# Patient Record
Sex: MALE | Race: WHITE | Employment: OTHER | URBAN - METROPOLITAN AREA
[De-identification: names, ages, dates, MRNs, and addresses within clinical notes are randomized per-mention and may not be internally consistent; named-entity substitution may affect disease eponyms.]

---

## 2017-01-04 ENCOUNTER — ANTICOAGULATION THERAPY VISIT (OUTPATIENT)
Dept: ANTICOAGULATION | Facility: CLINIC | Age: 67
End: 2017-01-04
Payer: MEDICARE

## 2017-01-04 ENCOUNTER — ALLIED HEALTH/NURSE VISIT (OUTPATIENT)
Dept: FAMILY MEDICINE | Facility: CLINIC | Age: 67
End: 2017-01-04
Payer: MEDICARE

## 2017-01-04 DIAGNOSIS — Z23 NEED FOR PROPHYLACTIC VACCINATION AND INOCULATION AGAINST INFLUENZA: Primary | ICD-10-CM

## 2017-01-04 DIAGNOSIS — Z79.01 LONG-TERM (CURRENT) USE OF ANTICOAGULANTS: Primary | ICD-10-CM

## 2017-01-04 DIAGNOSIS — I48.91 ATRIAL FIBRILLATION WITH RVR (H): ICD-10-CM

## 2017-01-04 LAB — INR POINT OF CARE: 5.4 (ref 0.86–1.14)

## 2017-01-04 PROCEDURE — 99207 ZZC NO CHARGE NURSE ONLY: CPT

## 2017-01-04 PROCEDURE — 36416 COLLJ CAPILLARY BLOOD SPEC: CPT

## 2017-01-04 PROCEDURE — 90662 IIV NO PRSV INCREASED AG IM: CPT

## 2017-01-04 PROCEDURE — G0008 ADMIN INFLUENZA VIRUS VAC: HCPCS

## 2017-01-04 PROCEDURE — 85610 PROTHROMBIN TIME: CPT | Mod: QW

## 2017-01-04 NOTE — NURSING NOTE
Prior to injection verified patient identity using patient's name and date of birth.  Per orders of Dr. Valdez injection of Flu given by Mayra Dumas MA. Patient instructed to remain in clinic for 20 minutes afterwards and to report any adverse reaction to me immediately.

## 2017-01-04 NOTE — PROGRESS NOTES
Injectable Influenza Immunization Documentation    1.  Is the person to be vaccinated sick today?  No    2. Does the person to be vaccinated have an allergy to eggs or to a component of the vaccine?  No    3. Has the person to be vaccinated today ever had a serious reaction to influenza vaccine in the past?  No    4. Has the person to be vaccinated ever had Guillain-[place] syndrome?  No     Form completed by Mayra Dumas MA

## 2017-01-04 NOTE — PROGRESS NOTES
ANTICOAGULATION FOLLOW-UP CLINIC VISIT    Patient Name:  Home Valdez  Date:  1/4/2017  Contact Type:  Face to Face    SUBJECTIVE:     Patient Findings     Positives Medication Changes (he was on prednisone and an antibiotic but it has been >7 days since then), OTC meds (some more Tylenol recently), Inflammation (there may still be some inflammation going on from URI)           OBJECTIVE    INR PROTIME   Date Value Ref Range Status   01/04/2017 5.4* 0.86 - 1.14 Final       ASSESSMENT / PLAN  INR assessment SUPRA    Recheck INR In: 5 DAYS    INR Location Clinic      Anticoagulation Summary as of 1/4/2017     INR goal 2.0-3.0   Selected INR 5.4! (1/4/2017)   Maintenance plan 10 mg (2.5 mg x 4) on Mon, Wed, Fri; 7.5 mg (2.5 mg x 3) all other days   Full instructions 1/4: Hold; Otherwise 10 mg on Mon, Wed, Fri; 7.5 mg all other days   Weekly total 60 mg   Plan last modified Brittany Martin RN (10/3/2016)   Next INR check 1/9/2017   Target end date     Indications   Atrial fibrillation with RVR (H) [I48.91]  Long-term (current) use of anticoagulants [Z79.01] [Z79.01]         Anticoagulation Episode Summary     INR check location     Preferred lab     Send INR reminders to [facility]    Comments 2.5 MG TABLETS, likes print out       Anticoagulation Care Providers     Provider Role Specialty Phone number    Neo Galicia MD [facility] [phone]            See the Encounter Report to view Anticoagulation Flowsheet and Dosing Calendar (Go to Encounters tab in chart review, and find the Anticoagulation Therapy Visit)    Dosage adjustment made based on physician directed care plan.    Hold Wed then resume- about a 16-17% decrease in weekly dose   He is aware that his blood in considered 'thinner' and that he need to be careful with shaving, cutting, walking, etc.     Brittany Martin RN

## 2017-01-04 NOTE — MR AVS SNAPSHOT
Home Valdez   1/4/2017 1:30 PM   Anticoagulation Therapy Visit    Description:  66 year old male   Provider:  SHREYAS ANTI COAG   Department:  Ph Anticoag           INR as of 1/4/2017     Selected INR 5.4! (1/4/2017)      Anticoagulation Summary as of 1/4/2017     INR goal 2.0-3.0   Selected INR 5.4! (1/4/2017)   Full instructions 1/4: Hold; Otherwise 10 mg on Mon, Wed, Fri; 7.5 mg all other days   Next INR check 1/9/2017    Indications   Atrial fibrillation with RVR (H) [I48.91]  Long-term (current) use of anticoagulants [Z79.01] [Z79.01]         Your next Anticoagulation Clinic appointment(s)     Jan 09, 2017  8:30 AM   Anticoagulation Visit with SHREYAS ANTI DANI   [facility]    [address] [place] [address]   [phone]              Contact Numbers     Clinic Number:         January 2017 Details    Sun Mon Tue Wed Thu Fri Sat     1               2               3               4      Hold   See details      5      7.5 mg         6      10 mg         7      7.5 mg           8      7.5 mg         9            10               11               12               13               14                 15               16               17               18               19               20               21                 22               23               24               25               26               27               28                 29               30               31                    Date Details   01/04 This INR check       Date of next INR:  1/9/2017         How to take your warfarin dose     To take:  7.5 mg Take 3 of the 2.5 mg tablets.    To take:  10 mg Take 4 of the 2.5 mg tablets.    Hold Do not take your warfarin dose. See the Details table to the right for additional instructions.

## 2017-01-09 ENCOUNTER — ANTICOAGULATION THERAPY VISIT (OUTPATIENT)
Dept: ANTICOAGULATION | Facility: CLINIC | Age: 67
End: 2017-01-09
Payer: MEDICARE

## 2017-01-09 DIAGNOSIS — Z79.01 LONG-TERM (CURRENT) USE OF ANTICOAGULANTS: Primary | ICD-10-CM

## 2017-01-09 DIAGNOSIS — I48.91 ATRIAL FIBRILLATION WITH RVR (H): ICD-10-CM

## 2017-01-09 LAB — INR POINT OF CARE: 3.9 (ref 0.86–1.14)

## 2017-01-09 PROCEDURE — 99207 ZZC NO CHARGE NURSE ONLY: CPT

## 2017-01-09 PROCEDURE — 85610 PROTHROMBIN TIME: CPT | Mod: QW

## 2017-01-09 PROCEDURE — 36416 COLLJ CAPILLARY BLOOD SPEC: CPT

## 2017-01-09 NOTE — PROGRESS NOTES
ANTICOAGULATION FOLLOW-UP CLINIC VISIT    Patient Name:  Home Valdez  Date:  1/9/2017  Contact Type:  Face to Face    SUBJECTIVE:     Patient Findings     Positives Other Complaints (Trigeminal Neurelgia acted up twice this weekend. ), OTC meds (took a Tylenol on Thursday. )           OBJECTIVE    INR PROTIME   Date Value Ref Range Status   01/09/2017 3.9* 0.86 - 1.14 Final       ASSESSMENT / PLAN  INR assessment SUPRA    Recheck INR In: 4 DAYS    INR Location Clinic      Anticoagulation Summary as of 1/9/2017     INR goal 2.0-3.0   Selected INR 3.9! (1/9/2017)   Maintenance plan 10 mg (2.5 mg x 4) on Mon, Wed, Fri; 7.5 mg (2.5 mg x 3) all other days   Full instructions 1/9: 5 mg; 1/10: 5 mg; 1/11: 7.5 mg; 1/12: 5 mg; Otherwise 10 mg on Mon, Wed, Fri; 7.5 mg all other days   Weekly total 60 mg   Plan last modified Brittany Martin RN (10/3/2016)   Next INR check 1/13/2017   Target end date     Indications   Atrial fibrillation with RVR (H) [I48.91]  Long-term (current) use of anticoagulants [Z79.01] [Z79.01]         Anticoagulation Episode Summary     INR check location     Preferred lab     Send INR reminders to [facility]    Comments 2.5 MG TABLETS, likes print out       Anticoagulation Care Providers     Provider Role Specialty Phone number    Neo Galicia MD [facility] [phone]            See the Encounter Report to view Anticoagulation Flowsheet and Dosing Calendar (Go to Encounters tab in chart review, and find the Anticoagulation Therapy Visit)    Dosage adjustment made based on physician directed care plan.    Brittany Martin, FRENCH

## 2017-01-09 NOTE — MR AVS SNAPSHOT
Home Valdez   1/9/2017 8:30 AM   Anticoagulation Therapy Visit    Description:  66 year old male   Provider:  PH ANTI COAG   Department:  Ph Anticoag           INR as of 1/9/2017     Selected INR 3.9! (1/9/2017)      Anticoagulation Summary as of 1/9/2017     INR goal 2.0-3.0   Selected INR 3.9! (1/9/2017)   Full instructions 1/9: 5 mg; 1/10: 5 mg; 1/11: 7.5 mg; 1/12: 5 mg; Otherwise 10 mg on Mon, Wed, Fri; 7.5 mg all other days   Next INR check 1/13/2017    Indications   Atrial fibrillation with RVR (H) [I48.91]  Long-term (current) use of anticoagulants [Z79.01] [Z79.01]         Your next Anticoagulation Clinic appointment(s)     Jan 09, 2017  8:30 AM   Anticoagulation Visit with PH ANTI COAG   [facility]    [address] [place] [address]   [phone]            Jan 13, 2017  8:30 AM   Anticoagulation Visit with PH ANTI COAG   [facility]    [address] [place] [address]   [phone]              Contact Numbers     Clinic Number:         January 2017 Details    Sun Mon Tue Wed Thu Fri Sat     1               2               3               4               5               6               7                 8               9      5 mg   See details      10      5 mg         11      7.5 mg         12      5 mg         13            14                 15               16               17               18               19               20               21                 22               23               24               25               26               27               28                 29               30               31                    Date Details   01/09 This INR check       Date of next INR:  1/13/2017         How to take your warfarin dose     To take:  5 mg Take 2 of the 2.5 mg tablets.    To take:  7.5 mg Take 3 of the 2.5 mg tablets.    To take:  10 mg Take 4 of the 2.5 mg  tablets.

## 2017-01-12 DIAGNOSIS — I50.9 CHF (CONGESTIVE HEART FAILURE) (H): ICD-10-CM

## 2017-01-12 DIAGNOSIS — I48.91 ATRIAL FIBRILLATION WITH RVR (H): Primary | ICD-10-CM

## 2017-01-12 RX ORDER — METOPROLOL SUCCINATE 50 MG/1
50 TABLET, EXTENDED RELEASE ORAL DAILY
Qty: 90 TABLET | Refills: 3 | Status: SHIPPED | OUTPATIENT
Start: 2017-01-12 | End: 2018-01-17

## 2017-01-13 ENCOUNTER — ANTICOAGULATION THERAPY VISIT (OUTPATIENT)
Dept: ANTICOAGULATION | Facility: CLINIC | Age: 67
End: 2017-01-13
Payer: MEDICARE

## 2017-01-13 DIAGNOSIS — I48.91 ATRIAL FIBRILLATION WITH RVR (H): ICD-10-CM

## 2017-01-13 DIAGNOSIS — Z79.01 LONG-TERM (CURRENT) USE OF ANTICOAGULANTS: Primary | ICD-10-CM

## 2017-01-13 LAB — INR POINT OF CARE: 3.2 (ref 0.86–1.14)

## 2017-01-13 PROCEDURE — 85610 PROTHROMBIN TIME: CPT | Mod: QW

## 2017-01-13 PROCEDURE — 36416 COLLJ CAPILLARY BLOOD SPEC: CPT

## 2017-01-13 PROCEDURE — 99207 ZZC NO CHARGE NURSE ONLY: CPT

## 2017-01-13 NOTE — PROGRESS NOTES
ANTICOAGULATION FOLLOW-UP CLINIC VISIT    Patient Name:  Home Valdez  Date:  1/13/2017  Contact Type:  Face to Face    SUBJECTIVE:     Patient Findings     Positives No Problem Findings           OBJECTIVE    INR PROTIME   Date Value Ref Range Status   01/13/2017 3.2* 0.86 - 1.14 Final       ASSESSMENT / PLAN  INR assessment SUPRA    Recheck INR In: 1 WEEK    INR Location Clinic      Anticoagulation Summary as of 1/13/2017     INR goal 2.0-3.0   Selected INR 3.2! (1/13/2017)   Maintenance plan 7.5 mg (2.5 mg x 3) on Tue, Thu, Sat; 5 mg (2.5 mg x 2) all other days   Full instructions 7.5 mg on Tue, Thu, Sat; 5 mg all other days   Weekly total 42.5 mg   Plan last modified Brittany Martin RN (1/13/2017)   Next INR check 1/20/2017   Target end date     Indications   Atrial fibrillation with RVR (H) [I48.91]  Long-term (current) use of anticoagulants [Z79.01] [Z79.01]         Anticoagulation Episode Summary     INR check location     Preferred lab     Send INR reminders to [facility] POOL    Comments 2.5 MG TABLETS, likes print out       Anticoagulation Care Providers     Provider Role Specialty Phone number    Neo Galicia MD [facility] [phone]            See the Encounter Report to view Anticoagulation Flowsheet and Dosing Calendar (Go to Encounters tab in chart review, and find the Anticoagulation Therapy Visit)    Dosage adjustment made based on physician directed care plan.    Brittany Martin RN

## 2017-01-13 NOTE — MR AVS SNAPSHOT
Home Valdez   1/13/2017 8:30 AM   Anticoagulation Therapy Visit    Description:  66 year old male   Provider:  PH ANTI COAG   Department:  Ph Anticoag           INR as of 1/13/2017     Selected INR 3.2! (1/13/2017)      Anticoagulation Summary as of 1/13/2017     INR goal 2.0-3.0   Selected INR 3.2! (1/13/2017)   Full instructions 7.5 mg on Tue, Thu, Sat; 5 mg all other days   Next INR check 1/20/2017    Indications   Atrial fibrillation with RVR (H) [I48.91]  Long-term (current) use of anticoagulants [Z79.01] [Z79.01]         Your next Anticoagulation Clinic appointment(s)     Jan 13, 2017  8:30 AM   Anticoagulation Visit with PH ANTI COAG   [facility] ([address] [address]   [phone]            Jan 20, 2017  8:15 AM   Anticoagulation Visit with PH ANTI COAG   [facility] ([address] [address]   [phone]              Contact Numbers     Clinic Number:         January 2017 Details    Sun Mon Tue Wed Thu Fri Sat     1               2               3               4               5               6               7                 8               9               10               11               12               13      5 mg   See details      14      7.5 mg           15      5 mg         16      5 mg         17      7.5 mg         18      5 mg         19      7.5 mg         20            21                 22               23               24               25               26               27               28                 29               30               31                    Date Details   01/13 This INR check       Date of next INR:  1/20/2017         How to take your warfarin dose     To take:  5 mg Take 2 of the 2.5 mg tablets.    To take:  7.5 mg Take 3 of the 2.5 mg tablets.

## 2017-01-20 ENCOUNTER — ANTICOAGULATION THERAPY VISIT (OUTPATIENT)
Dept: ANTICOAGULATION | Facility: CLINIC | Age: 67
End: 2017-01-20
Payer: MEDICARE

## 2017-01-20 DIAGNOSIS — Z79.01 LONG-TERM (CURRENT) USE OF ANTICOAGULANTS: Primary | ICD-10-CM

## 2017-01-20 DIAGNOSIS — I48.91 ATRIAL FIBRILLATION WITH RVR (H): ICD-10-CM

## 2017-01-20 LAB — INR POINT OF CARE: 2.3 (ref 0.86–1.14)

## 2017-01-20 PROCEDURE — 99207 ZZC NO CHARGE NURSE ONLY: CPT

## 2017-01-20 PROCEDURE — 85610 PROTHROMBIN TIME: CPT | Mod: QW

## 2017-01-20 PROCEDURE — 36416 COLLJ CAPILLARY BLOOD SPEC: CPT

## 2017-01-20 NOTE — PROGRESS NOTES
ANTICOAGULATION FOLLOW-UP CLINIC VISIT    Patient Name:  Home Valdez  Date:  1/20/2017  Contact Type:  Face to Face    SUBJECTIVE:     Patient Findings     Positives No Problem Findings           OBJECTIVE    INR PROTIME   Date Value Ref Range Status   01/20/2017 2.3* 0.86 - 1.14 Final       ASSESSMENT / PLAN  INR assessment THER    Recheck INR In: 2 WEEKS    INR Location Clinic      Anticoagulation Summary as of 1/20/2017     INR goal 2.0-3.0   Selected INR 2.3 (1/20/2017)   Maintenance plan 7.5 mg (2.5 mg x 3) on Tue, Thu, Sat; 5 mg (2.5 mg x 2) all other days   Full instructions 7.5 mg on Tue, Thu, Sat; 5 mg all other days   Weekly total 42.5 mg   No change documented Brittany Martin RN   Plan last modified Brittany Martin RN (1/13/2017)   Next INR check 2/3/2017   Target end date     Indications   Atrial fibrillation with RVR (H) [I48.91]  Long-term (current) use of anticoagulants [Z79.01] [Z79.01]         Anticoagulation Episode Summary     INR check location     Preferred lab     Send INR reminders to [facility] POOL    Comments 2.5 MG TABLETS, likes print out       Anticoagulation Care Providers     Provider Role Specialty Phone number    Neo Galicia MD [facility] [phone]            See the Encounter Report to view Anticoagulation Flowsheet and Dosing Calendar (Go to Encounters tab in chart review, and find the Anticoagulation Therapy Visit)    Dosage adjustment made based on physician directed care plan.    Brittany Martin RN

## 2017-01-20 NOTE — MR AVS SNAPSHOT
Home Valdez   1/20/2017 8:15 AM   Anticoagulation Therapy Visit    Description:  66 year old male   Provider:  PH ANTI COAG   Department:  Ph Anticoag           INR as of 1/20/2017     Selected INR 2.3 (1/20/2017)      Anticoagulation Summary as of 1/20/2017     INR goal 2.0-3.0   Selected INR 2.3 (1/20/2017)   Full instructions 7.5 mg on Tue, Thu, Sat; 5 mg all other days   Next INR check 2/3/2017    Indications   Atrial fibrillation with RVR (H) [I48.91]  Long-term (current) use of anticoagulants [Z79.01] [Z79.01]         Your next Anticoagulation Clinic appointment(s)     Jan 20, 2017  8:15 AM   Anticoagulation Visit with PH ANTI COAG   [address] [address]   [phone]            Feb 03, 2017  8:30 AM   Anticoagulation Visit with PH ANTI COAG   [facility] ([address] [address]   [phone]              Contact Numbers     Clinic Number:         January 2017 Details    Sun Mon Tue Wed Thu Fri Sat     1               2               3               4               5               6               7                 8               9               10               11               12               13               14                 15               16               17               18               19               20      5 mg   See details      21      7.5 mg           22      5 mg         23      5 mg         24      7.5 mg         25      5 mg         26      7.5 mg         27      5 mg         28      7.5 mg           29      5 mg         30      5 mg         31      7.5 mg              Date Details   01/20 This INR check               How to take your warfarin dose     To take:  5 mg Take 2 of the 2.5 mg tablets.    To take:  7.5 mg Take 3 of the 2.5 mg tablets.           February 2017 Details    Sun Mon Tue Wed Thu Fri Sat        1      5 mg          2      7.5 mg         3            4                 5               6               7               8               9               10               11                 12               13               14               15               16               17               18                 19               20               21               22               23               24               25                 26               27               28                    Date Details   No additional details    Date of next INR:  2/3/2017         How to take your warfarin dose     To take:  5 mg Take 2 of the 2.5 mg tablets.    To take:  7.5 mg Take 3 of the 2.5 mg tablets.

## 2017-01-25 DIAGNOSIS — J44.1 COPD EXACERBATION (H): ICD-10-CM

## 2017-01-25 DIAGNOSIS — J06.9 UPPER RESPIRATORY TRACT INFECTION, UNSPECIFIED TYPE: Primary | ICD-10-CM

## 2017-01-25 DIAGNOSIS — J43.1 PANLOBULAR EMPHYSEMA (H): ICD-10-CM

## 2017-01-25 RX ORDER — METHYLPREDNISOLONE 4 MG
TABLET, DOSE PACK ORAL
Qty: 21 TABLET | Refills: 0 | Status: SHIPPED | OUTPATIENT
Start: 2017-01-25 | End: 2017-02-16

## 2017-01-25 RX ORDER — AMOXICILLIN AND CLAVULANATE POTASSIUM 500; 125 MG/1; MG/1
1 TABLET, FILM COATED ORAL 3 TIMES DAILY
Qty: 30 TABLET | Refills: 3 | Status: SHIPPED | OUTPATIENT
Start: 2017-01-25 | End: 2017-02-16

## 2017-02-01 ENCOUNTER — OFFICE VISIT (OUTPATIENT)
Dept: NEUROSURGERY | Facility: CLINIC | Age: 67
End: 2017-02-01

## 2017-02-01 VITALS
WEIGHT: 244.8 LBS | HEIGHT: 69 IN | HEART RATE: 64 BPM | BODY MASS INDEX: 36.26 KG/M2 | SYSTOLIC BLOOD PRESSURE: 150 MMHG | DIASTOLIC BLOOD PRESSURE: 82 MMHG

## 2017-02-01 DIAGNOSIS — G50.0 TRIGEMINAL NEURALGIA OF LEFT SIDE OF FACE: Primary | ICD-10-CM

## 2017-02-01 ASSESSMENT — PAIN SCALES - GENERAL: PAINLEVEL: NO PAIN (0)

## 2017-02-01 NOTE — NURSING NOTE
Chief Complaint   Patient presents with     RECHECK     [employer] RETURN - facial pain has returned     Ros Dunne MA

## 2017-02-01 NOTE — Clinical Note
"2/1/2017       RE: Home Valdez  [address]  [facility] [address]     Dear Colleague,    Thank you for referring your patient, Home Valdez, to the [facility] NEUROSURGERY at [facility]. Please see a copy of my visit note below.    HISTORY OF PRESENT ILLNESS:  Mr. Valdez is seen in followup of his treated trigeminal neuralgia.  He is a 66-year-old man who we treated with a stereotactic balloon compression trigeminal rhizotomy on the left in 09/2016.  At that time, he was having so much pain that we really could not have a conversation with him, although he was able to understand the explanation of his condition and the treatment.      Today, he is a changed person.  He was smiling and talkative.  He is, however, having some small recurrences of pain.  He had been pain-free until earlier this month, and then he experienced the onset of what he said it felt like worms crawling under the skin of his face and this has been followed by several attacks of true trigeminal neuralgia.  They are not nearly as severe as they were before surgery, and he has not had any for the past several days.      He does say that his eye feels a bit \"gritty\" on the left.  There is no clouding of the cornea, there is no conjunctival redness, but he does not have a corneal reflex on that side.      RECOMMENDATION:  My first recommendation was for him to see his ophthalmologist as soon as possible about his eye.  The second recommendation was that if his pain does recur full bore, we can repeat the procedure or consider other procedures, but at the present time, he stated clearly that it is not bad enough to warrant surgical intervention.  He is going to stay on his medication, see his ophthalmologist but let us know if the pain is progressing and requires additional interventional treatment.         REGINA CA MD             D: 02/01/2017 20:36   T: 02/02/2017 12:39   MT: RICHARDSON      Name: "     KARTIK HUERTA   MRN:      [id number]        Account:      [id number]   :      1950           Service Date: 2017      Document: [id number]

## 2017-02-02 NOTE — PROGRESS NOTES
"HISTORY OF PRESENT ILLNESS:  Mr. Huerta is seen in followup of his treated trigeminal neuralgia.  He is a 66-year-old man who we treated with a stereotactic balloon compression trigeminal rhizotomy on the left in 2016.  At that time, he was having so much pain that we really could not have a conversation with him, although he was able to understand the explanation of his condition and the treatment.      Today, he is a changed person.  He was smiling and talkative.  He is, however, having some small recurrences of pain.  He had been pain-free until earlier this month, and then he experienced the onset of what he said it felt like worms crawling under the skin of his face and this has been followed by several attacks of true trigeminal neuralgia.  They are not nearly as severe as they were before surgery, and he has not had any for the past several days.      He does say that his eye feels a bit \"gritty\" on the left.  There is no clouding of the cornea, there is no conjunctival redness, but he does not have a corneal reflex on that side.      RECOMMENDATION:  My first recommendation was for him to see his ophthalmologist as soon as possible about his eye.  The second recommendation was that if his pain does recur full bore, we can repeat the procedure or consider other procedures, but at the present time, he stated clearly that it is not bad enough to warrant surgical intervention.  He is going to stay on his medication, see his ophthalmologist but let us know if the pain is progressing and requires additional interventional treatment.         REGINA CA MD             D: 2017 20:36   T: 2017 12:39   MT: RICHARDSON      Name:     KARTIK HUERTA   MRN:      [id number]        Account:      [id number]   :      1950           Service Date: 2017      Document: [id number]    "

## 2017-02-03 ENCOUNTER — ANTICOAGULATION THERAPY VISIT (OUTPATIENT)
Dept: ANTICOAGULATION | Facility: CLINIC | Age: 67
End: 2017-02-03
Payer: MEDICARE

## 2017-02-03 DIAGNOSIS — Z79.01 LONG-TERM (CURRENT) USE OF ANTICOAGULANTS: Primary | ICD-10-CM

## 2017-02-03 DIAGNOSIS — I48.91 ATRIAL FIBRILLATION WITH RVR (H): ICD-10-CM

## 2017-02-03 LAB — INR POINT OF CARE: 3.2 (ref 0.86–1.14)

## 2017-02-03 PROCEDURE — 36416 COLLJ CAPILLARY BLOOD SPEC: CPT

## 2017-02-03 PROCEDURE — 99207 ZZC NO CHARGE NURSE ONLY: CPT

## 2017-02-03 PROCEDURE — 85610 PROTHROMBIN TIME: CPT | Mod: QW

## 2017-02-03 NOTE — PROGRESS NOTES
ANTICOAGULATION FOLLOW-UP CLINIC VISIT    Patient Name:  Home Valdez  Date:  2/3/2017  Contact Type:  Face to Face    SUBJECTIVE:     Patient Findings     Positives Medication Changes (methylprednisone (done this AM), augmentin (will be done on Monday night))           OBJECTIVE    INR PROTIME   Date Value Ref Range Status   02/03/2017 3.2* 0.86 - 1.14 Final       ASSESSMENT / PLAN  INR assessment THER    Recheck INR In: 2 WEEKS    INR Location Clinic      Anticoagulation Summary as of 2/3/2017     INR goal 2.0-3.0   Selected INR 3.2! (2/3/2017)   Maintenance plan 7.5 mg (2.5 mg x 3) on Tue, Thu, Sat; 5 mg (2.5 mg x 2) all other days   Full instructions 2/3: 2.5 mg; Otherwise 7.5 mg on Tue, Thu, Sat; 5 mg all other days   Weekly total 42.5 mg   Plan last modified Brittany Martin RN (1/13/2017)   Next INR check 2/17/2017   Target end date     Indications   Atrial fibrillation with RVR (H) [I48.91]  Long-term (current) use of anticoagulants [Z79.01] [Z79.01]         Anticoagulation Episode Summary     INR check location     Preferred lab     Send INR reminders to [facility] POOL    Comments 2.5 MG TABLETS, likes print out       Anticoagulation Care Providers     Provider Role Specialty Phone number    Neo Galicia MD [facility] [phone]            See the Encounter Report to view Anticoagulation Flowsheet and Dosing Calendar (Go to Encounters tab in chart review, and find the Anticoagulation Therapy Visit)    Dosage adjustment made based on physician directed care plan.  Take 2.5 mg tonight and then resume. Recheck 2 weeks     Brittany Martin, FRENCH

## 2017-02-03 NOTE — MR AVS SNAPSHOT
Home Valdez   2/3/2017 8:30 AM   Anticoagulation Therapy Visit    Description:  66 year old male   Provider:  PH ANTI COAG   Department:  Ph Anticoag           INR as of 2/3/2017     Selected INR 3.2! (2/3/2017)      Anticoagulation Summary as of 2/3/2017     INR goal 2.0-3.0   Selected INR 3.2! (2/3/2017)   Full instructions 2/3: 2.5 mg; Otherwise 7.5 mg on Tue, Thu, Sat; 5 mg all other days   Next INR check 2/17/2017    Indications   Atrial fibrillation with RVR (H) [I48.91]  Long-term (current) use of anticoagulants [Z79.01] [Z79.01]         Your next Anticoagulation Clinic appointment(s)     Feb 03, 2017  8:30 AM   Anticoagulation Visit with PH ANTI COAG   [facility]    [address] [place] [address]   [phone]            Feb 17, 2017  8:45 AM   Anticoagulation Visit with PH ANTI COAG   [facility]    [address] [place] [address]   [phone]              Contact Numbers     Clinic Number:         February 2017 Details    Sun Mon Tue Wed Thu Fri Sat        1               2               3      2.5 mg   See details      4      7.5 mg           5      5 mg         6      5 mg         7      7.5 mg         8      5 mg         9      7.5 mg         10      5 mg         11      7.5 mg           12      5 mg         13      5 mg         14      7.5 mg         15      5 mg         16      7.5 mg         17            18                 19               20               21               22               23               24               25                 26               27               28                    Date Details   02/03 This INR check       Date of next INR:  2/17/2017         How to take your warfarin dose     To take:  2.5 mg Take 1 of the 2.5 mg tablets.    To take:  5 mg Take 2 of the 2.5 mg tablets.    To take:  7.5 mg Take 3 of the 2.5 mg tablets.

## 2017-02-07 ENCOUNTER — OFFICE VISIT (OUTPATIENT)
Dept: SLEEP MEDICINE | Facility: CLINIC | Age: 67
End: 2017-02-07
Payer: MEDICARE

## 2017-02-07 ENCOUNTER — OFFICE VISIT (OUTPATIENT)
Dept: FAMILY MEDICINE | Facility: OTHER | Age: 67
End: 2017-02-07
Payer: MEDICARE

## 2017-02-07 ENCOUNTER — TELEPHONE (OUTPATIENT)
Dept: FAMILY MEDICINE | Facility: OTHER | Age: 67
End: 2017-02-07

## 2017-02-07 VITALS — HEART RATE: 66 BPM | SYSTOLIC BLOOD PRESSURE: 136 MMHG | DIASTOLIC BLOOD PRESSURE: 82 MMHG

## 2017-02-07 DIAGNOSIS — R06.9 BREATHING PROBLEM: Primary | ICD-10-CM

## 2017-02-07 DIAGNOSIS — Z01.30 BP CHECK: Primary | ICD-10-CM

## 2017-02-07 PROCEDURE — 99207 ZZC NO CHARGE NURSE ONLY: CPT

## 2017-02-07 PROCEDURE — 94762 N-INVAS EAR/PLS OXIMTRY CONT: CPT | Performed by: OTOLARYNGOLOGY

## 2017-02-07 ASSESSMENT — ANXIETY QUESTIONNAIRES
3. WORRYING TOO MUCH ABOUT DIFFERENT THINGS: NOT AT ALL
6. BECOMING EASILY ANNOYED OR IRRITABLE: NOT AT ALL
2. NOT BEING ABLE TO STOP OR CONTROL WORRYING: NOT AT ALL
5. BEING SO RESTLESS THAT IT IS HARD TO SIT STILL: NOT AT ALL
IF YOU CHECKED OFF ANY PROBLEMS ON THIS QUESTIONNAIRE, HOW DIFFICULT HAVE THESE PROBLEMS MADE IT FOR YOU TO DO YOUR WORK, TAKE CARE OF THINGS AT HOME, OR GET ALONG WITH OTHER PEOPLE: NOT DIFFICULT AT ALL
1. FEELING NERVOUS, ANXIOUS, OR ON EDGE: NOT AT ALL
7. FEELING AFRAID AS IF SOMETHING AWFUL MIGHT HAPPEN: NOT AT ALL
GAD7 TOTAL SCORE: 0

## 2017-02-07 ASSESSMENT — PATIENT HEALTH QUESTIONNAIRE - PHQ9: 5. POOR APPETITE OR OVEREATING: NOT AT ALL

## 2017-02-07 NOTE — TELEPHONE ENCOUNTER
Panel Management Review      Patient has the following on his problem list:       Composite cancer screening  Chart review shows that this patient is due/due soon for the following   Summary:     Patient is due/failing the following:   BP CHECK    Action needed:   Patient needs nurse only appointment.    Type of outreach:    Phone, spoke to patient.  appointment scheduled    Questions for provider review:                                                                                                                                        Jocelin RO LPN       Chart routed to Jocelin RO LPN   .

## 2017-02-07 NOTE — NURSING NOTE
Home is a 66 year old male who comes in today for a blood pressure check because of medication change and ongoing blood pressure monitoring.  Patient is taking medication as prescribed  Patient is tolerating medications well.  Current complaints: none  Patient is monitoring Blood Pressure elsewhere.      Vitals as recorded, a regular cuff was used.  left arm  BP Readings from Last 4 Encounters:   02/07/17 136/82   02/01/17 150/82   12/19/16 140/90   11/08/16 153/67       Health Maintenance Due   Topic Date Due     HEPATITIS C SCREENING  02/27/1968     VIOLET QUESTIONNAIRE 1 YEAR  08/12/2016     COPD ACTION PLAN Q1 YR  02/08/2017     PNEUMOCOCCAL (2 of 2 - PPSV23) 02/10/2017   Nkechi Carroll MA     2/7/2017

## 2017-02-08 ASSESSMENT — ANXIETY QUESTIONNAIRES: GAD7 TOTAL SCORE: 0

## 2017-02-14 ENCOUNTER — DOCUMENTATION ONLY (OUTPATIENT)
Dept: SLEEP MEDICINE | Facility: CLINIC | Age: 67
End: 2017-02-14

## 2017-02-14 DIAGNOSIS — R09.02 HYPOXEMIA: Primary | ICD-10-CM

## 2017-02-14 NOTE — PROGRESS NOTES
Patient came in for new nasal mask. Was fitted with N20 and really liked it. We also tried the Dreamwear and the Wisp masks. He didn't like those as much, the patient went with the N20.

## 2017-02-14 NOTE — PROGRESS NOTES
EPIC Screening Oximetry Report   URGENT SLEEP STUDY REFERRAL FOR CHF PATIENTS WITH EF 35-50%  FAX WITH TRACINGS TO SLEEP CENTER    This study identifies severity, pattern and frequency of hypoxemia and cannot exclude mild sleep apnea or and may not identified sleep related breathing abnormalities in patients on oxygen or positive airway pressure devices.    Report February 14, 2017  Recording date 2/7/17  Name Home Valdez  MRN [id number]           REQUIRED FOR URGENT REFERRAL         Meets criteria?  1. ?  Duration of data collection: 11:48 hours [> 2 hours continuous artifact free]  2. ?  Condition:    2 lpm O2           [room air]  3. ?  4% O2 desat index:   3.7/ hour  [>15/hour ]  4. ?  Pattern                continuous     [episodic]      Episodic      (Obstructive sleep apnea, Cheyne Roland)    Sustained    (Hypoventilation, Lung disease)      SpO2  <88%                          59.3min       [consider increasing supplemental O2 to 4lpm]

## 2017-02-16 ENCOUNTER — ANTICOAGULATION THERAPY VISIT (OUTPATIENT)
Dept: ANTICOAGULATION | Facility: OTHER | Age: 67
End: 2017-02-16
Payer: MEDICARE

## 2017-02-16 ENCOUNTER — OFFICE VISIT (OUTPATIENT)
Dept: FAMILY MEDICINE | Facility: OTHER | Age: 67
End: 2017-02-16
Payer: MEDICARE

## 2017-02-16 VITALS
BODY MASS INDEX: 35.88 KG/M2 | SYSTOLIC BLOOD PRESSURE: 136 MMHG | WEIGHT: 244.7 LBS | RESPIRATION RATE: 20 BRPM | DIASTOLIC BLOOD PRESSURE: 78 MMHG | TEMPERATURE: 97.6 F | HEART RATE: 72 BPM

## 2017-02-16 DIAGNOSIS — J20.9 ACUTE BRONCHITIS WITH COEXISTING CONDITION REQUIRING PROPHYLACTIC TREATMENT: Primary | ICD-10-CM

## 2017-02-16 DIAGNOSIS — I48.91 ATRIAL FIBRILLATION WITH RVR (H): ICD-10-CM

## 2017-02-16 DIAGNOSIS — B37.0 CANDIDIASIS OF MOUTH: ICD-10-CM

## 2017-02-16 DIAGNOSIS — J44.1 COPD EXACERBATION (H): ICD-10-CM

## 2017-02-16 DIAGNOSIS — Z79.01 LONG-TERM (CURRENT) USE OF ANTICOAGULANTS: ICD-10-CM

## 2017-02-16 LAB — INR POINT OF CARE: 3.8 (ref 0.86–1.14)

## 2017-02-16 PROCEDURE — 85610 PROTHROMBIN TIME: CPT | Mod: QW

## 2017-02-16 PROCEDURE — 99207 ZZC NO CHARGE NURSE ONLY: CPT

## 2017-02-16 PROCEDURE — 99214 OFFICE O/P EST MOD 30 MIN: CPT | Performed by: FAMILY MEDICINE

## 2017-02-16 PROCEDURE — 36416 COLLJ CAPILLARY BLOOD SPEC: CPT

## 2017-02-16 RX ORDER — NYSTATIN 100000/ML
500000 SUSPENSION, ORAL (FINAL DOSE FORM) ORAL 3 TIMES DAILY
Qty: 60 ML | Refills: 0 | Status: SHIPPED | OUTPATIENT
Start: 2017-02-16 | End: 2017-11-06 | Stop reason: ALTCHOICE

## 2017-02-16 RX ORDER — CEFUROXIME AXETIL 250 MG/1
250 TABLET ORAL 2 TIMES DAILY
Qty: 20 TABLET | Refills: 0 | Status: SHIPPED | OUTPATIENT
Start: 2017-02-16 | End: 2017-03-07

## 2017-02-16 RX ORDER — PREDNISONE 20 MG/1
20 TABLET ORAL 2 TIMES DAILY
Qty: 10 TABLET | Refills: 0 | Status: SHIPPED | OUTPATIENT
Start: 2017-02-16 | End: 2017-03-07

## 2017-02-16 ASSESSMENT — PAIN SCALES - GENERAL: PAINLEVEL: NO PAIN (0)

## 2017-02-16 NOTE — PROGRESS NOTES
ANTICOAGULATION FOLLOW-UP CLINIC VISIT    Patient Name:  Home Valdez  Date:  2/16/2017  Contact Type:  Face to Face    SUBJECTIVE:     Patient Findings     Positives Change in medications (Finished a prednisoe taper last week), Antibiotic use or infection (Just finished a round of antibiotics last week)           OBJECTIVE    INR Protime   Date Value Ref Range Status   02/16/2017 3.8 (A) 0.86 - 1.14 Final       ASSESSMENT / PLAN  INR assessment SUPRA    Recheck INR In: 2 WEEKS    INR Location Clinic      Anticoagulation Summary as of 2/16/2017     INR goal 2.0-3.0   Today's INR 3.8!   Maintenance plan 7.5 mg (2.5 mg x 3) on Tue, Thu, Sat; 5 mg (2.5 mg x 2) all other days   Full instructions 2/16: Hold; Otherwise 7.5 mg on Tue, Thu, Sat; 5 mg all other days   Weekly total 42.5 mg   Plan last modified Brittany Martin RN (1/13/2017)   Next INR check 3/2/2017   Target end date     Indications   Atrial fibrillation with RVR (H) [I48.91]  Long-term (current) use of anticoagulants [Z79.01] [Z79.01]         Anticoagulation Episode Summary     INR check location     Preferred lab     Send INR reminders to [facility] POOL    Comments 2.5 MG TABLETS, likes print out       Anticoagulation Care Providers     Provider Role Specialty Phone number    Neo Galicia MD [facility] [phone]            See the Encounter Report to view Anticoagulation Flowsheet and Dosing Calendar (Go to Encounters tab in chart review, and find the Anticoagulation Therapy Visit)    Dosage adjustment made based on physician directed care plan.    Shameka Carey RN

## 2017-02-16 NOTE — PROGRESS NOTES
SUBJECTIVE:                                                    Home Valdez is a 66 year old male who presents to clinic today for the following health issues:      Acute Illness   Acute illness concerns: cough  Onset: over 1 week    Fever: no    Chills/Sweats: YES    Headache (location?): no    Sinus Pressure:YES    Conjunctivitis:  no    Ear Pain: no    Rhinorrhea: YES    Congestion: YES    Sore Throat: YES- some     Cough: YES-productive of yellow sputum    Wheeze: YES    Decreased Appetite: YES    Nausea: no    Vomiting: no    Diarrhea:  no    Dysuria/Freq.: no    Fatigue/Achiness: no    Sick/Strep Exposure: no     Therapies Tried and outcome: cough drops- mild relief      Home Valdez is a 66 year old male  who presents to the clinic today with a cough sx as noted above.      Patient Active Problem List   Diagnosis     Posttraumatic stress disorder     PERS HX TOBACCO USE     Pulmonary emphysema (H)     Pulmonary nodule, needs annual ct      Hypertension goal BP (blood pressure) < 140/90     Encounter for long-term current use of medication     COPD (chronic obstructive pulmonary disease) (H)     Hyperlipidemia LDL goal <130     AAA (abdominal aortic aneurysm) 4.0 cm     SEVERE JAMES (obstructive sleep apnea)     Ejection fraction < 50%     Nonischemic cardiomyopathy EF 25% by Echo 11/21/14     Other peripheral vascular disease(443.89)     Dermatophytosis of nail     Syncope     Atrial fibrillation with RVR (H)     Acute systolic CHF (congestive heart failure) (H)     Neutrophilic leukocytosis     Advance Care Planning     DVT prophylaxis     Rapid atrial fibrillation (H)     Hypopotassemia     Hypomagnesemia     Acute bronchitis     CHF (congestive heart failure) (H)     Thrombocytopenia (H)     Long-term (current) use of anticoagulants [Z79.01]     History of atrial fibrillation     COPD exacerbation (H)     Acute respiratory failure with hypoxia (H)     Acute respiratory failure (H)     Trigeminal  neuralgia of left side of face     Panlobular emphysema (H)           Past Medical History   Diagnosis Date     AAA (abdominal aortic aneurysm) (H)      COPD (chronic obstructive pulmonary disease) (H)      moderate     Emphysema      Hyperlipidemia      Hypertension      Hypomagnesemia 1/2/2015     JAMES (obstructive sleep apnea) 9/3/2014          PTSD (post-traumatic stress disorder)      Trigeminal neuralgia            Current Outpatient Prescriptions on File Prior to Visit:  metoprolol (TOPROL-XL) 50 MG 24 hr tablet Take 1 tablet (50 mg) by mouth daily   ipratropium - albuterol 0.5 mg/2.5 mg/3 mL (DUONEB) 0.5-2.5 (3) MG/3ML neb solution Take 1 vial (3 mLs) by nebulization every 4 hours as needed for shortness of breath / dyspnea or wheezing   amiodarone (PACERONE/CODARONE) 200 MG tablet Take 0.5 tablets (100 mg) by mouth daily   potassium chloride SA (K-DUR/KLOR-CON M) 20 MEQ CR tablet Take 1 tablet (20 mEq) by mouth daily   losartan (COZAAR) 50 MG tablet Take 2 tablets (100 mg) by mouth daily   fluticasone-salmeterol (ADVAIR) 250-50 MCG/DOSE diskus inhaler Inhale 1 puff into the lungs 2 times daily   fluticasone (FLONASE) 50 MCG/ACT spray USE ONE TO TWO SPRAYS IN EACH NOSTRIL EVERY DAY   buPROPion (WELLBUTRIN SR) 150 MG 12 hr tablet Take 1 tablet (150 mg) by mouth 2 times daily   albuterol (PROAIR HFA, PROVENTIL HFA, VENTOLIN HFA) 108 (90 BASE) MCG/ACT inhaler Inhale 2 puffs into the lungs every 4 hours as needed for shortness of breath / dyspnea   torsemide (DEMADEX) 20 MG tablet Take 10 mg po daily. Take an extra 10mg po daily as needed for swelling.   warfarin (JANTOVEN) 2.5 MG tablet Take 10 mg (4 tablets) every Dcnazf-Wjchmigfc-Sktrpi-Saturday and take 7.5 mg (3 tablets) all other days, or as directed by the coumadin clinic..  Please resume on Saturday 9/10, for healing from procedure.   senna-docusate (SENOKOT-S;PERICOLACE) 8.6-50 MG per tablet Take 2 tablets by mouth 2 times daily   order for DME  Equipment being ordered: Spacer for inhaler   order for DME Equipment being ordered: Nebulizer   metolazone (ZAROXOLYN) 2.5 MG tablet Take 1 tablet (2.5 mg) by mouth daily as needed   ORDER FOR DME Equipment being ordered: Oxygen 2 liters per minute continuous  with portabilityNeeds home oxygen with portability     No current facility-administered medications on file prior to visit.         Social History     Social History     Marital status:      Spouse name: Chrissy     Number of children: 2     Years of education: N/A     Occupational History      [employer]     Social History Main Topics     Smoking status: Former Smoker     Packs/day: 1.00     Years: 40.00     Types: Cigarettes     Quit date: 8/1/2014     Smokeless tobacco: Never Used     Alcohol use No     Drug use: No     Sexual activity: Yes     Partners: Female     Other Topics Concern      Service Yes     Blood Transfusions No     Sleep Concern Yes     Seat Belt Yes     Parent/Sibling W/ Cabg, Mi Or Angioplasty Before 65f 55m? No     Social History Narrative        Exam:   /78 (BP Location: Right arm, Patient Position: Chair, Cuff Size: Adult Large)  Pulse 72  Temp 97.6  F (36.4  C) (Tympanic)  Resp 20  Wt 244 lb 11.2 oz (111 kg)  BMI 35.88 kg/m2    General: healthy, alert and no distress, appears hydarated, vital signs stable     Ears: ne    Throat: NORMAL - no erythema, no adenopathy, no exudates.    Neck: supple, non-tender, free range of motion, no adenopathy    Cardiac: NORMAL - regular rate and rhythm without murmur.    Respiratory: diffuse scattered wheezing, but air movement and O2 sats okay        Assessment: Acute Bronchitis  COPD exacerbation    Plan: rx prednsione, ceftin. Restart advair. Nystatin for yeast stomatitis.  Symptomatic measures otherwise encouraged, rest,humidity, extra fluids. Recheck in   4-5 days if not improved, sooner if increasing symptoms. Octavio inr in 4-5 dd.    Medications:    Outpatient  Encounter Prescriptions as of 2/16/2017   Medication Sig Dispense Refill     predniSONE (DELTASONE) 20 MG tablet Take 1 tablet (20 mg) by mouth 2 times daily 10 tablet 0     nystatin (MYCOSTATIN) 425002 UNIT/ML suspension Take 5 mLs (500,000 Units) by mouth 3 times daily 60 mL 0     cefUROXime (CEFTIN) 250 MG tablet Take 1 tablet (250 mg) by mouth 2 times daily 20 tablet 0     metoprolol (TOPROL-XL) 50 MG 24 hr tablet Take 1 tablet (50 mg) by mouth daily 90 tablet 3     ipratropium - albuterol 0.5 mg/2.5 mg/3 mL (DUONEB) 0.5-2.5 (3) MG/3ML neb solution Take 1 vial (3 mLs) by nebulization every 4 hours as needed for shortness of breath / dyspnea or wheezing 540 vial 3     amiodarone (PACERONE/CODARONE) 200 MG tablet Take 0.5 tablets (100 mg) by mouth daily 45 tablet 3     potassium chloride SA (K-DUR/KLOR-CON M) 20 MEQ CR tablet Take 1 tablet (20 mEq) by mouth daily 90 tablet 1     losartan (COZAAR) 50 MG tablet Take 2 tablets (100 mg) by mouth daily 180 tablet 0     fluticasone-salmeterol (ADVAIR) 250-50 MCG/DOSE diskus inhaler Inhale 1 puff into the lungs 2 times daily 3 Inhaler 3     fluticasone (FLONASE) 50 MCG/ACT spray USE ONE TO TWO SPRAYS IN EACH NOSTRIL EVERY DAY 16 g 6     buPROPion (WELLBUTRIN SR) 150 MG 12 hr tablet Take 1 tablet (150 mg) by mouth 2 times daily 180 tablet 3     albuterol (PROAIR HFA, PROVENTIL HFA, VENTOLIN HFA) 108 (90 BASE) MCG/ACT inhaler Inhale 2 puffs into the lungs every 4 hours as needed for shortness of breath / dyspnea 6 Inhaler 3     torsemide (DEMADEX) 20 MG tablet Take 10 mg po daily. Take an extra 10mg po daily as needed for swelling. 180 tablet 3     warfarin (JANTOVEN) 2.5 MG tablet Take 10 mg (4 tablets) every Yyqinp-Dqsdiicvv-Eqsznc-Saturday and take 7.5 mg (3 tablets) all other days, or as directed by the coumadin clinic..      Please resume on Saturday 9/10, for healing from procedure. 290 tablet 0     senna-docusate (SENOKOT-S;PERICOLACE) 8.6-50 MG per tablet Take 2  tablets by mouth 2 times daily       order for DME Equipment being ordered: Spacer for inhaler 1 Device 0     order for DME Equipment being ordered: Nebulizer 1 Device 0     metolazone (ZAROXOLYN) 2.5 MG tablet Take 1 tablet (2.5 mg) by mouth daily as needed 30 tablet 0     ORDER FOR DME Equipment being ordered: Oxygen 2 liters per minute continuous  with portability  Needs home oxygen with portability 1 Units 0     [DISCONTINUED] methylPREDNISolone (MEDROL DOSEPAK) 4 MG tablet Follow package instructions (Patient not taking: Reported on 2/16/2017) 21 tablet 0     [DISCONTINUED] amoxicillin-clavulanate (AUGMENTIN) 500-125 MG per tablet Take 1 tablet by mouth 3 times daily (Patient not taking: Reported on 2/16/2017) 30 tablet 3     [DISCONTINUED] guaiFENesin-codeine (ROBITUSSIN AC) 100-10 MG/5ML SOLN solution Take 10 mLs by mouth every 4 hours as needed (Patient not taking: Reported on 2/16/2017) 240 mL 0     No facility-administered encounter medications on file as of 2/16/2017.          dbue

## 2017-02-16 NOTE — MR AVS SNAPSHOT
After Visit Summary   2/16/2017    Home Valdez    MRN: [id number]           Patient Information     Date Of Birth          1950        Visit Information        Provider Department      2/16/2017 2:00 PM Neo Galicia MD [facility]        Today's Diagnoses     Acute bronchitis with coexisting condition requiring prophylactic treatment    -  1    COPD exacerbation (H)        Candidiasis of mouth           Follow-ups after your visit        Your next 10 appointments already scheduled     Feb 22, 2017  8:30 AM CST   [facility]    [address] [facility] [address]   [phone]            Mar 02, 2017  9:00 AM CST   [facility] with  [facility]   [facility]    [address] [facility] [address]   [phone]            May 09, 2017  4:30 PM CDT   Return Sleep Patient with Wilber Thurston MD   [facility] ([facility])    [address] [place] [address] [phone]              Who to contact     If you have questions or need follow up information about today's clinic visit or your schedule please contact [facility] directly at [phone].  Normal or non-critical lab and imaging results will be communicated to you by Giveohart, letter or phone within 4 business days after the clinic has received the results. If you do not hear from us within 7 days, please contact the clinic through Giveohart or phone. If you have a critical or abnormal lab result, we will notify you by phone as soon as possible.  Submit refill requests through [email or web address] or call your pharmacy and they will forward the refill request to us. Please allow 3 business days for your refill to be completed.          Additional Information About Your Visit        [email or web address] Information     [email or web address] lets you send messages to your  "doctor, view your test results, renew your prescriptions, schedule appointments and more. To sign up, go to [email or web address] . Click on \"Log in\" on the left side of the screen, which will take you to the Welcome page. Then click on \"Sign up Now\" on the right side of the page.     You will be asked to enter the access code listed below, as well as some personal information. Please follow the directions to create your username and password.     Your access code is: [email or web address]  Expires: 3/19/2017  9:57 AM     Your access code will  in 90 days. If you need help or a new code, please call your [facility] clinic or [phone].        Care EveryWhere ID     This is your Care EveryWhere ID. This could be used by other organizations to access your [facility] medical records  [id number]        Your Vitals Were     Pulse Temperature Respirations BMI (Body Mass Index)          72 97.6  F (36.4  C) (Tympanic) 20 35.88 kg/m2         Blood Pressure from Last 3 Encounters:   17 136/78   17 136/82   17 150/82    Weight from Last 3 Encounters:   17 244 lb 11.2 oz (111 kg)   17 244 lb 12.8 oz (111 kg)   16 242 lb 11.2 oz (110.1 kg)              Today, you had the following     No orders found for display         Today's Medication Changes          These changes are accurate as of: 17  3:01 PM.  If you have any questions, ask your nurse or doctor.               Start taking these medicines.        Dose/Directions    cefUROXime 250 MG tablet   Commonly known as:  CEFTIN   Used for:  Acute bronchitis with coexisting condition requiring prophylactic treatment   Started by:  Neo Galicia MD        Dose:  250 mg   Take 1 tablet (250 mg) by mouth 2 times daily   Quantity:  20 tablet   Refills:  0       nystatin 203888 UNIT/ML suspension   Commonly known as:  MYCOSTATIN   Used for:  Candidiasis of mouth   Started by:  Neo Galicia MD        Dose:  612419 Units   Take 5 mLs " (500,000 Units) by mouth 3 times daily   Quantity:  60 mL   Refills:  0       predniSONE 20 MG tablet   Commonly known as:  DELTASONE   Used for:  Acute bronchitis with coexisting condition requiring prophylactic treatment, COPD exacerbation (H)   Started by:  Neo Galicia MD        Dose:  20 mg   Take 1 tablet (20 mg) by mouth 2 times daily   Quantity:  10 tablet   Refills:  0            Where to get your medicines      These medications were sent to [id number] - [place], [place] - [address] [place] [address]  [address] [place] [address] [place] [address]    Hours:  M-F 8:30-6:30; Sat 9-4; closed Sunday Phone:  [phone]     cefUROXime 250 MG tablet    nystatin 611810 UNIT/ML suspension    predniSONE 20 MG tablet                Primary Care Provider    None Specified       No primary provider on file.        Thank you!     Thank you for choosing [facility]  for your care. Our goal is always to provide you with excellent care. Hearing back from our patients is one way we can continue to improve our services. Please take a few minutes to complete the written survey that you may receive in the mail after your visit with us. Thank you!             Your Updated Medication List - Protect others around you: Learn how to safely use, store and throw away your medicines at www.disposemymeds.org.          This list is accurate as of: 2/16/17  3:01 PM.  Always use your most recent med list.                   Brand Name Dispense Instructions for use    albuterol 108 (90 BASE) MCG/ACT Inhaler    PROAIR HFA/PROVENTIL HFA/VENTOLIN HFA    6 Inhaler    Inhale 2 puffs into the lungs every 4 hours as needed for shortness of breath / dyspnea       amiodarone 200 MG tablet    PACERONE/CODARONE    45 tablet    Take 0.5 tablets (100 mg) by mouth daily       buPROPion 150 MG 12 hr tablet    WELLBUTRIN SR    180 tablet    Take 1 tablet (150 mg) by mouth 2 times daily       cefUROXime 250 MG tablet    CEFTIN    20 tablet    Take 1 tablet  (250 mg) by mouth 2 times daily       fluticasone 50 MCG/ACT spray    FLONASE    16 g    USE ONE TO TWO SPRAYS IN EACH NOSTRIL EVERY DAY       fluticasone-salmeterol 250-50 MCG/DOSE diskus inhaler    ADVAIR    3 Inhaler    Inhale 1 puff into the lungs 2 times daily       ipratropium - albuterol 0.5 mg/2.5 mg/3 mL 0.5-2.5 (3) MG/3ML neb solution    DUONEB    540 vial    Take 1 vial (3 mLs) by nebulization every 4 hours as needed for shortness of breath / dyspnea or wheezing       losartan 50 MG tablet    COZAAR    180 tablet    Take 2 tablets (100 mg) by mouth daily       metolazone 2.5 MG tablet    ZAROXOLYN    30 tablet    Take 1 tablet (2.5 mg) by mouth daily as needed       metoprolol 50 MG 24 hr tablet    TOPROL-XL    90 tablet    Take 1 tablet (50 mg) by mouth daily       nystatin 053289 UNIT/ML suspension    MYCOSTATIN    60 mL    Take 5 mLs (500,000 Units) by mouth 3 times daily       * order for DME     1 Units    Equipment being ordered: Oxygen 2 liters per minute continuous  with portability Needs home oxygen with portability       * order for DME     1 Device    Equipment being ordered: Nebulizer       * order for DME     1 Device    Equipment being ordered: Spacer for inhaler       potassium chloride SA 20 MEQ CR tablet    K-DUR/KLOR-CON M    90 tablet    Take 1 tablet (20 mEq) by mouth daily       predniSONE 20 MG tablet    DELTASONE    10 tablet    Take 1 tablet (20 mg) by mouth 2 times daily       senna-docusate 8.6-50 MG per tablet    SENOKOT-S;PERICOLACE     Take 2 tablets by mouth 2 times daily       torsemide 20 MG tablet    DEMADEX    180 tablet    Take 10 mg po daily. Take an extra 10mg po daily as needed for swelling.       warfarin 2.5 MG tablet    JANTOVEN    290 tablet    Take 10 mg (4 tablets) every Cfqzka-Krvqrormo-Tnvtkk-Saturday and take 7.5 mg (3 tablets) all other days, or as directed by the coumadin clinic..    Please resume on Saturday 9/10, for healing from procedure.       *  Notice:  This list has 3 medication(s) that are the same as other medications prescribed for you. Read the directions carefully, and ask your doctor or other care provider to review them with you.

## 2017-02-16 NOTE — MR AVS SNAPSHOT
Home Valdez   2/16/2017 1:15 PM   Anticoagulation Therapy Visit    Description:  66 year old male   Provider:  [facility]   Department:  [facility]           INR as of 2/16/2017     Today's INR 3.8!      Anticoagulation Summary as of 2/16/2017     INR goal 2.0-3.0   Today's INR 3.8!   Full instructions 2/16: Hold; Otherwise 7.5 mg on Tue, Thu, Sat; 5 mg all other days   Next INR check 3/2/2017    Indications   Atrial fibrillation with RVR (H) [I48.91]  Long-term (current) use of anticoagulants [Z79.01] [Z79.01]         Your next Anticoagulation Clinic appointment(s)     Feb 16, 2017  1:15 PM CST   Anticoagulation Visit with  ANTI COAG   [facility]    [address] [facility] [address]   [phone]            Mar 02, 2017  9:00 AM CST   Anticoagulation Visit with  ANTI COAG   [facility]    [address] [facility] [address]   [phone]              Contact Numbers     Clinic Number:         February 2017 Details    Sun Mon Tue Wed Thu Fri Sat        1               2               3               4                 5               6               7               8               9               10               11                 12               13               14               15               16      Hold   See details      17      5 mg         18      7.5 mg           19      5 mg         20      5 mg         21      7.5 mg         22      5 mg         23      7.5 mg         24      5 mg         25      7.5 mg           26      5 mg         27      5 mg         28      7.5 mg              Date Details   02/16 This INR check               How to take your warfarin dose     To take:  5 mg Take 2 of the 2.5 mg tablets.    To take:  7.5 mg Take 3 of the 2.5 mg tablets.    Hold Do not take your warfarin dose. See the Details table to the right for additional instructions.                March 2017 Details    Sun Mon  Tue Wed Thu Fri Sat        1      5 mg         2            3               4                 5               6               7               8               9               10               11                 12               13               14               15               16               17               18                 19               20               21               22               23               24               25                 26               27               28               29               30               31                 Date Details   No additional details    Date of next INR:  3/2/2017         How to take your warfarin dose     To take:  5 mg Take 2 of the 2.5 mg tablets.    To take:  7.5 mg Take 3 of the 2.5 mg tablets.

## 2017-02-16 NOTE — NURSING NOTE
"Chief Complaint   Patient presents with     Cough     1 week       Initial /78 (BP Location: Right arm, Patient Position: Chair, Cuff Size: Adult Large)  Pulse 72  Temp 97.6  F (36.4  C) (Tympanic)  Resp 20  Wt 244 lb 11.2 oz (111 kg)  BMI 35.88 kg/m2 Estimated body mass index is 35.88 kg/(m^2) as calculated from the following:    Height as of 2/1/17: 5' 9.25\" (1.759 m).    Weight as of this encounter: 244 lb 11.2 oz (111 kg).  Medication Reconciliation: complete   Nkechi Carroll MA     2/16/2017      "

## 2017-02-22 ENCOUNTER — ANTICOAGULATION THERAPY VISIT (OUTPATIENT)
Dept: ANTICOAGULATION | Facility: OTHER | Age: 67
End: 2017-02-22
Payer: MEDICARE

## 2017-02-22 DIAGNOSIS — I48.91 ATRIAL FIBRILLATION WITH RVR (H): ICD-10-CM

## 2017-02-22 DIAGNOSIS — Z79.01 LONG-TERM (CURRENT) USE OF ANTICOAGULANTS: ICD-10-CM

## 2017-02-22 LAB — INR POINT OF CARE: 1.4 (ref 0.86–1.14)

## 2017-02-22 PROCEDURE — 36416 COLLJ CAPILLARY BLOOD SPEC: CPT

## 2017-02-22 PROCEDURE — 85610 PROTHROMBIN TIME: CPT | Mod: QW

## 2017-02-22 PROCEDURE — 99207 ZZC NO CHARGE NURSE ONLY: CPT

## 2017-02-22 NOTE — MR AVS SNAPSHOT
Home Valdez   2/22/2017 8:30 AM   Anticoagulation Therapy Visit    Description:  66 year old male   Provider:  CAMILO ANTI COAG   Department:  [facility]           INR as of 2/22/2017     Today's INR 1.4!      Anticoagulation Summary as of 2/22/2017     INR goal 2.0-3.0   Today's INR 1.4!   Full instructions 2/22: 7.5 mg; Otherwise 7.5 mg on Tue, Thu, Sat; 5 mg all other days   Next INR check 3/2/2017    Indications   Atrial fibrillation with RVR (H) [I48.91]  Long-term (current) use of anticoagulants [Z79.01] [Z79.01]         Your next Anticoagulation Clinic appointment(s)     Feb 22, 2017  8:30 AM CST   [facility]    [address] [facility] [address]   [phone]            Mar 02, 2017  9:00 AM CST   Anticoagulation Visit with [facility] ANTI COAG   [facility]    [address] [facility] [address]   [phone]              Contact Numbers     Clinic Number:         February 2017 Details    Sun Mon Tue Wed Thu Fri Sat        1               2               3               4                 5               6               7               8               9               10               11                 12               13               14               15               16               17               18                 19               20               21               22      7.5 mg   See details      23      7.5 mg         24      5 mg         25      7.5 mg           26      5 mg         27      5 mg         28      7.5 mg              Date Details   02/22 This INR check               How to take your warfarin dose     To take:  5 mg Take 2 of the 2.5 mg tablets.    To take:  7.5 mg Take 3 of the 2.5 mg tablets.           March 2017 Details    Sun Mon Tue Wed Thu Fri Sat        1      5 mg         2            3               4                 5               6               7                8               9               10               11                 12               13               14               15               16               17               18                 19               20               21               22               23               24               25                 26               27               28               29               30               31                 Date Details   No additional details    Date of next INR:  3/2/2017         How to take your warfarin dose     To take:  5 mg Take 2 of the 2.5 mg tablets.    To take:  7.5 mg Take 3 of the 2.5 mg tablets.

## 2017-02-22 NOTE — PROGRESS NOTES
ANTICOAGULATION FOLLOW-UP CLINIC VISIT    Patient Name:  Home Valdez  Date:  2/22/2017  Contact Type:  Face to Face    SUBJECTIVE:        OBJECTIVE    INR Protime   Date Value Ref Range Status   02/22/2017 1.4 (A) 0.86 - 1.14 Final       ASSESSMENT / PLAN  INR assessment SUB    Recheck INR In: 1 WEEK    INR Location Clinic      Anticoagulation Summary as of 2/22/2017     INR goal 2.0-3.0   Today's INR 1.4!   Maintenance plan 7.5 mg (2.5 mg x 3) on Tue, Thu, Sat; 5 mg (2.5 mg x 2) all other days   Full instructions 2/22: 7.5 mg; Otherwise 7.5 mg on Tue, Thu, Sat; 5 mg all other days   Weekly total 42.5 mg   Plan last modified Brittany Martin RN (1/13/2017)   Next INR check 3/2/2017   Target end date     Indications   Atrial fibrillation with RVR (H) [I48.91]  Long-term (current) use of anticoagulants [Z79.01] [Z79.01]         Anticoagulation Episode Summary     INR check location     Preferred lab     Send INR reminders to [facility] POOL    Comments 2.5 MG TABLETS, likes print out       Anticoagulation Care Providers     Provider Role Specialty Phone number    Neo Galicia MD [facility] [phone]            See the Encounter Report to view Anticoagulation Flowsheet and Dosing Calendar (Go to Encounters tab in chart review, and find the Anticoagulation Therapy Visit)    Dosage adjustment made based on physician directed care plan.    Shameka Carey RN

## 2017-02-24 ENCOUNTER — TELEPHONE (OUTPATIENT)
Dept: CARDIOLOGY | Facility: CLINIC | Age: 67
End: 2017-02-24

## 2017-02-24 NOTE — TELEPHONE ENCOUNTER
Pt calls to ask if he can have labs prior to 5/2017 OV w/ Dr. Murphy done at [facility]. I told him we use the same EHR and  could see Dr. Murphy's orders-he may call  clinic to schedule lab appt and the results will be sent to Dr. Murphy in [facility]. He agrees.

## 2017-03-02 ENCOUNTER — ANTICOAGULATION THERAPY VISIT (OUTPATIENT)
Dept: ANTICOAGULATION | Facility: OTHER | Age: 67
End: 2017-03-02
Payer: MEDICARE

## 2017-03-02 DIAGNOSIS — Z79.01 LONG-TERM (CURRENT) USE OF ANTICOAGULANTS: ICD-10-CM

## 2017-03-02 DIAGNOSIS — I48.91 ATRIAL FIBRILLATION WITH RVR (H): ICD-10-CM

## 2017-03-02 LAB — INR POINT OF CARE: 2.9 (ref 0.86–1.14)

## 2017-03-02 PROCEDURE — 99207 ZZC NO CHARGE NURSE ONLY: CPT

## 2017-03-02 PROCEDURE — 85610 PROTHROMBIN TIME: CPT | Mod: QW

## 2017-03-02 PROCEDURE — 36416 COLLJ CAPILLARY BLOOD SPEC: CPT

## 2017-03-02 NOTE — PROGRESS NOTES
ANTICOAGULATION FOLLOW-UP CLINIC VISIT    Patient Name:  Home Valdez  Date:  3/2/2017  Contact Type:  Face to Face    SUBJECTIVE:     Patient Findings     Positives No Problem Findings           OBJECTIVE    INR Protime   Date Value Ref Range Status   03/02/2017 2.9 (A) 0.86 - 1.14 Final       ASSESSMENT / PLAN  INR assessment THER    Recheck INR In: 2 WEEKS    INR Location Clinic      Anticoagulation Summary as of 3/2/2017     INR goal 2.0-3.0   Today's INR 2.9   Maintenance plan 7.5 mg (2.5 mg x 3) on Tue, Thu, Sat; 5 mg (2.5 mg x 2) all other days   Full instructions 7.5 mg on Tue, Thu, Sat; 5 mg all other days   Weekly total 42.5 mg   No change documented Shameka Carey RN   Plan last modified Brittany Martin RN (1/13/2017)   Next INR check 3/16/2017   Target end date     Indications   Atrial fibrillation with RVR (H) [I48.91]  Long-term (current) use of anticoagulants [Z79.01] [Z79.01]         Anticoagulation Episode Summary     INR check location     Preferred lab     Send INR reminders to [facility] POOL    Comments 2.5 MG TABLETS, likes print out       Anticoagulation Care Providers     Provider Role Specialty Phone number    Neo Galicia MD [facility] [phone]            See the Encounter Report to view Anticoagulation Flowsheet and Dosing Calendar (Go to Encounters tab in chart review, and find the Anticoagulation Therapy Visit)    Dosage adjustment made based on physician directed care plan.    Shameka aCrey RN

## 2017-03-02 NOTE — MR AVS SNAPSHOT
Home Valdez   3/2/2017 9:00 AM   Anticoagulation Therapy Visit    Description:  67 year old male   Provider:  CAMILO ANTI COAG   Department:  [facility]           INR as of 3/2/2017     Today's INR 2.9      Anticoagulation Summary as of 3/2/2017     INR goal 2.0-3.0   Today's INR 2.9   Full instructions 7.5 mg on Tue, Thu, Sat; 5 mg all other days   Next INR check 3/16/2017    Indications   Atrial fibrillation with RVR (H) [I48.91]  Long-term (current) use of anticoagulants [Z79.01] [Z79.01]         Your next Anticoagulation Clinic appointment(s)     Mar 02, 2017  9:00 AM CST   An[facility]  ANTI COAEBEN   [facility]    [address] [facility] [address]   [phone]            Mar 16, 2017  8:30 AM CDT   Anticoagulation Visit with  ANTI COAG   [facility]    [address] [facility] [address]   [phone]              Contact Numbers     Clinic Number:         March 2017 Details    Sun Mon Tue Wed Thu Fri Sat        1               2      7.5 mg   See details      3      5 mg         4      7.5 mg           5      5 mg         6      5 mg         7      7.5 mg         8      5 mg         9      7.5 mg         10      5 mg         11      7.5 mg           12      5 mg         13      5 mg         14      7.5 mg         15      5 mg         16            17               18                 19               20               21               22               23               24               25                 26               27               28               29               30               31                 Date Details   03/02 This INR check       Date of next INR:  3/16/2017         How to take your warfarin dose     To take:  5 mg Take 2 of the 2.5 mg tablets.    To take:  7.5 mg Take 3 of the 2.5 mg tablets.

## 2017-03-07 ENCOUNTER — OFFICE VISIT (OUTPATIENT)
Dept: FAMILY MEDICINE | Facility: OTHER | Age: 67
End: 2017-03-07
Payer: MEDICARE

## 2017-03-07 VITALS
SYSTOLIC BLOOD PRESSURE: 134 MMHG | HEART RATE: 73 BPM | OXYGEN SATURATION: 96 % | DIASTOLIC BLOOD PRESSURE: 82 MMHG | BODY MASS INDEX: 34.13 KG/M2 | TEMPERATURE: 96.8 F | HEIGHT: 71 IN | WEIGHT: 243.8 LBS

## 2017-03-07 DIAGNOSIS — J01.90 ACUTE SINUSITIS WITH SYMPTOMS > 10 DAYS: Primary | ICD-10-CM

## 2017-03-07 PROCEDURE — 99213 OFFICE O/P EST LOW 20 MIN: CPT | Performed by: FAMILY MEDICINE

## 2017-03-07 RX ORDER — CEFUROXIME AXETIL 250 MG/1
250 TABLET ORAL 2 TIMES DAILY
Qty: 14 TABLET | Refills: 0 | Status: SHIPPED | OUTPATIENT
Start: 2017-03-07 | End: 2017-03-14

## 2017-03-07 RX ORDER — PREDNISONE 10 MG/1
10 TABLET ORAL DAILY
Qty: 7 TABLET | Refills: 0 | Status: SHIPPED | OUTPATIENT
Start: 2017-03-07 | End: 2017-05-21

## 2017-03-07 ASSESSMENT — PAIN SCALES - GENERAL: PAINLEVEL: NO PAIN (0)

## 2017-03-07 NOTE — MR AVS SNAPSHOT
After Visit Summary   3/7/2017    Home Valdez    MRN: [id number]           Patient Information     Date Of Birth          1950        Visit Information        Provider Department      3/7/2017 10:00 AM Neo Galicia MD [facility]        Today's Diagnoses     Acute sinusitis with symptoms > 10 days    -  1       Follow-ups after your visit        Your next 10 appointments already scheduled     Mar 16, 2017  8:30 AM CDT   Anticoagulation Visit with  ANTI COAG   [facility]    [address] [facility] [address] [phone]            May 09, 2017  4:30 PM CDT   Return Sleep Patient with Wilber Thurston MD   [facility]    [address] [facility] [address] [phone]            May 18, 2017  1:45 PM CDT   Return Visit with Jimenez Murphy MD   [facility]    [address] [place] [address] [phone]              Who to contact     If you have questions or need follow up information about today's clinic visit or your schedule please contact [facility] directly at [phone].  Normal or non-critical lab and imaging results will be communicated to you by [facility], letter or phone within 4 business days after the clinic has received the results. If you do not hear from us within 7 days, please contact the clinic through [facility] or phone. If you have a critical or abnormal lab result, we will notify you by phone as soon as possible.  Submit refill requests through [facility] or call your pharmacy and they will forward the refill request to us. Please allow 3 business days for your refill to be completed.          Additional Information About Your Visit        [facility] Information     [facility] lets you send messages to your doctor, view your test results, renew your  "prescriptions, schedule appointments and more. To sign up, go to [email or web address] . Click on \"Log in\" on the left side of the screen, which will take you to the Welcome page. Then click on \"Sign up Now\" on the right side of the page.     You will be asked to enter the access code listed below, as well as some personal information. Please follow the directions to create your username and password.     Your access code is: [email or web address]  Expires: 3/19/2017  9:57 AM     Your access code will  in 90 days. If you need help or a new code, please call your [place] clinic or [phone].        Care EveryWhere ID     This is your Care EveryWhere ID. This could be used by other organizations to access your [facility] medical records  [id number]        Your Vitals Were     Pulse Temperature Height Pulse Oximetry BMI (Body Mass Index)       73 96.8  F (36  C) (Tympanic) 5' 11\" (1.803 m) 96% 34 kg/m2        Blood Pressure from Last 3 Encounters:   17 134/82   17 136/78   17 136/82    Weight from Last 3 Encounters:   17 243 lb 12.8 oz (110.6 kg)   17 244 lb 11.2 oz (111 kg)   17 244 lb 12.8 oz (111 kg)              Today, you had the following     No orders found for display         Today's Medication Changes          These changes are accurate as of: 3/7/17 10:21 AM.  If you have any questions, ask your nurse or doctor.               Start taking these medicines.        Dose/Directions    cefUROXime 250 MG tablet   Commonly known as:  CEFTIN   Used for:  Acute sinusitis with symptoms > 10 days   Started by:  Neo Galicia MD        Dose:  250 mg   Take 1 tablet (250 mg) by mouth 2 times daily for 7 days   Quantity:  14 tablet   Refills:  0       predniSONE 10 MG tablet   Commonly known as:  DELTASONE   Used for:  Acute sinusitis with symptoms > 10 days   Started by:  Neo Galicia MD        Dose:  10 mg   Take 1 tablet (10 mg) by mouth daily   Quantity:  7 tablet "   Refills:  0            Where to get your medicines      These medications were sent to [id number] - [place], [place] - [address] [place] [address]  [address] [place] [id number]    Hours:  M-F 8:30-6:30; Sat 9-4; closed Sunday Phone:  [phone]     cefUROXime 250 MG tablet    predniSONE 10 MG tablet                Primary Care Provider    None Specified       No primary provider on file.        Thank you!     Thank you for choosing [facility]  for your care. Our goal is always to provide you with excellent care. Hearing back from our patients is one way we can continue to improve our services. Please take a few minutes to complete the written survey that you may receive in the mail after your visit with us. Thank you!             Your Updated Medication List - Protect others around you: Learn how to safely use, store and throw away your medicines at www.disposemymeds.org.          This list is accurate as of: 3/7/17 10:21 AM.  Always use your most recent med list.                   Brand Name Dispense Instructions for use    albuterol 108 (90 BASE) MCG/ACT Inhaler    PROAIR HFA/PROVENTIL HFA/VENTOLIN HFA    6 Inhaler    Inhale 2 puffs into the lungs every 4 hours as needed for shortness of breath / dyspnea       amiodarone 200 MG tablet    PACERONE/CODARONE    45 tablet    Take 0.5 tablets (100 mg) by mouth daily       buPROPion 150 MG 12 hr tablet    WELLBUTRIN SR    180 tablet    Take 1 tablet (150 mg) by mouth 2 times daily       cefUROXime 250 MG tablet    CEFTIN    14 tablet    Take 1 tablet (250 mg) by mouth 2 times daily for 7 days       fluticasone 50 MCG/ACT spray    FLONASE    16 g    USE ONE TO TWO SPRAYS IN EACH NOSTRIL EVERY DAY       fluticasone-salmeterol 250-50 MCG/DOSE diskus inhaler    ADVAIR    3 Inhaler    Inhale 1 puff into the lungs 2 times daily       ipratropium - albuterol 0.5 mg/2.5 mg/3 mL 0.5-2.5 (3) MG/3ML neb solution    DUONEB    540 vial    Take 1 vial (3 mLs) by  nebulization every 4 hours as needed for shortness of breath / dyspnea or wheezing       losartan 50 MG tablet    COZAAR    180 tablet    Take 2 tablets (100 mg) by mouth daily       metolazone 2.5 MG tablet    ZAROXOLYN    30 tablet    Take 1 tablet (2.5 mg) by mouth daily as needed       metoprolol 50 MG 24 hr tablet    TOPROL-XL    90 tablet    Take 1 tablet (50 mg) by mouth daily       nystatin 878591 UNIT/ML suspension    MYCOSTATIN    60 mL    Take 5 mLs (500,000 Units) by mouth 3 times daily       * order for DME     1 Units    Equipment being ordered: Oxygen 2 liters per minute continuous  with portability Needs home oxygen with portability       * order for DME     1 Device    Equipment being ordered: Nebulizer       * order for DME     1 Device    Equipment being ordered: Spacer for inhaler       potassium chloride SA 20 MEQ CR tablet    K-DUR/KLOR-CON M    90 tablet    Take 1 tablet (20 mEq) by mouth daily       predniSONE 10 MG tablet    DELTASONE    7 tablet    Take 1 tablet (10 mg) by mouth daily       senna-docusate 8.6-50 MG per tablet    SENOKOT-S;PERICOLACE     Take 2 tablets by mouth 2 times daily       torsemide 20 MG tablet    DEMADEX    180 tablet    Take 10 mg po daily. Take an extra 10mg po daily as needed for swelling.       warfarin 2.5 MG tablet    JANTOVEN    290 tablet    Take 10 mg (4 tablets) every Biibql-Dbsrolpls-Yqaqll-Saturday and take 7.5 mg (3 tablets) all other days, or as directed by the coumadin clinic..    Please resume on Saturday 9/10, for healing from procedure.       * Notice:  This list has 3 medication(s) that are the same as other medications prescribed for you. Read the directions carefully, and ask your doctor or other care provider to review them with you.

## 2017-03-07 NOTE — NURSING NOTE
"Chief Complaint   Patient presents with     Cough     3 weeks       Initial /82 (BP Location: Left arm, Patient Position: Chair, Cuff Size: Adult Regular)  Pulse 73  Temp 96.8  F (36  C) (Tympanic)  Ht 5' 11\" (1.803 m)  Wt 243 lb 12.8 oz (110.6 kg)  SpO2 96%  BMI 34 kg/m2 Estimated body mass index is 34 kg/(m^2) as calculated from the following:    Height as of this encounter: 5' 11\" (1.803 m).    Weight as of this encounter: 243 lb 12.8 oz (110.6 kg).  Medication Reconciliation: complete   Nkechi Carroll MA     3/7/2017    "

## 2017-03-07 NOTE — PROGRESS NOTES
SUBJECTIVE:                                                    Home Valdez is a 67 year old male who presents to clinic today for the following health issues:      Acute Illness   Acute illness concerns: cough  Onset: 3 weeks    Fever: no    Chills/Sweats: no    Headache (location?): YES    Sinus Pressure:YES    Conjunctivitis:  no    Ear Pain: no    Rhinorrhea: YES    Congestion: YES    Sore Throat: no     Cough: YES-productive of yellow sputum    Wheeze: YES- some    Decreased Appetite: no    Nausea: no    Vomiting: no    Diarrhea:  no    Dysuria/Freq.: no    Fatigue/Achiness: no    Sick/Strep Exposure: no     Therapies Tried and outcome: was on an antibiotic and prednisone- provided relief from symptoms but symptoms are now back      SUBJECTIVE:    Home is a 67 year old male presenting with symptoms as noted above.  Predisposing factors include copd, hx of smkg    Patient Active Problem List   Diagnosis     Posttraumatic stress disorder     PERS HX TOBACCO USE     Pulmonary emphysema (H)     Pulmonary nodule, needs annual ct      Hypertension goal BP (blood pressure) < 140/90     Encounter for long-term current use of medication     COPD (chronic obstructive pulmonary disease) (H)     Hyperlipidemia LDL goal <130     AAA (abdominal aortic aneurysm) 4.0 cm     SEVERE JAMES (obstructive sleep apnea)     Ejection fraction < 50%     Nonischemic cardiomyopathy EF 25% by Echo 11/21/14     Other peripheral vascular disease(443.89)     Dermatophytosis of nail     Syncope     Atrial fibrillation with RVR (H)     Acute systolic CHF (congestive heart failure) (H)     Neutrophilic leukocytosis     Advance Care Planning     DVT prophylaxis     Rapid atrial fibrillation (H)     Hypopotassemia     Hypomagnesemia     Acute bronchitis     CHF (congestive heart failure) (H)     Thrombocytopenia (H)     Long-term (current) use of anticoagulants [Z79.01]     History of atrial fibrillation     COPD exacerbation (H)     Acute  respiratory failure with hypoxia (H)     Acute respiratory failure (H)     Trigeminal neuralgia of left side of face     Panlobular emphysema (H)       Current Outpatient Prescriptions   Medication Sig Dispense Refill     predniSONE (DELTASONE) 10 MG tablet Take 1 tablet (10 mg) by mouth daily 7 tablet 0     cefUROXime (CEFTIN) 250 MG tablet Take 1 tablet (250 mg) by mouth 2 times daily for 7 days 14 tablet 0     nystatin (MYCOSTATIN) 093171 UNIT/ML suspension Take 5 mLs (500,000 Units) by mouth 3 times daily 60 mL 0     metoprolol (TOPROL-XL) 50 MG 24 hr tablet Take 1 tablet (50 mg) by mouth daily 90 tablet 3     ipratropium - albuterol 0.5 mg/2.5 mg/3 mL (DUONEB) 0.5-2.5 (3) MG/3ML neb solution Take 1 vial (3 mLs) by nebulization every 4 hours as needed for shortness of breath / dyspnea or wheezing 540 vial 3     amiodarone (PACERONE/CODARONE) 200 MG tablet Take 0.5 tablets (100 mg) by mouth daily 45 tablet 3     potassium chloride SA (K-DUR/KLOR-CON M) 20 MEQ CR tablet Take 1 tablet (20 mEq) by mouth daily 90 tablet 1     losartan (COZAAR) 50 MG tablet Take 2 tablets (100 mg) by mouth daily 180 tablet 0     fluticasone-salmeterol (ADVAIR) 250-50 MCG/DOSE diskus inhaler Inhale 1 puff into the lungs 2 times daily 3 Inhaler 3     fluticasone (FLONASE) 50 MCG/ACT spray USE ONE TO TWO SPRAYS IN EACH NOSTRIL EVERY DAY 16 g 6     buPROPion (WELLBUTRIN SR) 150 MG 12 hr tablet Take 1 tablet (150 mg) by mouth 2 times daily 180 tablet 3     albuterol (PROAIR HFA, PROVENTIL HFA, VENTOLIN HFA) 108 (90 BASE) MCG/ACT inhaler Inhale 2 puffs into the lungs every 4 hours as needed for shortness of breath / dyspnea 6 Inhaler 3     torsemide (DEMADEX) 20 MG tablet Take 10 mg po daily. Take an extra 10mg po daily as needed for swelling. 180 tablet 3     warfarin (JANTOVEN) 2.5 MG tablet Take 10 mg (4 tablets) every Lobsvt-Wozxmvjix-Khmmbd-Saturday and take 7.5 mg (3 tablets) all other days, or as directed by the coumadin clinic..       Please resume on Saturday 9/10, for healing from procedure. 290 tablet 0     senna-docusate (SENOKOT-S;PERICOLACE) 8.6-50 MG per tablet Take 2 tablets by mouth 2 times daily       order for DME Equipment being ordered: Spacer for inhaler 1 Device 0     order for DME Equipment being ordered: Nebulizer 1 Device 0     metolazone (ZAROXOLYN) 2.5 MG tablet Take 1 tablet (2.5 mg) by mouth daily as needed 30 tablet 0     ORDER FOR DME Equipment being ordered: Oxygen 2 liters per minute continuous  with portability  Needs home oxygen with portability 1 Units 0       Allergies   Allergen Reactions     Contrast Dye Anaphylaxis       History   Smoking Status     Former Smoker     Packs/day: 1.00     Years: 40.00     Types: Cigarettes     Quit date: 8/1/2014   Smokeless Tobacco     Never Used       OBJECTIVE:  General appearance: nad  Skin color is nl  Hydration status appears adequate with normal skin turgor and moist mucous membranes.    HEENT:   Left TM is nl  Right TM is nl  Throat is nl  Neck is supple w/o adenopathy    CARDIAC:nl  LUNGS: clear  ABDOMEN: ne    CXR Findings: ne    ASSESSMENT:    persistent sinusitis symptoms      PLAN:    short course abx and prednisone. He is already on flonase  extra fluids, humidity,  call back if no improvement in 4-5 days, sooner if increasing sx.  decrease coumadin to 5 mg / while on abx    dbue

## 2017-03-08 DIAGNOSIS — R09.02 HYPOXEMIA: ICD-10-CM

## 2017-03-08 DIAGNOSIS — G47.33 OSA (OBSTRUCTIVE SLEEP APNEA): Primary | ICD-10-CM

## 2017-03-16 ENCOUNTER — ANTICOAGULATION THERAPY VISIT (OUTPATIENT)
Dept: ANTICOAGULATION | Facility: OTHER | Age: 67
End: 2017-03-16
Payer: MEDICARE

## 2017-03-16 DIAGNOSIS — I48.91 ATRIAL FIBRILLATION WITH RVR (H): ICD-10-CM

## 2017-03-16 DIAGNOSIS — Z79.01 LONG-TERM (CURRENT) USE OF ANTICOAGULANTS: ICD-10-CM

## 2017-03-16 LAB — INR POINT OF CARE: 1.9 (ref 0.86–1.14)

## 2017-03-16 PROCEDURE — 85610 PROTHROMBIN TIME: CPT | Mod: QW

## 2017-03-16 PROCEDURE — 99207 ZZC NO CHARGE NURSE ONLY: CPT

## 2017-03-16 PROCEDURE — 36416 COLLJ CAPILLARY BLOOD SPEC: CPT

## 2017-03-16 NOTE — PROGRESS NOTES
ANTICOAGULATION FOLLOW-UP CLINIC VISIT    Patient Name:  Home Valdez  Date:  3/16/2017  Contact Type:  Face to Face    SUBJECTIVE:     Patient Findings     Positives No Problem Findings           OBJECTIVE    INR Protime   Date Value Ref Range Status   03/16/2017 1.9 (A) 0.86 - 1.14 Final       ASSESSMENT / PLAN  INR assessment THER    Recheck INR In: 2 WEEKS    INR Location Clinic      Anticoagulation Summary as of 3/16/2017     INR goal 2.0-3.0   Today's INR 1.9!   Maintenance plan 7.5 mg (2.5 mg x 3) on Tue, Thu, Sat; 5 mg (2.5 mg x 2) all other days   Full instructions 3/16: 5 mg; 3/18: 5 mg; Otherwise 7.5 mg on Tue, Thu, Sat; 5 mg all other days   Weekly total 42.5 mg   Plan last modified Brittany Martin RN (1/13/2017)   Next INR check 3/30/2017   Target end date     Indications   Atrial fibrillation with RVR (H) [I48.91]  Long-term (current) use of anticoagulants [Z79.01] [Z79.01]         Anticoagulation Episode Summary     INR check location     Preferred lab     Send INR reminders to [facility] POOL    Comments 2.5 MG TABLETS, likes print out       Anticoagulation Care Providers     Provider Role Specialty Phone number    Neo Galicia MD [facility] [phone]            See the Encounter Report to view Anticoagulation Flowsheet and Dosing Calendar (Go to Encounters tab in chart review, and find the Anticoagulation Therapy Visit)    Dosage adjustment made based on physician directed care plan.    Shameka Carey RN

## 2017-03-16 NOTE — MR AVS SNAPSHOT
Home Valdez   3/16/2017 8:30 AM   Anticoagulation Therapy Visit    Description:  67 year old male   Provider:  CAMILO ANTI COAG   Department:  [facility]           INR as of 3/16/2017     Today's INR 1.9!      Anticoagulation Summary as of 3/16/2017     INR goal 2.0-3.0   Today's INR 1.9!   Full instructions 3/16: 5 mg; 3/18: 5 mg; Otherwise 7.5 mg on Tue, Thu, Sat; 5 mg all other days   Next INR check 3/30/2017    Indications   Atrial fibrillation with RVR (H) [I48.91]  Long-term (current) use of anticoagulants [Z79.01] [Z79.01]         Your next Anticoagulation Clinic appointment(s)     Mar 16, 2017  8:30 AM CDT   Anticoagulation Visit with  ANTI COAG   [facility]    [address] [facility] [address]   [phone]            Mar 30, 2017  8:15 AM CDT   Anticoagulation Visit with  ANTI COAG   [facility]    [address] [facility] [address]   [phone]              Contact Numbers     Clinic Number:         March 2017 Details    Sun Mon Tue Wed Thu Fri Sat        1               2               3               4                 5               6               7               8               9               10               11                 12               13               14               15               16      5 mg   See details      17      5 mg         18      5 mg           19      5 mg         20      5 mg         21      7.5 mg         22      5 mg         23      7.5 mg         24      5 mg         25      7.5 mg           26      5 mg         27      5 mg         28      7.5 mg         29      5 mg         30            31                 Date Details   03/16 This INR check       Date of next INR:  3/30/2017         How to take your warfarin dose     To take:  5 mg Take 2 of the 2.5 mg tablets.    To take:  7.5 mg Take 3 of the 2.5 mg tablets.

## 2017-03-22 ENCOUNTER — THERAPY VISIT (OUTPATIENT)
Dept: SLEEP MEDICINE | Facility: CLINIC | Age: 67
End: 2017-03-22
Payer: MEDICARE

## 2017-03-22 PROCEDURE — 95811 POLYSOM 6/>YRS CPAP 4/> PARM: CPT | Performed by: OTOLARYNGOLOGY

## 2017-03-22 NOTE — MR AVS SNAPSHOT
After Visit Summary   3/22/2017    Home Valdez    MRN: [id number]           Patient Information     Date Of Birth          1950        Visit Information        Provider Department      3/22/2017 8:00 PM PH BED 1 [facility]         Follow-ups after your visit        Your next 10 appointments already scheduled     Mar 30, 2017  8:15 AM CDT   Anticoagulation Visit with [facility] ANTI COAEBEN   [facility]    [address]  [facility] [id number] [phone]            May 09, 2017  4:30 PM CDT   Return Sleep Patient with Wilber Thurston MD   [facility] ([facility])    [address] [facility] [address] [phone]            May 15, 2017 11:00 AM CDT   Return Visit with Jimenez Murphy MD   [facility] [place] ([facility])    [address] [address] [phone]              Who to contact     If you have questions or need follow up information about today's clinic visit or your schedule please contact [facility] directly at [phone].  Normal or non-critical lab and imaging results will be communicated to you by MyChart, letter or phone within 4 business days after the clinic has received the results. If you do not hear from us within 7 days, please contact the clinic through [employer] or phone. If you have a critical or abnormal lab result, we will notify you by phone as soon as possible.  Submit refill requests through Earnest or call your pharmacy and they will forward the refill request to us. Please allow 3 business days for your refill to be completed.          Additional Information About Your Visit        [employer]Dent Information     Earnest lets you send messages to your doctor, view your test results, renew your prescriptions, schedule appointments and more. To sign up, go to  [email or web address] . Click on \"Log in\" on the left side of the screen, which will take you to the Welcome page. Then click on \"Sign up Now\" on the right side of the page.     You will be asked to enter the access code listed below, as well as some personal information. Please follow the directions to create your username and password.     Your access code is: [email or web address]  Expires: 2017  8:16 AM     Your access code will  in 90 days. If you need help or a new code, please call your [facility] clinic or [phone].        Care EveryWhere ID     This is your Care EveryWhere ID. This could be used by other organizations to access your [facility] medical records  [id number]         Blood Pressure from Last 3 Encounters:   17 134/82   17 136/78   17 136/82    Weight from Last 3 Encounters:   17 110.6 kg (243 lb 12.8 oz)   17 111 kg (244 lb 11.2 oz)   17 111 kg (244 lb 12.8 oz)              Today, you had the following     No orders found for display       Primary Care Provider    None Specified       No primary provider on file.        Thank you!     Thank you for choosing [facility]  for your care. Our goal is always to provide you with excellent care. Hearing back from our patients is one way we can continue to improve our services. Please take a few minutes to complete the written survey that you may receive in the mail after your visit with us. Thank you!             Your Updated Medication List - Protect others around you: Learn how to safely use, store and throw away your medicines at www.disposemymeds.org.          This list is accurate as of: 3/22/17 11:59 PM.  Always use your most recent med list.                   Brand Name Dispense Instructions for use    albuterol 108 (90 BASE) MCG/ACT Inhaler    PROAIR HFA/PROVENTIL HFA/VENTOLIN HFA    6 Inhaler    Inhale 2 puffs into the lungs every 4 hours as needed for shortness of breath / dyspnea "       amiodarone 200 MG tablet    PACERONE/CODARONE    45 tablet    Take 0.5 tablets (100 mg) by mouth daily       buPROPion 150 MG 12 hr tablet    WELLBUTRIN SR    180 tablet    Take 1 tablet (150 mg) by mouth 2 times daily       fluticasone 50 MCG/ACT spray    FLONASE    16 g    USE ONE TO TWO SPRAYS IN EACH NOSTRIL EVERY DAY       fluticasone-salmeterol 250-50 MCG/DOSE diskus inhaler    ADVAIR    3 Inhaler    Inhale 1 puff into the lungs 2 times daily       ipratropium - albuterol 0.5 mg/2.5 mg/3 mL 0.5-2.5 (3) MG/3ML neb solution    DUONEB    540 vial    Take 1 vial (3 mLs) by nebulization every 4 hours as needed for shortness of breath / dyspnea or wheezing       losartan 50 MG tablet    COZAAR    180 tablet    Take 2 tablets (100 mg) by mouth daily       metolazone 2.5 MG tablet    ZAROXOLYN    30 tablet    Take 1 tablet (2.5 mg) by mouth daily as needed       metoprolol 50 MG 24 hr tablet    TOPROL-XL    90 tablet    Take 1 tablet (50 mg) by mouth daily       nystatin 550698 UNIT/ML suspension    MYCOSTATIN    60 mL    Take 5 mLs (500,000 Units) by mouth 3 times daily       * order for DME     1 Units    Equipment being ordered: Oxygen 2 liters per minute continuous  with portability Needs home oxygen with portability       * order for DME     1 Device    Equipment being ordered: Nebulizer       * order for DME     1 Device    Equipment being ordered: Spacer for inhaler       potassium chloride SA 20 MEQ CR tablet    K-DUR/KLOR-CON M    90 tablet    Take 1 tablet (20 mEq) by mouth daily       predniSONE 10 MG tablet    DELTASONE    7 tablet    Take 1 tablet (10 mg) by mouth daily       senna-docusate 8.6-50 MG per tablet    SENOKOT-S;PERICOLACE     Take 2 tablets by mouth 2 times daily       torsemide 20 MG tablet    DEMADEX    180 tablet    Take 10 mg po daily. Take an extra 10mg po daily as needed for swelling.       warfarin 2.5 MG tablet    JANTOVEN    290 tablet    Take 10 mg (4 tablets) every  Bjepdj-Eejtcalxu-Gcorwk-Saturday and take 7.5 mg (3 tablets) all other days, or as directed by the coumadin clinic..    Please resume on Saturday 9/10, for healing from procedure.       * Notice:  This list has 3 medication(s) that are the same as other medications prescribed for you. Read the directions carefully, and ask your doctor or other care provider to review them with you.

## 2017-03-23 NOTE — PROGRESS NOTES
Completed a all night titration PSG per provider order.    Preliminary AHI 0.  A final therapeutic PAP pressure was achieved.    Supine REM was not seen on therapeutic pressure.    Patient reports feeling refreshed in AM.

## 2017-03-30 ENCOUNTER — ANTICOAGULATION THERAPY VISIT (OUTPATIENT)
Dept: ANTICOAGULATION | Facility: OTHER | Age: 67
End: 2017-03-30
Payer: MEDICARE

## 2017-03-30 DIAGNOSIS — I48.91 ATRIAL FIBRILLATION WITH RVR (H): ICD-10-CM

## 2017-03-30 DIAGNOSIS — Z79.01 LONG-TERM (CURRENT) USE OF ANTICOAGULANTS: ICD-10-CM

## 2017-03-30 LAB — INR POINT OF CARE: 2.1 (ref 0.86–1.14)

## 2017-03-30 PROCEDURE — 85610 PROTHROMBIN TIME: CPT | Mod: QW

## 2017-03-30 PROCEDURE — 36416 COLLJ CAPILLARY BLOOD SPEC: CPT

## 2017-03-30 PROCEDURE — 99207 ZZC NO CHARGE NURSE ONLY: CPT

## 2017-03-30 NOTE — MR AVS SNAPSHOT
Home Valdez   3/30/2017 8:15 AM   Anticoagulation Therapy Visit    Description:  67 year old male   Provider:  [facility] ANTI [facility]AG   Department:  [facility]           INR as of 3/30/2017     Today's INR 2.1      Anticoagulation Summary as of 3/30/2017     INR goal 2.0-3.0   Today's INR 2.1   Full instructions 7.5 mg on Tue, Thu, Sat; 5 mg all other days   Next INR check 4/27/2017    Indications   Atrial fibrillation with RVR (H) [I48.91]  Long-term (current) use of anticoagulants [Z79.01] [Z79.01]         Your next Anticoagulation Clinic appointment(s)     Mar 30, 2017  8:15 AM CDT   Anticoagulation Visit with  ANTI COAG   [facility]    [address] [facility] [address]   [phone]            Apr 27, 2017  8:30 AM CDT   Anticoagulation Visit with  ANTI COAG   [facility] ([facility]    [address] [facility] [address]   [phone]              Contact Numbers     Clinic Number:         March 2017 Details    Sun Mon Tue Wed Thu Fri Sat        1               2               3               4                 5               6               7               8               9               10               11                 12               13               14               15               16               17               18                 19               20               21               22               23               24               25                 26               27               28               29               30      7.5 mg   See details      31      5 mg           Date Details   03/30 This INR check               How to take your warfarin dose     To take:  5 mg Take 2 of the 2.5 mg tablets.    To take:  7.5 mg Take 3 of the 2.5 mg tablets.           April 2017 Details    Sun Mon Tue Wed Thu Fri Sat           1      7.5 mg           2      5 mg         3      5 mg         4      7.5 mg         5      5 mg         6       7.5 mg         7      5 mg         8      7.5 mg           9      5 mg         10      5 mg         11      7.5 mg         12      5 mg         13      7.5 mg         14      5 mg         15      7.5 mg           16      5 mg         17      5 mg         18      7.5 mg         19      5 mg         20      7.5 mg         21      5 mg         22      7.5 mg           23      5 mg         24      5 mg         25      7.5 mg         26      5 mg         27            28               29                 30                      Date Details   No additional details    Date of next INR:  4/27/2017         How to take your warfarin dose     To take:  5 mg Take 2 of the 2.5 mg tablets.    To take:  7.5 mg Take 3 of the 2.5 mg tablets.

## 2017-03-30 NOTE — PROGRESS NOTES
ANTICOAGULATION FOLLOW-UP CLINIC VISIT    Patient Name:  Home Valdez  Date:  3/30/2017  Contact Type:  Face to Face    SUBJECTIVE:     Patient Findings     Positives No Problem Findings           OBJECTIVE    INR Protime   Date Value Ref Range Status   03/30/2017 2.1 (A) 0.86 - 1.14 Final       ASSESSMENT / PLAN  INR assessment THER    Recheck INR In: 4 WEEKS    INR Location Clinic      Anticoagulation Summary as of 3/30/2017     INR goal 2.0-3.0   Today's INR 2.1   Maintenance plan 7.5 mg (2.5 mg x 3) on Tue, Thu, Sat; 5 mg (2.5 mg x 2) all other days   Full instructions 7.5 mg on Tue, Thu, Sat; 5 mg all other days   Weekly total 42.5 mg   No change documented Shameka Craey RN   Plan last modified Brittany Martin RN (1/13/2017)   Next INR check 4/27/2017   Target end date     Indications   Atrial fibrillation with RVR (H) [I48.91]  Long-term (current) use of anticoagulants [Z79.01] [Z79.01]         Anticoagulation Episode Summary     INR check location     Preferred lab     Send INR reminders to [facility] POOL    Comments 2.5 MG TABLETS, likes print out       Anticoagulation Care Providers     Provider Role Specialty Phone number    Neo Galicia MD [facility] [phone]            See the Encounter Report to view Anticoagulation Flowsheet and Dosing Calendar (Go to Encounters tab in chart review, and find the Anticoagulation Therapy Visit)    Dosage adjustment made based on physician directed care plan.    Shameka Carey RN

## 2017-04-14 ENCOUNTER — OFFICE VISIT (OUTPATIENT)
Dept: FAMILY MEDICINE | Facility: OTHER | Age: 67
End: 2017-04-14
Payer: MEDICARE

## 2017-04-14 VITALS
BODY MASS INDEX: 33.5 KG/M2 | TEMPERATURE: 98.8 F | DIASTOLIC BLOOD PRESSURE: 72 MMHG | WEIGHT: 240.2 LBS | HEART RATE: 82 BPM | OXYGEN SATURATION: 92 % | SYSTOLIC BLOOD PRESSURE: 142 MMHG

## 2017-04-14 DIAGNOSIS — D69.6 THROMBOCYTOPENIA (H): ICD-10-CM

## 2017-04-14 DIAGNOSIS — I71.40 ABDOMINAL AORTIC ANEURYSM (AAA) WITHOUT RUPTURE (H): ICD-10-CM

## 2017-04-14 DIAGNOSIS — Z86.79 HISTORY OF ATRIAL FIBRILLATION: ICD-10-CM

## 2017-04-14 DIAGNOSIS — J44.9 CHRONIC OBSTRUCTIVE PULMONARY DISEASE, UNSPECIFIED COPD TYPE (H): Primary | Chronic | ICD-10-CM

## 2017-04-14 DIAGNOSIS — J44.1 OBSTRUCTIVE CHRONIC BRONCHITIS WITH EXACERBATION (H): ICD-10-CM

## 2017-04-14 DIAGNOSIS — I50.22 CHRONIC SYSTOLIC CONGESTIVE HEART FAILURE (H): ICD-10-CM

## 2017-04-14 DIAGNOSIS — J06.9 UPPER RESPIRATORY TRACT INFECTION, UNSPECIFIED TYPE: ICD-10-CM

## 2017-04-14 PROCEDURE — 99214 OFFICE O/P EST MOD 30 MIN: CPT | Performed by: INTERNAL MEDICINE

## 2017-04-14 RX ORDER — PREDNISONE 20 MG/1
40 TABLET ORAL DAILY
Qty: 10 TABLET | Refills: 0 | Status: SHIPPED | OUTPATIENT
Start: 2017-04-14 | End: 2017-04-19

## 2017-04-14 RX ORDER — LEVOFLOXACIN 250 MG/1
250 TABLET, FILM COATED ORAL DAILY
Qty: 7 TABLET | Refills: 0 | Status: SHIPPED | OUTPATIENT
Start: 2017-04-14 | End: 2017-05-21

## 2017-04-14 ASSESSMENT — PAIN SCALES - GENERAL: PAINLEVEL: NO PAIN (0)

## 2017-04-14 NOTE — NURSING NOTE
"Chief Complaint   Patient presents with     Cough       Initial /72 (BP Location: Right arm, Patient Position: Chair, Cuff Size: Adult Large)  Pulse 82  Temp 98.8  F (37.1  C) (Temporal)  Wt 240 lb 3.2 oz (109 kg)  SpO2 92%  BMI 33.5 kg/m2 Estimated body mass index is 33.5 kg/(m^2) as calculated from the following:    Height as of 3/7/17: 5' 11\" (1.803 m).    Weight as of this encounter: 240 lb 3.2 oz (109 kg).  Medication Reconciliation: complete  Tricia LAU    "

## 2017-04-14 NOTE — LETTER
My COPD Action Plan   Name: Home Valdez    YOB: 1950   Date: 4/14/2017    My doctor: Sandip Vega,    My clinic: [address] [address] [phone]  My Controller Medicine:    Dose:      My Rescue Medicine:    Dose:      My Flare Up Medicine:    Dose:   My COPD Severity:      Use of Oxygen:         GREEN ZONE       Doing well today      Usual level of activity and exercise    Usual amount of cough and mucus    No shortness of breath    Usual level of health (thinking clearly, sleeping well, feel like eating) Actions:      Take daily medicines    Use oxygen as prescribed    Follow regular exercise and diet plan    Avoid cigarette smoke and other irritants that harm the lungs           YELLOW ZONE          Having a bad day or flare up      Short of breath more than usual    A lot more sputum (mucus) than usual    Sputum looks yellow, green, tan, brown or bloody    More coughing or wheezing    Fever or chills    Less energy; trouble completing activities    Trouble thinking or focusing    Using quick relief inhaler or nebulizer more often    Poor sleep; symptoms wake me up    Do not feel like eating Actions:      Get plenty of rest    Take daily medicines    Use quick relief inhaler every  hours    If you use oxygen, call you doctor to see if you should adjust your oxygen    Do breathing exercises or other things to help you relax    Let a loved one, friend or neighbor know you are feeling worse    Call your care team if you have 2 or more symptoms.  Start taking steroids or antibiotics if directed by your care team           RED ZONE       Need medical care now      Severe shortness of breath (feel you can't breathe)    Fever, chills    Not enough breath to do any activity    Trouble coughing up mucus, walking or talking    Blood in mucus    Frequent coughing   Rescue medicines are not working    Not able to sleep because of  breathing    Feel confused or drowsy    Chest pain    Actions:      Call your health care team.  If you cannot reach your care team, call 911 or go to the emergency room.        Electronically signed by: Tricia Varghese, April 14, 2017  Annual Reminders:  Meet with Care Team, Flu Shot every Fall and Pneumonia Shot at least once  Pharmacy:    [facility] [place], [place] - [address]  [facility] [place] [place] - [address] [place] [address]  [facility] [id number] [facility] [address] [place] [address]

## 2017-04-14 NOTE — MR AVS SNAPSHOT
After Visit Summary   4/14/2017    Home Valdez    MRN: [id number]           Patient Information     Date Of Birth          1950        Visit Information        Provider Department      4/14/2017 9:20 AM Sandip Vega DO [facility]        Today's Diagnoses     Chronic obstructive pulmonary disease, unspecified COPD type (H)    -  1    Upper respiratory tract infection, unspecified type        Obstructive chronic bronchitis with exacerbation (H)        Chronic systolic congestive heart failure (H)        History of atrial fibrillation        Abdominal aortic aneurysm (AAA) without rupture (H)        Thrombocytopenia (H)           Follow-ups after your visit        Your next 10 appointments already scheduled     May 30, 2017  1:30 PM CDT   LAB with NL LAB PMC   [facility] ([facility]    [address] [place] [id number] [phone]           Patient must bring picture ID.  Patient should be prepared to give a urine specimen  Please do not eat 10-12 hours before your appointment if you are coming in fasting for labs on lipids, cholesterol, or glucose (sugar).  Pregnant women should follow their Care Team instructions. Water with medications is okay. Do not drink coffee or other fluids.   If you have concerns about taking  your medications, please ask at office or if scheduling via [employer], send a message by clicking on Secure Messaging, Message Your Care Team.            May 30, 2017  2:00 PM CDT   Return Visit with Jimenez Murphy MD   [facility] ([address] [id number] [phone]            Jun 01, 2017  8:30 AM CDT   Anticoagulation Visit with CAMILO ANTI COAEBEN   [facility] ([facility])    [address] [facility] [id number] [phone]              Who to contact     If you have questions or need follow up information  "about today's clinic visit or your schedule please contact [facility] directly at [phone].  Normal or non-critical lab and imaging results will be communicated to you by MyChart, letter or phone within 4 business days after the clinic has received the results. If you do not hear from us within 7 days, please contact the clinic through [employer] or phone. If you have a critical or abnormal lab result, we will notify you by phone as soon as possible.  Submit refill requests through [email or web address] or call your pharmacy and they will forward the refill request to us. Please allow 3 business days for your refill to be completed.          Additional Information About Your Visit        [employer] Information     [email or web address] lets you send messages to your doctor, view your test results, renew your prescriptions, schedule appointments and more. To sign up, go to [email or web address] . Click on \"Log in\" on the left side of the screen, which will take you to the Welcome page. Then click on \"Sign up Now\" on the right side of the page.     You will be asked to enter the access code listed below, as well as some personal information. Please follow the directions to create your username and password.     Your access code is: [email or web address]  Expires: 2017  8:16 AM     Your access code will  in 90 days. If you need help or a new code, please call your [facility] clinic or [phone].        Care EveryWhere ID     This is your Care EveryWhere ID. This could be used by other organizations to access your [facility] medical records  [id number]        Your Vitals Were     Pulse Temperature Pulse Oximetry BMI (Body Mass Index)          82 98.8  F (37.1  C) (Temporal) 92% 33.5 kg/m2         Blood Pressure from Last 3 Encounters:   17 138/62   17 136/78   17 142/72    Weight from Last 3 Encounters:   17 240 lb (108.9 kg)   17 245 lb (111.1 kg)   17 240 lb 3.2 oz (109 kg)              We " Performed the Following     COPD ACTION PLAN          Today's Medication Changes          These changes are accurate as of: 4/14/17 11:59 PM.  If you have any questions, ask your nurse or doctor.               Start taking these medicines.        Dose/Directions    levofloxacin 250 MG tablet   Commonly known as:  LEVAQUIN   Used for:  Obstructive chronic bronchitis with exacerbation (H)   Started by:  Sandip Vega DO        Dose:  250 mg   Take 1 tablet (250 mg) by mouth daily   Quantity:  7 tablet   Refills:  0         These medicines have changed or have updated prescriptions.        Dose/Directions    * predniSONE 10 MG tablet   Commonly known as:  DELTASONE   This may have changed:  Another medication with the same name was added. Make sure you understand how and when to take each.   Used for:  Acute sinusitis with symptoms > 10 days   Changed by:  Neo Galicia MD        Dose:  10 mg   Take 1 tablet (10 mg) by mouth daily   Quantity:  7 tablet   Refills:  0       * predniSONE 20 MG tablet   Commonly known as:  DELTASONE   This may have changed:  You were already taking a medication with the same name, and this prescription was added. Make sure you understand how and when to take each.   Used for:  Obstructive chronic bronchitis with exacerbation (H)   Changed by:  Sandip Vega DO        Dose:  40 mg   Take 2 tablets (40 mg) by mouth daily for 5 days   Quantity:  10 tablet   Refills:  0       * Notice:  This list has 2 medication(s) that are the same as other medications prescribed for you. Read the directions carefully, and ask your doctor or other care provider to review them with you.         Where to get your medicines      These medications were sent to [id number] - [place] - [address] [place] [address]  [address] [place] [address] [place] [address]    Hours:  M-F 8:30-6:30; Sat 9-4; closed Sunday Phone:  [phone]     levofloxacin 250 MG tablet    predniSONE 20 MG tablet                 Primary Care Provider    None Specified       [address] [place] [address]        Thank you!     Thank you for choosing [facility]  for your care. Our goal is always to provide you with excellent care. Hearing back from our patients is one way we can continue to improve our services. Please take a few minutes to complete the written survey that you may receive in the mail after your visit with us. Thank you!             Your Updated Medication List - Protect others around you: Learn how to safely use, store and throw away your medicines at www.disposemymeds.org.          This list is accurate as of: 4/14/17 11:59 PM.  Always use your most recent med list.                   Brand Name Dispense Instructions for use    amiodarone 200 MG tablet    PACERONE/CODARONE    45 tablet    Take 0.5 tablets (100 mg) by mouth daily       ipratropium - albuterol 0.5 mg/2.5 mg/3 mL 0.5-2.5 (3) MG/3ML neb solution    DUONEB    540 vial    Take 1 vial (3 mLs) by nebulization every 4 hours as needed for shortness of breath / dyspnea or wheezing       levofloxacin 250 MG tablet    LEVAQUIN    7 tablet    Take 1 tablet (250 mg) by mouth daily       metolazone 2.5 MG tablet    ZAROXOLYN    30 tablet    Take 1 tablet (2.5 mg) by mouth daily as needed       metoprolol 50 MG 24 hr tablet    TOPROL-XL    90 tablet    Take 1 tablet (50 mg) by mouth daily       nystatin 606778 UNIT/ML suspension    MYCOSTATIN    60 mL    Take 5 mLs (500,000 Units) by mouth 3 times daily       * order for DME     1 Units    Equipment being ordered: Oxygen 2 liters per minute continuous  with portability Needs home oxygen with portability       * order for DME     1 Device    Equipment being ordered: Nebulizer       * order for DME     1 Device    Equipment being ordered: Spacer for inhaler       potassium chloride SA 20 MEQ CR tablet    K-DUR/KLOR-CON M    90 tablet    Take 1 tablet (20 mEq) by mouth daily       *  predniSONE 10 MG tablet    DELTASONE    7 tablet    Take 1 tablet (10 mg) by mouth daily       * predniSONE 20 MG tablet    DELTASONE    10 tablet    Take 2 tablets (40 mg) by mouth daily for 5 days       senna-docusate 8.6-50 MG per tablet    SENOKOT-S;PERICOLACE     Take 2 tablets by mouth 2 times daily       torsemide 20 MG tablet    DEMADEX    180 tablet    Take 10 mg po daily. Take an extra 10mg po daily as needed for swelling.       * Notice:  This list has 5 medication(s) that are the same as other medications prescribed for you. Read the directions carefully, and ask your doctor or other care provider to review them with you.

## 2017-04-14 NOTE — PROGRESS NOTES
SUBJECTIVE:                                                    Home Valdez is a 67 year old male who presents to clinic today for the following health issues:    RESPIRATORY SYMPTOMS      Duration: 1 week    Description  nasal congestion, rhinorrhea, cough, wheezing and ear pain right    Severity: moderate    Accompanying signs and symptoms: None    History (predisposing factors):  COPD    Precipitating or alleviating factors: None    Therapies tried and outcome:  none       Problem list and histories reviewed & adjusted, as indicated.  Additional history: as documented      Reviewed and updated as needed this visit by clinical staff  Tobacco  Allergies  Soc Hx      Reviewed and updated as needed this visit by Provider       The patient is a pleasant 67-year-old gentleman who presents today with a weeklong history of runny nose, cough, wheezing, and some mild right ear pain. He is taking food and fluids adequately, he has had low-grade fever but has not measured it. He's had no shaking chills. His medications are reviewed and noted, he does have a current history of chronic obstructive pulmonary disease which is currently exacerbated as been using his nebulizer therapy and states that at this time, it is working properly. He also has a history of chronic systolic congestive heart failure which is currently fairly well-controlled. There is no signs of fluid overload or edema. He continues the anticoagulant for his chronic atrial fibrillation, he's had no syncope or near syncope.                         PAST, FAMILY,SOCIAL HISTORY:     Medical  History:   has a past medical history of AAA (abdominal aortic aneurysm) (H); COPD (chronic obstructive pulmonary disease) (H); Emphysema; Hyperlipidemia; Hypertension; Hypomagnesemia (1/2/2015); JAMES (obstructive sleep apnea) (9/3/2014); PTSD (post-traumatic stress disorder); and Trigeminal neuralgia.     Surgical History:   has a past surgical history that includes  CYSTOURETHROSCOPY (1998); LAMINOTOMY,LUMBAR DISK,1 INTRSP (1993); NONSPECIFIC PROCEDURE; NONSPECIFIC PROCEDURE; Phacoemulsification with standard intraocular lens implant (12/26/2013); Vitrectomy anterior (12/26/2013); L cataract surgery[; Soft tissue surgery; colonoscopy; hernia repair; Head and neck surgery; Vitrectomy parsplana with 25 gauge system (2/6/2014); and Balloon compression rhizotomy (Left, 9/7/2016).     Social History:   reports that he quit smoking about 2 years ago. His smoking use included Cigarettes. He has a 40.00 pack-year smoking history. He has never used smokeless tobacco. He reports that he does not drink alcohol or use illicit drugs.     Family History:  family history includes DIABETES in his paternal grandmother; Depression in his father; Hypertension in his mother and paternal grandfather.            MEDICATIONS  Current Outpatient Prescriptions   Medication Sig Dispense Refill     levofloxacin (LEVAQUIN) 250 MG tablet Take 1 tablet (250 mg) by mouth daily 7 tablet 0     predniSONE (DELTASONE) 10 MG tablet Take 1 tablet (10 mg) by mouth daily 7 tablet 0     nystatin (MYCOSTATIN) 031768 UNIT/ML suspension Take 5 mLs (500,000 Units) by mouth 3 times daily 60 mL 0     metoprolol (TOPROL-XL) 50 MG 24 hr tablet Take 1 tablet (50 mg) by mouth daily 90 tablet 3     ipratropium - albuterol 0.5 mg/2.5 mg/3 mL (DUONEB) 0.5-2.5 (3) MG/3ML neb solution Take 1 vial (3 mLs) by nebulization every 4 hours as needed for shortness of breath / dyspnea or wheezing 540 vial 3     amiodarone (PACERONE/CODARONE) 200 MG tablet Take 0.5 tablets (100 mg) by mouth daily 45 tablet 3     potassium chloride SA (K-DUR/KLOR-CON M) 20 MEQ CR tablet Take 1 tablet (20 mEq) by mouth daily 90 tablet 1     torsemide (DEMADEX) 20 MG tablet Take 10 mg po daily. Take an extra 10mg po daily as needed for swelling. 180 tablet 3     senna-docusate (SENOKOT-S;PERICOLACE) 8.6-50 MG per tablet Take 2 tablets by mouth 2 times daily        order for DME Equipment being ordered: Spacer for inhaler 1 Device 0     order for DME Equipment being ordered: Nebulizer 1 Device 0     metolazone (ZAROXOLYN) 2.5 MG tablet Take 1 tablet (2.5 mg) by mouth daily as needed 30 tablet 0     ORDER FOR DME Equipment being ordered: Oxygen 2 liters per minute continuous  with portability  Needs home oxygen with portability 1 Units 0     losartan (COZAAR) 50 MG tablet Take 2 tablets (100 mg) by mouth daily 180 tablet 3     buPROPion (WELLBUTRIN SR) 150 MG 12 hr tablet Take 1 tablet (150 mg) by mouth 2 times daily 180 tablet 3     fluticasone-salmeterol (ADVAIR) 250-50 MCG/DOSE diskus inhaler Inhale 1 puff into the lungs 2 times daily 3 Inhaler 3     albuterol (PROAIR HFA/PROVENTIL HFA/VENTOLIN HFA) 108 (90 BASE) MCG/ACT Inhaler Inhale 2 puffs into the lungs every 4 hours as needed for shortness of breath / dyspnea 6 Inhaler 3     fluticasone (FLONASE) 50 MCG/ACT spray USE ONE TO TWO SPRAYS IN EACH NOSTRIL EVERY DAY 16 g 6     warfarin (JANTOVEN) 2.5 MG tablet Take 7.5 mg (3 tablets) on Tuesday, Thursday, Saturday and 5 mg (2 tablets) all other days or as directed by coumadin clinic. 235 tablet 0     cetirizine (ZYRTEC) 10 MG tablet Take 1 tablet (10 mg) by mouth every evening 30 tablet 1     montelukast (SINGULAIR) 10 MG tablet Take 1 tablet (10 mg) by mouth At Bedtime 30 tablet 1     Respiratory Therapy Supplies (NEBULIZER/TUBING/MOUTHPIECE) KIT Use every 4-6 hours PRN 1 kit 11         --------------------------------------------------------------------------------------------------------------------                          REVIEW OF SYSTEMS:         LUNGS: Pt denies: excess sputum, hemoptysis, or shortness of breath at rest. He does have dyspnea with any exertion..   HEART: Pt denies: chest pain, sensed arrythmia, syncope, tachy or bradyarrhythmia or excess edema.   GI: Pt denies: nausea, vomitting, diarrhea, constipation, melena, or hematochezia.   NEURO: Pt denies:  seizures, strokes, diplopia, weakness, paraesthesias, or paralysis.                          EXAMINATION:         /72 (BP Location: Right arm, Patient Position: Chair, Cuff Size: Adult Large)  Pulse 82  Temp 98.8  F (37.1  C) (Temporal)  Wt 240 lb 3.2 oz (109 kg)  SpO2 92%  BMI 33.5 kg/m2   Constitutional: The patient appears to be in moderate acute distress. The patient appears to be adequately hydrated. No acute hemodynamic distress is noted at this time.   LUNGS: Breath sounds are markedly decreased with scant wheezes noted bilaterally, airflow is slowed, no intercostal retraction or stridor is noted. Occasional coughing is noted during visit.   HEART:  Irregularly irregular without rubs, clicks, gallops, or murmurs. PMI is nondisplaced. Upstrokes are brisk. S1,S2 are heard.   GI: Abdomen is soft, without rebound, guarding or tenderness. Bowel sounds are appropriate. No renal bruits are heard.    NEURO: Pt is alert and appropriate. No neurologic lateralization is noted. Cranial nerves 2-12 are intact. Peripheral sensory and motor function are grossly normal   SKIN:  warm and dry. No erythema, or rashes are noted. No specific lesions of concern are noted.      PSYCH: The patient appears grossly appropriate. Maintains good eye contact, does not have any jittery or atypical motion. Displays appropriate affect.                        DECISION MAKIN. Chronic obstructive pulmonary disease, unspecified COPD type (H)    - COPD ACTION PLAN    2. Upper respiratory tract infection, unspecified type  Antibiotics as below, Tylenol, over-the-counter antihistamines as needed, fluids, and rest    3. Obstructive chronic bronchitis with exacerbation (H)    - levofloxacin (LEVAQUIN) 250 MG tablet; Take 1 tablet (250 mg) by mouth daily  Dispense: 7 tablet; Refill: 0  - predniSONE (DELTASONE) 20 MG tablet; Take 2 tablets (40 mg) by mouth daily for 5 days  Dispense: 10 tablet; Refill: 0    4. Chronic systolic  congestive heart failure (H)  Stable at this time on current medications    5. History of atrial fibrillation  Continue anticoagulation, rate control    6. Abdominal aortic aneurysm (AAA) without rupture (H)  Recent ultrasound shows minimal progression    7. Thrombocytopenia (H)  Currently stable and being followed                           FOLLOW UP    I have asked the patient to make an appointment for follow up with me in 1 week        I have carefully explained the diagnosis and treatment options with the patient. The patient has displayed an understanding of the above, and all subsequent questions were answered.         DO SANJAY Lock    Portions of this note were produced using [employer]  Although every attempt at real-time proof reading has been made, occasional grammar/syntax errors may have been missed.

## 2017-04-19 DIAGNOSIS — J44.9 CHRONIC OBSTRUCTIVE PULMONARY DISEASE, UNSPECIFIED COPD TYPE (H): Primary | Chronic | ICD-10-CM

## 2017-04-19 DIAGNOSIS — Z53.9 ERRONEOUS ENCOUNTER--DISREGARD: Primary | ICD-10-CM

## 2017-04-19 RX ORDER — NEBULIZER ACCESSORIES
KIT MISCELLANEOUS
Qty: 1 KIT | Refills: 11 | Status: CANCELLED | OUTPATIENT
Start: 2017-04-19

## 2017-04-19 RX ORDER — NEBULIZER ACCESSORIES
KIT MISCELLANEOUS
Qty: 1 KIT | Refills: 11 | Status: SHIPPED | OUTPATIENT
Start: 2017-04-19

## 2017-04-27 ENCOUNTER — ANTICOAGULATION THERAPY VISIT (OUTPATIENT)
Dept: ANTICOAGULATION | Facility: OTHER | Age: 67
End: 2017-04-27
Payer: MEDICARE

## 2017-04-27 DIAGNOSIS — I48.91 ATRIAL FIBRILLATION WITH RVR (H): ICD-10-CM

## 2017-04-27 DIAGNOSIS — I10 HYPERTENSION GOAL BP (BLOOD PRESSURE) < 140/90: ICD-10-CM

## 2017-04-27 DIAGNOSIS — Z79.01 LONG-TERM (CURRENT) USE OF ANTICOAGULANTS: ICD-10-CM

## 2017-04-27 LAB — INR POINT OF CARE: 3.9 (ref 0.86–1.14)

## 2017-04-27 PROCEDURE — 36416 COLLJ CAPILLARY BLOOD SPEC: CPT

## 2017-04-27 PROCEDURE — 99207 ZZC NO CHARGE NURSE ONLY: CPT

## 2017-04-27 PROCEDURE — 85610 PROTHROMBIN TIME: CPT | Mod: QW

## 2017-04-27 RX ORDER — LOSARTAN POTASSIUM 50 MG/1
100 TABLET ORAL DAILY
Qty: 180 TABLET | Refills: 3 | Status: SHIPPED | OUTPATIENT
Start: 2017-04-27 | End: 2017-05-09

## 2017-04-27 NOTE — PROGRESS NOTES
ANTICOAGULATION FOLLOW-UP CLINIC VISIT    Patient Name:  Home Valdez  Date:  4/27/2017  Contact Type:  Face to Face    SUBJECTIVE:     Patient Findings     Positives Unexplained INR or factor level change           OBJECTIVE    INR Protime   Date Value Ref Range Status   04/27/2017 3.9 (A) 0.86 - 1.14 Final       ASSESSMENT / PLAN  INR assessment SUPRA    Recheck INR In: 2 WEEKS    INR Location Clinic      Anticoagulation Summary as of 4/27/2017     INR goal 2.0-3.0   Today's INR 3.9!   Maintenance plan 7.5 mg (2.5 mg x 3) on Tue, Thu, Sat; 5 mg (2.5 mg x 2) all other days   Full instructions 4/27: Hold; Otherwise 7.5 mg on Tue, Thu, Sat; 5 mg all other days   Weekly total 42.5 mg   Plan last modified Brittany Martin RN (1/13/2017)   Next INR check 5/11/2017   Target end date     Indications   Atrial fibrillation with RVR (H) [I48.91]  Long-term (current) use of anticoagulants [Z79.01] [Z79.01]         Anticoagulation Episode Summary     INR check location     Preferred lab     Send INR reminders to [facility] POOL    Comments 2.5 MG TABLETS, likes print out       Anticoagulation Care Providers     Provider Role Specialty Phone number    Neo Galicia MD [facility] [phone]            See the Encounter Report to view Anticoagulation Flowsheet and Dosing Calendar (Go to Encounters tab in chart review, and find the Anticoagulation Therapy Visit)    Dosage adjustment made based on physician directed care plan.    Shameka Carey RN

## 2017-05-02 ENCOUNTER — OFFICE VISIT (OUTPATIENT)
Dept: FAMILY MEDICINE | Facility: OTHER | Age: 67
End: 2017-05-02
Payer: MEDICARE

## 2017-05-02 VITALS
RESPIRATION RATE: 16 BRPM | BODY MASS INDEX: 34.17 KG/M2 | OXYGEN SATURATION: 94 % | HEART RATE: 79 BPM | SYSTOLIC BLOOD PRESSURE: 136 MMHG | TEMPERATURE: 99 F | WEIGHT: 245 LBS | DIASTOLIC BLOOD PRESSURE: 78 MMHG

## 2017-05-02 DIAGNOSIS — J43.1 PANLOBULAR EMPHYSEMA (H): ICD-10-CM

## 2017-05-02 DIAGNOSIS — J44.9 CHRONIC OBSTRUCTIVE PULMONARY DISEASE, UNSPECIFIED COPD TYPE (H): Chronic | ICD-10-CM

## 2017-05-02 DIAGNOSIS — I48.91 ATRIAL FIBRILLATION WITH RVR (H): ICD-10-CM

## 2017-05-02 DIAGNOSIS — J31.0 CHRONIC RHINITIS: ICD-10-CM

## 2017-05-02 DIAGNOSIS — Z87.891 PERSONAL HISTORY OF TOBACCO USE, PRESENTING HAZARDS TO HEALTH: ICD-10-CM

## 2017-05-02 DIAGNOSIS — J30.2 SEASONAL ALLERGIC RHINITIS, UNSPECIFIED ALLERGIC RHINITIS TRIGGER: Primary | ICD-10-CM

## 2017-05-02 DIAGNOSIS — I42.8 NONISCHEMIC CARDIOMYOPATHY (H): ICD-10-CM

## 2017-05-02 DIAGNOSIS — I10 HYPERTENSION GOAL BP (BLOOD PRESSURE) < 140/90: ICD-10-CM

## 2017-05-02 PROCEDURE — 99214 OFFICE O/P EST MOD 30 MIN: CPT | Performed by: INTERNAL MEDICINE

## 2017-05-02 RX ORDER — MONTELUKAST SODIUM 10 MG/1
10 TABLET ORAL AT BEDTIME
Qty: 30 TABLET | Refills: 1 | Status: SHIPPED | OUTPATIENT
Start: 2017-05-02 | End: 2017-06-30

## 2017-05-02 RX ORDER — CETIRIZINE HYDROCHLORIDE 10 MG/1
10 TABLET ORAL EVERY EVENING
Qty: 30 TABLET | Refills: 1 | Status: SHIPPED | OUTPATIENT
Start: 2017-05-02 | End: 2017-06-30

## 2017-05-02 ASSESSMENT — PAIN SCALES - GENERAL: PAINLEVEL: NO PAIN (0)

## 2017-05-02 NOTE — MR AVS SNAPSHOT
After Visit Summary   5/2/2017    Home Valdez    MRN: [id number]           Patient Information     Date Of Birth          1950        Visit Information        Provider Department      5/2/2017 9:00 AM Sandip Vega DO [facility]        Today's Diagnoses     Seasonal allergic rhinitis, unspecified allergic rhinitis trigger    -  1    Chronic obstructive pulmonary disease, unspecified COPD type (H)        Nonischemic cardiomyopathy EF 25% by Echo 11/21/14        Atrial fibrillation with RVR (H)        Panlobular emphysema (H)        Chronic rhinitis        PERS HX TOBACCO USE           Follow-ups after your visit        Your next 10 appointments already scheduled     May 30, 2017  1:30 PM CDT   LAB with NL LAB PMC   [facility]    [address] [place] [id number] [phone]           Patient must bring picture ID.  Patient should be prepared to give a urine specimen  Please do not eat 10-12 hours before your appointment if you are coming in fasting for labs on lipids, cholesterol, or glucose (sugar).  Pregnant women should follow their Care Team instructions. Water with medications is okay. Do not drink coffee or other fluids.   If you have concerns about taking  your medications, please ask at office or if scheduling via [email or web address], send a message by clicking on Secure Messaging, Message Your Care Team.            May 30, 2017  2:00 PM CDT   Return Visit with Jimenez Murphy MD   [facility] ([address] [phone]            Jun 01, 2017  8:30 AM CDT   Anticoagulation Visit with CAMILO ANTI COAG   [facility]    [address] [facility] [id number] [phone]              Who to contact     If you have questions or need follow up information about today's clinic visit or your schedule  "please contact [facility] directly at [phone].  Normal or non-critical lab and imaging results will be communicated to you by MyChart, letter or phone within 4 business days after the clinic has received the results. If you do not hear from us within 7 days, please contact the clinic through MyChart or phone. If you have a critical or abnormal lab result, we will notify you by phone as soon as possible.  Submit refill requests through [employer] or call your pharmacy and they will forward the refill request to us. Please allow 3 business days for your refill to be completed.          Additional Information About Your Visit        [employer] Information     [employer] lets you send messages to your doctor, view your test results, renew your prescriptions, schedule appointments and more. To sign up, go to [email or web address] . Click on \"Log in\" on the left side of the screen, which will take you to the Welcome page. Then click on \"Sign up Now\" on the right side of the page.     You will be asked to enter the access code listed below, as well as some personal information. Please follow the directions to create your username and password.     Your access code is: [email or web address]  Expires: 2017  8:16 AM     Your access code will  in 90 days. If you need help or a new code, please call your [place] clinic or [phone].        Care EveryWhere ID     This is your Care EveryWhere ID. This could be used by other organizations to access your [place] medical records  [id number]        Your Vitals Were     Pulse Temperature Respirations Pulse Oximetry BMI (Body Mass Index)       79 99  F (37.2  C) (Tympanic) 16 94% 34.17 kg/m2        Blood Pressure from Last 3 Encounters:   17 138/62   17 136/78   17 142/72    Weight from Last 3 Encounters:   17 240 lb (108.9 kg)   17 245 lb (111.1 kg)   17 240 lb 3.2 oz (109 kg)              Today, you had the following     No " orders found for display         Today's Medication Changes          These changes are accurate as of: 5/2/17 11:59 PM.  If you have any questions, ask your nurse or doctor.               Start taking these medicines.        Dose/Directions    cetirizine 10 MG tablet   Commonly known as:  zyrTEC   Used for:  Seasonal allergic rhinitis, unspecified allergic rhinitis trigger   Started by:  Sandip Vega DO        Dose:  10 mg   Take 1 tablet (10 mg) by mouth every evening   Quantity:  30 tablet   Refills:  1       montelukast 10 MG tablet   Commonly known as:  SINGULAIR   Used for:  Seasonal allergic rhinitis, unspecified allergic rhinitis trigger   Started by:  Sandip Vega DO        Dose:  10 mg   Take 1 tablet (10 mg) by mouth At Bedtime   Quantity:  30 tablet   Refills:  1         These medicines have changed or have updated prescriptions.        Dose/Directions    warfarin 2.5 MG tablet   Commonly known as:  HARRYTOVEN   This may have changed:  additional instructions   Used for:  Atrial fibrillation with RVR (H)   Changed by:  Sandip Vega DO        Take 7.5 mg (3 tablets) on Tuesday, Thursday, Saturday and 5 mg (2 tablets) all other days or as directed by coumadin clinic.   Quantity:  235 tablet   Refills:  0         Stop taking these medicines if you haven't already. Please contact your care team if you have questions.     levofloxacin 250 MG tablet   Commonly known as:  LEVAQUIN   Stopped by:  Sandip Vega DO           predniSONE 10 MG tablet   Commonly known as:  DELTASONE   Stopped by:  Sandip Vega DO                Where to get your medicines      These medications were sent to [id number] - [place] - [address] [place] [address]  [address] [place], [place] [address] [address]    Hours:  M-F 8:30-6:30; Sat 9-4; closed Sunday Phone:  [phone]     cetirizine 10 MG tablet    montelukast 10 MG tablet    warfarin 2.5 MG tablet                 Primary Care Provider Office Phone # Fax #    Sandip Vega [phone]       [address] [place] [address]        Thank you!     Thank you for choosing [facility]  for your care. Our goal is always to provide you with excellent care. Hearing back from our patients is one way we can continue to improve our services. Please take a few minutes to complete the written survey that you may receive in the mail after your visit with us. Thank you!             Your Updated Medication List - Protect others around you: Learn how to safely use, store and throw away your medicines at www.disposemymeds.org.          This list is accurate as of: 5/2/17 11:59 PM.  Always use your most recent med list.                   Brand Name Dispense Instructions for use    amiodarone 200 MG tablet    PACERONE/CODARONE    45 tablet    Take 0.5 tablets (100 mg) by mouth daily       cetirizine 10 MG tablet    zyrTEC    30 tablet    Take 1 tablet (10 mg) by mouth every evening       ipratropium - albuterol 0.5 mg/2.5 mg/3 mL 0.5-2.5 (3) MG/3ML neb solution    DUONEB    540 vial    Take 1 vial (3 mLs) by nebulization every 4 hours as needed for shortness of breath / dyspnea or wheezing       metolazone 2.5 MG tablet    ZAROXOLYN    30 tablet    Take 1 tablet (2.5 mg) by mouth daily as needed       metoprolol 50 MG 24 hr tablet    TOPROL-XL    90 tablet    Take 1 tablet (50 mg) by mouth daily       montelukast 10 MG tablet    SINGULAIR    30 tablet    Take 1 tablet (10 mg) by mouth At Bedtime       nebulizer/tubing/mouthpiece Kit     1 kit    Use every 4-6 hours PRN       nystatin 383219 UNIT/ML suspension    MYCOSTATIN    60 mL    Take 5 mLs (500,000 Units) by mouth 3 times daily       * order for DME     1 Units    Equipment being ordered: Oxygen 2 liters per minute continuous  with portability Needs home oxygen with portability       * order for DME     1 Device    Equipment  being ordered: Nebulizer       * order for DME     1 Device    Equipment being ordered: Spacer for inhaler       potassium chloride SA 20 MEQ CR tablet    K-DUR/KLOR-CON M    90 tablet    Take 1 tablet (20 mEq) by mouth daily       senna-docusate 8.6-50 MG per tablet    SENOKOT-S;PERICOLACE     Take 2 tablets by mouth 2 times daily       torsemide 20 MG tablet    DEMADEX    180 tablet    Take 10 mg po daily. Take an extra 10mg po daily as needed for swelling.       warfarin 2.5 MG tablet    JANTOVEN    235 tablet    Take 7.5 mg (3 tablets) on Tuesday, Thursday, Saturday and 5 mg (2 tablets) all other days or as directed by coumadin clinic.       * Notice:  This list has 3 medication(s) that are the same as other medications prescribed for you. Read the directions carefully, and ask your doctor or other care provider to review them with you.
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
## 2017-05-03 RX ORDER — BUPROPION HYDROCHLORIDE 150 MG/1
150 TABLET, EXTENDED RELEASE ORAL 2 TIMES DAILY
Qty: 180 TABLET | Refills: 3 | Status: CANCELLED | OUTPATIENT
Start: 2017-05-03

## 2017-05-03 RX ORDER — FLUTICASONE PROPIONATE 50 MCG
SPRAY, SUSPENSION (ML) NASAL
Qty: 16 G | Refills: 6 | Status: CANCELLED | OUTPATIENT
Start: 2017-05-03

## 2017-05-03 NOTE — TELEPHONE ENCOUNTER
Cozaar      Last Written Prescription Date: 4-  Last Fill Quantity: 180, # refills: 3  Last Office Visit with [facility] or [facility] prescribing provider: 5-2-2017  Next 5 appointments (look out 90 days)     May 30, 2017  2:00 PM CDT   [facility]    [address] [place] [id number] [phone]                   Potassium   Date Value Ref Range Status   11/08/2016 3.7 3.4 - 5.3 mmol/L Final     Creatinine   Date Value Ref Range Status   11/08/2016 1.10 0.66 - 1.25 mg/dL Final     BP Readings from Last 3 Encounters:   05/02/17 136/78   04/14/17 142/72   03/07/17 134/82

## 2017-05-05 DIAGNOSIS — I48.91 ATRIAL FIBRILLATION WITH RVR (H): ICD-10-CM

## 2017-05-05 RX ORDER — LOSARTAN POTASSIUM 50 MG/1
TABLET ORAL
Qty: 180 TABLET | Refills: 1 | OUTPATIENT
Start: 2017-05-05

## 2017-05-05 RX ORDER — WARFARIN SODIUM 2.5 MG/1
TABLET ORAL
Qty: 235 TABLET | Refills: 0 | Status: ON HOLD | OUTPATIENT
Start: 2017-05-05 | End: 2017-06-05

## 2017-05-05 RX ORDER — WARFARIN SODIUM 2.5 MG/1
TABLET ORAL
Qty: 290 TABLET | Refills: 1
Start: 2017-05-05

## 2017-05-05 NOTE — TELEPHONE ENCOUNTER
Cozaar  Filled for 90 days with 3 refills on 4/27/17, too soon refill refused.    Coumadin  Prescription approved per [facility] Refill Protocol.      Jonn Betancourt RN, BSN

## 2017-05-09 ENCOUNTER — OFFICE VISIT (OUTPATIENT)
Dept: SLEEP MEDICINE | Facility: CLINIC | Age: 67
End: 2017-05-09
Payer: MEDICARE

## 2017-05-09 VITALS
WEIGHT: 240 LBS | HEIGHT: 70 IN | BODY MASS INDEX: 34.36 KG/M2 | HEART RATE: 68 BPM | DIASTOLIC BLOOD PRESSURE: 62 MMHG | SYSTOLIC BLOOD PRESSURE: 138 MMHG

## 2017-05-09 DIAGNOSIS — J43.1 PANLOBULAR EMPHYSEMA (H): ICD-10-CM

## 2017-05-09 DIAGNOSIS — I10 HYPERTENSION GOAL BP (BLOOD PRESSURE) < 140/90: ICD-10-CM

## 2017-05-09 DIAGNOSIS — G47.33 OSA (OBSTRUCTIVE SLEEP APNEA): Primary | ICD-10-CM

## 2017-05-09 DIAGNOSIS — Z87.891 PERSONAL HISTORY OF TOBACCO USE, PRESENTING HAZARDS TO HEALTH: ICD-10-CM

## 2017-05-09 DIAGNOSIS — J44.1 CHRONIC OBSTRUCTIVE PULMONARY DISEASE WITH ACUTE EXACERBATION (H): ICD-10-CM

## 2017-05-09 DIAGNOSIS — J31.0 CHRONIC RHINITIS: ICD-10-CM

## 2017-05-09 DIAGNOSIS — I48.91 ATRIAL FIBRILLATION WITH RVR (H): ICD-10-CM

## 2017-05-09 PROCEDURE — 99213 OFFICE O/P EST LOW 20 MIN: CPT | Performed by: OTOLARYNGOLOGY

## 2017-05-09 RX ORDER — ALBUTEROL SULFATE 90 UG/1
2 AEROSOL, METERED RESPIRATORY (INHALATION) EVERY 4 HOURS PRN
Qty: 6 INHALER | Refills: 3 | Status: SHIPPED | OUTPATIENT
Start: 2017-05-09 | End: 2018-05-18

## 2017-05-09 RX ORDER — BUPROPION HYDROCHLORIDE 150 MG/1
150 TABLET, EXTENDED RELEASE ORAL 2 TIMES DAILY
Qty: 180 TABLET | Refills: 3 | Status: SHIPPED | OUTPATIENT
Start: 2017-05-09 | End: 2018-05-08

## 2017-05-09 RX ORDER — FLUTICASONE PROPIONATE 50 MCG
SPRAY, SUSPENSION (ML) NASAL
Qty: 16 G | Refills: 6 | Status: SHIPPED | OUTPATIENT
Start: 2017-05-09 | End: 2018-07-03

## 2017-05-09 RX ORDER — LOSARTAN POTASSIUM 50 MG/1
100 TABLET ORAL DAILY
Qty: 180 TABLET | Refills: 3 | Status: SHIPPED | OUTPATIENT
Start: 2017-05-09 | End: 2018-05-02

## 2017-05-09 NOTE — NURSING NOTE
"Chief Complaint   Patient presents with     Sleep Problem     cpap f/u       Initial /62  Pulse 68  Ht 1.778 m (5' 10\")  Wt 108.9 kg (240 lb)  BMI 34.44 kg/m2 Estimated body mass index is 34.44 kg/(m^2) as calculated from the following:    Height as of this encounter: 1.778 m (5' 10\").    Weight as of this encounter: 108.9 kg (240 lb).  Medication Reconciliation: complete          "

## 2017-05-09 NOTE — PATIENT INSTRUCTIONS

## 2017-05-09 NOTE — PROGRESS NOTES
Obstructive Sleep Apnea- PAP Follow-Up Visit:    Chief Complaint   Patient presents with     Sleep Problem     cpap f/u       Home Valdez comes in today for follow-up of their severe sleep apnea, managed with CPAP.     Overall, the patient rates their experience with PAP as 10 (0 poor, 10 great). The mask is comfortable. The mask is not leaking, 0 nights per week. They are not snoring with the mask on. They are not having gasp arousals.  They are not having significant oral/nasal dryness. The pressure settings are comfortable.     Patient uses nasal mask.      Patient does feel rested in the morning.    [place] Sleepiness Scale: 2/24    ResMed   BIPAP 15/7 cmH2O download:  30 total days of use. 0 nonuse days. 30 days with >4 hours use.   AHI 1.8.             Past medical/surgical history, family history, social history, medications and allergies were reviewed.      Problem List:  Patient Active Problem List    Diagnosis Date Noted     Panlobular emphysema (H) 12/07/2016     Priority: Medium     Trigeminal neuralgia of left side of face 09/06/2016     Priority: Medium     History of atrial fibrillation 03/28/2016     Priority: Medium     COPD exacerbation (H) 03/28/2016     Priority: Medium     Acute respiratory failure with hypoxia (H) 03/28/2016     Priority: Medium     Acute respiratory failure (H) 03/28/2016     Priority: Medium     Long-term (current) use of anticoagulants [Z79.01] 03/07/2016     Priority: Medium     Thrombocytopenia (H) 02/08/2016     Priority: Medium     CHF (congestive heart failure) (H) 08/16/2015     Priority: Medium     Acute bronchitis 01/04/2015     Priority: Medium     Hypopotassemia 01/02/2015     Priority: Medium     Hypomagnesemia 01/02/2015     Priority: Medium     Acute systolic CHF (congestive heart failure) (H) 12/31/2014     Priority: Medium     Neutrophilic leukocytosis 12/31/2014     Priority: Medium     Advance Care Planning 12/31/2014     Priority: Medium     Advance  Care Planning 9/12/2016: Receipt of ACP document:  Received: Health Care Directive which was witnessed or notarized on 4-1-08.  Document previously scanned on 4-7-08 however scanned to incorrect document type- edited today to AD doc type.  Validation form completed and sent to be scanned.  Code Status reflects choices in most recent ACP document.  Confirmed/documented designated decision maker(s).  Added by Jigna Martínez RN, System Director ACP-Honoring Choices              DVT prophylaxis 12/31/2014     Priority: Medium     Nonischemic cardiomyopathy EF 25% by Echo 11/21/14 11/03/2014     Priority: Medium     AAA (abdominal aortic aneurysm) 4.0 cm 09/10/2013     Priority: Medium     Atrial fibrillation with RVR (H) 12/31/2014     Rapid atrial fibrillation (H) 12/31/2014     Syncope 11/20/2014     Other peripheral vascular disease(443.89) 11/05/2014     Dermatophytosis of nail 11/05/2014     Ejection fraction < 50% 10/22/2014     SEVERE JAMES (obstructive sleep apnea) 09/08/2014     [place] 9/3/2014  , Oxygen Regino 70%  BIPAP 15/7 with O2 2L       Hyperlipidemia LDL goal <130 01/21/2013     COPD (chronic obstructive pulmonary disease) (H) 01/04/2013     Hypertension goal BP (blood pressure) < 140/90 07/23/2012     Encounter for long-term current use of medication 07/23/2012     Problem list name updated by automated process. Provider to review       Pulmonary emphysema (H) 01/18/2012     Do you wish to do the replacement in the background? yes         Pulmonary nodule, needs annual ct  01/18/2012     PERS HX TOBACCO USE 12/13/2006     Posttraumatic stress disorder 09/26/2003        There were no vitals taken for this visit.        Impression/Plan:  The patient is doing well with BIPAP  Severe Sleep apnea.  Tolerating PAP well. Daytime symptoms are improved..       Home Valdez will follow up in about 1 year(s).     Fifteen minutes spent with patient, all of which were spent face-to-face counseling,  consulting, coordinating plan of care.            CC:  Sandip Vega Oleg Froymovich, MD

## 2017-05-09 NOTE — MR AVS SNAPSHOT
After Visit Summary   5/9/2017    Home Valdez    MRN: [id number]           Patient Information     Date Of Birth          1950        Visit Information        Provider Department      5/9/2017 4:30 PM Wilber Thurston MD [place] [facility]        Today's Diagnoses     JAMES (obstructive sleep apnea)    -  1      Care Instructions      Your BMI is Body mass index is 34.44 kg/(m^2).  Weight management is a personal decision.  If you are interested in exploring weight loss strategies, the following discussion covers the approaches that may be successful. Body mass index (BMI) is one way to tell whether you are at a healthy weight, overweight, or obese. It measures your weight in relation to your height.  A BMI of 18.5 to 24.9 is in the healthy range. A person with a BMI of 25 to 29.9 is considered overweight, and someone with a BMI of 30 or greater is considered obese. More than two-thirds of American adults are considered overweight or obese.  Being overweight or obese increases the risk for further weight gain. Excess weight may lead to heart disease and diabetes.  Creating and following plans for healthy eating and physical activity may help you improve your health.  Weight control is part of healthy lifestyle and includes exercise, emotional health, and healthy eating habits. Careful eating habits lifelong are the mainstay of weight control. Though there are significant health benefits from weight loss, long-term weight loss with diet alone may be very difficult to achieve- studies show long-term success with dietary management in less than 10% of people. Attaining a healthy weight may be especially difficult to achieve in those with severe obesity. In some cases, medications, devices and surgical management might be considered.  What can you do?  If you are overweight or obese and are interested in methods for weight loss, you should discuss this with your provider.      Consider reducing daily calorie intake by 500 calories.     Keep a food journal.     Avoiding skipping meals, consider cutting portions instead.    Diet combined with exercise helps maintain muscle while optimizing fat loss. Strength training is particularly important for building and maintaining muscle mass. Exercise helps reduce stress, increase energy, and improves fitness. Increasing exercise without diet control, however, may not burn enough calories to loose weight.       Start walking three days a week 10-20 minutes at a time    Work towards walking thirty minutes five days a week     Eventually, increase the speed of your walking for 1-2 minutes at time    In addition, we recommend that you review healthy lifestyles and methods for weight loss available through the National Institutes of Health patient information sites:  http://win.niddk.nih.gov/publications/index.htm    And look into health and wellness programs that may be available through your health insurance provider, employer, local community center, or bryan club.    Weight management plan: Patient was referred to their PCP to discuss a diet and exercise plan.            Follow-ups after your visit        Your next 10 appointments already scheduled     May 11, 2017  8:15 AM CDT   [facility]  [facility]    [address] [facility] [address] [phone]            May 30, 2017  1:30 PM CDT   LAB with NL LAB [facility] ([facility])    [address] [facility] [address] [phone]           Patient must bring picture ID.  Patient should be prepared to give a urine specimen  Please do not eat 10-12 hours before your appointment if you are coming in fasting for labs on lipids, cholesterol, or glucose (sugar).  Pregnant women should follow their Care Team instructions. Water with medications is okay. Do not drink coffee or other  "fluids.   If you have concerns about taking  your medications, please ask at office or if scheduling via DashThis, send a message by clicking on Secure Messaging, Message Your Care Team.            May 30, 2017  2:00 PM CDT   Return Visit with Jimenez Murphy MD   [facility]    [address] [place] [id number] [phone]              Who to contact     If you have questions or need follow up information about today's clinic visit or your schedule please contact [facility] directly at [phone].  Normal or non-critical lab and imaging results will be communicated to you by [employer], letter or phone within 4 business days after the clinic has received the results. If you do not hear from us within 7 days, please contact the clinic through [employer] or phone. If you have a critical or abnormal lab result, we will notify you by phone as soon as possible.  Submit refill requests through DashThis or call your pharmacy and they will forward the refill request to us. Please allow 3 business days for your refill to be completed.          Additional Information About Your Visit        DashThis Information     DashThis lets you send messages to your doctor, view your test results, renew your prescriptions, schedule appointments and more. To sign up, go to [email or web address] . Click on \"Log in\" on the left side of the screen, which will take you to the Welcome page. Then click on \"Sign up Now\" on the right side of the page.     You will be asked to enter the access code listed below, as well as some personal information. Please follow the directions to create your username and password.     Your access code is: [email or web address]  Expires: 2017  8:16 AM     Your access code will  in 90 days. If you need help or a new code, please call your [facility] or [phone].        Care EveryWhere ID     This is your Care EveryWhere ID. This " "could be used by other organizations to access your [place] medical records  [id number]        Your Vitals Were     Pulse Height BMI (Body Mass Index)             68 1.778 m (5' 10\") 34.44 kg/m2          Blood Pressure from Last 3 Encounters:   05/09/17 138/62   05/02/17 136/78   04/14/17 142/72    Weight from Last 3 Encounters:   05/09/17 108.9 kg (240 lb)   05/02/17 111.1 kg (245 lb)   04/14/17 109 kg (240 lb 3.2 oz)              We Performed the Following     Comprehensive DME          Where to get your medicines      These medications were sent to [id number] - [place] - [address]   [address] [place] [address]    Hours:  M-F 8:30-6:30; Sat 9-4; closed Sunday Phone:  [phone]     albuterol 108 (90 BASE) MCG/ACT Inhaler    buPROPion 150 MG 12 hr tablet    fluticasone 50 MCG/ACT spray    fluticasone-salmeterol 250-50 MCG/DOSE diskus inhaler    losartan 50 MG tablet          Primary Care Provider Office Phone # Fax #    Sandip Home DO [place] [phone]       [address] [place] [address]        Thank you!     Thank you for choosing [facility]  for your care. Our goal is always to provide you with excellent care. Hearing back from our patients is one way we can continue to improve our services. Please take a few minutes to complete the written survey that you may receive in the mail after your visit with us. Thank you!             Your Updated Medication List - Protect others around you: Learn how to safely use, store and throw away your medicines at www.disposemymeds.org.          This list is accurate as of: 5/9/17  5:24 PM.  Always use your most recent med list.                   Brand Name Dispense Instructions for use    albuterol 108 (90 BASE) MCG/ACT Inhaler    PROAIR HFA/PROVENTIL HFA/VENTOLIN HFA    6 Inhaler    Inhale 2 puffs into the lungs every 4 hours as needed for shortness of breath / dyspnea       " amiodarone 200 MG tablet    PACERONE/CODARONE    45 tablet    Take 0.5 tablets (100 mg) by mouth daily       buPROPion 150 MG 12 hr tablet    WELLBUTRIN SR    180 tablet    Take 1 tablet (150 mg) by mouth 2 times daily       cetirizine 10 MG tablet    zyrTEC    30 tablet    Take 1 tablet (10 mg) by mouth every evening       fluticasone 50 MCG/ACT spray    FLONASE    16 g    USE ONE TO TWO SPRAYS IN EACH NOSTRIL EVERY DAY       fluticasone-salmeterol 250-50 MCG/DOSE diskus inhaler    ADVAIR    3 Inhaler    Inhale 1 puff into the lungs 2 times daily       ipratropium - albuterol 0.5 mg/2.5 mg/3 mL 0.5-2.5 (3) MG/3ML neb solution    DUONEB    540 vial    Take 1 vial (3 mLs) by nebulization every 4 hours as needed for shortness of breath / dyspnea or wheezing       levofloxacin 250 MG tablet    LEVAQUIN    7 tablet    Take 1 tablet (250 mg) by mouth daily       losartan 50 MG tablet    COZAAR    180 tablet    Take 2 tablets (100 mg) by mouth daily       metolazone 2.5 MG tablet    ZAROXOLYN    30 tablet    Take 1 tablet (2.5 mg) by mouth daily as needed       metoprolol 50 MG 24 hr tablet    TOPROL-XL    90 tablet    Take 1 tablet (50 mg) by mouth daily       montelukast 10 MG tablet    SINGULAIR    30 tablet    Take 1 tablet (10 mg) by mouth At Bedtime       nebulizer/tubing/mouthpiece Kit     1 kit    Use every 4-6 hours PRN       nystatin 307571 UNIT/ML suspension    MYCOSTATIN    60 mL    Take 5 mLs (500,000 Units) by mouth 3 times daily       * order for DME     1 Units    Equipment being ordered: Oxygen 2 liters per minute continuous  with portability Needs home oxygen with portability       * order for DME     1 Device    Equipment being ordered: Nebulizer       * order for DME     1 Device    Equipment being ordered: Spacer for inhaler       potassium chloride SA 20 MEQ CR tablet    K-DUR/KLOR-CON M    90 tablet    Take 1 tablet (20 mEq) by mouth daily       predniSONE 10 MG tablet    DELTASONE    7 tablet     Take 1 tablet (10 mg) by mouth daily       senna-docusate 8.6-50 MG per tablet    SENOKOT-S;PERICOLACE     Take 2 tablets by mouth 2 times daily       torsemide 20 MG tablet    DEMADEX    180 tablet    Take 10 mg po daily. Take an extra 10mg po daily as needed for swelling.       warfarin 2.5 MG tablet    JANTOVEN    235 tablet    Take 7.5 mg (3 tablets) on Tuesday, Thursday, Saturday and 5 mg (2 tablets) all other days or as directed by coumadin clinic.       * Notice:  This list has 3 medication(s) that are the same as other medications prescribed for you. Read the directions carefully, and ask your doctor or other care provider to review them with you.

## 2017-05-11 ENCOUNTER — ANTICOAGULATION THERAPY VISIT (OUTPATIENT)
Dept: ANTICOAGULATION | Facility: OTHER | Age: 67
End: 2017-05-11
Payer: MEDICARE

## 2017-05-11 DIAGNOSIS — I48.91 ATRIAL FIBRILLATION WITH RVR (H): ICD-10-CM

## 2017-05-11 DIAGNOSIS — Z79.01 LONG-TERM (CURRENT) USE OF ANTICOAGULANTS: ICD-10-CM

## 2017-05-11 LAB — INR POINT OF CARE: 2.6 (ref 0.86–1.14)

## 2017-05-11 PROCEDURE — 85610 PROTHROMBIN TIME: CPT | Mod: QW

## 2017-05-11 PROCEDURE — 36416 COLLJ CAPILLARY BLOOD SPEC: CPT

## 2017-05-11 PROCEDURE — 99207 ZZC NO CHARGE NURSE ONLY: CPT

## 2017-05-11 NOTE — MR AVS SNAPSHOT
Home Valdez   5/11/2017 8:15 AM   Anticoagulation Therapy Visit    Description:  67 year old male   Provider:  [facility]   Department:  [facility]           INR as of 5/11/2017     Today's INR 2.6      Anticoagulation Summary as of 5/11/2017     INR goal 2.0-3.0   Today's INR 2.6   Full instructions 7.5 mg on Tue, Thu, Sat; 5 mg all other days   Next INR check 6/1/2017    Indications   Atrial fibrillation with RVR (H) [I48.91]  Long-term (current) use of anticoagulants [Z79.01] [Z79.01]         Your next Anticoagulation Clinic appointment(s)     Jun 01, 2017  8:30 AM CDT   [facility] with [facility]   [facility]    [address] [facility] [address]   [phone]              Contact Numbers     Clinic Number:         May 2017 Details    Sun Mon Tue Wed Thu Fri Sat      1               2               3               4               5               6                 7               8               9               10               11      7.5 mg   See details      12      5 mg         13      7.5 mg           14      5 mg         15      5 mg         16      7.5 mg         17      5 mg         18      7.5 mg         19      5 mg         20      7.5 mg           21      5 mg         22      5 mg         23      7.5 mg         24      5 mg         25      7.5 mg         26      5 mg         27      7.5 mg           28      5 mg         29      5 mg         30      7.5 mg         31      5 mg             Date Details   05/11 This INR check               How to take your warfarin dose     To take:  5 mg Take 2 of the 2.5 mg tablets.    To take:  7.5 mg Take 3 of the 2.5 mg tablets.           June 2017 Details    Sun Mon Tue Wed Thu Fri Sat         1            2               3                 4               5               6               7               8               9               10                 11               12               13                14               15               16               17                 18               19               20               21               22               23               24                 25               26               27               28               29               30                 Date Details   No additional details    Date of next INR:  6/1/2017         How to take your warfarin dose     To take:  7.5 mg Take 3 of the 2.5 mg tablets.

## 2017-05-11 NOTE — PROGRESS NOTES
ANTICOAGULATION FOLLOW-UP CLINIC VISIT    Patient Name:  Home Valdez  Date:  5/11/2017  Contact Type:  Face to Face    SUBJECTIVE:     Patient Findings     Positives No Problem Findings           OBJECTIVE    INR Protime   Date Value Ref Range Status   05/11/2017 2.6 (A) 0.86 - 1.14 Final       ASSESSMENT / PLAN  INR assessment THER    Recheck INR In: 3 WEEKS    INR Location Clinic      Anticoagulation Summary as of 5/11/2017     INR goal 2.0-3.0   Today's INR 2.6   Maintenance plan 7.5 mg (2.5 mg x 3) on Tue, Thu, Sat; 5 mg (2.5 mg x 2) all other days   Full instructions 7.5 mg on Tue, Thu, Sat; 5 mg all other days   Weekly total 42.5 mg   No change documented Shameka Carey RN   Plan last modified Brittany Martin RN (1/13/2017)   Next INR check 6/1/2017   Target end date     Indications   Atrial fibrillation with RVR (H) [I48.91]  Long-term (current) use of anticoagulants [Z79.01] [Z79.01]         Anticoagulation Episode Summary     INR check location     Preferred lab     Send INR reminders to [facility] POOL    Comments 2.5 MG TABLETS, likes print out       Anticoagulation Care Providers     Provider Role Specialty Phone number    Neo Galicia MD [facility] [phone]            See the Encounter Report to view Anticoagulation Flowsheet and Dosing Calendar (Go to Encounters tab in chart review, and find the Anticoagulation Therapy Visit)    Dosage adjustment made based on physician directed care plan.    Shameka Carey RN

## 2017-05-26 ENCOUNTER — OFFICE VISIT (OUTPATIENT)
Dept: FAMILY MEDICINE | Facility: OTHER | Age: 67
End: 2017-05-26
Payer: MEDICARE

## 2017-05-26 VITALS
BODY MASS INDEX: 35.4 KG/M2 | WEIGHT: 246.7 LBS | HEART RATE: 73 BPM | DIASTOLIC BLOOD PRESSURE: 84 MMHG | SYSTOLIC BLOOD PRESSURE: 136 MMHG | RESPIRATION RATE: 20 BRPM | TEMPERATURE: 98.3 F | OXYGEN SATURATION: 93 %

## 2017-05-26 DIAGNOSIS — R20.9 DISTURBANCE OF SKIN SENSATION: Primary | ICD-10-CM

## 2017-05-26 DIAGNOSIS — Z12.5 SPECIAL SCREENING FOR MALIGNANT NEOPLASM OF PROSTATE: ICD-10-CM

## 2017-05-26 DIAGNOSIS — I48.91 ATRIAL FIBRILLATION WITH RVR (H): ICD-10-CM

## 2017-05-26 DIAGNOSIS — Z79.899 ENCOUNTER FOR LONG-TERM CURRENT USE OF MEDICATION: ICD-10-CM

## 2017-05-26 DIAGNOSIS — J43.1 PANLOBULAR EMPHYSEMA (H): ICD-10-CM

## 2017-05-26 PROCEDURE — 99213 OFFICE O/P EST LOW 20 MIN: CPT | Performed by: FAMILY MEDICINE

## 2017-05-26 ASSESSMENT — PAIN SCALES - GENERAL: PAINLEVEL: MILD PAIN (2)

## 2017-05-26 NOTE — PROGRESS NOTES
SUBJECTIVE:                                                    Home Valdez is a 67 year old male who presents to clinic today for the following health issues:      Patient is here for burning in his right foot for 3 weeks, some swelling.     SUBJECTIVE:  Home Valdez, a 67 year old male scheduled an appointment to discuss the following issues:     Disturbance of skin sensation: no obvious reason. Symptoms localized to a 6 cm area lat aspect rt foot  Special screening for malignant neoplasm of prostate  Encounter for long-term current use of medication  Panlobular emphysema (H): stable, o2 sats good, feels well  Atrial fibrillation with RVR (H): no med issues, stable  Past Medical History:   Diagnosis Date     AAA (abdominal aortic aneurysm) (H)      COPD (chronic obstructive pulmonary disease) (H)     moderate     Emphysema      Hyperlipidemia      Hypertension      Hypomagnesemia 1/2/2015     JAMES (obstructive sleep apnea) 9/3/2014         PTSD (post-traumatic stress disorder)      Trigeminal neuralgia      Current Outpatient Prescriptions   Medication Sig Dispense Refill     losartan (COZAAR) 50 MG tablet Take 2 tablets (100 mg) by mouth daily 180 tablet 3     buPROPion (WELLBUTRIN SR) 150 MG 12 hr tablet Take 1 tablet (150 mg) by mouth 2 times daily 180 tablet 3     fluticasone-salmeterol (ADVAIR) 250-50 MCG/DOSE diskus inhaler Inhale 1 puff into the lungs 2 times daily 3 Inhaler 3     albuterol (PROAIR HFA/PROVENTIL HFA/VENTOLIN HFA) 108 (90 BASE) MCG/ACT Inhaler Inhale 2 puffs into the lungs every 4 hours as needed for shortness of breath / dyspnea 6 Inhaler 3     fluticasone (FLONASE) 50 MCG/ACT spray USE ONE TO TWO SPRAYS IN EACH NOSTRIL EVERY DAY 16 g 6     warfarin (JANTOVEN) 2.5 MG tablet Take 7.5 mg (3 tablets) on Tuesday, Thursday, Saturday and 5 mg (2 tablets) all other days or as directed by coumadin clinic. 235 tablet 0     cetirizine (ZYRTEC) 10 MG tablet Take 1 tablet (10 mg) by  mouth every evening 30 tablet 1     montelukast (SINGULAIR) 10 MG tablet Take 1 tablet (10 mg) by mouth At Bedtime 30 tablet 1     Respiratory Therapy Supplies (NEBULIZER/TUBING/MOUTHPIECE) KIT Use every 4-6 hours PRN 1 kit 11     nystatin (MYCOSTATIN) 185695 UNIT/ML suspension Take 5 mLs (500,000 Units) by mouth 3 times daily 60 mL 0     metoprolol (TOPROL-XL) 50 MG 24 hr tablet Take 1 tablet (50 mg) by mouth daily 90 tablet 3     ipratropium - albuterol 0.5 mg/2.5 mg/3 mL (DUONEB) 0.5-2.5 (3) MG/3ML neb solution Take 1 vial (3 mLs) by nebulization every 4 hours as needed for shortness of breath / dyspnea or wheezing 540 vial 3     amiodarone (PACERONE/CODARONE) 200 MG tablet Take 0.5 tablets (100 mg) by mouth daily 45 tablet 3     potassium chloride SA (K-DUR/KLOR-CON M) 20 MEQ CR tablet Take 1 tablet (20 mEq) by mouth daily 90 tablet 1     torsemide (DEMADEX) 20 MG tablet Take 10 mg po daily. Take an extra 10mg po daily as needed for swelling. 180 tablet 3     senna-docusate (SENOKOT-S;PERICOLACE) 8.6-50 MG per tablet Take 2 tablets by mouth 2 times daily       order for DME Equipment being ordered: Nebulizer 1 Device 0     metolazone (ZAROXOLYN) 2.5 MG tablet Take 1 tablet (2.5 mg) by mouth daily as needed 30 tablet 0           EXAM:  /84 (BP Location: Right arm, Cuff Size: Adult Regular)  Pulse 73  Temp 98.3  F (36.8  C) (Tympanic)  Resp 20  Wt 246 lb 11.2 oz (111.9 kg)  SpO2 93%  BMI 35.4 kg/m2  .    LUNGS:  Clear to auscultation.decreased BS   HEART:  Regular rhythm, no murmurs.    ABDOMEN:  The abdomen is soft, nontender, no hepatosplenomegaly or masses appreciated.  Rectal and prostate nl  EXTREMITIES: 5 cm area of old hematoma in the symptomatic area. Slight tenderness to palpation. No redness or signs of inflamation       IMPRESSION/PLAN:  Encounter Diagnoses   Name Primary?     Disturbance of skin sensation: observation. cb if symptoms persist      Special screening for malignant neoplasm of  prostate: psa ordered      Encounter for long-term current use of medication      Panlobular emphysema (H): no changes made      Atrial fibrillation with RVR (H): will f/u with cardiology soon. Labs ordered      Orders Placed This Encounter     CBC with platelets differential     Prostate spec antigen screen     *UA reflex to Microscopic       Neo Galicia;

## 2017-05-26 NOTE — NURSING NOTE
"Chief Complaint   Patient presents with     Musculoskeletal Problem     right foot burning, 3 weeks       Initial /84 (BP Location: Right arm, Cuff Size: Adult Regular)  Pulse 73  Temp 98.3  F (36.8  C) (Tympanic)  Resp 20  Wt 246 lb 11.2 oz (111.9 kg)  SpO2 93%  BMI 35.4 kg/m2 Estimated body mass index is 35.4 kg/(m^2) as calculated from the following:    Height as of 5/9/17: 5' 10\" (1.778 m).    Weight as of this encounter: 246 lb 11.2 oz (111.9 kg).  Medication Reconciliation: complete   Nkechi Carroll MA     5/26/2017      "

## 2017-05-26 NOTE — MR AVS SNAPSHOT
After Visit Summary   5/26/2017    Home Valdez    MRN: [id number]           Patient Information     Date Of Birth          1950        Visit Information        Provider Department      5/26/2017 10:00 AM Neo Galicia MD [facility]        Today's Diagnoses     Disturbance of skin sensation    -  1    Special screening for malignant neoplasm of prostate        Encounter for long-term current use of medication           Follow-ups after your visit        Your next 10 appointments already scheduled     May 30, 2017  8:15 AM CDT   LAB with NL LAB [facility] ([facility])    [address] [facility] [address] [phone]           Patient must bring picture ID.  Patient should be prepared to give a urine specimen  Please do not eat 10-12 hours before your appointment if you are coming in fasting for labs on lipids, cholesterol, or glucose (sugar).  Pregnant women should follow their Care Team instructions. Water with medications is okay. Do not drink coffee or other fluids.   If you have concerns about taking  your medications, please ask at office or if scheduling via HomeToucht, send a message by clicking on Secure Messaging, Message Your Care Team.            May 30, 2017  2:00 PM CDT   Return Visit with Jimenez Murphy MD   [facility]    [address] [facility] [address] [phone]            Jun 01, 2017  8:30 AM CDT   Anticoagulation Visit with  [facility]   [facility] ([facility])    [address] [facility] [id number] [phone]              Future tests that were ordered for you today     Open Future Orders        Priority Expected Expires Ordered    CBC with platelets differential Routine 5/30/2017 6/26/2017 5/26/2017    Prostate spec antigen screen Routine 5/30/2017 6/26/2017 5/26/2017    *UA reflex to Microscopic Routine 5/30/2017 6/26/2017  "2017            Who to contact     If you have questions or need follow up information about today's clinic visit or your schedule please contact [facility] directly at [phone].  Normal or non-critical lab and imaging results will be communicated to you by MyChart, letter or phone within 4 business days after the clinic has received the results. If you do not hear from us within 7 days, please contact the clinic through MyChart or phone. If you have a critical or abnormal lab result, we will notify you by phone as soon as possible.  Submit refill requests through Disconnect or call your pharmacy and they will forward the refill request to us. Please allow 3 business days for your refill to be completed.          Additional Information About Your Visit        [employer] Information     Disconnect lets you send messages to your doctor, view your test results, renew your prescriptions, schedule appointments and more. To sign up, go to [email or web address] . Click on \"Log in\" on the left side of the screen, which will take you to the Welcome page. Then click on \"Sign up Now\" on the right side of the page.     You will be asked to enter the access code listed below, as well as some personal information. Please follow the directions to create your username and password.     Your access code is: [email or web address]  Expires: 2017  8:16 AM     Your access code will  in 90 days. If you need help or a new code, please call your [facility] or [phone].        Care EveryWhere ID     This is your Care EveryWhere ID. This could be used by other organizations to access your [place] medical records  [id number]        Your Vitals Were     Pulse Temperature Respirations Pulse Oximetry BMI (Body Mass Index)       73 98.3  F (36.8  C) (Tympanic) 20 93% 35.4 kg/m2        Blood Pressure from Last 3 Encounters:   17 136/84   17 138/62   17 136/78    Weight from Last 3 Encounters: "   05/26/17 246 lb 11.2 oz (111.9 kg)   05/09/17 240 lb (108.9 kg)   05/02/17 245 lb (111.1 kg)               Primary Care Provider Office Phone # Fax #    Sandip Vega [phone]       [address] [place] [address]        Thank you!     Thank you for choosing [facility]  for your care. Our goal is always to provide you with excellent care. Hearing back from our patients is one way we can continue to improve our services. Please take a few minutes to complete the written survey that you may receive in the mail after your visit with us. Thank you!             Your Updated Medication List - Protect others around you: Learn how to safely use, store and throw away your medicines at www.disposemymeds.org.          This list is accurate as of: 5/26/17 10:22 AM.  Always use your most recent med list.                   Brand Name Dispense Instructions for use    albuterol 108 (90 BASE) MCG/ACT Inhaler    PROAIR HFA/PROVENTIL HFA/VENTOLIN HFA    6 Inhaler    Inhale 2 puffs into the lungs every 4 hours as needed for shortness of breath / dyspnea       amiodarone 200 MG tablet    PACERONE/CODARONE    45 tablet    Take 0.5 tablets (100 mg) by mouth daily       buPROPion 150 MG 12 hr tablet    WELLBUTRIN SR    180 tablet    Take 1 tablet (150 mg) by mouth 2 times daily       cetirizine 10 MG tablet    zyrTEC    30 tablet    Take 1 tablet (10 mg) by mouth every evening       fluticasone 50 MCG/ACT spray    FLONASE    16 g    USE ONE TO TWO SPRAYS IN EACH NOSTRIL EVERY DAY       fluticasone-salmeterol 250-50 MCG/DOSE diskus inhaler    ADVAIR    3 Inhaler    Inhale 1 puff into the lungs 2 times daily       ipratropium - albuterol 0.5 mg/2.5 mg/3 mL 0.5-2.5 (3) MG/3ML neb solution    DUONEB    540 vial    Take 1 vial (3 mLs) by nebulization every 4 hours as needed for shortness of breath / dyspnea or wheezing       losartan 50 MG tablet    COZAAR    180 tablet     Take 2 tablets (100 mg) by mouth daily       metolazone 2.5 MG tablet    ZAROXOLYN    30 tablet    Take 1 tablet (2.5 mg) by mouth daily as needed       metoprolol 50 MG 24 hr tablet    TOPROL-XL    90 tablet    Take 1 tablet (50 mg) by mouth daily       montelukast 10 MG tablet    SINGULAIR    30 tablet    Take 1 tablet (10 mg) by mouth At Bedtime       nebulizer/tubing/mouthpiece Kit     1 kit    Use every 4-6 hours PRN       nystatin 527860 UNIT/ML suspension    MYCOSTATIN    60 mL    Take 5 mLs (500,000 Units) by mouth 3 times daily       order for DME     1 Device    Equipment being ordered: Nebulizer       potassium chloride SA 20 MEQ CR tablet    K-DUR/KLOR-CON M    90 tablet    Take 1 tablet (20 mEq) by mouth daily       senna-docusate 8.6-50 MG per tablet    SENOKOT-S;PERICOLACE     Take 2 tablets by mouth 2 times daily       torsemide 20 MG tablet    DEMADEX    180 tablet    Take 10 mg po daily. Take an extra 10mg po daily as needed for swelling.       warfarin 2.5 MG tablet    JANTOVEN    235 tablet    Take 7.5 mg (3 tablets) on Tuesday, Thursday, Saturday and 5 mg (2 tablets) all other days or as directed by coumadin clinic.

## 2017-05-30 ENCOUNTER — OFFICE VISIT (OUTPATIENT)
Dept: CARDIOLOGY | Facility: CLINIC | Age: 67
End: 2017-05-30
Attending: INTERNAL MEDICINE
Payer: MEDICARE

## 2017-05-30 VITALS
DIASTOLIC BLOOD PRESSURE: 88 MMHG | SYSTOLIC BLOOD PRESSURE: 126 MMHG | WEIGHT: 249 LBS | HEIGHT: 70 IN | BODY MASS INDEX: 35.65 KG/M2 | OXYGEN SATURATION: 95 % | HEART RATE: 66 BPM

## 2017-05-30 DIAGNOSIS — E78.5 HYPERLIPIDEMIA LDL GOAL <130: ICD-10-CM

## 2017-05-30 DIAGNOSIS — I71.40 ABDOMINAL AORTIC ANEURYSM (AAA) WITHOUT RUPTURE (H): ICD-10-CM

## 2017-05-30 DIAGNOSIS — J44.1 COPD EXACERBATION (H): ICD-10-CM

## 2017-05-30 DIAGNOSIS — Z79.899 ENCOUNTER FOR LONG-TERM CURRENT USE OF MEDICATION: ICD-10-CM

## 2017-05-30 DIAGNOSIS — I10 ESSENTIAL HYPERTENSION WITH GOAL BLOOD PRESSURE LESS THAN 140/90: ICD-10-CM

## 2017-05-30 DIAGNOSIS — I48.0 PAROXYSMAL ATRIAL FIBRILLATION (H): ICD-10-CM

## 2017-05-30 DIAGNOSIS — Z12.5 SPECIAL SCREENING FOR MALIGNANT NEOPLASM OF PROSTATE: ICD-10-CM

## 2017-05-30 DIAGNOSIS — I48.91 ATRIAL FIBRILLATION WITH RVR (H): ICD-10-CM

## 2017-05-30 DIAGNOSIS — I50.32 CHRONIC DIASTOLIC CONGESTIVE HEART FAILURE (H): Primary | ICD-10-CM

## 2017-05-30 LAB
ALBUMIN SERPL-MCNC: 3.6 G/DL (ref 3.4–5)
ALBUMIN UR-MCNC: 30 MG/DL
ALP SERPL-CCNC: 80 U/L (ref 40–150)
ALT SERPL W P-5'-P-CCNC: 25 U/L (ref 0–70)
ANION GAP SERPL CALCULATED.3IONS-SCNC: 5 MMOL/L (ref 3–14)
APPEARANCE UR: CLEAR
AST SERPL W P-5'-P-CCNC: 21 U/L (ref 0–45)
BASOPHILS # BLD AUTO: 0 10E9/L (ref 0–0.2)
BASOPHILS NFR BLD AUTO: 0.4 %
BILIRUB DIRECT SERPL-MCNC: 0.1 MG/DL (ref 0–0.2)
BILIRUB SERPL-MCNC: 0.5 MG/DL (ref 0.2–1.3)
BILIRUB UR QL STRIP: NEGATIVE
BUN SERPL-MCNC: 13 MG/DL (ref 7–30)
CALCIUM SERPL-MCNC: 8.2 MG/DL (ref 8.5–10.1)
CHLORIDE SERPL-SCNC: 108 MMOL/L (ref 94–109)
CHOLEST SERPL-MCNC: 169 MG/DL
CO2 SERPL-SCNC: 29 MMOL/L (ref 20–32)
COLOR UR AUTO: YELLOW
CREAT SERPL-MCNC: 1 MG/DL (ref 0.66–1.25)
DIFFERENTIAL METHOD BLD: NORMAL
EOSINOPHIL # BLD AUTO: 0.3 10E9/L (ref 0–0.7)
EOSINOPHIL NFR BLD AUTO: 3.7 %
ERYTHROCYTE [DISTWIDTH] IN BLOOD BY AUTOMATED COUNT: 13 % (ref 10–15)
GFR SERPL CREATININE-BSD FRML MDRD: 75 ML/MIN/1.7M2
GLUCOSE SERPL-MCNC: 103 MG/DL (ref 70–99)
GLUCOSE UR STRIP-MCNC: NEGATIVE MG/DL
HCT VFR BLD AUTO: 47.4 % (ref 40–53)
HDLC SERPL-MCNC: 37 MG/DL
HGB BLD-MCNC: 15.5 G/DL (ref 13.3–17.7)
HGB UR QL STRIP: ABNORMAL
KETONES UR STRIP-MCNC: NEGATIVE MG/DL
LDLC SERPL CALC-MCNC: 105 MG/DL
LEUKOCYTE ESTERASE UR QL STRIP: NEGATIVE
LYMPHOCYTES # BLD AUTO: 1.1 10E9/L (ref 0.8–5.3)
LYMPHOCYTES NFR BLD AUTO: 12.2 %
MCH RBC QN AUTO: 29.9 PG (ref 26.5–33)
MCHC RBC AUTO-ENTMCNC: 32.7 G/DL (ref 31.5–36.5)
MCV RBC AUTO: 91 FL (ref 78–100)
MONOCYTES # BLD AUTO: 0.8 10E9/L (ref 0–1.3)
MONOCYTES NFR BLD AUTO: 8.4 %
NEUTROPHILS # BLD AUTO: 6.9 10E9/L (ref 1.6–8.3)
NEUTROPHILS NFR BLD AUTO: 75.3 %
NITRATE UR QL: NEGATIVE
NONHDLC SERPL-MCNC: 132 MG/DL
PH UR STRIP: 6.5 PH (ref 5–7)
PLATELET # BLD AUTO: 210 10E9/L (ref 150–450)
POTASSIUM SERPL-SCNC: 4.3 MMOL/L (ref 3.4–5.3)
PROT SERPL-MCNC: 6.9 G/DL (ref 6.8–8.8)
PSA SERPL-ACNC: 0.87 UG/L (ref 0–4)
RBC # BLD AUTO: 5.19 10E12/L (ref 4.4–5.9)
RBC #/AREA URNS AUTO: NORMAL /HPF (ref 0–2)
SODIUM SERPL-SCNC: 142 MMOL/L (ref 133–144)
SP GR UR STRIP: 1.01 (ref 1–1.03)
TRIGL SERPL-MCNC: 135 MG/DL
TSH SERPL DL<=0.05 MIU/L-ACNC: 2.22 MU/L (ref 0.4–4)
URN SPEC COLLECT METH UR: ABNORMAL
UROBILINOGEN UR STRIP-ACNC: 0.2 EU/DL (ref 0.2–1)
WBC # BLD AUTO: 9.2 10E9/L (ref 4–11)
WBC #/AREA URNS AUTO: NORMAL /HPF (ref 0–2)

## 2017-05-30 PROCEDURE — 80048 BASIC METABOLIC PNL TOTAL CA: CPT | Performed by: INTERNAL MEDICINE

## 2017-05-30 PROCEDURE — G0103 PSA SCREENING: HCPCS | Performed by: INTERNAL MEDICINE

## 2017-05-30 PROCEDURE — 36415 COLL VENOUS BLD VENIPUNCTURE: CPT | Performed by: INTERNAL MEDICINE

## 2017-05-30 PROCEDURE — 84443 ASSAY THYROID STIM HORMONE: CPT | Performed by: INTERNAL MEDICINE

## 2017-05-30 PROCEDURE — 80076 HEPATIC FUNCTION PANEL: CPT | Performed by: INTERNAL MEDICINE

## 2017-05-30 PROCEDURE — 81001 URINALYSIS AUTO W/SCOPE: CPT | Performed by: FAMILY MEDICINE

## 2017-05-30 PROCEDURE — 85025 COMPLETE CBC W/AUTO DIFF WBC: CPT | Performed by: INTERNAL MEDICINE

## 2017-05-30 PROCEDURE — 99214 OFFICE O/P EST MOD 30 MIN: CPT | Performed by: INTERNAL MEDICINE

## 2017-05-30 PROCEDURE — 80061 LIPID PANEL: CPT | Performed by: INTERNAL MEDICINE
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
## 2017-05-30 NOTE — MR AVS SNAPSHOT
After Visit Summary   5/30/2017    Home Valdez    MRN: [id number]           Patient Information     Date Of Birth          1950        Visit Information        Provider Department      5/30/2017 2:00 PM Jimenez Murphy MD [facility]        Today's Diagnoses     Chronic diastolic congestive heart failure (H)    -  1    Atrial fibrillation with RVR (H)        Hyperlipidemia LDL goal <130        Abdominal aortic aneurysm (AAA) without rupture (H)        Essential hypertension with goal blood pressure less than 140/90        COPD exacerbation (H)           Follow-ups after your visit        Additional Services     Follow-Up with Cardiologist                 Your next 10 appointments already scheduled     Jun 01, 2017  8:30 AM CDT   Anticoagulation Visit with  ANTI COAG   [facility]    [address] [facility] [id number] [phone]              Future tests that were ordered for you today     Open Future Orders        Priority Expected Expires Ordered    General PFT Lab (Please always keep checked) Routine 11/21/2017 5/30/2018 5/30/2017    Follow-Up with Cardiologist Routine 11/26/2017 5/30/2018 5/30/2017            Who to contact     If you have questions or need follow up information about today's clinic visit or your schedule please contact [facility] directly at [phone].  Normal or non-critical lab and imaging results will be communicated to you by MyChart, letter or phone within 4 business days after the clinic has received the results. If you do not hear from us within 7 days, please contact the clinic through MyChart or phone. If you have a critical or abnormal lab result, we will notify you by phone as soon as possible.  Submit refill requests through [facility] or call your pharmacy and they will forward the refill request to us. Please allow 3 business days for your refill to be completed.           "Additional Information About Your Visit        MyChart Information     PasswordBox lets you send messages to your doctor, view your test results, renew your prescriptions, schedule appointments and more. To sign up, go to [email or web address] . Click on \"Log in\" on the left side of the screen, which will take you to the Welcome page. Then click on \"Sign up Now\" on the right side of the page.     You will be asked to enter the access code listed below, as well as some personal information. Please follow the directions to create your username and password.     Your access code is: [email or web address]  Expires: 2017  8:16 AM     Your access code will  in 90 days. If you need help or a new code, please call your [facility] clinic or [phone].        Care EveryWhere ID     This is your [facility] EveryWhere ID. This could be used by other organizations to access your [facility] medical records  [id number]        Your Vitals Were     Pulse Height Pulse Oximetry BMI (Body Mass Index)          66 1.778 m (5' 10\") 95% 35.73 kg/m2         Blood Pressure from Last 3 Encounters:   17 126/88   17 136/84   17 138/62    Weight from Last 3 Encounters:   17 112.9 kg (249 lb)   17 111.9 kg (246 lb 11.2 oz)   17 108.9 kg (240 lb)              We Performed the Following     Follow-Up with Cardiologist          Today's Medication Changes          These changes are accurate as of: 17  2:32 PM.  If you have any questions, ask your nurse or doctor.               Stop taking these medicines if you haven't already. Please contact your care team if you have questions.     metolazone 2.5 MG tablet   Commonly known as:  ZAROXOLYN   Stopped by:  Jimenez Murphy MD                    Primary Care Provider Office Phone # Fax #    Sandip Home Vega [phone]       [address] [place] [address]        Thank you!     Thank you for choosing [facility]  for your care. Our goal is always to provide you with excellent care. Hearing back from our patients is one way we can continue to improve our services. Please take a few minutes to complete the written survey that you may receive in the mail after your visit with us. Thank you!             Your Updated Medication List - Protect others around you: Learn how to safely use, store and throw away your medicines at www.disposemymeds.org.          This list is accurate as of: 5/30/17  2:32 PM.  Always use your most recent med list.                   Brand Name Dispense Instructions for use    albuterol 108 (90 BASE) MCG/ACT Inhaler    PROAIR HFA/PROVENTIL HFA/VENTOLIN HFA    6 Inhaler    Inhale 2 puffs into the lungs every 4 hours as needed for shortness of breath / dyspnea       amiodarone 200 MG tablet    PACERONE/CODARONE    45 tablet    Take 0.5 tablets (100 mg) by mouth daily       buPROPion 150 MG 12 hr tablet    WELLBUTRIN SR    180 tablet    Take 1 tablet (150 mg) by mouth 2 times daily       cetirizine 10 MG tablet    zyrTEC    30 tablet    Take 1 tablet (10 mg) by mouth every evening       fluticasone 50 MCG/ACT spray    FLONASE    16 g    USE ONE TO TWO SPRAYS IN EACH NOSTRIL EVERY DAY       fluticasone-salmeterol 250-50 MCG/DOSE diskus inhaler    ADVAIR    3 Inhaler    Inhale 1 puff into the lungs 2 times daily       ipratropium - albuterol 0.5 mg/2.5 mg/3 mL 0.5-2.5 (3) MG/3ML neb solution    DUONEB    540 vial    Take 1 vial (3 mLs) by nebulization every 4 hours as needed for shortness of breath / dyspnea or wheezing       losartan 50 MG tablet    COZAAR    180 tablet    Take 2 tablets (100 mg) by mouth daily       metoprolol 50 MG 24 hr tablet    TOPROL-XL    90 tablet    Take 1 tablet (50 mg) by mouth daily       montelukast 10 MG tablet    SINGULAIR    30 tablet    Take 1 tablet (10 mg) by mouth At Bedtime       nebulizer/tubing/mouthpiece Kit     1 kit    Use every 4-6 hours PRN        nystatin 576964 UNIT/ML suspension    MYCOSTATIN    60 mL    Take 5 mLs (500,000 Units) by mouth 3 times daily       order for DME     1 Device    Equipment being ordered: Nebulizer       potassium chloride SA 20 MEQ CR tablet    K-DUR/KLOR-CON M    90 tablet    Take 1 tablet (20 mEq) by mouth daily       senna-docusate 8.6-50 MG per tablet    SENOKOT-S;PERICOLACE     Take 2 tablets by mouth 2 times daily       torsemide 20 MG tablet    DEMADEX    180 tablet    Take 10 mg po daily. Take an extra 10mg po daily as needed for swelling.       warfarin 2.5 MG tablet    JANTOVEN    235 tablet    Take 7.5 mg (3 tablets) on Tuesday, Thursday, Saturday and 5 mg (2 tablets) all other days or as directed by coumadin clinic.

## 2017-05-30 NOTE — LETTER
5/30/2017    Sandip Vega, DO  [address]   [place] [address]    RE: Home Valdez       Dear Colleague,    I had the opportunity to see Mr. Home Valdez in Cardiology Clinic today at the [facility] in [place] for reevaluation of paroxysmal atrial fibrillation, nonischemic cardiomyopathy and congestive heart failure.  He has an abdominal aortic aneurysm, hypertension and COPD as well.  He also has severe obstructive sleep apnea and uses a CPAP regularly at night.      He developed atrial fibrillation with rapid ventricular response but was asymptomatic until he developed a dilated cardiomyopathy with an ejection fraction as low as 20%-25%.  He had diastolic heart failure complicated by pneumonia and ended up hospitalized.  Fortunately, once we controlled his diastolic heart failure and atrial fibrillation, he has done well.  He continues to use his CPAP mask faithfully at night for treatment of his sleep apnea and his inhalers for COPD.      At this point, he tells me he is doing very well.  He is not limited by any shortness of breath.  He has no chest pain, palpitations, lightheadedness or other concerning symptoms.  I reduced his amiodarone from 200 mg a day to 100 mg a day at last visit, and he has not had any breakthrough atrial fibrillation identified.      He has not had any coronary artery disease and has good cholesterol numbers without medical therapy.  He does have a mild abdominal aortic aneurysm measuring 3.6 cm on his most recent ultrasound study 6 months ago.  His left ventricular ejection fraction is now up to 45%-50% by echo, and he continues on a low dose of torsemide.      PHYSICAL EXAMINATION:  On examination today his blood pressure is 126/88, heart rate 66 and weight 249 pounds.  His lungs are clear.  His heart rhythm is regular.  He has no cardiac murmurs or carotid bruits and no edema.     No facility-administered  encounter medications on file as of 5/30/2017.      Outpatient Encounter Prescriptions as of 5/30/2017   Medication Sig Dispense Refill     losartan (COZAAR) 50 MG tablet Take 2 tablets (100 mg) by mouth daily 180 tablet 3     buPROPion (WELLBUTRIN SR) 150 MG 12 hr tablet Take 1 tablet (150 mg) by mouth 2 times daily 180 tablet 3     fluticasone-salmeterol (ADVAIR) 250-50 MCG/DOSE diskus inhaler Inhale 1 puff into the lungs 2 times daily 3 Inhaler 3     albuterol (PROAIR HFA/PROVENTIL HFA/VENTOLIN HFA) 108 (90 BASE) MCG/ACT Inhaler Inhale 2 puffs into the lungs every 4 hours as needed for shortness of breath / dyspnea 6 Inhaler 3     fluticasone (FLONASE) 50 MCG/ACT spray USE ONE TO TWO SPRAYS IN EACH NOSTRIL EVERY DAY 16 g 6     warfarin (JANTOVEN) 2.5 MG tablet Take 7.5 mg (3 tablets) on Tuesday, Thursday, Saturday and 5 mg (2 tablets) all other days or as directed by coumadin clinic. 235 tablet 0     cetirizine (ZYRTEC) 10 MG tablet Take 1 tablet (10 mg) by mouth every evening 30 tablet 1     montelukast (SINGULAIR) 10 MG tablet Take 1 tablet (10 mg) by mouth At Bedtime 30 tablet 1     nystatin (MYCOSTATIN) 747450 UNIT/ML suspension Take 5 mLs (500,000 Units) by mouth 3 times daily 60 mL 0     metoprolol (TOPROL-XL) 50 MG 24 hr tablet Take 1 tablet (50 mg) by mouth daily 90 tablet 3     ipratropium - albuterol 0.5 mg/2.5 mg/3 mL (DUONEB) 0.5-2.5 (3) MG/3ML neb solution Take 1 vial (3 mLs) by nebulization every 4 hours as needed for shortness of breath / dyspnea or wheezing 540 vial 3     amiodarone (PACERONE/CODARONE) 200 MG tablet Take 0.5 tablets (100 mg) by mouth daily 45 tablet 3     potassium chloride SA (K-DUR/KLOR-CON M) 20 MEQ CR tablet Take 1 tablet (20 mEq) by mouth daily 90 tablet 1     torsemide (DEMADEX) 20 MG tablet Take 10 mg po daily. Take an extra 10mg po daily as needed for swelling. 180 tablet 3     senna-docusate (SENOKOT-S;PERICOLACE) 8.6-50 MG per tablet Take 2 tablets by mouth 2 times daily        order for DME Equipment being ordered: Nebulizer 1 Device 0     Respiratory Therapy Supplies (NEBULIZER/TUBING/MOUTHPIECE) KIT Use every 4-6 hours PRN 1 kit 11     [DISCONTINUED] metolazone (ZAROXOLYN) 2.5 MG tablet Take 1 tablet (2.5 mg) by mouth daily as needed (Patient not taking: Reported on 5/30/2017) 30 tablet 0      IMPRESSIONS:  Mr. Home Valdez is a 67-year-old gentleman with a history of paroxysmal atrial fibrillation, well controlled on amiodarone and maintaining normal sinus rhythm.  He has a mild degree of left ventricular systolic dysfunction with an ejection fraction of 45%-50% and a history of diastolic heart failure.  Fortunately, that seems to be resolved as well.  He is managing his COPD and obstructive sleep apnea quite well.  He remains on warfarin for stroke prevention.      His labs all look excellent today.  There is no evidence of amiodarone toxicity.  I did encourage him to continue taking that medication for prevention of recurrent atrial fibrillation.  He complains of some photosensitivity but seems to have other potential causes for that including trigeminal neuralgia, recent cataract surgery and a retinal problem as well.  I am not inclined to stop the amiodarone for that reason unless his ophthalmologist would recommend it.      I will plan to see him back again in 6 months and repeat pulmonary function testing for amiodarone screening and an ultrasound of his abdominal aorta for reevaluation of his aneurysm.     Again, thank you for allowing me to participate in the care of your patient.      Sincerely,    MARCO A YARBROUGH MD     [facility]

## 2017-05-31 NOTE — PROGRESS NOTES
HISTORY OF PRESENT ILLNESS:  I had the opportunity to see Mr. Home Valdez in Cardiology Clinic today at the [facility] in [place] for reevaluation of paroxysmal atrial fibrillation, nonischemic cardiomyopathy and congestive heart failure.  He has an abdominal aortic aneurysm, hypertension and COPD as well.  He also has severe obstructive sleep apnea and uses a CPAP regularly at night.      He developed atrial fibrillation with rapid ventricular response but was asymptomatic until he developed a dilated cardiomyopathy with an ejection fraction as low as 20%-25%.  He had diastolic heart failure complicated by pneumonia and ended up hospitalized.  Fortunately, once we controlled his diastolic heart failure and atrial fibrillation, he has done well.  He continues to use his CPAP mask faithfully at night for treatment of his sleep apnea and his inhalers for COPD.      At this point, he tells me he is doing very well.  He is not limited by any shortness of breath.  He has no chest pain, palpitations, lightheadedness or other concerning symptoms.  I reduced his amiodarone from 200 mg a day to 100 mg a day at last visit, and he has not had any breakthrough atrial fibrillation identified.      He has not had any coronary artery disease and has good cholesterol numbers without medical therapy.  He does have a mild abdominal aortic aneurysm measuring 3.6 cm on his most recent ultrasound study 6 months ago.  His left ventricular ejection fraction is now up to 45%-50% by echo, and he continues on a low dose of torsemide.      PHYSICAL EXAMINATION:  On examination today his blood pressure is 126/88, heart rate 66 and weight 249 pounds.  His lungs are clear.  His heart rhythm is regular.  He has no cardiac murmurs or carotid bruits and no edema.      IMPRESSIONS:  Mr. Home Valdez is a 67-year-old gentleman with a history of paroxysmal atrial fibrillation, well controlled on amiodarone and  maintaining normal sinus rhythm.  He has a mild degree of left ventricular systolic dysfunction with an ejection fraction of 45%-50% and a history of diastolic heart failure.  Fortunately, that seems to be resolved as well.  He is managing his COPD and obstructive sleep apnea quite well.  He remains on warfarin for stroke prevention.      His labs all look excellent today.  There is no evidence of amiodarone toxicity.  I did encourage him to continue taking that medication for prevention of recurrent atrial fibrillation.  He complains of some photosensitivity but seems to have other potential causes for that including trigeminal neuralgia, recent cataract surgery and a retinal problem as well.  I am not inclined to stop the amiodarone for that reason unless his ophthalmologist would recommend it.      I will plan to see him back again in 6 months and repeat pulmonary function testing for amiodarone screening and an ultrasound of his abdominal aorta for reevaluation of his aneurysm.      cc:   Sandip Vega, [place] [address]         MARCO A YARBROUGH MD, FACC             D: 2017 14:40   T: 2017 03:34   MT: BRENDA      Name:     KARTIK HUERTA   MRN:      [id number]        Account:      [id number]   :      1950           Service Date: 2017      Document: [id number]

## 2017-06-01 ENCOUNTER — HOSPITAL ENCOUNTER (EMERGENCY)
Facility: CLINIC | Age: 67
Discharge: ANOTHER HOSPITAL | End: 2017-06-01
Attending: EMERGENCY MEDICINE | Admitting: EMERGENCY MEDICINE
Payer: MEDICARE

## 2017-06-01 ENCOUNTER — ANTICOAGULATION THERAPY VISIT (OUTPATIENT)
Dept: ANTICOAGULATION | Facility: OTHER | Age: 67
End: 2017-06-01
Payer: MEDICARE

## 2017-06-01 VITALS
OXYGEN SATURATION: 99 % | SYSTOLIC BLOOD PRESSURE: 174 MMHG | WEIGHT: 248.9 LBS | BODY MASS INDEX: 35.71 KG/M2 | HEART RATE: 78 BPM | RESPIRATION RATE: 18 BRPM | DIASTOLIC BLOOD PRESSURE: 92 MMHG | TEMPERATURE: 98.4 F

## 2017-06-01 DIAGNOSIS — G50.0 TRIGEMINAL NEURALGIA: ICD-10-CM

## 2017-06-01 DIAGNOSIS — I48.91 ATRIAL FIBRILLATION WITH RVR (H): ICD-10-CM

## 2017-06-01 DIAGNOSIS — Z79.01 LONG-TERM (CURRENT) USE OF ANTICOAGULANTS: ICD-10-CM

## 2017-06-01 LAB — INR POINT OF CARE: 3.4 (ref 0.86–1.14)

## 2017-06-01 PROCEDURE — 85610 PROTHROMBIN TIME: CPT | Mod: QW

## 2017-06-01 PROCEDURE — 99207 ZZC NO CHARGE NURSE ONLY: CPT

## 2017-06-01 PROCEDURE — 96375 TX/PRO/DX INJ NEW DRUG ADDON: CPT | Performed by: EMERGENCY MEDICINE

## 2017-06-01 PROCEDURE — 36416 COLLJ CAPILLARY BLOOD SPEC: CPT

## 2017-06-01 PROCEDURE — 99285 EMERGENCY DEPT VISIT HI MDM: CPT | Mod: 25 | Performed by: EMERGENCY MEDICINE

## 2017-06-01 PROCEDURE — 96376 TX/PRO/DX INJ SAME DRUG ADON: CPT | Performed by: EMERGENCY MEDICINE

## 2017-06-01 PROCEDURE — 25000128 H RX IP 250 OP 636: Performed by: EMERGENCY MEDICINE

## 2017-06-01 PROCEDURE — 96374 THER/PROPH/DIAG INJ IV PUSH: CPT | Performed by: EMERGENCY MEDICINE

## 2017-06-01 PROCEDURE — 25000125 ZZHC RX 250: Performed by: EMERGENCY MEDICINE

## 2017-06-01 PROCEDURE — 99285 EMERGENCY DEPT VISIT HI MDM: CPT | Mod: Z6 | Performed by: EMERGENCY MEDICINE

## 2017-06-01 RX ORDER — ALBUTEROL SULFATE 90 UG/1
2 AEROSOL, METERED RESPIRATORY (INHALATION) EVERY 4 HOURS PRN
Status: CANCELLED | OUTPATIENT
Start: 2017-06-01

## 2017-06-01 RX ORDER — FLUTICASONE PROPIONATE 50 MCG
1 SPRAY, SUSPENSION (ML) NASAL DAILY PRN
Status: CANCELLED | OUTPATIENT
Start: 2017-06-01

## 2017-06-01 RX ORDER — KETAMINE HYDROCHLORIDE 100 MG/ML
0.6 INJECTION, SOLUTION INTRAMUSCULAR; INTRAVENOUS ONCE
Status: COMPLETED | OUTPATIENT
Start: 2017-06-01 | End: 2017-06-01

## 2017-06-01 RX ORDER — HYDROMORPHONE HYDROCHLORIDE 1 MG/ML
0.5 INJECTION, SOLUTION INTRAMUSCULAR; INTRAVENOUS; SUBCUTANEOUS
Status: DISCONTINUED | OUTPATIENT
Start: 2017-06-01 | End: 2017-06-02 | Stop reason: HOSPADM

## 2017-06-01 RX ORDER — NYSTATIN 100000/ML
500000 SUSPENSION, ORAL (FINAL DOSE FORM) ORAL 3 TIMES DAILY
Status: CANCELLED | OUTPATIENT
Start: 2017-06-01

## 2017-06-01 RX ORDER — LOSARTAN POTASSIUM 50 MG/1
100 TABLET ORAL DAILY
Status: CANCELLED | OUTPATIENT
Start: 2017-06-02

## 2017-06-01 RX ORDER — AMOXICILLIN 250 MG
2 CAPSULE ORAL 2 TIMES DAILY
Status: CANCELLED | OUTPATIENT
Start: 2017-06-01

## 2017-06-01 RX ORDER — METOPROLOL SUCCINATE 50 MG/1
50 TABLET, EXTENDED RELEASE ORAL DAILY
Status: CANCELLED | OUTPATIENT
Start: 2017-06-02

## 2017-06-01 RX ORDER — CETIRIZINE HYDROCHLORIDE 10 MG/1
10 TABLET ORAL EVERY EVENING
Status: CANCELLED | OUTPATIENT
Start: 2017-06-01

## 2017-06-01 RX ORDER — IPRATROPIUM BROMIDE AND ALBUTEROL SULFATE 2.5; .5 MG/3ML; MG/3ML
1 SOLUTION RESPIRATORY (INHALATION) EVERY 4 HOURS PRN
Status: CANCELLED | OUTPATIENT
Start: 2017-06-01

## 2017-06-01 RX ORDER — ONDANSETRON 2 MG/ML
4 INJECTION INTRAMUSCULAR; INTRAVENOUS EVERY 30 MIN PRN
Status: DISCONTINUED | OUTPATIENT
Start: 2017-06-01 | End: 2017-06-02 | Stop reason: HOSPADM

## 2017-06-01 RX ORDER — BUPROPION HYDROCHLORIDE 150 MG/1
150 TABLET, EXTENDED RELEASE ORAL 2 TIMES DAILY
Status: CANCELLED | OUTPATIENT
Start: 2017-06-01

## 2017-06-01 RX ORDER — POTASSIUM CHLORIDE 1500 MG/1
20 TABLET, EXTENDED RELEASE ORAL DAILY
Status: CANCELLED | OUTPATIENT
Start: 2017-06-02

## 2017-06-01 RX ORDER — MONTELUKAST SODIUM 10 MG/1
10 TABLET ORAL AT BEDTIME
Status: CANCELLED | OUTPATIENT
Start: 2017-06-01

## 2017-06-01 RX ADMIN — HYDROMORPHONE HYDROCHLORIDE 0.5 MG: 1 INJECTION, SOLUTION INTRAMUSCULAR; INTRAVENOUS; SUBCUTANEOUS at 21:46

## 2017-06-01 RX ADMIN — KETAMINE HYDROCHLORIDE 68 MG: 100 INJECTION, SOLUTION INTRAMUSCULAR; INTRAVENOUS at 21:15

## 2017-06-01 RX ADMIN — KETAMINE HYDROCHLORIDE 68 MG: 100 INJECTION, SOLUTION INTRAMUSCULAR; INTRAVENOUS at 22:34

## 2017-06-01 RX ADMIN — ONDANSETRON HYDROCHLORIDE 4 MG: 2 INJECTION, SOLUTION INTRAMUSCULAR; INTRAVENOUS at 21:50

## 2017-06-01 ASSESSMENT — ENCOUNTER SYMPTOMS: HEADACHES: 1

## 2017-06-01 NOTE — PROGRESS NOTES
ANTICOAGULATION FOLLOW-UP CLINIC VISIT    Patient Name:  Home Valdez  Date:  6/1/2017  Contact Type:  Face to Face    SUBJECTIVE:     Patient Findings     Positives Unexplained INR or factor level change           OBJECTIVE    INR Protime   Date Value Ref Range Status   06/01/2017 3.4 (A) 0.86 - 1.14 Final       ASSESSMENT / PLAN  INR assessment SUPRA    Recheck INR In: 3 WEEKS    INR Location Clinic      Anticoagulation Summary as of 6/1/2017     INR goal 2.0-3.0   Today's INR 3.4!   Maintenance plan 7.5 mg (2.5 mg x 3) on Tue, Thu, Sat; 5 mg (2.5 mg x 2) all other days   Full instructions 6/1: 5 mg; Otherwise 7.5 mg on Tue, Thu, Sat; 5 mg all other days   Weekly total 42.5 mg   Plan last modified Brittany Martin RN (1/13/2017)   Next INR check 6/22/2017   Target end date     Indications   Atrial fibrillation with RVR (H) [I48.91]  Long-term (current) use of anticoagulants [Z79.01] [Z79.01]         Anticoagulation Episode Summary     INR check location     Preferred lab     Send INR reminders to [facility] POOL    Comments 2.5 MG TABLETS, likes print out       Anticoagulation Care Providers     Provider Role Specialty Phone number    Neo Galicia MD [facility] [phone]            See the Encounter Report to view Anticoagulation Flowsheet and Dosing Calendar (Go to Encounters tab in chart review, and find the Anticoagulation Therapy Visit)    Dosage adjustment made based on physician directed care plan.    Shameka Carey RN

## 2017-06-01 NOTE — MR AVS SNAPSHOT
Home Valdez   6/1/2017 8:30 AM   Anticoagulation Therapy Visit    Description:  67 year old male   Provider:  CAMILO ANTI COAG   Department:   Anticoag           INR as of 6/1/2017     Today's INR 3.4!      Anticoagulation Summary as of 6/1/2017     INR goal 2.0-3.0   Today's INR 3.4!   Full instructions 6/1: 5 mg; Otherwise 7.5 mg on Tue, Thu, Sat; 5 mg all other days   Next INR check 6/22/2017    Indications   Atrial fibrillation with RVR (H) [I48.91]  Long-term (current) use of anticoagulants [Z79.01] [Z79.01]         Your next Anticoagulation Clinic appointment(s)     Jun 01, 2017  8:30 AM CDT   [facility]  [facility]    [address] [facility] [address]   [phone]            Jun 22, 2017  8:45 AM CDT   Anticoagulation Visit with  ANTI COAG   [facility] ([facility]    [address] [facility] [address]   [phone]              Contact Numbers     Clinic Number:         June 2017 Details    Sun Mon Tue Wed Thu Fri Sat         1      5 mg   See details      2      5 mg         3      7.5 mg           4      5 mg         5      5 mg         6      7.5 mg         7      5 mg         8      7.5 mg         9      5 mg         10      7.5 mg           11      5 mg         12      5 mg         13      7.5 mg         14      5 mg         15      7.5 mg         16      5 mg         17      7.5 mg           18      5 mg         19      5 mg         20      7.5 mg         21      5 mg         22            23               24                 25               26               27               28               29               30                 Date Details   06/01 This INR check       Date of next INR:  6/22/2017         How to take your warfarin dose     To take:  5 mg Take 2 of the 2.5 mg tablets.    To take:  7.5 mg Take 3 of the 2.5 mg tablets.

## 2017-06-02 ENCOUNTER — HOSPITAL ENCOUNTER (INPATIENT)
Facility: CLINIC | Age: 67
LOS: 4 days | Discharge: HOME | DRG: 042 | End: 2017-06-06
Attending: NEUROLOGICAL SURGERY | Admitting: NEUROLOGICAL SURGERY
Payer: MEDICARE

## 2017-06-02 ENCOUNTER — APPOINTMENT (OUTPATIENT)
Dept: GENERAL RADIOLOGY | Facility: CLINIC | Age: 67
DRG: 042 | End: 2017-06-02
Attending: NURSE PRACTITIONER
Payer: MEDICARE

## 2017-06-02 DIAGNOSIS — I48.91 ATRIAL FIBRILLATION WITH RVR (H): ICD-10-CM

## 2017-06-02 DIAGNOSIS — G50.0 TRIGEMINAL NEURALGIA: Primary | ICD-10-CM

## 2017-06-02 LAB
ANION GAP SERPL CALCULATED.3IONS-SCNC: 7 MMOL/L (ref 3–14)
APTT PPP: 39 SEC (ref 22–37)
BUN SERPL-MCNC: 18 MG/DL (ref 7–30)
CALCIUM SERPL-MCNC: 8.5 MG/DL (ref 8.5–10.1)
CHLORIDE SERPL-SCNC: 107 MMOL/L (ref 94–109)
CO2 SERPL-SCNC: 26 MMOL/L (ref 20–32)
CREAT SERPL-MCNC: 1.3 MG/DL (ref 0.66–1.25)
ERYTHROCYTE [DISTWIDTH] IN BLOOD BY AUTOMATED COUNT: 13.5 % (ref 10–15)
GFR SERPL CREATININE-BSD FRML MDRD: 55 ML/MIN/1.7M2
GLUCOSE SERPL-MCNC: 113 MG/DL (ref 70–99)
HCT VFR BLD AUTO: 50.1 % (ref 40–53)
HGB BLD-MCNC: 16.5 G/DL (ref 13.3–17.7)
INR PPP: 2.64 (ref 0.86–1.14)
INTERPRETATION ECG - MUSE: NORMAL
MAGNESIUM SERPL-MCNC: 2.2 MG/DL (ref 1.6–2.3)
MCH RBC QN AUTO: 30.7 PG (ref 26.5–33)
MCHC RBC AUTO-ENTMCNC: 32.9 G/DL (ref 31.5–36.5)
MCV RBC AUTO: 93 FL (ref 78–100)
PHOSPHATE SERPL-MCNC: 3.1 MG/DL (ref 2.5–4.5)
PLATELET # BLD AUTO: 186 10E9/L (ref 150–450)
POTASSIUM SERPL-SCNC: 4.2 MMOL/L (ref 3.4–5.3)
RBC # BLD AUTO: 5.38 10E12/L (ref 4.4–5.9)
SODIUM SERPL-SCNC: 140 MMOL/L (ref 133–144)
WBC # BLD AUTO: 11.7 10E9/L (ref 4–11)

## 2017-06-02 PROCEDURE — 93005 ELECTROCARDIOGRAM TRACING: CPT

## 2017-06-02 PROCEDURE — A9270 NON-COVERED ITEM OR SERVICE: HCPCS | Mod: GY

## 2017-06-02 PROCEDURE — 25000125 ZZHC RX 250: Performed by: STUDENT IN AN ORGANIZED HEALTH CARE EDUCATION/TRAINING PROGRAM

## 2017-06-02 PROCEDURE — 36415 COLL VENOUS BLD VENIPUNCTURE: CPT | Performed by: STUDENT IN AN ORGANIZED HEALTH CARE EDUCATION/TRAINING PROGRAM

## 2017-06-02 PROCEDURE — 93010 ELECTROCARDIOGRAM REPORT: CPT | Mod: 76 | Performed by: INTERNAL MEDICINE

## 2017-06-02 PROCEDURE — A9270 NON-COVERED ITEM OR SERVICE: HCPCS | Mod: GY | Performed by: STUDENT IN AN ORGANIZED HEALTH CARE EDUCATION/TRAINING PROGRAM

## 2017-06-02 PROCEDURE — 85027 COMPLETE CBC AUTOMATED: CPT | Performed by: STUDENT IN AN ORGANIZED HEALTH CARE EDUCATION/TRAINING PROGRAM

## 2017-06-02 PROCEDURE — 94640 AIRWAY INHALATION TREATMENT: CPT | Mod: 76

## 2017-06-02 PROCEDURE — 25000132 ZZH RX MED GY IP 250 OP 250 PS 637: Mod: GY | Performed by: NEUROLOGICAL SURGERY

## 2017-06-02 PROCEDURE — 40000275 ZZH STATISTIC RCP TIME EA 10 MIN

## 2017-06-02 PROCEDURE — 83735 ASSAY OF MAGNESIUM: CPT | Performed by: STUDENT IN AN ORGANIZED HEALTH CARE EDUCATION/TRAINING PROGRAM

## 2017-06-02 PROCEDURE — A9270 NON-COVERED ITEM OR SERVICE: HCPCS | Mod: GY | Performed by: NEUROLOGICAL SURGERY

## 2017-06-02 PROCEDURE — 25000128 H RX IP 250 OP 636

## 2017-06-02 PROCEDURE — 94640 AIRWAY INHALATION TREATMENT: CPT

## 2017-06-02 PROCEDURE — 80048 BASIC METABOLIC PNL TOTAL CA: CPT | Performed by: STUDENT IN AN ORGANIZED HEALTH CARE EDUCATION/TRAINING PROGRAM

## 2017-06-02 PROCEDURE — 85610 PROTHROMBIN TIME: CPT | Performed by: STUDENT IN AN ORGANIZED HEALTH CARE EDUCATION/TRAINING PROGRAM

## 2017-06-02 PROCEDURE — 25000128 H RX IP 250 OP 636: Performed by: NEUROLOGICAL SURGERY

## 2017-06-02 PROCEDURE — 12000003 ZZH R&B CRITICAL UMMC

## 2017-06-02 PROCEDURE — 25000128 H RX IP 250 OP 636: Performed by: STUDENT IN AN ORGANIZED HEALTH CARE EDUCATION/TRAINING PROGRAM

## 2017-06-02 PROCEDURE — 71020 XR CHEST 2 VW: CPT

## 2017-06-02 PROCEDURE — 84100 ASSAY OF PHOSPHORUS: CPT | Performed by: STUDENT IN AN ORGANIZED HEALTH CARE EDUCATION/TRAINING PROGRAM

## 2017-06-02 PROCEDURE — 25000132 ZZH RX MED GY IP 250 OP 250 PS 637: Mod: GY | Performed by: STUDENT IN AN ORGANIZED HEALTH CARE EDUCATION/TRAINING PROGRAM

## 2017-06-02 PROCEDURE — 25000132 ZZH RX MED GY IP 250 OP 250 PS 637: Mod: GY

## 2017-06-02 PROCEDURE — 85730 THROMBOPLASTIN TIME PARTIAL: CPT | Performed by: STUDENT IN AN ORGANIZED HEALTH CARE EDUCATION/TRAINING PROGRAM

## 2017-06-02 RX ORDER — POTASSIUM CL/LIDO/0.9 % NACL 10MEQ/0.1L
10 INTRAVENOUS SOLUTION, PIGGYBACK (ML) INTRAVENOUS
Status: DISCONTINUED | OUTPATIENT
Start: 2017-06-02 | End: 2017-06-06 | Stop reason: HOSPADM

## 2017-06-02 RX ORDER — OXYCODONE HYDROCHLORIDE 5 MG/1
5-10 TABLET ORAL
Status: DISCONTINUED | OUTPATIENT
Start: 2017-06-02 | End: 2017-06-02

## 2017-06-02 RX ORDER — AMOXICILLIN 250 MG
1-2 CAPSULE ORAL 2 TIMES DAILY
Status: DISCONTINUED | OUTPATIENT
Start: 2017-06-02 | End: 2017-06-02

## 2017-06-02 RX ORDER — NALOXONE HYDROCHLORIDE 0.4 MG/ML
.1-.4 INJECTION, SOLUTION INTRAMUSCULAR; INTRAVENOUS; SUBCUTANEOUS
Status: DISCONTINUED | OUTPATIENT
Start: 2017-06-02 | End: 2017-06-06 | Stop reason: HOSPADM

## 2017-06-02 RX ORDER — KETOROLAC TROMETHAMINE 30 MG/ML
15 INJECTION, SOLUTION INTRAMUSCULAR; INTRAVENOUS ONCE
Status: COMPLETED | OUTPATIENT
Start: 2017-06-02 | End: 2017-06-02

## 2017-06-02 RX ORDER — HYDROMORPHONE HYDROCHLORIDE 1 MG/ML
INJECTION, SOLUTION INTRAMUSCULAR; INTRAVENOUS; SUBCUTANEOUS
Status: COMPLETED
Start: 2017-06-02 | End: 2017-06-02

## 2017-06-02 RX ORDER — CARBAMAZEPINE 100 MG/1
100 CAPSULE, EXTENDED RELEASE ORAL 2 TIMES DAILY
Status: DISCONTINUED | OUTPATIENT
Start: 2017-06-02 | End: 2017-06-06 | Stop reason: HOSPADM

## 2017-06-02 RX ORDER — POTASSIUM CHLORIDE 1.5 G/1.58G
20-40 POWDER, FOR SOLUTION ORAL
Status: DISCONTINUED | OUTPATIENT
Start: 2017-06-02 | End: 2017-06-06 | Stop reason: HOSPADM

## 2017-06-02 RX ORDER — PROCHLORPERAZINE MALEATE 5 MG
5 TABLET ORAL EVERY 6 HOURS PRN
Status: DISCONTINUED | OUTPATIENT
Start: 2017-06-02 | End: 2017-06-06 | Stop reason: HOSPADM

## 2017-06-02 RX ORDER — DIPHENHYDRAMINE HYDROCHLORIDE 50 MG/ML
25 INJECTION INTRAMUSCULAR; INTRAVENOUS ONCE
Status: COMPLETED | OUTPATIENT
Start: 2017-06-02 | End: 2017-06-02

## 2017-06-02 RX ORDER — BUPROPION HYDROCHLORIDE 150 MG/1
150 TABLET, EXTENDED RELEASE ORAL 2 TIMES DAILY
Status: DISCONTINUED | OUTPATIENT
Start: 2017-06-02 | End: 2017-06-06 | Stop reason: HOSPADM

## 2017-06-02 RX ORDER — OXYCODONE HYDROCHLORIDE 10 MG/1
10-20 TABLET ORAL
Status: DISCONTINUED | OUTPATIENT
Start: 2017-06-02 | End: 2017-06-06 | Stop reason: HOSPADM

## 2017-06-02 RX ORDER — HYDROMORPHONE HCL/0.9% NACL/PF 0.2MG/0.2
0.2 SYRINGE (ML) INTRAVENOUS
Status: DISCONTINUED | OUTPATIENT
Start: 2017-06-02 | End: 2017-06-02

## 2017-06-02 RX ORDER — BISACODYL 5 MG
5-15 TABLET, DELAYED RELEASE (ENTERIC COATED) ORAL DAILY PRN
Status: DISCONTINUED | OUTPATIENT
Start: 2017-06-02 | End: 2017-06-06 | Stop reason: HOSPADM

## 2017-06-02 RX ORDER — FLUTICASONE PROPIONATE 50 MCG
1 SPRAY, SUSPENSION (ML) NASAL DAILY PRN
Status: DISCONTINUED | OUTPATIENT
Start: 2017-06-02 | End: 2017-06-06 | Stop reason: HOSPADM

## 2017-06-02 RX ORDER — IPRATROPIUM BROMIDE AND ALBUTEROL SULFATE 2.5; .5 MG/3ML; MG/3ML
1 SOLUTION RESPIRATORY (INHALATION) EVERY 4 HOURS PRN
Status: DISCONTINUED | OUTPATIENT
Start: 2017-06-02 | End: 2017-06-06 | Stop reason: HOSPADM

## 2017-06-02 RX ORDER — SODIUM CHLORIDE 9 MG/ML
INJECTION, SOLUTION INTRAVENOUS CONTINUOUS
Status: DISCONTINUED | OUTPATIENT
Start: 2017-06-02 | End: 2017-06-02

## 2017-06-02 RX ORDER — MONTELUKAST SODIUM 10 MG/1
10 TABLET ORAL AT BEDTIME
Status: DISCONTINUED | OUTPATIENT
Start: 2017-06-02 | End: 2017-06-06 | Stop reason: HOSPADM

## 2017-06-02 RX ORDER — ONDANSETRON 4 MG/1
4 TABLET, ORALLY DISINTEGRATING ORAL EVERY 6 HOURS PRN
Status: DISCONTINUED | OUTPATIENT
Start: 2017-06-02 | End: 2017-06-06 | Stop reason: HOSPADM

## 2017-06-02 RX ORDER — METOPROLOL SUCCINATE 50 MG/1
50 TABLET, EXTENDED RELEASE ORAL DAILY
Status: DISCONTINUED | OUTPATIENT
Start: 2017-06-02 | End: 2017-06-06 | Stop reason: HOSPADM

## 2017-06-02 RX ORDER — ACETAMINOPHEN 325 MG/1
650 TABLET ORAL EVERY 4 HOURS PRN
Status: DISCONTINUED | OUTPATIENT
Start: 2017-06-02 | End: 2017-06-06 | Stop reason: HOSPADM

## 2017-06-02 RX ORDER — HYDROMORPHONE HYDROCHLORIDE 1 MG/ML
.5-1 INJECTION, SOLUTION INTRAMUSCULAR; INTRAVENOUS; SUBCUTANEOUS
Status: DISCONTINUED | OUTPATIENT
Start: 2017-06-02 | End: 2017-06-06 | Stop reason: HOSPADM

## 2017-06-02 RX ORDER — MAGNESIUM SULFATE HEPTAHYDRATE 40 MG/ML
4 INJECTION, SOLUTION INTRAVENOUS EVERY 4 HOURS PRN
Status: DISCONTINUED | OUTPATIENT
Start: 2017-06-02 | End: 2017-06-06 | Stop reason: HOSPADM

## 2017-06-02 RX ORDER — AMOXICILLIN 250 MG
2 CAPSULE ORAL 2 TIMES DAILY
Status: DISCONTINUED | OUTPATIENT
Start: 2017-06-02 | End: 2017-06-06 | Stop reason: HOSPADM

## 2017-06-02 RX ORDER — WARFARIN SODIUM 4 MG/1
4 TABLET ORAL ONCE
Status: COMPLETED | OUTPATIENT
Start: 2017-06-02 | End: 2017-06-02

## 2017-06-02 RX ORDER — POTASSIUM CHLORIDE 29.8 MG/ML
20 INJECTION INTRAVENOUS
Status: DISCONTINUED | OUTPATIENT
Start: 2017-06-02 | End: 2017-06-06 | Stop reason: HOSPADM

## 2017-06-02 RX ORDER — POTASSIUM CHLORIDE 750 MG/1
20-40 TABLET, EXTENDED RELEASE ORAL
Status: DISCONTINUED | OUTPATIENT
Start: 2017-06-02 | End: 2017-06-06 | Stop reason: HOSPADM

## 2017-06-02 RX ORDER — LIDOCAINE 40 MG/G
CREAM TOPICAL
Status: DISCONTINUED | OUTPATIENT
Start: 2017-06-02 | End: 2017-06-06 | Stop reason: HOSPADM

## 2017-06-02 RX ORDER — POTASSIUM CHLORIDE 7.45 MG/ML
10 INJECTION INTRAVENOUS
Status: DISCONTINUED | OUTPATIENT
Start: 2017-06-02 | End: 2017-06-06 | Stop reason: HOSPADM

## 2017-06-02 RX ORDER — GABAPENTIN 250 MG/5ML
100 SOLUTION ORAL EVERY 12 HOURS SCHEDULED
Status: DISCONTINUED | OUTPATIENT
Start: 2017-06-02 | End: 2017-06-06 | Stop reason: HOSPADM

## 2017-06-02 RX ORDER — LOSARTAN POTASSIUM 100 MG/1
100 TABLET ORAL DAILY
Status: DISCONTINUED | OUTPATIENT
Start: 2017-06-02 | End: 2017-06-06 | Stop reason: HOSPADM

## 2017-06-02 RX ORDER — ONDANSETRON 2 MG/ML
4 INJECTION INTRAMUSCULAR; INTRAVENOUS EVERY 6 HOURS PRN
Status: DISCONTINUED | OUTPATIENT
Start: 2017-06-02 | End: 2017-06-06 | Stop reason: HOSPADM

## 2017-06-02 RX ORDER — NYSTATIN 100000/ML
500000 SUSPENSION, ORAL (FINAL DOSE FORM) ORAL 3 TIMES DAILY
Status: DISCONTINUED | OUTPATIENT
Start: 2017-06-02 | End: 2017-06-06 | Stop reason: HOSPADM

## 2017-06-02 RX ORDER — AMIODARONE HYDROCHLORIDE 100 MG/1
100 TABLET ORAL DAILY
Status: DISCONTINUED | OUTPATIENT
Start: 2017-06-02 | End: 2017-06-06 | Stop reason: HOSPADM

## 2017-06-02 RX ORDER — KETAMINE HYDROCHLORIDE 10 MG/ML
0.04 INJECTION, SOLUTION INTRAMUSCULAR; INTRAVENOUS
Status: DISCONTINUED | OUTPATIENT
Start: 2017-06-02 | End: 2017-06-06 | Stop reason: HOSPADM

## 2017-06-02 RX ORDER — POTASSIUM CHLORIDE 750 MG/1
20 TABLET, EXTENDED RELEASE ORAL DAILY
Status: DISCONTINUED | OUTPATIENT
Start: 2017-06-02 | End: 2017-06-06 | Stop reason: HOSPADM

## 2017-06-02 RX ORDER — CETIRIZINE HYDROCHLORIDE 10 MG/1
10 TABLET ORAL EVERY EVENING
Status: DISCONTINUED | OUTPATIENT
Start: 2017-06-02 | End: 2017-06-06 | Stop reason: HOSPADM

## 2017-06-02 RX ORDER — ALBUTEROL SULFATE 90 UG/1
2 AEROSOL, METERED RESPIRATORY (INHALATION) EVERY 4 HOURS PRN
Status: DISCONTINUED | OUTPATIENT
Start: 2017-06-02 | End: 2017-06-06 | Stop reason: HOSPADM

## 2017-06-02 RX ORDER — PROCHLORPERAZINE 25 MG
12.5 SUPPOSITORY, RECTAL RECTAL EVERY 12 HOURS PRN
Status: DISCONTINUED | OUTPATIENT
Start: 2017-06-02 | End: 2017-06-06 | Stop reason: HOSPADM

## 2017-06-02 RX ADMIN — FLUTICASONE FUROATE AND VILANTEROL TRIFENATATE 1 PUFF: 100; 25 POWDER RESPIRATORY (INHALATION) at 08:01

## 2017-06-02 RX ADMIN — GABAPENTIN 100 MG: 250 SOLUTION ORAL at 08:05

## 2017-06-02 RX ADMIN — HYDROMORPHONE HYDROCHLORIDE 0.2 MG: 10 INJECTION, SOLUTION INTRAMUSCULAR; INTRAVENOUS; SUBCUTANEOUS at 00:50

## 2017-06-02 RX ADMIN — DIPHENHYDRAMINE HYDROCHLORIDE 25 MG: 50 INJECTION, SOLUTION INTRAMUSCULAR; INTRAVENOUS at 20:00

## 2017-06-02 RX ADMIN — ACETAMINOPHEN 650 MG: 325 TABLET, FILM COATED ORAL at 08:25

## 2017-06-02 RX ADMIN — IPRATROPIUM BROMIDE AND ALBUTEROL SULFATE 3 ML: .5; 3 SOLUTION RESPIRATORY (INHALATION) at 09:25

## 2017-06-02 RX ADMIN — GABAPENTIN 100 MG: 250 SOLUTION ORAL at 02:40

## 2017-06-02 RX ADMIN — ACETAMINOPHEN 650 MG: 325 TABLET, FILM COATED ORAL at 17:30

## 2017-06-02 RX ADMIN — OXYCODONE HYDROCHLORIDE 10 MG: 5 TABLET ORAL at 13:37

## 2017-06-02 RX ADMIN — CARBAMAZEPINE 100 MG: 100 CAPSULE, EXTENDED RELEASE ORAL at 08:05

## 2017-06-02 RX ADMIN — Medication 5 MG: at 14:53

## 2017-06-02 RX ADMIN — SODIUM CHLORIDE: 9 INJECTION, SOLUTION INTRAVENOUS at 01:24

## 2017-06-02 RX ADMIN — OXYCODONE HYDROCHLORIDE 10 MG: 10 TABLET ORAL at 17:43

## 2017-06-02 RX ADMIN — HYDROMORPHONE HYDROCHLORIDE 0.2 MG: 10 INJECTION, SOLUTION INTRAMUSCULAR; INTRAVENOUS; SUBCUTANEOUS at 02:39

## 2017-06-02 RX ADMIN — SENNOSIDES AND DOCUSATE SODIUM 2 TABLET: 8.6; 5 TABLET ORAL at 19:54

## 2017-06-02 RX ADMIN — AMIODARONE HYDROCHLORIDE 100 MG: 100 TABLET ORAL at 08:05

## 2017-06-02 RX ADMIN — HYDROMORPHONE HYDROCHLORIDE 1 MG: 1 INJECTION, SOLUTION INTRAMUSCULAR; INTRAVENOUS; SUBCUTANEOUS at 18:33

## 2017-06-02 RX ADMIN — LOSARTAN POTASSIUM 100 MG: 100 TABLET, FILM COATED ORAL at 08:07

## 2017-06-02 RX ADMIN — OXYCODONE HYDROCHLORIDE 10 MG: 10 TABLET ORAL at 21:32

## 2017-06-02 RX ADMIN — WARFARIN SODIUM 4 MG: 4 TABLET ORAL at 04:17

## 2017-06-02 RX ADMIN — CARBAMAZEPINE 100 MG: 100 CAPSULE, EXTENDED RELEASE ORAL at 02:41

## 2017-06-02 RX ADMIN — Medication 5 MG: at 04:09

## 2017-06-02 RX ADMIN — METOPROLOL SUCCINATE 50 MG: 50 TABLET, EXTENDED RELEASE ORAL at 08:07

## 2017-06-02 RX ADMIN — ACETAMINOPHEN 650 MG: 325 TABLET, FILM COATED ORAL at 13:36

## 2017-06-02 RX ADMIN — HYDROMORPHONE HYDROCHLORIDE 0.2 MG: 10 INJECTION, SOLUTION INTRAMUSCULAR; INTRAVENOUS; SUBCUTANEOUS at 05:10

## 2017-06-02 RX ADMIN — OXYCODONE HYDROCHLORIDE 10 MG: 5 TABLET ORAL at 16:46

## 2017-06-02 RX ADMIN — Medication 5 MG: at 17:30

## 2017-06-02 RX ADMIN — CARBAMAZEPINE 100 MG: 100 CAPSULE, EXTENDED RELEASE ORAL at 20:02

## 2017-06-02 RX ADMIN — GABAPENTIN 100 MG: 250 SOLUTION ORAL at 20:03

## 2017-06-02 RX ADMIN — Medication 5 MG: at 23:38

## 2017-06-02 RX ADMIN — Medication 5 MG: at 12:17

## 2017-06-02 RX ADMIN — HYDROMORPHONE HYDROCHLORIDE 0.2 MG: 10 INJECTION, SOLUTION INTRAMUSCULAR; INTRAVENOUS; SUBCUTANEOUS at 09:05

## 2017-06-02 RX ADMIN — BUPROPION HYDROCHLORIDE 150 MG: 150 TABLET, FILM COATED, EXTENDED RELEASE ORAL at 19:55

## 2017-06-02 RX ADMIN — IPRATROPIUM BROMIDE AND ALBUTEROL SULFATE 3 ML: .5; 3 SOLUTION RESPIRATORY (INHALATION) at 15:36

## 2017-06-02 RX ADMIN — SENNOSIDES AND DOCUSATE SODIUM 2 TABLET: 8.6; 5 TABLET ORAL at 08:07

## 2017-06-02 RX ADMIN — Medication 5 MG: at 08:01

## 2017-06-02 RX ADMIN — Medication 5 MG: at 06:05

## 2017-06-02 RX ADMIN — Medication 5 MG: at 19:50

## 2017-06-02 RX ADMIN — Medication 5 MG: at 10:03

## 2017-06-02 RX ADMIN — SODIUM CHLORIDE: 9 INJECTION, SOLUTION INTRAVENOUS at 10:06

## 2017-06-02 RX ADMIN — POTASSIUM CHLORIDE 20 MEQ: 750 TABLET, EXTENDED RELEASE ORAL at 08:08

## 2017-06-02 RX ADMIN — CETIRIZINE HYDROCHLORIDE 10 MG: 10 TABLET, FILM COATED ORAL at 19:55

## 2017-06-02 RX ADMIN — OXYCODONE HYDROCHLORIDE 10 MG: 5 TABLET ORAL at 10:37

## 2017-06-02 RX ADMIN — OXYCODONE HYDROCHLORIDE 15 MG: 5 TABLET ORAL at 18:57

## 2017-06-02 RX ADMIN — KETOROLAC TROMETHAMINE 15 MG: 30 INJECTION, SOLUTION INTRAMUSCULAR at 18:19

## 2017-06-02 RX ADMIN — KETOROLAC TROMETHAMINE 15 MG: 30 INJECTION, SOLUTION INTRAMUSCULAR at 02:22

## 2017-06-02 RX ADMIN — HYDROMORPHONE HYDROCHLORIDE 0.2 MG: 1 INJECTION, SOLUTION INTRAMUSCULAR; INTRAVENOUS; SUBCUTANEOUS at 00:50

## 2017-06-02 RX ADMIN — BUPROPION HYDROCHLORIDE 150 MG: 150 TABLET, FILM COATED, EXTENDED RELEASE ORAL at 08:07

## 2017-06-02 ASSESSMENT — PAIN DESCRIPTION - DESCRIPTORS
DESCRIPTORS: SHOOTING
DESCRIPTORS: ACHING
DESCRIPTORS: SHOOTING
DESCRIPTORS: SHOOTING

## 2017-06-02 ASSESSMENT — VISUAL ACUITY
OU: NORMAL ACUITY;GLASSES

## 2017-06-02 NOTE — ED NOTES
Pt presents with left sided facial pain off and on all week, much worse tonight than usual.  He reports hx of trigeminal neuralgia.

## 2017-06-02 NOTE — IP AVS SNAPSHOT
[address] [address]    Phone:  [phone]                                       After Visit Summary   6/2/2017    Home Vladez    MRN: [id number]           After Visit Summary Signature Page     I have received my discharge instructions, and my questions have been answered. I have discussed any challenges I see with this plan with the nurse or doctor.    ..........................................................................................................................................  Patient/Patient Representative Signature      ..........................................................................................................................................  Patient Representative Print Name and Relationship to Patient    ..................................................               ................................................  Date                                            Time    ..........................................................................................................................................  Reviewed by Signature/Title    ...................................................              ..............................................  Date                                                            Time

## 2017-06-02 NOTE — PHARMACY-ANTICOAGULATION SERVICE
Clinical Pharmacy - Warfarin Dosing Consult     Pharmacy has been consulted to manage this patient s warfarin therapy.  Indication: Atrial Fibrillation  Therapy Goal: INR 2-3  Warfarin Prior to Admission: Yes  Warfarin PTA Regimen: 7.5 mg Tue,Thu,Sat and 5 mg rest of week. Patients last dose was 5-31-17  Significant drug interactions: Amiodarone     INR Protime   Date Value Ref Range Status   06/01/2017 3.4 (A) 0.86 - 1.14 Final   05/11/2017 2.6 (A) 0.86 - 1.14 Final       Recommend warfarin 4 mg today (Late dose for 6/1) (Approximately 1/2 of scheduled home dose due to slightly elevated INR of 3.4) .  Pharmacy will monitor Home Valdez daily and order warfarin doses to achieve specified goal.      Please contact pharmacy as soon as possible if the warfarin needs to be held for a procedure or if the warfarin goals change.

## 2017-06-02 NOTE — LETTER
"Transition Communication Hand-off for Care Transitions to Next Level of Care Provider    Name: Home Valdez  MRN #: [id number]  :  1950    Primary Care Provider: Sandip Vega   Primary Clinic: [address] [place] [address]    Admit Date/Time: 2017 12:18 AM  Discharge Date:  17    Reason for Hospitalization:  Admitted for pain control. Recurrent trigeminal neuralgia.   Post-op Diagnosis: Left-sided recurrent trigeminal neuralgia in the V1, V2 and V3 distribution.     Procedure 17: Stereotactic trigeminal balloon rhizotomy left side.      Payor Source: Payor: MEDICARE / Plan: MEDICARE / Product Type: Medicare /   Readmission Assessment Measure (LOREN) Risk Score/category:  Elevated  Reason for Communication Hand-off Referral: Multiple providers/specialties    Discharge Plan:  Discharged Home.     Discharge Instructions from the [place] orders:  \"Our physician's assistant, Minnie Gabriel, will be calling you within 1-3 days to discuss your trigeminal medications.\"  \"Follow up with Shanika Pelayo NP in Neurosurgery in 2 weeks for follow up     Follow up in one week with PCP     Follow up on 2017 with Coumadin clinic\"       Follow-up plan:  Future Appointments  Date Time Provider Department Center   2017 8:45 AM CAMILO Benedict RN Care Coordinator  [address], [facility]        AVS/Discharge Summary is the source of truth; this is a helpful guide for improved communication of patient story          "
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
## 2017-06-02 NOTE — PLAN OF CARE
Problem: Goal Outcome Summary  Goal: Goal Outcome Summary  Outcome: No Change  Pt arrived on 6A from [facility] at around 0015 with worsening L facial nerve pain. Pt has hx of trigeminal neuralgia. VSS with some HTN. Neuros intact except some numbness near L eye and severe pain to L face. C/o severe unrelenting pain to L face, mostly in the cheek area. Received prn IV dilaudid, toradol, and ketamine, and scheduled gabapentin and carbatrol, with very little relief. MIVF at 100 mL/hr. Regular diet with poor PO intake d/t pain. Voids spontaneously. No BM. Up with SBA in room. Independently positioning in bed. Continue to address pain concerns. Plan for OR on Saturday.
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
## 2017-06-02 NOTE — IP AVS SNAPSHOT
MRN:[id number]                      After Visit Summary   6/2/2017    Home Valdez    MRN: [id number]           Thank you!     Thank you for choosing [facility] for your care. Our goal is always to provide you with excellent care. Hearing back from our patients is one way we can continue to improve our services. Please take a few minutes to complete the written survey that you may receive in the mail after you visit with us. Thank you!        Patient Information     Date Of Birth          1950        Designated Caregiver       Most Recent Value    Caregiver    Will someone help with your care after discharge? yes    Name of designated caregiver Chrissy    Phone number of caregiver [phone]    Caregiver address [address] [place] [address]      About your hospital stay     You were admitted on:  June 2, 2017 You last received care in the:  [address] [place] [address]    You were discharged on:  June 6, 2017        Reason for your hospital stay       Facial pain                  Who to Call     For medical emergencies, please call 911.  For non-urgent questions about your medical care, please call your primary care provider or clinic, [phone]  For questions related to your surgery, please call your surgery clinic        Attending Provider     Provider Specialty    Alex Campbell MD Neurosurgery    [facility], Paulino Kent MD Neurosurgery       Primary Care Provider Office Phone # Fax #    [facility] [phone]      After Care Instructions     Activity       Your activity upon discharge:  Do not do any bending, twisting, strenuous exercise, or heavy lifting (greater than 10 pounds) for 4-6 weeks. Be careful and ask for assistance when walking or going up and down stairs. Avoid any activities that could result in trauma to the surgical wound. Do not drive within 3 months of having your last seizure or while using narcotics or other sedating  medications, such as sleep aids, muscle relaxants, etc.            Diet       Follow this diet upon discharge: Orders Placed This Encounter      Regular Diet Adult            Discharge Instructions       You underwent a Percutaneous rhizotomy.  Our physician's assistant, Minnie Gabriel, will be calling you within 1-3 days to discuss your trigeminal medications.   - If you have not heard from our clinic about your follow up visit by 3-4 days following your discharge, please call our clinic at [phone] to schedule an appointment with the Neurosurgery teams.   This surgery was done by Paulino Gonzales MD      After discharge, your activity restrictions are:   -We encourage short frequent walks, increasing as tolerated.  - No driving until you are seen in clinic and cleared by your neurosurgeon.  If you have had a seizure, you may not drive for at least 3 months according to [place] law.    - No strenuous activity.  - No lifting more than 10 pounds until you are seen in clinic (a gallon of milk weighs approximately 8 pounds)    Wound cares after surgery  - You are ok to shower, but do not soak your incisions. Pat them dry if they get damp.   - Avoid coloring your hair or permanent styling until cleared by your surgeon  - No baths, hot tubs or pools for 4-6 weeks after surgery.       Call if you have any of the followin. Temperature greater than 101.5 F.   2. Any redness, swelling or discharge from the wound.   3. Any new weakness, numbness or altered mental status.  4. Worsening pain that is not improving with the pain medications you were prescribed.     Call [phone] or after 5:00 pm or on weekends call [phone] and ask for the neurosurgery resident on call. Thank You.                  Follow-up Appointments     Adult [facility] Follow-up and recommended labs and tests       Follow up with Shanika Pelayo NP in Neurosurgery in 2 weeks for follow up    Follow up in one week with PCP    Follow up on  "6/8/2017 with Coumadin clinic     Appointments on [facility] and/or [facility] (with [facility] or [facility] provider or service). Call [phone] if you haven't heard regarding these appointments within 7 days of discharge.                  Your next 10 appointments already scheduled     Jun 22, 2017  8:45 AM CDT   Anticoagulation Visit with  [facility]   [facility]    [address] [facility] [address] [phone]              Warfarin Instruction     You have started taking a medicine called warfarin. This is a blood-thinning medicine (anticoagulant). It helps prevent and treat blood clots.      Before leaving the hospital, make sure you know how much to take and how long to take it.      You will need regular blood tests to make sure your blood is clotting safely. It is very important to see your doctor for regular blood tests.    Talk to your doctor before taking any new medicine (this includes over-the-counter drugs and herbal products). Many medicines can interact with warfarin. This may cause more bleeding or too much clotting.     Eating a lot of vitamin K--found in green, leafy vegetables--can change the way warfarin works in your body. Do NOT avoid these foods. Instead, try to eat the same amount each day.     Bleeding is the most common side-effect of warfarin. You may notice bleeding gums, a bloody nose, bruises and bleeding longer when you cut yourself. See a doctor at once if:   o You cough up blood  o You find blood in your stool (poop)  o You have a deep cut, or a cut that bleeds longer than 10 minutes   o You have a bad cut, hard fall, accident or hit your head (go to urgent care or the emergency room).    For women who can get pregnant: This medicine can harm an unborn baby. Be very careful not to get pregnant while taking this medicine. If you think you might be pregnant, call your doctor right away.    For more information, read \"Guide to Warfarin " "Therapy,  the booklet you received in the hospital.        Pending Results     No orders found from 2017 to 6/3/2017.            Statement of Approval     Ordered          17 0913  I have reviewed and agree with all the recommendations and orders detailed in this document.  EFFECTIVE NOW     Approved and electronically signed by:  Miranda Mosley APRN CNP             Admission Information     Date & Time Provider Department Dept. Phone    2017 Paulino Gonzales MD [address] [facility] [phone]      Your Vitals Were     Blood Pressure Pulse Temperature Respirations Pulse Oximetry       144/72 (BP Location: Left arm) 77 98.7  F (37.1  C) (Oral) 16 91%       MyChart Information     [employer] lets you send messages to your doctor, view your test results, renew your prescriptions, schedule appointments and more. To sign up, go to [email or web address] . Click on \"Log in\" on the left side of the screen, which will take you to the Welcome page. Then click on \"Sign up Now\" on the right side of the page.     You will be asked to enter the access code listed below, as well as some personal information. Please follow the directions to create your username and password.     Your access code is: [email or web address]  Expires: 2017  8:16 AM     Your access code will  in 90 days. If you need help or a new code, please call your [facility] clinic or [phone].        Care EveryWhere ID     This is your Care EveryWhere ID. This could be used by other organizations to access your [facility] medical records  [id number]           Review of your medicines      START taking        Dose / Directions    carBAMazepine 100 MG 12 hr capsule   Commonly known as:  CARBATROL        Dose:  100 mg   Take 1 capsule (100 mg) by mouth 2 times daily   Quantity:  60 capsule   Refills:  0       gabapentin 250 MG/5ML solution   Commonly known as:  NEURONTIN        Dose:  100 mg   Take 2 mLs (100 mg) by mouth " every 12 hours   Quantity:  450 mL   Refills:  0       oxyCODONE 10 MG IR tablet   Commonly known as:  ROXICODONE        Dose:  10-20 mg   Take 1-2 tablets (10-20 mg) by mouth every 3 hours as needed for moderate to severe pain   Quantity:  30 tablet   Refills:  0         CONTINUE these medicines which have NOT CHANGED        Dose / Directions    albuterol 108 (90 BASE) MCG/ACT Inhaler   Commonly known as:  PROAIR HFA/PROVENTIL HFA/VENTOLIN HFA   Used for:  Chronic obstructive pulmonary disease with acute exacerbation (H)        Dose:  2 puff   Inhale 2 puffs into the lungs every 4 hours as needed for shortness of breath / dyspnea   Quantity:  6 Inhaler   Refills:  3       amiodarone 200 MG tablet   Commonly known as:  PACERONE/CODARONE   Used for:  Atrial fibrillation (H)        Dose:  100 mg   Take 0.5 tablets (100 mg) by mouth daily   Quantity:  45 tablet   Refills:  3       buPROPion 150 MG 12 hr tablet   Commonly known as:  WELLBUTRIN SR   Used for:  Personal history of tobacco use, presenting hazards to health        Dose:  150 mg   Take 1 tablet (150 mg) by mouth 2 times daily   Quantity:  180 tablet   Refills:  3       cetirizine 10 MG tablet   Commonly known as:  zyrTEC   Used for:  Seasonal allergic rhinitis, unspecified allergic rhinitis trigger        Dose:  10 mg   Take 1 tablet (10 mg) by mouth every evening   Quantity:  30 tablet   Refills:  1       fluticasone 50 MCG/ACT spray   Commonly known as:  FLONASE   Used for:  Chronic rhinitis        USE ONE TO TWO SPRAYS IN EACH NOSTRIL EVERY DAY   Quantity:  16 g   Refills:  6       fluticasone-salmeterol 250-50 MCG/DOSE diskus inhaler   Commonly known as:  ADVAIR   Used for:  Panlobular emphysema (H)        Dose:  1 puff   Inhale 1 puff into the lungs 2 times daily   Quantity:  3 Inhaler   Refills:  3       ipratropium - albuterol 0.5 mg/2.5 mg/3 mL 0.5-2.5 (3) MG/3ML neb solution   Commonly known as:  DUONEB   Used for:  COPD exacerbation (H)         Dose:  1 vial   Take 1 vial (3 mLs) by nebulization every 4 hours as needed for shortness of breath / dyspnea or wheezing   Quantity:  540 vial   Refills:  3       losartan 50 MG tablet   Commonly known as:  COZAAR   Used for:  Atrial fibrillation with RVR (H), Hypertension goal BP (blood pressure) < 140/90        Dose:  100 mg   Take 2 tablets (100 mg) by mouth daily   Quantity:  180 tablet   Refills:  3       metoprolol 50 MG 24 hr tablet   Commonly known as:  TOPROL-XL   Used for:  Atrial fibrillation with RVR (H), CHF (congestive heart failure) (H)        Dose:  50 mg   Take 1 tablet (50 mg) by mouth daily   Quantity:  90 tablet   Refills:  3       montelukast 10 MG tablet   Commonly known as:  SINGULAIR   Used for:  Seasonal allergic rhinitis, unspecified allergic rhinitis trigger        Dose:  10 mg   Take 1 tablet (10 mg) by mouth At Bedtime   Quantity:  30 tablet   Refills:  1       nebulizer/tubing/mouthpiece Kit   Used for:  Chronic obstructive pulmonary disease, unspecified COPD type (H)        Use every 4-6 hours PRN   Quantity:  1 kit   Refills:  11       nystatin 951958 UNIT/ML suspension   Commonly known as:  MYCOSTATIN   Used for:  Candidiasis of mouth        Dose:  226374 Units   Take 5 mLs (500,000 Units) by mouth 3 times daily   Quantity:  60 mL   Refills:  0       order for DME   Used for:  COPD (chronic obstructive pulmonary disease) (H)        Equipment being ordered: Nebulizer   Quantity:  1 Device   Refills:  0       potassium chloride SA 20 MEQ CR tablet   Commonly known as:  K-DUR/KLOR-CON M   Used for:  CHF (congestive heart failure) (H), Peripheral edema        Dose:  20 mEq   Take 1 tablet (20 mEq) by mouth daily   Quantity:  90 tablet   Refills:  1       senna-docusate 8.6-50 MG per tablet   Commonly known as:  SENOKOT-S;PERICOLACE        Dose:  2 tablet   Take 2 tablets by mouth 2 times daily   Refills:  0       torsemide 20 MG tablet   Commonly known as:  DEMADEX   Used for:  Chronic  diastolic congestive heart failure (H)        Take 10 mg po daily. Take an extra 10mg po daily as needed for swelling.   Quantity:  180 tablet   Refills:  3       warfarin 2.5 MG tablet   Commonly known as:  JANTOVEN   Used for:  Atrial fibrillation with RVR (H)        Start taking on:  6/8/2017   Take 7.5 mg (3 tablets) on Tuesday, Thursday, Saturday and 5 mg (2 tablets) all other days or as directed by coumadin clinic.   Quantity:  235 tablet   Refills:  0            Where to get your medicines      These medications were sent to [facility] - [place], [place] - [address]  [address] [address]     Phone:  [phone]     carBAMazepine 100 MG 12 hr capsule    gabapentin 250 MG/5ML solution    warfarin 2.5 MG tablet         Some of these will need a paper prescription and others can be bought over the counter. Ask your nurse if you have questions.     Bring a paper prescription for each of these medications     oxyCODONE 10 MG IR tablet                Protect others around you: Learn how to safely use, store and throw away your medicines at www.disposemymeds.org.             Medication List: This is a list of all your medications and when to take them. Check marks below indicate your daily home schedule. Keep this list as a reference.      Medications           Morning Afternoon Evening Bedtime As Needed    albuterol 108 (90 BASE) MCG/ACT Inhaler   Commonly known as:  PROAIR HFA/PROVENTIL HFA/VENTOLIN HFA   Inhale 2 puffs into the lungs every 4 hours as needed for shortness of breath / dyspnea                                amiodarone 200 MG tablet   Commonly known as:  PACERONE/CODARONE   Take 0.5 tablets (100 mg) by mouth daily   Last time this was given:  100 mg on 6/6/2017  8:43 AM                                buPROPion 150 MG 12 hr tablet   Commonly known as:  WELLBUTRIN SR   Take 1 tablet (150 mg) by mouth 2 times daily   Last time this was given:  150 mg on  6/6/2017  8:43 AM                                carBAMazepine 100 MG 12 hr capsule   Commonly known as:  CARBATROL   Take 1 capsule (100 mg) by mouth 2 times daily   Last time this was given:  100 mg on 6/6/2017  8:43 AM                                cetirizine 10 MG tablet   Commonly known as:  zyrTEC   Take 1 tablet (10 mg) by mouth every evening   Last time this was given:  10 mg on 6/5/2017  8:05 PM                                fluticasone 50 MCG/ACT spray   Commonly known as:  FLONASE   USE ONE TO TWO SPRAYS IN EACH NOSTRIL EVERY DAY                                fluticasone-salmeterol 250-50 MCG/DOSE diskus inhaler   Commonly known as:  ADVAIR   Inhale 1 puff into the lungs 2 times daily                                gabapentin 250 MG/5ML solution   Commonly known as:  NEURONTIN   Take 2 mLs (100 mg) by mouth every 12 hours   Last time this was given:  100 mg on 6/6/2017  8:43 AM                                ipratropium - albuterol 0.5 mg/2.5 mg/3 mL 0.5-2.5 (3) MG/3ML neb solution   Commonly known as:  DUONEB   Take 1 vial (3 mLs) by nebulization every 4 hours as needed for shortness of breath / dyspnea or wheezing   Last time this was given:  3 mLs on 6/6/2017  6:38 AM                                losartan 50 MG tablet   Commonly known as:  COZAAR   Take 2 tablets (100 mg) by mouth daily   Last time this was given:  100 mg on 6/6/2017  8:43 AM                                metoprolol 50 MG 24 hr tablet   Commonly known as:  TOPROL-XL   Take 1 tablet (50 mg) by mouth daily   Last time this was given:  50 mg on 6/6/2017  8:43 AM                                montelukast 10 MG tablet   Commonly known as:  SINGULAIR   Take 1 tablet (10 mg) by mouth At Bedtime   Last time this was given:  10 mg on 6/4/2017  9:53 PM                                nebulizer/tubing/mouthpiece Kit   Use every 4-6 hours PRN                                nystatin [id number] UNIT/ML suspension   Commonly known as:  MYCOSTATIN    Take 5 mLs (500,000 Units) by mouth 3 times daily   Last time this was given:  500,000 Units on 6/6/2017  8:44 AM                                order for DME   Equipment being ordered: Nebulizer                                oxyCODONE 10 MG IR tablet   Commonly known as:  ROXICODONE   Take 1-2 tablets (10-20 mg) by mouth every 3 hours as needed for moderate to severe pain   Last time this was given:  10 mg on 6/5/2017  7:55 AM                                potassium chloride SA 20 MEQ CR tablet   Commonly known as:  K-DUR/KLOR-CON M   Take 1 tablet (20 mEq) by mouth daily   Last time this was given:  20 mEq on 6/6/2017  8:43 AM                                senna-docusate 8.6-50 MG per tablet   Commonly known as:  SENOKOT-S;PERICOLACE   Take 2 tablets by mouth 2 times daily   Last time this was given:  2 tablets on 6/6/2017  8:43 AM                                torsemide 20 MG tablet   Commonly known as:  DEMADEX   Take 10 mg po daily. Take an extra 10mg po daily as needed for swelling.                                warfarin 2.5 MG tablet   Commonly known as:  JANTOVEN   Take 7.5 mg (3 tablets) on Tuesday, Thursday, Saturday and 5 mg (2 tablets) all other days or as directed by coumadin clinic.   Start taking on:  6/8/2017   Last time this was given:  4 mg on 6/2/2017  4:17 AM

## 2017-06-03 ENCOUNTER — ANESTHESIA EVENT (OUTPATIENT)
Dept: SURGERY | Facility: CLINIC | Age: 67
DRG: 042 | End: 2017-06-03
Payer: MEDICARE

## 2017-06-03 ENCOUNTER — ANESTHESIA (OUTPATIENT)
Dept: SURGERY | Facility: CLINIC | Age: 67
DRG: 042 | End: 2017-06-03
Payer: MEDICARE

## 2017-06-03 LAB
ANION GAP SERPL CALCULATED.3IONS-SCNC: 7 MMOL/L (ref 3–14)
APTT PPP: 34 SEC (ref 22–37)
BLD PROD TYP BPU: NORMAL
BLD UNIT ID BPU: 0
BLD UNIT ID BPU: 0
BLOOD PRODUCT CODE: NORMAL
BLOOD PRODUCT CODE: NORMAL
BPU ID: NORMAL
BPU ID: NORMAL
BUN SERPL-MCNC: 24 MG/DL (ref 7–30)
CALCIUM SERPL-MCNC: 8.2 MG/DL (ref 8.5–10.1)
CHLORIDE SERPL-SCNC: 107 MMOL/L (ref 94–109)
CO2 SERPL-SCNC: 28 MMOL/L (ref 20–32)
CREAT SERPL-MCNC: 1.7 MG/DL (ref 0.66–1.25)
ERYTHROCYTE [DISTWIDTH] IN BLOOD BY AUTOMATED COUNT: 13.8 % (ref 10–15)
FIBRINOGEN PPP-MCNC: 434 MG/DL (ref 200–420)
GFR SERPL CREATININE-BSD FRML MDRD: 40 ML/MIN/1.7M2
GLUCOSE SERPL-MCNC: 111 MG/DL (ref 70–99)
HCT VFR BLD AUTO: 48.3 % (ref 40–53)
HGB BLD-MCNC: 15.1 G/DL (ref 13.3–17.7)
INR PPP: 1.54 (ref 0.86–1.14)
INR PPP: 1.64 (ref 0.86–1.14)
INR PPP: 2 (ref 0.86–1.14)
MAGNESIUM SERPL-MCNC: 2.4 MG/DL (ref 1.6–2.3)
MCH RBC QN AUTO: 29.8 PG (ref 26.5–33)
MCHC RBC AUTO-ENTMCNC: 31.3 G/DL (ref 31.5–36.5)
MCV RBC AUTO: 95 FL (ref 78–100)
NUM BPU REQUESTED: 1
PHOSPHATE SERPL-MCNC: 3.1 MG/DL (ref 2.5–4.5)
PLATELET # BLD AUTO: 181 10E9/L (ref 150–450)
POTASSIUM SERPL-SCNC: 4.6 MMOL/L (ref 3.4–5.3)
RBC # BLD AUTO: 5.07 10E12/L (ref 4.4–5.9)
SODIUM SERPL-SCNC: 142 MMOL/L (ref 133–144)
TRANSFUSION STATUS PATIENT QL: NORMAL
WBC # BLD AUTO: 11.9 10E9/L (ref 4–11)

## 2017-06-03 PROCEDURE — 40000275 ZZH STATISTIC RCP TIME EA 10 MIN

## 2017-06-03 PROCEDURE — 25000125 ZZHC RX 250: Performed by: STUDENT IN AN ORGANIZED HEALTH CARE EDUCATION/TRAINING PROGRAM

## 2017-06-03 PROCEDURE — 85610 PROTHROMBIN TIME: CPT | Performed by: STUDENT IN AN ORGANIZED HEALTH CARE EDUCATION/TRAINING PROGRAM

## 2017-06-03 PROCEDURE — 80048 BASIC METABOLIC PNL TOTAL CA: CPT | Performed by: STUDENT IN AN ORGANIZED HEALTH CARE EDUCATION/TRAINING PROGRAM

## 2017-06-03 PROCEDURE — 85384 FIBRINOGEN ACTIVITY: CPT | Performed by: STUDENT IN AN ORGANIZED HEALTH CARE EDUCATION/TRAINING PROGRAM

## 2017-06-03 PROCEDURE — 36415 COLL VENOUS BLD VENIPUNCTURE: CPT | Performed by: STUDENT IN AN ORGANIZED HEALTH CARE EDUCATION/TRAINING PROGRAM

## 2017-06-03 PROCEDURE — 25000132 ZZH RX MED GY IP 250 OP 250 PS 637: Mod: GY | Performed by: NEUROLOGICAL SURGERY

## 2017-06-03 PROCEDURE — 40000344 ZZHCL STATISTIC THAWING COMPONENT: Performed by: STUDENT IN AN ORGANIZED HEALTH CARE EDUCATION/TRAINING PROGRAM

## 2017-06-03 PROCEDURE — 94640 AIRWAY INHALATION TREATMENT: CPT | Mod: 76

## 2017-06-03 PROCEDURE — 25000128 H RX IP 250 OP 636: Performed by: STUDENT IN AN ORGANIZED HEALTH CARE EDUCATION/TRAINING PROGRAM

## 2017-06-03 PROCEDURE — A9270 NON-COVERED ITEM OR SERVICE: HCPCS | Mod: GY | Performed by: NEUROLOGICAL SURGERY

## 2017-06-03 PROCEDURE — 94660 CPAP INITIATION&MGMT: CPT

## 2017-06-03 PROCEDURE — 25000132 ZZH RX MED GY IP 250 OP 250 PS 637: Mod: GY | Performed by: STUDENT IN AN ORGANIZED HEALTH CARE EDUCATION/TRAINING PROGRAM

## 2017-06-03 PROCEDURE — 85730 THROMBOPLASTIN TIME PARTIAL: CPT | Performed by: STUDENT IN AN ORGANIZED HEALTH CARE EDUCATION/TRAINING PROGRAM

## 2017-06-03 PROCEDURE — 25000128 H RX IP 250 OP 636: Performed by: NEUROLOGICAL SURGERY

## 2017-06-03 PROCEDURE — 27210995 ZZH RX 272

## 2017-06-03 PROCEDURE — 12000008 ZZH R&B INTERMEDIATE UMMC

## 2017-06-03 PROCEDURE — 84100 ASSAY OF PHOSPHORUS: CPT | Performed by: STUDENT IN AN ORGANIZED HEALTH CARE EDUCATION/TRAINING PROGRAM

## 2017-06-03 PROCEDURE — 36415 COLL VENOUS BLD VENIPUNCTURE: CPT | Performed by: NEUROLOGICAL SURGERY

## 2017-06-03 PROCEDURE — 83735 ASSAY OF MAGNESIUM: CPT | Performed by: STUDENT IN AN ORGANIZED HEALTH CARE EDUCATION/TRAINING PROGRAM

## 2017-06-03 PROCEDURE — P9059 PLASMA, FRZ BETWEEN 8-24HOUR: HCPCS | Performed by: STUDENT IN AN ORGANIZED HEALTH CARE EDUCATION/TRAINING PROGRAM

## 2017-06-03 PROCEDURE — 40000556 ZZH STATISTIC PERIPHERAL IV START W US GUIDANCE

## 2017-06-03 PROCEDURE — A9270 NON-COVERED ITEM OR SERVICE: HCPCS | Mod: GY | Performed by: STUDENT IN AN ORGANIZED HEALTH CARE EDUCATION/TRAINING PROGRAM

## 2017-06-03 PROCEDURE — 25000128 H RX IP 250 OP 636

## 2017-06-03 PROCEDURE — 94640 AIRWAY INHALATION TREATMENT: CPT

## 2017-06-03 PROCEDURE — 85027 COMPLETE CBC AUTOMATED: CPT | Performed by: STUDENT IN AN ORGANIZED HEALTH CARE EDUCATION/TRAINING PROGRAM

## 2017-06-03 PROCEDURE — 40000141 ZZH STATISTIC PERIPHERAL IV START W/O US GUIDANCE

## 2017-06-03 PROCEDURE — 85610 PROTHROMBIN TIME: CPT | Performed by: NEUROLOGICAL SURGERY

## 2017-06-03 RX ORDER — DIPHENHYDRAMINE HYDROCHLORIDE 50 MG/ML
25 INJECTION INTRAMUSCULAR; INTRAVENOUS EVERY 6 HOURS PRN
Status: DISCONTINUED | OUTPATIENT
Start: 2017-06-03 | End: 2017-06-06 | Stop reason: HOSPADM

## 2017-06-03 RX ORDER — DIPHENHYDRAMINE HCL 25 MG
25 CAPSULE ORAL EVERY 6 HOURS PRN
Status: DISCONTINUED | OUTPATIENT
Start: 2017-06-03 | End: 2017-06-06 | Stop reason: HOSPADM

## 2017-06-03 RX ORDER — CEFAZOLIN SODIUM 2 G/100ML
2 INJECTION, SOLUTION INTRAVENOUS
Status: COMPLETED | OUTPATIENT
Start: 2017-06-03 | End: 2017-06-05

## 2017-06-03 RX ORDER — CEFAZOLIN SODIUM 1 G/3ML
1 INJECTION, POWDER, FOR SOLUTION INTRAMUSCULAR; INTRAVENOUS SEE ADMIN INSTRUCTIONS
Status: DISCONTINUED | OUTPATIENT
Start: 2017-06-03 | End: 2017-06-05 | Stop reason: HOSPADM

## 2017-06-03 RX ORDER — SODIUM CHLORIDE 9 MG/ML
INJECTION, SOLUTION INTRAVENOUS
Status: COMPLETED
Start: 2017-06-03 | End: 2017-06-03

## 2017-06-03 RX ADMIN — PHYTONADIONE 10 MG: 10 INJECTION, EMULSION INTRAMUSCULAR; INTRAVENOUS; SUBCUTANEOUS at 09:05

## 2017-06-03 RX ADMIN — SODIUM CHLORIDE: 0.9 INJECTION, SOLUTION INTRAVENOUS at 11:04

## 2017-06-03 RX ADMIN — GABAPENTIN 100 MG: 250 SOLUTION ORAL at 09:02

## 2017-06-03 RX ADMIN — OXYCODONE HYDROCHLORIDE 10 MG: 10 TABLET ORAL at 22:45

## 2017-06-03 RX ADMIN — HYDROMORPHONE HYDROCHLORIDE 0.5 MG: 1 INJECTION, SOLUTION INTRAMUSCULAR; INTRAVENOUS; SUBCUTANEOUS at 08:17

## 2017-06-03 RX ADMIN — HYDROMORPHONE HYDROCHLORIDE 0.5 MG: 1 INJECTION, SOLUTION INTRAMUSCULAR; INTRAVENOUS; SUBCUTANEOUS at 09:01

## 2017-06-03 RX ADMIN — DIPHENHYDRAMINE HYDROCHLORIDE 25 MG: 25 CAPSULE ORAL at 22:38

## 2017-06-03 RX ADMIN — POTASSIUM CHLORIDE 20 MEQ: 750 TABLET, EXTENDED RELEASE ORAL at 09:03

## 2017-06-03 RX ADMIN — OXYCODONE HYDROCHLORIDE 20 MG: 10 TABLET ORAL at 06:38

## 2017-06-03 RX ADMIN — SENNOSIDES AND DOCUSATE SODIUM 2 TABLET: 8.6; 5 TABLET ORAL at 19:44

## 2017-06-03 RX ADMIN — OXYCODONE HYDROCHLORIDE 10 MG: 10 TABLET ORAL at 21:30

## 2017-06-03 RX ADMIN — SENNOSIDES AND DOCUSATE SODIUM 2 TABLET: 8.6; 5 TABLET ORAL at 09:03

## 2017-06-03 RX ADMIN — FLUTICASONE FUROATE AND VILANTEROL TRIFENATATE 1 PUFF: 100; 25 POWDER RESPIRATORY (INHALATION) at 09:04

## 2017-06-03 RX ADMIN — OXYCODONE HYDROCHLORIDE 10 MG: 10 TABLET ORAL at 19:45

## 2017-06-03 RX ADMIN — ACETAMINOPHEN 650 MG: 325 TABLET, FILM COATED ORAL at 09:02

## 2017-06-03 RX ADMIN — IPRATROPIUM BROMIDE AND ALBUTEROL SULFATE 3 ML: .5; 3 SOLUTION RESPIRATORY (INHALATION) at 17:39

## 2017-06-03 RX ADMIN — LOSARTAN POTASSIUM 100 MG: 100 TABLET, FILM COATED ORAL at 09:02

## 2017-06-03 RX ADMIN — HYDROMORPHONE HYDROCHLORIDE 1 MG: 1 INJECTION, SOLUTION INTRAMUSCULAR; INTRAVENOUS; SUBCUTANEOUS at 14:36

## 2017-06-03 RX ADMIN — CETIRIZINE HYDROCHLORIDE 10 MG: 10 TABLET, FILM COATED ORAL at 19:46

## 2017-06-03 RX ADMIN — BUPROPION HYDROCHLORIDE 150 MG: 150 TABLET, FILM COATED, EXTENDED RELEASE ORAL at 09:03

## 2017-06-03 RX ADMIN — GABAPENTIN 100 MG: 250 SOLUTION ORAL at 19:45

## 2017-06-03 RX ADMIN — OXYCODONE HYDROCHLORIDE 20 MG: 10 TABLET ORAL at 16:00

## 2017-06-03 RX ADMIN — AMIODARONE HYDROCHLORIDE 100 MG: 100 TABLET ORAL at 09:02

## 2017-06-03 RX ADMIN — BUPROPION HYDROCHLORIDE 150 MG: 150 TABLET, FILM COATED, EXTENDED RELEASE ORAL at 19:46

## 2017-06-03 RX ADMIN — ACETAMINOPHEN 650 MG: 325 TABLET, FILM COATED ORAL at 16:00

## 2017-06-03 RX ADMIN — OXYCODONE HYDROCHLORIDE 20 MG: 10 TABLET ORAL at 11:29

## 2017-06-03 RX ADMIN — ACETAMINOPHEN 650 MG: 325 TABLET, FILM COATED ORAL at 21:30

## 2017-06-03 RX ADMIN — IPRATROPIUM BROMIDE AND ALBUTEROL SULFATE 3 ML: .5; 3 SOLUTION RESPIRATORY (INHALATION) at 06:21

## 2017-06-03 RX ADMIN — DIPHENHYDRAMINE HYDROCHLORIDE 25 MG: 25 CAPSULE ORAL at 10:00

## 2017-06-03 RX ADMIN — CARBAMAZEPINE 100 MG: 100 CAPSULE, EXTENDED RELEASE ORAL at 13:42

## 2017-06-03 RX ADMIN — METOPROLOL SUCCINATE 50 MG: 50 TABLET, EXTENDED RELEASE ORAL at 09:02

## 2017-06-03 RX ADMIN — IPRATROPIUM BROMIDE AND ALBUTEROL SULFATE 3 ML: .5; 3 SOLUTION RESPIRATORY (INHALATION) at 21:53

## 2017-06-03 RX ADMIN — CARBAMAZEPINE 100 MG: 100 CAPSULE, EXTENDED RELEASE ORAL at 21:30

## 2017-06-03 RX ADMIN — MONTELUKAST SODIUM 10 MG: 10 TABLET, FILM COATED ORAL at 21:30

## 2017-06-03 ASSESSMENT — PAIN DESCRIPTION - DESCRIPTORS
DESCRIPTORS: ACHING
DESCRIPTORS: SHOOTING

## 2017-06-03 ASSESSMENT — VISUAL ACUITY
OU: NORMAL ACUITY;GLASSES

## 2017-06-03 ASSESSMENT — ENCOUNTER SYMPTOMS: DYSRHYTHMIAS: 1

## 2017-06-03 ASSESSMENT — COPD QUESTIONNAIRES: COPD: 1

## 2017-06-03 ASSESSMENT — LIFESTYLE VARIABLES: TOBACCO_USE: 1

## 2017-06-03 NOTE — PLAN OF CARE
Problem: Goal Outcome Summary  Goal: Goal Outcome Summary  Outcome: No Change  Pt sleeping well most of the night, VSS able to make needs known. NPO since [place] for OR today at approx 1500 for trigeminal neuralgia pain. Using home CPAP and was found with sats at 81% RA after he removed mask. Requested neb x1 and reported feeling better afterwards. Left facial pain controlled with Ketamine and Oxycodone prn. Pt voiding small amounts without difficulty. Pre-surgical bath x1 at 0500 and will need another before surgery. PIV fell out in bed and new one placed in L FA. Fluids infusing without difficulty.  Plan: OR today

## 2017-06-03 NOTE — PLAN OF CARE
Problem: Goal Outcome Summary  Goal: Goal Outcome Summary  Outcome: No Change  RN [id number]  VSS except slight HTN and requiring 2-3 LNC to maintain sats greater than 90.  Neuros intact except numbness to left face.  Left facial pain well controlled at this time with oxycodone.  Slightly sedated so holding off on IV pain meds at this time.  Pt denies pain this shift.  Voids spont.  Pt was NPO all day for OR.  INR after vit K and plasma x 2 was 1.54.  Now OR on hold until Monday.  Started back on regular diet, good PO.  IV SLed.  Up with SBA.  BA 2/2 increase in pain meds. Await for OR on Monday.  Continue with POC.

## 2017-06-03 NOTE — PLAN OF CARE
Problem: Goal Outcome Summary  Goal: Goal Outcome Summary  Outcome: Improving     VSS. Neuros intact except numbness to left face and surrounding eye. Pt in significant amount of pain which pt states feels like electric shocks, occasionally increasing in intensity. Pain improving with IV ketamine and oxycodone. Please continue to wake pt up if he is sleeping overnight to give pain meds. Voiding spont. Regular diet with good PO. IV TKO for multiple IV meds. IV Benadryl given x1 for itching from IV Dilaudid. Up with SBA. BA on 2/2 increase in pain meds. Plan is for OR tomorrow around 8663-9729, pt to be NPO at midnight. Home CPAP on. Continue POC.

## 2017-06-03 NOTE — PROGRESS NOTES
[facility], [place]   Neurosurgery Progress Note:    Assessment: Home Valdez is a 67 year old male with hx L trigeminal neuralgia with previous glycerol injection, GK, and balloon compression rhizotomy procedures, who is now experiencing recurrence. He has been clinically stable, and his pain has been better under control. Scheduled for rhizotomy today.    Plan:  - Serial neuro exams  - Pain control  - INR recheck after products given this AM   - NPO for procedure today  - Bowel regimen  - PRN antiemetics  - IVF until taking adequate PO  - PT/OT  - SCDs for DVT proph    Greatly appreciate the help from PT/OT in caring for Home Valdez    Jasmyn Deluca MD  Neurosurgery, PGY2      Subjective: No acute events overnight.    Objective:   Temp:  [96.9  F (36.1  C)-100.2  F (37.9  C)] (P) 99.2  F (37.3  C)  Pulse:  [57-77] (P) 64  Resp:  [16-20] (P) 18  BP: (106-146)/(46-84) 134/60  SpO2:  [86 %-95 %] (P) 94 %  I/O last 3 completed shifts:  In: 1520 [P.O.:720; I.V.:800]  Out: 370 [Urine:370]    Gen: Appears comfortable, NAD  Wound: Incision, clean, dry, intact without strikethrough  Neurologic:  - Alert & Oriented to person, place, time, and situation  - Follows commands briskly  - Speech fluent, spontaneous. No aphasia or dysarthria.  - No gaze preference. No apparent hemineglect.  - PERRL, EOMI  - Strong eye closure, jaw clench, and cheek puff  - Face symmetric with sensation intact to light touch  - Palate elevates symmetrically, uvula midline, tongue protrudes midline  - Trapezii and sternocleidomastoid muscles 5/5 bilaterally  - No pronator drift     Del Tr Bi WE WF Gr   R 5 5 5 5 5 5   L 5 5 5 5 5 5    HF KE KF DF PF EHL   R 5 5 5 5 5 5   L 5 5 5 5 5 5     Reflexes 2+ throughout    Sensation intact and symmetric to light touch throughout except numbness along entire L mckenna-face (baseline since glycerol injection many years ago)    LABS  Recent Labs   Lab Test  06/03/17   5548   06/02/17 0108  05/30/17   0805   WBC  11.9*  11.7*  9.2   HGB  15.1  16.5  15.5   MCV  95  93  91   PLT  181  186  210       Recent Labs   Lab Test  06/03/17   0617 06/02/17 0108 05/30/17   0805   NA  142  140  142   POTASSIUM  4.6  4.2  4.3   CHLORIDE  107  107  108   CO2  28  26  29   BUN  24  18  13   CR  1.70*  1.30*  1.00   ANIONGAP  7  7  5   BHAVNA  8.2*  8.5  8.2*   GLC  111*  113*  103*

## 2017-06-03 NOTE — PLAN OF CARE
Problem: Individualization  Goal: Patient Preferences  Outcome: No Change  VSS, needs 2-3L O2 to keep sats above 90%. Left facial pain controlled with tylenol, oxycodone and IV dilaudid. Neuros unchanged; left facial numbness. Pt has been NPO for OR this afternoon. Received 2 units of plasma and vit k, waiting for INR to come back at this time. Up with SBA in room . Voiding spontaneously, no BM today. Had pre-op shower this afternoon. Family at bedside and helpful with cares.

## 2017-06-03 NOTE — PLAN OF CARE
Problem: Goal Outcome Summary  Goal: Goal Outcome Summary  Outcome: No Change  VSS.  Neuros intact except numbness to left face and surrounding eye.  Pain was well controlled most of the morning and afternoon with oxycodone and ketamine.  Pt became intolerable at around 1715.  Oxycodone increased plus one time dose, toradol given, dilaudid increased, ketamine given with moderate relief.  Continue to monitor and notify MDs if more meds needed.  Voids spont.  Good PO, regular diet.  IV TKO'd for multiple IV meds.  Up with SBA.  BA 2/2 increase in pain meds.  Plan for OR tomorrow btwn 3-4pm.  Continue with POC.

## 2017-06-04 LAB — INR PPP: 1.28 (ref 0.86–1.14)

## 2017-06-04 PROCEDURE — 25000132 ZZH RX MED GY IP 250 OP 250 PS 637: Mod: GY | Performed by: NEUROLOGICAL SURGERY

## 2017-06-04 PROCEDURE — 25000128 H RX IP 250 OP 636: Performed by: NEUROLOGICAL SURGERY

## 2017-06-04 PROCEDURE — 94660 CPAP INITIATION&MGMT: CPT

## 2017-06-04 PROCEDURE — 36415 COLL VENOUS BLD VENIPUNCTURE: CPT | Performed by: STUDENT IN AN ORGANIZED HEALTH CARE EDUCATION/TRAINING PROGRAM

## 2017-06-04 PROCEDURE — 40000141 ZZH STATISTIC PERIPHERAL IV START W/O US GUIDANCE

## 2017-06-04 PROCEDURE — A9270 NON-COVERED ITEM OR SERVICE: HCPCS | Mod: GY | Performed by: NEUROLOGICAL SURGERY

## 2017-06-04 PROCEDURE — 25000125 ZZHC RX 250: Performed by: STUDENT IN AN ORGANIZED HEALTH CARE EDUCATION/TRAINING PROGRAM

## 2017-06-04 PROCEDURE — A9270 NON-COVERED ITEM OR SERVICE: HCPCS | Mod: GY | Performed by: STUDENT IN AN ORGANIZED HEALTH CARE EDUCATION/TRAINING PROGRAM

## 2017-06-04 PROCEDURE — 12000003 ZZH R&B CRITICAL UMMC

## 2017-06-04 PROCEDURE — 85610 PROTHROMBIN TIME: CPT | Performed by: STUDENT IN AN ORGANIZED HEALTH CARE EDUCATION/TRAINING PROGRAM

## 2017-06-04 PROCEDURE — 40000275 ZZH STATISTIC RCP TIME EA 10 MIN

## 2017-06-04 PROCEDURE — 25000132 ZZH RX MED GY IP 250 OP 250 PS 637: Mod: GY | Performed by: STUDENT IN AN ORGANIZED HEALTH CARE EDUCATION/TRAINING PROGRAM

## 2017-06-04 PROCEDURE — 94640 AIRWAY INHALATION TREATMENT: CPT | Mod: 76

## 2017-06-04 RX ADMIN — OXYCODONE HYDROCHLORIDE 10 MG: 10 TABLET ORAL at 01:46

## 2017-06-04 RX ADMIN — OXYCODONE HYDROCHLORIDE 10 MG: 10 TABLET ORAL at 08:17

## 2017-06-04 RX ADMIN — ACETAMINOPHEN 650 MG: 325 TABLET, FILM COATED ORAL at 19:42

## 2017-06-04 RX ADMIN — METOPROLOL SUCCINATE 50 MG: 50 TABLET, EXTENDED RELEASE ORAL at 08:12

## 2017-06-04 RX ADMIN — ACETAMINOPHEN 650 MG: 325 TABLET, FILM COATED ORAL at 15:08

## 2017-06-04 RX ADMIN — OXYCODONE HYDROCHLORIDE 10 MG: 10 TABLET ORAL at 15:08

## 2017-06-04 RX ADMIN — HYDROMORPHONE HYDROCHLORIDE 1 MG: 1 INJECTION, SOLUTION INTRAMUSCULAR; INTRAVENOUS; SUBCUTANEOUS at 21:53

## 2017-06-04 RX ADMIN — POTASSIUM CHLORIDE 20 MEQ: 750 TABLET, EXTENDED RELEASE ORAL at 08:12

## 2017-06-04 RX ADMIN — ACETAMINOPHEN 650 MG: 325 TABLET, FILM COATED ORAL at 11:31

## 2017-06-04 RX ADMIN — BUPROPION HYDROCHLORIDE 150 MG: 150 TABLET, FILM COATED, EXTENDED RELEASE ORAL at 08:12

## 2017-06-04 RX ADMIN — OXYCODONE HYDROCHLORIDE 10 MG: 10 TABLET ORAL at 06:53

## 2017-06-04 RX ADMIN — HYDROMORPHONE HYDROCHLORIDE 0.5 MG: 1 INJECTION, SOLUTION INTRAMUSCULAR; INTRAVENOUS; SUBCUTANEOUS at 19:36

## 2017-06-04 RX ADMIN — ACETAMINOPHEN 650 MG: 325 TABLET, FILM COATED ORAL at 01:44

## 2017-06-04 RX ADMIN — CARBAMAZEPINE 100 MG: 100 CAPSULE, EXTENDED RELEASE ORAL at 19:42

## 2017-06-04 RX ADMIN — IPRATROPIUM BROMIDE AND ALBUTEROL SULFATE 3 ML: .5; 3 SOLUTION RESPIRATORY (INHALATION) at 01:17

## 2017-06-04 RX ADMIN — MONTELUKAST SODIUM 10 MG: 10 TABLET, FILM COATED ORAL at 21:53

## 2017-06-04 RX ADMIN — ACETAMINOPHEN 650 MG: 325 TABLET, FILM COATED ORAL at 06:52

## 2017-06-04 RX ADMIN — HYDROMORPHONE HYDROCHLORIDE 1 MG: 1 INJECTION, SOLUTION INTRAMUSCULAR; INTRAVENOUS; SUBCUTANEOUS at 11:31

## 2017-06-04 RX ADMIN — CARBAMAZEPINE 100 MG: 100 CAPSULE, EXTENDED RELEASE ORAL at 10:25

## 2017-06-04 RX ADMIN — CETIRIZINE HYDROCHLORIDE 10 MG: 10 TABLET, FILM COATED ORAL at 19:43

## 2017-06-04 RX ADMIN — AMIODARONE HYDROCHLORIDE 100 MG: 100 TABLET ORAL at 08:12

## 2017-06-04 RX ADMIN — GABAPENTIN 100 MG: 250 SOLUTION ORAL at 19:42

## 2017-06-04 RX ADMIN — OXYCODONE HYDROCHLORIDE 10 MG: 10 TABLET ORAL at 18:09

## 2017-06-04 RX ADMIN — SENNOSIDES AND DOCUSATE SODIUM 2 TABLET: 8.6; 5 TABLET ORAL at 19:42

## 2017-06-04 RX ADMIN — HYDROMORPHONE HYDROCHLORIDE 1 MG: 1 INJECTION, SOLUTION INTRAMUSCULAR; INTRAVENOUS; SUBCUTANEOUS at 16:19

## 2017-06-04 RX ADMIN — GABAPENTIN 100 MG: 250 SOLUTION ORAL at 08:12

## 2017-06-04 RX ADMIN — FLUTICASONE FUROATE AND VILANTEROL TRIFENATATE 1 PUFF: 100; 25 POWDER RESPIRATORY (INHALATION) at 08:11

## 2017-06-04 RX ADMIN — SENNOSIDES AND DOCUSATE SODIUM 2 TABLET: 8.6; 5 TABLET ORAL at 08:12

## 2017-06-04 RX ADMIN — OXYCODONE HYDROCHLORIDE 10 MG: 10 TABLET ORAL at 03:54

## 2017-06-04 RX ADMIN — OXYCODONE HYDROCHLORIDE 10 MG: 10 TABLET ORAL at 21:17

## 2017-06-04 RX ADMIN — OXYCODONE HYDROCHLORIDE 10 MG: 10 TABLET ORAL at 05:19

## 2017-06-04 RX ADMIN — IPRATROPIUM BROMIDE AND ALBUTEROL SULFATE 3 ML: .5; 3 SOLUTION RESPIRATORY (INHALATION) at 09:39

## 2017-06-04 RX ADMIN — OXYCODONE HYDROCHLORIDE 10 MG: 10 TABLET ORAL at 10:25

## 2017-06-04 RX ADMIN — BUPROPION HYDROCHLORIDE 150 MG: 150 TABLET, FILM COATED, EXTENDED RELEASE ORAL at 19:42

## 2017-06-04 RX ADMIN — LOSARTAN POTASSIUM 100 MG: 100 TABLET, FILM COATED ORAL at 08:12

## 2017-06-04 RX ADMIN — OXYCODONE HYDROCHLORIDE 10 MG: 10 TABLET ORAL at 00:50

## 2017-06-04 ASSESSMENT — PAIN DESCRIPTION - DESCRIPTORS
DESCRIPTORS: ACHING
DESCRIPTORS: ACHING
DESCRIPTORS: SHOOTING
DESCRIPTORS: ACHING
DESCRIPTORS: SHOOTING
DESCRIPTORS: ACHING

## 2017-06-04 ASSESSMENT — VISUAL ACUITY
OU: NORMAL ACUITY;GLASSES

## 2017-06-04 NOTE — PLAN OF CARE
Problem: Goal Outcome Summary  Goal: Goal Outcome Summary  Outcome: No Change  RN [id number]  VSS except paulie at times and requiring 2-3 LNC to maintain sats greater than 90.  Neuros intact except numbness to left face.  Left facial pain well controlled at this time with oxycodone and IV dilaudid.  Voids spont.  Regular diet, good PO.  Plan for NPO at [place] for OR tomorrow.  IV SLed.  Up with SBA.  On call for OR tomorrow.  Continue with POC.

## 2017-06-04 NOTE — PROGRESS NOTES
[facility], [place]   Neurosurgery Progress Note:    Assessment: Home Valdez is a 67 year old male with hx L trigeminal neuralgia with previous glycerol injection, GK, and balloon compression rhizotomy procedures, who is now experiencing recurrence. He has been clinically stable, and his pain has been better under control. Surgery postponed yesterday due to elevated INR     Plan:  - Serial neuro exams  - Pain control  - INR recheck this AM   - Bowel regimen  - PRN antiemetics  - IVFs remain on; will consider making NPO once plan finalized   - PT/OT  - SCDs for DVT proph  - Tentative plan for surgery on Monday AM     Greatly appreciate the help from PT/OT in caring for Home Valdez    Jasmyn Deluca MD  Neurosurgery, PGY2      Subjective: No acute events overnight.    Objective:   Temp:  [96.9  F (36.1  C)-99.3  F (37.4  C)] 99.3  F (37.4  C)  Pulse:  [58-77] 58  Heart Rate:  [60-67] 63  Resp:  [16-20] 18  BP: (118-146)/(58-84) 133/80  SpO2:  [91 %-98 %] 94 %  I/O last 3 completed shifts:  In: 1070 [P.O.:870]  Out: 520 [Urine:520]    Gen: Appears comfortable, NAD  Wound: Incision, clean, dry, intact without strikethrough  Neurologic:  - Alert & Oriented to person, place, time, and situation  - Follows commands briskly  - Speech fluent, spontaneous. No aphasia or dysarthria.  - No gaze preference. No apparent hemineglect.  - PERRL, EOMI  - Strong eye closure, jaw clench, and cheek puff  - Face symmetric with sensation intact to light touch  - Palate elevates symmetrically, uvula midline, tongue protrudes midline  - Trapezii and sternocleidomastoid muscles 5/5 bilaterally  - No pronator drift     Del Tr Bi WE WF Gr   R 5 5 5 5 5 5   L 5 5 5 5 5 5    HF KE KF DF PF EHL   R 5 5 5 5 5 5   L 5 5 5 5 5 5     Reflexes 2+ throughout    Sensation intact and symmetric to light touch throughout except numbness along entire L mckenna-face (baseline since glycerol injection many years  ago)    LABS  Recent Labs   Lab Test  06/03/17   0617  06/02/17   0108  05/30/17   0805   WBC  11.9*  11.7*  9.2   HGB  15.1  16.5  15.5   MCV  95  93  91   PLT  181  186  210       Recent Labs   Lab Test  06/03/17   0617  06/02/17   0108  05/30/17   0805   NA  142  140  142   POTASSIUM  4.6  4.2  4.3   CHLORIDE  107  107  108   CO2  28  26  29   BUN  24  18  13   CR  1.70*  1.30*  1.00   ANIONGAP  7  7  5   BHAVNA  8.2*  8.5  8.2*   GLC  111*  113*  103*

## 2017-06-04 NOTE — PLAN OF CARE
Problem: Goal Outcome Summary  Goal: Goal Outcome Summary  Outcome: No Change  Pt here for trigeminal neuralgia, awaiting for rhizotomy procedure (most likely tomorrow). AVSS, on 2-3L NC, stating mid 90's. A&Ox4. Neuros intact besides numbness to left face. Continues to have sharp, shooting pain to left side of face. Taking PRN oxy and tylenol. Has PRN dilaudid and ketamine if needed. Pt states pain has been managed well, please stay on top of PRN meds. IV SL. On a regular diet with good PO intake. Voiding spon. No BM but passing gas. Up with SBA. INR to be drawn this AM, please watch for results. Pulse ox on. Refused PCD's. Continue with POC.

## 2017-06-04 NOTE — PLAN OF CARE
Problem: Individualization  Goal: Patient Preferences  Outcome: No Change  VSS, needs 2-3L O2 to keep sats above 90%. Left facial pain controlled with tylenol, oxycodone and IV dilaudid. Neuros unchanged; left facial numbness. Good po intake. Up with SBA in room . Voiding spontaneously, no BM today. Plan is for pt to be NPO at midnight, OR tomorrow. Family at bedside and helpful with cares.

## 2017-06-05 ENCOUNTER — APPOINTMENT (OUTPATIENT)
Dept: GENERAL RADIOLOGY | Facility: CLINIC | Age: 67
DRG: 042 | End: 2017-06-05
Attending: NEUROLOGICAL SURGERY
Payer: MEDICARE

## 2017-06-05 LAB — INR PPP: 1.24 (ref 0.86–1.14)

## 2017-06-05 PROCEDURE — 71000016 ZZH RECOVERY PHASE 1 LEVEL 3 FIRST HR: Performed by: NEUROLOGICAL SURGERY

## 2017-06-05 PROCEDURE — 36415 COLL VENOUS BLD VENIPUNCTURE: CPT | Performed by: STUDENT IN AN ORGANIZED HEALTH CARE EDUCATION/TRAINING PROGRAM

## 2017-06-05 PROCEDURE — C9399 UNCLASSIFIED DRUGS OR BIOLOG: HCPCS | Performed by: ANESTHESIOLOGY

## 2017-06-05 PROCEDURE — 37000008 ZZH ANESTHESIA TECHNICAL FEE, 1ST 30 MIN: Performed by: NEUROLOGICAL SURGERY

## 2017-06-05 PROCEDURE — 25000128 H RX IP 250 OP 636: Performed by: NEUROLOGICAL SURGERY

## 2017-06-05 PROCEDURE — 25000132 ZZH RX MED GY IP 250 OP 250 PS 637: Mod: GY | Performed by: STUDENT IN AN ORGANIZED HEALTH CARE EDUCATION/TRAINING PROGRAM

## 2017-06-05 PROCEDURE — 40000275 ZZH STATISTIC RCP TIME EA 10 MIN

## 2017-06-05 PROCEDURE — 27210794 ZZH OR GENERAL SUPPLY STERILE: Performed by: NEUROLOGICAL SURGERY

## 2017-06-05 PROCEDURE — 25000125 ZZHC RX 250: Performed by: ANESTHESIOLOGY

## 2017-06-05 PROCEDURE — 12000001 ZZH R&B MED SURG/OB UMMC

## 2017-06-05 PROCEDURE — 36000053 ZZH SURGERY LEVEL 2 EA 15 ADDTL MIN - UMMC: Performed by: NEUROLOGICAL SURGERY

## 2017-06-05 PROCEDURE — 008K3ZZ DIVISION OF TRIGEMINAL NERVE, PERCUTANEOUS APPROACH: ICD-10-PCS | Performed by: NEUROLOGICAL SURGERY

## 2017-06-05 PROCEDURE — A9270 NON-COVERED ITEM OR SERVICE: HCPCS | Mod: GY | Performed by: NEUROLOGICAL SURGERY

## 2017-06-05 PROCEDURE — A9270 NON-COVERED ITEM OR SERVICE: HCPCS | Mod: GY | Performed by: STUDENT IN AN ORGANIZED HEALTH CARE EDUCATION/TRAINING PROGRAM

## 2017-06-05 PROCEDURE — C1757 CATH, THROMBECTOMY/EMBOLECT: HCPCS | Performed by: NEUROLOGICAL SURGERY

## 2017-06-05 PROCEDURE — 94660 CPAP INITIATION&MGMT: CPT

## 2017-06-05 PROCEDURE — 25000132 ZZH RX MED GY IP 250 OP 250 PS 637: Mod: GY | Performed by: NEUROLOGICAL SURGERY

## 2017-06-05 PROCEDURE — 40000279 XR SURGERY CARM FLUORO GREATER THAN 5 MIN W STILLS: Mod: TC

## 2017-06-05 PROCEDURE — 37000009 ZZH ANESTHESIA TECHNICAL FEE, EACH ADDTL 15 MIN: Performed by: NEUROLOGICAL SURGERY

## 2017-06-05 PROCEDURE — 94640 AIRWAY INHALATION TREATMENT: CPT

## 2017-06-05 PROCEDURE — 40000171 ZZH STATISTIC PRE-PROCEDURE ASSESSMENT III: Performed by: NEUROLOGICAL SURGERY

## 2017-06-05 PROCEDURE — 85610 PROTHROMBIN TIME: CPT | Performed by: STUDENT IN AN ORGANIZED HEALTH CARE EDUCATION/TRAINING PROGRAM

## 2017-06-05 PROCEDURE — 25000125 ZZHC RX 250: Performed by: STUDENT IN AN ORGANIZED HEALTH CARE EDUCATION/TRAINING PROGRAM

## 2017-06-05 PROCEDURE — 36000055 ZZH SURGERY LEVEL 2 W FLUORO 1ST 30 MIN - UMMC: Performed by: NEUROLOGICAL SURGERY

## 2017-06-05 PROCEDURE — 25000128 H RX IP 250 OP 636: Performed by: ANESTHESIOLOGY

## 2017-06-05 PROCEDURE — 25000566 ZZH SEVOFLURANE, EA 15 MIN: Performed by: NEUROLOGICAL SURGERY

## 2017-06-05 RX ORDER — LIDOCAINE HYDROCHLORIDE 20 MG/ML
INJECTION, SOLUTION INFILTRATION; PERINEURAL PRN
Status: DISCONTINUED | OUTPATIENT
Start: 2017-06-05 | End: 2017-06-05

## 2017-06-05 RX ORDER — ONDANSETRON 4 MG/1
4 TABLET, ORALLY DISINTEGRATING ORAL EVERY 30 MIN PRN
Status: DISCONTINUED | OUTPATIENT
Start: 2017-06-05 | End: 2017-06-05

## 2017-06-05 RX ORDER — IPRATROPIUM BROMIDE AND ALBUTEROL SULFATE 2.5; .5 MG/3ML; MG/3ML
3 SOLUTION RESPIRATORY (INHALATION) ONCE
Status: COMPLETED | OUTPATIENT
Start: 2017-06-05 | End: 2017-06-05

## 2017-06-05 RX ORDER — SODIUM CHLORIDE, SODIUM LACTATE, POTASSIUM CHLORIDE, CALCIUM CHLORIDE 600; 310; 30; 20 MG/100ML; MG/100ML; MG/100ML; MG/100ML
INJECTION, SOLUTION INTRAVENOUS CONTINUOUS
Status: DISCONTINUED | OUTPATIENT
Start: 2017-06-05 | End: 2017-06-05

## 2017-06-05 RX ORDER — LIDOCAINE 40 MG/G
CREAM TOPICAL
Status: DISCONTINUED | OUTPATIENT
Start: 2017-06-05 | End: 2017-06-05 | Stop reason: HOSPADM

## 2017-06-05 RX ORDER — OXYCODONE HYDROCHLORIDE 10 MG/1
10-20 TABLET ORAL
Qty: 30 TABLET | Refills: 0 | Status: SHIPPED | OUTPATIENT
Start: 2017-06-05 | End: 2017-11-06 | Stop reason: ALTCHOICE

## 2017-06-05 RX ORDER — KETAMINE HYDROCHLORIDE 10 MG/ML
INJECTION, SOLUTION INTRAMUSCULAR; INTRAVENOUS PRN
Status: DISCONTINUED | OUTPATIENT
Start: 2017-06-05 | End: 2017-06-05

## 2017-06-05 RX ORDER — FENTANYL CITRATE 50 UG/ML
25-50 INJECTION, SOLUTION INTRAMUSCULAR; INTRAVENOUS
Status: DISCONTINUED | OUTPATIENT
Start: 2017-06-05 | End: 2017-06-05

## 2017-06-05 RX ORDER — SODIUM CHLORIDE, SODIUM LACTATE, POTASSIUM CHLORIDE, CALCIUM CHLORIDE 600; 310; 30; 20 MG/100ML; MG/100ML; MG/100ML; MG/100ML
INJECTION, SOLUTION INTRAVENOUS CONTINUOUS PRN
Status: DISCONTINUED | OUTPATIENT
Start: 2017-06-05 | End: 2017-06-05

## 2017-06-05 RX ORDER — HYDROMORPHONE HYDROCHLORIDE 1 MG/ML
.3-.5 INJECTION, SOLUTION INTRAMUSCULAR; INTRAVENOUS; SUBCUTANEOUS EVERY 5 MIN PRN
Status: DISCONTINUED | OUTPATIENT
Start: 2017-06-05 | End: 2017-06-05

## 2017-06-05 RX ORDER — CARBAMAZEPINE 100 MG/1
100 CAPSULE, EXTENDED RELEASE ORAL 2 TIMES DAILY
Qty: 60 CAPSULE | Refills: 0 | Status: SHIPPED | OUTPATIENT
Start: 2017-06-05 | End: 2017-07-26

## 2017-06-05 RX ORDER — ONDANSETRON 2 MG/ML
INJECTION INTRAMUSCULAR; INTRAVENOUS PRN
Status: DISCONTINUED | OUTPATIENT
Start: 2017-06-05 | End: 2017-06-05

## 2017-06-05 RX ORDER — WARFARIN SODIUM 2.5 MG/1
TABLET ORAL
Qty: 235 TABLET | Refills: 0 | Status: SHIPPED | OUTPATIENT
Start: 2017-06-08 | End: 2018-07-17

## 2017-06-05 RX ORDER — PROPOFOL 10 MG/ML
INJECTION, EMULSION INTRAVENOUS PRN
Status: DISCONTINUED | OUTPATIENT
Start: 2017-06-05 | End: 2017-06-05

## 2017-06-05 RX ORDER — GABAPENTIN 250 MG/5ML
100 SOLUTION ORAL EVERY 12 HOURS
Qty: 450 ML | Refills: 0 | Status: SHIPPED | OUTPATIENT
Start: 2017-06-05 | End: 2017-11-06 | Stop reason: ALTCHOICE

## 2017-06-05 RX ORDER — ONDANSETRON 2 MG/ML
4 INJECTION INTRAMUSCULAR; INTRAVENOUS EVERY 30 MIN PRN
Status: DISCONTINUED | OUTPATIENT
Start: 2017-06-05 | End: 2017-06-05

## 2017-06-05 RX ORDER — FENTANYL CITRATE 50 UG/ML
INJECTION, SOLUTION INTRAMUSCULAR; INTRAVENOUS PRN
Status: DISCONTINUED | OUTPATIENT
Start: 2017-06-05 | End: 2017-06-05

## 2017-06-05 RX ORDER — IOPAMIDOL 612 MG/ML
INJECTION, SOLUTION INTRATHECAL PRN
Status: DISCONTINUED | OUTPATIENT
Start: 2017-06-05 | End: 2017-06-05

## 2017-06-05 RX ORDER — SODIUM CHLORIDE, SODIUM LACTATE, POTASSIUM CHLORIDE, CALCIUM CHLORIDE 600; 310; 30; 20 MG/100ML; MG/100ML; MG/100ML; MG/100ML
INJECTION, SOLUTION INTRAVENOUS CONTINUOUS
Status: DISCONTINUED | OUTPATIENT
Start: 2017-06-05 | End: 2017-06-05 | Stop reason: HOSPADM

## 2017-06-05 RX ORDER — EPHEDRINE SULFATE 50 MG/ML
INJECTION, SOLUTION INTRAMUSCULAR; INTRAVENOUS; SUBCUTANEOUS PRN
Status: DISCONTINUED | OUTPATIENT
Start: 2017-06-05 | End: 2017-06-05

## 2017-06-05 RX ADMIN — AMIODARONE HYDROCHLORIDE 100 MG: 100 TABLET ORAL at 07:49

## 2017-06-05 RX ADMIN — METOPROLOL SUCCINATE 50 MG: 50 TABLET, EXTENDED RELEASE ORAL at 07:50

## 2017-06-05 RX ADMIN — ACETAMINOPHEN 650 MG: 325 TABLET, FILM COATED ORAL at 03:20

## 2017-06-05 RX ADMIN — CARBAMAZEPINE 100 MG: 100 CAPSULE, EXTENDED RELEASE ORAL at 20:03

## 2017-06-05 RX ADMIN — OXYCODONE HYDROCHLORIDE 10 MG: 10 TABLET ORAL at 03:20

## 2017-06-05 RX ADMIN — GABAPENTIN 100 MG: 250 SOLUTION ORAL at 07:50

## 2017-06-05 RX ADMIN — ONDANSETRON 4 MG: 2 INJECTION INTRAMUSCULAR; INTRAVENOUS at 11:07

## 2017-06-05 RX ADMIN — IPRATROPIUM BROMIDE AND ALBUTEROL SULFATE 3 ML: .5; 3 SOLUTION RESPIRATORY (INHALATION) at 00:32

## 2017-06-05 RX ADMIN — NYSTATIN 500000 UNITS: 500000 SUSPENSION ORAL at 20:03

## 2017-06-05 RX ADMIN — IPRATROPIUM BROMIDE AND ALBUTEROL SULFATE 3 ML: .5; 3 SOLUTION RESPIRATORY (INHALATION) at 11:44

## 2017-06-05 RX ADMIN — CARBAMAZEPINE 100 MG: 100 CAPSULE, EXTENDED RELEASE ORAL at 07:49

## 2017-06-05 RX ADMIN — ACETAMINOPHEN 650 MG: 325 TABLET, FILM COATED ORAL at 07:55

## 2017-06-05 RX ADMIN — KETAMINE HYDROCHLORIDE 20 MG: 10 INJECTION, SOLUTION INTRAMUSCULAR; INTRAVENOUS at 09:58

## 2017-06-05 RX ADMIN — OXYCODONE HYDROCHLORIDE 10 MG: 10 TABLET ORAL at 07:55

## 2017-06-05 RX ADMIN — LOSARTAN POTASSIUM 100 MG: 100 TABLET, FILM COATED ORAL at 07:50

## 2017-06-05 RX ADMIN — Medication 5 MG: at 09:59

## 2017-06-05 RX ADMIN — FENTANYL CITRATE 100 MCG: 50 INJECTION, SOLUTION INTRAMUSCULAR; INTRAVENOUS at 09:35

## 2017-06-05 RX ADMIN — CEFAZOLIN SODIUM 2 G: 2 INJECTION, SOLUTION INTRAVENOUS at 10:00

## 2017-06-05 RX ADMIN — GABAPENTIN 100 MG: 250 SOLUTION ORAL at 20:05

## 2017-06-05 RX ADMIN — BUPROPION HYDROCHLORIDE 150 MG: 150 TABLET, FILM COATED, EXTENDED RELEASE ORAL at 07:50

## 2017-06-05 RX ADMIN — Medication 5 MG: at 10:03

## 2017-06-05 RX ADMIN — PHENYLEPHRINE HYDROCHLORIDE 0.5 MCG/KG/MIN: 10 INJECTION, SOLUTION INTRAMUSCULAR; INTRAVENOUS; SUBCUTANEOUS at 10:32

## 2017-06-05 RX ADMIN — LIDOCAINE HYDROCHLORIDE 100 MG: 20 INJECTION, SOLUTION INFILTRATION; PERINEURAL at 09:35

## 2017-06-05 RX ADMIN — SUGAMMADEX 200 MG: 100 INJECTION, SOLUTION INTRAVENOUS at 11:11

## 2017-06-05 RX ADMIN — SUCCINYLCHOLINE CHLORIDE 160 MG: 20 INJECTION, SOLUTION INTRAMUSCULAR; INTRAVENOUS at 09:35

## 2017-06-05 RX ADMIN — FLUTICASONE FUROATE AND VILANTEROL TRIFENATATE 1 PUFF: 100; 25 POWDER RESPIRATORY (INHALATION) at 07:49

## 2017-06-05 RX ADMIN — CETIRIZINE HYDROCHLORIDE 10 MG: 10 TABLET, FILM COATED ORAL at 20:05

## 2017-06-05 RX ADMIN — ROCURONIUM BROMIDE 30 MG: 10 INJECTION INTRAVENOUS at 09:48

## 2017-06-05 RX ADMIN — SODIUM CHLORIDE, POTASSIUM CHLORIDE, SODIUM LACTATE AND CALCIUM CHLORIDE: 600; 310; 30; 20 INJECTION, SOLUTION INTRAVENOUS at 09:25

## 2017-06-05 RX ADMIN — PHENYLEPHRINE HYDROCHLORIDE 100 MCG: 10 INJECTION, SOLUTION INTRAMUSCULAR; INTRAVENOUS; SUBCUTANEOUS at 09:51

## 2017-06-05 RX ADMIN — PHENYLEPHRINE HYDROCHLORIDE 100 MCG: 10 INJECTION, SOLUTION INTRAMUSCULAR; INTRAVENOUS; SUBCUTANEOUS at 09:45

## 2017-06-05 RX ADMIN — PROPOFOL 200 MG: 10 INJECTION, EMULSION INTRAVENOUS at 09:35

## 2017-06-05 RX ADMIN — BUPROPION HYDROCHLORIDE 150 MG: 150 TABLET, FILM COATED, EXTENDED RELEASE ORAL at 20:03

## 2017-06-05 RX ADMIN — SENNOSIDES AND DOCUSATE SODIUM 2 TABLET: 8.6; 5 TABLET ORAL at 20:03

## 2017-06-05 RX ADMIN — IPRATROPIUM BROMIDE AND ALBUTEROL SULFATE 3 ML: .5; 3 SOLUTION RESPIRATORY (INHALATION) at 08:51

## 2017-06-05 RX ADMIN — HYDROMORPHONE HYDROCHLORIDE 0.5 MG: 1 INJECTION, SOLUTION INTRAMUSCULAR; INTRAVENOUS; SUBCUTANEOUS at 01:53

## 2017-06-05 ASSESSMENT — VISUAL ACUITY
OU: NORMAL ACUITY;GLASSES
OU: NORMAL ACUITY;GLASSES

## 2017-06-05 ASSESSMENT — PAIN DESCRIPTION - DESCRIPTORS: DESCRIPTORS: ACHING

## 2017-06-05 NOTE — OR NURSING
Brad arrived in the pacu with agitation. He calmed down some and was able to say why he was here. Dr. Scott at bedside and told Brad that the rhizotomy went well and he could possibly be discharged later today.  Brad proceeded to want to leave the pacu and was upset that we were keeping him here. Chrissy, his wife called and she said that he did have some confusion after pain meds. His RN on 6A also supported that he had some intermittent confusion after pain meds.  OLAN, unable to test do further testing of his neuro status because he refused. He did cooperate in having a duoneb. He was able to void in a urinal about 40 cc of concentrated urine.

## 2017-06-05 NOTE — PLAN OF CARE
Problem: Goal Outcome Summary  Goal: Goal Outcome Summary  Outcome: No Change  Pt admitted with recurrence of left-sided trigeminal neuralgia. VSS except HTN within parameters. Sats mid 90's on 3L NC, pt requires 2-3 L to maintain above 90%. Neuros include numbness to L side of face, intermittent confusion after pain meds. C/o of pain to left side of face, pain controlled with PRN IV Dilaudid, PRN Tylenol and PRN Oxycodone. Voids spont. BMx1 overnight. NPO. IV SL. Up SBA. BA on, pt forgets to use call light for help. Refused PCD's. Plan for possible OR today, pt add on for OR. Continue to monitor and follow POC.

## 2017-06-05 NOTE — BRIEF OP NOTE
[facility], [place]    Brief Operative Note    Pre-operative diagnosis: Left side V1,2,3 Trigemnial Neauralgia  Post-operative diagnosis Same  Procedure:   Left side BALLOON COMPRESSION RHIZOTOMY - Wound Class: I-Clean  Surgeon: Surgeon(s) and Role:     * Paulino Gonzales MD - Primary     * Karl Scott MD - Resident - Assisting  Anesthesia: General   Estimated blood loss: Less than 10 ml  Drains: None  Specimens: * No specimens in log *  Findings:   On 3rd attempt good infltion of balloon for 90 sec.  Complications: None.  Implants: None.  Plan:  PACU --> 3C likely d/c home

## 2017-06-05 NOTE — ANESTHESIA POSTPROCEDURE EVALUATION
Patient: Home Valdez    Procedure(s):  BALLOON COMPRESSION RHIZOTOMY - Wound Class: I-Clean    Diagnosis:Trigemnial Neauralgia  Diagnosis Additional Information: No value filed.    Anesthesia Type:  General, ETT    Note:  Anesthesia Post Evaluation    Patient location during evaluation: PACU  Patient participation: Able to fully participate in evaluation (Agitated as baseline per chou RN and family member)  Level of consciousness: agitated  Pain management: adequate  Airway patency: patent  Cardiovascular status: acceptable  Respiratory status: acceptable  Hydration status: acceptable  PONV: none             Last vitals:  Vitals:    06/05/17 1205 06/05/17 1215 06/05/17 1232   BP:   158/73   Pulse:      Resp: 16 16 20   Temp:   37.2  C (99  F)   SpO2:   98%         Electronically Signed By: Yenny Connell MD  June 5, 2017  12:39 PM

## 2017-06-05 NOTE — PLAN OF CARE
Problem: Individualization  Goal: Patient Preferences  Outcome: Improving  Pt had Rhizotomy this am. Came back from PACU around 1230. VSS, requiring 2L of O2 to keep sats above 90%. Pt was weaned off of O2 but with activity pt will dip into low 80s (needs 1L to recover and then does ok on room air again). Will try to encourage IS and keep trailing activity on room air. Neuros unchanged; numbness on left side of face. Bandaid over site on left cheek from OR. Good po intake. Voiding spontaneously, had BM this am. Up with SBA. Hoping to go home this afternoon, NP came and saw pt. Will go home if sats get better on room air. Wife at bedside and helpful with cares.

## 2017-06-05 NOTE — PROGRESS NOTES
[facility], [place]   Neurosurgery Progress Note:    Assessment: Home Valdez is a 67 year old male with hx L trigeminal neuralgia with previous glycerol injection, GK, and balloon compression rhizotomy procedures, who is now experiencing recurrence. He has been clinically stable, and his pain has been better under control. To OR today.    Plan:  - Serial neuro exams  - Pain control  - INR recheck this AM   - Bowel regimen  - PRN antiemetics  - NPO  - PT/OT  - SCDs for DVT proph  - Plan for OR today  - INR recheck    Greatly appreciate the help from PT/OT in caring for Home Valdez    Jasmyn Deluca MD  Neurosurgery, PGY2      Subjective: No acute events overnight.    Objective:   Temp:  [97.4  F (36.3  C)-99.4  F (37.4  C)] 98.1  F (36.7  C)  Pulse:  [55-77] 69  Heart Rate:  [69-84] 69  Resp:  [16-20] 18  BP: (132-177)/(65-95) 132/77  SpO2:  [79 %-98 %] 90 %  I/O last 3 completed shifts:  In: 1250 [P.O.:650; I.V.:600]  Out: 40 [Urine:40]    Gen: Appears comfortable, NAD  Wound: Incision, clean, dry, intact without strikethrough  Neurologic:  - Alert & Oriented to person, place, time, and situation  - Follows commands briskly  - Speech fluent, spontaneous. No aphasia or dysarthria.  - No gaze preference. No apparent hemineglect.  - PERRL, EOMI  - Strong eye closure, jaw clench, and cheek puff  - Face symmetric with sensation intact to light touch  - Palate elevates symmetrically, uvula midline, tongue protrudes midline  - Trapezii and sternocleidomastoid muscles 5/5 bilaterally  - No pronator drift     Del Tr Bi WE WF Gr   R 5 5 5 5 5 5   L 5 5 5 5 5 5    HF KE KF DF PF EHL   R 5 5 5 5 5 5   L 5 5 5 5 5 5     Reflexes 2+ throughout    Sensation intact and symmetric to light touch throughout except numbness along entire L mckenna-face (baseline since glycerol injection many years ago)    LABS  No new labs.

## 2017-06-05 NOTE — DISCHARGE SUMMARY
[facility] Discharge Summary and Instructions    Home Valdez MRN# [id number]   Age: 67 year old YOB: 1950     Date of Admission:  6/2/2017  Date of Discharge::  6/6/2017  Admitting Physician:  Alex Campbell MD  Discharge Physician:  Alex Campbell MD          Admission Diagnoses:   Trigeminal neuralgia [G50.0]          Discharge Diagnosis:   Trigeminal neuralgia [G50.0]          Procedures:   Stereotactic trigeminal balloon rhizotomy left side.  Use of intraoperative fluoroscopic guidance.            Brief History of Illness:   Mr. Home Valdez has a history of left-sided trigeminal neuralgia, which was treated in the past by gamma knife as well as glycerol rhizotomy and subsequently got relief for many years.  Most recently in 09/2016 he presented to the Neurosurgery Service here at the [facility] with recurrent symptoms when he was treated with the balloon compression rhizotomy that gave him good relief.  He had recurrence of his symptoms during his last week and therefore presented once again for left-sided balloon compression rhizotomy again.  He wished to proceed with this and gave informed written consent for the same after understanding the risks, benefits and alternatives to the procedure in detail.                Hospital Course:   Following surgery the patient noted resolution of left facial pain.  The patient was initially requiring supplemental oxygen to keep oxygen saturations above 90%, the patient does not use supplemental oxygen at home.  Due to this, the patient was monitored overnight in the hospital.  Patient has known history of COPD and does utilize a CPAP machine at night.  The patient and his wife state that his typical oxygen saturations are in the low 90s and the patient does check these at home.   The patient's saturations prior to discharge were 92% on room air.  The patient denied shortness of breathe, chest pain.    The patient  stated that he was able to tolerate diet and mastication without pain.  The patient was ambulating safely and independently.  Prior to discharge the patient was voiding, pain was controlled, he was medically stable and tolerating diet.  Patient was able to discharge home with wife on 6/6/2017.            Discharge Medications:     Current Discharge Medication List      START taking these medications    Details   oxyCODONE (ROXICODONE) 10 MG IR tablet Take 1-2 tablets (10-20 mg) by mouth every 3 hours as needed for moderate to severe pain  Qty: 30 tablet, Refills: 0    Associated Diagnoses: Trigeminal neuralgia      carBAMazepine (CARBATROL) 100 MG 12 hr capsule Take 1 capsule (100 mg) by mouth 2 times daily  Qty: 60 capsule, Refills: 0    Associated Diagnoses: Trigeminal neuralgia      gabapentin (NEURONTIN) 250 MG/5ML solution Take 2 mLs (100 mg) by mouth every 12 hours  Qty: 450 mL, Refills: 0    Associated Diagnoses: Trigeminal neuralgia         CONTINUE these medications which have CHANGED    Details   warfarin (JANTOVEN) 2.5 MG tablet Take 7.5 mg (3 tablets) on Tuesday, Thursday, Saturday and 5 mg (2 tablets) all other days or as directed by coumadin clinic.  Qty: 235 tablet, Refills: 0    Comments: Ok to restart Coumadin on 6/8/2017  Associated Diagnoses: Atrial fibrillation with RVR (H)         CONTINUE these medications which have NOT CHANGED    Details   losartan (COZAAR) 50 MG tablet Take 2 tablets (100 mg) by mouth daily  Qty: 180 tablet, Refills: 3    Associated Diagnoses: Atrial fibrillation with RVR (H); Hypertension goal BP (blood pressure) < 140/90      buPROPion (WELLBUTRIN SR) 150 MG 12 hr tablet Take 1 tablet (150 mg) by mouth 2 times daily  Qty: 180 tablet, Refills: 3    Associated Diagnoses: Personal history of tobacco use, presenting hazards to health      fluticasone-salmeterol (ADVAIR) 250-50 MCG/DOSE diskus inhaler Inhale 1 puff into the lungs 2 times daily  Qty: 3 Inhaler, Refills: 3     Associated Diagnoses: Panlobular emphysema (H)      albuterol (PROAIR HFA/PROVENTIL HFA/VENTOLIN HFA) 108 (90 BASE) MCG/ACT Inhaler Inhale 2 puffs into the lungs every 4 hours as needed for shortness of breath / dyspnea  Qty: 6 Inhaler, Refills: 3    Associated Diagnoses: Chronic obstructive pulmonary disease with acute exacerbation (H)      fluticasone (FLONASE) 50 MCG/ACT spray USE ONE TO TWO SPRAYS IN EACH NOSTRIL EVERY DAY  Qty: 16 g, Refills: 6    Associated Diagnoses: Chronic rhinitis      cetirizine (ZYRTEC) 10 MG tablet Take 1 tablet (10 mg) by mouth every evening  Qty: 30 tablet, Refills: 1    Associated Diagnoses: Seasonal allergic rhinitis, unspecified allergic rhinitis trigger      montelukast (SINGULAIR) 10 MG tablet Take 1 tablet (10 mg) by mouth At Bedtime  Qty: 30 tablet, Refills: 1    Associated Diagnoses: Seasonal allergic rhinitis, unspecified allergic rhinitis trigger      Respiratory Therapy Supplies (NEBULIZER/TUBING/MOUTHPIECE) KIT Use every 4-6 hours PRN  Qty: 1 kit, Refills: 11    Associated Diagnoses: Chronic obstructive pulmonary disease, unspecified COPD type (H)      nystatin (MYCOSTATIN) 760844 UNIT/ML suspension Take 5 mLs (500,000 Units) by mouth 3 times daily  Qty: 60 mL, Refills: 0    Associated Diagnoses: Candidiasis of mouth      metoprolol (TOPROL-XL) 50 MG 24 hr tablet Take 1 tablet (50 mg) by mouth daily  Qty: 90 tablet, Refills: 3    Associated Diagnoses: Atrial fibrillation with RVR (H); CHF (congestive heart failure) (H)      ipratropium - albuterol 0.5 mg/2.5 mg/3 mL (DUONEB) 0.5-2.5 (3) MG/3ML neb solution Take 1 vial (3 mLs) by nebulization every 4 hours as needed for shortness of breath / dyspnea or wheezing  Qty: 540 vial, Refills: 3    Associated Diagnoses: COPD exacerbation (H)      amiodarone (PACERONE/CODARONE) 200 MG tablet Take 0.5 tablets (100 mg) by mouth daily  Qty: 45 tablet, Refills: 3    Associated Diagnoses: Atrial fibrillation (H)      potassium chloride  SA (K-DUR/KLOR-CON M) 20 MEQ CR tablet Take 1 tablet (20 mEq) by mouth daily  Qty: 90 tablet, Refills: 1    Associated Diagnoses: CHF (congestive heart failure) (H); Peripheral edema      torsemide (DEMADEX) 20 MG tablet Take 10 mg po daily. Take an extra 10mg po daily as needed for swelling.  Qty: 180 tablet, Refills: 3    Associated Diagnoses: Chronic diastolic congestive heart failure (H)      senna-docusate (SENOKOT-S;PERICOLACE) 8.6-50 MG per tablet Take 2 tablets by mouth 2 times daily      order for DME Equipment being ordered: Nebulizer  Qty: 1 Device, Refills: 0    Associated Diagnoses: COPD (chronic obstructive pulmonary disease) (H)            Exam:   Physical Exam  /65 (BP Location: Left arm)  Pulse 77  Temp 98.1  F (36.7  C) (Oral)  Resp 18  SpO2 91%  General: Appears comfortable, NAD  Wound: Incision, clean, dry, intact   Neurologic Exam:  - AOx3.  - Follows commands.  - Speech fluent, spontaneous. No aphasia or dysarthria.  - No gaze preference. No apparent hemineglect.  - PERRL, EOMI.  - Face symmetric with sensation intact to light touch.  - Palate elevates symmetrically, uvula midline, tongue protrudes midline.  - Trapezii and sternocleidomastoid muscles 5/5 bilaterally.  - No pronator drift.  Motor: Normal bulk/tone; no tremor, rigidity, or bradykinesia.   Right:  Deltoid 5/5, tricep 5/5, bicep 5/5, wrist flexor 5/5, wrist extensor 5/5, finger intrinsic 5/5  Left:  Deltoid 5/5, tricep 5/5, bicep 5/5, wrist flexor 5/5, wrist extensor 5/5, finger intrinsic 5/5  Right: Iliopsoas 5/5, quadricep 5/5, hamstring 5/5, tibialis anterior 5/5, gastrocnemius 5/5, EHL 5/5  Left:  Iliopsoas 5/5, quadricep 5/5, hamstring 5/5, tibialis anterior 5/5, gastrocnemius 5/5, EHL 5/5    Sensation intact in bilateral L4-S1 dermatones            Discharge Instructions and Follow-Up:   Discharge diet: Regular   Discharge activity: You may advance activity as tolerated. No strenuous exercise or heay lifting greater  than 10 lbs for 4 weeks or until seen and cleared in clinic.   Discharge follow-up: Follow-up with Shanika Pelayo NP in 2 weeks    Follow up with Coumadin clinic on 6/8/17    Follow up with PCP with in one week    Wound care: Ok to shower,however no scrubbing of the wound and no soaking of the wound, meaning no bathtubs or swimming pools. Pat dry only. Leave wound open to air.  Sutures are not absorbable and need to be removed in 2 weeks. If patient still at rehab by this time, the sutures may be removed by the rehab physician if he or she considers that the wound has healed completely.     Please call if you have:  1. increased pain, redness, drainage, swelling at your incision  2. fevers > 101.5 F degrees  3. with any questions or concerns.  You may reach the Neurosurgery clinic at [phone] during regular work hours. ER at [phone].    and ask for the Neurosurgery Resident on call at [phone], during off hours or weekends.         Discharge Disposition:   Discharged to home

## 2017-06-05 NOTE — OP NOTE
DATE OF SURGERY:  06/05/2017      PREOPERATIVE DIAGNOSIS:  Left-sided recurrent trigeminal neuralgia in the V1, V2 and V3 distribution.      POSTOPERATIVE DIAGNOSIS:  Left-sided recurrent trigeminal neuralgia in the V1, V2 and V3 distribution.      PROCEDURE PERFORMED:  Stereotactic trigeminal balloon rhizotomy left side.  Use of intraoperative fluoroscopic guidance.      SURGEON:  Paulino Gonzales MD      ASSISTANTS:  Karl Scott MD      OPERATIVE INDICATIONS:  Mr. Home Valdez has a history of left-sided trigeminal neuralgia, which was treated in the past by gamma knife as well as glycerol rhizotomy and subsequently got relief for many years.  Most recently in 09/2016 he presented to the Neurosurgery Service here at the [facility] with recurrent symptoms when he was treated with the balloon compression rhizotomy that gave him good relief.  He had recurrence of his symptoms during his last week and therefore presented once again for left-sided balloon compression rhizotomy again.  He wished to proceed with this and gave informed written consent for the same after understanding the risks, benefits and alternatives to the procedure in detail.      DESCRIPTION OF PROCEDURE:  Mr. Valdez was brought to the operating room by our Anesthesia colleagues.  He underwent general endotracheal anesthesia and two C-arm fluoroscopes were set up to provide AP and lateral fluoroscopy for the procedure.  The skin over the left cheek was prepped and he was sterilely draped.  Timepout was performed.     A point 2 cm lateral to the oral fissure was marked on the left side and entry into the skin was made with a 15 blade needle and then a Jamshidi needle with obturator was placed through the cheek, taking care not to enter the oral cavity.  Under fluoroscopic guidance, the needle was passed to the foramen ovale and position confirmed on AP and lateral views, which showed the needle up to the clival line.  Then, the  stylet was removed and a balloon catheter was placed.  The balloon was attempted to be inflated but there was no good inflation noted and therefore, the balloon was removed and the cannula and stylet was reinserted and the balloon stylet was reinserted and placed more medially in the foramen ovale.  The stylet was removed and the balloon catheter once again placed inside the cannula and inflation of the balloon was performed with Isovue 300.  It was thought that the inflation noted on fluoroscopy was more distal to the Meckel's cave and therefore, after inflation at 1.5 atmospheres of pressure for 90 seconds this was deflated back and the deflated catheter moved more deeper just slightly beyond the clival line and inflation of the balloon was once again performed.  This time, there was good pear-shaped fluoroscopic visualization noted and the balloon was inflated at 1.5 atmospheres of pressure for 90 seconds.  At the end of this, the balloon was deflated and the catheter and cannula were removed and the patient sent to the recovery room.      There was no significant blood loss.  Dr. Kelsie Nguyen was present and scrubbed in for the entire procedure.         KELSIE NGUYEN MD       As dictated by RICARDO GARCIA MD            D: 2017 11:24   T: 2017 11:54   MT: DIONY      Name:     KARTIK HUERTA   MRN:      [id number]        Account:        [id number]   :      1950           Procedure Date: 2017      Document: [id number]

## 2017-06-05 NOTE — PLAN OF CARE
Problem: Individualization  Goal: Patient Preferences  Outcome: No Change  Hypertensive, home BP meds given. Needs 2-3L O2 to keep sats above 90%. Left facial pain controlled with tylenol & oxycodone. Neuros unchanged; left facial numbness. Pt has been NPO since midnight, had sips of water with meds this am. Up indep in room . Had pre-op shower. Pt is going to pre-op at this time, report given to receiving RN. Wife accompanied pt to pre-op. Belongings left in room, top drawer has glasses and cell phone.

## 2017-06-05 NOTE — ANESTHESIA CARE TRANSFER NOTE
Patient: Home Valdez    Procedure(s):  BALLOON COMPRESSION RHIZOTOMY - Wound Class: I-Clean    Diagnosis: Trigemnial Neauralgia  Diagnosis Additional Information: No value filed.    Anesthesia Type:   General, ETT     Note:  Airway :Face Mask  Patient transferred to:PACU  Comments: VSS. Breathing spontaneously at a regular rate with adequate tidal volumes and maintaining O2 sats on 6L facemask. Denies nausea or pain. However demonstrates post operative delirium.     Perez Slaughter MD  Anesthesiology resident   [facility]        Vitals: (Last set prior to Anesthesia Care Transfer)    CRNA VITALS  6/5/2017 1052 - 6/5/2017 1138      6/5/2017             Pulse: 142    Ht Rate: 79    SpO2: 100 %    Resp Rate (observed): 19    Resp Rate (set): 10                Electronically Signed By: Perez Slaughter MD  June 5, 2017  11:38 AM

## 2017-06-06 ENCOUNTER — CARE COORDINATION (OUTPATIENT)
Dept: CARE COORDINATION | Facility: CLINIC | Age: 67
End: 2017-06-06

## 2017-06-06 ENCOUNTER — TELEPHONE (OUTPATIENT)
Dept: ANTICOAGULATION | Facility: OTHER | Age: 67
End: 2017-06-06

## 2017-06-06 VITALS
DIASTOLIC BLOOD PRESSURE: 72 MMHG | SYSTOLIC BLOOD PRESSURE: 144 MMHG | TEMPERATURE: 98.7 F | HEART RATE: 77 BPM | RESPIRATION RATE: 16 BRPM | OXYGEN SATURATION: 91 %

## 2017-06-06 LAB — INTERPRETATION ECG - MUSE: NORMAL

## 2017-06-06 PROCEDURE — 94640 AIRWAY INHALATION TREATMENT: CPT

## 2017-06-06 PROCEDURE — 40000275 ZZH STATISTIC RCP TIME EA 10 MIN

## 2017-06-06 PROCEDURE — A9270 NON-COVERED ITEM OR SERVICE: HCPCS | Mod: GY | Performed by: STUDENT IN AN ORGANIZED HEALTH CARE EDUCATION/TRAINING PROGRAM

## 2017-06-06 PROCEDURE — 25000132 ZZH RX MED GY IP 250 OP 250 PS 637: Mod: GY | Performed by: STUDENT IN AN ORGANIZED HEALTH CARE EDUCATION/TRAINING PROGRAM

## 2017-06-06 PROCEDURE — 25000125 ZZHC RX 250: Performed by: STUDENT IN AN ORGANIZED HEALTH CARE EDUCATION/TRAINING PROGRAM

## 2017-06-06 RX ADMIN — FLUTICASONE FUROATE AND VILANTEROL TRIFENATATE 1 PUFF: 100; 25 POWDER RESPIRATORY (INHALATION) at 08:37

## 2017-06-06 RX ADMIN — AMIODARONE HYDROCHLORIDE 100 MG: 100 TABLET ORAL at 08:43

## 2017-06-06 RX ADMIN — IPRATROPIUM BROMIDE AND ALBUTEROL SULFATE 3 ML: .5; 3 SOLUTION RESPIRATORY (INHALATION) at 06:38

## 2017-06-06 RX ADMIN — METOPROLOL SUCCINATE 50 MG: 50 TABLET, EXTENDED RELEASE ORAL at 08:43

## 2017-06-06 RX ADMIN — SENNOSIDES AND DOCUSATE SODIUM 2 TABLET: 8.6; 5 TABLET ORAL at 08:43

## 2017-06-06 RX ADMIN — BUPROPION HYDROCHLORIDE 150 MG: 150 TABLET, FILM COATED, EXTENDED RELEASE ORAL at 08:43

## 2017-06-06 RX ADMIN — LOSARTAN POTASSIUM 100 MG: 100 TABLET, FILM COATED ORAL at 08:43

## 2017-06-06 RX ADMIN — GABAPENTIN 100 MG: 250 SOLUTION ORAL at 08:43

## 2017-06-06 RX ADMIN — POTASSIUM CHLORIDE 20 MEQ: 750 TABLET, EXTENDED RELEASE ORAL at 08:43

## 2017-06-06 RX ADMIN — NYSTATIN 500000 UNITS: 500000 SUSPENSION ORAL at 08:44

## 2017-06-06 RX ADMIN — CARBAMAZEPINE 100 MG: 100 CAPSULE, EXTENDED RELEASE ORAL at 08:43

## 2017-06-06 NOTE — PROGRESS NOTES
[facility], [place]   Neurosurgery Progress Note:    Assessment: Home Valdez is a 67 year old male with hx L trigeminal neuralgia with previous glycerol injection, GK, and balloon compression rhizotomy procedures, who is now experiencing recurrence.   Now POD #1 s/p left-sided balloon compression rhizotomy. Pain is completely gone per patient.     Plan:  - Serial neuro exams  - Pain control  - Oxygenating without issues overnight. Using home CPAP.   - Diet advanced  - Bowel regimen  - PRN antiemetics  - NPO  - PT/OT  - SCDs for DVT proph  - Dc today  - Follow up with Dr. Orozco in neurosurgery clinic in 1 month with thin-cut MRI to discuss MVD option in future     Greatly appreciate the help from PT/OT in caring for Home Valdez    Jasmyn Deluca MD  Neurosurgery, PGY2      Subjective: No acute events overnight.    Objective:   Temp:  [97.4  F (36.3  C)-99.4  F (37.4  C)] 97.8  F (36.6  C)  Pulse:  [59-77] 59  Heart Rate:  [69-84] 69  Resp:  [16-20] 18  BP: (132-177)/(65-95) 147/75  SpO2:  [79 %-98 %] 90 %  I/O last 3 completed shifts:  In: 630 [P.O.:30; I.V.:600]  Out: 40 [Urine:40]    Gen: Appears comfortable, NAD  Wound: Incision, clean, dry, intact without strikethrough  Neurologic:  - Alert & Oriented to person, place, time, and situation  - Follows commands briskly  - Speech fluent, spontaneous. No aphasia or dysarthria.  - No gaze preference. No apparent hemineglect.  - PERRL, EOMI  - Strong eye closure, jaw clench, and cheek puff  - Face symmetric with sensation intact to light touch  - Palate elevates symmetrically, uvula midline, tongue protrudes midline  - Trapezii and sternocleidomastoid muscles 5/5 bilaterally  - No pronator drift     Del Tr Bi WE WF Gr   R 5 5 5 5 5 5   L 5 5 5 5 5 5    HF KE KF DF PF EHL   R 5 5 5 5 5 5   L 5 5 5 5 5 5     Reflexes 2+ throughout    Sensation intact and symmetric to light touch throughout except numbness along entire L mckenna-face  (baseline since glycerol injection many years ago)    LABS  No new labs.

## 2017-06-06 NOTE — PROGRESS NOTES
Patient has clinic visit today 6/8 so no post DC follow up call is needed            Date: 6/8/2017 Status: Hernandez   Time: 10:00 AM Length: 30     Visit Type: LONG [659]   CLARY:     Provider: Neo Galicia MD Department:  FAMILY PRACTICE   Special Needs:   Comments:     Referral Number:   Referral Status:         CSN: [id number]   Notes: f/u/ok per GJ     Made On:   6/6/2017 12:40 PM By:   FREDERICK HUERTA [[id number]] (ES)

## 2017-06-06 NOTE — TELEPHONE ENCOUNTER
See note below.   According to patient's chart, he took 7.5 mg Coumadin on Tuesday, 5 mg Wednesday, and 5 mg Thursday. While admitted they gave him 4 mg on Friday 6/2, and he was advised to not take any coumadin again on 6/8. He was also given 10 mg Jackie K via infusion.   He has an appt to see [facility] on 6/8 for INR check. He was discharged with new medications- Roxicodone, carbatrol, and neurontin. The Carbatrol can increase INR, per [employer]rom[employer].   FRENCH Canales, FRENCH Stinson, FRENCH Holliday,     Mr José Miguel discharged this morning from [address] at the [facility]. He was admitted on 6-02 for pain control due to trigeminal neuralgia. Surgery was on 6-05.   On the DC orders the doctor instructed him to restart Coumadin on 6-08-17.   Last dose of Coumadin was 6-02-17 at 4:17am.         Thanks,   Asiya Benedict RN Care Coordinator   [address], [facility]

## 2017-06-06 NOTE — PLAN OF CARE
Problem: Goal Outcome Summary  Goal: Goal Outcome Summary  Outcome: Improving  Patient with hx trigeminal neuralgia POD 1 balloon rhizotomy procedure. Site intact. Patient denies pain. Using cpap with oxygen at 2.5 L. Slight numbness right face. /76  Pulse 77  Temp 98.5  F (36.9  C) (Oral)  Resp 16  SpO2 90%

## 2017-06-06 NOTE — PLAN OF CARE
Problem: Goal Outcome Summary  Goal: Goal Outcome Summary  Outcome: Improving  Pt s/p Rhizotomy for Trigeminal neuralgia. Alert and oriented. Numbness to L face. Denies pain. Continues to require 2L O2 to remain over 90%. AVSS. Bandaid over L cheek site. Tolerating regular diet. Voiding spontaneously, had BM this am. Up with SBA. CPAP at night. D/c home tomorrow if O2 sats improve.

## 2017-06-07 NOTE — PROGRESS NOTES
VSS; A&O X 4. Neurologically intact, denied pain before leaving, retrieved meds with wife from discharge pharmacy and wife is providing transport to home. Pt, writer, and wife of pt went through discharge summary and instructions together.

## 2017-06-08 ENCOUNTER — OFFICE VISIT (OUTPATIENT)
Dept: FAMILY MEDICINE | Facility: OTHER | Age: 67
End: 2017-06-08
Payer: MEDICARE

## 2017-06-08 ENCOUNTER — ANTICOAGULATION THERAPY VISIT (OUTPATIENT)
Dept: ANTICOAGULATION | Facility: OTHER | Age: 67
End: 2017-06-08
Payer: MEDICARE

## 2017-06-08 ENCOUNTER — TELEPHONE (OUTPATIENT)
Dept: NEUROSURGERY | Facility: CLINIC | Age: 67
End: 2017-06-08

## 2017-06-08 VITALS
BODY MASS INDEX: 35.15 KG/M2 | DIASTOLIC BLOOD PRESSURE: 62 MMHG | HEART RATE: 80 BPM | WEIGHT: 245 LBS | TEMPERATURE: 98.9 F | SYSTOLIC BLOOD PRESSURE: 134 MMHG | RESPIRATION RATE: 18 BRPM | OXYGEN SATURATION: 93 %

## 2017-06-08 DIAGNOSIS — Z79.01 LONG-TERM (CURRENT) USE OF ANTICOAGULANTS: ICD-10-CM

## 2017-06-08 DIAGNOSIS — Z79.899 ON AMIODARONE THERAPY: Primary | ICD-10-CM

## 2017-06-08 DIAGNOSIS — Z86.79 HISTORY OF ATRIAL FIBRILLATION: ICD-10-CM

## 2017-06-08 DIAGNOSIS — J44.9 CHRONIC OBSTRUCTIVE PULMONARY DISEASE, UNSPECIFIED COPD TYPE (H): Chronic | ICD-10-CM

## 2017-06-08 DIAGNOSIS — G50.0 TRIGEMINAL NEURALGIA OF LEFT SIDE OF FACE: Primary | ICD-10-CM

## 2017-06-08 DIAGNOSIS — I48.91 ATRIAL FIBRILLATION WITH RVR (H): ICD-10-CM

## 2017-06-08 LAB — INR POINT OF CARE: 1 (ref 0.86–1.14)

## 2017-06-08 PROCEDURE — 99495 TRANSJ CARE MGMT MOD F2F 14D: CPT | Performed by: FAMILY MEDICINE

## 2017-06-08 PROCEDURE — 99207 ZZC NO CHARGE NURSE ONLY: CPT

## 2017-06-08 PROCEDURE — 36416 COLLJ CAPILLARY BLOOD SPEC: CPT

## 2017-06-08 PROCEDURE — 85610 PROTHROMBIN TIME: CPT | Mod: QW

## 2017-06-08 ASSESSMENT — PAIN SCALES - GENERAL: PAINLEVEL: NO PAIN (0)

## 2017-06-08 NOTE — NURSING NOTE
"Chief Complaint   Patient presents with     Hospital F/U       Initial /62 (BP Location: Right arm, Patient Position: Chair, Cuff Size: Adult Regular)  Pulse 80  Temp 98.9  F (37.2  C) (Tympanic)  Resp 18  Wt 245 lb (111.1 kg)  SpO2 93%  BMI 35.15 kg/m2 Estimated body mass index is 35.15 kg/(m^2) as calculated from the following:    Height as of 5/30/17: 5' 10\" (1.778 m).    Weight as of this encounter: 245 lb (111.1 kg).  Medication Reconciliation: complete   Nkechi Carroll MA     6/8/2017      "

## 2017-06-08 NOTE — PROGRESS NOTES
SUBJECTIVE:                                                    Home Valdez is a 67 year old male who presents to clinic today for the following health issues:          Hospital Follow-up Visit:    Hospital/Nursing Home/IP Rehab Facility: [facility]  Date of Admission: 6/3/17  Date of Discharge: 6/6/17  Reason(s) for Admission: Trigeminal neuralgia            Problems taking medications regularly:  None       Medication changes since discharge: gabapentin        Problems adhering to non-medication therapy:  None    Summary of hospitalization:  [facility] discharge summary reviewed  Diagnostic Tests/Treatments reviewed.  Follow up needed: pcp, coumadin clinic  Other Healthcare Providers Involved in Patient s Care:         None  Update since discharge: improved.       Post Discharge Medication Reconciliation: discharge medications reconciled, continue medications without change.  Plan of care communicated with patient     Coding guidelines for this visit:  Type of Medical   Decision Making Face-to-Face Visit       within 7 Days of discharge Face-to-Face Visit        within 14 days of discharge   Moderate Complexity [id number]   High Complexity 33024 [id number]          SUBJECTIVE:  Home Valdez, a 67 year old male scheduled an appointment to discuss the following issues:     Trigeminal neuralgia of left side of face: much improved p procedure at   Long-term (current) use of anticoagulants: inr 1.0, coumadin restarted  History of atrial fibrillation: stable, no symptoms  Chronic obstructive pulmonary disease, unspecified COPD type (H): needs O2 sats asher'd, ok at 93  Past Medical History:   Diagnosis Date     AAA (abdominal aortic aneurysm) (H)      COPD (chronic obstructive pulmonary disease) (H)     moderate     Emphysema      Hyperlipidemia      Hypertension      Hypomagnesemia 1/2/2015     JAMES (obstructive sleep apnea) 9/3/2014         PTSD (post-traumatic stress  disorder)      Trigeminal neuralgia      Current Outpatient Prescriptions   Medication Sig Dispense Refill     warfarin (JANTOVEN) 2.5 MG tablet Take 7.5 mg (3 tablets) on Tuesday, Thursday, Saturday and 5 mg (2 tablets) all other days or as directed by coumadin clinic. 235 tablet 0     carBAMazepine (CARBATROL) 100 MG 12 hr capsule Take 1 capsule (100 mg) by mouth 2 times daily 60 capsule 0     gabapentin (NEURONTIN) 250 MG/5ML solution Take 2 mLs (100 mg) by mouth every 12 hours 450 mL 0     losartan (COZAAR) 50 MG tablet Take 2 tablets (100 mg) by mouth daily 180 tablet 3     buPROPion (WELLBUTRIN SR) 150 MG 12 hr tablet Take 1 tablet (150 mg) by mouth 2 times daily 180 tablet 3     fluticasone-salmeterol (ADVAIR) 250-50 MCG/DOSE diskus inhaler Inhale 1 puff into the lungs 2 times daily 3 Inhaler 3     albuterol (PROAIR HFA/PROVENTIL HFA/VENTOLIN HFA) 108 (90 BASE) MCG/ACT Inhaler Inhale 2 puffs into the lungs every 4 hours as needed for shortness of breath / dyspnea 6 Inhaler 3     fluticasone (FLONASE) 50 MCG/ACT spray USE ONE TO TWO SPRAYS IN EACH NOSTRIL EVERY DAY 16 g 6     cetirizine (ZYRTEC) 10 MG tablet Take 1 tablet (10 mg) by mouth every evening 30 tablet 1     montelukast (SINGULAIR) 10 MG tablet Take 1 tablet (10 mg) by mouth At Bedtime 30 tablet 1     Respiratory Therapy Supplies (NEBULIZER/TUBING/MOUTHPIECE) KIT Use every 4-6 hours PRN 1 kit 11     nystatin (MYCOSTATIN) 572672 UNIT/ML suspension Take 5 mLs (500,000 Units) by mouth 3 times daily 60 mL 0     metoprolol (TOPROL-XL) 50 MG 24 hr tablet Take 1 tablet (50 mg) by mouth daily 90 tablet 3     ipratropium - albuterol 0.5 mg/2.5 mg/3 mL (DUONEB) 0.5-2.5 (3) MG/3ML neb solution Take 1 vial (3 mLs) by nebulization every 4 hours as needed for shortness of breath / dyspnea or wheezing 540 vial 3     amiodarone (PACERONE/CODARONE) 200 MG tablet Take 0.5 tablets (100 mg) by mouth daily 45 tablet 3     potassium chloride SA (K-DUR/KLOR-CON M) 20 MEQ  CR tablet Take 1 tablet (20 mEq) by mouth daily 90 tablet 1     torsemide (DEMADEX) 20 MG tablet Take 10 mg po daily. Take an extra 10mg po daily as needed for swelling. 180 tablet 3     senna-docusate (SENOKOT-S;PERICOLACE) 8.6-50 MG per tablet Take 2 tablets by mouth 2 times daily       order for DME Equipment being ordered: Nebulizer 1 Device 0     oxyCODONE (ROXICODONE) 10 MG IR tablet Take 1-2 tablets (10-20 mg) by mouth every 3 hours as needed for moderate to severe pain (Patient not taking: Reported on 6/8/2017) 30 tablet 0       REVIEW OF SYSTEMS:         Respiratory: stable copd    Cardiovascular: negative    Gastrointestinal: negative      Neurologic: see hpi         EXAM:  /62 (BP Location: Right arm, Patient Position: Chair, Cuff Size: Adult Regular)  Pulse 80  Temp 98.9  F (37.2  C) (Tympanic)  Resp 18  Wt 245 lb (111.1 kg)  SpO2 93%  BMI 35.15 kg/m2    LUNGS:  A few scattered wheezes  HEART:  Regular rhythm, no murmurs.    NEURO no facial nerve abnormalities    SKIN:  A small amount of bruising near the procedure site      IMPRESSION/PLAN:  Encounter Diagnoses   Name Primary?     Trigeminal neuralgia of left side of face: symptoms resolved w/o problems      Long-term (current) use of anticoagulants [Z79.01]: will f/u with coumadin clinic      History of atrial fibrillation: stable NSR      Chronic obstructive pulmonary disease, unspecified COPD type (H): o2 sats satisfactory on RA        Neo Galicia

## 2017-06-08 NOTE — PROGRESS NOTES
ANTICOAGULATION FOLLOW-UP CLINIC VISIT    Patient Name:  Home Valdez  Date:  6/8/2017  Contact Type:  Face to Face    SUBJECTIVE:     Patient Findings     Positives Change in medications (roxicodone, neurontin, and carbatrol - carbatrol can increase INR ), Intentional hold of therapy (pt had procedure on 6/5 so they held coumadin while he was admitted. )           OBJECTIVE    INR Protime   Date Value Ref Range Status   06/08/2017 1.0 0.86 - 1.14 Final       ASSESSMENT / PLAN  INR assessment SUB    Recheck INR In: 1 WEEK    INR Location Clinic      Anticoagulation Summary as of 6/8/2017     INR goal 2.0-3.0   Today's INR 1.0!   Maintenance plan 7.5 mg (2.5 mg x 3) on Tue, Thu, Sat; 5 mg (2.5 mg x 2) all other days   Full instructions 7.5 mg on Tue, Thu, Sat; 5 mg all other days   Weekly total 42.5 mg   No change documented Brittany Martin RN   Plan last modified Brittany Martin RN (1/13/2017)   Next INR check 6/15/2017   Target end date     Indications   Atrial fibrillation with RVR (H) [I48.91]  Long-term (current) use of anticoagulants [Z79.01] [Z79.01]         Anticoagulation Episode Summary     INR check location     Preferred lab     Send INR reminders to [facility]    Comments 2.5 MG TABLETS, likes print out       Anticoagulation Care Providers     Provider Role Specialty Phone number    eNo Galicia MD [facility] [phone]            See the Encounter Report to view Anticoagulation Flowsheet and Dosing Calendar (Go to Encounters tab in chart review, and find the Anticoagulation Therapy Visit)    Dosage adjustment made based on physician directed care plan.    roxicodone, neurontin, and carbatrol - carbatrol can increase INR; these three meds will only be taken for 2 weeks      Brittany Martin, FRENCH

## 2017-06-08 NOTE — TELEPHONE ENCOUNTER
Left message for patient and encouraged him to call back to discuss how he is doing after surgery with Dr. Gonzales as well as post-op follow-up. Direct line was given to patient for return call.     Nohemy Cronin RN

## 2017-06-08 NOTE — MR AVS SNAPSHOT
Home Valdez   6/8/2017 9:30 AM   Anticoagulation Therapy Visit    Description:  67 year old male   Provider:  [facility]   Department:  [facility]           INR as of 6/8/2017     Today's INR 1.0!      Anticoagulation Summary as of 6/8/2017     INR goal 2.0-3.0   Today's INR 1.0!   Full instructions 7.5 mg on Tue, Thu, Sat; 5 mg all other days   Next INR check 6/15/2017    Indications   Atrial fibrillation with RVR (H) [I48.91]  Long-term (current) use of anticoagulants [Z79.01] [Z79.01]         Your next Anticoagulation Clinic appointment(s)     Claudy 15, 2017  9:30 AM CDT   [facility]    [address] [facility] [address]   [phone]            Jun 22, 2017  8:45 AM CDT   Anticoagulation Visit with  ANTI COAG   [facility]    [address] [facility] [address]   [phone]              Contact Numbers     Clinic Number:         June 2017 Details    Sun Mon Tue Wed Thu Fri Sat         1               2               3                 4               5               6               7               8      7.5 mg   See details      9      5 mg         10      7.5 mg           11      5 mg         12      5 mg         13      7.5 mg         14      5 mg         15            16               17                 18               19               20               21               22               23               24                 25               26               27               28               29               30                 Date Details   06/08 This INR check       Date of next INR:  6/15/2017         How to take your warfarin dose     To take:  5 mg Take 2 of the 2.5 mg tablets.    To take:  7.5 mg Take 3 of the 2.5 mg tablets.

## 2017-06-08 NOTE — MR AVS SNAPSHOT
After Visit Summary   6/8/2017    Home Valdez    MRN: [id number]           Patient Information     Date Of Birth          1950        Visit Information        Provider Department      6/8/2017 10:00 AM Neo Galicia MD [facility]        Today's Diagnoses     Trigeminal neuralgia of left side of face    -  1    Long-term (current) use of anticoagulants [Z79.01]        History of atrial fibrillation        Chronic obstructive pulmonary disease, unspecified COPD type (H)           Follow-ups after your visit        Your next 10 appointments already scheduled     Claudy 15, 2017  9:30 AM CDT   Anticoagulation Visit with  ANTI COAG   [facility]    [address] [facility] [address]   [phone]            Jun 20, 2017  1:30 PM CDT   (Arrive by 1:15 PM)   Return Visit with Dorothea Schwartz PA-C   [facility] ([facility])    [address] [place] [id number] [phone]            Jun 22, 2017  8:45 AM CDT   Anticoagulation Visit with  ANTI COAG   [facility]    [address] [facility] [address]   [phone]              Who to contact     If you have questions or need follow up information about today's clinic visit or your schedule please contact [facility] directly at [phone].  Normal or non-critical lab and imaging results will be communicated to you by MyChart, letter or phone within 4 business days after the clinic has received the results. If you do not hear from us within 7 days, please contact the clinic through MyChart or phone. If you have a critical or abnormal lab result, we will notify you by phone as soon as possible.  Submit refill requests through [employer] or call your pharmacy and they will forward the refill request to us. Please allow 3 business days for your refill to be completed.           "Additional Information About Your Visit        MyChart Information     [employer] lets you send messages to your doctor, view your test results, renew your prescriptions, schedule appointments and more. To sign up, go to [email or web address] . Click on \"Log in\" on the left side of the screen, which will take you to the Welcome page. Then click on \"Sign up Now\" on the right side of the page.     You will be asked to enter the access code listed below, as well as some personal information. Please follow the directions to create your username and password.     Your access code is: [email or web address]  Expires: 2017  8:16 AM     Your access code will  in 90 days. If you need help or a new code, please call your [place] clinic or [phone].        Care EveryWhere ID     This is your [facility] EveryWhere ID. This could be used by other organizations to access your [place] medical records  [id number]        Your Vitals Were     Pulse Temperature Respirations Pulse Oximetry BMI (Body Mass Index)       80 98.9  F (37.2  C) (Tympanic) 18 93% 35.15 kg/m2        Blood Pressure from Last 3 Encounters:   17 134/62   17 144/72   17 (!) 174/92    Weight from Last 3 Encounters:   17 245 lb (111.1 kg)   17 248 lb 14.4 oz (112.9 kg)   17 249 lb (112.9 kg)              Today, you had the following     No orders found for display       Primary Care Provider Office Phone # Fax #    Sandip Home DO Gary [phone]       [address]   [facility] [address]        Thank you!     Thank you for choosing [facility]  for your care. Our goal is always to provide you with excellent care. Hearing back from our patients is one way we can continue to improve our services. Please take a few minutes to complete the written survey that you may receive in the mail after your visit with us. Thank you!             Your Updated Medication List - Protect " others around you: Learn how to safely use, store and throw away your medicines at www.disposemymeds.org.          This list is accurate as of: 6/8/17 11:31 AM.  Always use your most recent med list.                   Brand Name Dispense Instructions for use    albuterol 108 (90 BASE) MCG/ACT Inhaler    PROAIR HFA/PROVENTIL HFA/VENTOLIN HFA    6 Inhaler    Inhale 2 puffs into the lungs every 4 hours as needed for shortness of breath / dyspnea       amiodarone 200 MG tablet    PACERONE/CODARONE    45 tablet    Take 0.5 tablets (100 mg) by mouth daily       buPROPion 150 MG 12 hr tablet    WELLBUTRIN SR    180 tablet    Take 1 tablet (150 mg) by mouth 2 times daily       carBAMazepine 100 MG 12 hr capsule    CARBATROL    60 capsule    Take 1 capsule (100 mg) by mouth 2 times daily       cetirizine 10 MG tablet    zyrTEC    30 tablet    Take 1 tablet (10 mg) by mouth every evening       fluticasone 50 MCG/ACT spray    FLONASE    16 g    USE ONE TO TWO SPRAYS IN EACH NOSTRIL EVERY DAY       fluticasone-salmeterol 250-50 MCG/DOSE diskus inhaler    ADVAIR    3 Inhaler    Inhale 1 puff into the lungs 2 times daily       gabapentin 250 MG/5ML solution    NEURONTIN    450 mL    Take 2 mLs (100 mg) by mouth every 12 hours       ipratropium - albuterol 0.5 mg/2.5 mg/3 mL 0.5-2.5 (3) MG/3ML neb solution    DUONEB    540 vial    Take 1 vial (3 mLs) by nebulization every 4 hours as needed for shortness of breath / dyspnea or wheezing       losartan 50 MG tablet    COZAAR    180 tablet    Take 2 tablets (100 mg) by mouth daily       metoprolol 50 MG 24 hr tablet    TOPROL-XL    90 tablet    Take 1 tablet (50 mg) by mouth daily       montelukast 10 MG tablet    SINGULAIR    30 tablet    Take 1 tablet (10 mg) by mouth At Bedtime       nebulizer/tubing/mouthpiece Kit     1 kit    Use every 4-6 hours PRN       nystatin 529369 UNIT/ML suspension    MYCOSTATIN    60 mL    Take 5 mLs (500,000 Units) by mouth 3 times daily       order for  DME     1 Device    Equipment being ordered: Nebulizer       oxyCODONE 10 MG IR tablet    ROXICODONE    30 tablet    Take 1-2 tablets (10-20 mg) by mouth every 3 hours as needed for moderate to severe pain       potassium chloride SA 20 MEQ CR tablet    K-DUR/KLOR-CON M    90 tablet    Take 1 tablet (20 mEq) by mouth daily       senna-docusate 8.6-50 MG per tablet    SENOKOT-S;PERICOLACE     Take 2 tablets by mouth 2 times daily       torsemide 20 MG tablet    DEMADEX    180 tablet    Take 10 mg po daily. Take an extra 10mg po daily as needed for swelling.       warfarin 2.5 MG tablet    JANTOVEN    235 tablet    Take 7.5 mg (3 tablets) on Tuesday, Thursday, Saturday and 5 mg (2 tablets) all other days or as directed by coumadin clinic.

## 2017-06-09 ENCOUNTER — TELEPHONE (OUTPATIENT)
Dept: NEUROSURGERY | Facility: CLINIC | Age: 67
End: 2017-06-09

## 2017-06-09 NOTE — TELEPHONE ENCOUNTER
Neurosurgery Discharge Follow-Up      Responsible Attending Physician: Dr. Gonzales  Date of Discharge:    Discharge to:  Home    Current Status:  Pt is a 66 y/o male s/p Stereotactic trigeminal balloon rhizotomy left side.  Use of intraoperative fluoroscopic guidance on 6/5/17.  Reports pre-op symptoms improved.   Operative  pain is decreasing.  Ambulating without assistance.  Pain well controlled with current meds, ample supply.  Denies redness, swelling, increased tenderness, or elevated temp.  Denies current bowel or bladder issues.  No visible sutures/staples in place.      Discharge instructions and medication use were reviewed.  RN triage/on call number given:  [phone] or after hours and w/e - [phone].  Patient verbalized understanding and agreement with current plan.    Follow up appointments/imaging/tests needed: 2 week post-op with Shanika Pelayo DNP is scheduled for 6/23 at 10:00am.     Nohemy Cronin RN

## 2017-06-15 ENCOUNTER — ANTICOAGULATION THERAPY VISIT (OUTPATIENT)
Dept: ANTICOAGULATION | Facility: OTHER | Age: 67
End: 2017-06-15
Payer: MEDICARE

## 2017-06-15 DIAGNOSIS — I48.91 ATRIAL FIBRILLATION WITH RVR (H): ICD-10-CM

## 2017-06-15 DIAGNOSIS — Z79.01 LONG-TERM (CURRENT) USE OF ANTICOAGULANTS: ICD-10-CM

## 2017-06-15 LAB — INR POINT OF CARE: 1.6 (ref 0.86–1.14)

## 2017-06-15 PROCEDURE — 85610 PROTHROMBIN TIME: CPT | Mod: QW

## 2017-06-15 PROCEDURE — 36416 COLLJ CAPILLARY BLOOD SPEC: CPT

## 2017-06-15 PROCEDURE — 99207 ZZC NO CHARGE NURSE ONLY: CPT

## 2017-06-15 NOTE — PROGRESS NOTES
ANTICOAGULATION FOLLOW-UP CLINIC VISIT    Patient Name:  Home Valdez  Date:  6/15/2017  Contact Type:  Face to Face    SUBJECTIVE:     Patient Findings     Positives Intentional hold of therapy, Missed doses    Comments Pt had been taken off his coumadin due to hospitalization but has been back on for 1 week. We will increase him slightly and recheck in 1 week. Patient verbalized understanding and agrees with plan of care. Pt had no further questions or concerns at this time. Shameka aCrey RN, BSN             OBJECTIVE    INR Protime   Date Value Ref Range Status   06/15/2017 1.6 (A) 0.86 - 1.14 Final       ASSESSMENT / PLAN  INR assessment SUB    Recheck INR In: 1 WEEK    INR Location Clinic      Anticoagulation Summary as of 6/15/2017     INR goal 2.0-3.0   Today's INR 1.6!   Maintenance plan 5 mg (2.5 mg x 2) on Mon, Wed, Fri; 7.5 mg (2.5 mg x 3) all other days   Full instructions 6/15: 10 mg; Otherwise 5 mg on Mon, Wed, Fri; 7.5 mg all other days   Weekly total 45 mg   Plan last modified Shameka Carey RN (6/15/2017)   Next INR check 6/22/2017   Target end date     Indications   Atrial fibrillation with RVR (H) [I48.91]  Long-term (current) use of anticoagulants [Z79.01] [Z79.01]         Anticoagulation Episode Summary     INR check location     Preferred lab     Send INR reminders to [facility]    Comments 2.5 MG TABLETS, likes print out       Anticoagulation Care Providers     Provider Role Specialty Phone number    YeyomarilynNeo MD [facility] [phone]            See the Encounter Report to view Anticoagulation Flowsheet and Dosing Calendar (Go to Encounters tab in chart review, and find the Anticoagulation Therapy Visit)    Dosage adjustment made based on physician directed care plan.    Shameka Carey RN

## 2017-06-15 NOTE — MR AVS SNAPSHOT
Home Valdez   6/15/2017 9:30 AM   Anticoagulation Therapy Visit    Description:  67 year old male   Provider:  [facility]   Department:  [facility]           INR as of 6/15/2017     Today's INR 1.6!      Anticoagulation Summary as of 6/15/2017     INR goal 2.0-3.0   Today's INR 1.6!   Full instructions 6/15: 10 mg; Otherwise 5 mg on Mon, Wed, Fri; 7.5 mg all other days   Next INR check 6/22/2017    Indications   Atrial fibrillation with RVR (H) [I48.91]  Long-term (current) use of anticoagulants [Z79.01] [Z79.01]         Your next Anticoagulation Clinic appointment(s)     Jun 22, 2017  8:45 AM CDT   [facility] with [facility]   [facility]    [address] [facility] [address]   [phone]              Contact Numbers     Clinic Number:         June 2017 Details    Sun Mon Tue Wed Thu Fri Sat         1               2               3                 4               5               6               7               8               9               10                 11               12               13               14               15      10 mg   See details      16      5 mg         17      7.5 mg           18      7.5 mg         19      5 mg         20      7.5 mg         21      5 mg         22            23               24                 25               26               27               28               29               30                 Date Details   06/15 This INR check       Date of next INR:  6/22/2017         How to take your warfarin dose     To take:  5 mg Take 2 of the 2.5 mg tablets.    To take:  7.5 mg Take 3 of the 2.5 mg tablets.    To take:  10 mg Take 4 of the 2.5 mg tablets.

## 2017-06-22 ENCOUNTER — ANTICOAGULATION THERAPY VISIT (OUTPATIENT)
Dept: ANTICOAGULATION | Facility: OTHER | Age: 67
End: 2017-06-22
Payer: MEDICARE

## 2017-06-22 DIAGNOSIS — I48.91 ATRIAL FIBRILLATION WITH RVR (H): ICD-10-CM

## 2017-06-22 DIAGNOSIS — Z79.01 LONG-TERM (CURRENT) USE OF ANTICOAGULANTS: ICD-10-CM

## 2017-06-22 LAB — INR POINT OF CARE: 1.7 (ref 0.86–1.14)

## 2017-06-22 PROCEDURE — 99207 ZZC NO CHARGE NURSE ONLY: CPT

## 2017-06-22 PROCEDURE — 36416 COLLJ CAPILLARY BLOOD SPEC: CPT

## 2017-06-22 PROCEDURE — 85610 PROTHROMBIN TIME: CPT | Mod: QW

## 2017-06-22 NOTE — PROGRESS NOTES
ANTICOAGULATION FOLLOW-UP CLINIC VISIT    Patient Name:  Home Valdez  Date:  6/22/2017  Contact Type:  Face to Face    SUBJECTIVE:     Patient Findings     Positives Unexplained INR or factor level change           OBJECTIVE    INR Protime   Date Value Ref Range Status   06/22/2017 1.7 (A) 0.86 - 1.14 Final       ASSESSMENT / PLAN  INR assessment SUB    Recheck INR In: 8 DAYS    INR Location Clinic      Anticoagulation Summary as of 6/22/2017     INR goal 2.0-3.0   Today's INR 1.7!   Maintenance plan 5 mg (2.5 mg x 2) on Mon, Fri; 7.5 mg (2.5 mg x 3) all other days   Full instructions 6/22: 10 mg; Otherwise 5 mg on Mon, Fri; 7.5 mg all other days   Weekly total 47.5 mg   Plan last modified Shameka Carey RN (6/22/2017)   Next INR check 6/30/2017   Target end date     Indications   Atrial fibrillation with RVR (H) [I48.91]  Long-term (current) use of anticoagulants [Z79.01] [Z79.01]         Anticoagulation Episode Summary     INR check location     Preferred lab     Send INR reminders to [facility] POOL    Comments 2.5 MG TABLETS, likes print out       Anticoagulation Care Providers     Provider Role Specialty Phone number    Neo Galicia MD [facility] [phone]            See the Encounter Report to view Anticoagulation Flowsheet and Dosing Calendar (Go to Encounters tab in chart review, and find the Anticoagulation Therapy Visit)    Dosage adjustment made based on physician directed care plan.    Shameka Carey RN

## 2017-06-22 NOTE — MR AVS SNAPSHOT
Home Valdez   6/22/2017 8:45 AM   Anticoagulation Therapy Visit    Description:  67 year old male   Provider:  [facility]   Department:  [facility]           INR as of 6/22/2017     Today's INR 1.7!      Anticoagulation Summary as of 6/22/2017     INR goal 2.0-3.0   Today's INR 1.7!   Full instructions 6/22: 10 mg; Otherwise 5 mg on Mon, Fri; 7.5 mg all other days   Next INR check 6/30/2017    Indications   Atrial fibrillation with RVR (H) [I48.91]  Long-term (current) use of anticoagulants [Z79.01] [Z79.01]         Your next Anticoagulation Clinic appointment(s)     Jun 22, 2017  8:45 AM CDT   [facility]  [facility]   [facility]    [address] [facility] [address]   [phone]            Jun 30, 2017  8:30 AM CDT   Anticoagulation Visit with  ANTI COAG   [facility] ([facility]    [address] [facility] [address]   [phone]              Contact Numbers     Clinic Number:         June 2017 Details    Sun Mon Tue Wed Thu Fri Sat         1               2               3                 4               5               6               7               8               9               10                 11               12               13               14               15               16               17                 18               19               20               21               22      10 mg   See details      23      5 mg         24      7.5 mg           25      7.5 mg         26      5 mg         27      7.5 mg         28      7.5 mg         29      7.5 mg         30              Date Details   06/22 This INR check       Date of next INR:  6/30/2017         How to take your warfarin dose     To take:  5 mg Take 2 of the 2.5 mg tablets.    To take:  7.5 mg Take 3 of the 2.5 mg tablets.    To take:  10 mg Take 4 of the 2.5 mg tablets.

## 2017-06-23 ENCOUNTER — OFFICE VISIT (OUTPATIENT)
Dept: NEUROSURGERY | Facility: CLINIC | Age: 67
End: 2017-06-23

## 2017-06-23 VITALS
TEMPERATURE: 98.1 F | SYSTOLIC BLOOD PRESSURE: 149 MMHG | HEART RATE: 63 BPM | DIASTOLIC BLOOD PRESSURE: 78 MMHG | HEIGHT: 70 IN

## 2017-06-23 DIAGNOSIS — Z09 SURGICAL FOLLOWUP: ICD-10-CM

## 2017-06-23 DIAGNOSIS — G50.0 TRIGEMINAL NEURALGIA: Primary | ICD-10-CM

## 2017-06-23 ASSESSMENT — PAIN SCALES - GENERAL: PAINLEVEL: NO PAIN (0)

## 2017-06-23 NOTE — NURSING NOTE
.  Chief Complaint   Patient presents with     RECHECK     [facility] RETURN - POST OP     Ros Dunne MA

## 2017-06-23 NOTE — LETTER
6/23/2017       RE: Home Valdez  [address]  [facility] [address]     Dear Colleague,    Thank you for referring your patient, Home Valdez, to the [facility] NEUROSURGERY at [facility]. Please see a copy of my visit note below.    REASON FOR VISIT:    Chief Complaint   Patient presents with     Surgical Followup     S/p balloon compression of left trigeminal nerve by Dr. WATSON Gonzales on 6/5/2017       HISTORY OF PRESENT ILLNESS:  Mr. Valdez is a 67 year old male with significant history of intermittent left sided trigeminal neuralgia (TN) involving all 3 division since 9/2016. In early 6/2017, he went to a local hospital for management of severe lancinating pain attacks and was subsequently transferred to [facility] for surgical intervention by our service. Of note, he had previously undergone a balloon compression by my colleague Dr. Jamie Orozco in 10/2016 with good relief and henceforth the same procedure was repeated. This was done by Dr. Paulino Gonzales 18 days ago.    Today he happily tells that he has been free of pain except for increased numbness in the left half face of his face from his baseline. He said he has had the numbness for years and it does not affect his quality of life compared to the TN. He is ready to be weaned off Carbatrol and Neurontin, which was prescribed to him upon discharge. He has otherwise no concerns.      INTERVAL HEALTH HISTORY:  Past Medical History:   Diagnosis Date     AAA (abdominal aortic aneurysm) (H)      COPD (chronic obstructive pulmonary disease) (H)     moderate     Emphysema      Hyperlipidemia      Hypertension      Hypomagnesemia 1/2/2015     JAMES (obstructive sleep apnea) 9/3/2014         PTSD (post-traumatic stress disorder)      Trigeminal neuralgia        CURRENT MEDICATIONS:  Current Outpatient Prescriptions   Medication Sig Dispense Refill     oxyCODONE (ROXICODONE) 10 MG IR tablet Take 1-2 tablets (10-20 mg) by  mouth every 3 hours as needed for moderate to severe pain 30 tablet 0     warfarin (JANTOVEN) 2.5 MG tablet Take 7.5 mg (3 tablets) on Tuesday, Thursday, Saturday and 5 mg (2 tablets) all other days or as directed by coumadin clinic. 235 tablet 0     carBAMazepine (CARBATROL) 100 MG 12 hr capsule Take 1 capsule (100 mg) by mouth 2 times daily 60 capsule 0     gabapentin (NEURONTIN) 250 MG/5ML solution Take 2 mLs (100 mg) by mouth every 12 hours 450 mL 0     losartan (COZAAR) 50 MG tablet Take 2 tablets (100 mg) by mouth daily 180 tablet 3     buPROPion (WELLBUTRIN SR) 150 MG 12 hr tablet Take 1 tablet (150 mg) by mouth 2 times daily 180 tablet 3     fluticasone-salmeterol (ADVAIR) 250-50 MCG/DOSE diskus inhaler Inhale 1 puff into the lungs 2 times daily 3 Inhaler 3     albuterol (PROAIR HFA/PROVENTIL HFA/VENTOLIN HFA) 108 (90 BASE) MCG/ACT Inhaler Inhale 2 puffs into the lungs every 4 hours as needed for shortness of breath / dyspnea 6 Inhaler 3     fluticasone (FLONASE) 50 MCG/ACT spray USE ONE TO TWO SPRAYS IN EACH NOSTRIL EVERY DAY 16 g 6     cetirizine (ZYRTEC) 10 MG tablet Take 1 tablet (10 mg) by mouth every evening 30 tablet 1     montelukast (SINGULAIR) 10 MG tablet Take 1 tablet (10 mg) by mouth At Bedtime 30 tablet 1     Respiratory Therapy Supplies (NEBULIZER/TUBING/MOUTHPIECE) KIT Use every 4-6 hours PRN 1 kit 11     nystatin (MYCOSTATIN) 439145 UNIT/ML suspension Take 5 mLs (500,000 Units) by mouth 3 times daily 60 mL 0     metoprolol (TOPROL-XL) 50 MG 24 hr tablet Take 1 tablet (50 mg) by mouth daily 90 tablet 3     ipratropium - albuterol 0.5 mg/2.5 mg/3 mL (DUONEB) 0.5-2.5 (3) MG/3ML neb solution Take 1 vial (3 mLs) by nebulization every 4 hours as needed for shortness of breath / dyspnea or wheezing 540 vial 3     amiodarone (PACERONE/CODARONE) 200 MG tablet Take 0.5 tablets (100 mg) by mouth daily 45 tablet 3     potassium chloride SA (K-DUR/KLOR-CON M) 20 MEQ CR tablet Take 1 tablet (20 mEq) by  "mouth daily 90 tablet 1     torsemide (DEMADEX) 20 MG tablet Take 10 mg po daily. Take an extra 10mg po daily as needed for swelling. 180 tablet 3     senna-docusate (SENOKOT-S;PERICOLACE) 8.6-50 MG per tablet Take 2 tablets by mouth 2 times daily       order for DME Equipment being ordered: Nebulizer 1 Device 0       ALLERGIES:  Contrast dye    PHYSICAL EXAMINATION:  Filed Vitals:  /78 (BP Location: Right arm, Patient Position: Sitting, Cuff Size: Adult Large)  Pulse 63  Temp 98.1  F (36.7  C) (Oral)  Ht 1.778 m (5' 10\")     Patient Supplied Answers To the  Pain Questionnaire  [facility] Pain -  Patient Entered Questionnaire/Answers 6/23/2017   What number best describes your pain right now:  0 = No pain  to  10 = Worst pain imaginable 0   How would you describe the pain? -   What number best describes your average pain for the past week:  0 = No pain  to  10 = Worst pain imaginable -   What number best describes your LOWEST pain in past 24 hours:  0 = No pain  to  10 = Worst pain imaginable -   What number best describes your WORST pain in past 24 hours:  0 = No pain  to  10 = Worst pain imaginable -   What non-medicine treatments have you already had for your pain? -   Have you tried treating your pain with medication?  -   Are you currently taking medications for your pain? -     Appearance: Comfortable and relaxed  LOC and Cognition:  Normal:   Alert and oriented to time, person and place for age, Appropriate and fluent speech for age, Follows commands appropriately for age and Affect pleasant, behavior cooperative  Focus Cranial Nerves:   Bilateral corneal reflex is intact. [place] and pterygoid muscle strength are normal, He has hypethesia in all three division of trigeminal nerve except for the cornea. Facial  movement is symmetric.       ASSESSMENT: Left sided V1, V2, and V3 idiopathic trigeminal neuralgia in remission after recent balloon compression.      RECOMMENDATION:  1. Begin tapering Neurontin " first and then Carbatol over the next 3 weeks. A written scheduled was given to follow upon discharge. Patient was instructed to call our office if there is a concern with the medication or if he develops recurrent TN.  2. Followup in neurosurgery about 1-2 week after he finishes all medications.    Shanika Pelayo DNP, APRN, FNP-BC  [facility]  Department of Neurosurgery  Phone: [phone]  Fax: [phone]

## 2017-06-23 NOTE — PATIENT INSTRUCTIONS
A. Please taper off the medication base on the following schedule:  1st week  Neurontin 200 mg or 2 cc  At night   Carbatrol  No change     2nd week  Carbatrol 100 mg Twice per      3rd week  Carbatrol 100 mg Daily     4th week: Done  Feel free to call our [phone] if you have any concerns or questions  B. Followup in neurosurgery in 4-5 weeks.

## 2017-06-23 NOTE — MR AVS SNAPSHOT
After Visit Summary   6/23/2017    Home Valdez    MRN: [id number]           Patient Information     Date Of Birth          1950        Visit Information        Provider Department      6/23/2017 10:00 AM Shanika Pelayo APRN CNP [facility] Neurosurgery        Care Instructions    Home Valdez Medication tapering schedule:    Please taper off the medication base on the following schedule:  First week  Neurontin 200 mg or 2 cc  At night   Carbatrol  No change     Second week  Carbatrol 100 mg Twice per      Third week  Carbatrol 100 mg Daily     Four week: Done  Feel free to call our [phone] if you have any concerns or questions            Follow-ups after your visit        Follow-up notes from your care team     Return in about 5 weeks (around 7/28/2017).      Your next 10 appointments already scheduled     Jun 30, 2017  8:30 AM CDT   Anticoagulation Visit with  ANTI COAG   [facility] ([facility])    [address] [facility] [address]   [phone]            Jul 26, 2017  8:30 AM CDT   (Arrive by 8:15 AM)   Return Visit with REJI Yi CNP [facility] Neurosurgery ([facility])    [address] [address] [phone]              Who to contact     Please call your clinic at [phone] to:    Ask questions about your health    Make or cancel appointments    Discuss your medicines    Learn about your test results    Speak to your doctor   If you have compliments or concerns about an experience at your clinic, or if you wish to file a complaint, please contact [facility] Patient Relations at [phone] or email us at [email or web address]         Additional Information About Your Visit        MyChart Information     [employer] is an electronic gateway that provides easy, online access to your medical records. With [employer], you  "can request a clinic appointment, read your test results, renew a prescription or communicate with your care team.     To sign up for [employer] visit the website at [email or web address]   You will be asked to enter the access code listed below, as well as some personal information. Please follow the directions to create your username and password.     Your access code is: [email or web address]  Expires: 2017 10:47 AM     Your access code will  in 90 days. If you need help or a new code, please contact your [facility] or call [phone] for assistance.        Care EveryWhere ID     This is your Care EveryWhere ID. This could be used by other organizations to access your [place] medical records  [id number]        Your Vitals Were     Pulse Temperature Height             63 98.1  F (36.7  C) (Oral) 1.778 m (5' 10\")          Blood Pressure from Last 3 Encounters:   17 149/78   17 134/62   17 144/72    Weight from Last 3 Encounters:   17 111.1 kg (245 lb)   17 112.9 kg (248 lb 14.4 oz)   17 112.9 kg (249 lb)              Today, you had the following     No orders found for display       Primary Care Provider Office Phone # Fax #    Sandip Home Vega [phone]       [address]   [facility] [address]        Equal Access to Services     MILKA ORTIZ AH: Hadii aad ku hadasho Soomaali, waaxda luqadaha, qaybta kaalmada adeegyada, waxay paul john . So [facility] [phone].    ATENCIÓN: Si habla español, tiene a calabrese disposición servicios gratuitos de asistencia lingüística. Llame al [phone].    We comply with applicable federal civil rights laws and [place] laws. We do not discriminate on the basis of race, color, national origin, age, disability sex, sexual orientation or gender identity.            Thank you!     Thank you for choosing [facility]  for your care. " Our goal is always to provide you with excellent care. Hearing back from our patients is one way we can continue to improve our services. Please take a few minutes to complete the written survey that you may receive in the mail after your visit with us. Thank you!             Your Updated Medication List - Protect others around you: Learn how to safely use, store and throw away your medicines at www.disposemymeds.org.          This list is accurate as of: 6/23/17 10:54 AM.  Always use your most recent med list.                   Brand Name Dispense Instructions for use Diagnosis    albuterol 108 (90 BASE) MCG/ACT Inhaler    PROAIR HFA/PROVENTIL HFA/VENTOLIN HFA    6 Inhaler    Inhale 2 puffs into the lungs every 4 hours as needed for shortness of breath / dyspnea    Chronic obstructive pulmonary disease with acute exacerbation (H)       amiodarone 200 MG tablet    PACERONE/CODARONE    45 tablet    Take 0.5 tablets (100 mg) by mouth daily    Atrial fibrillation (H)       buPROPion 150 MG 12 hr tablet    WELLBUTRIN SR    180 tablet    Take 1 tablet (150 mg) by mouth 2 times daily    Personal history of tobacco use, presenting hazards to health       carBAMazepine 100 MG 12 hr capsule    CARBATROL    60 capsule    Take 1 capsule (100 mg) by mouth 2 times daily    Trigeminal neuralgia       cetirizine 10 MG tablet    zyrTEC    30 tablet    Take 1 tablet (10 mg) by mouth every evening    Seasonal allergic rhinitis, unspecified allergic rhinitis trigger       fluticasone 50 MCG/ACT spray    FLONASE    16 g    USE ONE TO TWO SPRAYS IN EACH NOSTRIL EVERY DAY    Chronic rhinitis       fluticasone-salmeterol 250-50 MCG/DOSE diskus inhaler    ADVAIR    3 Inhaler    Inhale 1 puff into the lungs 2 times daily    Panlobular emphysema (H)       gabapentin 250 MG/5ML solution    NEURONTIN    450 mL    Take 2 mLs (100 mg) by mouth every 12 hours    Trigeminal neuralgia       ipratropium - albuterol 0.5 mg/2.5 mg/3 mL 0.5-2.5 (3)  MG/3ML neb solution    DUONEB    540 vial    Take 1 vial (3 mLs) by nebulization every 4 hours as needed for shortness of breath / dyspnea or wheezing    COPD exacerbation (H)       losartan 50 MG tablet    COZAAR    180 tablet    Take 2 tablets (100 mg) by mouth daily    Atrial fibrillation with RVR (H), Hypertension goal BP (blood pressure) < 140/90       metoprolol 50 MG 24 hr tablet    TOPROL-XL    90 tablet    Take 1 tablet (50 mg) by mouth daily    Atrial fibrillation with RVR (H), CHF (congestive heart failure) (H)       montelukast 10 MG tablet    SINGULAIR    30 tablet    Take 1 tablet (10 mg) by mouth At Bedtime    Seasonal allergic rhinitis, unspecified allergic rhinitis trigger       nebulizer/tubing/mouthpiece Kit     1 kit    Use every 4-6 hours PRN    Chronic obstructive pulmonary disease, unspecified COPD type (H)       nystatin 593973 UNIT/ML suspension    MYCOSTATIN    60 mL    Take 5 mLs (500,000 Units) by mouth 3 times daily    Candidiasis of mouth       order for DME     1 Device    Equipment being ordered: Nebulizer    COPD (chronic obstructive pulmonary disease) (H)       oxyCODONE 10 MG IR tablet    ROXICODONE    30 tablet    Take 1-2 tablets (10-20 mg) by mouth every 3 hours as needed for moderate to severe pain    Trigeminal neuralgia       potassium chloride SA 20 MEQ CR tablet    K-DUR/KLOR-CON M    90 tablet    Take 1 tablet (20 mEq) by mouth daily    CHF (congestive heart failure) (H), Peripheral edema       senna-docusate 8.6-50 MG per tablet    SENOKOT-S;PERICOLACE     Take 2 tablets by mouth 2 times daily        torsemide 20 MG tablet    DEMADEX    180 tablet    Take 10 mg po daily. Take an extra 10mg po daily as needed for swelling.    Chronic diastolic congestive heart failure (H)       warfarin 2.5 MG tablet    JANTOVEN    235 tablet    Take 7.5 mg (3 tablets) on Tuesday, Thursday, Saturday and 5 mg (2 tablets) all other days or as directed by coumadin clinic.    Atrial  fibrillation with RVR (H)

## 2017-06-23 NOTE — PROGRESS NOTES
REASON FOR VISIT:    Chief Complaint   Patient presents with     Surgical Followup     S/p balloon compression of left trigeminal nerve by Dr. WATSON Gonzales on 6/5/2017       HISTORY OF PRESENT ILLNESS:  Mr. Valdez is a 67 year old male with significant history of intermittent left sided trigeminal neuralgia (TN) involving all 3 division since 9/2016. In early 6/2017, he went to a local hospital for management of severe lancinating pain attacks and was subsequently transferred to [facility] for surgical intervention by our service. Of note, he had previously undergone a balloon compression by my colleague Dr. Jamie Orozco in 10/2016 with good relief and henceforth the same procedure was repeated. This was done by Dr. Paulino Gonzales 18 days ago.    Today he happily tells that he has been free of pain except for increased numbness in the left half face of his face from his baseline. He said he has had the numbness for years and it does not affect his quality of life compared to the TN. He is ready to be weaned off Carbatrol and Neurontin, which was prescribed to him upon discharge. He has otherwise no concerns.      INTERVAL HEALTH HISTORY:  Past Medical History:   Diagnosis Date     AAA (abdominal aortic aneurysm) (H)      COPD (chronic obstructive pulmonary disease) (H)     moderate     Emphysema      Hyperlipidemia      Hypertension      Hypomagnesemia 1/2/2015     JAMES (obstructive sleep apnea) 9/3/2014         PTSD (post-traumatic stress disorder)      Trigeminal neuralgia        CURRENT MEDICATIONS:  Current Outpatient Prescriptions   Medication Sig Dispense Refill     oxyCODONE (ROXICODONE) 10 MG IR tablet Take 1-2 tablets (10-20 mg) by mouth every 3 hours as needed for moderate to severe pain 30 tablet 0     warfarin (JANTOVEN) 2.5 MG tablet Take 7.5 mg (3 tablets) on Tuesday, Thursday, Saturday and 5 mg (2 tablets) all other days or as directed by coumadin clinic. 235 tablet 0     carBAMazepine (CARBATROL) 100  MG 12 hr capsule Take 1 capsule (100 mg) by mouth 2 times daily 60 capsule 0     gabapentin (NEURONTIN) 250 MG/5ML solution Take 2 mLs (100 mg) by mouth every 12 hours 450 mL 0     losartan (COZAAR) 50 MG tablet Take 2 tablets (100 mg) by mouth daily 180 tablet 3     buPROPion (WELLBUTRIN SR) 150 MG 12 hr tablet Take 1 tablet (150 mg) by mouth 2 times daily 180 tablet 3     fluticasone-salmeterol (ADVAIR) 250-50 MCG/DOSE diskus inhaler Inhale 1 puff into the lungs 2 times daily 3 Inhaler 3     albuterol (PROAIR HFA/PROVENTIL HFA/VENTOLIN HFA) 108 (90 BASE) MCG/ACT Inhaler Inhale 2 puffs into the lungs every 4 hours as needed for shortness of breath / dyspnea 6 Inhaler 3     fluticasone (FLONASE) 50 MCG/ACT spray USE ONE TO TWO SPRAYS IN EACH NOSTRIL EVERY DAY 16 g 6     cetirizine (ZYRTEC) 10 MG tablet Take 1 tablet (10 mg) by mouth every evening 30 tablet 1     montelukast (SINGULAIR) 10 MG tablet Take 1 tablet (10 mg) by mouth At Bedtime 30 tablet 1     Respiratory Therapy Supplies (NEBULIZER/TUBING/MOUTHPIECE) KIT Use every 4-6 hours PRN 1 kit 11     nystatin (MYCOSTATIN) 109989 UNIT/ML suspension Take 5 mLs (500,000 Units) by mouth 3 times daily 60 mL 0     metoprolol (TOPROL-XL) 50 MG 24 hr tablet Take 1 tablet (50 mg) by mouth daily 90 tablet 3     ipratropium - albuterol 0.5 mg/2.5 mg/3 mL (DUONEB) 0.5-2.5 (3) MG/3ML neb solution Take 1 vial (3 mLs) by nebulization every 4 hours as needed for shortness of breath / dyspnea or wheezing 540 vial 3     amiodarone (PACERONE/CODARONE) 200 MG tablet Take 0.5 tablets (100 mg) by mouth daily 45 tablet 3     potassium chloride SA (K-DUR/KLOR-CON M) 20 MEQ CR tablet Take 1 tablet (20 mEq) by mouth daily 90 tablet 1     torsemide (DEMADEX) 20 MG tablet Take 10 mg po daily. Take an extra 10mg po daily as needed for swelling. 180 tablet 3     senna-docusate (SENOKOT-S;PERICOLACE) 8.6-50 MG per tablet Take 2 tablets by mouth 2 times daily       order for DME Equipment being  "ordered: Nebulizer 1 Device 0       ALLERGIES:  Contrast dye      PHYSICAL EXAMINATION:  Filed Vitals:  /78 (BP Location: Right arm, Patient Position: Sitting, Cuff Size: Adult Large)  Pulse 63  Temp 98.1  F (36.7  C) (Oral)  Ht 1.778 m (5' 10\")     Patient Supplied Answers To the [facility] Pain Questionnaire  [facility] Pain -  Patient Entered Questionnaire/Answers 6/23/2017   What number best describes your pain right now:  0 = No pain  to  10 = Worst pain imaginable 0   How would you describe the pain? -   What number best describes your average pain for the past week:  0 = No pain  to  10 = Worst pain imaginable -   What number best describes your LOWEST pain in past 24 hours:  0 = No pain  to  10 = Worst pain imaginable -   What number best describes your WORST pain in past 24 hours:  0 = No pain  to  10 = Worst pain imaginable -   What non-medicine treatments have you already had for your pain? -   Have you tried treating your pain with medication?  -   Are you currently taking medications for your pain? -     Appearance: Comfortable and relaxed  LOC and Cognition:  Normal:   Alert and oriented to time, person and place for age, Appropriate and fluent speech for age, Follows commands appropriately for age and Affect pleasant, behavior cooperative  Focus Cranial Nerves:   Bilateral corneal reflex is intact. [place] and pterygoid muscle strength are normal, He has hypoethesia in all three divisions of trigeminal nerve. Facial movement is symmetric.       ASSESSMENT: Left sided V1, V2, and V3 idiopathic trigeminal neuralgia in remission after recent balloon compression.      RECOMMENDATION:  1. Begin tapering Neurontin first and then Carbatol over the next 3 weeks. A written scheduled was given to follow upon discharge. Patient was instructed to call our office if there is a concern with the medication or if he develops recurrent TN.  2. Followup in neurosurgery about 1-2 week after he finishes all " medications.    Shanika Pelayo, SAHRA, APRN, FNP-BC  [facility]  Department of Neurosurgery  Phone: [phone]  Fax: [phone]

## 2017-06-30 ENCOUNTER — ANTICOAGULATION THERAPY VISIT (OUTPATIENT)
Dept: ANTICOAGULATION | Facility: OTHER | Age: 67
End: 2017-06-30
Payer: MEDICARE

## 2017-06-30 ENCOUNTER — TELEPHONE (OUTPATIENT)
Dept: INTERNAL MEDICINE | Facility: OTHER | Age: 67
End: 2017-06-30

## 2017-06-30 DIAGNOSIS — J30.2 CHRONIC SEASONAL ALLERGIC RHINITIS DUE TO OTHER ALLERGEN: Primary | ICD-10-CM

## 2017-06-30 DIAGNOSIS — Z79.01 LONG-TERM (CURRENT) USE OF ANTICOAGULANTS: ICD-10-CM

## 2017-06-30 DIAGNOSIS — R60.0 PERIPHERAL EDEMA: ICD-10-CM

## 2017-06-30 DIAGNOSIS — I50.9 CHF (CONGESTIVE HEART FAILURE) (H): ICD-10-CM

## 2017-06-30 DIAGNOSIS — I48.91 ATRIAL FIBRILLATION WITH RVR (H): ICD-10-CM

## 2017-06-30 LAB — INR POINT OF CARE: 1.9 (ref 0.86–1.14)

## 2017-06-30 PROCEDURE — 85610 PROTHROMBIN TIME: CPT | Mod: QW

## 2017-06-30 PROCEDURE — 99207 ZZC NO CHARGE NURSE ONLY: CPT

## 2017-06-30 PROCEDURE — 36416 COLLJ CAPILLARY BLOOD SPEC: CPT

## 2017-06-30 RX ORDER — CETIRIZINE HYDROCHLORIDE 10 MG/1
10 TABLET ORAL EVERY EVENING
Qty: 30 TABLET | Refills: 1 | Status: SHIPPED | OUTPATIENT
Start: 2017-06-30 | End: 2017-09-04

## 2017-06-30 RX ORDER — MONTELUKAST SODIUM 10 MG/1
10 TABLET ORAL AT BEDTIME
Qty: 30 TABLET | Refills: 1 | Status: SHIPPED | OUTPATIENT
Start: 2017-06-30 | End: 2017-09-04

## 2017-06-30 RX ORDER — POTASSIUM CHLORIDE 1500 MG/1
20 TABLET, EXTENDED RELEASE ORAL DAILY
Qty: 90 TABLET | Refills: 1 | Status: SHIPPED | OUTPATIENT
Start: 2017-06-30 | End: 2017-12-26

## 2017-06-30 NOTE — TELEPHONE ENCOUNTER
Reason for Call:  Medication or medication refill:    Do you use a [facility]?  Name of the pharmacy and phone number for the current request:  [facility] - [phone]    Name of the medication requested: Zyrtec & Singulair    Other request: pt is wondering if he should continue taking both meds    Can we leave a detailed message on this number? YES    Phone number patient can be reached at: Home number on file [phone] (home)    Best Time:     Call taken on 6/30/2017 at 12:47 PM by Keiry Henry

## 2017-06-30 NOTE — PROGRESS NOTES
ANTICOAGULATION FOLLOW-UP CLINIC VISIT    Patient Name:  Home Valdez  Date:  6/30/2017  Contact Type:  Face to Face    SUBJECTIVE:     Patient Findings     Positives No Problem Findings           OBJECTIVE    INR Protime   Date Value Ref Range Status   06/30/2017 1.9 (A) 0.86 - 1.14 Final       ASSESSMENT / PLAN  INR assessment THER    Recheck INR In: 1 WEEK    INR Location Clinic      Anticoagulation Summary as of 6/30/2017     INR goal 2.0-3.0   Today's INR 1.9!   Maintenance plan 5 mg (2.5 mg x 2) on Mon; 7.5 mg (2.5 mg x 3) all other days   Full instructions 5 mg on Mon; 7.5 mg all other days   Weekly total 50 mg   Plan last modified Shameka Carey RN (6/30/2017)   Next INR check 7/7/2017   Target end date     Indications   Atrial fibrillation with RVR (H) [I48.91]  Long-term (current) use of anticoagulants [Z79.01] [Z79.01]         Anticoagulation Episode Summary     INR check location     Preferred lab     Send INR reminders to [facility]    Comments 2.5 MG TABLETS, likes print out       Anticoagulation Care Providers     Provider Role Specialty Phone number    Neo Galicia MD [facility] [phone]            See the Encounter Report to view Anticoagulation Flowsheet and Dosing Calendar (Go to Encounters tab in chart review, and find the Anticoagulation Therapy Visit)    Dosage adjustment made based on physician directed care plan.    Shameka Carey RN

## 2017-06-30 NOTE — MR AVS SNAPSHOT
Home Valdez   6/30/2017 8:30 AM   Anticoagulation Therapy Visit    Description:  67 year old male   Provider:  [facility]   Department:  [facility]           INR as of 6/30/2017     Today's INR 1.9!      Anticoagulation Summary as of 6/30/2017     INR goal 2.0-3.0   Today's INR 1.9!   Full instructions 5 mg on Mon; 7.5 mg all other days   Next INR check 7/7/2017    Indications   Atrial fibrillation with RVR (H) [I48.91]  Long-term (current) use of anticoagulants [Z79.01] [Z79.01]         Your next Anticoagulation Clinic appointment(s)     Jun 30, 2017  8:30 AM CDT   [facility]    [address] [facility] [address]   [phone]            Jul 07, 2017  9:45 AM CDT   [facility]    [address] [facility] [address]   [phone]              Contact Numbers     Clinic Number:         June 2017 Details    Sun Mon Tue Wed Thu Fri Sat         1               2               3                 4               5               6               7               8               9               10                 11               12               13               14               15               16               17                 18               19               20               21               22               23               24                 25               26               27               28               29               30      7.5 mg   See details        Date Details   06/30 This INR check               How to take your warfarin dose     To take:  7.5 mg Take 3 of the 2.5 mg tablets.           July 2017 Details    Sun Mon Tue Wed Thu Fri Sat           1      7.5 mg           2      7.5 mg         3      5 mg         4      7.5 mg         5      7.5 mg         6      7.5 mg         7            8                 9               10                11               12               13               14               15                 16               17               18               19               20               21               22                 23               24               25               26               27               28               29                 30               31                     Date Details   No additional details    Date of next INR:  7/7/2017         How to take your warfarin dose     To take:  5 mg Take 2 of the 2.5 mg tablets.    To take:  7.5 mg Take 3 of the 2.5 mg tablets.

## 2017-07-07 ENCOUNTER — ANTICOAGULATION THERAPY VISIT (OUTPATIENT)
Dept: ANTICOAGULATION | Facility: OTHER | Age: 67
End: 2017-07-07
Payer: MEDICARE

## 2017-07-07 DIAGNOSIS — I48.91 ATRIAL FIBRILLATION WITH RVR (H): ICD-10-CM

## 2017-07-07 DIAGNOSIS — Z79.01 LONG-TERM (CURRENT) USE OF ANTICOAGULANTS: ICD-10-CM

## 2017-07-07 LAB — INR POINT OF CARE: 2 (ref 0.86–1.14)

## 2017-07-07 PROCEDURE — 36416 COLLJ CAPILLARY BLOOD SPEC: CPT

## 2017-07-07 PROCEDURE — 85610 PROTHROMBIN TIME: CPT | Mod: QW

## 2017-07-07 PROCEDURE — 99207 ZZC NO CHARGE NURSE ONLY: CPT

## 2017-07-07 NOTE — PROGRESS NOTES
ANTICOAGULATION FOLLOW-UP CLINIC VISIT    Patient Name:  Home Valdez  Date:  7/7/2017  Contact Type:  Face to Face    SUBJECTIVE:     Patient Findings     Positives No Problem Findings           OBJECTIVE    INR Protime   Date Value Ref Range Status   07/07/2017 2.0 (A) 0.86 - 1.14 Final       ASSESSMENT / PLAN  INR assessment THER    Recheck INR In: 2 WEEKS    INR Location Clinic      Anticoagulation Summary as of 7/7/2017     INR goal 2.0-3.0   Today's INR 2.0   Maintenance plan 5 mg (2.5 mg x 2) on Mon; 7.5 mg (2.5 mg x 3) all other days   Full instructions 5 mg on Mon; 7.5 mg all other days   Weekly total 50 mg   No change documented Shameka Carey RN   Plan last modified Shameka Carey RN (6/30/2017)   Next INR check 7/21/2017   Target end date     Indications   Atrial fibrillation with RVR (H) [I48.91]  Long-term (current) use of anticoagulants [Z79.01] [Z79.01]         Anticoagulation Episode Summary     INR check location     Preferred lab     Send INR reminders to [facility]    Comments 2.5 MG TABLETS, likes print out       Anticoagulation Care Providers     Provider Role Specialty Phone number    Neo Galicia MD [facility] [phone]            See the Encounter Report to view Anticoagulation Flowsheet and Dosing Calendar (Go to Encounters tab in chart review, and find the Anticoagulation Therapy Visit)    Dosage adjustment made based on physician directed care plan.    Shameka Carey, RN

## 2017-07-12 ENCOUNTER — OFFICE VISIT (OUTPATIENT)
Dept: FAMILY MEDICINE | Facility: CLINIC | Age: 67
End: 2017-07-12
Payer: MEDICARE

## 2017-07-12 VITALS
TEMPERATURE: 97.2 F | OXYGEN SATURATION: 95 % | SYSTOLIC BLOOD PRESSURE: 144 MMHG | BODY MASS INDEX: 36.59 KG/M2 | WEIGHT: 255 LBS | HEART RATE: 77 BPM | RESPIRATION RATE: 16 BRPM | DIASTOLIC BLOOD PRESSURE: 78 MMHG

## 2017-07-12 DIAGNOSIS — L30.9 DERMATITIS: Primary | ICD-10-CM

## 2017-07-12 PROCEDURE — 99212 OFFICE O/P EST SF 10 MIN: CPT | Performed by: FAMILY MEDICINE

## 2017-07-12 ASSESSMENT — PAIN SCALES - GENERAL: PAINLEVEL: NO PAIN (0)

## 2017-07-12 NOTE — MR AVS SNAPSHOT
"              After Visit Summary   7/12/2017    Home Valdez    MRN: [id number]           Patient Information     Date Of Birth          1950        Visit Information        Provider Department      7/12/2017 5:00 PM Neo Galicia MD [facility]         Follow-ups after your visit        Your next 10 appointments already scheduled     Jul 21, 2017  4:15 PM CDT   Anticoagulation [facility] with  ANTI [facility]AG   [facility]    [address] [facility] [address] [phone]            Jul 26, 2017  8:30 AM CDT   (Arrive by 8:15 AM)   Return Visit with REJI Yi [facility] ([facility] [place])    [address]  3rd Floor  [place] [facility] [address] [phone]              Who to contact     If you have questions or need follow up information about today's clinic visit or your schedule please contact [facility] directly at [phone].  Normal or non-critical lab and imaging results will be communicated to you by Boxeehart, letter or phone within 4 business days after the clinic has received the results. If you do not hear from us within 7 days, please contact the clinic through Boxeehart or phone. If you have a critical or abnormal lab result, we will notify you by phone as soon as possible.  Submit refill requests through [employer] or call your pharmacy and they will forward the refill request to us. Please allow 3 business days for your refill to be completed.          Additional Information About Your Visit        Boxeehart Information     [employer] lets you send messages to your doctor, view your test results, renew your prescriptions, schedule appointments and more. To sign up, go to [email or web address] . Click on \"Log in\" on the left side of the screen, which will take you to the Welcome page. Then click on \"Sign up Now\" on the right side of the page.     You will be asked to " enter the access code listed below, as well as some personal information. Please follow the directions to create your username and password.     Your access code is: [email or web address]  Expires: 2017 10:47 AM     Your access code will  in 90 days. If you need help or a new code, please call your [place] clinic or [phone].        Care EveryWhere ID     This is your Care EveryWhere ID. This could be used by other organizations to access your [place] medical records  [id number]        Your Vitals Were     Pulse Temperature Respirations Pulse Oximetry BMI (Body Mass Index)       77 97.2  F (36.2  C) (Temporal) 16 95% 36.59 kg/m2        Blood Pressure from Last 3 Encounters:   17 144/78   17 149/78   17 134/62    Weight from Last 3 Encounters:   17 255 lb (115.7 kg)   17 245 lb (111.1 kg)   17 248 lb 14.4 oz (112.9 kg)              Today, you had the following     No orders found for display       Primary Care Provider Office Phone # Fax #    Sandip Home Vega [phone]       [address]   [place] [address]        Equal Access to Services     MILKA ORTIZ AH: Hadii aad ku hadasho Soomaali, waaxda luqadaha, qaybta kaalmada adeegyada, waxay idiin hayaan jessica kharapillo john . So [place] [phone].    ATENCIÓN: Si habla español, tiene a calabrese disposición servicios gratuitos de asistencia lingüística. Llame al [phone].    We comply with applicable federal civil rights laws and [place] laws. We do not discriminate on the basis of race, color, national origin, age, disability sex, sexual orientation or gender identity.            Thank you!     Thank you for choosing [facility]  for your care. Our goal is always to provide you with excellent care. Hearing back from our patients is one way we can continue to improve our services. Please take a few minutes to complete the written survey that you may receive  in the mail after your visit with us. Thank you!             Your Updated Medication List - Protect others around you: Learn how to safely use, store and throw away your medicines at www.disposemymeds.org.          This list is accurate as of: 7/12/17  5:42 PM.  Always use your most recent med list.                   Brand Name Dispense Instructions for use Diagnosis    albuterol 108 (90 BASE) MCG/ACT Inhaler    PROAIR HFA/PROVENTIL HFA/VENTOLIN HFA    6 Inhaler    Inhale 2 puffs into the lungs every 4 hours as needed for shortness of breath / dyspnea    Chronic obstructive pulmonary disease with acute exacerbation (H)       amiodarone 200 MG tablet    PACERONE/CODARONE    45 tablet    Take 0.5 tablets (100 mg) by mouth daily    Atrial fibrillation (H)       buPROPion 150 MG 12 hr tablet    WELLBUTRIN SR    180 tablet    Take 1 tablet (150 mg) by mouth 2 times daily    Personal history of tobacco use, presenting hazards to health       carBAMazepine 100 MG 12 hr capsule    CARBATROL    60 capsule    Take 1 capsule (100 mg) by mouth 2 times daily    Trigeminal neuralgia       cetirizine 10 MG tablet    zyrTEC    30 tablet    Take 1 tablet (10 mg) by mouth every evening    Chronic seasonal allergic rhinitis due to other allergen       fluticasone 50 MCG/ACT spray    FLONASE    16 g    USE ONE TO TWO SPRAYS IN EACH NOSTRIL EVERY DAY    Chronic rhinitis       fluticasone-salmeterol 250-50 MCG/DOSE diskus inhaler    ADVAIR    3 Inhaler    Inhale 1 puff into the lungs 2 times daily    Panlobular emphysema (H)       gabapentin 250 MG/5ML solution    NEURONTIN    450 mL    Take 2 mLs (100 mg) by mouth every 12 hours    Trigeminal neuralgia       ipratropium - albuterol 0.5 mg/2.5 mg/3 mL 0.5-2.5 (3) MG/3ML neb solution    DUONEB    540 vial    Take 1 vial (3 mLs) by nebulization every 4 hours as needed for shortness of breath / dyspnea or wheezing    COPD exacerbation (H)       losartan 50 MG tablet    COZAAR    180 tablet     Take 2 tablets (100 mg) by mouth daily    Atrial fibrillation with RVR (H), Hypertension goal BP (blood pressure) < 140/90       metoprolol 50 MG 24 hr tablet    TOPROL-XL    90 tablet    Take 1 tablet (50 mg) by mouth daily    Atrial fibrillation with RVR (H), CHF (congestive heart failure) (H)       montelukast 10 MG tablet    SINGULAIR    30 tablet    Take 1 tablet (10 mg) by mouth At Bedtime    Chronic seasonal allergic rhinitis due to other allergen       nebulizer/tubing/mouthpiece Kit     1 kit    Use every 4-6 hours PRN    Chronic obstructive pulmonary disease, unspecified COPD type (H)       nystatin 789732 UNIT/ML suspension    MYCOSTATIN    60 mL    Take 5 mLs (500,000 Units) by mouth 3 times daily    Candidiasis of mouth       order for DME     1 Device    Equipment being ordered: Nebulizer    COPD (chronic obstructive pulmonary disease) (H)       oxyCODONE 10 MG IR tablet    ROXICODONE    30 tablet    Take 1-2 tablets (10-20 mg) by mouth every 3 hours as needed for moderate to severe pain    Trigeminal neuralgia       potassium chloride SA 20 MEQ CR tablet    K-DUR/KLOR-CON M    90 tablet    Take 1 tablet (20 mEq) by mouth daily    CHF (congestive heart failure) (H), Peripheral edema       senna-docusate 8.6-50 MG per tablet    SENOKOT-S;PERICOLACE     Take 2 tablets by mouth 2 times daily        torsemide 20 MG tablet    DEMADEX    180 tablet    Take 10 mg po daily. Take an extra 10mg po daily as needed for swelling.    Chronic diastolic congestive heart failure (H)       warfarin 2.5 MG tablet    JANTOVEN    235 tablet    Take 7.5 mg (3 tablets) on Tuesday, Thursday, Saturday and 5 mg (2 tablets) all other days or as directed by coumadin clinic.    Atrial fibrillation with RVR (H)

## 2017-07-12 NOTE — NURSING NOTE
"Chief Complaint   Patient presents with     Derm Problem       Initial /72 (BP Location: Right arm, Patient Position: Sitting, Cuff Size: Adult Large)  Pulse 98  Temp 97.2  F (36.2  C) (Temporal)  Resp 16  Wt 255 lb (115.7 kg)  SpO2 (!) 77%  BMI 36.59 kg/m2 Estimated body mass index is 36.59 kg/(m^2) as calculated from the following:    Height as of 6/23/17: 5' 10\" (1.778 m).    Weight as of this encounter: 255 lb (115.7 kg).  Medication Reconciliation: complete     Debbi Choudhury MA 7/12/2017        "

## 2017-07-12 NOTE — PROGRESS NOTES
SUBJECTIVE:                                                    Home Valdez is a 67 year old male who presents to clinic today for the following health issues:    Rash  Onset: 3 or 4 days ago    Description:   Location: upper left thigh  Character: round, raised, red  Itching (Pruritis): no     Progression of Symptoms:  improving    Accompanying Signs & Symptoms:  Fever: no   Body aches or joint pain: no   Sore throat symptoms: no   Recent cold symptoms: no     History:   Previous similar rash: no     Precipitating factors:   Exposure to similar rash: no   New exposures: None   Recent travel: no     Alleviating factors:      Therapies Tried and outcome: none    Rash: 4x 2 cm area of what appears to be herpes simplex on left upper posterior thigh  Appears to be in the resolution stage at this point  No systemic symptoms  /78 (BP Location: Right arm, Patient Position: Sitting, Cuff Size: Adult Large)  Pulse 77  Temp 97.2  F (36.2  C) (Temporal)  Resp 16  Wt 255 lb (115.7 kg)  SpO2 95%  BMI 36.59 kg/m2  IMP: herpes simplex, resolving  PL: local care at this point   cb if not contniuing to resolve  dbue

## 2017-07-18 ENCOUNTER — TELEPHONE (OUTPATIENT)
Dept: FAMILY MEDICINE | Facility: OTHER | Age: 67
End: 2017-07-18

## 2017-07-18 NOTE — TELEPHONE ENCOUNTER
Reason for Call:  Same Day Appointment, Requested Provider:  Sandip Vega DO    PCP: Sandip Vega    Reason for visit: URI/allergies, nasal congestion    Duration of symptoms: 4 days    Have you been treated for this in the past? No    Additional comments: pt is scheduled on Thurs, requesting to be seen today, please advise    Can we leave a detailed message on this number? YES    Phone number patient can be reached at: Home number on file [phone] (home)    Best Time: anytime    Call taken on 7/18/2017 at 7:35 AM by Marilyn Perez

## 2017-07-19 ENCOUNTER — OFFICE VISIT (OUTPATIENT)
Dept: FAMILY MEDICINE | Facility: OTHER | Age: 67
End: 2017-07-19
Payer: MEDICARE

## 2017-07-19 VITALS
SYSTOLIC BLOOD PRESSURE: 132 MMHG | TEMPERATURE: 98 F | BODY MASS INDEX: 35.87 KG/M2 | DIASTOLIC BLOOD PRESSURE: 78 MMHG | WEIGHT: 250 LBS | HEART RATE: 72 BPM | OXYGEN SATURATION: 95 %

## 2017-07-19 DIAGNOSIS — J43.1 PANLOBULAR EMPHYSEMA (H): Primary | ICD-10-CM

## 2017-07-19 DIAGNOSIS — Z86.79 HISTORY OF ATRIAL FIBRILLATION: ICD-10-CM

## 2017-07-19 PROCEDURE — 99213 OFFICE O/P EST LOW 20 MIN: CPT | Performed by: INTERNAL MEDICINE

## 2017-07-19 RX ORDER — CEFUROXIME AXETIL 500 MG/1
500 TABLET ORAL 2 TIMES DAILY
Qty: 20 TABLET | Refills: 0 | Status: SHIPPED | OUTPATIENT
Start: 2017-07-19 | End: 2017-11-06 | Stop reason: ALTCHOICE

## 2017-07-19 RX ORDER — PREDNISONE 20 MG/1
40 TABLET ORAL DAILY
Qty: 10 TABLET | Refills: 0 | Status: SHIPPED | OUTPATIENT
Start: 2017-07-19 | End: 2017-07-24

## 2017-07-19 ASSESSMENT — PAIN SCALES - GENERAL: PAINLEVEL: NO PAIN (0)

## 2017-07-19 NOTE — MR AVS SNAPSHOT
After Visit Summary   7/19/2017    Home Valdez    MRN: [id number]           Patient Information     Date Of Birth          1950        Visit Information        Provider Department      7/19/2017 7:00 AM Sandip Vega DO [facility]        Today's Diagnoses     Panlobular emphysema (H)    -  1    History of atrial fibrillation           Follow-ups after your visit        Your next 10 appointments already scheduled     Jul 20, 2017  8:40 AM CDT   [facility] with Sandip Vega DO   [facility]    [address] [facility] [id number] [phone]           Bring a current list of meds and any records pertaining to this visit.  For Physicals, please bring immunization records and any forms needing to be filled out.  Please arrive 10 minutes early to complete paperwork.            Jul 21, 2017  4:15 PM CDT   Anticoagulation Visit with  [facility]   [facility]    [address] [facility] [id number] [phone]            Jul 26, 2017  8:30 AM CDT   (Arrive by 8:15 AM)   Return Visit with REJI Yi [facility] ([facility])    [address] [facility]  3rd Floor  [facility] [address] [phone]              Who to contact     If you have questions or need follow up information about today's clinic visit or your schedule please contact [facility] directly at [phone].  Normal or non-critical lab and imaging results will be communicated to you by MyChart, letter or phone within 4 business days after the clinic has received the results. If you do not hear from us within 7 days, please contact the clinic through MyChart or phone. If you have a critical or abnormal lab result, we will notify you by phone as soon as possible.  Submit refill requests through [employer] or call your pharmacy and they will  "forward the refill request to us. Please allow 3 business days for your refill to be completed.          Additional Information About Your Visit        nWayharTotalTakeout Information     [employer] lets you send messages to your doctor, view your test results, renew your prescriptions, schedule appointments and more. To sign up, go to [email or web address] . Click on \"Log in\" on the left side of the screen, which will take you to the Welcome page. Then click on \"Sign up Now\" on the right side of the page.     You will be asked to enter the access code listed below, as well as some personal information. Please follow the directions to create your username and password.     Your access code is: [email or web address]  Expires: 2017 10:47 AM     Your access code will  in 90 days. If you need help or a new code, please call your [place] clinic or [phone].        Care EveryWhere ID     This is your Care EveryWhere ID. This could be used by other organizations to access your [place] medical records  [id number]        Your Vitals Were     Pulse Temperature Pulse Oximetry BMI (Body Mass Index)          72 98  F (36.7  C) (Temporal) 95% 35.87 kg/m2         Blood Pressure from Last 3 Encounters:   17 132/78   17 144/78   17 149/78    Weight from Last 3 Encounters:   17 250 lb (113.4 kg)   17 255 lb (115.7 kg)   17 245 lb (111.1 kg)              Today, you had the following     No orders found for display         Today's Medication Changes          These changes are accurate as of: 17  7:38 AM.  If you have any questions, ask your nurse or doctor.               Start taking these medicines.        Dose/Directions    cefuroxime 500 MG tablet   Commonly known as:  CEFTIN   Used for:  Panlobular emphysema (H)   Started by:  Sandip Vega DO        Dose:  500 mg   Take 1 tablet (500 mg) by mouth 2 times daily   Quantity:  20 tablet   Refills:  0       predniSONE 20 MG tablet "   Commonly known as:  DELTASONE   Used for:  Panlobular emphysema (H)   Started by:  Sandip Vega DO        Dose:  40 mg   Take 2 tablets (40 mg) by mouth daily for 5 days   Quantity:  10 tablet   Refills:  0            Where to get your medicines      These medications were sent to [id number] - [place], [place] - [address]   [address] [facility] [address]    Hours:  M-F 8:30-6:30; Sat 9-4; closed Sunday Phone:  [phone]     cefuroxime 500 MG tablet    predniSONE 20 MG tablet                Primary Care Provider Office Phone # Fax #    Sandip Home Vega [phone]       [address] [place] [address]        Equal Access to Services     MILKA ORTIZ : Hadii lissette leon hadasho Soomaali, waaxda luqadaha, qaybta kaalmada adeegyada, jase john . So [facility] [phone].    ATENCIÓN: Si habla español, tiene a calabrese disposición servicios gratuitos de asistencia lingüística. Llame al [phone].    We comply with applicable federal civil rights laws and [place] laws. We do not discriminate on the basis of race, color, national origin, age, disability sex, sexual orientation or gender identity.            Thank you!     Thank you for choosing [facility]  for your care. Our goal is always to provide you with excellent care. Hearing back from our patients is one way we can continue to improve our services. Please take a few minutes to complete the written survey that you may receive in the mail after your visit with us. Thank you!             Your Updated Medication List - Protect others around you: Learn how to safely use, store and throw away your medicines at www.disposemymeds.org.          This list is accurate as of: 7/19/17  7:38 AM.  Always use your most recent med list.                   Brand Name Dispense Instructions for use Diagnosis    albuterol 108 (90 BASE) MCG/ACT Inhaler    PROAIR  HFA/PROVENTIL HFA/VENTOLIN HFA    6 Inhaler    Inhale 2 puffs into the lungs every 4 hours as needed for shortness of breath / dyspnea    Chronic obstructive pulmonary disease with acute exacerbation (H)       amiodarone 200 MG tablet    PACERONE/CODARONE    45 tablet    Take 0.5 tablets (100 mg) by mouth daily    Atrial fibrillation (H)       buPROPion 150 MG 12 hr tablet    WELLBUTRIN SR    180 tablet    Take 1 tablet (150 mg) by mouth 2 times daily    Personal history of tobacco use, presenting hazards to health       carBAMazepine 100 MG 12 hr capsule    CARBATROL    60 capsule    Take 1 capsule (100 mg) by mouth 2 times daily    Trigeminal neuralgia       cefuroxime 500 MG tablet    CEFTIN    20 tablet    Take 1 tablet (500 mg) by mouth 2 times daily    Panlobular emphysema (H)       cetirizine 10 MG tablet    zyrTEC    30 tablet    Take 1 tablet (10 mg) by mouth every evening    Chronic seasonal allergic rhinitis due to other allergen       fluticasone 50 MCG/ACT spray    FLONASE    16 g    USE ONE TO TWO SPRAYS IN EACH NOSTRIL EVERY DAY    Chronic rhinitis       fluticasone-salmeterol 250-50 MCG/DOSE diskus inhaler    ADVAIR    3 Inhaler    Inhale 1 puff into the lungs 2 times daily    Panlobular emphysema (H)       gabapentin 250 MG/5ML solution    NEURONTIN    450 mL    Take 2 mLs (100 mg) by mouth every 12 hours    Trigeminal neuralgia       ipratropium - albuterol 0.5 mg/2.5 mg/3 mL 0.5-2.5 (3) MG/3ML neb solution    DUONEB    540 vial    Take 1 vial (3 mLs) by nebulization every 4 hours as needed for shortness of breath / dyspnea or wheezing    COPD exacerbation (H)       losartan 50 MG tablet    COZAAR    180 tablet    Take 2 tablets (100 mg) by mouth daily    Atrial fibrillation with RVR (H), Hypertension goal BP (blood pressure) < 140/90       metoprolol 50 MG 24 hr tablet    TOPROL-XL    90 tablet    Take 1 tablet (50 mg) by mouth daily    Atrial fibrillation with RVR (H), CHF (congestive heart  failure) (H)       montelukast 10 MG tablet    SINGULAIR    30 tablet    Take 1 tablet (10 mg) by mouth At Bedtime    Chronic seasonal allergic rhinitis due to other allergen       nebulizer/tubing/mouthpiece Kit     1 kit    Use every 4-6 hours PRN    Chronic obstructive pulmonary disease, unspecified COPD type (H)       nystatin 751724 UNIT/ML suspension    MYCOSTATIN    60 mL    Take 5 mLs (500,000 Units) by mouth 3 times daily    Candidiasis of mouth       order for DME     1 Device    Equipment being ordered: Nebulizer    COPD (chronic obstructive pulmonary disease) (H)       oxyCODONE 10 MG IR tablet    ROXICODONE    30 tablet    Take 1-2 tablets (10-20 mg) by mouth every 3 hours as needed for moderate to severe pain    Trigeminal neuralgia       potassium chloride SA 20 MEQ CR tablet    K-DUR/KLOR-CON M    90 tablet    Take 1 tablet (20 mEq) by mouth daily    CHF (congestive heart failure) (H), Peripheral edema       predniSONE 20 MG tablet    DELTASONE    10 tablet    Take 2 tablets (40 mg) by mouth daily for 5 days    Panlobular emphysema (H)       senna-docusate 8.6-50 MG per tablet    SENOKOT-S;PERICOLACE     Take 2 tablets by mouth 2 times daily        torsemide 20 MG tablet    DEMADEX    180 tablet    Take 10 mg po daily. Take an extra 10mg po daily as needed for swelling.    Chronic diastolic congestive heart failure (H)       warfarin 2.5 MG tablet    JANTOVEN    235 tablet    Take 7.5 mg (3 tablets) on Tuesday, Thursday, Saturday and 5 mg (2 tablets) all other days or as directed by coumadin clinic.    Atrial fibrillation with RVR (H)

## 2017-07-19 NOTE — PROGRESS NOTES
SUBJECTIVE:                                                    Home Valdez is a 67 year old male who presents to clinic today for the following health issues:    RESPIRATORY SYMPTOMS      Duration: 5 days    Description  nasal congestion, rhinorrhea, cough and wheezing    Severity: moderate    Accompanying signs and symptoms: None    History (predisposing factors):  COPD    Precipitating or alleviating factors: None    Therapies tried and outcome:  none        CHIEF COMPLAINT:    The patient is a pleasant 67-year-old gentleman who has history of chronic obstructive pulmonary disease. He recently began expressing increased shortness of breath, increased wheezing, and yellow sputum production. He notes that he's been doing a lot of outdoor work but generally wears a mask. He has been around some people that a prescription trace symptoms in recent weeks. His history is also positive for nonischemic cardiomyopathy and chronic atrial fibrillation. He normally sees another provider for his routine care.                         PAST, FAMILY,SOCIAL HISTORY:     Medical  History:   has a past medical history of AAA (abdominal aortic aneurysm) (H); COPD (chronic obstructive pulmonary disease) (H); Emphysema; Hyperlipidemia; Hypertension; Hypomagnesemia (1/2/2015); JAMES (obstructive sleep apnea) (9/3/2014); PTSD (post-traumatic stress disorder); and Trigeminal neuralgia.     Surgical History:   has a past surgical history that includes CYSTOURETHROSCOPY (1998); LAMINOTOMY,LUMBAR DISK,1 INTRSP (1993); NONSPECIFIC PROCEDURE; NONSPECIFIC PROCEDURE; Phacoemulsification with standard intraocular lens implant (12/26/2013); Vitrectomy anterior (12/26/2013); L cataract surgery[; Soft tissue surgery; colonoscopy; hernia repair; Head and neck surgery; Vitrectomy parsplana with 25 gauge system (2/6/2014); Balloon compression rhizotomy (Left, 9/7/2016); and Balloon compression rhizotomy (Left, 6/5/2017).     Social History:    reports that he quit smoking about 2 years ago. His smoking use included Cigarettes. He has a 40.00 pack-year smoking history. He has never used smokeless tobacco. He reports that he does not drink alcohol or use illicit drugs.     Family History:  family history includes DIABETES in his paternal grandmother; Depression in his father; Hypertension in his mother and paternal grandfather.            MEDICATIONS  Current Outpatient Prescriptions   Medication Sig Dispense Refill     cefuroxime (CEFTIN) 500 MG tablet Take 1 tablet (500 mg) by mouth 2 times daily 20 tablet 0     predniSONE (DELTASONE) 20 MG tablet Take 2 tablets (40 mg) by mouth daily for 5 days 10 tablet 0     cetirizine (ZYRTEC) 10 MG tablet Take 1 tablet (10 mg) by mouth every evening 30 tablet 1     montelukast (SINGULAIR) 10 MG tablet Take 1 tablet (10 mg) by mouth At Bedtime 30 tablet 1     potassium chloride SA (K-DUR/KLOR-CON M) 20 MEQ CR tablet Take 1 tablet (20 mEq) by mouth daily 90 tablet 1     oxyCODONE (ROXICODONE) 10 MG IR tablet Take 1-2 tablets (10-20 mg) by mouth every 3 hours as needed for moderate to severe pain 30 tablet 0     warfarin (JANTOVEN) 2.5 MG tablet Take 7.5 mg (3 tablets) on Tuesday, Thursday, Saturday and 5 mg (2 tablets) all other days or as directed by coumadin clinic. 235 tablet 0     carBAMazepine (CARBATROL) 100 MG 12 hr capsule Take 1 capsule (100 mg) by mouth 2 times daily 60 capsule 0     gabapentin (NEURONTIN) 250 MG/5ML solution Take 2 mLs (100 mg) by mouth every 12 hours 450 mL 0     losartan (COZAAR) 50 MG tablet Take 2 tablets (100 mg) by mouth daily 180 tablet 3     buPROPion (WELLBUTRIN SR) 150 MG 12 hr tablet Take 1 tablet (150 mg) by mouth 2 times daily 180 tablet 3     fluticasone-salmeterol (ADVAIR) 250-50 MCG/DOSE diskus inhaler Inhale 1 puff into the lungs 2 times daily 3 Inhaler 3     albuterol (PROAIR HFA/PROVENTIL HFA/VENTOLIN HFA) 108 (90 BASE) MCG/ACT Inhaler Inhale 2 puffs into the lungs every 4  hours as needed for shortness of breath / dyspnea 6 Inhaler 3     fluticasone (FLONASE) 50 MCG/ACT spray USE ONE TO TWO SPRAYS IN EACH NOSTRIL EVERY DAY 16 g 6     Respiratory Therapy Supplies (NEBULIZER/TUBING/MOUTHPIECE) KIT Use every 4-6 hours PRN 1 kit 11     nystatin (MYCOSTATIN) 517830 UNIT/ML suspension Take 5 mLs (500,000 Units) by mouth 3 times daily 60 mL 0     metoprolol (TOPROL-XL) 50 MG 24 hr tablet Take 1 tablet (50 mg) by mouth daily 90 tablet 3     ipratropium - albuterol 0.5 mg/2.5 mg/3 mL (DUONEB) 0.5-2.5 (3) MG/3ML neb solution Take 1 vial (3 mLs) by nebulization every 4 hours as needed for shortness of breath / dyspnea or wheezing 540 vial 3     amiodarone (PACERONE/CODARONE) 200 MG tablet Take 0.5 tablets (100 mg) by mouth daily 45 tablet 3     torsemide (DEMADEX) 20 MG tablet Take 10 mg po daily. Take an extra 10mg po daily as needed for swelling. 180 tablet 3     senna-docusate (SENOKOT-S;PERICOLACE) 8.6-50 MG per tablet Take 2 tablets by mouth 2 times daily       order for DME Equipment being ordered: Nebulizer 1 Device 0         --------------------------------------------------------------------------------------------------------------------                          REVIEW OF SYSTEMS:         LUNGS: Pt denies: hemoptysis, or shortness of breath at rest. He does have dyspnea with exertion..   HEART: Pt denies: chest pain, sensed arrythmia, syncope, tachy or bradyarrhythmia or excess edema.   GI: Pt denies: nausea, vomitting, diarrhea, constipation, melena, or hematochezia.   NEURO: Pt denies: seizures, strokes, diplopia, weakness, paraesthesias, or paralysis.                          EXAMINATION:         /78 (BP Location: Left arm, Patient Position: Chair, Cuff Size: Adult Regular)  Pulse 72  Temp 98  F (36.7  C) (Temporal)  Wt 250 lb (113.4 kg)  SpO2 95%  BMI 35.87 kg/m2   Constitutional: The patient appears to be in mild acute distress. The patient appears to be adequately  hydrated. No acute respiratory or hemodynamic distress is noted at this time.   LUNGS: Scattered rhonchi with wheezing is noted bilaterally, airflow is slowed, no intercostal retraction or stridor is noted. No coughing is noted during visit.   HEART:  Irregularly irregular without rubs, clicks, gallops, or murmurs. PMI is nondisplaced. Upstrokes are brisk. S1,S2 are heard.   GI: Abdomen is soft, without rebound, guarding or tenderness. Bowel sounds are appropriate. No renal bruits are heard.    NEURO: Pt is alert and appropriate. No neurologic lateralization is noted. Cranial nerves 2-12 are intact. Peripheral sensory and motor function are grossly normal                        DECISION MAKIN. Panlobular emphysema (H)  Brief course of steroid with antibiotic.  Continue aggressive nebulizer  Avoid allergens as possible, wear mask  - cefuroxime (CEFTIN) 500 MG tablet; Take 1 tablet (500 mg) by mouth 2 times daily  Dispense: 20 tablet; Refill: 0  - predniSONE (DELTASONE) 20 MG tablet; Take 2 tablets (40 mg) by mouth daily for 5 days  Dispense: 10 tablet; Refill: 0    2. History of atrial fibrillation  Continue anticoagulation and rate management                               FOLLOW UP    I have asked the patient to make an appointment for follow up with me or his usual provider as needed        I have carefully explained the diagnosis and treatment options with the patient. The patient has displayed an understanding of the above, and all subsequent questions were answered.         DO SANJAY Lock    Portions of this note were produced using frooly  Although every attempt at real-time proof reading has been made, occasional grammar/syntax errors may have been missed.

## 2017-07-26 ENCOUNTER — OFFICE VISIT (OUTPATIENT)
Dept: NEUROSURGERY | Facility: CLINIC | Age: 67
End: 2017-07-26

## 2017-07-26 VITALS
BODY MASS INDEX: 35.79 KG/M2 | SYSTOLIC BLOOD PRESSURE: 147 MMHG | WEIGHT: 250 LBS | HEART RATE: 56 BPM | DIASTOLIC BLOOD PRESSURE: 78 MMHG | HEIGHT: 70 IN

## 2017-07-26 DIAGNOSIS — G50.0 TRIGEMINAL NEURALGIA: Primary | ICD-10-CM

## 2017-07-26 DIAGNOSIS — Z09 SURGICAL FOLLOWUP: ICD-10-CM

## 2017-07-26 ASSESSMENT — PAIN SCALES - GENERAL: PAINLEVEL: NO PAIN (0)

## 2017-07-26 NOTE — PROGRESS NOTES
REASON FOR VISIT: Postop followup    Chief Complaint   Patient presents with     Surgical Followup     Postop followup, s/p balloon compression for recurrent left-sided trigeminal neuralgia by Dr. Gonzales on 6/5/2017       HISTORY OF PRESENT ILLNESS:  This is a 67 year old male with a long-standing history of left-sided trigeminal neuralgia involving all 3 divisions. He has had glycerol injection and SRS by an outside provide years ago. He underwent the first balloon compression by Dr. Jamie Orozco in 9/2016 and most recently in 6/2017 by Dr. Paulino Gonzales when he developed recurrent pain in left V1 and V2 distributions.     He returns today for a 2nd postop followup. It has been one week since he weaned off Carbatrol and Neurontin. He denies recurrent trigeminal neuralgia except for a crawling sensation in the left side of his face intermittently since the procedure. He said the sensation makes him nervous, because this was how he felt before it progressed to twinges, small and then full blown electric- shock like pain attacks in his face. He said he has had baseline decreased sensation in all left trigeminal nerve dermatone, but predominantly in V2 since the glycerol injection more than 20 years ago. He has otherwise no (new) concerns from the procedure. He wants to go over microvascular decompression today and possibly consider having this done when the pain reoccurs.       INTERVAL HEALTH HISTORY:  Past Medical History:   Diagnosis Date     AAA (abdominal aortic aneurysm) (H)      COPD (chronic obstructive pulmonary disease) (H)     moderate     Emphysema      Hyperlipidemia      Hypertension      Hypomagnesemia 1/2/2015     JAMES (obstructive sleep apnea) 9/3/2014         PTSD (post-traumatic stress disorder)      Trigeminal neuralgia        CURRENT MEDICATIONS:  Current Outpatient Prescriptions   Medication Sig Dispense Refill     cefuroxime (CEFTIN) 500 MG tablet Take 1 tablet (500 mg) by mouth 2 times  daily 20 tablet 0     cetirizine (ZYRTEC) 10 MG tablet Take 1 tablet (10 mg) by mouth every evening 30 tablet 1     montelukast (SINGULAIR) 10 MG tablet Take 1 tablet (10 mg) by mouth At Bedtime 30 tablet 1     potassium chloride SA (K-DUR/KLOR-CON M) 20 MEQ CR tablet Take 1 tablet (20 mEq) by mouth daily 90 tablet 1     oxyCODONE (ROXICODONE) 10 MG IR tablet Take 1-2 tablets (10-20 mg) by mouth every 3 hours as needed for moderate to severe pain 30 tablet 0     warfarin (JANTOVEN) 2.5 MG tablet Take 7.5 mg (3 tablets) on Tuesday, Thursday, Saturday and 5 mg (2 tablets) all other days or as directed by coumadin clinic. 235 tablet 0     carBAMazepine (CARBATROL) 100 MG 12 hr capsule Take 1 capsule (100 mg) by mouth 2 times daily 60 capsule 0     gabapentin (NEURONTIN) 250 MG/5ML solution Take 2 mLs (100 mg) by mouth every 12 hours 450 mL 0     losartan (COZAAR) 50 MG tablet Take 2 tablets (100 mg) by mouth daily 180 tablet 3     buPROPion (WELLBUTRIN SR) 150 MG 12 hr tablet Take 1 tablet (150 mg) by mouth 2 times daily 180 tablet 3     fluticasone-salmeterol (ADVAIR) 250-50 MCG/DOSE diskus inhaler Inhale 1 puff into the lungs 2 times daily 3 Inhaler 3     albuterol (PROAIR HFA/PROVENTIL HFA/VENTOLIN HFA) 108 (90 BASE) MCG/ACT Inhaler Inhale 2 puffs into the lungs every 4 hours as needed for shortness of breath / dyspnea 6 Inhaler 3     fluticasone (FLONASE) 50 MCG/ACT spray USE ONE TO TWO SPRAYS IN EACH NOSTRIL EVERY DAY 16 g 6     Respiratory Therapy Supplies (NEBULIZER/TUBING/MOUTHPIECE) KIT Use every 4-6 hours PRN 1 kit 11     nystatin (MYCOSTATIN) 130858 UNIT/ML suspension Take 5 mLs (500,000 Units) by mouth 3 times daily 60 mL 0     metoprolol (TOPROL-XL) 50 MG 24 hr tablet Take 1 tablet (50 mg) by mouth daily 90 tablet 3     ipratropium - albuterol 0.5 mg/2.5 mg/3 mL (DUONEB) 0.5-2.5 (3) MG/3ML neb solution Take 1 vial (3 mLs) by nebulization every 4 hours as needed for shortness of breath / dyspnea or wheezing  "540 vial 3     amiodarone (PACERONE/CODARONE) 200 MG tablet Take 0.5 tablets (100 mg) by mouth daily 45 tablet 3     torsemide (DEMADEX) 20 MG tablet Take 10 mg po daily. Take an extra 10mg po daily as needed for swelling. 180 tablet 3     senna-docusate (SENOKOT-S;PERICOLACE) 8.6-50 MG per tablet Take 2 tablets by mouth 2 times daily       order for DME Equipment being ordered: Nebulizer 1 Device 0       ALLERGIES:  Contrast dye      REVIEW OF SYSTEMS:  10 point ROS neg other than the symptoms noted above in the HPI and PMH.      PHYSICAL EXAMINATION:    Filed Vitals:  /78 (BP Location: Left arm, Patient Position: Sitting, Cuff Size: Adult Regular)  Pulse 56  Ht 1.778 m (5' 10\")  Wt 113.4 kg (250 lb)  BMI 35.87 kg/m2     Patient Supplied Answers To the  Pain Questionnaire  [facility] Pain -  Patient Entered Questionnaire/Answers 7/26/2017   What number best describes your pain right now:  0 = No pain  to  10 = Worst pain imaginable 0   How would you describe the pain? other   Which of the following worsen your pain? nothing worsens the pain   Which of the following improve or reduce your pain?  medication, nothing relieves the pain   What number best describes your average pain for the past week:  0 = No pain  to  10 = Worst pain imaginable 0   What number best describes your LOWEST pain in past 24 hours:  0 = No pain  to  10 = Worst pain imaginable 0   What number best describes your WORST pain in past 24 hours:  0 = No pain  to  10 = Worst pain imaginable 0   What non-medicine treatments have you already had for your pain? surgery   Have you tried treating your pain with medication?  Yes   Are you currently taking medications for your pain? No     Appearance: Comfortable and relaxed  LOC and Cognition:  Normal:   Alert and oriented to time, person and place for age, Appropriate and fluent speech for age, Follows commands appropriately for age and Affect pleasant, behavior cooperative  Focus neurologic exam: "   Bilateral corneal reflex is intact. Masseter and pterygoid muscle strength are normal. He has hypoesthesia in all 3 trigeminal nerve branch dermatone including the left cornea. Facial movement are intact and symmetric.       ASSESSMENT: Left-sided trigeminal neuralgia in remission after repeat balloon compression on 6/5/2017      RECOMMENDATIONS: We went over MVD in detail per patient's request. The patient was informed that the relief is often immediate and long lived with MVD preserving 10-12 years in 70% of patients. The incidence of facial anesthesia is much less than percutaneous procedures. He was told that surgery takes about 3 hours to complete followed by 3-4 days of hospitalization and another 4-6 weeks of resting at home for recovery. Potential complications associated with MVD include but not limited to CSF leak, meningitis, partial or complete deafness, transient diplopia, transient facial nerve dysfunction, cerebellar injuries, increased facial numbness, dysesthesia, possible failure and rarely stroke and death.   However, given his multiple co-morbidities and anxiety about recurrence within the next months, I suggest that he schedule a followup appointment to reassess his status and review all surgical options, including MVD again with Dr. Jamie Orozco in a couple of months. He is agreeable to this. We will have our office schedule the appointment accordingly.   I also told him to feel free to call our office earlier if there is any concerns or questions.   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 We told him that there is no limitation for this and gave him a written letter per his request.      Shanika Pelayo, SAHRA, APRN, FNP-BC  [facility]  Department of Neurosurgery  Phone: [phone]  Fax: [phone]

## 2017-07-26 NOTE — MR AVS SNAPSHOT
After Visit Summary   7/26/2017    Home Valdez    MRN: [id number]           Patient Information     Date Of Birth          1950        Visit Information        Provider Department      7/26/2017 8:30 AM Shanika Pelayo APRN [facility] Neurosurgery        Today's Diagnoses     Trigeminal neuralgia    -  1    Surgical followup          Care Instructions    1. Please return to clinic in 3 months for follow-up with Dr. Orozco.     2. Please call the neurosurgery clinic at [phone] with further questions or concerns.           Follow-ups after your visit        Your next 10 appointments already scheduled     Jul 27, 2017 10:30 AM CDT   [facility] with  [facility]   [facility]    [address] [facility] [id number] [phone]            Nov 08, 2017  9:30 AM CST   (Arrive by 9:15 AM)   Return Visit with Jamie Orozco MD   [facility] Neurosurgery ([facility])    [address] [address] [phone]              Who to contact     Please call your clinic at [phone] to:    Ask questions about your health    Make or cancel appointments    Discuss your medicines    Learn about your test results    Speak to your doctor   If you have compliments or concerns about an experience at your clinic, or if you wish to file a complaint, please contact [facility] Patient Relations at [phone] or email us at [email or web address]         Additional Information About Your Visit        MyChart Information     [email or web address] is an electronic gateway that provides easy, online access to your medical records. With [email or web address], you can request a clinic appointment, read your test results, renew a prescription or communicate with your care team.     To sign up for H-umust visit the website at [email or web address]   You will be asked to enter the  "access code listed below, as well as some personal information. Please follow the directions to create your username and password.     Your access code is: [email or web address]  Expires: 2017 10:47 AM     Your access code will  in 90 days. If you need help or a new code, please contact your [facility] or call [phone] for assistance.        Care EveryWhere ID     This is your Care EveryWhere ID. This could be used by other organizations to access your [facility] medical records  [id number]        Your Vitals Were     Pulse Height BMI (Body Mass Index)             56 1.778 m (5' 10\") 35.87 kg/m2          Blood Pressure from Last 3 Encounters:   17 147/78   17 132/78   17 144/78    Weight from Last 3 Encounters:   17 113.4 kg (250 lb)   17 113.4 kg (250 lb)   17 115.7 kg (255 lb)              Today, you had the following     No orders found for display       Primary Care Provider Office Phone # Fax #    Sandip Home Vega [phone]       [address]   [facility] [address]        Equal Access to Services     MILKA ORTIZ AH: Hadii aad ku hadasho Soomaali, waaxda luqadaha, qaybta kaalmada adeegyada, waxay idiin haysandran jessica cotearapillo lacasey bhandari. So [facility] [phone].    ATENCIÓN: Si habla español, tiene a calabrese disposición servicios gratuitos de asistencia lingüística. Llame al [phone].    We comply with applicable federal civil rights laws and [place] laws. We do not discriminate on the basis of race, color, national origin, age, disability sex, sexual orientation or gender identity.            Thank you!     Thank you for choosing [facility]  for your care. Our goal is always to provide you with excellent care. Hearing back from our patients is one way we can continue to improve our services. Please take a few minutes to complete the written survey that you may receive in the mail after " your visit with us. Thank you!             Your Updated Medication List - Protect others around you: Learn how to safely use, store and throw away your medicines at www.disposemymeds.org.          This list is accurate as of: 7/26/17  9:45 AM.  Always use your most recent med list.                   Brand Name Dispense Instructions for use Diagnosis    albuterol 108 (90 BASE) MCG/ACT Inhaler    PROAIR HFA/PROVENTIL HFA/VENTOLIN HFA    6 Inhaler    Inhale 2 puffs into the lungs every 4 hours as needed for shortness of breath / dyspnea    Chronic obstructive pulmonary disease with acute exacerbation (H)       amiodarone 200 MG tablet    PACERONE/CODARONE    45 tablet    Take 0.5 tablets (100 mg) by mouth daily    Atrial fibrillation (H)       buPROPion 150 MG 12 hr tablet    WELLBUTRIN SR    180 tablet    Take 1 tablet (150 mg) by mouth 2 times daily    Personal history of tobacco use, presenting hazards to health       carBAMazepine 100 MG 12 hr capsule    CARBATROL    60 capsule    Take 1 capsule (100 mg) by mouth 2 times daily    Trigeminal neuralgia       cefuroxime 500 MG tablet    CEFTIN    20 tablet    Take 1 tablet (500 mg) by mouth 2 times daily    Panlobular emphysema (H)       cetirizine 10 MG tablet    zyrTEC    30 tablet    Take 1 tablet (10 mg) by mouth every evening    Chronic seasonal allergic rhinitis due to other allergen       fluticasone 50 MCG/ACT spray    FLONASE    16 g    USE ONE TO TWO SPRAYS IN EACH NOSTRIL EVERY DAY    Chronic rhinitis       fluticasone-salmeterol 250-50 MCG/DOSE diskus inhaler    ADVAIR    3 Inhaler    Inhale 1 puff into the lungs 2 times daily    Panlobular emphysema (H)       gabapentin 250 MG/5ML solution    NEURONTIN    450 mL    Take 2 mLs (100 mg) by mouth every 12 hours    Trigeminal neuralgia       ipratropium - albuterol 0.5 mg/2.5 mg/3 mL 0.5-2.5 (3) MG/3ML neb solution    DUONEB    540 vial    Take 1 vial (3 mLs) by nebulization every 4 hours as needed for  shortness of breath / dyspnea or wheezing    COPD exacerbation (H)       losartan 50 MG tablet    COZAAR    180 tablet    Take 2 tablets (100 mg) by mouth daily    Atrial fibrillation with RVR (H), Hypertension goal BP (blood pressure) < 140/90       metoprolol 50 MG 24 hr tablet    TOPROL-XL    90 tablet    Take 1 tablet (50 mg) by mouth daily    Atrial fibrillation with RVR (H), CHF (congestive heart failure) (H)       montelukast 10 MG tablet    SINGULAIR    30 tablet    Take 1 tablet (10 mg) by mouth At Bedtime    Chronic seasonal allergic rhinitis due to other allergen       nebulizer/tubing/mouthpiece Kit     1 kit    Use every 4-6 hours PRN    Chronic obstructive pulmonary disease, unspecified COPD type (H)       nystatin 457838 UNIT/ML suspension    MYCOSTATIN    60 mL    Take 5 mLs (500,000 Units) by mouth 3 times daily    Candidiasis of mouth       order for DME     1 Device    Equipment being ordered: Nebulizer    COPD (chronic obstructive pulmonary disease) (H)       oxyCODONE 10 MG IR tablet    ROXICODONE    30 tablet    Take 1-2 tablets (10-20 mg) by mouth every 3 hours as needed for moderate to severe pain    Trigeminal neuralgia       potassium chloride SA 20 MEQ CR tablet    K-DUR/KLOR-CON M    90 tablet    Take 1 tablet (20 mEq) by mouth daily    CHF (congestive heart failure) (H), Peripheral edema       senna-docusate 8.6-50 MG per tablet    SENOKOT-S;PERICOLACE     Take 2 tablets by mouth 2 times daily        torsemide 20 MG tablet    DEMADEX    180 tablet    Take 10 mg po daily. Take an extra 10mg po daily as needed for swelling.    Chronic diastolic congestive heart failure (H)       warfarin 2.5 MG tablet    JANTOVEN    235 tablet    Take 7.5 mg (3 tablets) on Tuesday, Thursday, Saturday and 5 mg (2 tablets) all other days or as directed by coumadin clinic.    Atrial fibrillation with RVR (H)

## 2017-07-26 NOTE — LETTER
Home Valdez  [address] [facility] [address]        July 26, 2017           To Whom It May Concern:    Home Valdez was seen in our clinic. 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     Please do not hesitate to contact our clinic with further questions or concerns.       Sincerely,        REJI Palencia CNP

## 2017-07-26 NOTE — LETTER
7/26/2017       RE: Home Valdez  [address]  [facility] [address]     Dear Colleague,    Thank you for referring your patient, Home Valdez, to the [facility] NEUROSURGERY at [facility]. Please see a copy of my visit note below.    REASON FOR VISIT: Postop followup    Chief Complaint   Patient presents with     Surgical Followup     Postop followup, s/p balloon compression for recurrent left-sided trigeminal neuralgia by Dr. Gonzales on 6/5/2017     HISTORY OF PRESENT ILLNESS:  This is a 67 year old male with a long-standing history of left-sided trigeminal neuralgia involving all 3 divisions. He has had glycerol injection and SRS by an outside provide years ago. He underwent the first balloon compression by Dr. Jamie Orozco in 9/2016 and most recently in 6/2017 by Dr. Paulino Gonzales when he developed recurrent pain in left V1 and V2 distributions.     He returns today for a 2nd postop followup. It has been one week since he weaned off Carbatrol and Neurontin. He denies recurrent trigeminal neuralgia except for a crawling sensation in the left side of his face intermittently since the procedure. He said the sensation makes him nervous, because this was how he felt before it progressed to twinges, small and then full blown electric- shock like pain attacks in his face. He said he has had baseline decreased sensation in all left trigeminal nerve dermatone, but predominantly in V2 since the glycerol injection more than 20 years ago. He has otherwise no (new) concerns from the procedure. He wants to go over microvascular decompression today and possibly consider having this done when the pain reoccurs.     INTERVAL HEALTH HISTORY:  Past Medical History:   Diagnosis Date     AAA (abdominal aortic aneurysm) (H)      COPD (chronic obstructive pulmonary disease) (H)     moderate     Emphysema      Hyperlipidemia      Hypertension      Hypomagnesemia 1/2/2015     JAMES  (obstructive sleep apnea) 9/3/2014         PTSD (post-traumatic stress disorder)      Trigeminal neuralgia        CURRENT MEDICATIONS:  Current Outpatient Prescriptions   Medication Sig Dispense Refill     cefuroxime (CEFTIN) 500 MG tablet Take 1 tablet (500 mg) by mouth 2 times daily 20 tablet 0     cetirizine (ZYRTEC) 10 MG tablet Take 1 tablet (10 mg) by mouth every evening 30 tablet 1     montelukast (SINGULAIR) 10 MG tablet Take 1 tablet (10 mg) by mouth At Bedtime 30 tablet 1     potassium chloride SA (K-DUR/KLOR-CON M) 20 MEQ CR tablet Take 1 tablet (20 mEq) by mouth daily 90 tablet 1     oxyCODONE (ROXICODONE) 10 MG IR tablet Take 1-2 tablets (10-20 mg) by mouth every 3 hours as needed for moderate to severe pain 30 tablet 0     warfarin (JANTOVEN) 2.5 MG tablet Take 7.5 mg (3 tablets) on Tuesday, Thursday, Saturday and 5 mg (2 tablets) all other days or as directed by coumadin clinic. 235 tablet 0     carBAMazepine (CARBATROL) 100 MG 12 hr capsule Take 1 capsule (100 mg) by mouth 2 times daily 60 capsule 0     gabapentin (NEURONTIN) 250 MG/5ML solution Take 2 mLs (100 mg) by mouth every 12 hours 450 mL 0     losartan (COZAAR) 50 MG tablet Take 2 tablets (100 mg) by mouth daily 180 tablet 3     buPROPion (WELLBUTRIN SR) 150 MG 12 hr tablet Take 1 tablet (150 mg) by mouth 2 times daily 180 tablet 3     fluticasone-salmeterol (ADVAIR) 250-50 MCG/DOSE diskus inhaler Inhale 1 puff into the lungs 2 times daily 3 Inhaler 3     albuterol (PROAIR HFA/PROVENTIL HFA/VENTOLIN HFA) 108 (90 BASE) MCG/ACT Inhaler Inhale 2 puffs into the lungs every 4 hours as needed for shortness of breath / dyspnea 6 Inhaler 3     fluticasone (FLONASE) 50 MCG/ACT spray USE ONE TO TWO SPRAYS IN EACH NOSTRIL EVERY DAY 16 g 6     Respiratory Therapy Supplies (NEBULIZER/TUBING/MOUTHPIECE) KIT Use every 4-6 hours PRN 1 kit 11     nystatin (MYCOSTATIN) 635365 UNIT/ML suspension Take 5 mLs (500,000 Units) by mouth 3 times daily 60 mL 0  "    metoprolol (TOPROL-XL) 50 MG 24 hr tablet Take 1 tablet (50 mg) by mouth daily 90 tablet 3     ipratropium - albuterol 0.5 mg/2.5 mg/3 mL (DUONEB) 0.5-2.5 (3) MG/3ML neb solution Take 1 vial (3 mLs) by nebulization every 4 hours as needed for shortness of breath / dyspnea or wheezing 540 vial 3     amiodarone (PACERONE/CODARONE) 200 MG tablet Take 0.5 tablets (100 mg) by mouth daily 45 tablet 3     torsemide (DEMADEX) 20 MG tablet Take 10 mg po daily. Take an extra 10mg po daily as needed for swelling. 180 tablet 3     senna-docusate (SENOKOT-S;PERICOLACE) 8.6-50 MG per tablet Take 2 tablets by mouth 2 times daily       order for DME Equipment being ordered: Nebulizer 1 Device 0     ALLERGIES:  Contrast dye    REVIEW OF SYSTEMS:  10 point ROS neg other than the symptoms noted above in the HPI and PMH.    PHYSICAL EXAMINATION:    Filed Vitals:  /78 (BP Location: Left arm, Patient Position: Sitting, Cuff Size: Adult Regular)  Pulse 56  Ht 1.778 m (5' 10\")  Wt 113.4 kg (250 lb)  BMI 35.87 kg/m2     Patient Supplied Answers To the [facility] Pain Questionnaire  [facility] Pain -  Patient Entered Questionnaire/Answers 7/26/2017   What number best describes your pain right now:  0 = No pain  to  10 = Worst pain imaginable 0   How would you describe the pain? other   Which of the following worsen your pain? nothing worsens the pain   Which of the following improve or reduce your pain?  medication, nothing relieves the pain   What number best describes your average pain for the past week:  0 = No pain  to  10 = Worst pain imaginable 0   What number best describes your LOWEST pain in past 24 hours:  0 = No pain  to  10 = Worst pain imaginable 0   What number best describes your WORST pain in past 24 hours:  0 = No pain  to  10 = Worst pain imaginable 0   What non-medicine treatments have you already had for your pain? surgery   Have you tried treating your pain with medication?  Yes   Are you currently taking medications for " your pain? No     Appearance: Comfortable and relaxed  LOC and Cognition:  Normal:   Alert and oriented to time, person and place for age, Appropriate and fluent speech for age, Follows commands appropriately for age and Affect pleasant, behavior cooperative  Focus neurologic exam:   Bilateral corneal reflex is intact. Masseter and pterygoid muscle strength are normal. He has hypoesthesia in all 3 trigeminal nerve branch dermatone including the left cornea. Facial movement are intact and symmetric.     ASSESSMENT: Left-sided trigeminal neuralgia in remission after repeat balloon compression on 6/5/2017    RECOMMENDATIONS: We went over MVD in detail per patient's request. The patient was informed that the relief is often immediate and long lived with MVD preserving 10-12 years in 70% of patients. The incidence of facial anesthesia is much less than percutaneous procedures. He was told that surgery takes about 3 hours to complete followed by 3-4 days of hospitalization and another 4-6 weeks of resting at home for recovery. Potential complications associated with MVD include but not limited to CSF leak, meningitis, partial or complete deafness, transient diplopia, transient facial nerve dysfunction, cerebellar injuries, increased facial numbness, dysesthesia, possible failure and rarely stroke and death.   However, given his multiple co-morbidities and anxiety about recurrence within the next months, I suggest that he schedule a followup appointment to reassess his status and review all surgical options, including MVD again with Dr. Jamie Orozco in a couple of months. He is agreeable to this. We will have our office schedule the appointment accordingly.   I also told him to feel free to call our office earlier if there is any concerns or questions.   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 We told him that there is no limitation for this and gave him a  written letter per his request.    Again, thank you for allowing me to participate in the care of your patient.      Sincerely,    REJI Palencia CNP

## 2017-07-27 ENCOUNTER — TELEPHONE (OUTPATIENT)
Dept: ANTICOAGULATION | Facility: OTHER | Age: 67
End: 2017-07-27

## 2017-07-27 ENCOUNTER — ANTICOAGULATION THERAPY VISIT (OUTPATIENT)
Dept: ANTICOAGULATION | Facility: OTHER | Age: 67
End: 2017-07-27
Payer: MEDICARE

## 2017-07-27 DIAGNOSIS — Z79.01 LONG-TERM (CURRENT) USE OF ANTICOAGULANTS: ICD-10-CM

## 2017-07-27 DIAGNOSIS — I48.91 ATRIAL FIBRILLATION WITH RVR (H): ICD-10-CM

## 2017-07-27 DIAGNOSIS — Z86.79 HISTORY OF ATRIAL FIBRILLATION: Primary | ICD-10-CM

## 2017-07-27 LAB — INR POINT OF CARE: 2.5 (ref 0.86–1.14)

## 2017-07-27 PROCEDURE — 85610 PROTHROMBIN TIME: CPT | Mod: QW

## 2017-07-27 PROCEDURE — 99207 ZZC NO CHARGE NURSE ONLY: CPT

## 2017-07-27 PROCEDURE — 36416 COLLJ CAPILLARY BLOOD SPEC: CPT

## 2017-07-27 NOTE — MR AVS SNAPSHOT
Home Valdez   7/27/2017 3:15 PM   Anticoagulation Therapy Visit    Description:  67 year old male   Provider:  CAMILO ANTI COAG   Department:   Anticoag           INR as of 7/27/2017     Today's INR 2.5      Anticoagulation Summary as of 7/27/2017     INR goal 2.0-3.0   Today's INR 2.5   Full instructions 5 mg on Mon; 7.5 mg all other days   Next INR check 8/7/2017    Indications   Atrial fibrillation with RVR (H) [I48.91]  Long-term (current) use of anticoagulants [Z79.01] [Z79.01]         Your next Anticoagulation Clinic appointment(s)     Jul 27, 2017  3:15 PM CDT   An[facility]agulation Visit with  ANTI COAG   [facility]    [address] [facility] [address]   [phone]            Aug 07, 2017  8:45 AM CDT   Anticoagulation Visit with  ANTI COAG   [facility]    [address] [facility] [address]   [phone]              Contact Numbers     Clinic Number:         July 2017 Details    Sun Mon Tue Wed Thu Fri Sat           1                 2               3               4               5               6               7               8                 9               10               11               12               13               14               15                 16               17               18               19               20               21               22                 23               24               25               26               27      7.5 mg   See details      28      7.5 mg         29      7.5 mg           30      7.5 mg         31      5 mg               Date Details   07/27 This INR check               How to take your warfarin dose     To take:  5 mg Take 2 of the 2.5 mg tablets.    To take:  7.5 mg Take 3 of the 2.5 mg tablets.           August 2017 Details    Sun Mon Tue Wed Thu Fri Sat       1      7.5 mg         2      7.5 mg         3      7.5 mg         4      7.5 mg         5       7.5 mg           6      7.5 mg         7            8               9               10               11               12                 13               14               15               16               17               18               19                 20               21               22               23               24               25               26                 27               28               29               30               31                  Date Details   No additional details    Date of next INR:  8/7/2017         How to take your warfarin dose     To take:  5 mg Take 2 of the 2.5 mg tablets.    To take:  7.5 mg Take 3 of the 2.5 mg tablets.

## 2017-07-27 NOTE — TELEPHONE ENCOUNTER
Please review and renew this patients INR referral, orders pending. Thank you!      Shameka Carey RN, BSN

## 2017-07-27 NOTE — PROGRESS NOTES
ANTICOAGULATION FOLLOW-UP CLINIC VISIT    Patient Name:  Home Valdez  Date:  7/27/2017  Contact Type:  Face to Face    SUBJECTIVE:     Patient Findings     Positives Antibiotic use or infection (Pt just finished a medrol dose ailyn and is finishing up an antibiotic. ), No Problem Findings           OBJECTIVE    INR Protime   Date Value Ref Range Status   07/27/2017 2.5 (A) 0.86 - 1.14 Final       ASSESSMENT / PLAN  INR assessment THER    Recheck INR In: 10 DAYS    INR Location Clinic      Anticoagulation Summary as of 7/27/2017     INR goal 2.0-3.0   Today's INR 2.5   Maintenance plan 5 mg (2.5 mg x 2) on Mon; 7.5 mg (2.5 mg x 3) all other days   Full instructions 5 mg on Mon; 7.5 mg all other days   Weekly total 50 mg   No change documented Shameka Carey RN   Plan last modified Shameka Carey RN (6/30/2017)   Next INR check 8/7/2017   Target end date     Indications   Atrial fibrillation with RVR (H) [I48.91]  Long-term (current) use of anticoagulants [Z79.01] [Z79.01]         Anticoagulation Episode Summary     INR check location     Preferred lab     Send INR reminders to [facility]    Comments 2.5 MG TABLETS, likes print out       Anticoagulation Care Providers     Provider Role Specialty Phone number    Neo Galicia MD [facility] [phone]            See the Encounter Report to view Anticoagulation Flowsheet and Dosing Calendar (Go to Encounters tab in chart review, and find the Anticoagulation Therapy Visit)    Dosage adjustment made based on physician directed care plan.    Shameka Carey RN

## 2017-08-07 ENCOUNTER — ANTICOAGULATION THERAPY VISIT (OUTPATIENT)
Dept: ANTICOAGULATION | Facility: OTHER | Age: 67
End: 2017-08-07
Payer: MEDICARE

## 2017-08-07 DIAGNOSIS — Z79.01 LONG-TERM (CURRENT) USE OF ANTICOAGULANTS: ICD-10-CM

## 2017-08-07 DIAGNOSIS — I48.91 ATRIAL FIBRILLATION WITH RVR (H): ICD-10-CM

## 2017-08-07 LAB — INR POINT OF CARE: 3.2 (ref 0.86–1.14)

## 2017-08-07 PROCEDURE — 36416 COLLJ CAPILLARY BLOOD SPEC: CPT

## 2017-08-07 PROCEDURE — 85610 PROTHROMBIN TIME: CPT | Mod: QW

## 2017-08-07 PROCEDURE — 99207 ZZC NO CHARGE NURSE ONLY: CPT

## 2017-08-07 NOTE — MR AVS SNAPSHOT
Home Valdez   8/7/2017 8:45 AM   Anticoagulation Therapy Visit    Description:  67 year old male   Provider:  CAMILO ANTI COAG   Department:  [facility]           INR as of 8/7/2017     Today's INR 3.2!      Anticoagulation Summary as of 8/7/2017     INR goal 2.0-3.0   Today's INR 3.2!   Full instructions 8/9: 5 mg; Otherwise 5 mg on Mon; 7.5 mg all other days   Next INR check 8/11/2017    Indications   Atrial fibrillation with RVR (H) [I48.91]  Long-term (current) use of anticoagulants [Z79.01] [Z79.01]         Your next Anticoagulation Clinic appointment(s)     Aug 07, 2017  8:45 AM CDT   Anticoagulation Visit with CAMILO ANTI COAG   [facility] ([facility]    [address] [facility] [address]   [phone]            Aug 11, 2017  9:15 AM CDT   Anticoagulation Visit with CAMILO ANTI COAG   [facility] ([facility]    [address] [facility] [address]   [phone]              Contact Numbers     Clinic Number:         August 2017 Details    Sun Mon Tue Wed Thu Fri Sat       1               2               3               4               5                 6               7      5 mg   See details      8      7.5 mg         9      5 mg         10      7.5 mg         11            12                 13               14               15               16               17               18               19                 20               21               22               23               24               25               26                 27               28               29               30               31                  Date Details   08/07 This INR check       Date of next INR:  8/11/2017         How to take your warfarin dose     To take:  5 mg Take 2 of the 2.5 mg tablets.    To take:  7.5 mg Take 3 of the 2.5 mg tablets.

## 2017-08-07 NOTE — PROGRESS NOTES
ANTICOAGULATION FOLLOW-UP CLINIC VISIT    Patient Name:  Hmoe Valdez  Date:  8/7/2017  Contact Type:  Face to Face    SUBJECTIVE:     Patient Findings     Positives Antibiotic use or infection (Started on an antibiotic from the dentist)           OBJECTIVE    INR Protime   Date Value Ref Range Status   08/07/2017 3.2 (A) 0.86 - 1.14 Final       ASSESSMENT / PLAN  INR assessment THER    Recheck INR In: 4 DAYS    INR Location Clinic      Anticoagulation Summary as of 8/7/2017     INR goal 2.0-3.0   Today's INR 3.2!   Maintenance plan 5 mg (2.5 mg x 2) on Mon; 7.5 mg (2.5 mg x 3) all other days   Full instructions 8/9: 5 mg; Otherwise 5 mg on Mon; 7.5 mg all other days   Weekly total 50 mg   Plan last modified Shameka Carey RN (6/30/2017)   Next INR check 8/11/2017   Target end date     Indications   Atrial fibrillation with RVR (H) [I48.91]  Long-term (current) use of anticoagulants [Z79.01] [Z79.01]         Anticoagulation Episode Summary     INR check location     Preferred lab     Send INR reminders to [facility]    Comments 2.5 MG TABLETS, likes print out       Anticoagulation Care Providers     Provider Role Specialty Phone number    eNo Galicia MD [facility] [phone]            See the Encounter Report to view Anticoagulation Flowsheet and Dosing Calendar (Go to Encounters tab in chart review, and find the Anticoagulation Therapy Visit)    Dosage adjustment made based on physician directed care plan.    Shameka Carey RN

## 2017-08-11 ENCOUNTER — ANTICOAGULATION THERAPY VISIT (OUTPATIENT)
Dept: ANTICOAGULATION | Facility: OTHER | Age: 67
End: 2017-08-11
Payer: MEDICARE

## 2017-08-11 DIAGNOSIS — I48.91 ATRIAL FIBRILLATION WITH RVR (H): ICD-10-CM

## 2017-08-11 DIAGNOSIS — Z79.01 LONG-TERM (CURRENT) USE OF ANTICOAGULANTS: ICD-10-CM

## 2017-08-11 LAB — INR POINT OF CARE: 3.6 (ref 0.86–1.14)

## 2017-08-11 PROCEDURE — 36416 COLLJ CAPILLARY BLOOD SPEC: CPT

## 2017-08-11 PROCEDURE — 99207 ZZC NO CHARGE NURSE ONLY: CPT

## 2017-08-11 PROCEDURE — 85610 PROTHROMBIN TIME: CPT | Mod: QW

## 2017-08-11 NOTE — PROGRESS NOTES
ANTICOAGULATION FOLLOW-UP CLINIC VISIT    Patient Name:  Home Valdez  Date:  8/11/2017  Contact Type:  Face to Face    SUBJECTIVE:     Patient Findings     Positives Antibiotic use or infection (Still taking antibiotics from the Dentist - 2 days left)           OBJECTIVE    INR Protime   Date Value Ref Range Status   08/11/2017 3.6 (A) 0.86 - 1.14 Final       ASSESSMENT / PLAN  INR assessment SUPRA    Recheck INR In: 6 DAYS    INR Location Clinic      Anticoagulation Summary as of 8/11/2017     INR goal 2.0-3.0   Today's INR 3.6!   Maintenance plan 5 mg (2.5 mg x 2) on Mon; 7.5 mg (2.5 mg x 3) all other days   Full instructions 8/11: 2.5 mg; Otherwise 5 mg on Mon; 7.5 mg all other days   Weekly total 50 mg   Plan last modified Shameka Carey RN (6/30/2017)   Next INR check 8/17/2017   Target end date     Indications   Atrial fibrillation with RVR (H) [I48.91]  Long-term (current) use of anticoagulants [Z79.01] [Z79.01]         Anticoagulation Episode Summary     INR check location     Preferred lab     Send INR reminders to [facility] PO[facility]    Comments 2.5 MG TABLETS, likes print out       Anticoagulation Care Providers     Provider Role Specialty Phone number    Neo Galicia MD [facility] [phone]            See the Encounter Report to view Anticoagulation Flowsheet and Dosing Calendar (Go to Encounters tab in chart review, and find the Anticoagulation Therapy Visit)    Dosage adjustment made based on physician directed care plan.    Shameka Carey RN

## 2017-08-11 NOTE — MR AVS SNAPSHOT
Home Valdez   8/11/2017 11:30 AM   Anticoagulation Therapy Visit    Description:  67 year old male   Provider:  CAMILO ANTI DANI   Department:  [facility]           INR as of 8/11/2017     Today's INR 3.6!      Anticoagulation Summary as of 8/11/2017     INR goal 2.0-3.0   Today's INR 3.6!   Full instructions 8/11: 2.5 mg; Otherwise 5 mg on Mon; 7.5 mg all other days   Next INR check 8/17/2017    Indications   Atrial fibrillation with RVR (H) [I48.91]  Long-term (current) use of anticoagulants [Z79.01] [Z79.01]         Your next Anticoagulation Clinic appointment(s)     Aug 17, 2017  8:15 AM CDT   [facility] with [facility]TI [facility]AG   [facility]    [address] [facility] [address]   [phone]              Contact Numbers     Clinic Number:         August 2017 Details    Sun Mon Tue Wed Thu Fri Sat       1               2               3               4               5                 6               7               8               9               10               11      2.5 mg   See details      12      7.5 mg           13      7.5 mg         14      5 mg         15      7.5 mg         16      7.5 mg         17            18               19                 20               21               22               23               24               25               26                 27               28               29               30               31                  Date Details   08/11 This INR check       Date of next INR:  8/17/2017         How to take your warfarin dose     To take:  2.5 mg Take 1 of the 2.5 mg tablets.    To take:  5 mg Take 2 of the 2.5 mg tablets.    To take:  7.5 mg Take 3 of the 2.5 mg tablets.

## 2017-08-12 DIAGNOSIS — I48.91 ATRIAL FIBRILLATION WITH RVR (H): ICD-10-CM

## 2017-08-14 RX ORDER — WARFARIN SODIUM 2.5 MG/1
TABLET ORAL
Qty: 250 TABLET | Refills: 0 | Status: SHIPPED | OUTPATIENT
Start: 2017-08-14 | End: 2017-11-22

## 2017-08-14 NOTE — TELEPHONE ENCOUNTER
warfarin (JANTOVEN) 2.5 MG tablet                                Last Written Prescription Date: 6/8/17  Last Fill Qty: 235, # refills: 0  Last Office Visit with G, P or [facility] prescribing provider: 7/19/17         Date and Result of Last PT/INR:   Lab Results   Component Value Date    INR 3.6 08/11/2017    INR 3.2 08/07/2017    INR 1.24 06/05/2017    INR 1.28 06/04/2017

## 2017-08-21 ENCOUNTER — ANTICOAGULATION THERAPY VISIT (OUTPATIENT)
Dept: ANTICOAGULATION | Facility: OTHER | Age: 67
End: 2017-08-21
Payer: MEDICARE

## 2017-08-21 DIAGNOSIS — Z79.01 LONG-TERM (CURRENT) USE OF ANTICOAGULANTS: ICD-10-CM

## 2017-08-21 DIAGNOSIS — I48.91 ATRIAL FIBRILLATION WITH RVR (H): ICD-10-CM

## 2017-08-21 LAB — INR POINT OF CARE: 3 (ref 0.86–1.14)

## 2017-08-21 PROCEDURE — 99207 ZZC NO CHARGE NURSE ONLY: CPT

## 2017-08-21 PROCEDURE — 85610 PROTHROMBIN TIME: CPT | Mod: QW

## 2017-08-21 PROCEDURE — 36416 COLLJ CAPILLARY BLOOD SPEC: CPT

## 2017-08-21 NOTE — MR AVS SNAPSHOT
Home Valdez   8/21/2017 8:15 AM   Anticoagulation Therapy Visit    Description:  67 year old male   Provider:  CAMILO ANTI COAG   Department:   Anticoag           INR as of 8/21/2017     Today's INR 3.0      Anticoagulation Summary as of 8/21/2017     INR goal 2.0-3.0   Today's INR 3.0   Full instructions 5 mg on Mon; 7.5 mg all other days   Next INR check 9/11/2017    Indications   Atrial fibrillation with RVR (H) [I48.91]  Long-term (current) use of anticoagulants [Z79.01] [Z79.01]         Your next Anticoagulation Clinic appointment(s)     Aug 21, 2017  8:15 AM CDT   Anticoagulation Visit with  ANTI COAG   [facility]    [address] [facility] [address]   [phone]            Sep 11, 2017  8:30 AM CDT   Anticoagulation Visit with  ANTI COAG   [facility]    [address] [facility] [address]   [phone]              Contact Numbers     Clinic Number:         August 2017 Details    Sun Mon Tue Wed Thu Fri Sat       1               2               3               4               5                 6               7               8               9               10               11               12                 13               14               15               16               17               18               19                 20               21      5 mg   See details      22      7.5 mg         23      7.5 mg         24      7.5 mg         25      7.5 mg         26      7.5 mg           27      7.5 mg         28      5 mg         29      7.5 mg         30      7.5 mg         31      7.5 mg            Date Details   08/21 This INR check               How to take your warfarin dose     To take:  5 mg Take 2 of the 2.5 mg tablets.    To take:  7.5 mg Take 3 of the 2.5 mg tablets.           September 2017 Details    Sun Mon Tue Wed Thu Fri Sat          1      7.5 mg         2      7.5 mg           3      7.5 mg          4      5 mg         5      7.5 mg         6      7.5 mg         7      7.5 mg         8      7.5 mg         9      7.5 mg           10      7.5 mg         11            12               13               14               15               16                 17               18               19               20               21               22               23                 24               25               26               27               28               29               30                Date Details   No additional details    Date of next INR:  9/11/2017         How to take your warfarin dose     To take:  5 mg Take 2 of the 2.5 mg tablets.    To take:  7.5 mg Take 3 of the 2.5 mg tablets.

## 2017-08-21 NOTE — PROGRESS NOTES
ANTICOAGULATION FOLLOW-UP CLINIC VISIT    Patient Name:  Home Valdez  Date:  8/21/2017  Contact Type:  Face to Face    SUBJECTIVE:     Patient Findings     Positives No Problem Findings           OBJECTIVE    INR Protime   Date Value Ref Range Status   08/21/2017 3.0 (A) 0.86 - 1.14 Final       ASSESSMENT / PLAN  INR assessment THER    Recheck INR In: 3 WEEKS    INR Location Clinic      Anticoagulation Summary as of 8/21/2017     INR goal 2.0-3.0   Today's INR 3.0   Maintenance plan 5 mg (2.5 mg x 2) on Mon; 7.5 mg (2.5 mg x 3) all other days   Full instructions 5 mg on Mon; 7.5 mg all other days   Weekly total 50 mg   No change documented Shameka Carey RN   Plan last modified Shameka Carey RN (6/30/2017)   Next INR check 9/11/2017   Target end date     Indications   Atrial fibrillation with RVR (H) [I48.91]  Long-term (current) use of anticoagulants [Z79.01] [Z79.01]         Anticoagulation Episode Summary     INR check location     Preferred lab     Send INR reminders to [facility]    Comments 2.5 MG TABLETS, likes print out       Anticoagulation Care Providers     Provider Role Specialty Phone number    Neo Galicia MD [facility] [phone]            See the Encounter Report to view Anticoagulation Flowsheet and Dosing Calendar (Go to Encounters tab in chart review, and find the Anticoagulation Therapy Visit)    Dosage adjustment made based on physician directed care plan.    Shameka Carey RN

## 2017-08-25 ENCOUNTER — ANTICOAGULATION THERAPY VISIT (OUTPATIENT)
Dept: ANTICOAGULATION | Facility: OTHER | Age: 67
End: 2017-08-25
Payer: MEDICARE

## 2017-08-25 DIAGNOSIS — I48.91 ATRIAL FIBRILLATION WITH RVR (H): ICD-10-CM

## 2017-08-25 DIAGNOSIS — Z79.01 LONG-TERM (CURRENT) USE OF ANTICOAGULANTS: ICD-10-CM

## 2017-08-25 LAB — INR POINT OF CARE: 2.6 (ref 0.86–1.14)

## 2017-08-25 PROCEDURE — 99207 ZZC NO CHARGE NURSE ONLY: CPT

## 2017-08-25 PROCEDURE — 85610 PROTHROMBIN TIME: CPT | Mod: QW

## 2017-08-25 PROCEDURE — 36416 COLLJ CAPILLARY BLOOD SPEC: CPT

## 2017-08-25 NOTE — PROGRESS NOTES
ANTICOAGULATION FOLLOW-UP CLINIC VISIT    Patient Name:  Home Valdez  Date:  8/25/2017  Contact Type:  Face to Face    SUBJECTIVE:     Patient Findings     Positives No Problem Findings    Comments Having an upcoming procedure on 9/13. INR needs to be below 2.5. I will see him back on Friday 9/8/17 and we will reevaluate. Shameka Carey RN, BSN             OBJECTIVE    INR Protime   Date Value Ref Range Status   08/25/2017 2.6 (A) 0.86 - 1.14 Final       ASSESSMENT / PLAN  INR assessment THER    Recheck INR In: 2 WEEKS    INR Location Clinic      Anticoagulation Summary as of 8/25/2017     INR goal 2.0-3.0   Today's INR 2.6   Maintenance plan 5 mg (2.5 mg x 2) on Mon; 7.5 mg (2.5 mg x 3) all other days   Full instructions 5 mg on Mon; 7.5 mg all other days   Weekly total 50 mg   No change documented Shameka Carey RN   Plan last modified Shameka Carey RN (6/30/2017)   Next INR check 9/8/2017   Target end date     Indications   Atrial fibrillation with RVR (H) [I48.91]  Long-term (current) use of anticoagulants [Z79.01] [Z79.01]         Anticoagulation Episode Summary     INR check location     Preferred lab     Send INR reminders to [facility]    Comments 2.5 MG TABLETS, likes print out       Anticoagulation Care Providers     Provider Role Specialty Phone number    Neo Galicia MD [facility] [phone]            See the Encounter Report to view Anticoagulation Flowsheet and Dosing Calendar (Go to Encounters tab in chart review, and find the Anticoagulation Therapy Visit)    Dosage adjustment made based on physician directed care plan.    Shameka Carey RN

## 2017-08-25 NOTE — MR AVS SNAPSHOT
Home Valdez   8/25/2017 8:15 AM   Anticoagulation Therapy Visit    Description:  67 year old male   Provider:  CAMILO ANTI COAG   Department:  [facility]           INR as of 8/25/2017     Today's INR 2.6      Anticoagulation Summary as of 8/25/2017     INR goal 2.0-3.0   Today's INR 2.6   Full instructions 5 mg on Mon; 7.5 mg all other days   Next INR check 9/8/2017    Indications   Atrial fibrillation with RVR (H) [I48.91]  Long-term (current) use of anticoagulants [Z79.01] [Z79.01]         Your next Anticoagulation Clinic appointment(s)     Sep 08, 2017  8:15 AM CDT   An[facility]agulation [facility] with CAMILO ANTI DANI   [facility]    [address] [facility] [id number] [phone]              Contact Numbers     Clinic Number:         August 2017 Details    Sun Mon Tue Wed Thu Fri Sat       1               2               3               4               5                 6               7               8               9               10               11               12                 13               14               15               16               17               18               19                 20               21               22               23               24               25      7.5 mg   See details      26      7.5 mg           27      7.5 mg         28      5 mg         29      7.5 mg         30      7.5 mg         31      7.5 mg            Date Details   08/25 This INR check               How to take your warfarin dose     To take:  5 mg Take 2 of the 2.5 mg tablets.    To take:  7.5 mg Take 3 of the 2.5 mg tablets.           September 2017 Details    Sun Mon Tue Wed Thu Fri Sat          1      7.5 mg         2      7.5 mg           3      7.5 mg         4      5 mg         5      7.5 mg         6      7.5 mg         7      7.5 mg         8            9                 10               11               12               13               14                15               16                 17               18               19               20               21               22               23                 24               25               26               27               28               29               30                Date Details   No additional details    Date of next INR:  9/8/2017         How to take your warfarin dose     To take:  5 mg Take 2 of the 2.5 mg tablets.    To take:  7.5 mg Take 3 of the 2.5 mg tablets.

## 2017-08-29 DIAGNOSIS — J44.1 COPD EXACERBATION (H): ICD-10-CM

## 2017-08-29 NOTE — TELEPHONE ENCOUNTER
Ipratropium-albuterol 0.5 mg/2.5 mg/3 mL (duoneb) 0.5-2.5 ( (3) MG/ 3 ML neb solution        Last Written Prescription Date: 12/30/2016  Last Fill Quantity: 540, # refills: 3    Last Office Visit with G, P or [facility] prescribing provider:  07/19/2017   Future Office Visit:       Date of Last Asthma Action Plan Letter:   There are no preventive care reminders to display for this patient.   Asthma Control Test: No flowsheet data found.    Date of Last Spirometry Test:   No results found for this or any previous visit.          Leda Lucas, CMA

## 2017-08-31 RX ORDER — IPRATROPIUM BROMIDE AND ALBUTEROL SULFATE 2.5; .5 MG/3ML; MG/3ML
SOLUTION RESPIRATORY (INHALATION)
Qty: 540 ML | Refills: 3 | Status: SHIPPED | OUTPATIENT
Start: 2017-08-31 | End: 2018-03-26

## 2017-08-31 NOTE — TELEPHONE ENCOUNTER
Prescription approved per [facility] Refill Protocol.    Kari Mora RN  [facility]

## 2017-09-08 ENCOUNTER — ANTICOAGULATION THERAPY VISIT (OUTPATIENT)
Dept: ANTICOAGULATION | Facility: OTHER | Age: 67
End: 2017-09-08
Payer: MEDICARE

## 2017-09-08 DIAGNOSIS — I48.91 ATRIAL FIBRILLATION WITH RVR (H): ICD-10-CM

## 2017-09-08 DIAGNOSIS — Z79.01 LONG-TERM (CURRENT) USE OF ANTICOAGULANTS: ICD-10-CM

## 2017-09-08 LAB — INR POINT OF CARE: 3.2 (ref 0.86–1.14)

## 2017-09-08 PROCEDURE — 99207 ZZC NO CHARGE NURSE ONLY: CPT

## 2017-09-08 PROCEDURE — 85610 PROTHROMBIN TIME: CPT | Mod: QW

## 2017-09-08 PROCEDURE — 36416 COLLJ CAPILLARY BLOOD SPEC: CPT

## 2017-09-08 NOTE — PROGRESS NOTES
ANTICOAGULATION FOLLOW-UP CLINIC VISIT    Patient Name:  Home Valdez  Date:  9/8/2017  Contact Type:  Face to Face    SUBJECTIVE:     Patient Findings     Positives No Problem Findings    Comments INR still needs to be below 2.5 for upcoming procedure on 9/13. We will decrease his dose and have him recheck on Tuesday 9/12 in lab. Shameka Carey RN, BSN             OBJECTIVE    INR Protime   Date Value Ref Range Status   09/08/2017 3.2 (A) 0.86 - 1.14 Final       ASSESSMENT / PLAN  INR assessment THER    Recheck INR In: 4 DAYS    INR Location Clinic      Anticoagulation Summary as of 9/8/2017     INR goal 2.0-3.0   Today's INR 3.2!   Maintenance plan 5 mg (2.5 mg x 2) on Mon; 7.5 mg (2.5 mg x 3) all other days   Full instructions 9/8: 5 mg; 9/9: 5 mg; 9/10: 5 mg; 9/11: 2.5 mg; Otherwise 5 mg on Mon; 7.5 mg all other days   Weekly total 50 mg   Plan last modified Shameka Carey RN (6/30/2017)   Next INR check 9/12/2017   Target end date     Indications   Atrial fibrillation with RVR (H) [I48.91]  Long-term (current) use of anticoagulants [Z79.01] [Z79.01]         Anticoagulation Episode Summary     INR check location     Preferred lab     Send INR reminders to [facility]    Comments 2.5 MG TABLETS, likes print out       Anticoagulation Care Providers     Provider Role Specialty Phone number    FridaNeo MD [facility] [phone]            See the Encounter Report to view Anticoagulation Flowsheet and Dosing Calendar (Go to Encounters tab in chart review, and find the Anticoagulation Therapy Visit)    Dosage adjustment made based on physician directed care plan.    Shameka Carey RN

## 2017-09-08 NOTE — MR AVS SNAPSHOT
Home Valdez   9/8/2017 8:15 AM   Anticoagulation Therapy Visit    Description:  67 year old male   Provider:  [facility]   Department:  [facility]           INR as of 9/8/2017     Today's INR 3.2!      Anticoagulation Summary as of 9/8/2017     INR goal 2.0-3.0   Today's INR 3.2!   Full instructions 9/8: 5 mg; 9/9: 5 mg; 9/10: 5 mg; 9/11: 2.5 mg; Otherwise 5 mg on Mon; 7.5 mg all other days   Next INR check 9/12/2017    Indications   Atrial fibrillation with RVR (H) [I48.91]  Long-term (current) use of anticoagulants [Z79.01] [Z79.01]         Your next Anticoagulation Clinic appointment(s)     Sep 08, 2017  8:15 AM CDT   [facility]agulation Visit with [facility]   [facility]    [address] [facility] [address]   [phone]              Contact Numbers     Clinic Number:         September 2017 Details    Sun Mon Tue Wed Thu Fri Sat          1               2                 3               4               5               6               7               8      5 mg   See details      9      5 mg           10      5 mg         11      2.5 mg         12            13               14               15               16                 17               18               19               20               21               22               23                 24               25               26               27               28               29               30                Date Details   09/08 This INR check       Date of next INR:  9/12/2017         How to take your warfarin dose     To take:  2.5 mg Take 1 of the 2.5 mg tablets.    To take:  5 mg Take 2 of the 2.5 mg tablets.    To take:  7.5 mg Take 3 of the 2.5 mg tablets.

## 2017-09-12 ENCOUNTER — ANTICOAGULATION THERAPY VISIT (OUTPATIENT)
Dept: ANTICOAGULATION | Facility: CLINIC | Age: 67
End: 2017-09-12
Payer: MEDICARE

## 2017-09-12 DIAGNOSIS — Z79.01 LONG-TERM (CURRENT) USE OF ANTICOAGULANTS: Primary | ICD-10-CM

## 2017-09-12 DIAGNOSIS — Z79.01 LONG-TERM (CURRENT) USE OF ANTICOAGULANTS: ICD-10-CM

## 2017-09-12 DIAGNOSIS — I48.91 ATRIAL FIBRILLATION WITH RVR (H): ICD-10-CM

## 2017-09-12 LAB — INR BLD: 2.2 (ref 0.86–1.14)

## 2017-09-12 PROCEDURE — 99207 ZZC NO CHARGE NURSE ONLY: CPT | Performed by: INTERNAL MEDICINE

## 2017-09-12 PROCEDURE — 85610 PROTHROMBIN TIME: CPT | Mod: QW | Performed by: INTERNAL MEDICINE

## 2017-09-12 PROCEDURE — 36416 COLLJ CAPILLARY BLOOD SPEC: CPT | Performed by: INTERNAL MEDICINE

## 2017-09-12 NOTE — PROGRESS NOTES
ANTICOAGULATION FOLLOW-UP CLINIC VISIT    Patient Name:  Home Valdez  Date:  9/12/2017  Contact Type:  Telephone    SUBJECTIVE:     Patient Findings     Comments Pt's dose decreased over this past weekend for dental procedure tomorrow.            OBJECTIVE    INR Point of Care   Date Value Ref Range Status   09/12/2017 2.2 (H) 0.86 - 1.14 Final     Comment:     This test is intended for monitoring Coumadin therapy.  Results are not   accurate in patients with prolonged INR due to factor deficiency.         ASSESSMENT / PLAN  INR assessment THER    Recheck INR In: 2 WEEKS    INR Location Outside lab      Anticoagulation Summary as of 9/12/2017     INR goal 2.0-3.0   Today's INR 2.2   Maintenance plan 5 mg (2.5 mg x 2) on Mon; 7.5 mg (2.5 mg x 3) all other days   Full instructions 9/12: 2.5 mg; 9/15: 5 mg; 9/22: 5 mg; Otherwise 5 mg on Mon; 7.5 mg all other days   Weekly total 50 mg   Plan last modified Shameka Carey RN (6/30/2017)   Next INR check 9/25/2017   Target end date     Indications   Atrial fibrillation with RVR (H) [I48.91]  Long-term (current) use of anticoagulants [Z79.01] [Z79.01]         Anticoagulation Episode Summary     INR check location     Preferred lab     Send INR reminders to [facility]    Comments 2.5 MG TABLETS, likes print out       Anticoagulation Care Providers     Provider Role Specialty Phone number    Neo Galicia MD [facility] [phone]            See the Encounter Report to view Anticoagulation Flowsheet and Dosing Calendar (Go to Encounters tab in chart review, and find the Anticoagulation Therapy Visit)    Dosage adjustment made based on physician directed care plan.    Fabiola Kent RN

## 2017-09-12 NOTE — MR AVS SNAPSHOT
Home Valdez   9/12/2017   Anticoagulation Therapy Visit    Description:  67 year old male   Provider:  Sandip Vega DO   Department:  [facility]           INR as of 9/12/2017     Today's INR 2.2      Anticoagulation Summary as of 9/12/2017     INR goal 2.0-3.0   Today's INR 2.2   Full instructions 9/12: 2.5 mg; 9/15: 5 mg; 9/22: 5 mg; Otherwise 5 mg on Mon; 7.5 mg all other days   Next INR check 9/25/2017    Indications   Atrial fibrillation with RVR (H) [I48.91]  Long-term (current) use of anticoagulants [Z79.01] [Z79.01]         Your next Anticoagulation Clinic appointment(s)     Sep 28, 2017  8:15 AM CDT   [facility]    [address] [facility] [address]   [phone]              Contact Numbers     Clinic Number:         September 2017 Details    Sun Mon Tue Wed Thu Fri Sat          1               2                 3               4               5               6               7               8               9                 10               11               12      2.5 mg   See details      13      7.5 mg         14      7.5 mg         15      5 mg         16      7.5 mg           17      7.5 mg         18      5 mg         19      7.5 mg         20      7.5 mg         21      7.5 mg         22      5 mg         23      7.5 mg           24      7.5 mg         25            26               27               28               29               30                Date Details   09/12 This INR check       Date of next INR:  9/25/2017         How to take your warfarin dose     To take:  2.5 mg Take 1 of the 2.5 mg tablets.    To take:  5 mg Take 2 of the 2.5 mg tablets.    To take:  7.5 mg Take 3 of the 2.5 mg tablets.

## 2017-09-25 ENCOUNTER — OFFICE VISIT (OUTPATIENT)
Dept: FAMILY MEDICINE | Facility: OTHER | Age: 67
End: 2017-09-25
Payer: MEDICARE

## 2017-09-25 VITALS
RESPIRATION RATE: 24 BRPM | TEMPERATURE: 98.6 F | DIASTOLIC BLOOD PRESSURE: 82 MMHG | WEIGHT: 244.7 LBS | BODY MASS INDEX: 35.11 KG/M2 | SYSTOLIC BLOOD PRESSURE: 138 MMHG | OXYGEN SATURATION: 95 % | HEART RATE: 72 BPM

## 2017-09-25 DIAGNOSIS — J44.1 OBSTRUCTIVE CHRONIC BRONCHITIS WITH EXACERBATION (H): Primary | ICD-10-CM

## 2017-09-25 PROCEDURE — 99213 OFFICE O/P EST LOW 20 MIN: CPT | Performed by: FAMILY MEDICINE

## 2017-09-25 RX ORDER — CEFUROXIME AXETIL 250 MG/1
250 TABLET ORAL 2 TIMES DAILY
Qty: 20 TABLET | Refills: 3 | Status: SHIPPED | OUTPATIENT
Start: 2017-09-25 | End: 2018-07-17

## 2017-09-25 RX ORDER — METHYLPREDNISOLONE 4 MG
TABLET, DOSE PACK ORAL
Qty: 21 TABLET | Refills: 3 | Status: SHIPPED | OUTPATIENT
Start: 2017-09-25 | End: 2017-11-06 | Stop reason: ALTCHOICE

## 2017-09-25 ASSESSMENT — PAIN SCALES - GENERAL: PAINLEVEL: NO PAIN (0)

## 2017-09-25 NOTE — NURSING NOTE
"Chief Complaint   Patient presents with     Allergies       Initial /82 (BP Location: Left arm, Cuff Size: Adult Large)  Pulse 72  Temp 98.6  F (37  C) (Temporal)  Resp 24  Wt 244 lb 11.2 oz (111 kg)  SpO2 95%  BMI 35.11 kg/m2 Estimated body mass index is 35.11 kg/(m^2) as calculated from the following:    Height as of 7/26/17: 5' 10\" (1.778 m).    Weight as of this encounter: 244 lb 11.2 oz (111 kg).  Medication Reconciliation: complete Nkechi Carroll MA     9/25/2017      "

## 2017-09-25 NOTE — PROGRESS NOTES
Home Valdez is a 67 year old male  who presents to the clinic today with a cough with purulent sputum for 2 weeks.  No chest pain, or hemoptysis. He has ahd some aguirre with this.        Patient Active Problem List   Diagnosis     Posttraumatic stress disorder     PERS HX TOBACCO USE     Pulmonary emphysema (H)     Pulmonary nodule, needs annual ct      Hypertension goal BP (blood pressure) < 140/90     Encounter for long-term current use of medication     COPD (chronic obstructive pulmonary disease) (H)     Hyperlipidemia LDL goal <130     AAA (abdominal aortic aneurysm) 4.0 cm     SEVERE JAMES (obstructive sleep apnea)     Ejection fraction < 50%     Nonischemic cardiomyopathy EF 25% by Echo 11/21/14     Periph vascular dis NEC     Dermatophytosis of nail     Syncope     Atrial fibrillation with RVR (H)     Acute systolic CHF (congestive heart failure) (H)     Neutrophilic leukocytosis     Advance Care Planning     DVT prophylaxis     Rapid atrial fibrillation (H)     Hypopotassemia     Hypomagnesemia     Acute bronchitis     CHF (congestive heart failure) (H)     Thrombocytopenia (H)     Long-term (current) use of anticoagulants [Z79.01]     History of atrial fibrillation     COPD exacerbation (H)     Acute respiratory failure (H)     Trigeminal neuralgia of left side of face     Panlobular emphysema (H)     Trigeminal neuralgia     On amiodarone therapy           Past Medical History:   Diagnosis Date     AAA (abdominal aortic aneurysm) (H)      COPD (chronic obstructive pulmonary disease) (H)     moderate     Emphysema      Hyperlipidemia      Hypertension      Hypomagnesemia 1/2/2015     JAMES (obstructive sleep apnea) 9/3/2014         PTSD (post-traumatic stress disorder)      Trigeminal neuralgia            Current Outpatient Prescriptions on File Prior to Visit:  montelukast (SINGULAIR) 10 MG tablet TAKE 1 TABLET BY MOUTH DAILY AT BEDTIME   cetirizine (ZYRTEC) 10 MG tablet TAKE 1 TABLET BY MOUTH EVERY  EVENING   ipratropium - albuterol 0.5 mg/2.5 mg/3 mL (DUONEB) 0.5-2.5 (3) MG/3ML neb solution TAKE 1 VIAL (3 MLS) BY NEBULIZATION EVERY 4 HOURS AS NEEDED FOR SHORTNESS OF BREATH / DYSPNEA OR WHEEZING   warfarin (JANTOVEN) 2.5 MG tablet TAKE TAKE 2 TABLETS (5MG) on Mondays and take 3 TABLETS (7.5MG) ALL OTHER DAYS OR AS DIRECTED BY THE COUMADIN CLINIC   cefuroxime (CEFTIN) 500 MG tablet Take 1 tablet (500 mg) by mouth 2 times daily   potassium chloride SA (K-DUR/KLOR-CON M) 20 MEQ CR tablet Take 1 tablet (20 mEq) by mouth daily   oxyCODONE (ROXICODONE) 10 MG IR tablet Take 1-2 tablets (10-20 mg) by mouth every 3 hours as needed for moderate to severe pain   warfarin (JANTOVEN) 2.5 MG tablet Take 7.5 mg (3 tablets) on Tuesday, Thursday, Saturday and 5 mg (2 tablets) all other days or as directed by coumadin clinic.   gabapentin (NEURONTIN) 250 MG/5ML solution Take 2 mLs (100 mg) by mouth every 12 hours   losartan (COZAAR) 50 MG tablet Take 2 tablets (100 mg) by mouth daily   buPROPion (WELLBUTRIN SR) 150 MG 12 hr tablet Take 1 tablet (150 mg) by mouth 2 times daily   fluticasone-salmeterol (ADVAIR) 250-50 MCG/DOSE diskus inhaler Inhale 1 puff into the lungs 2 times daily   albuterol (PROAIR HFA/PROVENTIL HFA/VENTOLIN HFA) 108 (90 BASE) MCG/ACT Inhaler Inhale 2 puffs into the lungs every 4 hours as needed for shortness of breath / dyspnea   fluticasone (FLONASE) 50 MCG/ACT spray USE ONE TO TWO SPRAYS IN EACH NOSTRIL EVERY DAY   Respiratory Therapy Supplies (NEBULIZER/TUBING/MOUTHPIECE) KIT Use every 4-6 hours PRN   nystatin (MYCOSTATIN) 984321 UNIT/ML suspension Take 5 mLs (500,000 Units) by mouth 3 times daily   metoprolol (TOPROL-XL) 50 MG 24 hr tablet Take 1 tablet (50 mg) by mouth daily   amiodarone (PACERONE/CODARONE) 200 MG tablet Take 0.5 tablets (100 mg) by mouth daily   torsemide (DEMADEX) 20 MG tablet Take 10 mg po daily. Take an extra 10mg po daily as needed for swelling.   senna-docusate  (SENOKOT-S;PERICOLACE) 8.6-50 MG per tablet Take 2 tablets by mouth 2 times daily   order for DME Equipment being ordered: Nebulizer     No current facility-administered medications on file prior to visit.         Social History     Social History     Marital status:      Spouse name: Chrissy     Number of children: 2     Years of education: N/A     Occupational History      [employer]     Social History Main Topics     Smoking status: Former Smoker     Packs/day: 1.00     Years: 40.00     Types: Cigarettes     Quit date: 8/1/2014     Smokeless tobacco: Never Used     Alcohol use No     Drug use: No     Sexual activity: Yes     Partners: Female     Other Topics Concern      Service Yes     Blood Transfusions No     Sleep Concern Yes     Seat Belt Yes     Parent/Sibling W/ Cabg, Mi Or Angioplasty Before 65f 55m? No     Social History Narrative        Exam:   There were no vitals taken for this visit.    General: healthy, alert and no distress, appears hydarated, vital signs stable     Cardiac: NORMAL - regular rate and rhythm without murmur.    Respiratory: some scattered, wheezing, mild        Assessment: Acute Bronchitis  COPD exacerbation    Plan:  Symptomatic measures otherwise encouraged, rest,humidity, extra fluids. Recheck in   4-5 days if not improved, sooner if increasing symptoms.  rx ceftin and medrol DP  Also reviewed his Immunizations, recommended flu shot in 2-3 weeks  INR in a few dd re abx usage      Medications:    Outpatient Encounter Prescriptions as of 9/25/2017   Medication Sig Dispense Refill     cefuroxime (CEFTIN) 250 MG tablet Take 1 tablet (250 mg) by mouth 2 times daily 20 tablet 3     methylPREDNISolone (MEDROL DOSEPAK) 4 MG tablet Follow package instructions 21 tablet 3     montelukast (SINGULAIR) 10 MG tablet TAKE 1 TABLET BY MOUTH DAILY AT BEDTIME 30 tablet 10     cetirizine (ZYRTEC) 10 MG tablet TAKE 1 TABLET BY MOUTH EVERY EVENING 30 tablet 10      ipratropium - albuterol 0.5 mg/2.5 mg/3 mL (DUONEB) 0.5-2.5 (3) MG/3ML neb solution TAKE 1 VIAL (3 MLS) BY NEBULIZATION EVERY 4 HOURS AS NEEDED FOR SHORTNESS OF BREATH / DYSPNEA OR WHEEZING 540 mL 3     warfarin (JANTOVEN) 2.5 MG tablet TAKE TAKE 2 TABLETS (5MG) on Mondays and take 3 TABLETS (7.5MG) ALL OTHER DAYS OR AS DIRECTED BY THE COUMADIN CLINIC 250 tablet 0     cefuroxime (CEFTIN) 500 MG tablet Take 1 tablet (500 mg) by mouth 2 times daily 20 tablet 0     potassium chloride SA (K-DUR/KLOR-CON M) 20 MEQ CR tablet Take 1 tablet (20 mEq) by mouth daily 90 tablet 1     oxyCODONE (ROXICODONE) 10 MG IR tablet Take 1-2 tablets (10-20 mg) by mouth every 3 hours as needed for moderate to severe pain 30 tablet 0     warfarin (JANTOVEN) 2.5 MG tablet Take 7.5 mg (3 tablets) on Tuesday, Thursday, Saturday and 5 mg (2 tablets) all other days or as directed by coumadin clinic. 235 tablet 0     gabapentin (NEURONTIN) 250 MG/5ML solution Take 2 mLs (100 mg) by mouth every 12 hours 450 mL 0     losartan (COZAAR) 50 MG tablet Take 2 tablets (100 mg) by mouth daily 180 tablet 3     buPROPion (WELLBUTRIN SR) 150 MG 12 hr tablet Take 1 tablet (150 mg) by mouth 2 times daily 180 tablet 3     fluticasone-salmeterol (ADVAIR) 250-50 MCG/DOSE diskus inhaler Inhale 1 puff into the lungs 2 times daily 3 Inhaler 3     albuterol (PROAIR HFA/PROVENTIL HFA/VENTOLIN HFA) 108 (90 BASE) MCG/ACT Inhaler Inhale 2 puffs into the lungs every 4 hours as needed for shortness of breath / dyspnea 6 Inhaler 3     fluticasone (FLONASE) 50 MCG/ACT spray USE ONE TO TWO SPRAYS IN EACH NOSTRIL EVERY DAY 16 g 6     Respiratory Therapy Supplies (NEBULIZER/TUBING/MOUTHPIECE) KIT Use every 4-6 hours PRN 1 kit 11     nystatin (MYCOSTATIN) 197000 UNIT/ML suspension Take 5 mLs (500,000 Units) by mouth 3 times daily 60 mL 0     metoprolol (TOPROL-XL) 50 MG 24 hr tablet Take 1 tablet (50 mg) by mouth daily 90 tablet 3     amiodarone (PACERONE/CODARONE) 200 MG tablet  Take 0.5 tablets (100 mg) by mouth daily 45 tablet 3     torsemide (DEMADEX) 20 MG tablet Take 10 mg po daily. Take an extra 10mg po daily as needed for swelling. 180 tablet 3     senna-docusate (SENOKOT-S;PERICOLACE) 8.6-50 MG per tablet Take 2 tablets by mouth 2 times daily       order for DME Equipment being ordered: Nebulizer 1 Device 0     No facility-administered encounter medications on file as of 9/25/2017.      dbue

## 2017-09-25 NOTE — MR AVS SNAPSHOT
"              After Visit Summary   9/25/2017    Home Valdez    MRN: [id number]           Patient Information     Date Of Birth          1950        Visit Information        Provider Department      9/25/2017 10:00 AM Neo Galicia MD [facility]        Today's Diagnoses     Obstructive chronic bronchitis with exacerbation (H)    -  1       Follow-ups after your visit        Your next 10 appointments already scheduled     Sep 28, 2017  8:15 AM CDT   Anticoagulation Visit with  ANTI COAG   [facility] ([facility])    [address] [facility] [id number] [phone]            Nov 08, 2017  9:30 AM CST   (Arrive by 9:15 AM)   Return Visit with Jamie Orozco MD   [facility] ([facility])    [address]  [address] [facility] [place] [facility] [address] [phone]              Who to contact     If you have questions or need follow up information about today's clinic visit or your schedule please contact [facility] directly at [phone].  Normal or non-critical lab and imaging results will be communicated to you by ScalIThart, letter or phone within 4 business days after the clinic has received the results. If you do not hear from us within 7 days, please contact the clinic through ScalIThart or phone. If you have a critical or abnormal lab result, we will notify you by phone as soon as possible.  Submit refill requests through InfoReach or call your pharmacy and they will forward the refill request to us. Please allow 3 business days for your refill to be completed.          Additional Information About Your Visit        MyChart Information     InfoReach lets you send messages to your doctor, view your test results, renew your prescriptions, schedule appointments and more. To sign up, go to [email or web address] . Click on \"Log in\" on the left side of the screen, which will take you to the Welcome page. Then click " "on \"Sign up Now\" on the right side of the page.     You will be asked to enter the access code listed below, as well as some personal information. Please follow the directions to create your username and password.     Your access code is: [email or web address]  Expires: 2017 12:20 PM     Your access code will  in 90 days. If you need help or a new code, please call your [facility] clinic or [phone].        Care EveryWhere ID     This is your Care EveryWhere ID. This could be used by other organizations to access your [facility] medical records  [id number]        Your Vitals Were     Pulse Temperature Respirations Pulse Oximetry BMI (Body Mass Index)       72 98.6  F (37  C) (Temporal) 24 95% 35.11 kg/m2        Blood Pressure from Last 3 Encounters:   17 138/82   17 147/78   17 132/78    Weight from Last 3 Encounters:   17 244 lb 11.2 oz (111 kg)   17 250 lb (113.4 kg)   17 250 lb (113.4 kg)              Today, you had the following     No orders found for display         Today's Medication Changes          These changes are accurate as of: 17 12:20 PM.  If you have any questions, ask your nurse or doctor.               Start taking these medicines.        Dose/Directions    methylPREDNISolone 4 MG tablet   Commonly known as:  MEDROL DOSEPAK   Used for:  Obstructive chronic bronchitis with exacerbation (H)   Started by:  Neo Galicia MD        Follow package instructions   Quantity:  21 tablet   Refills:  3         These medicines have changed or have updated prescriptions.        Dose/Directions    * cefuroxime 500 MG tablet   Commonly known as:  CEFTIN   This may have changed:  Another medication with the same name was added. Make sure you understand how and when to take each.   Used for:  Panlobular emphysema (H)   Changed by:  Sandip Vega DO        Dose:  500 mg   Take 1 tablet (500 mg) by mouth 2 times daily   Quantity:  20 tablet   Refills:  0    "    * cefuroxime 250 MG tablet   Commonly known as:  CEFTIN   This may have changed:  You were already taking a medication with the same name, and this prescription was added. Make sure you understand how and when to take each.   Used for:  Obstructive chronic bronchitis with exacerbation (H)   Changed by:  Neo Galicia MD        Dose:  250 mg   Take 1 tablet (250 mg) by mouth 2 times daily   Quantity:  20 tablet   Refills:  3       * Notice:  This list has 2 medication(s) that are the same as other medications prescribed for you. Read the directions carefully, and ask your doctor or other care provider to review them with you.         Where to get your medicines      These medications were sent to [id number] - [place], [place] - [address] [place] [address]  [address] [place] [id number]    Hours:  M-F 8:30-6:30; Sat 9-4; closed Sunday Phone:  [phone]     cefuroxime 250 MG tablet    methylPREDNISolone 4 MG tablet                Primary Care Provider Office Phone # Fax #    Sandip Home Vega,  [phone]       [address] [place] [address]        Equal Access to Services     [facility] HospitalFELIPE : Hadii lissette leon hadasho Sofede, waaxda luqadaha, qaybta kaalmada adekendra, jase john . So [facility] [phone].    ATENCIÓN: Si habla español, tiene a calabrese disposición servicios gratuitos de asistencia lingüística. [facility] [phone].    We comply with applicable federal civil rights laws and [place] laws. We do not discriminate on the basis of race, color, national origin, age, disability sex, sexual orientation or gender identity.            Thank you!     Thank you for choosing [facility]  for your care. Our goal is always to provide you with excellent care. Hearing back from our patients is one way we can continue to improve our services. Please take a few minutes to complete the written survey that you may receive in the mail after your visit with  us. Thank you!             Your Updated Medication List - Protect others around you: Learn how to safely use, store and throw away your medicines at www.disposemymeds.org.          This list is accurate as of: 9/25/17 12:20 PM.  Always use your most recent med list.                   Brand Name Dispense Instructions for use Diagnosis    albuterol 108 (90 BASE) MCG/ACT Inhaler    PROAIR HFA/PROVENTIL HFA/VENTOLIN HFA    6 Inhaler    Inhale 2 puffs into the lungs every 4 hours as needed for shortness of breath / dyspnea    Chronic obstructive pulmonary disease with acute exacerbation (H)       amiodarone 200 MG tablet    PACERONE/CODARONE    45 tablet    Take 0.5 tablets (100 mg) by mouth daily    Atrial fibrillation (H)       buPROPion 150 MG 12 hr tablet    WELLBUTRIN SR    180 tablet    Take 1 tablet (150 mg) by mouth 2 times daily    Personal history of tobacco use, presenting hazards to health       * cefuroxime 500 MG tablet    CEFTIN    20 tablet    Take 1 tablet (500 mg) by mouth 2 times daily    Panlobular emphysema (H)       * cefuroxime 250 MG tablet    CEFTIN    20 tablet    Take 1 tablet (250 mg) by mouth 2 times daily    Obstructive chronic bronchitis with exacerbation (H)       cetirizine 10 MG tablet    zyrTEC    30 tablet    TAKE 1 TABLET BY MOUTH EVERY EVENING    Chronic seasonal allergic rhinitis due to other allergen       fluticasone 50 MCG/ACT spray    FLONASE    16 g    USE ONE TO TWO SPRAYS IN EACH NOSTRIL EVERY DAY    Chronic rhinitis       fluticasone-salmeterol 250-50 MCG/DOSE diskus inhaler    ADVAIR    3 Inhaler    Inhale 1 puff into the lungs 2 times daily    Panlobular emphysema (H)       gabapentin 250 MG/5ML solution    NEURONTIN    450 mL    Take 2 mLs (100 mg) by mouth every 12 hours    Trigeminal neuralgia       ipratropium - albuterol 0.5 mg/2.5 mg/3 mL 0.5-2.5 (3) MG/3ML neb solution    DUONEB    540 mL    TAKE 1 VIAL (3 MLS) BY NEBULIZATION EVERY 4 HOURS AS NEEDED FOR SHORTNESS  OF BREATH / DYSPNEA OR WHEEZING    COPD exacerbation (H)       losartan 50 MG tablet    COZAAR    180 tablet    Take 2 tablets (100 mg) by mouth daily    Atrial fibrillation with RVR (H), Hypertension goal BP (blood pressure) < 140/90       methylPREDNISolone 4 MG tablet    MEDROL DOSEPAK    21 tablet    Follow package instructions    Obstructive chronic bronchitis with exacerbation (H)       metoprolol 50 MG 24 hr tablet    TOPROL-XL    90 tablet    Take 1 tablet (50 mg) by mouth daily    Atrial fibrillation with RVR (H), CHF (congestive heart failure) (H)       montelukast 10 MG tablet    SINGULAIR    30 tablet    TAKE 1 TABLET BY MOUTH DAILY AT BEDTIME    Chronic seasonal allergic rhinitis due to other allergen       nebulizer/tubing/mouthpiece Kit     1 kit    Use every 4-6 hours PRN    Chronic obstructive pulmonary disease, unspecified COPD type (H)       nystatin 298281 UNIT/ML suspension    MYCOSTATIN    60 mL    Take 5 mLs (500,000 Units) by mouth 3 times daily    Candidiasis of mouth       order for DME     1 Device    Equipment being ordered: Nebulizer    COPD (chronic obstructive pulmonary disease) (H)       oxyCODONE 10 MG IR tablet    ROXICODONE    30 tablet    Take 1-2 tablets (10-20 mg) by mouth every 3 hours as needed for moderate to severe pain    Trigeminal neuralgia       potassium chloride SA 20 MEQ CR tablet    K-DUR/KLOR-CON M    90 tablet    Take 1 tablet (20 mEq) by mouth daily    CHF (congestive heart failure) (H), Peripheral edema       senna-docusate 8.6-50 MG per tablet    SENOKOT-S;PERICOLACE     Take 2 tablets by mouth 2 times daily        torsemide 20 MG tablet    DEMADEX    180 tablet    Take 10 mg po daily. Take an extra 10mg po daily as needed for swelling.    Chronic diastolic congestive heart failure (H)       * warfarin 2.5 MG tablet    JANTOVEN    235 tablet    Take 7.5 mg (3 tablets) on Tuesday, Thursday, Saturday and 5 mg (2 tablets) all other days or as directed by coumadin  clinic.    Atrial fibrillation with RVR (H)       * warfarin 2.5 MG tablet    JANTOVEN    250 tablet    TAKE TAKE 2 TABLETS (5MG) on Mondays and take 3 TABLETS (7.5MG) ALL OTHER DAYS OR AS DIRECTED BY THE COUMADIN CLINIC    Atrial fibrillation with RVR (H)       * Notice:  This list has 4 medication(s) that are the same as other medications prescribed for you. Read the directions carefully, and ask your doctor or other care provider to review them with you.

## 2017-09-28 ENCOUNTER — ANTICOAGULATION THERAPY VISIT (OUTPATIENT)
Dept: ANTICOAGULATION | Facility: OTHER | Age: 67
End: 2017-09-28
Payer: MEDICARE

## 2017-09-28 DIAGNOSIS — Z79.01 LONG-TERM (CURRENT) USE OF ANTICOAGULANTS: ICD-10-CM

## 2017-09-28 DIAGNOSIS — I48.91 ATRIAL FIBRILLATION WITH RVR (H): ICD-10-CM

## 2017-09-28 LAB — INR POINT OF CARE: 2.3 (ref 0.86–1.14)

## 2017-09-28 PROCEDURE — 99207 ZZC NO CHARGE NURSE ONLY: CPT

## 2017-09-28 PROCEDURE — 36416 COLLJ CAPILLARY BLOOD SPEC: CPT

## 2017-09-28 PROCEDURE — 85610 PROTHROMBIN TIME: CPT | Mod: QW

## 2017-09-28 NOTE — PROGRESS NOTES
ANTICOAGULATION FOLLOW-UP CLINIC VISIT    Patient Name:  Home Valdez  Date:  9/28/2017  Contact Type:  Face to Face    SUBJECTIVE:     Patient Findings     Positives Antibiotic use or infection (Currently on Ceftin  for 5 more days), No Problem Findings           OBJECTIVE    INR Protime   Date Value Ref Range Status   09/28/2017 2.3 (A) 0.86 - 1.14 Final       ASSESSMENT / PLAN  INR assessment THER    Recheck INR In: 2 WEEKS    INR Location Clinic      Anticoagulation Summary as of 9/28/2017     INR goal 2.0-3.0   Today's INR 2.3   Maintenance plan 5 mg (2.5 mg x 2) on Mon; 7.5 mg (2.5 mg x 3) all other days   Full instructions 5 mg on Mon; 7.5 mg all other days   Weekly total 50 mg   No change documented Shameka Carey RN   Plan last modified Shameka Carey RN (6/30/2017)   Next INR check 10/12/2017   Target end date     Indications   Atrial fibrillation with RVR (H) [I48.91]  Long-term (current) use of anticoagulants [Z79.01] [Z79.01]         Anticoagulation Episode Summary     INR check location     Preferred lab     Send INR reminders to [facility] POOL    Comments 2.5 MG TABLETS, likes print out       Anticoagulation Care Providers     Provider Role Specialty Phone number    Neo Galicia MD [facility] [phone]            See the Encounter Report to view Anticoagulation Flowsheet and Dosing Calendar (Go to Encounters tab in chart review, and find the Anticoagulation Therapy Visit)    Dosage adjustment made based on physician directed care plan.    Shameka Carey RN                physical therapy

## 2017-09-28 NOTE — MR AVS SNAPSHOT
Home Valdez   9/28/2017 8:15 AM   Anticoagulation Therapy Visit    Description:  67 year old male   Provider:  [facility]   Department:  [facility]           INR as of 9/28/2017     Today's INR 2.3      Anticoagulation Summary as of 9/28/2017     INR goal 2.0-3.0   Today's INR 2.3   Full instructions 5 mg on Mon; 7.5 mg all other days   Next INR check 10/12/2017    Indications   Atrial fibrillation with RVR (H) [I48.91]  Long-term (current) use of anticoagulants [Z79.01] [Z79.01]         Your next Anticoagulation Clinic appointment(s)     Oct 12, 2017  4:15 PM CDT   [facility]    [address] [facility] [id number] [phone]              Contact Numbers     Clinic Number:         September 2017 Details    Sun Mon Tue Wed Thu Fri Sat          1               2                 3               4               5               6               7               8               9                 10               11               12               13               14               15               16                 17               18               19               20               21               22               23                 24               25               26               27               28      7.5 mg   See details      29      7.5 mg         30      7.5 mg          Date Details   09/28 This INR check               How to take your warfarin dose     To take:  7.5 mg Take 3 of the 2.5 mg tablets.           October 2017 Details    Sun Mon Tue Wed Thu Fri Sat     1      7.5 mg         2      5 mg         3      7.5 mg         4      7.5 mg         5      7.5 mg         6      7.5 mg         7      7.5 mg           8      7.5 mg         9      5 mg         10      7.5 mg         11      7.5 mg         12            13               14                 15               16               17               18                19               20               21                 22               23               24               25               26               27               28                 29               30               31                    Date Details   No additional details    Date of next INR:  10/12/2017         How to take your warfarin dose     To take:  5 mg Take 2 of the 2.5 mg tablets.    To take:  7.5 mg Take 3 of the 2.5 mg tablets.

## 2017-10-12 ENCOUNTER — ANTICOAGULATION THERAPY VISIT (OUTPATIENT)
Dept: ANTICOAGULATION | Facility: OTHER | Age: 67
End: 2017-10-12
Payer: MEDICARE

## 2017-10-12 DIAGNOSIS — I48.91 ATRIAL FIBRILLATION WITH RVR (H): ICD-10-CM

## 2017-10-12 DIAGNOSIS — Z79.01 LONG-TERM (CURRENT) USE OF ANTICOAGULANTS: ICD-10-CM

## 2017-10-12 LAB — INR POINT OF CARE: 3.5 (ref 0.86–1.14)

## 2017-10-12 PROCEDURE — 99207 ZZC NO CHARGE NURSE ONLY: CPT

## 2017-10-12 PROCEDURE — 85610 PROTHROMBIN TIME: CPT | Mod: QW

## 2017-10-12 PROCEDURE — 36416 COLLJ CAPILLARY BLOOD SPEC: CPT

## 2017-10-12 NOTE — MR AVS SNAPSHOT
Home Valdez   10/12/2017 4:15 PM   Anticoagulation Therapy Visit    Description:  67 year old male   Provider:  [facility] ANTI [facility]AG   Department:   Anticoag           INR as of 10/12/2017     Today's INR 3.5!      Anticoagulation Summary as of 10/12/2017     INR goal 2.0-3.0   Today's INR 3.5!   Full instructions 10/12: 2.5 mg; Otherwise 5 mg on Mon; 7.5 mg all other days   Next INR check 10/27/2017    Indications   Atrial fibrillation with RVR (H) [I48.91]  Long-term (current) use of anticoagulants [Z79.01] [Z79.01]         Your next Anticoagulation Clinic appointment(s)     Oct 12, 2017  4:15 PM CDT   [facility]  [facility]    [address] [facility] [address]   [phone]            Oct 27, 2017  4:15 PM CDT   Anticoagulation Visit with  ANTI COAG   [facility] ([facility]    [address] [facility] [address]   [phone]              Contact Numbers     Clinic Number:         October 2017 Details    Sun Mon Tue Wed Thu Fri Sat     1               2               3               4               5               6               7                 8               9               10               11               12      2.5 mg   See details      13      7.5 mg         14      7.5 mg           15      7.5 mg         16      5 mg         17      7.5 mg         18      7.5 mg         19      7.5 mg         20      7.5 mg         21      7.5 mg           22      7.5 mg         23      5 mg         24      7.5 mg         25      7.5 mg         26      7.5 mg         27            28                 29               30               31                    Date Details   10/12 This INR check       Date of next INR:  10/27/2017         How to take your warfarin dose     To take:  2.5 mg Take 1 of the 2.5 mg tablets.    To take:  5 mg Take 2 of the 2.5 mg tablets.    To take:  7.5 mg Take 3 of the 2.5 mg tablets.

## 2017-10-12 NOTE — PROGRESS NOTES
ANTICOAGULATION FOLLOW-UP CLINIC VISIT    Patient Name:  Home Valdez  Date:  10/12/2017  Contact Type:  Face to Face    SUBJECTIVE:     Patient Findings     Positives Change in diet/appetite (Pt states he was trying to stay away from greens - I have advised him to eat them like he normally would)           OBJECTIVE    INR Protime   Date Value Ref Range Status   10/12/2017 3.5 (A) 0.86 - 1.14 Final       ASSESSMENT / PLAN  INR assessment SUPRA    Recheck INR In: 2 WEEKS    INR Location Clinic      Anticoagulation Summary as of 10/12/2017     INR goal 2.0-3.0   Today's INR 3.5!   Maintenance plan 5 mg (2.5 mg x 2) on Mon; 7.5 mg (2.5 mg x 3) all other days   Full instructions 10/12: 2.5 mg; Otherwise 5 mg on Mon; 7.5 mg all other days   Weekly total 50 mg   Plan last modified Shameka Carey RN (6/30/2017)   Next INR check 10/27/2017   Target end date     Indications   Atrial fibrillation with RVR (H) [I48.91]  Long-term (current) use of anticoagulants [Z79.01] [Z79.01]         Anticoagulation Episode Summary     INR check location     Preferred lab     Send INR reminders to [facility] POOL    Comments 2.5 MG TABLETS, likes print out       Anticoagulation Care Providers     Provider Role Specialty Phone number    Neo Galicia MD [facility] [phone]            See the Encounter Report to view Anticoagulation Flowsheet and Dosing Calendar (Go to Encounters tab in chart review, and find the Anticoagulation Therapy Visit)    Dosage adjustment made based on physician directed care plan.    Shameka Carey RN

## 2017-10-16 ENCOUNTER — TELEPHONE (OUTPATIENT)
Dept: INTERNAL MEDICINE | Facility: CLINIC | Age: 67
End: 2017-10-16

## 2017-10-16 NOTE — TELEPHONE ENCOUNTER
Please forward the following to the requesting individuals;      Patient may discontinue Coumadin 5 days prior to the dental procedure.    Patient should start Coumadin the following day.      Gary

## 2017-10-16 NOTE — TELEPHONE ENCOUNTER
Reason for Call: Request for an order or referral:    [facility]ue [facility]in 48 hours preop, surgery for 17 teeth to be taken out.    Date needed: as soon as possible (today if possible    Has the patient been seen by the PCP for this problem? YES    Additional comments:     Phone number Patient can be reached at:  Other phone number:  [phone]  Adriana    Best Time:      Can we leave a detailed message on this number?  YES    Call taken on 10/16/2017 at 8:41 AM by Marilyn Perez

## 2017-10-17 NOTE — TELEPHONE ENCOUNTER
Brad returned call. I read him the message below and he verbalized understanding. He has no questions at this time.     Thank you. Wes Vidales, Patient Representative

## 2017-10-19 ENCOUNTER — ALLIED HEALTH/NURSE VISIT (OUTPATIENT)
Dept: FAMILY MEDICINE | Facility: OTHER | Age: 67
End: 2017-10-19
Payer: MEDICARE

## 2017-10-19 ENCOUNTER — TELEPHONE (OUTPATIENT)
Dept: INTERNAL MEDICINE | Facility: CLINIC | Age: 67
End: 2017-10-19

## 2017-10-19 DIAGNOSIS — Z23 NEED FOR PROPHYLACTIC VACCINATION AND INOCULATION AGAINST INFLUENZA: Primary | ICD-10-CM

## 2017-10-19 PROCEDURE — 90662 IIV NO PRSV INCREASED AG IM: CPT

## 2017-10-19 PROCEDURE — G0008 ADMIN INFLUENZA VIRUS VAC: HCPCS

## 2017-10-19 NOTE — TELEPHONE ENCOUNTER
I have contacted Adriana with the oral surgeon's office. I gave her the message below. She is going to follow-up with the pt. Neeru Brown CMA ([facility])

## 2017-10-19 NOTE — MR AVS SNAPSHOT
After Visit Summary   10/19/2017    Home Valdez    MRN: [id number]           Patient Information     Date Of Birth          1950        Visit Information        Provider Department      10/19/2017 3:30 PM NL FLOAT NURSE, [facility]        Today's Diagnoses     Need for prophylactic vaccination and inoculation against influenza    -  1       Follow-ups after your visit        Your next 10 appointments already scheduled     Oct 27, 2017  4:15 PM CDT   [facility] with  ANT[facility]   [facility]    [address]  [facility] [address]   [phone]            Nov 08, 2017  9:30 AM CST   (Arrive by 9:15 AM)   Return Visit with Jamie Orozco MD   [facility] ([facility])    [address]  [address] [facility] [id number] [phone]            Nov 15, 2017  9:00 AM CST   Pulmonary Function with NL RESPIRATORY THERAPY   [facility] ([facility])    [address] [place] [id number] [phone]           No Inhalers for 6 hours prior to test No Smoking 2 hours prior to test            Nov 16, 2017  9:30 AM CST   US AORTA/IVC/ILIAC DUPLEX COMPLETE with PHUS3   [facility] ([facility])    [address] [facility] [phone] [phone]           Please bring a list of your medicines (including vitamins, minerals and over-the-counter drugs). Also, tell your doctor about any allergies you may have. Wear comfortable clothes and leave your valuables at home.  Adults: No eating or drinking for 8 hours before the exam. You may take medicine with a small sip of water.  Children: - Children 6+ years: No food or drink for 6 hours before exam. - Children 1-5 years: No food or drink for 4 hours before exam. - Infants, breast-fed: may have breast milk up to 2 hours before exam. - Infants,  "formula: may have bottle until 4 hours before exam.  Please call the Imaging Department at your exam site with any questions.            2017  1:30 PM CST   Return Visit with Jimenez Murphy MD   [facility]    [address] [place] [id number] [phone]              Who to contact     If you have questions or need follow up information about today's clinic visit or your schedule please contact [facility] directly at [phone].  Normal or non-critical lab and imaging results will be communicated to you by LeadPointhart, letter or phone within 4 business days after the clinic has received the results. If you do not hear from us within 7 days, please contact the clinic through DrFirstt or phone. If you have a critical or abnormal lab result, we will notify you by phone as soon as possible.  Submit refill requests through UReserv or call your pharmacy and they will forward the refill request to us. Please allow 3 business days for your refill to be completed.          Additional Information About Your Visit        UReserv Information     UReserv lets you send messages to your doctor, view your test results, renew your prescriptions, schedule appointments and more. To sign up, go to [email or web address] . Click on \"Log in\" on the left side of the screen, which will take you to the Welcome page. Then click on \"Sign up Now\" on the right side of the page.     You will be asked to enter the access code listed below, as well as some personal information. Please follow the directions to create your username and password.     Your access code is: [email or web address]  Expires: 2017 12:20 PM     Your access code will  in 90 days. If you need help or a new code, please call your [facility] or [phone].        Care EveryWhere ID     This is your Care EveryWhere ID. This could be used by other organizations to access your [facility] " records  [id number]         Blood Pressure from Last 3 Encounters:   09/25/17 138/82   07/26/17 147/78   07/19/17 132/78    Weight from Last 3 Encounters:   09/25/17 244 lb 11.2 oz (111 kg)   07/26/17 250 lb (113.4 kg)   07/19/17 250 lb (113.4 kg)              We Performed the Following     FLU VACCINE, INCREASED ANTIGEN, PRESV FREE, AGE 65+ [11415]     Vaccine Administration, Initial [36825]        Primary Care Provider Office Phone # Fax #    Sandip Vega [phone] [address] [place] [address]        Equal Access to Services     MILKA ORTIZ : Radha Ignacio, wajanettda nayeliadaha, qaybta kaalmada ademalouyamanuel, jase bhandari. So [facility] [phone].    ATENCIÓN: Si habla español, tiene a aclabrese disposición servicios gratuitos de asistencia lingüística. Llame al [phone].    We comply with applicable federal civil rights laws and [place] laws. We do not discriminate on the basis of race, color, national origin, age, disability, sex, sexual orientation, or gender identity.            Thank you!     Thank you for choosing [facility]  for your care. Our goal is always to provide you with excellent care. Hearing back from our patients is one way we can continue to improve our services. Please take a few minutes to complete the written survey that you may receive in the mail after your visit with us. Thank you!             Your Updated Medication List - Protect others around you: Learn how to safely use, store and throw away your medicines at www.disposemymeds.org.          This list is accurate as of: 10/19/17  3:36 PM.  Always use your most recent med list.                   Brand Name Dispense Instructions for use Diagnosis    albuterol 108 (90 BASE) MCG/ACT Inhaler    PROAIR HFA/PROVENTIL HFA/VENTOLIN HFA    6 Inhaler    Inhale 2 puffs into the lungs every 4 hours as needed for shortness of breath / dyspnea    Chronic  obstructive pulmonary disease with acute exacerbation (H)       amiodarone 200 MG tablet    PACERONE/CODARONE    45 tablet    Take 0.5 tablets (100 mg) by mouth daily    Atrial fibrillation (H)       buPROPion 150 MG 12 hr tablet    WELLBUTRIN SR    180 tablet    Take 1 tablet (150 mg) by mouth 2 times daily    Personal history of tobacco use, presenting hazards to health       * cefuroxime 500 MG tablet    CEFTIN    20 tablet    Take 1 tablet (500 mg) by mouth 2 times daily    Panlobular emphysema (H)       * cefuroxime 250 MG tablet    CEFTIN    20 tablet    Take 1 tablet (250 mg) by mouth 2 times daily    Obstructive chronic bronchitis with exacerbation (H)       cetirizine 10 MG tablet    zyrTEC    30 tablet    TAKE 1 TABLET BY MOUTH EVERY EVENING    Chronic seasonal allergic rhinitis due to other allergen       fluticasone 50 MCG/ACT spray    FLONASE    16 g    USE ONE TO TWO SPRAYS IN EACH NOSTRIL EVERY DAY    Chronic rhinitis       fluticasone-salmeterol 250-50 MCG/DOSE diskus inhaler    ADVAIR    3 Inhaler    Inhale 1 puff into the lungs 2 times daily    Panlobular emphysema (H)       gabapentin 250 MG/5ML solution    NEURONTIN    450 mL    Take 2 mLs (100 mg) by mouth every 12 hours    Trigeminal neuralgia       ipratropium - albuterol 0.5 mg/2.5 mg/3 mL 0.5-2.5 (3) MG/3ML neb solution    DUONEB    540 mL    TAKE 1 VIAL (3 MLS) BY NEBULIZATION EVERY 4 HOURS AS NEEDED FOR SHORTNESS OF BREATH / DYSPNEA OR WHEEZING    COPD exacerbation (H)       losartan 50 MG tablet    COZAAR    180 tablet    Take 2 tablets (100 mg) by mouth daily    Atrial fibrillation with RVR (H), Hypertension goal BP (blood pressure) < 140/90       methylPREDNISolone 4 MG tablet    MEDROL DOSEPAK    21 tablet    Follow package instructions    Obstructive chronic bronchitis with exacerbation (H)       metoprolol 50 MG 24 hr tablet    TOPROL-XL    90 tablet    Take 1 tablet (50 mg) by mouth daily    Atrial fibrillation with RVR (H), CHF  (congestive heart failure) (H)       montelukast 10 MG tablet    SINGULAIR    30 tablet    TAKE 1 TABLET BY MOUTH DAILY AT BEDTIME    Chronic seasonal allergic rhinitis due to other allergen       nebulizer/tubing/mouthpiece Kit     1 kit    Use every 4-6 hours PRN    Chronic obstructive pulmonary disease, unspecified COPD type (H)       nystatin 752348 UNIT/ML suspension    MYCOSTATIN    60 mL    Take 5 mLs (500,000 Units) by mouth 3 times daily    Candidiasis of mouth       order for DME     1 Device    Equipment being ordered: Nebulizer    COPD (chronic obstructive pulmonary disease) (H)       oxyCODONE 10 MG IR tablet    ROXICODONE    30 tablet    Take 1-2 tablets (10-20 mg) by mouth every 3 hours as needed for moderate to severe pain    Trigeminal neuralgia       potassium chloride SA 20 MEQ CR tablet    K-DUR/KLOR-CON M    90 tablet    Take 1 tablet (20 mEq) by mouth daily    CHF (congestive heart failure) (H), Peripheral edema       senna-docusate 8.6-50 MG per tablet    SENOKOT-S;PERICOLACE     Take 2 tablets by mouth 2 times daily        torsemide 20 MG tablet    DEMADEX    180 tablet    Take 10 mg po daily. Take an extra 10mg po daily as needed for swelling.    Chronic diastolic congestive heart failure (H)       * warfarin 2.5 MG tablet    JANTOVEN    235 tablet    Take 7.5 mg (3 tablets) on Tuesday, Thursday, Saturday and 5 mg (2 tablets) all other days or as directed by coumadin clinic.    Atrial fibrillation with RVR (H)       * warfarin 2.5 MG tablet    JANTOVEN    250 tablet    TAKE TAKE 2 TABLETS (5MG) on Mondays and take 3 TABLETS (7.5MG) ALL OTHER DAYS OR AS DIRECTED BY THE COUMADIN CLINIC    Atrial fibrillation with RVR (H)       * Notice:  This list has 4 medication(s) that are the same as other medications prescribed for you. Read the directions carefully, and ask your doctor or other care provider to review them with you.

## 2017-10-19 NOTE — TELEPHONE ENCOUNTER
Oral surgeon's office called and they are only recommending pt stop coumadin for 2 days prior to procedure. Pt was notified by PCP that he should stop it for 5 days prior. The surgeon said 2 days is what they think is necessary. Please advise if the 2 day hold is okay.    Adriana [phone]. Okay to leave detailed message on VM. She will call pt.

## 2017-10-19 NOTE — PROGRESS NOTES
Injectable Influenza Immunization Documentation    1.  Is the person to be vaccinated sick today?   No    2. Does the person to be vaccinated have an allergy to a component   of the vaccine?   No  Egg Allergy Algorithm Link    3. Has the person to be vaccinated ever had a serious reaction   to influenza vaccine in the past?   No    4. Has the person to be vaccinated ever had Guillain-Barré syndrome?   No    Prior to injection verified patient identity using patient's name and date of birth.    Form completed by Nkechi Carroll MA     10/19/2017

## 2017-10-25 ENCOUNTER — OFFICE VISIT (OUTPATIENT)
Dept: NEUROSURGERY | Facility: CLINIC | Age: 67
End: 2017-10-25

## 2017-10-25 VITALS
BODY MASS INDEX: 36.22 KG/M2 | SYSTOLIC BLOOD PRESSURE: 142 MMHG | HEART RATE: 61 BPM | DIASTOLIC BLOOD PRESSURE: 79 MMHG | HEIGHT: 70 IN | WEIGHT: 253 LBS

## 2017-10-25 DIAGNOSIS — G50.0 TRIGEMINAL NEURALGIA: Primary | ICD-10-CM

## 2017-10-25 ASSESSMENT — PAIN SCALES - GENERAL: PAINLEVEL: NO PAIN (0)

## 2017-10-25 NOTE — LETTER
"10/25/2017       RE: Home Valdez  [address]  [facility] [address]     Dear Colleague,    Thank you for referring your patient, Home Valdez, to the [facility] NEUROSURGERY at [facility]. Please see a copy of my visit note below.    Neurosurgery Clinic     10/25/17    HPI:   Home Valdez is a 67 year old male who presents for followup of LEFT trigeminal neuralgia affecting V1 and V2.     He has received a LEFT percutaneous balloon rhizotomy for trigeminal neuralgia in 09/2016 and 06/2017. He is planning on having impressions for dentures completed on 11/9/17 as the first stage a major dental restoratons. There is a time limit on completing the restoration procedure and the impressions cannot be delayed wihtout disrupting the entire plan. At his last visit with Shanika Pelayo, the patient reported increasing amounts of pain beginning in 9/2017. Unfortunately, dental impressions are not performed under sedation, and the patient is concerned that his recurrent trigeminal neuralgia will lead to his inability to obtain dentures before the end of the year.  He is especially concerned with his facial sensitivity and the amount of facial touching that is involved with obtaining impressions.     The patient does report interest in a microvascular decompression possibly in 2018. He is requesting a repeat percutaneous balloon rhizotomy in the meantime.     Physical exam:  Ht 1.778 m (5' 10\")  Wt 114.8 kg (253 lb)  BMI 36.3 kg/m2    General: appears comfortable, in no acute distress. Accompanied by wife and daughter who is a RN coordinator here for hematology/oncology.   NEURO:  CN: No corneal reflex of the LEFT eye. Decreased sensation in V1, V2, and V3 on the LEFT. Mild left ptosis present. PERRL bilaterally. EOMI. Symmetric palate elevation, tongue protrusion, and smile. 5/5 SCM bilaterally. No aphasia or dysarthria.   Motor: 5/5 in the bilateral upper and lower " "extremities  Sensory: Intact to light touch bilaterally in upper and lower extremities     Imaging:  No new imaging obtained for this visit     Assessment:  Home Valdez is a 67 year old male who presents for followup of LEFT trigeminal neuralgia affecting V1 and V2. He is s/p balloon rhizotomies x 2 (9/16 and 6/17) with recurrence and dysesthesias on examination. He is interested in a MVD in the future but would like to pursue a repeat percutaneous balloon rhizotomy in the interim to facilitate him receiving his dentures and is understanding of the risk of future dysesthesias with repeat ablative procedures. The patient elects to the below procedure after having discussed the benefits, risks, and alternatives to the procedure.     Plan:   --repeat LEFT percutaneous balloon rhizotomy on 11/7/17  --lower INR < 1.5 on day prior surgery     Taco \"Pravin\" MD [facility]   Neurosurgery, PGY-1     I have personally examined the patient, reviewed and edited the resident's note and agree with the plan of care.  My additional comments have been separately dictated. - [facility]          HISTORY OF PRESENT ILLNESS:  Mr. Valdez was seen for further followup of his trigeminal neuralgia.      He has left first and second division trigeminal neuralgia which was treated with a balloon compression rhizotomy in 2016.  He got good relief from the first procedure, but it recurred last spring when I was out, and Dr. Gonzales did a repeat procedure.  He got additional relief, but his pain has recurred in the past couple of months.  He also has some \"warm like\" abnormal feelings in the first division.  These tend to come on just before the actual pain begins.      The situation is complicated in that he now needs to have an extensive dental reconstruction and the simple act of creating the dental impressions required to start the process is too painful for him to complete it.  Therefore, he is requesting that we consider repeating the balloon " compression rhizotomy in preparation for the impressions which are necessary before the actual reconstruction is done.      A detailed summary of today's visit has been entered in the record by my resident, Pravin Grayson, which I have reviewed, amended and incorporate into my own.      Mr. Huerta has corneal anesthesia, which he has had ever since a glycerol injection 25 years ago and some demonstrable numbness in the first division on the face.      IMPRESSION/PLAN:  We discussed the whole variety of options.  He understands that microvascular decompression may be the final course of action that we need to pursue, but for a variety of logistic reasons, he needs to get the dental reconstructions done before the end of the year and therefore wants to do the repeat rhizotomy in order to accomplish that and then decide about a microvascular decompression sometime next year.      This seems like a reasonable situation to consider a repeat balloon compression rhizotomy; and therefore, we will make arrangements to do that in the near future.         REGINA CA MD             D: 10/25/2017 12:38   T: 10/25/2017 16:49   MT: RICHARDSON      Name:     KARTIK HUERTA   MRN:      [id number]        Account:      [id number]   :      1950           Service Date: 10/25/2017      Document: [id number]

## 2017-10-25 NOTE — NURSING NOTE
Pre-op Teaching    Procedure:left percutaneous trigeminal nerve balloon compression   Date:11/7/17  Surgeon:aJmie Orozco MD     Type and Screen Date:n/a  Type and Cross Date:(Needs to be within 3 days of surgery-otherwise order stat day of)        Pre-op folder with specific written instructions      Discussed pre-op routine and requirements to include:      Surgical procedure, post-op recovery and expectations, need for H&P, NPO prior to OR, pre-op antibacterial showers, pain control and importance of follow-up visits.  Surgery scheduling will coordinate OR time/date and update patient as appropriate.  3C will call with more instructions pre-op.   Ample time was provided for patient questions and in-depth discussion of topics of heightened interest.     A four ounce bottle of antibacterial soap solution was given to patient as well as specific instructions for use. Brad verbalized understanding of instructions.  Approximately 20 minutes spent with patient and family discussing and reviewing.

## 2017-10-25 NOTE — PROGRESS NOTES
"HISTORY OF PRESENT ILLNESS:  Mr. Valdez was seen for further followup of his trigeminal neuralgia.      He has left first and second division trigeminal neuralgia which was treated with a balloon compression rhizotomy in 2016.  He got good relief from the first procedure, but it recurred last spring when I was out, and Dr. Gonzales did a repeat procedure.  He got additional relief, but his pain has recurred in the past couple of months.  He also has some \"warm like\" abnormal feelings in the first division.  These tend to come on just before the actual pain begins.      The situation is complicated in that he now needs to have an extensive dental reconstruction and the simple act of creating the dental impressions required to start the process is too painful for him to complete it.  Therefore, he is requesting that we consider repeating the balloon compression rhizotomy in preparation for the impressions which are necessary before the actual reconstruction is done.      A detailed summary of today's visit has been entered in the record by my resident, Pravin Grayson, which I have reviewed, amended and incorporate into my own.      Mr. Valdez has corneal anesthesia, which he has had ever since a glycerol injection 25 years ago and some demonstrable numbness in the first division on the face.      IMPRESSION/PLAN:  We discussed the whole variety of options.  He understands that microvascular decompression may be the final course of action that we need to pursue, but for a variety of logistic reasons, he needs to get the dental reconstructions done before the end of the year and therefore wants to do the repeat rhizotomy in order to accomplish that and then decide about a microvascular decompression sometime next year.      This seems like a reasonable situation to consider a repeat balloon compression rhizotomy; and therefore, we will make arrangements to do that in the near future.         REGINA CA MD             D: " 10/25/2017 12:38   T: 10/25/2017 16:49   MT: RICHARDSON      Name:     KARTIK HUERTA   MRN:      [id number]        Account:      [id number]   :      1950           Service Date: 10/25/2017      Document: [id number]

## 2017-10-25 NOTE — PROGRESS NOTES
"Neurosurgery Clinic     10/25/17    HPI:   Home Valdez is a 67 year old male who presents for followup of LEFT trigeminal neuralgia affecting V1 and V2.     He has received a LEFT percutaneous balloon rhizotomy for trigeminal neuralgia in 09/2016 and 06/2017. He is planning on having impressions for dentures completed on 11/9/17 as the first stage a major dental restoratons. There is a time limit on completing the restoration procedure and the impressions cannot be delayed wihtout disrupting the entire plan. At his last visit with Shanika Pelayo, the patient reported increasing amounts of pain beginning in 9/2017. Unfortunately, dental impressions are not performed under sedation, and the patient is concerned that his recurrent trigeminal neuralgia will lead to his inability to obtain dentures before the end of the year.  He is especially concerned with his facial sensitivity and the amount of facial touching that is involved with obtaining impressions.     The patient does report interest in a microvascular decompression possibly in 2018. He is requesting a repeat percutaneous balloon rhizotomy in the meantime.     Physical exam:  Ht 1.778 m (5' 10\")  Wt 114.8 kg (253 lb)  BMI 36.3 kg/m2    General: appears comfortable, in no acute distress. Accompanied by wife and daughter who is a RN coordinator here for hematology/oncology.   NEURO:  CN: No corneal reflex of the LEFT eye. Decreased sensation in V1, V2, and V3 on the LEFT. Mild left ptosis present. PERRL bilaterally. EOMI. Symmetric palate elevation, tongue protrusion, and smile. 5/5 SCM bilaterally. No aphasia or dysarthria.   Motor: 5/5 in the bilateral upper and lower extremities  Sensory: Intact to light touch bilaterally in upper and lower extremities     Imaging:  No new imaging obtained for this visit     Assessment:  Home Valdez is a 67 year old male who presents for followup of LEFT trigeminal neuralgia affecting V1 and V2. He is s/p " "balloon rhizotomies x 2 (9/16 and 6/17) with recurrence and dysesthesias on examination. He is interested in a MVD in the future but would like to pursue a repeat percutaneous balloon rhizotomy in the interim to facilitate him receiving his dentures and is understanding of the risk of future dysesthesias with repeat ablative procedures. The patient elects to the below procedure after having discussed the benefits, risks, and alternatives to the procedure.     Plan:   --repeat LEFT percutaneous balloon rhizotomy on 11/7/17  --lower INR < 1.5 on day prior surgery     Taco \"Pravin\" MD [facility]   Neurosurgery, PGY-1     I have personally examined the patient, reviewed and edited the resident's note and agree with the plan of care.  My additional comments have been separately dictated. - [facility]        "

## 2017-10-25 NOTE — MR AVS SNAPSHOT
After Visit Summary   10/25/2017    Home Valdez    MRN: [id number]           Patient Information     Date Of Birth          1950        Visit Information        Provider Department      10/25/2017 8:15 AM Jamie Orozco MD [facility] Neuro[facility]        Today's Diagnoses     Trigeminal neuralgia    -  1       Follow-ups after your visit        Your next 10 appointments already scheduled     Nov 01, 2017  5:00 PM CDT   Pre-Op physical with Neo Galicia MD   [facility]    [address] [facility] [id number] [phone]            Nov 03, 2017  8:30 AM CDT   Anticoagulation Visit with  ANTI COAG   [facility]    [address] [facility] [id number] [phone]            Nov 06, 2017  3:00 PM CST   (Arrive by 2:45 PM)   PAC Pharmacist with  Pac Pharmacist   [facility] ([facility])    [address] [place] [address]  [address] [facility] [id number] [phone]            Nov 06, 2017  3:30 PM CST   (Arrive by 3:15 PM)   PAC EVALUATION with  Pac Yokasta 2   [facility] ([facility])    [address] [place] [id number]   [phone]            Nov 06, 2017  4:30 PM CST   (Arrive by 4:15 PM)   PAC RN ASSESSMENT with  Pac Rn   [facility] ([facility])    [address] [place] [address]  [address] [facility] [address]   [phone]            Nov 06, 2017  4:40 PM CST   (Arrive by 4:25 PM)   PAC Anesthesia Consult with  Pac Anesthesiologist   [facility] ([facility])    [address] [place] [address]   [phone]            Nov 07, 2017   Procedure with Jamie Orozco MD   [facility] [place] Same Day Surgery (--)    [address] [place] [address]  St  [place] [address]   [phone]            Nov 15, 2017  9:00 AM CST   Pulmonary Function with NL RESPIRATORY THERAPY   [facility] ([facility])    [address] [place] [address] [address] [place] [phone]           No Inhalers for 6 hours prior to test No Smoking 2 hours prior to test            Nov 16, 2017  9:30 AM CST   US AORTA/IVC/ILIAC DUPLEX COMPLETE with PHUS3   [facility]trasound ([facility])    [address] [place] [phone]           Please bring a list of your medicines (including vitamins, minerals and over-the-counter drugs). Also, tell your doctor about any allergies you may have. Wear comfortable clothes and leave your valuables at home.  Adults: No eating or drinking for 8 hours before the exam. You may take medicine with a small sip of water.  Children: - Children 6+ years: No food or drink for 6 hours before exam. - Children 1-5 years: No food or drink for 4 hours before exam. - Infants, breast-fed: may have breast milk up to 2 hours before exam. - Infants, formula: may have bottle until 4 hours before exam.  Please call the Imaging Department at your exam site with any questions.            Nov 28, 2017  1:30 PM CST   Return Visit with Jimenez Murphy MD   [facility] ([facility])    [address] [place] [id number] [phone]              Who to contact     Please call your clinic at [phone] to:    Ask questions about your health    Make or cancel appointments    Discuss your medicines    Learn about your test results    Speak to your doctor   If you have compliments or concerns about an experience at your clinic, or if you wish to file a complaint, please contact [facility] Patient Relations at [phone] or email us at [email or web address]         Additional Information About Your Visit       "  MyChart Information     [employer] is an electronic gateway that provides easy, online access to your medical records. With [employer], you can request a clinic appointment, read your test results, renew a prescription or communicate with your care team.     To sign up for [employer] visit the website at [email or web address]   You will be asked to enter the access code listed below, as well as some personal information. Please follow the directions to create your username and password.     Your access code is: [email or web address]  Expires: 2017 12:20 PM     Your access code will  in 90 days. If you need help or a new code, please contact your [facility] or call [phone] for assistance.        Care EveryWhere ID     This is your Care EveryWhere ID. This could be used by other organizations to access your [facility] medical records  [id number]        Your Vitals Were     Pulse Height BMI (Body Mass Index)             61 1.778 m (5' 10\") 36.3 kg/m2          Blood Pressure from Last 3 Encounters:   10/25/17 142/79   17 138/82   17 147/78    Weight from Last 3 Encounters:   10/25/17 114.8 kg (253 lb)   17 111 kg (244 lb 11.2 oz)   17 113.4 kg (250 lb)              We Performed the Following     Latoya-Operative Worksheet        Primary Care Provider Office Phone # Fax #    [facility] [phone]       [address] [place] [address]        Equal Access to Services     [facility]: Hadii aad ku hadasho Soomaali, waaxda luqadaha, qaybta kaalmada adeegyamanuel, jase john . So [facility] [phone].    ATENCIÓN: Si habla español, tiene a calabrese disposición servicios gratuitos de asistencia lingüística. Llame al [phone].    We comply with applicable federal civil rights laws and [place] laws. We do not discriminate on the basis of race, color, national origin, age, disability, sex, sexual " orientation, or gender identity.            Thank you!     Thank you for choosing [facility]  for your care. Our goal is always to provide you with excellent care. Hearing back from our patients is one way we can continue to improve our services. Please take a few minutes to complete the written survey that you may receive in the mail after your visit with us. Thank you!             Your Updated Medication List - Protect others around you: Learn how to safely use, store and throw away your medicines at www.disposemymeds.org.          This list is accurate as of: 10/25/17 11:59 PM.  Always use your most recent med list.                   Brand Name Dispense Instructions for use Diagnosis    albuterol 108 (90 BASE) MCG/ACT Inhaler    PROAIR HFA/PROVENTIL HFA/VENTOLIN HFA    6 Inhaler    Inhale 2 puffs into the lungs every 4 hours as needed for shortness of breath / dyspnea    Chronic obstructive pulmonary disease with acute exacerbation (H)       amiodarone 200 MG tablet    PACERONE/CODARONE    45 tablet    Take 0.5 tablets (100 mg) by mouth daily    Atrial fibrillation (H)       buPROPion 150 MG 12 hr tablet    WELLBUTRIN SR    180 tablet    Take 1 tablet (150 mg) by mouth 2 times daily    Personal history of tobacco use, presenting hazards to health       * cefuroxime 500 MG tablet    CEFTIN    20 tablet    Take 1 tablet (500 mg) by mouth 2 times daily    Panlobular emphysema (H)       * cefuroxime 250 MG tablet    CEFTIN    20 tablet    Take 1 tablet (250 mg) by mouth 2 times daily    Obstructive chronic bronchitis with exacerbation (H)       cetirizine 10 MG tablet    zyrTEC    30 tablet    TAKE 1 TABLET BY MOUTH EVERY EVENING    Chronic seasonal allergic rhinitis due to other allergen       fluticasone 50 MCG/ACT spray    FLONASE    16 g    USE ONE TO TWO SPRAYS IN EACH NOSTRIL EVERY DAY    Chronic rhinitis       fluticasone-salmeterol 250-50 MCG/DOSE diskus inhaler    ADVAIR    3 Inhaler    Inhale 1  puff into the lungs 2 times daily    Panlobular emphysema (H)       gabapentin 250 MG/5ML solution    NEURONTIN    450 mL    Take 2 mLs (100 mg) by mouth every 12 hours    Trigeminal neuralgia       ipratropium - albuterol 0.5 mg/2.5 mg/3 mL 0.5-2.5 (3) MG/3ML neb solution    DUONEB    540 mL    TAKE 1 VIAL (3 MLS) BY NEBULIZATION EVERY 4 HOURS AS NEEDED FOR SHORTNESS OF BREATH / DYSPNEA OR WHEEZING    COPD exacerbation (H)       losartan 50 MG tablet    COZAAR    180 tablet    Take 2 tablets (100 mg) by mouth daily    Atrial fibrillation with RVR (H), Hypertension goal BP (blood pressure) < 140/90       methylPREDNISolone 4 MG tablet    MEDROL DOSEPAK    21 tablet    Follow package instructions    Obstructive chronic bronchitis with exacerbation (H)       metoprolol 50 MG 24 hr tablet    TOPROL-XL    90 tablet    Take 1 tablet (50 mg) by mouth daily    Atrial fibrillation with RVR (H), CHF (congestive heart failure) (H)       montelukast 10 MG tablet    SINGULAIR    30 tablet    TAKE 1 TABLET BY MOUTH DAILY AT BEDTIME    Chronic seasonal allergic rhinitis due to other allergen       nebulizer/tubing/mouthpiece Kit     1 kit    Use every 4-6 hours PRN    Chronic obstructive pulmonary disease, unspecified COPD type (H)       nystatin 318515 UNIT/ML suspension    MYCOSTATIN    60 mL    Take 5 mLs (500,000 Units) by mouth 3 times daily    Candidiasis of mouth       order for DME     1 Device    Equipment being ordered: Nebulizer    COPD (chronic obstructive pulmonary disease) (H)       oxyCODONE 10 MG IR tablet    ROXICODONE    30 tablet    Take 1-2 tablets (10-20 mg) by mouth every 3 hours as needed for moderate to severe pain    Trigeminal neuralgia       potassium chloride SA 20 MEQ CR tablet    K-DUR/KLOR-CON M    90 tablet    Take 1 tablet (20 mEq) by mouth daily    CHF (congestive heart failure) (H), Peripheral edema       senna-docusate 8.6-50 MG per tablet    SENOKOT-S;PERICOLACE     Take 2 tablets by mouth 2  times daily        torsemide 20 MG tablet    DEMADEX    180 tablet    Take 10 mg po daily. Take an extra 10mg po daily as needed for swelling.    Chronic diastolic congestive heart failure (H)       * warfarin 2.5 MG tablet    JANTOVEN    235 tablet    Take 7.5 mg (3 tablets) on Tuesday, Thursday, Saturday and 5 mg (2 tablets) all other days or as directed by coumadin clinic.    Atrial fibrillation with RVR (H)       * warfarin 2.5 MG tablet    JANTOVEN    250 tablet    TAKE TAKE 2 TABLETS (5MG) on Mondays and take 3 TABLETS (7.5MG) ALL OTHER DAYS OR AS DIRECTED BY THE COUMADIN CLINIC    Atrial fibrillation with RVR (H)       * Notice:  This list has 4 medication(s) that are the same as other medications prescribed for you. Read the directions carefully, and ask your doctor or other care provider to review them with you.

## 2017-10-27 ENCOUNTER — ANTICOAGULATION THERAPY VISIT (OUTPATIENT)
Dept: ANTICOAGULATION | Facility: OTHER | Age: 67
End: 2017-10-27
Payer: MEDICARE

## 2017-10-27 DIAGNOSIS — I48.91 ATRIAL FIBRILLATION WITH RVR (H): ICD-10-CM

## 2017-10-27 DIAGNOSIS — Z79.01 LONG-TERM (CURRENT) USE OF ANTICOAGULANTS: ICD-10-CM

## 2017-10-27 LAB — INR POINT OF CARE: 3.9 (ref 0.86–1.14)

## 2017-10-27 PROCEDURE — 99207 ZZC NO CHARGE NURSE ONLY: CPT

## 2017-10-27 PROCEDURE — 36416 COLLJ CAPILLARY BLOOD SPEC: CPT

## 2017-10-27 PROCEDURE — 85610 PROTHROMBIN TIME: CPT | Mod: QW

## 2017-10-27 NOTE — PROGRESS NOTES
ANTICOAGULATION FOLLOW-UP CLINIC VISIT    Patient Name:  Home Valdez  Date:  10/27/2017  Contact Type:  Face to Face    SUBJECTIVE:     Patient Findings     Positives Unexplained INR or factor level change    Comments Pt is having upcoming surgery on 11/7/17. INR needs to be below 1.5. Will recheck on 11/3. Shameka Carey RN, BSN             OBJECTIVE    INR Protime   Date Value Ref Range Status   10/27/2017 3.9 (A) 0.86 - 1.14 Final       ASSESSMENT / PLAN  INR assessment SUPRA    Recheck INR In: 1 WEEK    INR Location Clinic      Anticoagulation Summary as of 10/27/2017     INR goal 2.0-3.0   Today's INR 3.9!   Maintenance plan 5 mg (2.5 mg x 2) on Mon; 7.5 mg (2.5 mg x 3) all other days   Full instructions 10/27: Hold; 11/1: 5 mg; 11/4: Hold; 11/5: Hold; 11/6: Hold; Otherwise 5 mg on Mon; 7.5 mg all other days   Weekly total 50 mg   Plan last modified Shameka Carey RN (6/30/2017)   Next INR check 11/3/2017   Target end date     Indications   Atrial fibrillation with RVR (H) [I48.91]  Long-term (current) use of anticoagulants [Z79.01] [Z79.01]         Anticoagulation Episode Summary     INR check location     Preferred lab     Send INR reminders to [facility]    Comments 2.5 MG TABLETS, likes print out       Anticoagulation Care Providers     Provider Role Specialty Phone number    Neo Galicia MD [facility] [phone]            See the Encounter Report to view Anticoagulation Flowsheet and Dosing Calendar (Go to Encounters tab in chart review, and find the Anticoagulation Therapy Visit)    Dosage adjustment made based on physician directed care plan.    Shameka Carey RN

## 2017-10-27 NOTE — MR AVS SNAPSHOT
Home Valdez   10/27/2017 4:15 PM   Anticoagulation Therapy Visit    Description:  67 year old male   Provider:  CAMILO ANTI DANI   Department:   Anticoag           INR as of 10/27/2017     Today's INR 3.9!      Anticoagulation Summary as of 10/27/2017     INR goal 2.0-3.0   Today's INR 3.9!   Full instructions 10/27: Hold; 11/1: 5 mg; 11/4: Hold; 11/5: Hold; 11/6: Hold; Otherwise 5 mg on Mon; 7.5 mg all other days   Next INR check 11/3/2017    Indications   Atrial fibrillation with RVR (H) [I48.91]  Long-term (current) use of anticoagulants [Z79.01] [Z79.01]         Your next Anticoagulation Clinic appointment(s)     Oct 27, 2017  4:15 PM CDT   [facility]agulation [facility]  ANTI COAG   [facility]    [address] [facility] [address]   [phone]            Nov 03, 2017  8:30 AM CDT   Anticoagulation Visit with  ANTI COAG   [facility]    [address] [facility] [address]   [phone]              Contact Numbers     Clinic Number:         October 2017 Details    Sun Mon Tue Wed Thu Fri Sat     1               2               3               4               5               6               7                 8               9               10               11               12               13               14                 15               16               17               18               19               20               21                 22               23               24               25               26               27      Hold   See details      28      7.5 mg           29      7.5 mg         30      5 mg         31      7.5 mg              Date Details   10/27 This INR check               How to take your warfarin dose     To take:  5 mg Take 2 of the 2.5 mg tablets.    To take:  7.5 mg Take 3 of the 2.5 mg tablets.    Hold Do not take your warfarin dose. See the Details table to the right for additional  instructions.                November 2017 Details    Sun Mon Tue Wed Thu Fri Sat        1      5 mg         2      7.5 mg         3            4                 5               6               7               8               9               10               11                 12               13               14               15               16               17               18                 19               20               21               22               23               24               25                 26               27               28               29               30                  Date Details   No additional details    Date of next INR:  11/3/2017         How to take your warfarin dose     To take:  5 mg Take 2 of the 2.5 mg tablets.    To take:  7.5 mg Take 3 of the 2.5 mg tablets.

## 2017-10-30 ENCOUNTER — TELEPHONE (OUTPATIENT)
Dept: FAMILY MEDICINE | Facility: OTHER | Age: 67
End: 2017-10-30

## 2017-10-30 NOTE — TELEPHONE ENCOUNTER
I advised pt to go ahead and take the antibiotic and we will continue as planned to see him on Friday and make any adjustment at that time. Patient verbalized understanding and agrees with plan of care. Pt had no further questions or concerns at this time. Shameka Carey RN, BSN

## 2017-10-30 NOTE — TELEPHONE ENCOUNTER
Reason for Call:  INR    Who is calling?   Patient    Phone number:  [phone] (H)    Fax number:  na    Name of caller: Brad    INR Value:  NA    Are there any other concerns:  Yes: Pt is concerned because he has surgery scheduled for next week Wed 11/08 and is coming down with a bad cold.  Pt states he has antibiotics he can take for this but, he is worried about how this will effect his coumadin numbers for his surgery.  Please call and advise.    Can we leave a detailed message on this number? YES    Phone number patient can be reached at: Home number on file [phone] (home)      Call taken on 10/30/2017 at 12:38 PM by Brittany Hernandez

## 2017-11-01 ENCOUNTER — OFFICE VISIT (OUTPATIENT)
Dept: FAMILY MEDICINE | Facility: CLINIC | Age: 67
End: 2017-11-01
Payer: MEDICARE

## 2017-11-01 VITALS
WEIGHT: 253 LBS | RESPIRATION RATE: 20 BRPM | BODY MASS INDEX: 36.3 KG/M2 | OXYGEN SATURATION: 94 % | HEART RATE: 72 BPM | TEMPERATURE: 98.5 F | SYSTOLIC BLOOD PRESSURE: 136 MMHG | DIASTOLIC BLOOD PRESSURE: 74 MMHG

## 2017-11-01 DIAGNOSIS — I10 HYPERTENSION GOAL BP (BLOOD PRESSURE) < 140/90: ICD-10-CM

## 2017-11-01 DIAGNOSIS — J44.9 CHRONIC OBSTRUCTIVE PULMONARY DISEASE, UNSPECIFIED COPD TYPE (H): Chronic | ICD-10-CM

## 2017-11-01 DIAGNOSIS — Z01.818 PREOP GENERAL PHYSICAL EXAM: Primary | ICD-10-CM

## 2017-11-01 DIAGNOSIS — J43.1 PANLOBULAR EMPHYSEMA (H): ICD-10-CM

## 2017-11-01 LAB
ANION GAP SERPL CALCULATED.3IONS-SCNC: 7 MMOL/L (ref 3–14)
BASOPHILS # BLD AUTO: 0 10E9/L (ref 0–0.2)
BASOPHILS NFR BLD AUTO: 0.2 %
BUN SERPL-MCNC: 20 MG/DL (ref 7–30)
CALCIUM SERPL-MCNC: 8.8 MG/DL (ref 8.5–10.1)
CHLORIDE SERPL-SCNC: 105 MMOL/L (ref 94–109)
CO2 SERPL-SCNC: 29 MMOL/L (ref 20–32)
CREAT SERPL-MCNC: 1.26 MG/DL (ref 0.66–1.25)
DIFFERENTIAL METHOD BLD: ABNORMAL
EOSINOPHIL # BLD AUTO: 0.1 10E9/L (ref 0–0.7)
EOSINOPHIL NFR BLD AUTO: 0.6 %
ERYTHROCYTE [DISTWIDTH] IN BLOOD BY AUTOMATED COUNT: 13.6 % (ref 10–15)
GFR SERPL CREATININE-BSD FRML MDRD: 57 ML/MIN/1.7M2
GLUCOSE SERPL-MCNC: 104 MG/DL (ref 70–99)
HCT VFR BLD AUTO: 50.2 % (ref 40–53)
HGB BLD-MCNC: 16 G/DL (ref 13.3–17.7)
IMM GRANULOCYTES # BLD: 0.1 10E9/L (ref 0–0.4)
IMM GRANULOCYTES NFR BLD: 0.4 %
LYMPHOCYTES # BLD AUTO: 1.5 10E9/L (ref 0.8–5.3)
LYMPHOCYTES NFR BLD AUTO: 11.1 %
MCH RBC QN AUTO: 29.3 PG (ref 26.5–33)
MCHC RBC AUTO-ENTMCNC: 31.9 G/DL (ref 31.5–36.5)
MCV RBC AUTO: 92 FL (ref 78–100)
MONOCYTES # BLD AUTO: 0.9 10E9/L (ref 0–1.3)
MONOCYTES NFR BLD AUTO: 6.6 %
NEUTROPHILS # BLD AUTO: 11.2 10E9/L (ref 1.6–8.3)
NEUTROPHILS NFR BLD AUTO: 81.1 %
PLATELET # BLD AUTO: 229 10E9/L (ref 150–450)
POTASSIUM SERPL-SCNC: 3.9 MMOL/L (ref 3.4–5.3)
RBC # BLD AUTO: 5.46 10E12/L (ref 4.4–5.9)
SODIUM SERPL-SCNC: 141 MMOL/L (ref 133–144)
WBC # BLD AUTO: 13.8 10E9/L (ref 4–11)

## 2017-11-01 PROCEDURE — 85025 COMPLETE CBC W/AUTO DIFF WBC: CPT | Performed by: FAMILY MEDICINE

## 2017-11-01 PROCEDURE — 93000 ELECTROCARDIOGRAM COMPLETE: CPT | Performed by: FAMILY MEDICINE

## 2017-11-01 PROCEDURE — 80048 BASIC METABOLIC PNL TOTAL CA: CPT | Performed by: FAMILY MEDICINE

## 2017-11-01 PROCEDURE — 36415 COLL VENOUS BLD VENIPUNCTURE: CPT | Performed by: FAMILY MEDICINE

## 2017-11-01 PROCEDURE — 99214 OFFICE O/P EST MOD 30 MIN: CPT | Performed by: FAMILY MEDICINE

## 2017-11-01 ASSESSMENT — PAIN SCALES - GENERAL: PAINLEVEL: MODERATE PAIN (5)

## 2017-11-01 NOTE — NURSING NOTE
"Chief Complaint   Patient presents with     Pre-Op Exam       Initial /74 (BP Location: Right arm, Patient Position: Sitting, Cuff Size: Adult Large)  Pulse 72  Temp 98.5  F (36.9  C) (Temporal)  Resp 20  Wt 253 lb (114.8 kg)  SpO2 94%  BMI 36.3 kg/m2 Estimated body mass index is 36.3 kg/(m^2) as calculated from the following:    Height as of 10/25/17: 5' 10\" (1.778 m).    Weight as of this encounter: 253 lb (114.8 kg).  Medication Reconciliation: complete     Debbi Choudhury MA 11/1/2017        "

## 2017-11-01 NOTE — MR AVS SNAPSHOT
After Visit Summary   11/1/2017    Home Valdez    MRN: [id number]           Patient Information     Date Of Birth          1950        Visit Information        Provider Department      11/1/2017 5:00 PM Neo Galicia MD [facility]        Today's Diagnoses     Preop general physical exam    -  1    Panlobular emphysema (H)        Hypertension goal BP (blood pressure) < 140/90        Chronic obstructive pulmonary disease, unspecified COPD type (H)          Care Instructions      Before Your Surgery      Call your surgeon if there is any change in your health. This includes signs of a cold or flu (such as a sore throat, runny nose, cough, rash or fever).    Do not smoke, drink alcohol or take over the counter medicine (unless your surgeon or primary care doctor tells you to) for the 24 hours before and after surgery.    If you take prescribed drugs: Follow your doctor s orders about which medicines to take and which to stop until after surgery.    Eating and drinking prior to surgery: follow the instructions from your surgeon    Take a shower or bath the night before surgery. Use the soap your surgeon gave you to gently clean your skin. If you do not have soap from your surgeon, use your regular soap. Do not shave or scrub the surgery site.  Wear clean pajamas and have clean sheets on your bed.           Follow-ups after your visit        Your next 10 appointments already scheduled     Nov 03, 2017  8:30 AM CDT   Anticoagulation Visit with  ANTI COAG   [facility] ([facility])    [address] [facility] [address]   [phone]            Nov 06, 2017  3:00 PM CST   (Arrive by 2:45 PM)   PAC Pharmacist with  Pac Pharmacist   [facility] ([facility])    [address]  4th [address]  [place] [address]   [phone]            Nov 06, 2017  3:30 PM CST   (Arrive by 3:15 PM)   PAC  EVALUATION with  Pac Yokasta 2   [facility] ([facility])    [address]  [address] [facility] [address]   [phone]            Nov 06, 2017  4:30 PM CST   (Arrive by 4:15 PM)   PAC RN ASSESSMENT with Adam Pac Rn   [facility] [place] ([facility])    [address]  [address] [facility] [address]   [phone]            Nov 06, 2017  4:40 PM CST   (Arrive by 4:25 PM)   PAC Anesthesia Consult with Adam Pac Anesthesiologist   [facility] [place] ([facility])    [address]  [address] [facility] [address]   [phone]            Nov 07, 2017   Procedure with Jamie Orozco MD   [facility] [place] Same Day Surgery (--)    [address] [address]   [phone]            Nov 15, 2017  9:00 AM CST   Pulmonary Function with NL RESPIRATORY THERAPY   [facility] ([facility])    [address] [place] [id number] [phone]           No Inhalers for 6 hours prior to test No Smoking 2 hours prior to test            Nov 16, 2017  9:30 AM CST   US AORTA/IVC/ILIAC DUPLEX COMPLETE with PHUS3   [facility] ([facility])    [address] [id number] [phone]           Please bring a list of your medicines (including vitamins, minerals and over-the-counter drugs). Also, tell your doctor about any allergies you may have. Wear comfortable clothes and leave your valuables at home.  Adults: No eating or drinking for 8 hours before the exam. You may take medicine with a small sip of water.  Children: - Children 6+ years: No food or drink for 6 hours before exam. - Children 1-5 years: No food or drink for 4 hours before exam. - Infants, breast-fed: may have breast milk up to 2 hours before exam. - Infants, formula: may have  "bottle until 4 hours before exam.  Please call the Imaging Department at your exam site with any questions.            2017  1:30 PM CST   Return Visit with Jimenez Murphy MD   [facility] ([facility])    [address] [place] [address] [phone]              Who to contact     If you have questions or need follow up information about today's clinic visit or your schedule please contact [facility] directly at [phone].  Normal or non-critical lab and imaging results will be communicated to you by [employer], letter or phone within 4 business days after the clinic has received the results. If you do not hear from us within 7 days, please contact the clinic through [employer] or phone. If you have a critical or abnormal lab result, we will notify you by phone as soon as possible.  Submit refill requests through [employer] or call your pharmacy and they will forward the refill request to us. Please allow 3 business days for your refill to be completed.          Additional Information About Your Visit        [employer] Information     GoingOn lets you send messages to your doctor, view your test results, renew your prescriptions, schedule appointments and more. To sign up, go to [email or web address] . Click on \"Log in\" on the left side of the screen, which will take you to the Welcome page. Then click on \"Sign up Now\" on the right side of the page.     You will be asked to enter the access code listed below, as well as some personal information. Please follow the directions to create your username and password.     Your access code is: [email or web address]  Expires: 2017 12:20 PM     Your access code will  in 90 days. If you need help or a new code, please call your [facility] or [phone].        Care EveryWhere ID     This is your Care EveryWhere ID. This could be used by other organizations to access your " [facility] medical records  [id number]        Your Vitals Were     Pulse Temperature Respirations Pulse Oximetry BMI (Body Mass Index)       72 98.5  F (36.9  C) (Temporal) 20 94% 36.3 kg/m2        Blood Pressure from Last 3 Encounters:   11/01/17 136/74   10/25/17 142/79   09/25/17 138/82    Weight from Last 3 Encounters:   11/01/17 253 lb (114.8 kg)   10/25/17 253 lb (114.8 kg)   09/25/17 244 lb 11.2 oz (111 kg)              We Performed the Following     Basic metabolic panel     CBC with platelets differential     EKG 12-lead complete w/read - Clinics        Primary Care Provider Office Phone # Fax #    [facility],  [phone] [address] [place] [address]        Equal Access to Services     REBECCA ORTIZ : Hadii aad carolyn hadasho Soomaali, waaxda luqadaha, qaybta kaalmada adeegyada, jase miller haybrendan john . So [facility] [phone].    ATENCIÓN: Si habla español, tiene a calabrese disposición servicios gratuitos de asistencia lingüística. Llame al [phone].    We comply with applicable federal civil rights laws and [place] laws. We do not discriminate on the basis of race, color, national origin, age, disability, sex, sexual orientation, or gender identity.            Thank you!     Thank you for choosing [facility]  for your care. Our goal is always to provide you with excellent care. Hearing back from our patients is one way we can continue to improve our services. Please take a few minutes to complete the written survey that you may receive in the mail after your visit with us. Thank you!             Your Updated Medication List - Protect others around you: Learn how to safely use, store and throw away your medicines at www.disposemymeds.org.          This list is accurate as of: 11/1/17  7:14 PM.  Always use your most recent med list.                   Brand Name Dispense Instructions for use Diagnosis    albuterol 108 (90 BASE) MCG/ACT  Inhaler    PROAIR HFA/PROVENTIL HFA/VENTOLIN HFA    6 Inhaler    Inhale 2 puffs into the lungs every 4 hours as needed for shortness of breath / dyspnea    Chronic obstructive pulmonary disease with acute exacerbation (H)       amiodarone 200 MG tablet    PACERONE/CODARONE    45 tablet    Take 0.5 tablets (100 mg) by mouth daily    Atrial fibrillation (H)       buPROPion 150 MG 12 hr tablet    WELLBUTRIN SR    180 tablet    Take 1 tablet (150 mg) by mouth 2 times daily    Personal history of tobacco use, presenting hazards to health       * cefuroxime 500 MG tablet    CEFTIN    20 tablet    Take 1 tablet (500 mg) by mouth 2 times daily    Panlobular emphysema (H)       * cefuroxime 250 MG tablet    CEFTIN    20 tablet    Take 1 tablet (250 mg) by mouth 2 times daily    Obstructive chronic bronchitis with exacerbation (H)       cetirizine 10 MG tablet    zyrTEC    30 tablet    TAKE 1 TABLET BY MOUTH EVERY EVENING    Chronic seasonal allergic rhinitis due to other allergen       fluticasone 50 MCG/ACT spray    FLONASE    16 g    USE ONE TO TWO SPRAYS IN EACH NOSTRIL EVERY DAY    Chronic rhinitis       fluticasone-salmeterol 250-50 MCG/DOSE diskus inhaler    ADVAIR    3 Inhaler    Inhale 1 puff into the lungs 2 times daily    Panlobular emphysema (H)       gabapentin 250 MG/5ML solution    NEURONTIN    450 mL    Take 2 mLs (100 mg) by mouth every 12 hours    Trigeminal neuralgia       ipratropium - albuterol 0.5 mg/2.5 mg/3 mL 0.5-2.5 (3) MG/3ML neb solution    DUONEB    540 mL    TAKE 1 VIAL (3 MLS) BY NEBULIZATION EVERY 4 HOURS AS NEEDED FOR SHORTNESS OF BREATH / DYSPNEA OR WHEEZING    COPD exacerbation (H)       losartan 50 MG tablet    COZAAR    180 tablet    Take 2 tablets (100 mg) by mouth daily    Atrial fibrillation with RVR (H), Hypertension goal BP (blood pressure) < 140/90       methylPREDNISolone 4 MG tablet    MEDROL DOSEPAK    21 tablet    Follow package instructions    Obstructive chronic bronchitis with  exacerbation (H)       metoprolol 50 MG 24 hr tablet    TOPROL-XL    90 tablet    Take 1 tablet (50 mg) by mouth daily    Atrial fibrillation with RVR (H), CHF (congestive heart failure) (H)       montelukast 10 MG tablet    SINGULAIR    30 tablet    TAKE 1 TABLET BY MOUTH DAILY AT BEDTIME    Chronic seasonal allergic rhinitis due to other allergen       nebulizer/tubing/mouthpiece Kit     1 kit    Use every 4-6 hours PRN    Chronic obstructive pulmonary disease, unspecified COPD type (H)       nystatin 566726 UNIT/ML suspension    MYCOSTATIN    60 mL    Take 5 mLs (500,000 Units) by mouth 3 times daily    Candidiasis of mouth       order for DME     1 Device    Equipment being ordered: Nebulizer    COPD (chronic obstructive pulmonary disease) (H)       oxyCODONE 10 MG IR tablet    ROXICODONE    30 tablet    Take 1-2 tablets (10-20 mg) by mouth every 3 hours as needed for moderate to severe pain    Trigeminal neuralgia       potassium chloride SA 20 MEQ CR tablet    K-DUR/KLOR-CON M    90 tablet    Take 1 tablet (20 mEq) by mouth daily    CHF (congestive heart failure) (H), Peripheral edema       senna-docusate 8.6-50 MG per tablet    SENOKOT-S;PERICOLACE     Take 2 tablets by mouth 2 times daily        torsemide 20 MG tablet    DEMADEX    180 tablet    Take 10 mg po daily. Take an extra 10mg po daily as needed for swelling.    Chronic diastolic congestive heart failure (H)       * warfarin 2.5 MG tablet    JANTOVEN    235 tablet    Take 7.5 mg (3 tablets) on Tuesday, Thursday, Saturday and 5 mg (2 tablets) all other days or as directed by coumadin clinic.    Atrial fibrillation with RVR (H)       * warfarin 2.5 MG tablet    JANTOVEN    250 tablet    TAKE TAKE 2 TABLETS (5MG) on Mondays and take 3 TABLETS (7.5MG) ALL OTHER DAYS OR AS DIRECTED BY THE COUMADIN CLINIC    Atrial fibrillation with RVR (H)       * Notice:  This list has 4 medication(s) that are the same as other medications prescribed for you. Read the  directions carefully, and ask your doctor or other care provider to review them with you.
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
## 2017-11-01 NOTE — LETTER
[address]   [address]  Tel. [phone] / Fax [phone]    November 2, 2017        Home Valdez  [address] [place] [id number]          Dear Home,    Your recent blood tests were all okay. If you have any further questions please contact our office.     Sincerely,        Neo Galicia M.D.

## 2017-11-02 ENCOUNTER — ANESTHESIA EVENT (OUTPATIENT)
Dept: SURGERY | Facility: CLINIC | Age: 67
End: 2017-11-02
Payer: MEDICARE

## 2017-11-03 ENCOUNTER — ANTICOAGULATION THERAPY VISIT (OUTPATIENT)
Dept: ANTICOAGULATION | Facility: OTHER | Age: 67
End: 2017-11-03
Payer: MEDICARE

## 2017-11-03 DIAGNOSIS — I48.91 ATRIAL FIBRILLATION WITH RVR (H): ICD-10-CM

## 2017-11-03 DIAGNOSIS — Z79.01 LONG-TERM (CURRENT) USE OF ANTICOAGULANTS: ICD-10-CM

## 2017-11-03 LAB — INR POINT OF CARE: 2.8 (ref 0.86–1.14)

## 2017-11-03 PROCEDURE — 99207 ZZC NO CHARGE NURSE ONLY: CPT

## 2017-11-03 PROCEDURE — 85610 PROTHROMBIN TIME: CPT | Mod: QW

## 2017-11-03 PROCEDURE — 36416 COLLJ CAPILLARY BLOOD SPEC: CPT

## 2017-11-03 NOTE — MR AVS SNAPSHOT
Home Valdez   11/3/2017 8:30 AM   Anticoagulation Therapy Visit    Description:  67 year old male   Provider:  [facility]   Department:  [facility]           INR as of 11/3/2017     Today's INR 2.8      Anticoagulation Summary as of 11/3/2017     INR goal 2.0-3.0   Today's INR 2.8   Full instructions 11/4: Hold; 11/5: Hold; 11/6: Hold; Otherwise 5 mg on Mon; 7.5 mg all other days   Next INR check 11/6/2017    Indications   Atrial fibrillation with RVR (H) [I48.91]  Long-term (current) use of anticoagulants [Z79.01] [Z79.01]         Your next Anticoagulation Clinic appointment(s)     Nov 03, 2017  8:30 AM CDT   Anticoagulation Visit with CAMILO AN[facility]   [facility])    [address] [facility] [address]   [phone]            Nov 06, 2017  9:15 AM CST   Anticoagulation Visit with CAM[facility]O [facility] COAG   [facility]    [address] [facility] [address]   [phone]              Contact Numbers     Clinic Number:         November 2017 Details    Sun Mon Tue Wed Thu Fri Sat        1               2               3      7.5 mg   See details      4      Hold           5      Hold         6            7               8               9               10               11                 12               13               14               15               16               17               18                 19               20               21               22               23               24               25                 26               27               28               29               30                  Date Details   11/03 This INR check       Date of next INR:  11/6/2017         How to take your warfarin dose     To take:  7.5 mg Take 3 of the 2.5 mg tablets.    Hold Do not take your warfarin dose. See the Details table to the right for additional instructions.

## 2017-11-03 NOTE — PROGRESS NOTES
ANTICOAGULATION FOLLOW-UP CLINIC VISIT    Patient Name:  Home Valdez  Date:  11/3/2017  Contact Type:  Face to Face    SUBJECTIVE:     Patient Findings     Positives No Problem Findings    Comments Pt has an upcoming procedure on Tuesday 11/7. INR needs to be below 1.5. He will hold his coumadin and recheck his INR on Monday 11/6/17. Patient verbalized understanding and agrees with plan of care. Pt had no further questions or concerns at this time.           OBJECTIVE    INR Protime   Date Value Ref Range Status   11/03/2017 2.8 (A) 0.86 - 1.14 Final       ASSESSMENT / PLAN  INR assessment THER    Recheck INR In: 3 DAYS    INR Location Clinic      Anticoagulation Summary as of 11/3/2017     INR goal 2.0-3.0   Today's INR 2.8   Maintenance plan 5 mg (2.5 mg x 2) on Mon; 7.5 mg (2.5 mg x 3) all other days   Full instructions 11/3: Hold; 11/4: Hold; 11/5: Hold; 11/6: Hold; Otherwise 5 mg on Mon; 7.5 mg all other days   Weekly total 50 mg   Plan last modified Shameka Carey RN (6/30/2017)   Next INR check 11/6/2017   Target end date     Indications   Atrial fibrillation with RVR (H) [I48.91]  Long-term (current) use of anticoagulants [Z79.01] [Z79.01]         Anticoagulation Episode Summary     INR check location     Preferred lab     Send INR reminders to [facility]    Comments 2.5 MG TABLETS, likes print out       Anticoagulation Care Providers     Provider Role Specialty Phone number    Neo Galicia MD [facility] [phone]            See the Encounter Report to view Anticoagulation Flowsheet and Dosing Calendar (Go to Encounters tab in chart review, and find the Anticoagulation Therapy Visit)    Dosage adjustment made based on physician directed care plan.    Shameka Carey RN

## 2017-11-06 ENCOUNTER — TELEPHONE (OUTPATIENT)
Dept: INTERNAL MEDICINE | Facility: CLINIC | Age: 67
End: 2017-11-06

## 2017-11-06 ENCOUNTER — OFFICE VISIT (OUTPATIENT)
Dept: SURGERY | Facility: CLINIC | Age: 67
End: 2017-11-06

## 2017-11-06 ENCOUNTER — ANTICOAGULATION THERAPY VISIT (OUTPATIENT)
Dept: ANTICOAGULATION | Facility: OTHER | Age: 67
End: 2017-11-06
Payer: MEDICARE

## 2017-11-06 ENCOUNTER — ALLIED HEALTH/NURSE VISIT (OUTPATIENT)
Dept: SURGERY | Facility: CLINIC | Age: 67
End: 2017-11-06

## 2017-11-06 VITALS
RESPIRATION RATE: 18 BRPM | HEART RATE: 62 BPM | TEMPERATURE: 98.2 F | DIASTOLIC BLOOD PRESSURE: 81 MMHG | WEIGHT: 250.3 LBS | OXYGEN SATURATION: 94 % | BODY MASS INDEX: 35.83 KG/M2 | HEIGHT: 70 IN | SYSTOLIC BLOOD PRESSURE: 150 MMHG

## 2017-11-06 DIAGNOSIS — Z01.818 PREOP EXAMINATION: Primary | ICD-10-CM

## 2017-11-06 DIAGNOSIS — J44.9 CHRONIC OBSTRUCTIVE PULMONARY DISEASE, UNSPECIFIED COPD TYPE (H): Chronic | ICD-10-CM

## 2017-11-06 DIAGNOSIS — I48.91 ATRIAL FIBRILLATION WITH RVR (H): ICD-10-CM

## 2017-11-06 DIAGNOSIS — Z01.818 PRE-OP EVALUATION: Primary | ICD-10-CM

## 2017-11-06 DIAGNOSIS — Z79.01 LONG-TERM (CURRENT) USE OF ANTICOAGULANTS: ICD-10-CM

## 2017-11-06 LAB
INR POINT OF CARE: 1.2 (ref 0.86–1.14)
WBC # BLD AUTO: 13.7 10E9/L (ref 4–11)

## 2017-11-06 PROCEDURE — 85048 AUTOMATED LEUKOCYTE COUNT: CPT | Performed by: FAMILY MEDICINE

## 2017-11-06 PROCEDURE — 85610 PROTHROMBIN TIME: CPT | Mod: QW

## 2017-11-06 PROCEDURE — 36416 COLLJ CAPILLARY BLOOD SPEC: CPT

## 2017-11-06 PROCEDURE — 99207 ZZC NO CHARGE NURSE ONLY: CPT

## 2017-11-06 RX ORDER — MULTIVITAMIN,THERAPEUTIC
1 TABLET ORAL DAILY
COMMUNITY
End: 2018-09-12

## 2017-11-06 RX ORDER — IPRATROPIUM BROMIDE AND ALBUTEROL SULFATE 2.5; .5 MG/3ML; MG/3ML
3 SOLUTION RESPIRATORY (INHALATION) ONCE
Status: CANCELLED | OUTPATIENT
Start: 2017-11-06 | End: 2017-11-06

## 2017-11-06 ASSESSMENT — ENCOUNTER SYMPTOMS: DYSRHYTHMIAS: 1

## 2017-11-06 ASSESSMENT — COPD QUESTIONNAIRES
COPD: 1
CAT_SEVERITY: MODERATE

## 2017-11-06 NOTE — PROGRESS NOTES
ANTICOAGULATION FOLLOW-UP CLINIC VISIT    Patient Name:  Home Valdez  Date:  11/6/2017  Contact Type:  Face to Face    SUBJECTIVE:     Patient Findings     Positives Intentional hold of therapy    Comments Pt is having a Trigeminal Neuralgia percutaneous balloon rhizotomy tomorrow. He will restart his coumadin tomorrow after his procedure and recheck his INR next Monday 11/13/17. I have Faxed his INR result today to Marilee Gerber RN with Dr Orozco at [facility] [phone].            OBJECTIVE    INR Protime   Date Value Ref Range Status   11/06/2017 1.2 (A) 0.86 - 1.14 Final       ASSESSMENT / PLAN  INR assessment SUB    Recheck INR In: 1 WEEK    INR Location Clinic      Anticoagulation Summary as of 11/6/2017     INR goal 2.0-3.0   Today's INR 1.2!   Maintenance plan 5 mg (2.5 mg x 2) on Mon; 7.5 mg (2.5 mg x 3) all other days   Full instructions 11/6: Hold; 11/7: 10 mg; 11/8: 10 mg; Otherwise 5 mg on Mon; 7.5 mg all other days   Weekly total 50 mg   Plan last modified Shameka Carey RN (6/30/2017)   Next INR check 11/13/2017   Target end date     Indications   Atrial fibrillation with RVR (H) [I48.91]  Long-term (current) use of anticoagulants [Z79.01] [Z79.01]         Anticoagulation Episode Summary     INR check location     Preferred lab     Send INR reminders to [facility]    Comments 2.5 MG TABLETS, likes print out       Anticoagulation Care Providers     Provider Role Specialty Phone number    Neo Galicia MD [facility] [phone]            See the Encounter Report to view Anticoagulation Flowsheet and Dosing Calendar (Go to Encounters tab in chart review, and find the Anticoagulation Therapy Visit)    Dosage adjustment made based on physician directed care plan.    Shameka Carey RN

## 2017-11-06 NOTE — PATIENT INSTRUCTIONS
Preparing for Your Surgery      Name:  Home Valdez   MRN:  [id number]   :  1950   Today's Date:  2017     Arriving for surgery:  Surgery date:  17  Arrival time:  9:10 AM  Please come to:       [facility] [address]  [address] [place]  [address]    -   parking is available in front of the hospital from 5:15 am to 8:00 pm    -  Stop at the Information Desk in the lobby    -   Inform the information person that you are here for surgery. An escort to 3c will be provided. If you would not like an escort, please proceed to 3C on the 3rd floor. [phone]     What can I eat or drink?  -  You may have solid food or milk products until 8 hours prior to your surgery.  Stop midnight tonight  -  You may have water, apple juice or 7up/Sprite or gatorade until 2 hours prior to your surgery. Stop 9:00 am  17 you can have a small cup of          Black coffee    Which medicines can I take?         Continue to hold coumadin before surgery.       No aspirin or advil today.    -  Do NOT take these medications in the morning, the day of surgery: 17         Losartan       Torsemide  (demadex)         -  Please take these medications the day of surgery:  17         duoneb neb        wellbutrin       advair inhaler       Albuterol inhaler if needed and please bring to the hospital       flonase if needed       Metoprolol       Amiodarone       ceftin         How do I prepare myself?  -  Take two showers: one the night before surgery; and one the morning of surgery.         Use Scrubcare or Hibiclens to wash from neck down.  You may use your own shampoo and conditioner. No other hair products.   -  Do NOT use lotion, powder, deodorant, or antiperspirant the day of your surgery.  -  Do NOT wear any makeup, fingernail polish or jewelry.  -Do not bring your own medications to the hospital, except for inhalers and eye drops.  -  Bring your ID and  insurance card.    Questions or Concerns:  If you have questions or concerns, please call the  Preoperative Assessment Center, Monday-Friday 7AM-7PM:  [phone]  AFTER YOUR SURGERY  Breathing exercises   Breathing exercises help you recover faster. Take deep breaths and let the air out slowly. This will:     Help you wake up after surgery.    Help prevent complications like pneumonia.  Preventing complications will help you go home sooner.   We may give you a breathing device (incentive spirometer) to encourage you to breathe deeply.   Nausea and vomiting   You may feel sick to your stomach after surgery; if so, let your nurse know.    Pain control:  After surgery, you may have pain. Our goal is to help you manage your pain. Pain medicine will help you feel comfortable enough to do activities that will help you heal.  These activities may include breathing exercises, walking and physical therapy.   To help your health care team treat your pain we will ask: 1) If you have pain  2) where it is located 3) describe your pain in your words  Methods of pain control include medications given by mouth, vein or by nerve block for some surgeries.  We may give you a pain control pump that will:  1) Deliver the medicine through a tube placed in your vein  2) Control the amount of medicine you receive  3) Allow you to push a button to deliver a dose of pain medicine  Sequential Compression Device (SCD) or Pneumo Boots:  You may need to wear SCD S on your legs or feet. These are wraps connected to a machine that pumps in air and releases it. The repeated pumping helps prevent blood clots from forming.

## 2017-11-06 NOTE — TELEPHONE ENCOUNTER
Review of the available lab work shows white blood count slightly elevated but stable. I do not believe that this is a contraindication to the proposed surgery.    Gary

## 2017-11-06 NOTE — PHARMACY - PREOPERATIVE ASSESSMENT CENTER
Anticoagulation Note - Preoperative Assessment Center (PAC) Pharmacist     Patient seen and interviewed during time of PAC Clinic appointment November 6, 2017.  The purpose of this note is to document the perioperative anticoagulation plan outlined by the providers caring for Home Valdez.     Current Regimen  Anticoagulation Regimen as of November 2, 2017: Warfarin 5mg on Mondays and 7.5mg rest of of the week.  Indication:  Atrial fibrillation  Prescriber:  Dr. Galicia  Expected Duration of therapy: indefinite  Current medications that may interact with this include: amiodarone, INR is being monitored and warfarin dose adjusted as needed.  NR Goal: 2-3  Recent Change in oral intake/nutrition: No    Perioperative plan  Home Valdez is scheduled for surgery on 11/7/17 and the perioperative anticoagulation plan outlined by patient's anticoagulation clinic is to hold warfarin starting 11/3/17, last dose to be taken 11/2/17.  Resumption of anticoagulation will be based on surgery team assessment of bleeding risks and complications.  This plan may require re-assessment and modification by his primary team in the perioperative setting depending on patients clinical situation.        Niurka Foster RPH  November 6, 2017  3:42 PM

## 2017-11-06 NOTE — TELEPHONE ENCOUNTER
Reason for Call:  Request for results:    Name of test or procedure: WBC labs    Date of test of procedure: 11/6    Location of the test or procedure: [facility]    OK to leave the result message on voice mail or with a family member? YES    Phone number Patient can be reached at:  Home number on file [phone] (home)    Additional comments: Patient had his WBC labs checked today because it came back abnormal last week. Chrissy, his wife, said he is scheduled for surgery tomorrow and they see the surgeon today at 2 pm. They said Dr. Galicia told them to check with Dr. Vega since he will be on vacation this week to find out these results and know if he is okay for surgery tomorrow. They are requesting a call back before 2 pm today if possible. Please advise.     Call taken on 11/6/2017 at 11:47 AM by Terrell Sosa

## 2017-11-06 NOTE — PROGRESS NOTES
Preoperative Assessment Center medication history for November 6, 2017 is complete.    See Epic admission navigator for allergy information, pharmacy and prior to admission medications.    Operating room staff will still need to confirm medications and last dose information on day of surgery.     Medication history interview sources:  Patient and spous    Changes made to PTA medication list (reason)  Added: Multivitamin  Deleted: Gabapentin, medrol dose ailyn, nystatin suspension, oxycodone  Changed: none    Additional medication history information (including reliability of information, actions taken by pharmacist):None      Prior to Admission medications    Medication Sig Last Dose Taking? Auth Provider   multivitamin, therapeutic (THERA-VIT) TABS tablet Take 1 tablet by mouth daily Taking Yes Unknown, Entered By History   cefuroxime (CEFTIN) 250 MG tablet Take 1 tablet (250 mg) by mouth 2 times daily Taking Yes Neo Galicia MD   montelukast (SINGULAIR) 10 MG tablet TAKE 1 TABLET BY MOUTH DAILY AT BEDTIME Taking Yes Sandip Vega DO   cetirizine (ZYRTEC) 10 MG tablet TAKE 1 TABLET BY MOUTH EVERY EVENING Taking Yes Sandip Vega DO   ipratropium - albuterol 0.5 mg/2.5 mg/3 mL (DUONEB) 0.5-2.5 (3) MG/3ML neb solution TAKE 1 VIAL (3 MLS) BY NEBULIZATION EVERY 4 HOURS AS NEEDED FOR SHORTNESS OF BREATH / DYSPNEA OR WHEEZING Taking Yes Sandip Vega DO   potassium chloride SA (K-DUR/KLOR-CON M) 20 MEQ CR tablet Take 1 tablet (20 mEq) by mouth daily Taking Yes Jimenez Murphy MD   losartan (COZAAR) 50 MG tablet Take 2 tablets (100 mg) by mouth daily Taking Yes Sandip Vega DO   buPROPion (WELLBUTRIN SR) 150 MG 12 hr tablet Take 1 tablet (150 mg) by mouth 2 times daily Taking Yes Sandip Vega DO   fluticasone-salmeterol (ADVAIR) 250-50 MCG/DOSE diskus inhaler Inhale 1 puff into the lungs 2 times daily Taking Yes Sandip Vega DO    albuterol (PROAIR HFA/PROVENTIL HFA/VENTOLIN HFA) 108 (90 BASE) MCG/ACT Inhaler Inhale 2 puffs into the lungs every 4 hours as needed for shortness of breath / dyspnea Taking Yes Sandip Vega DO   fluticasone (FLONASE) 50 MCG/ACT spray USE ONE TO TWO SPRAYS IN EACH NOSTRIL EVERY DAY Taking Yes Sandip Vega DO   metoprolol (TOPROL-XL) 50 MG 24 hr tablet Take 1 tablet (50 mg) by mouth daily Taking Yes Jimenez Murphy MD   amiodarone (PACERONE/CODARONE) 200 MG tablet Take 0.5 tablets (100 mg) by mouth daily Taking Yes Jimenez Murphy MD   torsemide (DEMADEX) 20 MG tablet Take 10 mg po daily. Take an extra 10mg po daily as needed for swelling.  Patient taking differently: Take 10 mg by mouth daily as needed Take 10 mg po daily. Take an extra 10mg po daily as needed for swelling. Taking Yes Jimenez Murphy MD   senna-docusate (SENOKOT-S;PERICOLACE) 8.6-50 MG per tablet Take 2 tablets by mouth 2 times daily Taking Yes Reported, Patient   warfarin (JANTOVEN) 2.5 MG tablet TAKE TAKE 2 TABLETS (5MG) on Mondays and take 3 TABLETS (7.5MG) ALL OTHER DAYS OR AS DIRECTED BY THE COUMADIN CLINIC   Sandip Vega DO   warfarin (JANTOVEN) 2.5 MG tablet Take 7.5 mg (3 tablets) on Tuesday, Thursday, Saturday and 5 mg (2 tablets) all other days or as directed by coumadin clinic.   Miranda Mosley APRN CNP   Respiratory Therapy Supplies (NEBULIZER/TUBING/MOUTHPIECE) KIT Use every 4-6 hours PRN   Neo Galicia MD   order for DME Equipment being ordered: Nebulizer   Neo Galicia MD         Medication history completed by: Niurka Foster [facility]

## 2017-11-06 NOTE — MR AVS SNAPSHOT
After Visit Summary   11/6/2017    Home Valdez    MRN: [id number]           Patient Information     Date Of Birth          1950        Visit Information        Provider Department      11/6/2017 3:00 PM Pharmacist, Elham Anguiano [facility] Preoperative Assessment [place]        Today's Diagnoses     Preop examination    -  1       Follow-ups after your visit        Your next 10 appointments already scheduled     Nov 06, 2017  4:30 PM CST   (Arrive by 4:15 PM)   PAC RN ASSESSMENT with Elham Pac Rn   [facility] Preoperative Assessment [place] ([facility])    [address] [place] [address]  [address]th [facility] [address]   [phone]            Nov 06, 2017  4:40 PM CST   (Arrive by 4:25 PM)   PAC Anesthesia Consult with  Pac Anesthesiologist   [facility] Assessment [place] ([facility])    [address] [place] [address]   [phone]            Nov 06, 2017  5:00 PM CST   LAB with ELHAM LAB   [facility] Lab ([facility])    [address] [place] [address]   [phone]           Please do not eat 10-12 hours before your appointment if you are coming in fasting for labs on lipids, cholesterol, or glucose (sugar). This does not apply to pregnant women. Water, hot tea and black coffee (with nothing added) are okay. Do not drink other fluids, diet soda or chew gum.            Nov 07, 2017   Procedure with Jamie Orozco MD   [facility], Same Day Surgery (--)    [address]  [facility] [address]   [phone]            Nov 13, 2017  8:45 AM CST   Anticoagulation Visit with CAMILO ANTI COAG   [facility] ([facility])    [address] [facility] [address]   [phone]            Nov 15, 2017  9:00 AM CST   Pulmonary Function with NL RESPIRATORY THERAPY   [facility] Respiratory Services ([facility])    [address] [place] [address]  Dr [place] [id number] [phone]           No Inhalers for 6 hours prior to test No Smoking 2 hours prior to test            Nov 16, 2017  9:30 AM CST   US AORTA/IVC/ILIAC DUPLEX COMPLETE with PHUS3   [facility] Ultrasound ([facility])    [address] [facility] [id number] [phone]           Please bring a list of your medicines (including vitamins, minerals and over-the-counter drugs). Also, tell your doctor about any allergies you may have. Wear comfortable clothes and leave your valuables at home.  Adults: No eating or drinking for 8 hours before the exam. You may take medicine with a small sip of water.  Children: - Children 6+ years: No food or drink for 6 hours before exam. - Children 1-5 years: No food or drink for 4 hours before exam. - Infants, breast-fed: may have breast milk up to 2 hours before exam. - Infants, formula: may have bottle until 4 hours before exam.  Please call the Imaging Department at your exam site with any questions.            Nov 28, 2017  1:30 PM CST   Return Visit with Jimenez Murphy MD   [facility] ([facility])    [address] [place] [id number] [phone]              Who to contact     Please call your clinic at [phone] to:    Ask questions about your health    Make or cancel appointments    Discuss your medicines    Learn about your test results    Speak to your doctor   If you have compliments or concerns about an experience at your clinic, or if you wish to file a complaint, please contact [facility] Patient Relations at [phone] or email us at [email or web address]         Additional Information About Your Visit        [employer] Information     [employer] is an electronic gateway that provides easy, online access to your medical records. With [employer], you can request a clinic appointment, read your test  results, renew a prescription or communicate with your care team.     To sign up for [employer] visit the website at [email or web address]   You will be asked to enter the access code listed below, as well as some personal information. Please follow the directions to create your username and password.     Your access code is: [email or web address]  Expires: 2017 11:20 AM     Your access code will  in 90 days. If you need help or a new code, please contact your [facility] or call [phone] for assistance.        Care EveryWhere ID     This is your Care EveryWhere ID. This could be used by other organizations to access your [place] medical records  [id number]         Blood Pressure from Last 3 Encounters:   17 150/81   17 136/74   10/25/17 142/79    Weight from Last 3 Encounters:   17 113.5 kg (250 lb 4.8 oz)   17 114.8 kg (253 lb)   10/25/17 114.8 kg (253 lb)              Today, you had the following     No orders found for display         Today's Medication Changes          These changes are accurate as of: 17  3:51 PM.  If you have any questions, ask your nurse or doctor.               These medicines have changed or have updated prescriptions.        Dose/Directions    cefuroxime 250 MG tablet   Commonly known as:  CEFTIN   This may have changed:  Another medication with the same name was removed. Continue taking this medication, and follow the directions you see here.   Used for:  Obstructive chronic bronchitis with exacerbation (H)   Changed by:  Neo Galicia MD        Dose:  250 mg   Take 1 tablet (250 mg) by mouth 2 times daily   Quantity:  20 tablet   Refills:  3       torsemide 20 MG tablet   Commonly known as:  DEMADEX   This may have changed:    - how much to take  - how to take this  - when to take this  - reasons to take this  - additional instructions   Used for:  Chronic diastolic congestive heart failure (H)        Take 10 mg  po daily. Take an extra 10mg po daily as needed for swelling.   Quantity:  180 tablet   Refills:  3         Stop taking these medicines if you haven't already. Please contact your care team if you have questions.     gabapentin 250 MG/5ML solution   Commonly known as:  NEURONTIN   Stopped by:  Pharmacist, Uc Pac           methylPREDNISolone 4 MG tablet   Commonly known as:  MEDROL DOSEPAK   Stopped by:  PharmacistAdam           nystatin 834599 UNIT/ML suspension   Commonly known as:  MYCOSTATIN   Stopped by:  Pharmacist, Uc Pac           oxyCODONE IR 10 MG tablet   Commonly known as:  ROXICODONE   Stopped by:  PharmacistAdam                    Primary Care Provider Office Phone # Fax #    Sandip Tillmansoraida, [phone] [address] [facility] [address] [place] [phone]        Equal Access to Services     REBECCA ORTIZ : Radha cheungo Sofede, waaxda luqadaha, qaybta kaalmada adeegyada, jase john . So [facility] [phone].    ATENCIÓN: Si habla español, tiene a calabrese disposición servicios gratuitos de asistencia lingüística. Ll[facility] [phone].    We comply with applicable federal civil rights laws and [place] laws. We do not discriminate on the basis of race, color, national origin, age, disability, sex, sexual orientation, or gender identity.            Thank you!     Thank you for choosing [facility] PREOPERATIVE ASSESSMENT CENTER  for your care. Our goal is always to provide you with excellent care. Hearing back from our patients is one way we can continue to improve our services. Please take a few minutes to complete the written survey that you may receive in the mail after your visit with us. Thank you!             Your Updated Medication List - Protect others around you: Learn how to safely use, store and throw away your medicines at www.disposemymeds.org.          This list is accurate as of: 11/6/17  3:51 PM.  Always use your most recent med list.                    Brand Name Dispense Instructions for use Diagnosis    albuterol 108 (90 BASE) MCG/ACT Inhaler    PROAIR HFA/PROVENTIL HFA/VENTOLIN HFA    6 Inhaler    Inhale 2 puffs into the lungs every 4 hours as needed for shortness of breath / dyspnea    Chronic obstructive pulmonary disease with acute exacerbation (H)       amiodarone 200 MG tablet    PACERONE/CODARONE    45 tablet    Take 0.5 tablets (100 mg) by mouth daily    Atrial fibrillation (H)       buPROPion 150 MG 12 hr tablet    WELLBUTRIN SR    180 tablet    Take 1 tablet (150 mg) by mouth 2 times daily    Personal history of tobacco use, presenting hazards to health       cefuroxime 250 MG tablet    CEFTIN    20 tablet    Take 1 tablet (250 mg) by mouth 2 times daily    Obstructive chronic bronchitis with exacerbation (H)       cetirizine 10 MG tablet    zyrTEC    30 tablet    TAKE 1 TABLET BY MOUTH EVERY EVENING    Chronic seasonal allergic rhinitis due to other allergen       fluticasone 50 MCG/ACT spray    FLONASE    16 g    USE ONE TO TWO SPRAYS IN EACH NOSTRIL EVERY DAY    Chronic rhinitis       fluticasone-salmeterol 250-50 MCG/DOSE diskus inhaler    ADVAIR    3 Inhaler    Inhale 1 puff into the lungs 2 times daily    Panlobular emphysema (H)       ipratropium - albuterol 0.5 mg/2.5 mg/3 mL 0.5-2.5 (3) MG/3ML neb solution    DUONEB    540 mL    TAKE 1 VIAL (3 MLS) BY NEBULIZATION EVERY 4 HOURS AS NEEDED FOR SHORTNESS OF BREATH / DYSPNEA OR WHEEZING    COPD exacerbation (H)       losartan 50 MG tablet    COZAAR    180 tablet    Take 2 tablets (100 mg) by mouth daily    Atrial fibrillation with RVR (H), Hypertension goal BP (blood pressure) < 140/90       metoprolol 50 MG 24 hr tablet    TOPROL-XL    90 tablet    Take 1 tablet (50 mg) by mouth daily    Atrial fibrillation with RVR (H), CHF (congestive heart failure) (H)       montelukast 10 MG tablet    SINGULAIR    30 tablet    TAKE 1 TABLET BY MOUTH DAILY AT BEDTIME    Chronic seasonal  allergic rhinitis due to other allergen       multivitamin, therapeutic Tabs tablet      Take 1 tablet by mouth daily        nebulizer/tubing/mouthpiece Kit     1 kit    Use every 4-6 hours PRN    Chronic obstructive pulmonary disease, unspecified COPD type (H)       order for DME     1 Device    Equipment being ordered: Nebulizer    COPD (chronic obstructive pulmonary disease) (H)       potassium chloride SA 20 MEQ CR tablet    K-DUR/KLOR-CON M    90 tablet    Take 1 tablet (20 mEq) by mouth daily    CHF (congestive heart failure) (H), Peripheral edema       senna-docusate 8.6-50 MG per tablet    SENOKOT-S;PERICOLACE     Take 2 tablets by mouth 2 times daily        torsemide 20 MG tablet    DEMADEX    180 tablet    Take 10 mg po daily. Take an extra 10mg po daily as needed for swelling.    Chronic diastolic congestive heart failure (H)       * warfarin 2.5 MG tablet    JANTOVEN    235 tablet    Take 7.5 mg (3 tablets) on Tuesday, Thursday, Saturday and 5 mg (2 tablets) all other days or as directed by coumadin clinic.    Atrial fibrillation with RVR (H)       * warfarin 2.5 MG tablet    JANTOVEN    250 tablet    TAKE TAKE 2 TABLETS (5MG) on Mondays and take 3 TABLETS (7.5MG) ALL OTHER DAYS OR AS DIRECTED BY THE COUMADIN CLINIC    Atrial fibrillation with RVR (H)       * Notice:  This list has 2 medication(s) that are the same as other medications prescribed for you. Read the directions carefully, and ask your doctor or other care provider to review them with you.

## 2017-11-06 NOTE — TELEPHONE ENCOUNTER
I have attempted to call the pt with the following results. I left message for pt to call back. I will call back another time. Neeru Brown CMA ([facility])

## 2017-11-06 NOTE — MR AVS SNAPSHOT
Home Valdez   11/6/2017 9:15 AM   Anticoagulation Therapy Visit    Description:  67 year old male   Provider:  CAMILO ANTI COAG   Department:  [facility]           INR as of 11/6/2017     Today's INR 1.2!      Anticoagulation Summary as of 11/6/2017     INR goal 2.0-3.0   Today's INR 1.2!   Full instructions 11/6: Hold; 11/7: 10 mg; 11/8: 10 mg; Otherwise 5 mg on Mon; 7.5 mg all other days   Next INR check 11/13/2017    Indications   Atrial fibrillation with RVR (H) [I48.91]  Long-term (current) use of anticoagulants [Z79.01] [Z79.01]         Your next Anticoagulation Clinic appointment(s)     Nov 06, 2017  9:15 AM CST   Anticoagulation Visit with  ANTI COAG   [facility]    [address] [facility] [address]   [phone]            Nov 13, 2017  8:45 AM CST   Anticoagulation Visit with  ANTI COAG   [facility] ([facility]    [address] [facility] [address]   [phone]              Contact Numbers     Clinic Number:         November 2017 Details    Sun Mon Tue Wed Thu Fri Sat        1               2               3               4                 5               6      Hold   See details      7      10 mg         8      10 mg         9      7.5 mg         10      7.5 mg         11      7.5 mg           12      7.5 mg         13            14               15               16               17               18                 19               20               21               22               23               24               25                 26               27               28               29               30                  Date Details   11/06 This INR check       Date of next INR:  11/13/2017         How to take your warfarin dose     To take:  5 mg Take 2 of the 2.5 mg tablets.    To take:  7.5 mg Take 3 of the 2.5 mg tablets.    To take:  10 mg Take 4 of the 2.5 mg tablets.    Hold Do not take your warfarin dose. See  the Details table to the right for additional instructions.

## 2017-11-06 NOTE — ANESTHESIA PREPROCEDURE EVALUATION
Anesthesia Evaluation     . Pt has had prior anesthetic. Type: General    No history of anesthetic complications          ROS/MED HX    ENT/Pulmonary:     (+)sleep apnea, moderate COPD, uses CPAP Bipap 15/7 cmH2O , recent URI resolved On Ceftin.  completed Medrol. : . .    Neurologic:     (+)Trigeminal neuralgia    Cardiovascular:     (+) Dyslipidemia, hypertension-range: 120-150 systolic, ---. Taking blood thinners Pt has received instructions: Instructions Given to patient: Warfarin stopped 11/2/17. CHF etiology: NICM Last EF: 45-50% date: 10/2016 . . :. dysrhythmias a-fib, . pulmonary hypertension, Previous cardiac testing Echodate:10/2016results:date: results:ECG reviewed date:11/2017 results:Cath date: 10/2014 results:          METS/Exercise Tolerance: Comment: Can walk 6 blocks.  Does yard work.  >4 METS   Hematologic:  - neg hematologic  ROS       Musculoskeletal:  - neg musculoskeletal ROS       GI/Hepatic:  - neg GI/hepatic ROS       Renal/Genitourinary:  - ROS Renal section negative       Endo: Comment: Recent steroids.     (+) Obesity, .      Psychiatric:     (+) psychiatric history other (comment) (PTSD)      Infectious Disease:   (+) Other Infectious Disease (recent URI, achy, runny nose.  Feels fine now.  Cough resolved. )       Malignancy:      - no malignancy   Other:                     Physical Exam      Airway   Mallampati: II  TM distance: >3 FB  Neck ROM: full    Dental   (+) missing, caps and chipped    Cardiovascular   Rhythm and rate: regular and normal      Pulmonary (+) decreased breath sounds       Other findings:     11/1/2017 17:28  Sodium: 141  Potassium: 3.9  Chloride: 105  Carbon Dioxide: 29  Urea Nitrogen: 20  Creatinine: 1.26 (H)  GFR Estimate: 57 (L)  GFR Estimate If Black: 69  Calcium: 8.8  Anion Gap: 7  Glucose: 104 (H)    Hemoglobin: 16.0  Hematocrit: 50.2  Platelet Count: 229    11/6/2017 07:47  WBC: 13.7 (H)  On steroids    INR 1.2 11/6/17      Echocardiogram 10/2016:   The  patient refused contrast.  The ascending aorta is Mildly dilated.  The visual ejection fraction is estimated at 45-50%.  Left ventricular systolic function is mildly reduced.  There are regional wall motion abnormalities as specified.  The left ventricle is mildly dilated.  The study was technically difficult.    CONCLUSIONS:    Heart cath 2014  1.  No evidence for hemodynamically significant coronary artery   disease.  In particular, his coronary arteries appear very large and   smooth.   2.  Markedly elevated left-sided filling pressures with EDP of 40 and   a pulmonary capillary wedge pressure of 40 with elevated right-sided   pressure PA systolic being around 55.     EKG 11/2017:  NSR with left anterior fascicular block. Possible inferior infarct.            PAC Discussion and Assessment    ASA Classification: 3  Case is suitable for: [place] and [place]  Anesthetic techniques and relevant risks discussed: GA  Invasive monitoring and risk discussed: No  Types:   Possibility and Risk of blood transfusion discussed: No  NPO instructions given:   Additional anesthetic preparation and risks discussed:   Needs early admission to pre-op area:   Other:     PAC Resident/NP Anesthesia Assessment:  Scheduled for Left Percutaneous Trigeminal Nerve Balloon Compression on 11/7/17 by Dr. Orozco in treatment of trigeminal neuralgia.  PAC referral for risk assessment and optimization for anesthesia with comorbid conditions of:    He needs to get the dental reconstructions done before the end of the year and therefore wants to do the repeat rhizotomy in order to accomplish that and then decide about a microvascular decompression sometime next year.     Pre-operative considerations:  1.  Cardiac:  Functional status very good.  Can walk 6 blocks. 0.9%  risk of major adverse cardiac event.  Echocardiogram 10/2016:  EF 45-50%.  History of NICM.  Bilat heart cath 2014.  PAP 55.  Echocardiogram 10/2016 did not comment on PAP.   History of atrial fibrillation (paroxysmal).  Warfarin held.  INR 1.2  EKG 11/2017:  NSR with left anterior fascicular block.    2.  Pulm:  Airway feasible.  Severe JAMES .  Bipap 15/7 with 02 2L.    + COPD, moderate.  Used Duonebs daily.  (also one ordered for day of surgery).   Recent URI with cough.  Completed Medrol and finishing Ceftin.  Sat 94%.  Prior smoker.   3.  GI:  Risk of PONV score = 1-2.  If 3 or > anti-emetic intervention recommended (with 2 or more meds).   4.  Neuro:  Trigeminal neuralgia on left  5.  Poor dentition (caution with teeth).  Has dental work scheduled for later this year.   6.  Obese:  BMI 36  7.  Fair historian - wife provides much of the history.   8.  Leukocytosis:  Suspect from steroids (just completed).   9.  History of PTSD      Patient is optimized and is acceptable candidate for the proposed procedure.  No further diagnostic evaluation is needed.     Patient also evaluated by Dr. Kong. See recommendations below.         Reviewed and Signed by PAC Mid-Level Provider/Resident  Mid-Level Provider/Resident: Uma Ybarra PA-C  Date: 11/6/17  Time: 1530    Attending Anesthesiologist Anesthesia Assessment:  STAFF:  67 y.o. man with severe left CN V trigeminal neuralgia disease for balloon compression by Dr. Orozco  using general anesthesia.   History summarized above.  Issues are  1. Significant COPD, currently stable with small amt clear phlegm.  Also has JAMES on BiPAP for night care.    2. Cardiac status is stable with slightly reduced LVEF, PLUS Moderate PULMONARY HTN.  Patient is in intermittent a. Fib. Off coumadin for procedure.  3. Obesity  4. Very poor dentition  Instructions given and questions answered.   Final plans by anesthesiology team on DOS.   ---rcp      Reviewed and Signed by PAC Anesthesiologist  Anesthesiologist: steffany  Date: 11/6/2017  Time:   Pass/Fail: Pass  Disposition:     PAC Pharmacist Assessment:        Pharmacist:   Date:   Time:      Anesthesia  Plan      History & Physical Review      ASA Status:  3 .    NPO Status:  > 8 hours    Plan for General and ETT with Intravenous induction. Maintenance will be Other.           Postoperative Care  Postoperative pain management:  IV analgesics.      Consents  Anesthetic plan, risks, benefits and alternatives discussed with:  Patient..                          .

## 2017-11-06 NOTE — MR AVS SNAPSHOT
After Visit Summary   2017    Home Valdez    MRN: [id number]           Patient Information     Date Of Birth          1950        Visit Information        Provider Department      2017 4:30 PM Rn, [facility]        Care Instructions    Preparing for Your Surgery      Name:  Home Valdez   MRN:  [id number]   :  1950   Today's Date:  2017     Arriving for surgery:  Surgery date:  17  Arrival time:  9:10 AM  Please come to:       [facility] [address]  [address] [place]  [address]    -   parking is available in front of the hospital from 5:15 am to 8:00 pm    -  Stop at the Information Desk in the lobby    -   Inform the information person that you are here for surgery. An escort to 3c will be provided. If you would not like an escort, please proceed to 3C on the 3rd floor. [phone]     What can I eat or drink?  -  You may have solid food or milk products until 8 hours prior to your surgery.  Stop midnight tonight  -  You may have water, apple juice or 7up/Sprite or gatorade until 2 hours prior to your surgery. Stop 9:00 am  17 you can have a small cup of          Black coffee    Which medicines can I take?         Continue to hold coumadin before surgery.       No aspirin or advil today.    -  Do NOT take these medications in the morning, the day of surgery: 17         Losartan       Torsemide  (demadex)         -  Please take these medications the day of surgery:  17         duoneb neb        wellbutrin       advair inhaler       Albuterol inhaler if needed and please bring to the hospital       flonase if needed       Metoprolol       Amiodarone       ceftin         How do I prepare myself?  -  Take two showers: one the night before surgery; and one the morning of surgery.         Use Scrubcare or Hibiclens to wash from neck down.  You may use your own  shampoo and conditioner. No other hair products.   -  Do NOT use lotion, powder, deodorant, or antiperspirant the day of your surgery.  -  Do NOT wear any makeup, fingernail polish or jewelry.  -Do not bring your own medications to the hospital, except for inhalers and eye drops.  -  Bring your ID and insurance card.    Questions or Concerns:  If you have questions or concerns, please call the  Preoperative Assessment Center, Monday-Friday 7AM-7PM:  [phone]  AFTER YOUR SURGERY  Breathing exercises   Breathing exercises help you recover faster. Take deep breaths and let the air out slowly. This will:     Help you wake up after surgery.    Help prevent complications like pneumonia.  Preventing complications will help you go home sooner.   We may give you a breathing device (incentive spirometer) to encourage you to breathe deeply.   Nausea and vomiting   You may feel sick to your stomach after surgery; if so, let your nurse know.    Pain control:  After surgery, you may have pain. Our goal is to help you manage your pain. Pain medicine will help you feel comfortable enough to do activities that will help you heal.  These activities may include breathing exercises, walking and physical therapy.   To help your health care team treat your pain we will ask: 1) If you have pain  2) where it is located 3) describe your pain in your words  Methods of pain control include medications given by mouth, vein or by nerve block for some surgeries.  We may give you a pain control pump that will:  1) Deliver the medicine through a tube placed in your vein  2) Control the amount of medicine you receive  3) Allow you to push a button to deliver a dose of pain medicine  Sequential Compression Device (SCD) or Pneumo Boots:  You may need to wear SCD S on your legs or feet. These are wraps connected to a machine that pumps in air and releases it. The repeated pumping helps prevent blood clots from forming.                      Follow-ups after your visit        Your next 10 appointments already scheduled     Nov 06, 2017  4:30 PM CST   (Arrive by 4:15 PM)   PAC RN ASSESSMENT with Adam Pac Rn   [facility] Preoperative Assessment [facility] ([facility])    [address]  4th Floor  [facility] [address]   [phone]            Nov 06, 2017  4:40 PM CST   (Arrive by 4:25 PM)   PAC Anesthesia Consult with Adam Pac Anesthesiologist   [facility] Preoperative [facility] ([facility])    [address]  4th Floor  [facility] [address] [phone]            Nov 07, 2017   Procedure with Jamie Orozco MD   [facility] [place], Same Day Surgery (--)    [address]  [facility] [address]   [phone]            Nov 13, 2017  8:45 AM CST   Anticoagulation Visit with  ANTI COAG   [facility]    [address] [facility] [id number] [phone]            Nov 15, 2017  9:00 AM CST   Pulmonary Function with NL RESPIRATORY THERAPY   [facility] ([facility])    [address] [place] [id number] [phone]           No Inhalers for 6 hours prior to test No Smoking 2 hours prior to test            Nov 16, 2017  9:30 AM CST   US AORTA/IVC/ILIAC DUPLEX COMPLETE with PHUS3   [facility] ([facility])    [address] [id number] [phone]           Please bring a list of your medicines (including vitamins, minerals and over-the-counter drugs). Also, tell your doctor about any allergies you may have. Wear comfortable clothes and leave your valuables at home.  Adults: No eating or drinking for 8 hours before the exam. You may take medicine with a small sip of water.  Children: - Children 6+ years: No food or drink for 6 hours before exam. - Children 1-5 years: No food or drink for 4 hours before exam. - Infants, breast-fed: may have  breast milk up to 2 hours before exam. - Infants, formula: may have bottle until 4 hours before exam.  Please call the Imaging Department at your exam site with any questions.            2017  1:30 PM CST   Return Visit with Jimenez Murphy MD   [facility] ([facility])    [address] [place] [id number] [phone]              Who to contact     Please call your clinic at [phone] to:    Ask questions about your health    Make or cancel appointments    Discuss your medicines    Learn about your test results    Speak to your doctor   If you have compliments or concerns about an experience at your clinic, or if you wish to file a complaint, please contact [facility] Patient Relations at [phone] or email us at [email or web address]         Additional Information About Your Visit        [employer] Information     [employer] is an electronic gateway that provides easy, online access to your medical records. With [employer], you can request a clinic appointment, read your test results, renew a prescription or communicate with your care team.     To sign up for [employer] visit the website at [email or web address]   You will be asked to enter the access code listed below, as well as some personal information. Please follow the directions to create your username and password.     Your access code is: [email or web address]  Expires: 2017 11:20 AM     Your access code will  in 90 days. If you need help or a new code, please contact your [facility] Clinic or call [phone] for assistance.        Care EveryWhere ID     This is your Care EveryWhere ID. This could be used by other organizations to access your [facility] medical records  [id number]         Blood Pressure from Last 3 Encounters:   17 150/81   17 136/74   10/25/17 142/79    Weight from Last 3  Encounters:   11/06/17 113.5 kg (250 lb 4.8 oz)   11/01/17 114.8 kg (253 lb)   10/25/17 114.8 kg (253 lb)              Today, you had the following     No orders found for display         Today's Medication Changes          These changes are accurate as of: 11/6/17  4:17 PM.  If you have any questions, ask your nurse or doctor.               These medicines have changed or have updated prescriptions.        Dose/Directions    cefuroxime 250 MG tablet   Commonly known as:  CEFTIN   This may have changed:  Another medication with the same name was removed. Continue taking this medication, and follow the directions you see here.   Used for:  Obstructive chronic bronchitis with exacerbation (H)   Changed by:  Neo Galicia MD        Dose:  250 mg   Take 1 tablet (250 mg) by mouth 2 times daily   Quantity:  20 tablet   Refills:  3       torsemide 20 MG tablet   Commonly known as:  DEMADEX   This may have changed:    - how much to take  - how to take this  - when to take this  - reasons to take this  - additional instructions   Used for:  Chronic diastolic congestive heart failure (H)        Take 10 mg po daily. Take an extra 10mg po daily as needed for swelling.   Quantity:  180 tablet   Refills:  3         Stop taking these medicines if you haven't already. Please contact your care team if you have questions.     gabapentin 250 MG/5ML solution   Commonly known as:  NEURONTIN   Stopped by:  Pharmacist, Uc Pac           methylPREDNISolone 4 MG tablet   Commonly known as:  MEDROL DOSEPAK   Stopped by:  Adam Ruiz           nystatin 181177 UNIT/ML suspension   Commonly known as:  MYCOSTATIN   Stopped by:  Pharmacist, Uc Pac           oxyCODONE IR 10 MG tablet   Commonly known as:  ROXICODONE   Stopped by:  Adam Ruiz                    Primary Care Provider Office Phone # Fax #    Sandip [facility] [phone]       [address] [place] [address]        Equal Access to  Services     [facility]: Hadii aad ku mabel Johnstonali, waaxda luqadaha, qaybta kaalmada compa, jase john . So [facility] [phone].    ATENCIÓN: Si habla laura, tiene a calabrese disposición servicios gratuitos de asistencia lingüística. Llame al [phone].    We comply with applicable federal civil rights laws and [place] laws. We do not discriminate on the basis of race, color, national origin, age, disability, sex, sexual orientation, or gender identity.            Thank you!     Thank you for choosing [facility] PREOPERAT[facility]  for your care. Our goal is always to provide you with excellent care. Hearing back from our patients is one way we can continue to improve our services. Please take a few minutes to complete the written survey that you may receive in the mail after your visit with us. Thank you!             Your Updated Medication List - Protect others around you: Learn how to safely use, store and throw away your medicines at www.disposemymeds.org.          This list is accurate as of: 11/6/17  4:17 PM.  Always use your most recent med list.                   Brand Name Dispense Instructions for use Diagnosis    albuterol 108 (90 BASE) MCG/ACT Inhaler    PROAIR HFA/PROVENTIL HFA/VENTOLIN HFA    6 Inhaler    Inhale 2 puffs into the lungs every 4 hours as needed for shortness of breath / dyspnea    Chronic obstructive pulmonary disease with acute exacerbation (H)       amiodarone 200 MG tablet    PACERONE/CODARONE    45 tablet    Take 0.5 tablets (100 mg) by mouth daily    Atrial fibrillation (H)       buPROPion 150 MG 12 hr tablet    WELLBUTRIN SR    180 tablet    Take 1 tablet (150 mg) by mouth 2 times daily    Personal history of tobacco use, presenting hazards to health       cefuroxime 250 MG tablet    CEFTIN    20 tablet    Take 1 tablet (250 mg) by mouth 2 times daily    Obstructive chronic bronchitis with exacerbation (H)       cetirizine 10 MG  tablet    zyrTEC    30 tablet    TAKE 1 TABLET BY MOUTH EVERY EVENING    Chronic seasonal allergic rhinitis due to other allergen       fluticasone 50 MCG/ACT spray    FLONASE    16 g    USE ONE TO TWO SPRAYS IN EACH NOSTRIL EVERY DAY    Chronic rhinitis       fluticasone-salmeterol 250-50 MCG/DOSE diskus inhaler    ADVAIR    3 Inhaler    Inhale 1 puff into the lungs 2 times daily    Panlobular emphysema (H)       ipratropium - albuterol 0.5 mg/2.5 mg/3 mL 0.5-2.5 (3) MG/3ML neb solution    DUONEB    540 mL    TAKE 1 VIAL (3 MLS) BY NEBULIZATION EVERY 4 HOURS AS NEEDED FOR SHORTNESS OF BREATH / DYSPNEA OR WHEEZING    COPD exacerbation (H)       losartan 50 MG tablet    COZAAR    180 tablet    Take 2 tablets (100 mg) by mouth daily    Atrial fibrillation with RVR (H), Hypertension goal BP (blood pressure) < 140/90       metoprolol 50 MG 24 hr tablet    TOPROL-XL    90 tablet    Take 1 tablet (50 mg) by mouth daily    Atrial fibrillation with RVR (H), CHF (congestive heart failure) (H)       montelukast 10 MG tablet    SINGULAIR    30 tablet    TAKE 1 TABLET BY MOUTH DAILY AT BEDTIME    Chronic seasonal allergic rhinitis due to other allergen       multivitamin, therapeutic Tabs tablet      Take 1 tablet by mouth daily        nebulizer/tubing/mouthpiece Kit     1 kit    Use every 4-6 hours PRN    Chronic obstructive pulmonary disease, unspecified COPD type (H)       order for DME     1 Device    Equipment being ordered: Nebulizer    COPD (chronic obstructive pulmonary disease) (H)       potassium chloride SA 20 MEQ CR tablet    K-DUR/KLOR-CON M    90 tablet    Take 1 tablet (20 mEq) by mouth daily    CHF (congestive heart failure) (H), Peripheral edema       senna-docusate 8.6-50 MG per tablet    SENOKOT-S;PERICOLACE     Take 2 tablets by mouth 2 times daily        torsemide 20 MG tablet    DEMADEX    180 tablet    Take 10 mg po daily. Take an extra 10mg po daily as needed for swelling.    Chronic diastolic congestive  heart failure (H)       * warfarin 2.5 MG tablet    JANTOVEN    235 tablet    Take 7.5 mg (3 tablets) on Tuesday, Thursday, Saturday and 5 mg (2 tablets) all other days or as directed by coumadin clinic.    Atrial fibrillation with RVR (H)       * warfarin 2.5 MG tablet    JANTOVEN    250 tablet    TAKE TAKE 2 TABLETS (5MG) on Mondays and take 3 TABLETS (7.5MG) ALL OTHER DAYS OR AS DIRECTED BY THE COUMADIN CLINIC    Atrial fibrillation with RVR (H)       * Notice:  This list has 2 medication(s) that are the same as other medications prescribed for you. Read the directions carefully, and ask your doctor or other care provider to review them with you.

## 2017-11-07 ENCOUNTER — APPOINTMENT (OUTPATIENT)
Dept: GENERAL RADIOLOGY | Facility: CLINIC | Age: 67
End: 2017-11-07
Attending: NEUROLOGICAL SURGERY
Payer: MEDICARE

## 2017-11-07 ENCOUNTER — ANESTHESIA (OUTPATIENT)
Dept: SURGERY | Facility: CLINIC | Age: 67
End: 2017-11-07
Payer: MEDICARE

## 2017-11-07 ENCOUNTER — HOSPITAL ENCOUNTER (OUTPATIENT)
Facility: CLINIC | Age: 67
Discharge: HOME | End: 2017-11-07
Attending: NEUROLOGICAL SURGERY | Admitting: NEUROLOGICAL SURGERY
Payer: MEDICARE

## 2017-11-07 VITALS
SYSTOLIC BLOOD PRESSURE: 148 MMHG | DIASTOLIC BLOOD PRESSURE: 92 MMHG | TEMPERATURE: 98.5 F | OXYGEN SATURATION: 90 % | RESPIRATION RATE: 18 BRPM

## 2017-11-07 LAB
APTT PPP: 27 SEC (ref 22–37)
GLUCOSE BLDC GLUCOMTR-MCNC: 95 MG/DL (ref 70–99)
HGB BLD-MCNC: 16.7 G/DL (ref 13.3–17.7)
INR PPP: 1.15 (ref 0.86–1.14)
POTASSIUM SERPL-SCNC: 4.1 MMOL/L (ref 3.4–5.3)

## 2017-11-07 PROCEDURE — 40000277 XR SURGERY CARM FLUORO LESS THAN 5 MIN W STILLS: Mod: TC

## 2017-11-07 PROCEDURE — 37000009 ZZH ANESTHESIA TECHNICAL FEE, EACH ADDTL 15 MIN: Performed by: NEUROLOGICAL SURGERY

## 2017-11-07 PROCEDURE — 37000008 ZZH ANESTHESIA TECHNICAL FEE, 1ST 30 MIN: Performed by: NEUROLOGICAL SURGERY

## 2017-11-07 PROCEDURE — 85730 THROMBOPLASTIN TIME PARTIAL: CPT | Performed by: STUDENT IN AN ORGANIZED HEALTH CARE EDUCATION/TRAINING PROGRAM

## 2017-11-07 PROCEDURE — 25000128 H RX IP 250 OP 636: Performed by: NURSE ANESTHETIST, CERTIFIED REGISTERED

## 2017-11-07 PROCEDURE — 85610 PROTHROMBIN TIME: CPT | Performed by: STUDENT IN AN ORGANIZED HEALTH CARE EDUCATION/TRAINING PROGRAM

## 2017-11-07 PROCEDURE — 85018 HEMOGLOBIN: CPT | Performed by: STUDENT IN AN ORGANIZED HEALTH CARE EDUCATION/TRAINING PROGRAM

## 2017-11-07 PROCEDURE — 25000566 ZZH SEVOFLURANE, EA 15 MIN: Performed by: NEUROLOGICAL SURGERY

## 2017-11-07 PROCEDURE — 25000128 H RX IP 250 OP 636: Performed by: STUDENT IN AN ORGANIZED HEALTH CARE EDUCATION/TRAINING PROGRAM

## 2017-11-07 PROCEDURE — 36415 COLL VENOUS BLD VENIPUNCTURE: CPT | Performed by: STUDENT IN AN ORGANIZED HEALTH CARE EDUCATION/TRAINING PROGRAM

## 2017-11-07 PROCEDURE — 25000125 ZZHC RX 250: Performed by: PHYSICIAN ASSISTANT

## 2017-11-07 PROCEDURE — 36000053 ZZH SURGERY LEVEL 2 EA 15 ADDTL MIN - UMMC: Performed by: NEUROLOGICAL SURGERY

## 2017-11-07 PROCEDURE — 25000128 H RX IP 250 OP 636: Performed by: NEUROLOGICAL SURGERY

## 2017-11-07 PROCEDURE — 27210794 ZZH OR GENERAL SUPPLY STERILE: Performed by: NEUROLOGICAL SURGERY

## 2017-11-07 PROCEDURE — C9399 UNCLASSIFIED DRUGS OR BIOLOG: HCPCS | Performed by: NURSE ANESTHETIST, CERTIFIED REGISTERED

## 2017-11-07 PROCEDURE — C1757 CATH, THROMBECTOMY/EMBOLECT: HCPCS | Performed by: NEUROLOGICAL SURGERY

## 2017-11-07 PROCEDURE — 71000027 ZZH RECOVERY PHASE 2 EACH 15 MINS: Performed by: NEUROLOGICAL SURGERY

## 2017-11-07 PROCEDURE — 25000125 ZZHC RX 250: Performed by: NURSE ANESTHETIST, CERTIFIED REGISTERED

## 2017-11-07 PROCEDURE — 71000014 ZZH RECOVERY PHASE 1 LEVEL 2 FIRST HR: Performed by: NEUROLOGICAL SURGERY

## 2017-11-07 PROCEDURE — 36000055 ZZH SURGERY LEVEL 2 W FLUORO 1ST 30 MIN - UMMC: Performed by: NEUROLOGICAL SURGERY

## 2017-11-07 PROCEDURE — 84132 ASSAY OF SERUM POTASSIUM: CPT | Performed by: STUDENT IN AN ORGANIZED HEALTH CARE EDUCATION/TRAINING PROGRAM

## 2017-11-07 PROCEDURE — 82962 GLUCOSE BLOOD TEST: CPT

## 2017-11-07 PROCEDURE — 40000170 ZZH STATISTIC PRE-PROCEDURE ASSESSMENT II: Performed by: NEUROLOGICAL SURGERY

## 2017-11-07 RX ORDER — AMOXICILLIN 250 MG
1-2 CAPSULE ORAL 2 TIMES DAILY
Qty: 30 TABLET | Refills: 0 | Status: ON HOLD | OUTPATIENT
Start: 2017-11-07 | End: 2018-07-31

## 2017-11-07 RX ORDER — IPRATROPIUM BROMIDE AND ALBUTEROL SULFATE 2.5; .5 MG/3ML; MG/3ML
3 SOLUTION RESPIRATORY (INHALATION) ONCE
Status: COMPLETED | OUTPATIENT
Start: 2017-11-07 | End: 2017-11-07

## 2017-11-07 RX ORDER — OXYCODONE HYDROCHLORIDE 5 MG/1
5-10 TABLET ORAL
Status: DISCONTINUED | OUTPATIENT
Start: 2017-11-07 | End: 2017-11-07 | Stop reason: HOSPADM

## 2017-11-07 RX ORDER — CEFAZOLIN SODIUM 2 G/100ML
2 INJECTION, SOLUTION INTRAVENOUS
Status: COMPLETED | OUTPATIENT
Start: 2017-11-07 | End: 2017-11-07

## 2017-11-07 RX ORDER — HYDROMORPHONE HYDROCHLORIDE 1 MG/ML
.3-.5 INJECTION, SOLUTION INTRAMUSCULAR; INTRAVENOUS; SUBCUTANEOUS EVERY 10 MIN PRN
Status: DISCONTINUED | OUTPATIENT
Start: 2017-11-07 | End: 2017-11-07 | Stop reason: HOSPADM

## 2017-11-07 RX ORDER — NALOXONE HYDROCHLORIDE 0.4 MG/ML
.1-.4 INJECTION, SOLUTION INTRAMUSCULAR; INTRAVENOUS; SUBCUTANEOUS
Status: DISCONTINUED | OUTPATIENT
Start: 2017-11-07 | End: 2017-11-07 | Stop reason: HOSPADM

## 2017-11-07 RX ORDER — EPHEDRINE SULFATE 50 MG/ML
INJECTION, SOLUTION INTRAMUSCULAR; INTRAVENOUS; SUBCUTANEOUS PRN
Status: DISCONTINUED | OUTPATIENT
Start: 2017-11-07 | End: 2017-11-07

## 2017-11-07 RX ORDER — PROPOFOL 10 MG/ML
INJECTION, EMULSION INTRAVENOUS PRN
Status: DISCONTINUED | OUTPATIENT
Start: 2017-11-07 | End: 2017-11-07

## 2017-11-07 RX ORDER — SODIUM CHLORIDE, SODIUM LACTATE, POTASSIUM CHLORIDE, CALCIUM CHLORIDE 600; 310; 30; 20 MG/100ML; MG/100ML; MG/100ML; MG/100ML
INJECTION, SOLUTION INTRAVENOUS CONTINUOUS PRN
Status: DISCONTINUED | OUTPATIENT
Start: 2017-11-07 | End: 2017-11-07

## 2017-11-07 RX ORDER — DIPHENHYDRAMINE HYDROCHLORIDE 50 MG/ML
INJECTION INTRAMUSCULAR; INTRAVENOUS PRN
Status: DISCONTINUED | OUTPATIENT
Start: 2017-11-07 | End: 2017-11-07

## 2017-11-07 RX ORDER — LIDOCAINE HYDROCHLORIDE 20 MG/ML
INJECTION, SOLUTION INFILTRATION; PERINEURAL PRN
Status: DISCONTINUED | OUTPATIENT
Start: 2017-11-07 | End: 2017-11-07

## 2017-11-07 RX ORDER — FENTANYL CITRATE 50 UG/ML
25-50 INJECTION, SOLUTION INTRAMUSCULAR; INTRAVENOUS
Status: DISCONTINUED | OUTPATIENT
Start: 2017-11-07 | End: 2017-11-07 | Stop reason: HOSPADM

## 2017-11-07 RX ORDER — ONDANSETRON 2 MG/ML
4 INJECTION INTRAMUSCULAR; INTRAVENOUS EVERY 30 MIN PRN
Status: DISCONTINUED | OUTPATIENT
Start: 2017-11-07 | End: 2017-11-07 | Stop reason: HOSPADM

## 2017-11-07 RX ORDER — ONDANSETRON 2 MG/ML
INJECTION INTRAMUSCULAR; INTRAVENOUS PRN
Status: DISCONTINUED | OUTPATIENT
Start: 2017-11-07 | End: 2017-11-07

## 2017-11-07 RX ORDER — ACETAMINOPHEN 325 MG/1
650 TABLET ORAL EVERY 4 HOURS PRN
Qty: 100 TABLET | Refills: 0 | Status: SHIPPED | OUTPATIENT
Start: 2017-11-07

## 2017-11-07 RX ORDER — MEPERIDINE HYDROCHLORIDE 25 MG/ML
12.5 INJECTION INTRAMUSCULAR; INTRAVENOUS; SUBCUTANEOUS
Status: DISCONTINUED | OUTPATIENT
Start: 2017-11-07 | End: 2017-11-07 | Stop reason: HOSPADM

## 2017-11-07 RX ORDER — OXYCODONE HYDROCHLORIDE 5 MG/1
5-10 TABLET ORAL
Qty: 30 TABLET | Refills: 0 | Status: SHIPPED | OUTPATIENT
Start: 2017-11-07 | End: 2018-04-18

## 2017-11-07 RX ORDER — LABETALOL HYDROCHLORIDE 5 MG/ML
10 INJECTION, SOLUTION INTRAVENOUS
Status: DISCONTINUED | OUTPATIENT
Start: 2017-11-07 | End: 2017-11-07 | Stop reason: HOSPADM

## 2017-11-07 RX ORDER — GLYCOPYRROLATE 0.2 MG/ML
INJECTION, SOLUTION INTRAMUSCULAR; INTRAVENOUS PRN
Status: DISCONTINUED | OUTPATIENT
Start: 2017-11-07 | End: 2017-11-07

## 2017-11-07 RX ORDER — ESMOLOL HYDROCHLORIDE 10 MG/ML
INJECTION INTRAVENOUS PRN
Status: DISCONTINUED | OUTPATIENT
Start: 2017-11-07 | End: 2017-11-07

## 2017-11-07 RX ORDER — HYDRALAZINE HYDROCHLORIDE 20 MG/ML
2.5-5 INJECTION INTRAMUSCULAR; INTRAVENOUS EVERY 10 MIN PRN
Status: DISCONTINUED | OUTPATIENT
Start: 2017-11-07 | End: 2017-11-07 | Stop reason: HOSPADM

## 2017-11-07 RX ORDER — CEFAZOLIN SODIUM 1 G/3ML
1 INJECTION, POWDER, FOR SOLUTION INTRAMUSCULAR; INTRAVENOUS SEE ADMIN INSTRUCTIONS
Status: DISCONTINUED | OUTPATIENT
Start: 2017-11-07 | End: 2017-11-07 | Stop reason: HOSPADM

## 2017-11-07 RX ORDER — ACETAMINOPHEN 325 MG/1
650 TABLET ORAL
Status: DISCONTINUED | OUTPATIENT
Start: 2017-11-07 | End: 2017-11-07 | Stop reason: HOSPADM

## 2017-11-07 RX ORDER — IOPAMIDOL 612 MG/ML
INJECTION, SOLUTION INTRATHECAL PRN
Status: DISCONTINUED | OUTPATIENT
Start: 2017-11-07 | End: 2017-11-07 | Stop reason: HOSPADM

## 2017-11-07 RX ORDER — ONDANSETRON 4 MG/1
4 TABLET, ORALLY DISINTEGRATING ORAL EVERY 30 MIN PRN
Status: DISCONTINUED | OUTPATIENT
Start: 2017-11-07 | End: 2017-11-07 | Stop reason: HOSPADM

## 2017-11-07 RX ORDER — SODIUM CHLORIDE, SODIUM LACTATE, POTASSIUM CHLORIDE, CALCIUM CHLORIDE 600; 310; 30; 20 MG/100ML; MG/100ML; MG/100ML; MG/100ML
INJECTION, SOLUTION INTRAVENOUS CONTINUOUS
Status: DISCONTINUED | OUTPATIENT
Start: 2017-11-07 | End: 2017-11-07 | Stop reason: HOSPADM

## 2017-11-07 RX ADMIN — SODIUM CHLORIDE, POTASSIUM CHLORIDE, SODIUM LACTATE AND CALCIUM CHLORIDE: 600; 310; 30; 20 INJECTION, SOLUTION INTRAVENOUS at 13:35

## 2017-11-07 RX ADMIN — DIPHENHYDRAMINE HYDROCHLORIDE 25 MG: 50 INJECTION, SOLUTION INTRAMUSCULAR; INTRAVENOUS at 14:15

## 2017-11-07 RX ADMIN — ONDANSETRON 4 MG: 2 INJECTION INTRAMUSCULAR; INTRAVENOUS at 14:53

## 2017-11-07 RX ADMIN — CEFAZOLIN SODIUM 2 G: 2 INJECTION, SOLUTION INTRAVENOUS at 14:15

## 2017-11-07 RX ADMIN — MIDAZOLAM HYDROCHLORIDE 1 MG: 1 INJECTION, SOLUTION INTRAMUSCULAR; INTRAVENOUS at 13:41

## 2017-11-07 RX ADMIN — ESMOLOL HYDROCHLORIDE 10 MG: 10 INJECTION, SOLUTION INTRAVENOUS at 14:00

## 2017-11-07 RX ADMIN — PROPOFOL 140 MG: 10 INJECTION, EMULSION INTRAVENOUS at 14:00

## 2017-11-07 RX ADMIN — Medication 140 MG: at 14:00

## 2017-11-07 RX ADMIN — ROCURONIUM BROMIDE 50 MG: 10 INJECTION INTRAVENOUS at 14:10

## 2017-11-07 RX ADMIN — Medication 0.2 MG: at 14:33

## 2017-11-07 RX ADMIN — IPRATROPIUM BROMIDE AND ALBUTEROL SULFATE 3 ML: .5; 3 SOLUTION RESPIRATORY (INHALATION) at 09:56

## 2017-11-07 RX ADMIN — LIDOCAINE HYDROCHLORIDE 100 MG: 20 INJECTION, SOLUTION INFILTRATION; PERINEURAL at 14:00

## 2017-11-07 RX ADMIN — Medication 5 MG: at 14:43

## 2017-11-07 RX ADMIN — SUGAMMADEX 200 MG: 100 INJECTION, SOLUTION INTRAVENOUS at 14:53

## 2017-11-07 RX ADMIN — SODIUM CHLORIDE, POTASSIUM CHLORIDE, SODIUM LACTATE AND CALCIUM CHLORIDE: 600; 310; 30; 20 INJECTION, SOLUTION INTRAVENOUS at 15:12

## 2017-11-07 NOTE — IP AVS SNAPSHOT
Same Day Surgery [address] [address]    Phone:  [phone]                                       After Visit Summary   11/7/2017    Home Valdez    MRN: [id number]           After Visit Summary Signature Page     I have received my discharge instructions, and my questions have been answered. I have discussed any challenges I see with this plan with the nurse or doctor.    ..........................................................................................................................................  Patient/Patient Representative Signature      ..........................................................................................................................................  Patient Representative Print Name and Relationship to Patient    ..................................................               ................................................  Date                                            Time    ..........................................................................................................................................  Reviewed by Signature/Title    ...................................................              ..............................................  Date                                                            Time

## 2017-11-07 NOTE — ANESTHESIA CARE TRANSFER NOTE
Patient: Home Valdez    Procedure(s):  Left Percutaneous Trigeminal Nerve Balloon Compression - Wound Class: I-Clean    Diagnosis: Trigeminal Neuralgia   Diagnosis Additional Information: No value filed.    Anesthesia Type:   General, ETT     Note:    Patient transferred to:PACU  Comments: Pt remains stable, monitors on alarms in place, report to PACU RN, no complicationsHandoff Report: Identifed the Patient, Identified the Reponsible Provider, Reviewed the pertinent medical history, Discussed the surgical course, Reviewed Intra-OP anesthesia mangement and issues during anesthesia, Set expectations for post-procedure period and Allowed opportunity for questions and acknowledgement of understanding      Vitals: (Last set prior to Anesthesia Care Transfer)    CRNA VITALS  11/7/2017 1446 - 11/7/2017 1516      11/7/2017             Resp Rate (observed): (!)  4                Electronically Signed By: REJI Jain CRNA  November 7, 2017  3:16 PM

## 2017-11-07 NOTE — BRIEF OP NOTE
[facility] Brief Operative Note    Pre-operative diagnosis: Trigeminal Neuralgia    Post-operative diagnosis Trigeminal Neuralgia   Procedure: Procedure(s):  Left Percutaneous Trigeminal Nerve Balloon Compression - Wound Class: I-Clean   Surgeon: Jamie Orozco MD   Assistants(s): Jasmyn Deluca MD   Estimated blood loss: None    Specimens: None   Findings: Foramen ovale, left

## 2017-11-07 NOTE — IP AVS SNAPSHOT
MRN:[id number]                      After Visit Summary   2017    Home Valdez    MRN: [id number]           Thank you!     Thank you for choosing [facility] for your care. Our goal is always to provide you with excellent care. Hearing back from our patients is one way we can continue to improve our services. Please take a few minutes to complete the written survey that you may receive in the mail after you visit with us. Thank you!        Patient Information     Date Of Birth          1950        About your hospital stay     You were admitted on:  2017 You last received care in the:  Same Day Surgery [facility]    You were discharged on:  2017       Who to Call     For medical emergencies, please call 911.  For non-urgent questions about your medical care, please call your primary care provider or clinic, [phone]  For questions related to your surgery, please call your surgery clinic        Attending Provider     Provider Jamie Abdi MD Neurosurgery       Primary Care Provider Office Phone # Fax #    [facility] [phone]      After Care Instructions     Diet Instructions       Resume pre-procedure diet            Discharge Instructions       Call if you have any of the followin. Temperature > 101.5 F  2. Any redness, swelling or discharge from the wound.   3. Any new numbness, weakness, or altered mental status.     Call [phone] or after 5:00 pm or on weekends call [phone] and ask for the neurosurgery resident on call. Thank you.            Discharge Instructions       You underwent a Percutaneous rhizotomy.  Our physician's assistant will be calling you within 1 week to discuss your trigeminal medications.   - If you have not heard from our clinic about your follow up visit by 3-4 days following your discharge, please call our clinic at [phone] to schedule an appointment  with the Neurosurgery teams.   This surgery was done by Jamie Orozco MD      After discharge, your activity restrictions are:   -We encourage short frequent walks, increasing as tolerated.  - No driving until you are seen in clinic and cleared by your neurosurgeon.  If you have had a seizure, you may not drive for at least 3 months according to [place] law.    - No strenuous activity.  - No lifting more than 10 pounds until you are seen in clinic (a gallon of milk weighs approximately 8 pounds)    Wound cares after surgery  - You are ok to shower, but do not soak your incisions. Pat them dry if they get damp.   - Avoid coloring your hair or permanent styling until cleared by your surgeon  - No baths, hot tubs or pools for 4-6 weeks after surgery.       Call if you have any of the followin. Temperature greater than 101.5 F.   2. Any redness, swelling or discharge from the wound.   3. Any new weakness, numbness or altered mental status.  4. Worsening pain that is not improving with the pain medications you were prescribed.     Call [phone] or after 5:00 pm or on weekends call [phone] and ask for the neurosurgery resident on call. Thank You.            Ice to affected area       Ice to operative site PRN            No driving or operating machinery        until the day after procedure                  Your next 10 appointments already scheduled     2017  8:45 AM CST   Anticoagulation Visit with  ANTI COAG   [facility] ([facility]    [address] [facility] [id number] [phone]            Nov 15, 2017  9:00 AM CST   Pulmonary Function with NL RESPIRATORY THERAPY   [facility] ([facility])    [address] [place] [id number] [phone]           No Inhalers for 6 hours prior to test No Smoking 2 hours prior to test            2017  9:30 AM CST   US AORTA/IVC/ILIAC DUPLEX COMPLETE with PHUS3    [facility] ([facility])    [address] [place] [id number] [phone]           Please bring a list of your medicines (including vitamins, minerals and over-the-counter drugs). Also, tell your doctor about any allergies you may have. Wear comfortable clothes and leave your valuables at home.  Adults: No eating or drinking for 8 hours before the exam. You may take medicine with a small sip of water.  Children: - Children 6+ years: No food or drink for 6 hours before exam. - Children 1-5 years: No food or drink for 4 hours before exam. - Infants, breast-fed: may have breast milk up to 2 hours before exam. - Infants, formula: may have bottle until 4 hours before exam.  Please call the Imaging Department at your exam site with any questions.            Nov 28, 2017  1:30 PM CST   Return Visit with Jimenez Murphy MD   [facility] ([facility])    [address] [place] [id number] [phone]              Further instructions from your care team       REMEMBER: SAME DAY SURGERY IS NOT SAME DAY RECOVERY. TAKE IT EASY, GET PLENTY OF REST, AND HAVE SOMEONE WITH YOU FOR 24 HOURS. FOLLOW THESE INSTRUCTIONS AND PLEASE DO NOT HESITATE TO CALL WITH ANY QUESTIONS.   [facility]  Same-Day Surgery   Adult Discharge Orders & Instructions     For 24 hours after surgery    1. Get plenty of rest.  A responsible adult must stay with you for at least 24 hours after you leave the hospital.   2. Do not drive or use heavy equipment.  If you have weakness or tingling, don't drive or use heavy equipment until this feeling goes away.  3. Do not drink alcohol.  4. Avoid strenuous or risky activities.  Ask for help when climbing stairs.   5. You may feel lightheaded.  IF so, sit for a few minutes before standing.  Have someone help you get up.   6. If you have nausea (feel sick to  your stomach): Drink only clear liquids such as apple juice, ginger ale, broth or 7-Up.  Rest may also help.  Be sure to drink enough fluids.  Move to a regular diet as you feel able.  7. You may have a slight fever. Call the doctor if your fever is over 100 F (37.7 C) (taken under the tongue) or lasts longer than 24 hours.  8. You may have a dry mouth, a sore throat, muscle aches or trouble sleeping.  These should go away after 24 hours.  9. Do not make important or legal decisions.   Call your doctor for any of the followin.  Signs of infection (fever, growing tenderness at the surgery site, a large amount of drainage or bleeding, severe pain, foul-smelling drainage, redness, swelling).    2. It has been over 8 to 10 hours since surgery and you are still not able to urinate (pass water).    3.  Headache for over 24 hours.    To contact a doctor, call Dr Orozco' Neurosurgery Clinic @ [phone] (before 5 pm) or:        [phone] and ask for the resident on call for Neurosurgery (answered 24 hours a day)      Emergency Department:    [facility]: [phone]       (TTY for hearing impaired: [phone])      If you have obstructive sleep apnea      You were given anesthesia medicine during surgery to keep you comfortable and free of pain. After surgery, you may have more apnea spells because of this medicine and other medicines you were given. The spells may last longer than usual.     At home:        Keep using the continuous positive airway pressure (CPAP) device when you sleep. Unless your health care provider tells you not to, use it when you sleep, day or night. CPAP is a common device used to treat obstructive sleep apnea.             Tips for taking pain medications  To get the best pain relief possible , remember these points:      Take pain medications as directed, before pain becomes severe      Pain medication can upset your stomach: taking it with food may help      Constipation is a  "common side effect of pain medication. Drink plenty of fluids.      Eat foods high in fiber. Take a stool softener if recommended by your doctor or pharmacist.      Do not drink alcohol, drive or operate machinery while taking pain medications.      Ask about other ways to control pain, such as with heat, ice or relaxation.        Pending Results     No orders found from 2017 to 2017.            Admission Information     Date & Time Provider Department Dept. Phone    2017 Jamie Orozco MD Same Day Surgery [facility] [phone]      Your Vitals Were     Blood Pressure Temperature Respirations Pulse Oximetry          119/75 98.5  F (36.9  C) (Oral) 18 90%        MyChart Information     [employer] lets you send messages to your doctor, view your test results, renew your prescriptions, schedule appointments and more. To sign up, go to [email or web address] . Click on \"Log in\" on the left side of the screen, which will take you to the Welcome page. Then click on \"Sign up Now\" on the right side of the page.     You will be asked to enter the access code listed below, as well as some personal information. Please follow the directions to create your username and password.     Your access code is: [email or web address]  Expires: 2017 11:20 AM     Your access code will  in 90 days. If you need help or a new code, please call your [place] clinic or [phone].        Care EveryWhere ID     This is your Care EveryWhere ID. This could be used by other organizations to access your [place] medical records  [id number]        Equal Access to Services     [facility]: Hadii lissette monroe Sofede, waaxda luqadaha, qaybta kaalmada compa, jase bhandari. So [facility] [phone].    ATENCIÓN: Si habla español, tiene a calabrese disposición servicios gratuitos de asistencia lingüística. Llame al [phone].    We comply with applicable federal civil rights laws and [place] " laws. We do not discriminate on the basis of race, color, national origin, age, disability, sex, sexual orientation, or gender identity.               Review of your medicines      START taking        Dose / Directions    acetaminophen 325 MG tablet   Commonly known as:  TYLENOL        Dose:  650 mg   Take 2 tablets (650 mg) by mouth every 4 hours as needed for other (mild pain)   Quantity:  100 tablet   Refills:  0       oxyCODONE IR 5 MG tablet   Commonly known as:  ROXICODONE        Dose:  5-10 mg   Take 1-2 tablets (5-10 mg) by mouth every 3 hours as needed for pain or other (Moderate to Severe)   Quantity:  30 tablet   Refills:  0         CONTINUE these medicines which may have CHANGED, or have new prescriptions. If we are uncertain of the size of tablets/capsules you have at home, strength may be listed as something that might have changed.        Dose / Directions    * senna-docusate 8.6-50 MG per tablet   Commonly known as:  SENOKOT-S;PERICOLACE   This may have changed:  Another medication with the same name was added. Make sure you understand how and when to take each.        Dose:  2 tablet   Take 2 tablets by mouth 2 times daily   Refills:  0       * senna-docusate 8.6-50 MG per tablet   Commonly known as:  SENOKOT-S;PERICOLACE   This may have changed:  You were already taking a medication with the same name, and this prescription was added. Make sure you understand how and when to take each.        Dose:  1-2 tablet   Take 1-2 tablets by mouth 2 times daily Take while on oral narcotics to prevent or treat constipation.   Quantity:  30 tablet   Refills:  0       torsemide 20 MG tablet   Commonly known as:  DEMADEX   This may have changed:    - how much to take  - how to take this  - when to take this  - reasons to take this  - additional instructions   Used for:  Chronic diastolic congestive heart failure (H)        Take 10 mg po daily. Take an extra 10mg po daily as needed for swelling.   Quantity:  180  tablet   Refills:  3       * Notice:  This list has 2 medication(s) that are the same as other medications prescribed for you. Read the directions carefully, and ask your doctor or other care provider to review them with you.      CONTINUE these medicines which have NOT CHANGED        Dose / Directions    albuterol 108 (90 BASE) MCG/ACT Inhaler   Commonly known as:  PROAIR HFA/PROVENTIL HFA/VENTOLIN HFA   Used for:  Chronic obstructive pulmonary disease with acute exacerbation (H)        Dose:  2 puff   Inhale 2 puffs into the lungs every 4 hours as needed for shortness of breath / dyspnea   Quantity:  6 Inhaler   Refills:  3       amiodarone 200 MG tablet   Commonly known as:  PACERONE/CODARONE   Used for:  Atrial fibrillation (H)        Dose:  100 mg   Take 0.5 tablets (100 mg) by mouth daily   Quantity:  45 tablet   Refills:  3       buPROPion 150 MG 12 hr tablet   Commonly known as:  WELLBUTRIN SR   Used for:  Personal history of tobacco use, presenting hazards to health        Dose:  150 mg   Take 1 tablet (150 mg) by mouth 2 times daily   Quantity:  180 tablet   Refills:  3       cefuroxime 250 MG tablet   Commonly known as:  CEFTIN   Used for:  Obstructive chronic bronchitis with exacerbation (H)        Dose:  250 mg   Take 1 tablet (250 mg) by mouth 2 times daily   Quantity:  20 tablet   Refills:  3       cetirizine 10 MG tablet   Commonly known as:  zyrTEC   Used for:  Chronic seasonal allergic rhinitis due to other allergen        TAKE 1 TABLET BY MOUTH EVERY EVENING   Quantity:  30 tablet   Refills:  10       fluticasone 50 MCG/ACT spray   Commonly known as:  FLONASE   Used for:  Chronic rhinitis        USE ONE TO TWO SPRAYS IN EACH NOSTRIL EVERY DAY   Quantity:  16 g   Refills:  6       fluticasone-salmeterol 250-50 MCG/DOSE diskus inhaler   Commonly known as:  ADVAIR   Used for:  Panlobular emphysema (H)        Dose:  1 puff   Inhale 1 puff into the lungs 2 times daily   Quantity:  3 Inhaler   Refills:   3       ipratropium - albuterol 0.5 mg/2.5 mg/3 mL 0.5-2.5 (3) MG/3ML neb solution   Commonly known as:  DUONEB   Used for:  COPD exacerbation (H)        TAKE 1 VIAL (3 MLS) BY NEBULIZATION EVERY 4 HOURS AS NEEDED FOR SHORTNESS OF BREATH / DYSPNEA OR WHEEZING   Quantity:  540 mL   Refills:  3       losartan 50 MG tablet   Commonly known as:  COZAAR   Used for:  Atrial fibrillation with RVR (H), Hypertension goal BP (blood pressure) < 140/90        Dose:  100 mg   Take 2 tablets (100 mg) by mouth daily   Quantity:  180 tablet   Refills:  3       metoprolol 50 MG 24 hr tablet   Commonly known as:  TOPROL-XL   Used for:  Atrial fibrillation with RVR (H), CHF (congestive heart failure) (H)        Dose:  50 mg   Take 1 tablet (50 mg) by mouth daily   Quantity:  90 tablet   Refills:  3       montelukast 10 MG tablet   Commonly known as:  SINGULAIR   Used for:  Chronic seasonal allergic rhinitis due to other allergen        TAKE 1 TABLET BY MOUTH DAILY AT BEDTIME   Quantity:  30 tablet   Refills:  10       multivitamin, therapeutic Tabs tablet        Dose:  1 tablet   Take 1 tablet by mouth daily   Refills:  0       nebulizer/tubing/mouthpiece Kit   Used for:  Chronic obstructive pulmonary disease, unspecified COPD type (H)        Use every 4-6 hours PRN   Quantity:  1 kit   Refills:  11       order for DME   Used for:  COPD (chronic obstructive pulmonary disease) (H)        Equipment being ordered: Nebulizer   Quantity:  1 Device   Refills:  0       potassium chloride SA 20 MEQ CR tablet   Commonly known as:  K-DUR/KLOR-CON M   Used for:  CHF (congestive heart failure) (H), Peripheral edema        Dose:  20 mEq   Take 1 tablet (20 mEq) by mouth daily   Quantity:  90 tablet   Refills:  1       * warfarin 2.5 MG tablet   Commonly known as:  JANTOVEN   Used for:  Atrial fibrillation with RVR (H)        Take 7.5 mg (3 tablets) on Tuesday, Thursday, Saturday and 5 mg (2 tablets) all other days or as directed by coumadin  clinic.   Quantity:  235 tablet   Refills:  0       * warfarin 2.5 MG tablet   Commonly known as:  JANTOVEN   Used for:  Atrial fibrillation with RVR (H)        TAKE TAKE 2 TABLETS (5MG) on Mondays and take 3 TABLETS (7.5MG) ALL OTHER DAYS OR AS DIRECTED BY THE COUMADIN CLINIC   Quantity:  250 tablet   Refills:  0       * Notice:  This list has 2 medication(s) that are the same as other medications prescribed for you. Read the directions carefully, and ask your doctor or other care provider to review them with you.         Where to get your medicines      These medications were sent to [id number] - [place], [place] - [address] [place] [address]  [address] [place] [id number]    Hours:  M-F 8:30-6:30; Sat 9-4; closed Sunday Phone:  [phone]     acetaminophen 325 MG tablet    senna-docusate 8.6-50 MG per tablet         Some of these will need a paper prescription and others can be bought over the counter. Ask your nurse if you have questions.     Bring a paper prescription for each of these medications     oxyCODONE IR 5 MG tablet                Protect others around you: Learn how to safely use, store and throw away your medicines at www.disposemymeds.org.             Medication List: This is a list of all your medications and when to take them. Check marks below indicate your daily home schedule. Keep this list as a reference.      Medications           Morning Afternoon Evening Bedtime As Needed    acetaminophen 325 MG tablet   Commonly known as:  TYLENOL   Take 2 tablets (650 mg) by mouth every 4 hours as needed for other (mild pain)                                albuterol 108 (90 BASE) MCG/ACT Inhaler   Commonly known as:  PROAIR HFA/PROVENTIL HFA/VENTOLIN HFA   Inhale 2 puffs into the lungs every 4 hours as needed for shortness of breath / dyspnea                                amiodarone 200 MG tablet   Commonly known as:  PACERONE/CODARONE   Take 0.5 tablets (100 mg) by mouth daily                                 buPROPion 150 MG 12 hr tablet   Commonly known as:  WELLBUTRIN SR   Take 1 tablet (150 mg) by mouth 2 times daily                                cefuroxime 250 MG tablet   Commonly known as:  CEFTIN   Take 1 tablet (250 mg) by mouth 2 times daily                                cetirizine 10 MG tablet   Commonly known as:  zyrTEC   TAKE 1 TABLET BY MOUTH EVERY EVENING                                fluticasone 50 MCG/ACT spray   Commonly known as:  FLONASE   USE ONE TO TWO SPRAYS IN EACH NOSTRIL EVERY DAY                                fluticasone-salmeterol 250-50 MCG/DOSE diskus inhaler   Commonly known as:  ADVAIR   Inhale 1 puff into the lungs 2 times daily                                ipratropium - albuterol 0.5 mg/2.5 mg/3 mL 0.5-2.5 (3) MG/3ML neb solution   Commonly known as:  DUONEB   TAKE 1 VIAL (3 MLS) BY NEBULIZATION EVERY 4 HOURS AS NEEDED FOR SHORTNESS OF BREATH / DYSPNEA OR WHEEZING   Last time this was given:  3 mLs on 11/7/2017  9:56 AM                                losartan 50 MG tablet   Commonly known as:  COZAAR   Take 2 tablets (100 mg) by mouth daily                                metoprolol 50 MG 24 hr tablet   Commonly known as:  TOPROL-XL   Take 1 tablet (50 mg) by mouth daily                                montelukast 10 MG tablet   Commonly known as:  SINGULAIR   TAKE 1 TABLET BY MOUTH DAILY AT BEDTIME                                multivitamin, therapeutic Tabs tablet   Take 1 tablet by mouth daily                                nebulizer/tubing/mouthpiece Kit   Use every 4-6 hours PRN                                order for DME   Equipment being ordered: Nebulizer                                oxyCODONE IR 5 MG tablet   Commonly known as:  ROXICODONE   Take 1-2 tablets (5-10 mg) by mouth every 3 hours as needed for pain or other (Moderate to Severe)                                potassium chloride SA 20 MEQ CR tablet   Commonly known as:  K-DUR/KLOR-CON M   Take 1 tablet (20  mEq) by mouth daily                                * senna-docusate 8.6-50 MG per tablet   Commonly known as:  SENOKOT-S;PERICOLACE   Take 2 tablets by mouth 2 times daily                                * senna-docusate 8.6-50 MG per tablet   Commonly known as:  SENOKOT-S;PERICOLACE   Take 1-2 tablets by mouth 2 times daily Take while on oral narcotics to prevent or treat constipation.                                torsemide 20 MG tablet   Commonly known as:  DEMADEX   Take 10 mg po daily. Take an extra 10mg po daily as needed for swelling.                                * warfarin 2.5 MG tablet   Commonly known as:  JANTOVEN   Take 7.5 mg (3 tablets) on Tuesday, Thursday, Saturday and 5 mg (2 tablets) all other days or as directed by coumadin clinic.                                * warfarin 2.5 MG tablet   Commonly known as:  JANTOVEN   TAKE TAKE 2 TABLETS (5MG) on Mondays and take 3 TABLETS (7.5MG) ALL OTHER DAYS OR AS DIRECTED BY THE COUMADIN CLINIC                                * Notice:  This list has 4 medication(s) that are the same as other medications prescribed for you. Read the directions carefully, and ask your doctor or other care provider to review them with you.

## 2017-11-07 NOTE — ANESTHESIA POSTPROCEDURE EVALUATION
Patient: Home Valdez    Procedure(s):  Left Percutaneous Trigeminal Nerve Balloon Compression - Wound Class: I-Clean    Diagnosis:Trigeminal Neuralgia   Diagnosis Additional Information: No value filed.    Anesthesia Type:  General, ETT    Note:  Anesthesia Post Evaluation    Patient location during evaluation: PACU  Patient participation: Able to fully participate in evaluation  Level of consciousness: awake and alert  Pain management: adequate  Airway patency: patent  Cardiovascular status: acceptable  Respiratory status: acceptable  Hydration status: acceptable  PONV: none     Anesthetic complications: None    Comments: Wide awake and no facial pain!        Last vitals:  Vitals:    11/07/17 0925 11/07/17 1013 11/07/17 1510   BP: (!) 149/101 137/78 110/74   Resp: 18     Temp: 37.1  C (98.8  F)  (P) 37.5  C (99.5  F)   SpO2: 98%           Electronically Signed By: Home Howard MD  November 7, 2017  3:21 PM

## 2017-11-07 NOTE — OR NURSING
Anesthesia aware we're unable to view pre-op EKG. Review H&P, past EKG. Stated a repeat EKG wasn't necessary pre-op today.

## 2017-11-08 NOTE — TELEPHONE ENCOUNTER
I have attempted to call the pt with the following results. I left message for pt to call back. I will call back another time. Neeru Brown CMA ([facility])

## 2017-11-08 NOTE — OP NOTE
DATE OF PROCEDURE:  11/7/2017      INDICATIONS:  Mr. Home Kelley has recurrent left-sided trigeminal neuralgia and after thorough discussion of the various options open to him, he wants to undergo a third balloon compression trigeminal rhizotomy.      PREOPERATIVE DIAGNOSIS:  Recurrent trigeminal neuralgia.      POSTOPERATIVE DIAGNOSIS:  Recurrent trigeminal neuralgia.      PROCEDURE:   1. Stereotactic trigeminal balloon rhizotomy, left  2. Intraoperative fluoroscopic guidance    SURGEON:  Jamie Orozco MD.      ASSISTANTS:  Jasmyn Deluca MD.     DESCRIPTION OF PROCEDURE:  The patient was brought into the operating room where general endotracheal anesthesia was induced.  Two c-arm fluoroscopes were set up to provide AP and lateral fluoroscopy for the procedure. We prepared the skin over the left cheek.    A point approximately 2.5 cm lateral to the oral fissure was marked. Entry into the skin was made with a 15-gauge needle, and then the Jamshidi needle with obturator in place was passed through the cheek, taking care not to enter the oral cavity.  With fluoroscopic guidance, the needle was passed to the foramen ovale and the position confirmed fluoroscopically by identifying foramen ovale on an AP film and the clival line on the lateral film.     We obtained excellent placement of the cannula medially in foramen ovale.  The position was confirmed with a modified AP image. The Jamshidi needle stylet was passed to an appropriate depth and the position again confirmed fluoroscopically.The balloon catheter was passed into the proper position and the pressure injection syringe prefilled with Isovue M 300 was connected to it.  We inflated the balloon and obtained 1.4 atmospheres of pressure with a pear-shaped enlargement of the balloon.  Inflation was held for 110 seconds and the balloon deflated.  The balloon catheter and cannula were removed and the patient sent to Recovery Room in satisfactory condition.   There was no significant blood loss.    Dr. Orozco was present for the key portions of the operation and immediately available for the entire procedure..        ESTIMATED BLOOD LOSS:  2cc        I, Dr. Jamie Orozco, was present for the entire procedure.         JAMIE OROZCO MD             D: 2017 14:47   T: 2017 22:05   MT:       Name:     KARTIK HUERTA   MRN:      [id number]        Account:        [id number]   :      1950           Procedure Date: 2017      Document: [id number]

## 2017-11-10 ENCOUNTER — TELEPHONE (OUTPATIENT)
Dept: NEUROSURGERY | Facility: CLINIC | Age: 67
End: 2017-11-10

## 2017-11-13 ENCOUNTER — ANTICOAGULATION THERAPY VISIT (OUTPATIENT)
Dept: ANTICOAGULATION | Facility: OTHER | Age: 67
End: 2017-11-13
Payer: MEDICARE

## 2017-11-13 DIAGNOSIS — Z79.01 LONG-TERM (CURRENT) USE OF ANTICOAGULANTS: ICD-10-CM

## 2017-11-13 DIAGNOSIS — I48.91 ATRIAL FIBRILLATION WITH RVR (H): ICD-10-CM

## 2017-11-13 LAB — INR POINT OF CARE: 3.2 (ref 0.86–1.14)

## 2017-11-13 PROCEDURE — 36416 COLLJ CAPILLARY BLOOD SPEC: CPT

## 2017-11-13 PROCEDURE — 99207 ZZC NO CHARGE NURSE ONLY: CPT

## 2017-11-13 PROCEDURE — 85610 PROTHROMBIN TIME: CPT | Mod: QW

## 2017-11-13 NOTE — MR AVS SNAPSHOT
Home Valdez   11/13/2017 1:15 PM   Anticoagulation Therapy Visit    Description:  67 year old male   Provider:  [facility]   Department:   Anticoag           INR as of 11/13/2017     Today's INR 3.2!      Anticoagulation Summary as of 11/13/2017     INR goal 2.0-3.0   Today's INR 3.2!   Full instructions 5 mg on Mon; 7.5 mg all other days   Next INR check 11/24/2017    Indications   Atrial fibrillation with RVR (H) [I48.91]  Long-term (current) use of anticoagulants [Z79.01] [Z79.01]         Your next Anticoagulation Clinic appointment(s)     Nov 13, 2017  1:15 PM CST   Anticoagulation Visit with  [facility]AG   [facility]    [address] [facility] [address]   [phone]            Nov 24, 2017  8:30 AM CST   Anticoagulation Visit with  ANTI COAG   [facility]    [address] [facility] [address]   [phone]              Contact Numbers     Clinic Number:         November 2017 Details    Sun Mon Tue Wed Thu Fri Sat        1               2               3               4                 5               6               7               8               9               10               11                 12               13      5 mg   See details      14      7.5 mg         15      7.5 mg         16      7.5 mg         17      7.5 mg         18      7.5 mg           19      7.5 mg         20      5 mg         21      7.5 mg         22      7.5 mg         23      7.5 mg         24            25                 26               27               28               29               30                  Date Details   11/13 This INR check       Date of next INR:  11/24/2017         How to take your warfarin dose     To take:  5 mg Take 2 of the 2.5 mg tablets.    To take:  7.5 mg Take 3 of the 2.5 mg tablets.

## 2017-11-13 NOTE — PROGRESS NOTES
ANTICOAGULATION FOLLOW-UP CLINIC VISIT    Patient Name:  Home Valdez  Date:  11/13/2017  Contact Type:  Face to Face    SUBJECTIVE:     Patient Findings     Positives No Problem Findings           OBJECTIVE    INR Protime   Date Value Ref Range Status   11/13/2017 3.2 (A) 0.86 - 1.14 Final       ASSESSMENT / PLAN  INR assessment THER    Recheck INR In: 10 DAYS    INR Location Clinic      Anticoagulation Summary as of 11/13/2017     INR goal 2.0-3.0   Today's INR 3.2!   Maintenance plan 5 mg (2.5 mg x 2) on Mon; 7.5 mg (2.5 mg x 3) all other days   Full instructions 5 mg on Mon; 7.5 mg all other days   Weekly total 50 mg   No change documented Shameka Carey RN   Plan last modified Shameka Carey RN (6/30/2017)   Next INR check 11/24/2017   Target end date     Indications   Atrial fibrillation with RVR (H) [I48.91]  Long-term (current) use of anticoagulants [Z79.01] [Z79.01]         Anticoagulation Episode Summary     INR check location     Preferred lab     Send INR reminders to [facility]    Comments 2.5 MG TABLETS, likes print out       Anticoagulation Care Providers     Provider Role Specialty Phone number    Neo Galicia MD [facility] [phone]            See the Encounter Report to view Anticoagulation Flowsheet and Dosing Calendar (Go to Encounters tab in chart review, and find the Anticoagulation Therapy Visit)    Dosage adjustment made based on physician directed care plan.    Shameka Carey RN

## 2017-11-15 ENCOUNTER — HOSPITAL ENCOUNTER (OUTPATIENT)
Dept: RESPIRATORY THERAPY | Facility: CLINIC | Age: 67
Discharge: HOME | End: 2017-11-15
Attending: INTERNAL MEDICINE | Admitting: INTERNAL MEDICINE
Payer: MEDICARE

## 2017-11-15 DIAGNOSIS — I48.0 PAROXYSMAL ATRIAL FIBRILLATION (H): ICD-10-CM

## 2017-11-15 DIAGNOSIS — J44.1 COPD EXACERBATION (H): ICD-10-CM

## 2017-11-15 PROCEDURE — 94726 PLETHYSMOGRAPHY LUNG VOLUMES: CPT

## 2017-11-15 PROCEDURE — 94010 BREATHING CAPACITY TEST: CPT

## 2017-11-15 PROCEDURE — 94729 DIFFUSING CAPACITY: CPT

## 2017-11-15 NOTE — PROGRESS NOTES
FVC FEV1 FEV1/FVC DLCOunc TLC (Pleth) RV (Pleth) FRC(Pleth) SVC  Visit Date   L L % ml/min/mmHg L L L L  Pre          11/25/2015 8:09:39 AM 2.24 0.92 41  12.84  8.17  5.17  6.86  3.00  12/1/2016 8:28:28 AM 3.03 1.63 54  17.26  7.81  4.36  5.00  3.45  11/15/2017 8:25:31 AM 2.85 1.38 49  21.20  7.08  3.45  4.99  3.63

## 2017-11-15 NOTE — PROGRESS NOTES
Pre-Bronch    Post-Bronch         Actual Pred %Pred  Actual %Pred %Chng       ---- SPIROMETRY ----                          FVC (L) 2.85 4.34 65                    FEV1 (L) 1.38 3.30 41                    FEV1/FVC (%) 49 76                      FEV1/SVC (%) 38 69                      FEV1/FEV6 (%) 52 78                      FEF Max (L/sec) 3.90 8.58 45                    FEF 25-75% (L/sec) 0.62 2.56 24                    FIVC (L) 2.34                        FIF Max (L/sec) 5.66 7.13 79                    Expiratory Time (sec) 11.91                        ----LUNG MECHANICS                           MVV (L/min)   131                      MEP (cmH2O)                          MIP (cmH2O)                          ---- LUNG VOLUMES ----                          SVC (L) 3.63 4.81 75                    IC (L) 2.08 4.14 50                    ERV (L) 1.54 0.67 230                    FRC(Pleth) (L) 4.99 3.68 135                    RV (Pleth) (L) 3.45 2.59 133                    TLC (Pleth) (L) 7.08 7.13 99                    RV/TLC (Pleth) (%) 49 40                      ---- DIFFUSION ----                          DLCOunc (ml/min/mmHg) 21.20 26.66 79                    DLCOcor (ml/min/mmHg)   26.66                      DL/VA (ml/min/mmHg/L) 3.70 3.91                      VA (L) 5.72 6.82 83                    IVC (L) 3.30                        Hgb (gm/dL)   12-18                      ---- BLOOD GASES ----

## 2017-11-15 NOTE — DISCHARGE INSTRUCTIONS
Thank you for completing pulmonary function testing today.  All results will be scanned into your epic results for your doctor to review.  Please resume taking all your current prescribed medications and diet as directed by your provider.   If you have not heard from your provider about your testing within two weeks and do not have a follow-up appointment scheduled with them please contact your provider about any questions you have concerning your testing.   Thank you  [facility] Pulmonary Function Lab

## 2017-11-16 ENCOUNTER — HOSPITAL ENCOUNTER (OUTPATIENT)
Dept: ULTRASOUND IMAGING | Facility: CLINIC | Age: 67
Discharge: HOME | End: 2017-11-16
Attending: INTERNAL MEDICINE | Admitting: INTERNAL MEDICINE
Payer: MEDICARE

## 2017-11-16 DIAGNOSIS — I71.40 ABDOMINAL AORTIC ANEURYSM (AAA) WITHOUT RUPTURE (H): ICD-10-CM

## 2017-11-16 PROCEDURE — 76775 US EXAM ABDO BACK WALL LIM: CPT

## 2017-11-20 LAB
DLCOCOR-%PRED-PRE: 76 %
DLCOCOR-PRE: 20.44 ML/MIN/MMHG
DLCOUNC-%PRED-PRE: 79 %
DLCOUNC-PRE: 21.2 ML/MIN/MMHG
DLCOUNC-PRED: 26.66 ML/MIN/MMHG
ERV-%PRED-PRE: 230 %
ERV-PRE: 1.54 L
ERV-PRED: 0.67 L
EXPTIME-PRE: 11.91 SEC
FEF2575-%PRED-PRE: 24 %
FEF2575-PRE: 0.62 L/SEC
FEF2575-PRED: 2.56 L/SEC
FEFMAX-%PRED-PRE: 45 %
FEFMAX-PRE: 3.9 L/SEC
FEFMAX-PRED: 8.58 L/SEC
FEV1-%PRED-PRE: 41 %
FEV1-PRE: 1.38 L
FEV1FEV6-PRE: 52 %
FEV1FEV6-PRED: 78 %
FEV1FVC-PRE: 49 %
FEV1FVC-PRED: 76 %
FEV1SVC-PRE: 38 %
FEV1SVC-PRED: 69 %
FIFMAX-PRE: 5.66 L/SEC
FRCPLETH-%PRED-PRE: 135 %
FRCPLETH-PRE: 4.99 L
FRCPLETH-PRED: 3.68 L
FVC-%PRED-PRE: 65 %
FVC-PRE: 2.85 L
FVC-PRED: 4.34 L
GAW-%PRED-PRE: 268 %
GAW-PRE: 2.76 L/S/CMH2O
GAW-PRED: 1.03 L/S/CMH2O
IC-%PRED-PRE: 50 %
IC-PRE: 2.08 L
IC-PRED: 4.14 L
RVPLETH-%PRED-PRE: 133 %
RVPLETH-PRE: 3.45 L
RVPLETH-PRED: 2.59 L
SGAW-%PRED-PRE: 687 %
SGAW-PRE: 0.55 1/CMH2O*S
SGAW-PRED: 0.08 1/CMH2O*S
SRAW-%PRED-PRE: 38 %
SRAW-PRE: 1.84 CMH2O*S
SRAW-PRED: 4.76 CMH2O*S
TLCPLETH-%PRED-PRE: 99 %
TLCPLETH-PRE: 7.08 L
TLCPLETH-PRED: 7.13 L
VA-%PRED-PRE: 83 %
VA-PRE: 5.72 L
VC-%PRED-PRE: 75 %
VC-PRE: 3.63 L
VC-PRED: 4.81 L

## 2017-11-22 DIAGNOSIS — I48.91 ATRIAL FIBRILLATION WITH RVR (H): ICD-10-CM

## 2017-11-22 RX ORDER — WARFARIN SODIUM 2.5 MG/1
TABLET ORAL
Qty: 250 TABLET | Refills: 0 | Status: SHIPPED | OUTPATIENT
Start: 2017-11-22 | End: 2018-03-02

## 2017-11-24 ENCOUNTER — ANTICOAGULATION THERAPY VISIT (OUTPATIENT)
Dept: ANTICOAGULATION | Facility: OTHER | Age: 67
End: 2017-11-24
Payer: MEDICARE

## 2017-11-24 DIAGNOSIS — Z79.01 LONG-TERM (CURRENT) USE OF ANTICOAGULANTS: ICD-10-CM

## 2017-11-24 DIAGNOSIS — I48.91 ATRIAL FIBRILLATION WITH RVR (H): ICD-10-CM

## 2017-11-24 LAB — INR POINT OF CARE: 2.2 (ref 0.86–1.14)

## 2017-11-24 PROCEDURE — 36416 COLLJ CAPILLARY BLOOD SPEC: CPT

## 2017-11-24 PROCEDURE — 85610 PROTHROMBIN TIME: CPT | Mod: QW

## 2017-11-24 PROCEDURE — 99207 ZZC NO CHARGE NURSE ONLY: CPT

## 2017-11-24 NOTE — PROGRESS NOTES
ANTICOAGULATION FOLLOW-UP CLINIC VISIT    Patient Name:  Home Valdez  Date:  11/24/2017  Contact Type:  Face to Face    SUBJECTIVE:     Patient Findings     Positives No Problem Findings           OBJECTIVE    INR Protime   Date Value Ref Range Status   11/24/2017 2.2 (A) 0.86 - 1.14 Final       ASSESSMENT / PLAN  INR assessment THER    Recheck INR In: 3 DAYS    INR Location Clinic      Anticoagulation Summary as of 11/24/2017     INR goal 2.0-3.0   Today's INR 2.2   Maintenance plan 5 mg (2.5 mg x 2) on Mon; 7.5 mg (2.5 mg x 3) all other days   Full instructions 5 mg on Mon; 7.5 mg all other days   Weekly total 50 mg   No change documented Shameka Carey RN   Plan last modified Shameka Carey RN (6/30/2017)   Next INR check 11/27/2017   Target end date     Indications   Atrial fibrillation with RVR (H) [I48.91]  Long-term (current) use of anticoagulants [Z79.01] [Z79.01]         Anticoagulation Episode Summary     INR check location     Preferred lab     Send INR reminders to [facility]    Comments 2.5 MG TABLETS, likes print out, PM dose      Anticoagulation Care Providers     Provider Role Specialty Phone number    Sandip Vega DO Home [facility] [phone]            See the Encounter Report to view Anticoagulation Flowsheet and Dosing Calendar (Go to Encounters tab in chart review, and find the Anticoagulation Therapy Visit)    Dosage adjustment made based on physician directed care plan.    Shameka Carey, RN

## 2017-11-24 NOTE — MR AVS SNAPSHOT
Home Valdez   11/24/2017 8:30 AM   Anticoagulation Therapy Visit    Description:  67 year old male   Provider:  [facility]   Department:   Anticoag           INR as of 11/24/2017     Today's INR 2.2      Anticoagulation Summary as of 11/24/2017     INR goal 2.0-3.0   Today's INR 2.2   Full instructions 5 mg on Mon; 7.5 mg all other days   Next INR check 11/27/2017    Indications   Atrial fibrillation with RVR (H) [I48.91]  Long-term (current) use of anticoagulants [Z79.01] [Z79.01]         Your next Anticoagulation Clinic appointment(s)     Nov 24, 2017  8:30 AM CST   [facility]  [facility]   [facility]    [address] [facility] [address]   [phone]            Nov 27, 2017  8:45 AM CST   Anticoagulation Visit with  ANTI COAG   [facility]    [address] [facility] [address]   [phone]              Contact Numbers     Clinic Number:         November 2017 Details    Sun Mon Tue Wed Thu Fri Sat        1               2               3               4                 5               6               7               8               9               10               11                 12               13               14               15               16               17               18                 19               20               21               22               23               24      7.5 mg   See details      25      7.5 mg           26      7.5 mg         27            28               29               30                  Date Details   11/24 This INR check       Date of next INR:  11/27/2017         How to take your warfarin dose     To take:  5 mg Take 2 of the 2.5 mg tablets.    To take:  7.5 mg Take 3 of the 2.5 mg tablets.

## 2017-11-27 ENCOUNTER — ANTICOAGULATION THERAPY VISIT (OUTPATIENT)
Dept: ANTICOAGULATION | Facility: OTHER | Age: 67
End: 2017-11-27
Payer: MEDICARE

## 2017-11-27 DIAGNOSIS — I48.91 ATRIAL FIBRILLATION WITH RVR (H): ICD-10-CM

## 2017-11-27 DIAGNOSIS — Z79.01 LONG-TERM (CURRENT) USE OF ANTICOAGULANTS: ICD-10-CM

## 2017-11-27 LAB — INR POINT OF CARE: 2.3 (ref 0.86–1.14)

## 2017-11-27 PROCEDURE — 85610 PROTHROMBIN TIME: CPT | Mod: QW

## 2017-11-27 PROCEDURE — 36416 COLLJ CAPILLARY BLOOD SPEC: CPT

## 2017-11-27 PROCEDURE — 99207 ZZC NO CHARGE NURSE ONLY: CPT

## 2017-11-27 NOTE — PROGRESS NOTES
ANTICOAGULATION FOLLOW-UP CLINIC VISIT    Patient Name:  Home Valdez  Date:  11/27/2017  Contact Type:  Face to Face    SUBJECTIVE:     Patient Findings     Positives No Problem Findings    Comments Pt is having dental work done on Wednesday and his INR needs to be below 2.5. He will hold today and tomorrow and restart his coumadin on Wed evening.            OBJECTIVE    INR Protime   Date Value Ref Range Status   11/27/2017 2.3 (A) 0.86 - 1.14 Final       ASSESSMENT / PLAN  INR assessment THER    Recheck INR In: 2 WEEKS    INR Location Clinic      Anticoagulation Summary as of 11/27/2017     INR goal 2.0-3.0   Today's INR 2.3   Maintenance plan 5 mg (2.5 mg x 2) on Mon; 7.5 mg (2.5 mg x 3) all other days   Full instructions 11/27: Hold; 11/28: Hold; Otherwise 5 mg on Mon; 7.5 mg all other days   Weekly total 50 mg   Plan last modified Shameka Carey RN (6/30/2017)   Next INR check 12/11/2017   Target end date     Indications   Atrial fibrillation with RVR (H) [I48.91]  Long-term (current) use of anticoagulants [Z79.01] [Z79.01]         Anticoagulation Episode Summary     INR check location     Preferred lab     Send INR reminders to \[facility]\""    Comments 2.5 MG TABLETS, likes print out, PM dose      Anticoagulation Care Providers     Provider Role Specialty Phone number    Sandip VegaDO [facility] [phone]            See the Encounter Report to view Anticoagulation Flowsheet and Dosing Calendar (Go to Encounters tab in chart review, and find the Anticoagulation Therapy Visit)    Dosage adjustment made based on physician directed care plan.    Shameka Carey RN

## 2017-11-27 NOTE — MR AVS SNAPSHOT
Home Valdez   11/27/2017 8:45 AM   Anticoagulation Therapy Visit    Description:  67 year old male   Provider:  [facility]   Department:  [facility]           INR as of 11/27/2017     Today's INR 2.3      Anticoagulation Summary as of 11/27/2017     INR goal 2.0-3.0   Today's INR 2.3   Full instructions 11/27: Hold; 11/28: Hold; Otherwise 5 mg on Mon; 7.5 mg all other days   Next INR check 12/11/2017    Indications   Atrial fibrillation with RVR (H) [I48.91]  Long-term (current) use of anticoagulants [Z79.01] [Z79.01]         Your next Anticoagulation Clinic appointment(s)     Dec 11, 2017  9:00 AM CST   Anticoagulation Visit with [facility]   [facility]    [address] [facility] [address]   [phone]              Contact Numbers     Clinic Number:         November 2017 Details    Sun Mon Tue Wed Thu Fri Sat        1               2               3               4                 5               6               7               8               9               10               11                 12               13               14               15               16               17               18                 19               20               21               22               23               24               25                 26               27      Hold   See details      28      Hold         29      7.5 mg         30      7.5 mg            Date Details   11/27 This INR check               How to take your warfarin dose     To take:  7.5 mg Take 3 of the 2.5 mg tablets.    Hold Do not take your warfarin dose. See the Details table to the right for additional instructions.                December 2017 Details    Sun Mon Tue Wed Thu Fri Sat          1      7.5 mg         2      7.5 mg           3      7.5 mg         4      5 mg         5      7.5 mg         6      7.5 mg         7      7.5 mg         8      7.5 mg         9      7.5 mg           10       7.5 mg         11            12               13               14               15               16                 17               18               19               20               21               22               23                 24               25               26               27               28               29               30                 31                      Date Details   No additional details    Date of next INR:  12/11/2017         How to take your warfarin dose     To take:  5 mg Take 2 of the 2.5 mg tablets.    To take:  7.5 mg Take 3 of the 2.5 mg tablets.

## 2017-11-28 ENCOUNTER — OFFICE VISIT (OUTPATIENT)
Dept: CARDIOLOGY | Facility: CLINIC | Age: 67
End: 2017-11-28
Payer: MEDICARE

## 2017-11-28 VITALS
OXYGEN SATURATION: 96 % | HEIGHT: 70 IN | SYSTOLIC BLOOD PRESSURE: 136 MMHG | DIASTOLIC BLOOD PRESSURE: 78 MMHG | HEART RATE: 66 BPM | WEIGHT: 251.7 LBS | BODY MASS INDEX: 36.03 KG/M2

## 2017-11-28 DIAGNOSIS — Z79.899 ON AMIODARONE THERAPY: ICD-10-CM

## 2017-11-28 DIAGNOSIS — I71.40 ABDOMINAL AORTIC ANEURYSM (AAA) WITHOUT RUPTURE (H): ICD-10-CM

## 2017-11-28 DIAGNOSIS — I10 ESSENTIAL HYPERTENSION WITH GOAL BLOOD PRESSURE LESS THAN 140/90: ICD-10-CM

## 2017-11-28 DIAGNOSIS — I50.32 CHRONIC DIASTOLIC CONGESTIVE HEART FAILURE (H): Primary | ICD-10-CM

## 2017-11-28 DIAGNOSIS — I48.0 PAROXYSMAL ATRIAL FIBRILLATION (H): ICD-10-CM

## 2017-11-28 LAB
ALBUMIN SERPL-MCNC: 3.4 G/DL (ref 3.4–5)
ALP SERPL-CCNC: 90 U/L (ref 40–150)
ALT SERPL W P-5'-P-CCNC: 29 U/L (ref 0–70)
AST SERPL W P-5'-P-CCNC: 14 U/L (ref 0–45)
BILIRUB DIRECT SERPL-MCNC: 0.1 MG/DL (ref 0–0.2)
BILIRUB SERPL-MCNC: 0.4 MG/DL (ref 0.2–1.3)
PROT SERPL-MCNC: 7 G/DL (ref 6.8–8.8)
TSH SERPL DL<=0.005 MIU/L-ACNC: 1.76 MU/L (ref 0.4–4)

## 2017-11-28 PROCEDURE — 84443 ASSAY THYROID STIM HORMONE: CPT | Performed by: INTERNAL MEDICINE

## 2017-11-28 PROCEDURE — 80076 HEPATIC FUNCTION PANEL: CPT | Performed by: INTERNAL MEDICINE

## 2017-11-28 PROCEDURE — 36415 COLL VENOUS BLD VENIPUNCTURE: CPT | Performed by: INTERNAL MEDICINE

## 2017-11-28 PROCEDURE — 99214 OFFICE O/P EST MOD 30 MIN: CPT | Performed by: INTERNAL MEDICINE
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
## 2017-11-28 NOTE — LETTER
11/28/2017      Sandip Vega, DO  [address] [place] [address]      RE: Home Valdez       Dear Colleague,    I had the pleasure of seeing Home Valdez in the [facility].    HISTORY OF PRESENT ILLNESS:  I had the opportunity to see Mr. Home Valdez in Cardiology Clinic today at the [facility] in [place] for reevaluation of paroxysmal atrial fibrillation and history of nonischemic cardiomyopathy and congestive heart failure.        As you may recall, he developed rapid atrial fibrillation with a severe cardiomyopathy with an ejection fraction as low as 20%-25%.  He had significant congestive heart failure and possibly pneumonia and was hospitalized with severe shortness of breath.  Fortunately, once we controlled his heart failure and converted him to sinus rhythm, he has done well.  We have continued amiodarone to maintain sinus rhythm and he has not had any recurrences of atrial fibrillation that we have identified for the last couple of years.      He continues to have significant COPD.  We recently did some pulmonary function testing to evaluate for amiodarone toxicity and there was no evidence of amiodarone toxicity based on his DLCO levels, but he did have evidence of severe obstructive lung disease with airtrapping.      Despite this, he seems to be doing very well.  He does have some shortness of breath which he attributes more to his weight and anything.  He has no chest pain, palpitations, lightheadedness or syncope.  He was not really aware of the atrial fibrillation when it occurred last time.      He had a followup ultrasound recently for his abdominal aortic aneurysm.  Fortunately, that issue is stable with a maximum dimension of 3.1 cm.  His last echocardiogram in 2016 showed an ejection fraction up to 45%-50%.  He has not been using his diuretic from many months now.      PHYSICAL EXAMINATION:  His blood  pressure is 136/78, heart rate 66 and weight 251 pounds.  His lungs are clear.  His heart rhythm is regular.  I hear no cardiac murmurs or carotid bruits.      IMPRESSIONS:  Mr. Home Valdez is a 67-year-old gentleman with paroxysmal atrial fibrillation and a history of rate-related cardiomyopathy with congestive heart failure.  His left ventricular ejection fraction has improved up to 45%-50% with medical management.  He has been cardioverted and is maintaining sinus rhythm with amiodarone and has no evidence of amiodarone toxicity.        I am glad to see that he continues to do well.  I will not make any medication changes at this point.  I did offer him a visit in 6 months, but he chooses to come back in a year and that seems fine.  I will repeat an echocardiogram at that time.       Outpatient Encounter Prescriptions as of 11/28/2017   Medication Sig Dispense Refill     warfarin (JANTOVEN) 2.5 MG tablet Take 5 mg (2 tabs) on Monday and 7.5 mg (3 tabs) all other days, or as directed by the coumadin clinic. 250 tablet 0     acetaminophen (TYLENOL) 325 MG tablet Take 2 tablets (650 mg) by mouth every 4 hours as needed for other (mild pain) 100 tablet 0     senna-docusate (SENOKOT-S;PERICOLACE) 8.6-50 MG per tablet Take 1-2 tablets by mouth 2 times daily Take while on oral narcotics to prevent or treat constipation. 30 tablet 0     oxyCODONE IR (ROXICODONE) 5 MG tablet Take 1-2 tablets (5-10 mg) by mouth every 3 hours as needed for pain or other (Moderate to Severe) 30 tablet 0     multivitamin, therapeutic (THERA-VIT) TABS tablet Take 1 tablet by mouth daily       cefuroxime (CEFTIN) 250 MG tablet Take 1 tablet (250 mg) by mouth 2 times daily 20 tablet 3     montelukast (SINGULAIR) 10 MG tablet TAKE 1 TABLET BY MOUTH DAILY AT BEDTIME 30 tablet 10     cetirizine (ZYRTEC) 10 MG tablet TAKE 1 TABLET BY MOUTH EVERY EVENING 30 tablet 10     ipratropium - albuterol 0.5 mg/2.5 mg/3 mL (DUONEB) 0.5-2.5 (3) MG/3ML neb  solution TAKE 1 VIAL (3 MLS) BY NEBULIZATION EVERY 4 HOURS AS NEEDED FOR SHORTNESS OF BREATH / DYSPNEA OR WHEEZING 540 mL 3     potassium chloride SA (K-DUR/KLOR-CON M) 20 MEQ CR tablet Take 1 tablet (20 mEq) by mouth daily 90 tablet 1     warfarin (JANTOVEN) 2.5 MG tablet Take 7.5 mg (3 tablets) on Tuesday, Thursday, Saturday and 5 mg (2 tablets) all other days or as directed by coumadin clinic. 235 tablet 0     losartan (COZAAR) 50 MG tablet Take 2 tablets (100 mg) by mouth daily 180 tablet 3     buPROPion (WELLBUTRIN SR) 150 MG 12 hr tablet Take 1 tablet (150 mg) by mouth 2 times daily 180 tablet 3     fluticasone-salmeterol (ADVAIR) 250-50 MCG/DOSE diskus inhaler Inhale 1 puff into the lungs 2 times daily 3 Inhaler 3     albuterol (PROAIR HFA/PROVENTIL HFA/VENTOLIN HFA) 108 (90 BASE) MCG/ACT Inhaler Inhale 2 puffs into the lungs every 4 hours as needed for shortness of breath / dyspnea 6 Inhaler 3     fluticasone (FLONASE) 50 MCG/ACT spray USE ONE TO TWO SPRAYS IN EACH NOSTRIL EVERY DAY 16 g 6     Respiratory Therapy Supplies (NEBULIZER/TUBING/MOUTHPIECE) KIT Use every 4-6 hours PRN 1 kit 11     metoprolol (TOPROL-XL) 50 MG 24 hr tablet Take 1 tablet (50 mg) by mouth daily 90 tablet 3     amiodarone (PACERONE/CODARONE) 200 MG tablet Take 0.5 tablets (100 mg) by mouth daily 45 tablet 3     torsemide (DEMADEX) 20 MG tablet Take 10 mg po daily. Take an extra 10mg po daily as needed for swelling. (Patient taking differently: Take 10 mg by mouth daily as needed Take 10 mg po daily. Take an extra 10mg po daily as needed for swelling.) 180 tablet 3     senna-docusate (SENOKOT-S;PERICOLACE) 8.6-50 MG per tablet Take 2 tablets by mouth 2 times daily       order for DME Equipment being ordered: Nebulizer 1 Device 0     No facility-administered encounter medications on file as of 11/28/2017.      Again, thank you for allowing me to participate in the care of your patient.      Sincerely,    MARCO A YARBROUGH MD      [facility]

## 2017-11-28 NOTE — PROGRESS NOTES
HISTORY OF PRESENT ILLNESS:  I had the opportunity to see Mr. Home Valdez in Cardiology Clinic today at the [facility] in [place] for reevaluation of paroxysmal atrial fibrillation and history of nonischemic cardiomyopathy and congestive heart failure.        As you may recall, he developed rapid atrial fibrillation with a severe cardiomyopathy with an ejection fraction as low as 20%-25%.  He had significant congestive heart failure and possibly pneumonia and was hospitalized with severe shortness of breath.  Fortunately, once we controlled his heart failure and converted him to sinus rhythm, he has done well.  We have continued amiodarone to maintain sinus rhythm and he has not had any recurrences of atrial fibrillation that we have identified for the last couple of years.      He continues to have significant COPD.  We recently did some pulmonary function testing to evaluate for amiodarone toxicity and there was no evidence of amiodarone toxicity based on his DLCO levels, but he did have evidence of severe obstructive lung disease with airtrapping.      Despite this, he seems to be doing very well.  He does have some shortness of breath which he attributes more to his weight and anything.  He has no chest pain, palpitations, lightheadedness or syncope.  He was not really aware of the atrial fibrillation when it occurred last time.      He had a followup ultrasound recently for his abdominal aortic aneurysm.  Fortunately, that issue is stable with a maximum dimension of 3.1 cm.  His last echocardiogram in 2016 showed an ejection fraction up to 45%-50%.  He has not been using his diuretic from many months now.      PHYSICAL EXAMINATION:  His blood pressure is 136/78, heart rate 66 and weight 251 pounds.  His lungs are clear.  His heart rhythm is regular.  I hear no cardiac murmurs or carotid bruits.      IMPRESSIONS:  Mr. Home Valdez is a 67-year-old gentleman with paroxysmal atrial  fibrillation and a history of rate-related cardiomyopathy with congestive heart failure.  His left ventricular ejection fraction has improved up to 45%-50% with medical management.  He has been cardioverted and is maintaining sinus rhythm with amiodarone and has no evidence of amiodarone toxicity.        I am glad to see that he continues to do well.  I will not make any medication changes at this point.  I did offer him a visit in 6 months, but he chooses to come back in a year and that seems fine.  I will repeat an echocardiogram at that time.      cc:      [place] [address]         MARCO A YARBROUGH MD, [facility]             D: 2017 14:28   T: 2017 14:57   MT: [id number]      Name:     KARTIK HUERTA   MRN:      [id number]        Account:      [id number]   :      1950           Service Date: 2017      Document: [id number]

## 2017-11-28 NOTE — MR AVS SNAPSHOT
After Visit Summary   11/28/2017    Home Valdez    MRN: [id number]           Patient Information     Date Of Birth          1950        Visit Information        Provider Department      11/28/2017 1:30 PM Jimenez Murphy MD [facility]        Today's Diagnoses     Chronic diastolic congestive heart failure (H)    -  1    On amiodarone therapy        Abdominal aortic aneurysm (AAA) without rupture (H)        Essential hypertension with goal blood pressure less than 140/90        Paroxysmal atrial fibrillation (H)           Follow-ups after your visit        Additional Services     Follow-Up with Cardiologist                 Your next 10 appointments already scheduled     Dec 11, 2017  9:00 AM CST   Anticoagulation Visit with  ANTI COAG   [facility]    [address] [facility] [address] [phone]              Future tests that were ordered for you today     Open Future Orders        Priority Expected Expires Ordered    Echocardiogram Routine 11/27/2018 11/28/2019 11/28/2017    Basic metabolic panel Routine 11/27/2018 11/28/2019 11/28/2017    Follow-Up with Cardiologist Routine 11/27/2018 11/28/2019 11/28/2017            Who to contact     If you have questions or need follow up information about today's clinic visit or your schedule please contact [facility] directly at [phone].  Normal or non-critical lab and imaging results will be communicated to you by MyChart, letter or phone within 4 business days after the clinic has received the results. If you do not hear from us within 7 days, please contact the clinic through MyChart or phone. If you have a critical or abnormal lab result, we will notify you by phone as soon as possible.  Submit refill requests through [employer] or call your pharmacy and they will forward the refill request to us. Please allow 3  "business days for your refill to be completed.          Additional Information About Your Visit        MyChart Information     [employer] lets you send messages to your doctor, view your test results, renew your prescriptions, schedule appointments and more. To sign up, go to [email or web address] . Click on \"Log in\" on the left side of the screen, which will take you to the Welcome page. Then click on \"Sign up Now\" on the right side of the page.     You will be asked to enter the access code listed below, as well as some personal information. Please follow the directions to create your username and password.     Your access code is: [email or web address]  Expires: 2017 11:20 AM     Your access code will  in 90 days. If you need help or a new code, please call your [place] clinic or [phone].        Care EveryWhere ID     This is your Care EveryWhere ID. This could be used by other organizations to access your [place] medical records  [id number]        Your Vitals Were     Pulse Height Pulse Oximetry BMI (Body Mass Index)          66 1.778 m (5' 10\") 96% 36.12 kg/m2         Blood Pressure from Last 3 Encounters:   17 136/78   17 (!) 148/92   17 150/81    Weight from Last 3 Encounters:   17 114.2 kg (251 lb 11.2 oz)   17 113.5 kg (250 lb 4.8 oz)   17 114.8 kg (253 lb)              We Performed the Following     Follow-Up with Cardiologist     Hepatic panel     TSH          Today's Medication Changes          These changes are accurate as of: 17  2:22 PM.  If you have any questions, ask your nurse or doctor.               These medicines have changed or have updated prescriptions.        Dose/Directions    torsemide 20 MG tablet   Commonly known as:  DEMADEX   This may have changed:    - how much to take  - how to take this  - when to take this  - reasons to take this  - additional instructions   Used for:  Chronic diastolic congestive heart failure (H)        Take " 10 mg po daily. Take an extra 10mg po daily as needed for swelling.   Quantity:  180 tablet   Refills:  3                Primary Care Provider Office Phone # Fax #    Sandip Vega,  [phone] [address] [place] [address]        Equal Access to Services     MILKA ORTIZ : Hadii aad ku hadasho Soomaali, waaxda luqadaha, qaybta kaalmada adeegyada, waxay idiin hayaan adeeg khsrinivasanpillo laparthn ah. So [facility] [phone].    ATENCIÓN: Si habla español, tiene a calabrese disposición servicios gratuitos de asistencia lingüística. Llame al [phone].    We comply with applicable federal civil rights laws and [place] laws. We do not discriminate on the basis of race, color, national origin, age, disability, sex, sexual orientation, or gender identity.            Thank you!     Thank you for choosing [facility]  for your care. Our goal is always to provide you with excellent care. Hearing back from our patients is one way we can continue to improve our services. Please take a few minutes to complete the written survey that you may receive in the mail after your visit with us. Thank you!             Your Updated Medication List - Protect others around you: Learn how to safely use, store and throw away your medicines at www.disposemymeds.org.          This list is accurate as of: 11/28/17  2:22 PM.  Always use your most recent med list.                   Brand Name Dispense Instructions for use Diagnosis    acetaminophen 325 MG tablet    TYLENOL    100 tablet    Take 2 tablets (650 mg) by mouth every 4 hours as needed for other (mild pain)        albuterol 108 (90 BASE) MCG/ACT Inhaler    PROAIR HFA/PROVENTIL HFA/VENTOLIN HFA    6 Inhaler    Inhale 2 puffs into the lungs every 4 hours as needed for shortness of breath / dyspnea    Chronic obstructive pulmonary disease with acute exacerbation (H)       amiodarone 200 MG tablet    PACERONE/CODARONE    45  tablet    Take 0.5 tablets (100 mg) by mouth daily    Atrial fibrillation (H)       buPROPion 150 MG 12 hr tablet    WELLBUTRIN SR    180 tablet    Take 1 tablet (150 mg) by mouth 2 times daily    Personal history of tobacco use, presenting hazards to health       cefuroxime 250 MG tablet    CEFTIN    20 tablet    Take 1 tablet (250 mg) by mouth 2 times daily    Obstructive chronic bronchitis with exacerbation (H)       cetirizine 10 MG tablet    zyrTEC    30 tablet    TAKE 1 TABLET BY MOUTH EVERY EVENING    Chronic seasonal allergic rhinitis due to other allergen       fluticasone 50 MCG/ACT spray    FLONASE    16 g    USE ONE TO TWO SPRAYS IN EACH NOSTRIL EVERY DAY    Chronic rhinitis       fluticasone-salmeterol 250-50 MCG/DOSE diskus inhaler    ADVAIR    3 Inhaler    Inhale 1 puff into the lungs 2 times daily    Panlobular emphysema (H)       ipratropium - albuterol 0.5 mg/2.5 mg/3 mL 0.5-2.5 (3) MG/3ML neb solution    DUONEB    540 mL    TAKE 1 VIAL (3 MLS) BY NEBULIZATION EVERY 4 HOURS AS NEEDED FOR SHORTNESS OF BREATH / DYSPNEA OR WHEEZING    COPD exacerbation (H)       losartan 50 MG tablet    COZAAR    180 tablet    Take 2 tablets (100 mg) by mouth daily    Atrial fibrillation with RVR (H), Hypertension goal BP (blood pressure) < 140/90       metoprolol 50 MG 24 hr tablet    TOPROL-XL    90 tablet    Take 1 tablet (50 mg) by mouth daily    Atrial fibrillation with RVR (H), CHF (congestive heart failure) (H)       montelukast 10 MG tablet    SINGULAIR    30 tablet    TAKE 1 TABLET BY MOUTH DAILY AT BEDTIME    Chronic seasonal allergic rhinitis due to other allergen       multivitamin, therapeutic Tabs tablet      Take 1 tablet by mouth daily        nebulizer/tubing/mouthpiece Kit     1 kit    Use every 4-6 hours PRN    Chronic obstructive pulmonary disease, unspecified COPD type (H)       order for DME     1 Device    Equipment being ordered: Nebulizer    COPD (chronic obstructive pulmonary disease) (H)        oxyCODONE IR 5 MG tablet    ROXICODONE    30 tablet    Take 1-2 tablets (5-10 mg) by mouth every 3 hours as needed for pain or other (Moderate to Severe)        potassium chloride SA 20 MEQ CR tablet    K-DUR/KLOR-CON M    90 tablet    Take 1 tablet (20 mEq) by mouth daily    CHF (congestive heart failure) (H), Peripheral edema       * senna-docusate 8.6-50 MG per tablet    SENOKOT-S;PERICOLACE     Take 2 tablets by mouth 2 times daily        * senna-docusate 8.6-50 MG per tablet    SENOKOT-S;PERICOLACE    30 tablet    Take 1-2 tablets by mouth 2 times daily Take while on oral narcotics to prevent or treat constipation.        torsemide 20 MG tablet    DEMADEX    180 tablet    Take 10 mg po daily. Take an extra 10mg po daily as needed for swelling.    Chronic diastolic congestive heart failure (H)       * warfarin 2.5 MG tablet    JANTOVEN    235 tablet    Take 7.5 mg (3 tablets) on Tuesday, Thursday, Saturday and 5 mg (2 tablets) all other days or as directed by coumadin clinic.    Atrial fibrillation with RVR (H)       * warfarin 2.5 MG tablet    JANTOVEN    250 tablet    Take 5 mg (2 tabs) on Monday and 7.5 mg (3 tabs) all other days, or as directed by the coumadin clinic.    Atrial fibrillation with RVR (H)       * Notice:  This list has 4 medication(s) that are the same as other medications prescribed for you. Read the directions carefully, and ask your doctor or other care provider to review them with you.

## 2017-12-11 ENCOUNTER — ANTICOAGULATION THERAPY VISIT (OUTPATIENT)
Dept: ANTICOAGULATION | Facility: OTHER | Age: 67
End: 2017-12-11
Payer: MEDICARE

## 2017-12-11 DIAGNOSIS — Z79.899 ON AMIODARONE THERAPY: Primary | ICD-10-CM

## 2017-12-11 DIAGNOSIS — Z79.01 LONG-TERM (CURRENT) USE OF ANTICOAGULANTS: ICD-10-CM

## 2017-12-11 DIAGNOSIS — I48.91 ATRIAL FIBRILLATION WITH RVR (H): ICD-10-CM

## 2017-12-11 LAB — INR POINT OF CARE: 4.7 (ref 0.86–1.14)

## 2017-12-11 PROCEDURE — 36416 COLLJ CAPILLARY BLOOD SPEC: CPT

## 2017-12-11 PROCEDURE — 99207 ZZC NO CHARGE NURSE ONLY: CPT

## 2017-12-11 PROCEDURE — 85610 PROTHROMBIN TIME: CPT | Mod: QW

## 2017-12-11 NOTE — MR AVS SNAPSHOT
Home Valdez   12/11/2017 9:00 AM   Anticoagulation Therapy Visit    Description:  67 year old male   Provider:  CAM[facility] ANTI DANI   Department:  [facility]           INR as of 12/11/2017     Today's INR 4.7!      Anticoagulation Summary as of 12/11/2017     INR goal 2.0-3.0   Today's INR 4.7!   Full instructions 12/11: Hold; 12/12: 5 mg; Otherwise 5 mg on Mon; 7.5 mg all other days   Next INR check 12/15/2017    Indications   Atrial fibrillation with RVR (H) [I48.91]  Long-term (current) use of anticoagulants [Z79.01] [Z79.01]         Your next Anticoagulation Clinic appointment(s)     Dec 15, 2017  1:30 PM CST   [facility] with [facility]AG   [facility]    [address] [facility] [address]   [phone]              Contact Numbers     Clinic Number:         December 2017 Details    Sun Mon Tue Wed Thu Fri Sat          1               2                 3               4               5               6               7               8               9                 10               11      Hold   See details      12      5 mg         13      7.5 mg         14      7.5 mg         15            16                 17               18               19               20               21               22               23                 24               25               26               27               28               29               30                 31                      Date Details   12/11 This INR check       Date of next INR:  12/15/2017         How to take your warfarin dose     To take:  5 mg Take 2 of the 2.5 mg tablets.    To take:  7.5 mg Take 3 of the 2.5 mg tablets.    Hold Do not take your warfarin dose. See the Details table to the right for additional instructions.

## 2017-12-11 NOTE — PROGRESS NOTES
ANTICOAGULATION FOLLOW-UP CLINIC VISIT    Patient Name:  Home Valdez  Date:  12/11/2017  Contact Type:  Face to Face    SUBJECTIVE:     Patient Findings     Positives Other complaints, OTC meds, Unexplained INR or factor level change    Comments Pt did just have an intensive dental procedure so the swelling could be affecting his INR. He has also had some occasional tylenol which could be affecting this as well. Shameka Carey RN, BSN             OBJECTIVE    INR Protime   Date Value Ref Range Status   12/11/2017 4.7 (A) 0.86 - 1.14 Final       ASSESSMENT / PLAN  INR assessment SUPRA    Recheck INR In: 4 DAYS    INR Location Clinic      Anticoagulation Summary as of 12/11/2017     INR goal 2.0-3.0   Today's INR 4.7!   Maintenance plan 5 mg (2.5 mg x 2) on Mon; 7.5 mg (2.5 mg x 3) all other days   Full instructions 12/11: Hold; 12/12: 5 mg; Otherwise 5 mg on Mon; 7.5 mg all other days   Weekly total 50 mg   Plan last modified Shameka Carey RN (6/30/2017)   Next INR check 12/15/2017   Target end date     Indications   Atrial fibrillation with RVR (H) [I48.91]  Long-term (current) use of anticoagulants [Z79.01] [Z79.01]         Anticoagulation Episode Summary     INR check location     Preferred lab     Send INR reminders to [facility]    Comments 2.5 MG TABLETS, likes print out, PM dose      Anticoagulation Care Providers     Provider Role Specialty Phone number    Sandip VegaDO [facility] [phone]            See the Encounter Report to view Anticoagulation Flowsheet and Dosing Calendar (Go to Encounters tab in chart review, and find the Anticoagulation Therapy Visit)    Dosage adjustment made based on physician directed care plan.    Shameka Carey RN

## 2017-12-15 ENCOUNTER — ANTICOAGULATION THERAPY VISIT (OUTPATIENT)
Dept: ANTICOAGULATION | Facility: OTHER | Age: 67
End: 2017-12-15
Payer: MEDICARE

## 2017-12-15 DIAGNOSIS — Z79.01 LONG-TERM (CURRENT) USE OF ANTICOAGULANTS: ICD-10-CM

## 2017-12-15 DIAGNOSIS — I48.91 ATRIAL FIBRILLATION WITH RVR (H): ICD-10-CM

## 2017-12-15 LAB — INR POINT OF CARE: 2.3 (ref 0.86–1.14)

## 2017-12-15 PROCEDURE — 99207 ZZC NO CHARGE NURSE ONLY: CPT

## 2017-12-15 PROCEDURE — 85610 PROTHROMBIN TIME: CPT | Mod: QW

## 2017-12-15 PROCEDURE — 36416 COLLJ CAPILLARY BLOOD SPEC: CPT

## 2017-12-15 NOTE — MR AVS SNAPSHOT
Home Valdez   12/15/2017 1:30 PM   Anticoagulation Therapy Visit    Description:  67 year old male   Provider:  CAMILO ANTI COAEBEN   Department:  [facility]           INR as of 12/15/2017     Today's INR 2.3      Anticoagulation Summary as of 12/15/2017     INR goal 2.0-3.0   Today's INR 2.3   Full instructions 5 mg on Mon, Wed, Fri; 7.5 mg all other days   Next INR check 12/22/2017    Indications   Atrial fibrillation with RVR (H) [I48.91]  Long-term (current) use of anticoagulants [Z79.01] [Z79.01]         Your next Anticoagulation Clinic appointment(s)     Dec 22, 2017  9:15 AM CST   Anticoagulation Visit with MC ANTI COAG   [facility]    [address] [facility] [address]   [phone]              Contact Numbers     Clinic Number:         December 2017 Details    Sun Mon Tue Wed Thu Fri Sat          1               2                 3               4               5               6               7               8               9                 10               11               12               13               14               15      5 mg   See details      16      7.5 mg           17      7.5 mg         18      5 mg         19      7.5 mg         20      5 mg         21      7.5 mg         22            23                 24               25               26               27               28               29               30                 31                      Date Details   12/15 This INR check       Date of next INR:  12/22/2017         How to take your warfarin dose     To take:  5 mg Take 2 of the 2.5 mg tablets.    To take:  7.5 mg Take 3 of the 2.5 mg tablets.

## 2017-12-22 ENCOUNTER — ANTICOAGULATION THERAPY VISIT (OUTPATIENT)
Dept: ANTICOAGULATION | Facility: OTHER | Age: 67
End: 2017-12-22
Payer: MEDICARE

## 2017-12-22 DIAGNOSIS — I48.91 ATRIAL FIBRILLATION WITH RVR (H): ICD-10-CM

## 2017-12-22 DIAGNOSIS — Z79.01 LONG-TERM (CURRENT) USE OF ANTICOAGULANTS: ICD-10-CM

## 2017-12-22 LAB — INR POINT OF CARE: 2.8 (ref 0.86–1.14)

## 2017-12-22 PROCEDURE — 36416 COLLJ CAPILLARY BLOOD SPEC: CPT

## 2017-12-22 PROCEDURE — 85610 PROTHROMBIN TIME: CPT | Mod: QW

## 2017-12-22 PROCEDURE — 99207 ZZC NO CHARGE NURSE ONLY: CPT

## 2017-12-22 NOTE — PROGRESS NOTES
ANTICOAGULATION FOLLOW-UP CLINIC VISIT    Patient Name:  Home Valdez  Date:  12/22/2017  Contact Type:  Face to Face    SUBJECTIVE:     Patient Findings     Positives No Problem Findings           OBJECTIVE    INR Protime   Date Value Ref Range Status   12/22/2017 2.8 (A) 0.86 - 1.14 Final       ASSESSMENT / PLAN  INR assessment THER    Recheck INR In: 2 WEEKS    INR Location Clinic      Anticoagulation Summary as of 12/22/2017     INR goal 2.0-3.0   Today's INR 2.8   Maintenance plan 5 mg (2.5 mg x 2) on Mon, Wed, Fri; 7.5 mg (2.5 mg x 3) all other days   Full instructions 5 mg on Mon, Wed, Fri; 7.5 mg all other days   Weekly total 45 mg   No change documented Shameka Carey RN   Plan last modified Shameka Carey RN (12/15/2017)   Next INR check 1/5/2018   Target end date     Indications   Atrial fibrillation with RVR (H) [I48.91]  Long-term (current) use of anticoagulants [Z79.01] [Z79.01]         Anticoagulation Episode Summary     INR check location     Preferred lab     Send INR reminders to [facility]    Comments 2.5 MG TABLETS, likes print out, PM dose      Anticoagulation Care Providers     Provider Role Specialty Phone number    PrimoSandip quigley DO Home [facility] Internal Medicine [phone]            See the Encounter Report to view Anticoagulation Flowsheet and Dosing Calendar (Go to Encounters tab in chart review, and find the Anticoagulation Therapy Visit)    Dosage adjustment made based on physician directed care plan.    Shameka Carey RN

## 2017-12-22 NOTE — MR AVS SNAPSHOT
Home Valdez   12/22/2017 9:15 AM   Anticoagulation Therapy Visit    Description:  67 year old male   Provider:  CAM[facility] ANTI COAG   Department:  [facility]           INR as of 12/22/2017     Today's INR 2.8      Anticoagulation Summary as of 12/22/2017     INR goal 2.0-3.0   Today's INR 2.8   Full instructions 5 mg on Mon, Wed, Fri; 7.5 mg all other days   Next INR check 1/5/2018    Indications   Atrial fibrillation with RVR (H) [I48.91]  Long-term (current) use of anticoagulants [Z79.01] [Z79.01]         Your next Anticoagulation Clinic appointment(s)     Jan 05, 2018  4:15 PM CST   Anticoagulation Visit with MC ANTI COAG   [facility]    [address] [facility] [address]   [phone]              Contact Numbers     Clinic Number:         December 2017 Details    Sun Mon Tue Wed Thu Fri Sat          1               2                 3               4               5               6               7               8               9                 10               11               12               13               14               15               16                 17               18               19               20               21               22      5 mg   See details      23      7.5 mg           24      7.5 mg         25      5 mg         26      7.5 mg         27      5 mg         28      7.5 mg         29      5 mg         30      7.5 mg           31      7.5 mg                Date Details   12/22 This INR check               How to take your warfarin dose     To take:  5 mg Take 2 of the 2.5 mg tablets.    To take:  7.5 mg Take 3 of the 2.5 mg tablets.           January 2018 Details    Sun Mon Tue Wed Thu Fri Sat      1      5 mg         2      7.5 mg         3      5 mg         4      7.5 mg         5            6                 7               8               9               10               11               12               13                 14                15               16               17               18               19               20                 21               22               23               24               25               26               27                 28               29               30               31                   Date Details   No additional details    Date of next INR:  1/5/2018         How to take your warfarin dose     To take:  5 mg Take 2 of the 2.5 mg tablets.    To take:  7.5 mg Take 3 of the 2.5 mg tablets.

## 2017-12-26 DIAGNOSIS — I50.9 CHF (CONGESTIVE HEART FAILURE) (H): ICD-10-CM

## 2017-12-26 DIAGNOSIS — I48.91 ATRIAL FIBRILLATION (H): ICD-10-CM

## 2017-12-26 DIAGNOSIS — R60.0 PERIPHERAL EDEMA: ICD-10-CM

## 2017-12-26 RX ORDER — POTASSIUM CHLORIDE 1500 MG/1
20 TABLET, EXTENDED RELEASE ORAL DAILY
Qty: 90 TABLET | Refills: 1 | Status: SHIPPED | OUTPATIENT
Start: 2017-12-26 | End: 2018-04-03

## 2017-12-26 RX ORDER — AMIODARONE HYDROCHLORIDE 200 MG/1
100 TABLET ORAL DAILY
Qty: 45 TABLET | Refills: 1 | Status: SHIPPED | OUTPATIENT
Start: 2017-12-26 | End: 2018-06-27

## 2018-01-05 ENCOUNTER — ANTICOAGULATION THERAPY VISIT (OUTPATIENT)
Dept: ANTICOAGULATION | Facility: OTHER | Age: 68
End: 2018-01-05
Payer: MEDICARE

## 2018-01-05 DIAGNOSIS — Z79.01 LONG-TERM (CURRENT) USE OF ANTICOAGULANTS: ICD-10-CM

## 2018-01-05 DIAGNOSIS — I48.91 ATRIAL FIBRILLATION WITH RVR (H): ICD-10-CM

## 2018-01-05 LAB — INR POINT OF CARE: 3.7 (ref 0.86–1.14)

## 2018-01-05 PROCEDURE — 99207 ZZC NO CHARGE NURSE ONLY: CPT

## 2018-01-05 PROCEDURE — 85610 PROTHROMBIN TIME: CPT | Mod: QW

## 2018-01-05 PROCEDURE — 36416 COLLJ CAPILLARY BLOOD SPEC: CPT

## 2018-01-05 NOTE — PROGRESS NOTES
ANTICOAGULATION FOLLOW-UP CLINIC VISIT    Patient Name:  Home Valdez  Date:  1/5/2018  Contact Type:  Face to Face    SUBJECTIVE:     Patient Findings     Positives Change in medications (Just finished prednisone 3 days ago), Antibiotic use or infection (Just finished an antibiotic 3 days ago)           OBJECTIVE    INR Protime   Date Value Ref Range Status   01/05/2018 3.7 (A) 0.86 - 1.14 Final       ASSESSMENT / PLAN  INR assessment SUPRA    Recheck INR In: 2 WEEKS    INR Location Clinic      Anticoagulation Summary as of 1/5/2018     INR goal 2.0-3.0   Today's INR 3.7!   Maintenance plan 5 mg (2.5 mg x 2) on Mon, Wed, Fri; 7.5 mg (2.5 mg x 3) all other days   Full instructions 1/6: 5 mg; Otherwise 5 mg on Mon, Wed, Fri; 7.5 mg all other days   Weekly total 45 mg   Plan last modified Shameka Carey RN (12/15/2017)   Next INR check 1/19/2018   Target end date     Indications   Atrial fibrillation with RVR (H) [I48.91]  Long-term (current) use of anticoagulants [Z79.01] [Z79.01]         Anticoagulation Episode Summary     INR check location     Preferred lab     Send INR reminders to [facility]    Comments 2.5 MG TABLETS, likes print out, PM dose      Anticoagulation Care Providers     Provider Role Specialty Phone number    Sandip Vega DO Home [facility] Internal Medicine [phone]            See the Encounter Report to view Anticoagulation Flowsheet and Dosing Calendar (Go to Encounters tab in chart review, and find the Anticoagulation Therapy Visit)    Dosage adjustment made based on physician directed care plan.    Shameka Carey RN

## 2018-01-17 DIAGNOSIS — I48.91 ATRIAL FIBRILLATION WITH RVR (H): ICD-10-CM

## 2018-01-17 DIAGNOSIS — I50.9 CHF (CONGESTIVE HEART FAILURE) (H): ICD-10-CM

## 2018-01-17 RX ORDER — METOPROLOL SUCCINATE 50 MG/1
50 TABLET, EXTENDED RELEASE ORAL DAILY
Qty: 90 TABLET | Refills: 1 | Status: SHIPPED | OUTPATIENT
Start: 2018-01-17 | End: 2018-04-18

## 2018-01-19 ENCOUNTER — ANTICOAGULATION THERAPY VISIT (OUTPATIENT)
Dept: ANTICOAGULATION | Facility: OTHER | Age: 68
End: 2018-01-19
Payer: MEDICARE

## 2018-01-19 DIAGNOSIS — I48.91 ATRIAL FIBRILLATION WITH RVR (H): ICD-10-CM

## 2018-01-19 DIAGNOSIS — Z79.01 LONG-TERM (CURRENT) USE OF ANTICOAGULANTS: ICD-10-CM

## 2018-01-19 LAB — INR POINT OF CARE: 4.2 (ref 0.86–1.14)

## 2018-01-19 PROCEDURE — 36416 COLLJ CAPILLARY BLOOD SPEC: CPT

## 2018-01-19 PROCEDURE — 85610 PROTHROMBIN TIME: CPT | Mod: QW

## 2018-01-19 PROCEDURE — 99207 ZZC NO CHARGE NURSE ONLY: CPT

## 2018-01-19 NOTE — PROGRESS NOTES
ANTICOAGULATION FOLLOW-UP CLINIC VISIT    Patient Name:  Home Valdez  Date:  1/19/2018  Contact Type:  Face to Face    SUBJECTIVE:     Patient Findings     Positives Unexplained INR or factor level change           OBJECTIVE    INR Protime   Date Value Ref Range Status   01/19/2018 4.2 (A) 0.86 - 1.14 Final       ASSESSMENT / PLAN  INR assessment SUPRA    Recheck INR In: 2 WEEKS    INR Location Clinic      Anticoagulation Summary as of 1/19/2018     INR goal 2.0-3.0   Today's INR 4.2!   Maintenance plan 7.5 mg (2.5 mg x 3) on Tue, Thu, Sat; 5 mg (2.5 mg x 2) all other days   Full instructions 1/19: Hold; Otherwise 7.5 mg on Tue, Thu, Sat; 5 mg all other days   Weekly total 42.5 mg   Plan last modified Shameka Carey RN (1/19/2018)   Next INR check 2/2/2018   Target end date     Indications   Atrial fibrillation with RVR (H) [I48.91]  Long-term (current) use of anticoagulants [Z79.01] [Z79.01]         Anticoagulation Episode Summary     INR check location     Preferred lab     Send INR reminders to [facility]    Comments 2.5 MG TABLETS, likes print out, PM dose      Anticoagulation Care Providers     Provider Role Specialty Phone number    PrimosoraidaSandip DO [facility] Internal Medicine [phone]            See the Encounter Report to view Anticoagulation Flowsheet and Dosing Calendar (Go to Encounters tab in chart review, and find the Anticoagulation Therapy Visit)    Dosage adjustment made based on physician directed care plan.    Shameka Carey RN

## 2018-01-19 NOTE — MR AVS SNAPSHOT
Home Valdez   1/19/2018 8:15 AM   Anticoagulation Therapy Visit    Description:  67 year old male   Provider:  CAMILO ANTI COAEBEN   Department:  [facility]           INR as of 1/19/2018     Today's INR 4.2!      Anticoagulation Summary as of 1/19/2018     INR goal 2.0-3.0   Today's INR 4.2!   Full instructions 1/19: Hold; Otherwise 7.5 mg on Tue, Thu, Sat; 5 mg all other days   Next INR check 2/2/2018    Indications   Atrial fibrillation with RVR (H) [I48.91]  Long-term (current) use of anticoagulants [Z79.01] [Z79.01]         Your next Anticoagulation Clinic appointment(s)     Feb 02, 2018  4:15 PM CST   An[facility]agulation Visit with [facility]TI [facility]AG   [facility]    [address] [facility] [address]   [phone]              Contact Numbers     Clinic Number:         January 2018 Details    Sun Mon Tue Wed Thu Fri Sat      1               2               3               4               5               6                 7               8               9               10               11               12               13                 14               15               16               17               18               19      Hold   See details      20      7.5 mg           21      5 mg         22      5 mg         23      7.5 mg         24      5 mg         25      7.5 mg         26      5 mg         27      7.5 mg           28      5 mg         29      5 mg         30      7.5 mg         31      5 mg             Date Details   01/19 This INR check               How to take your warfarin dose     To take:  5 mg Take 2 of the 2.5 mg tablets.    To take:  7.5 mg Take 3 of the 2.5 mg tablets.    Hold Do not take your warfarin dose. See the Details table to the right for additional instructions.                February 2018 Details    Sun Mon Tue Wed Thu Fri Sat         1      7.5 mg         2            3                 4               5               6               7                8               9               10                 11               12               13               14               15               16               17                 18               19               20               21               22               23               24                 25               26               27               28                   Date Details   No additional details    Date of next INR:  2/2/2018         How to take your warfarin dose     To take:  5 mg Take 2 of the 2.5 mg tablets.    To take:  7.5 mg Take 3 of the 2.5 mg tablets.

## 2018-01-24 DIAGNOSIS — G47.33 OSA (OBSTRUCTIVE SLEEP APNEA): Primary | ICD-10-CM

## 2018-02-02 ENCOUNTER — ANTICOAGULATION THERAPY VISIT (OUTPATIENT)
Dept: ANTICOAGULATION | Facility: OTHER | Age: 68
End: 2018-02-02
Payer: MEDICARE

## 2018-02-02 DIAGNOSIS — Z79.01 LONG-TERM (CURRENT) USE OF ANTICOAGULANTS: ICD-10-CM

## 2018-02-02 DIAGNOSIS — I48.91 ATRIAL FIBRILLATION WITH RVR (H): ICD-10-CM

## 2018-02-02 LAB — INR POINT OF CARE: 2.5 (ref 0.86–1.14)

## 2018-02-02 PROCEDURE — 99207 ZZC NO CHARGE NURSE ONLY: CPT

## 2018-02-02 PROCEDURE — 36416 COLLJ CAPILLARY BLOOD SPEC: CPT

## 2018-02-02 PROCEDURE — 85610 PROTHROMBIN TIME: CPT | Mod: QW

## 2018-02-02 NOTE — MR AVS SNAPSHOT
Home Valdez   2/2/2018 4:15 PM   Anticoagulation Therapy Visit    Description:  67 year old male   Provider:  CAMILO ANTI COAG   Department:   Anticoag           INR as of 2/2/2018     Today's INR 2.5      Anticoagulation Summary as of 2/2/2018     INR goal 2.0-3.0   Today's INR 2.5   Full instructions 7.5 mg on Tue, Thu, Sat; 5 mg all other days   Next INR check 2/16/2018    Indications   Atrial fibrillation with RVR (H) [I48.91]  Long-term (current) use of anticoagulants [Z79.01] [Z79.01]         Your next Anticoagulation Clinic appointment(s)     Feb 02, 2018  4:15 PM CST   An[facility]agulation Visit with  ANTI COAG   [facility]    [address] [facility] [address]   [phone]            Feb 16, 2018  4:15 PM CST   Anticoagulation Visit with  ANTI COAG   [facility]    [address] [facility] [address]   [phone]              Contact Numbers     Clinic Number:         February 2018 Details    Sun Mon Tue Wed Thu Fri Sat         1               2      5 mg   See details      3      7.5 mg           4      5 mg         5      5 mg         6      7.5 mg         7      5 mg         8      7.5 mg         9      5 mg         10      7.5 mg           11      5 mg         12      5 mg         13      7.5 mg         14      5 mg         15      7.5 mg         16            17                 18               19               20               21               22               23               24                 25               26               27               28                   Date Details   02/02 This INR check       Date of next INR:  2/16/2018         How to take your warfarin dose     To take:  5 mg Take 2 of the 2.5 mg tablets.    To take:  7.5 mg Take 3 of the 2.5 mg tablets.

## 2018-02-02 NOTE — PROGRESS NOTES
ANTICOAGULATION FOLLOW-UP CLINIC VISIT    Patient Name:  Home Valdez  Date:  2/2/2018  Contact Type:  Face to Face    SUBJECTIVE:     Patient Findings     Positives No Problem Findings           OBJECTIVE    INR Protime   Date Value Ref Range Status   02/02/2018 2.5 (A) 0.86 - 1.14 Final       ASSESSMENT / PLAN  INR assessment THER    Recheck INR In: 2 WEEKS    INR Location Clinic      Anticoagulation Summary as of 2/2/2018     INR goal 2.0-3.0   Today's INR 2.5   Maintenance plan 7.5 mg (2.5 mg x 3) on Tue, Thu, Sat; 5 mg (2.5 mg x 2) all other days   Full instructions 7.5 mg on Tue, Thu, Sat; 5 mg all other days   Weekly total 42.5 mg   No change documented Shameka Carey RN   Plan last modified Shameka Carey RN (1/19/2018)   Next INR check 2/16/2018   Target end date     Indications   Atrial fibrillation with RVR (H) [I48.91]  Long-term (current) use of anticoagulants [Z79.01] [Z79.01]         Anticoagulation Episode Summary     INR check location     Preferred lab     Send INR reminders to [facility]    Comments 2.5 MG TABLETS, likes print out, PM dose      Anticoagulation Care Providers     Provider Role Specialty Phone number    Sandip Vega DO [facility] Internal Medicine [phone]            See the Encounter Report to view Anticoagulation Flowsheet and Dosing Calendar (Go to Encounters tab in chart review, and find the Anticoagulation Therapy Visit)    Dosage adjustment made based on physician directed care plan.    Shameka Carey RN

## 2018-02-16 ENCOUNTER — ANTICOAGULATION THERAPY VISIT (OUTPATIENT)
Dept: ANTICOAGULATION | Facility: OTHER | Age: 68
End: 2018-02-16
Payer: MEDICARE

## 2018-02-16 DIAGNOSIS — I48.91 ATRIAL FIBRILLATION WITH RVR (H): ICD-10-CM

## 2018-02-16 DIAGNOSIS — Z79.01 LONG-TERM (CURRENT) USE OF ANTICOAGULANTS: ICD-10-CM

## 2018-02-16 LAB — INR POINT OF CARE: 2.1 (ref 0.86–1.14)

## 2018-02-16 PROCEDURE — 36416 COLLJ CAPILLARY BLOOD SPEC: CPT

## 2018-02-16 PROCEDURE — 85610 PROTHROMBIN TIME: CPT | Mod: QW

## 2018-02-16 PROCEDURE — 99207 ZZC NO CHARGE NURSE ONLY: CPT

## 2018-02-16 NOTE — PROGRESS NOTES
ANTICOAGULATION FOLLOW-UP CLINIC VISIT    Patient Name:  Home Valdez  Date:  2/16/2018  Contact Type:  Face to Face    SUBJECTIVE:     Patient Findings     Positives No Problem Findings           OBJECTIVE    INR Protime   Date Value Ref Range Status   02/16/2018 2.1 (A) 0.86 - 1.14 Final       ASSESSMENT / PLAN  INR assessment THER    Recheck INR In: 2 WEEKS    INR Location Clinic      Anticoagulation Summary as of 2/16/2018     INR goal 2.0-3.0   Today's INR 2.1   Maintenance plan 7.5 mg (2.5 mg x 3) on Tue, Thu, Sat; 5 mg (2.5 mg x 2) all other days   Full instructions 7.5 mg on Tue, Thu, Sat; 5 mg all other days   Weekly total 42.5 mg   No change documented Shameka Carey RN   Plan last modified Shameka Carey RN (1/19/2018)   Next INR check 3/2/2018   Target end date     Indications   Atrial fibrillation with RVR (H) [I48.91]  Long-term (current) use of anticoagulants [Z79.01] [Z79.01]         Anticoagulation Episode Summary     INR check location     Preferred lab     Send INR reminders to [facility]    Comments 2.5 MG TABLETS, likes print out, PM dose      Anticoagulation Care Providers     Provider Role Specialty Phone number    Sandip Vega DO [facility] Internal Medicine [phone]            See the Encounter Report to view Anticoagulation Flowsheet and Dosing Calendar (Go to Encounters tab in chart review, and find the Anticoagulation Therapy Visit)    Dosage adjustment made based on physician directed care plan.    Shameka Carey RN

## 2018-02-16 NOTE — MR AVS SNAPSHOT
Home Valdez   2/16/2018 4:15 PM   Anticoagulation Therapy Visit    Description:  67 year old male   Provider:  CAMILO ANTI COAG   Department:   Anticoag           INR as of 2/16/2018     Today's INR 2.1      Anticoagulation Summary as of 2/16/2018     INR goal 2.0-3.0   Today's INR 2.1   Full instructions 7.5 mg on Tue, Thu, Sat; 5 mg all other days   Next INR check 3/2/2018    Indications   Atrial fibrillation with RVR (H) [I48.91]  Long-term (current) use of anticoagulants [Z79.01] [Z79.01]         Your next Anticoagulation Clinic appointment(s)     Feb 16, 2018  4:15 PM CST   Anticoagulation Visit with  ANTI COAG   [facility]    [address] [facility] [address]   [phone]            Mar 02, 2018  8:15 AM CST   Anticoagulation Visit with  ANTI COAG   [facility]    [address] [facility] [address]   [phone]              Contact Numbers     Clinic Number:         February 2018 Details    Sun Mon Tue Wed Thu Fri Sat         1               2               3                 4               5               6               7               8               9               10                 11               12               13               14               15               16      5 mg   See details      17      7.5 mg           18      5 mg         19      5 mg         20      7.5 mg         21      5 mg         22      7.5 mg         23      5 mg         24      7.5 mg           25      5 mg         26      5 mg         27      7.5 mg         28      5 mg             Date Details   02/16 This INR check               How to take your warfarin dose     To take:  5 mg Take 2 of the 2.5 mg tablets.    To take:  7.5 mg Take 3 of the 2.5 mg tablets.           March 2018 Details    Sun Mon Tue Wed Thu Fri Sat         1      7.5 mg         2            3                 4               5               6               7                8               9               10                 11               12               13               14               15               16               17                 18               19               20               21               22               23               24                 25               26               27               28               29               30               31                Date Details   No additional details    Date of next INR:  3/2/2018         How to take your warfarin dose     To take:  5 mg Take 2 of the 2.5 mg tablets.    To take:  7.5 mg Take 3 of the 2.5 mg tablets.

## 2018-03-02 ENCOUNTER — ANTICOAGULATION THERAPY VISIT (OUTPATIENT)
Dept: ANTICOAGULATION | Facility: OTHER | Age: 68
End: 2018-03-02
Payer: MEDICARE

## 2018-03-02 DIAGNOSIS — I48.91 ATRIAL FIBRILLATION WITH RVR (H): ICD-10-CM

## 2018-03-02 DIAGNOSIS — Z79.01 LONG-TERM (CURRENT) USE OF ANTICOAGULANTS: ICD-10-CM

## 2018-03-02 LAB — INR POINT OF CARE: 3 (ref 0.86–1.14)

## 2018-03-02 PROCEDURE — 99207 ZZC NO CHARGE NURSE ONLY: CPT

## 2018-03-02 PROCEDURE — 85610 PROTHROMBIN TIME: CPT | Mod: QW

## 2018-03-02 PROCEDURE — 36416 COLLJ CAPILLARY BLOOD SPEC: CPT

## 2018-03-02 RX ORDER — WARFARIN SODIUM 2.5 MG/1
TABLET ORAL
Qty: 210 TABLET | Refills: 0 | Status: SHIPPED | OUTPATIENT
Start: 2018-03-02 | End: 2018-06-06

## 2018-03-02 NOTE — MR AVS SNAPSHOT
Home Val[facility]   3/2/2018 8:15 AM   Anticoagulation Therapy Visit    Description:  68 year old male   Provider:  CAMILO ANTI COAG   Department:   Anticoag           INR as of 3/2/2018     Today's INR 3.0      Anticoagulation Summary as of 3/2/2018     INR goal 2.0-3.0   Today's INR 3.0   Full instructions 7.5 mg on Tue, Thu, Sat; 5 mg all other days   Next INR check 3/23/2018    Indications   Atrial fibrillation with RVR (H) [I48.91]  Long-term (current) use of anticoagulants [Z79.01] [Z79.01]         Your next Anticoagulation Clinic appointment(s)     Mar 02, 2018  8:15 AM CST   Anticoagulation Visit with  ANTI COAG   [facility]    [address] [facility] [address]   [phone]            Mar 23, 2018  4:15 PM CDT   Anticoagulation Visit with  ANTI COAG   [facility]    [address] [facility] [address]   [phone]              Contact Numbers     Clinic Number:         March 2018 Details    Sun Mon Tue Wed Thu Fri Sat         1               2      5 mg   See details      3      7.5 mg           4      5 mg         5      5 mg         6      7.5 mg         7      5 mg         8      7.5 mg         9      5 mg         10      7.5 mg           11      5 mg         12      5 mg         13      7.5 mg         14      5 mg         15      7.5 mg         16      5 mg         17      7.5 mg           18      5 mg         19      5 mg         20      7.5 mg         21      5 mg         22      7.5 mg         23            24                 25               26               27               28               29               30               31                Date Details   03/02 This INR check       Date of next INR:  3/23/2018         How to take your warfarin dose     To take:  5 mg Take 2 of the 2.5 mg tablets.    To take:  7.5 mg Take 3 of the 2.5 mg tablets.

## 2018-03-02 NOTE — TELEPHONE ENCOUNTER
Prescription approved per [facility] Refill Protocol.  Brittany Dove RN

## 2018-03-02 NOTE — TELEPHONE ENCOUNTER
"Requested Prescriptions   Pending Prescriptions Disp Refills     JANTOVEN 2.5 MG tablet [Pharmacy Med Name: JANTOVEN 2.5MG TAB] 250 tablet      Sig: TAKE 2 TABLETS (5MG) ON MONDAY AND TAKE 3 TABLETS (7.5MG) ALL OTHERDAYS OF THE WEEK OR AS DIRECTED BY THE COUMADIN CLINIC IN    Vitamin K Antagonists Failed    3/2/2018  9:27 AM       Failed - INR is within goal in the past 6 weeks    Confirm INR is within goal in the past 6 weeks.     Recent Labs   Lab Test 03/02/18   INR  3.0*                      Passed - Recent or future visit with authorizing provider's specialty    Patient had office visit in the last year or has a visit in the next 30 days with authorizing provider.  See \"Patient Info\" tab in inbasket, or \"Choose Columns\" in Meds & Orders section of the refill encounter.            Passed - Patient is 18 years of age or older          Last Written Prescription Date:  6-8-17  Last Fill Quantity: 235,  # refills: 0   Last Office Visit with G, P or [facility] prescribing provider:  11/01/17   Future Office Visit:       "

## 2018-03-11 DIAGNOSIS — J30.2 CHRONIC SEASONAL ALLERGIC RHINITIS DUE TO OTHER ALLERGEN: ICD-10-CM

## 2018-03-11 DIAGNOSIS — J44.1 OBSTRUCTIVE CHRONIC BRONCHITIS WITH EXACERBATION (H): ICD-10-CM

## 2018-03-11 RX ORDER — METHYLPREDNISOLONE 4 MG
TABLET, DOSE PACK ORAL
Qty: 21 TABLET | Status: CANCELLED | OUTPATIENT
Start: 2018-03-11

## 2018-03-12 DIAGNOSIS — J44.1 OBSTRUCTIVE CHRONIC BRONCHITIS WITH EXACERBATION (H): ICD-10-CM

## 2018-03-12 DIAGNOSIS — J20.9 ACUTE BRONCHITIS WITH COEXISTING CONDITION REQUIRING PROPHYLACTIC TREATMENT: ICD-10-CM

## 2018-03-12 RX ORDER — CEFUROXIME AXETIL 250 MG/1
250 TABLET ORAL 2 TIMES DAILY
Qty: 20 TABLET | Refills: 3 | Status: SHIPPED | OUTPATIENT
Start: 2018-03-12 | End: 2018-04-18

## 2018-03-12 RX ORDER — METHYLPREDNISOLONE 4 MG
TABLET, DOSE PACK ORAL
Qty: 21 TABLET | Refills: 3 | Status: SHIPPED | OUTPATIENT
Start: 2018-03-12 | End: 2018-07-17

## 2018-03-12 NOTE — TELEPHONE ENCOUNTER
cefuroxine 500 MG       Last Written Prescription Date:  3/12/18  Last Fill Quantity: 20,   # refills: 3  Last Office Visit: 11/1/17  Future Office visit:       Routing refill request to provider for review/approval because:  Drug not on the [facility], P or [facility] refill protocol or controlled substance

## 2018-03-12 NOTE — TELEPHONE ENCOUNTER
"Last Written Prescription Date:  Cetirizine  Last Fill Quantity: 30,  # refills: 10   Last office visit: 11/1/2017 with prescribing provider:  Dr. Galicia   Future Office Visit:        Requested Prescriptions   Pending Prescriptions Disp Refills     cetirizine (ZYRTEC) 10 MG tablet [Pharmacy Med Name: CETIRIZINE 10MG TAB] 30 tablet      Sig: TAKE 1 TABLET BY MOUTH EVERY EVENING    Antihistamines Protocol Failed    3/11/2018  9:16 AM       Failed - Patient is 3-64 years of age    Apply weight-based dosing for peds patients age 3 - 12 years of age.    Forward request to provider for patients under the age of 3 or over the age of 64.         Passed - Recent (12 mo) or future (30 days) visit within the authorizing provider's specialty    Patient had office visit in the last 12 months or has a visit in the next 30 days with authorizing provider or within the authorizing provider's specialty.  See \"Patient Info\" tab in inbasket, or \"Choose Columns\" in Meds & Orders section of the refill encounter.            Gisel Bell MA 3/12/2018  4:43 PM        "

## 2018-03-13 RX ORDER — CEFUROXIME AXETIL 500 MG/1
TABLET ORAL
Qty: 10 TABLET | OUTPATIENT
Start: 2018-03-13

## 2018-03-13 RX ORDER — CETIRIZINE HYDROCHLORIDE 10 MG/1
TABLET ORAL
Qty: 30 TABLET | Refills: 1 | Status: SHIPPED | OUTPATIENT
Start: 2018-03-13 | End: 2018-04-11

## 2018-03-13 NOTE — TELEPHONE ENCOUNTER
Prescription approved per [facility] Refill Protocol.  Brittany Dove RN

## 2018-03-23 ENCOUNTER — ANTICOAGULATION THERAPY VISIT (OUTPATIENT)
Dept: ANTICOAGULATION | Facility: OTHER | Age: 68
End: 2018-03-23
Payer: MEDICARE

## 2018-03-23 DIAGNOSIS — I48.91 ATRIAL FIBRILLATION WITH RVR (H): ICD-10-CM

## 2018-03-23 DIAGNOSIS — Z79.01 LONG-TERM (CURRENT) USE OF ANTICOAGULANTS: ICD-10-CM

## 2018-03-23 LAB — INR POINT OF CARE: 3 (ref 0.86–1.14)

## 2018-03-23 PROCEDURE — 36416 COLLJ CAPILLARY BLOOD SPEC: CPT

## 2018-03-23 PROCEDURE — 85610 PROTHROMBIN TIME: CPT | Mod: QW

## 2018-03-23 PROCEDURE — 99207 ZZC NO CHARGE NURSE ONLY: CPT

## 2018-03-23 NOTE — MR AVS SNAPSHOT
Home Valdez   3/23/2018 1:30 PM   Anticoagulation Therapy Visit    Description:  68 year old male   Provider:  CAMILO ANTI COAG   Department:  [facility]           INR as of 3/23/2018     Today's INR 3.0      Anticoagulation Summary as of 3/23/2018     INR goal 2.0-3.0   Today's INR 3.0   Full instructions 7.5 mg on Tue, Thu, Sat; 5 mg all other days   Next INR check 4/13/2018    Indications   Atrial fibrillation with RVR (H) [I48.91]  Long-term (current) use of anticoagulants [Z79.01] [Z79.01]         Your next Anticoagulation Clinic appointment(s)     Mar 23, 2018  1:30 PM CDT   [facility]agulation [facility] with  ANTI COAG   [facility]    [address] [facility] [address]   [phone]            Apr 13, 2018  8:15 AM CDT   Anticoagulation Visit with  ANTI COAG   [facility] ([facility]    [address] [facility] [address]   [phone]              Contact Numbers     Clinic Number:         March 2018 Details    Sun Mon Tue Wed Thu Fri Sat         1               2               3                 4               5               6               7               8               9               10                 11               12               13               14               15               16               17                 18               19               20               21               22               23      5 mg   See details      24      7.5 mg           25      5 mg         26      5 mg         27      7.5 mg         28      5 mg         29      7.5 mg         30      5 mg         31      7.5 mg          Date Details   03/23 This INR check               How to take your warfarin dose     To take:  5 mg Take 2 of the 2.5 mg tablets.    To take:  7.5 mg Take 3 of the 2.5 mg tablets.           April 2018 Details    Sun Mon Tue Wed Thu Fri Sat     1      5 mg         2      5 mg         3      7.5 mg         4      5 mg          5      7.5 mg         6      5 mg         7      7.5 mg           8      5 mg         9      5 mg         10      7.5 mg         11      5 mg         12      7.5 mg         13            14                 15               16               17               18               19               20               21                 22               23               24               25               26               27               28                 29               30                     Date Details   No additional details    Date of next INR:  4/13/2018         How to take your warfarin dose     To take:  5 mg Take 2 of the 2.5 mg tablets.    To take:  7.5 mg Take 3 of the 2.5 mg tablets.

## 2018-03-23 NOTE — PROGRESS NOTES
ANTICOAGULATION FOLLOW-UP CLINIC VISIT    Patient Name:  Home Valdez  Date:  3/23/2018  Contact Type:  Face to Face    SUBJECTIVE:     Patient Findings     Positives Antibiotic use or infection (Pt is currently on a Z-ailyn but only has one day left. ), No Problem Findings           OBJECTIVE    INR Protime   Date Value Ref Range Status   03/23/2018 3.0 (A) 0.86 - 1.14 Final       ASSESSMENT / PLAN  INR assessment THER    Recheck INR In: 3 WEEKS    INR Location Clinic      Anticoagulation Summary as of 3/23/2018     INR goal 2.0-3.0   Today's INR 3.0   Maintenance plan 7.5 mg (2.5 mg x 3) on Tue, Thu, Sat; 5 mg (2.5 mg x 2) all other days   Full instructions 7.5 mg on Tue, Thu, Sat; 5 mg all other days   Weekly total 42.5 mg   No change documented Shameka Carey RN   Plan last modified Shameka Carey RN (1/19/2018)   Next INR check 4/13/2018   Target end date     Indications   Atrial fibrillation with RVR (H) [I48.91]  Long-term (current) use of anticoagulants [Z79.01] [Z79.01]         Anticoagulation Episode Summary     INR check location     Preferred lab     Send INR reminders to [facility]    Comments 2.5 MG TABLETS, likes print out, PM dose      Anticoagulation Care Providers     Provider Role Specialty Phone number    PrimoEdgar quigleyphilipp Bhat DO [facility] Internal Medicine [phone]            See the Encounter Report to view Anticoagulation Flowsheet and Dosing Calendar (Go to Encounters tab in chart review, and find the Anticoagulation Therapy Visit)    Dosage adjustment made based on physician directed care plan.    Shameka Carey RN

## 2018-03-26 DIAGNOSIS — J44.1 COPD EXACERBATION (H): ICD-10-CM

## 2018-03-26 NOTE — TELEPHONE ENCOUNTER
"Requested Prescriptions   Pending Prescriptions Disp Refills     ipratropium - albuterol 0.5 mg/2.5 mg/3 mL (DUONEB) 0.5-2.5 (3) MG/3ML neb solution [Pharmacy Med Name: IPRATROP/ALBUT 0.5-3MG/3ML INH] 540 mL      Sig: TAKE 1 VIAL (3 MLS) BY NEBULIZATION EVERY 4 HOURS AS NEEDED FOR SHORTNESS OF BREATH / DYSPNEA OR WHEEZING    Short-Acting Beta Agonist Inhalers Protocol  Passed    3/26/2018  6:15 AM       Passed - Patient is age 12 or older       Passed - Recent (12 mo) or future (30 days) visit within the authorizing provider's specialty    Patient had office visit in the last 12 months or has a visit in the next 30 days with authorizing provider or within the authorizing provider's specialty.  See \"Patient Info\" tab in inbasket, or \"Choose Columns\" in Meds & Orders section of the refill encounter.            Last Written Prescription Date:  8/31/17  Last Fill Quantity: 540,  # refills: 3   Last office visit: 11/1/2017 with prescribing provider:     Future Office Visit:      "

## 2018-03-28 RX ORDER — IPRATROPIUM BROMIDE AND ALBUTEROL SULFATE 2.5; .5 MG/3ML; MG/3ML
SOLUTION RESPIRATORY (INHALATION)
Qty: 540 ML | Refills: 0 | Status: SHIPPED | OUTPATIENT
Start: 2018-03-28 | End: 2018-05-09

## 2018-03-28 NOTE — TELEPHONE ENCOUNTER
Prescription approved per [facility] Refill Protocol.  Saige Bazzi RN. . .  3/28/2018, 12:01 PM

## 2018-04-03 ENCOUNTER — DOCUMENTATION ONLY (OUTPATIENT)
Dept: CARDIOLOGY | Facility: CLINIC | Age: 68
End: 2018-04-03

## 2018-04-03 DIAGNOSIS — R60.0 PERIPHERAL EDEMA: ICD-10-CM

## 2018-04-03 DIAGNOSIS — I50.9 CHF (CONGESTIVE HEART FAILURE) (H): ICD-10-CM

## 2018-04-03 RX ORDER — POTASSIUM CHLORIDE 1500 MG/1
20 TABLET, EXTENDED RELEASE ORAL DAILY
Qty: 90 TABLET | Refills: 2 | Status: SHIPPED | OUTPATIENT
Start: 2018-04-03 | End: 2018-10-23

## 2018-04-03 NOTE — PROGRESS NOTES
Received notification that pt is due for amiodarone monitoring for:  -every 6 month TSH and Hepatic panel (most recently done on 11/28/17)  -every 12 month CXR (most recently done 6/2/17)    Noted that TSH, Hepatic panel and CXR orders are in Epic.  Sent staff message to scheduling to contact pt to arrange labs and CXR.  FRENCH Darling 3:46 PM 4/3/2018

## 2018-04-11 DIAGNOSIS — J30.2 CHRONIC SEASONAL ALLERGIC RHINITIS DUE TO OTHER ALLERGEN: ICD-10-CM

## 2018-04-11 RX ORDER — CETIRIZINE HYDROCHLORIDE 10 MG/1
TABLET ORAL
Qty: 90 TABLET | Refills: 1 | Status: SHIPPED | OUTPATIENT
Start: 2018-04-11 | End: 2018-05-08

## 2018-04-11 NOTE — TELEPHONE ENCOUNTER
"Requested Prescriptions   Pending Prescriptions Disp Refills     cetirizine (ZYRTEC) 10 MG tablet [Pharmacy Med Name: CETIRIZINE 10MG TAB] 30 tablet      Sig: TAKE 1 TABLET BY MOUTH EVERY EVENING    Antihistamines Protocol Failed    4/11/2018  7:12 AM       Failed - Patient is 3-64 years of age    Apply weight-based dosing for peds patients age 3 - 12 years of age.    Forward request to provider for patients under the age of 3 or over the age of 64.         Passed - Recent (12 mo) or future (30 days) visit within the authorizing provider's specialty    Patient had office visit in the last 12 months or has a visit in the next 30 days with authorizing provider or within the authorizing provider's specialty.  See \"Patient Info\" tab in inbasket, or \"Choose Columns\" in Meds & Orders section of the refill encounter.              Last Written Prescription Date:  3/13/18  Last Fill Quantity: 30,  # refills: 1   Last Office Visit with [facility], P or [facility] prescribing provider:  11/1/17   Future Office Visit:    Next 5 appointments (look out 90 days)     Apr 17, 2018 10:30 AM CDT   Pre-Op physical with Neo Galicia MD   [facility]    [address] [facility] [address] [phone]                   "

## 2018-04-11 NOTE — TELEPHONE ENCOUNTER
Routing refill request to provider for review/approval because:  Patient does not fall in the age group necessary for RN to fill.  MAXINE LyonsN, RN

## 2018-04-13 ENCOUNTER — ANTICOAGULATION THERAPY VISIT (OUTPATIENT)
Dept: ANTICOAGULATION | Facility: OTHER | Age: 68
End: 2018-04-13
Payer: MEDICARE

## 2018-04-13 DIAGNOSIS — I48.91 ATRIAL FIBRILLATION WITH RVR (H): ICD-10-CM

## 2018-04-13 DIAGNOSIS — Z79.01 LONG-TERM (CURRENT) USE OF ANTICOAGULANTS: ICD-10-CM

## 2018-04-13 LAB — INR POINT OF CARE: 2.5 (ref 0.86–1.14)

## 2018-04-13 PROCEDURE — 99207 ZZC NO CHARGE NURSE ONLY: CPT

## 2018-04-13 PROCEDURE — 85610 PROTHROMBIN TIME: CPT | Mod: QW

## 2018-04-13 PROCEDURE — 36416 COLLJ CAPILLARY BLOOD SPEC: CPT

## 2018-04-13 NOTE — PROGRESS NOTES
ANTICOAGULATION FOLLOW-UP CLINIC VISIT    Patient Name:  Home Valdez  Date:  4/13/2018  Contact Type:  Face to Face    SUBJECTIVE:     Patient Findings     Positives No Problem Findings           OBJECTIVE    INR Protime   Date Value Ref Range Status   04/13/2018 2.5 (A) 0.86 - 1.14 Final       ASSESSMENT / PLAN  INR assessment THER    Recheck INR In: 4 WEEKS    INR Location Clinic      Anticoagulation Summary as of 4/13/2018     INR goal 2.0-3.0   Today's INR 2.5   Maintenance plan 7.5 mg (2.5 mg x 3) on Tue, Thu, Sat; 5 mg (2.5 mg x 2) all other days   Full instructions 7.5 mg on Tue, Thu, Sat; 5 mg all other days   Weekly total 42.5 mg   No change documented Shameka Carey RN   Plan last modified Shameka Carey RN (1/19/2018)   Next INR check 5/11/2018   Target end date     Indications   Atrial fibrillation with RVR (H) [I48.91]  Long-term (current) use of anticoagulants [Z79.01] [Z79.01]         Anticoagulation Episode Summary     INR check location     Preferred lab     Send INR reminders to [facility]    Comments 2.5 MG TABLETS, likes print out, PM dose      Anticoagulation Care Providers     Provider Role Specialty Phone number    Sandip Vega DO [facility] [phone]            See the Encounter Report to view Anticoagulation Flowsheet and Dosing Calendar (Go to Encounters tab in chart review, and find the Anticoagulation Therapy Visit)    Dosage adjustment made based on physician directed care plan.    Shameka Carey RN

## 2018-04-17 ENCOUNTER — OFFICE VISIT (OUTPATIENT)
Dept: FAMILY MEDICINE | Facility: OTHER | Age: 68
End: 2018-04-17
Payer: MEDICARE

## 2018-04-17 VITALS
DIASTOLIC BLOOD PRESSURE: 64 MMHG | SYSTOLIC BLOOD PRESSURE: 128 MMHG | TEMPERATURE: 97.9 F | HEART RATE: 74 BPM | HEIGHT: 70 IN | RESPIRATION RATE: 18 BRPM | WEIGHT: 247.5 LBS | OXYGEN SATURATION: 91 % | BODY MASS INDEX: 35.43 KG/M2

## 2018-04-17 DIAGNOSIS — R91.1 PULMONARY NODULE: ICD-10-CM

## 2018-04-17 DIAGNOSIS — I50.32 CHRONIC DIASTOLIC CONGESTIVE HEART FAILURE (H): ICD-10-CM

## 2018-04-17 DIAGNOSIS — Z79.899 ON AMIODARONE THERAPY: ICD-10-CM

## 2018-04-17 DIAGNOSIS — Z79.899 ENCOUNTER FOR LONG-TERM (CURRENT) USE OF MEDICATIONS: ICD-10-CM

## 2018-04-17 DIAGNOSIS — Z87.891 PERSONAL HISTORY OF TOBACCO USE, PRESENTING HAZARDS TO HEALTH: ICD-10-CM

## 2018-04-17 DIAGNOSIS — Z01.818 PREOP GENERAL PHYSICAL EXAM: Primary | ICD-10-CM

## 2018-04-17 DIAGNOSIS — H25.9 SENILE CATARACT OF RIGHT EYE, UNSPECIFIED AGE-RELATED CATARACT TYPE: ICD-10-CM

## 2018-04-17 LAB
ALBUMIN SERPL-MCNC: 3.6 G/DL (ref 3.4–5)
ALBUMIN UR-MCNC: 30 MG/DL
ALP SERPL-CCNC: 82 U/L (ref 40–150)
ALT SERPL W P-5'-P-CCNC: 26 U/L (ref 0–70)
ANION GAP SERPL CALCULATED.3IONS-SCNC: 7 MMOL/L (ref 3–14)
APPEARANCE UR: CLEAR
AST SERPL W P-5'-P-CCNC: 21 U/L (ref 0–45)
BACTERIA #/AREA URNS HPF: ABNORMAL /HPF
BASOPHILS # BLD AUTO: 0 10E9/L (ref 0–0.2)
BASOPHILS NFR BLD AUTO: 0.3 %
BILIRUB DIRECT SERPL-MCNC: 0.2 MG/DL (ref 0–0.2)
BILIRUB SERPL-MCNC: 0.7 MG/DL (ref 0.2–1.3)
BILIRUB UR QL STRIP: NEGATIVE
BUN SERPL-MCNC: 19 MG/DL (ref 7–30)
CALCIUM SERPL-MCNC: 8.1 MG/DL (ref 8.5–10.1)
CHLORIDE SERPL-SCNC: 106 MMOL/L (ref 94–109)
CO2 SERPL-SCNC: 30 MMOL/L (ref 20–32)
COLOR UR AUTO: YELLOW
CREAT SERPL-MCNC: 1.23 MG/DL (ref 0.66–1.25)
DIFFERENTIAL METHOD BLD: NORMAL
EOSINOPHIL # BLD AUTO: 0.3 10E9/L (ref 0–0.7)
EOSINOPHIL NFR BLD AUTO: 3.7 %
ERYTHROCYTE [DISTWIDTH] IN BLOOD BY AUTOMATED COUNT: 13.4 % (ref 10–15)
GFR SERPL CREATININE-BSD FRML MDRD: 58 ML/MIN/1.7M2
GLUCOSE SERPL-MCNC: 79 MG/DL (ref 70–99)
GLUCOSE UR STRIP-MCNC: NEGATIVE MG/DL
HCT VFR BLD AUTO: 49.4 % (ref 40–53)
HGB BLD-MCNC: 16 G/DL (ref 13.3–17.7)
HGB UR QL STRIP: ABNORMAL
KETONES UR STRIP-MCNC: NEGATIVE MG/DL
LEUKOCYTE ESTERASE UR QL STRIP: ABNORMAL
LYMPHOCYTES # BLD AUTO: 1.3 10E9/L (ref 0.8–5.3)
LYMPHOCYTES NFR BLD AUTO: 14.8 %
MCH RBC QN AUTO: 29.7 PG (ref 26.5–33)
MCHC RBC AUTO-ENTMCNC: 32.4 G/DL (ref 31.5–36.5)
MCV RBC AUTO: 92 FL (ref 78–100)
MONOCYTES # BLD AUTO: 1 10E9/L (ref 0–1.3)
MONOCYTES NFR BLD AUTO: 10.9 %
NEUTROPHILS # BLD AUTO: 6.4 10E9/L (ref 1.6–8.3)
NEUTROPHILS NFR BLD AUTO: 70.3 %
NITRATE UR QL: NEGATIVE
NON-SQ EPI CELLS #/AREA URNS LPF: ABNORMAL /LPF
PH UR STRIP: 6.5 PH (ref 5–7)
PLATELET # BLD AUTO: 243 10E9/L (ref 150–450)
POTASSIUM SERPL-SCNC: 4.4 MMOL/L (ref 3.4–5.3)
PROT SERPL-MCNC: 7.1 G/DL (ref 6.8–8.8)
RBC # BLD AUTO: 5.39 10E12/L (ref 4.4–5.9)
RBC #/AREA URNS AUTO: ABNORMAL /HPF
SODIUM SERPL-SCNC: 143 MMOL/L (ref 133–144)
SOURCE: ABNORMAL
SP GR UR STRIP: 1.02 (ref 1–1.03)
TSH SERPL DL<=0.005 MIU/L-ACNC: 1.79 MU/L (ref 0.4–4)
UROBILINOGEN UR STRIP-ACNC: 0.2 EU/DL (ref 0.2–1)
WBC # BLD AUTO: 9.1 10E9/L (ref 4–11)
WBC #/AREA URNS AUTO: ABNORMAL /HPF

## 2018-04-17 PROCEDURE — 84443 ASSAY THYROID STIM HORMONE: CPT | Performed by: INTERNAL MEDICINE

## 2018-04-17 PROCEDURE — 80076 HEPATIC FUNCTION PANEL: CPT | Performed by: INTERNAL MEDICINE

## 2018-04-17 PROCEDURE — 81001 URINALYSIS AUTO W/SCOPE: CPT | Performed by: FAMILY MEDICINE

## 2018-04-17 PROCEDURE — 99214 OFFICE O/P EST MOD 30 MIN: CPT | Performed by: FAMILY MEDICINE

## 2018-04-17 PROCEDURE — 85025 COMPLETE CBC W/AUTO DIFF WBC: CPT | Performed by: FAMILY MEDICINE

## 2018-04-17 PROCEDURE — 80048 BASIC METABOLIC PNL TOTAL CA: CPT | Performed by: INTERNAL MEDICINE

## 2018-04-17 PROCEDURE — 36415 COLL VENOUS BLD VENIPUNCTURE: CPT | Performed by: INTERNAL MEDICINE

## 2018-04-17 ASSESSMENT — ANXIETY QUESTIONNAIRES
1. FEELING NERVOUS, ANXIOUS, OR ON EDGE: NOT AT ALL
5. BEING SO RESTLESS THAT IT IS HARD TO SIT STILL: NOT AT ALL
GAD7 TOTAL SCORE: 0
6. BECOMING EASILY ANNOYED OR IRRITABLE: NOT AT ALL
2. NOT BEING ABLE TO STOP OR CONTROL WORRYING: NOT AT ALL
7. FEELING AFRAID AS IF SOMETHING AWFUL MIGHT HAPPEN: NOT AT ALL
3. WORRYING TOO MUCH ABOUT DIFFERENT THINGS: NOT AT ALL

## 2018-04-17 ASSESSMENT — PAIN SCALES - GENERAL: PAINLEVEL: NO PAIN (0)

## 2018-04-17 ASSESSMENT — PATIENT HEALTH QUESTIONNAIRE - PHQ9: 5. POOR APPETITE OR OVEREATING: NOT AT ALL
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
## 2018-04-17 NOTE — MR AVS SNAPSHOT
After Visit Summary   4/17/2018    Home Valdez    MRN: [id number]           Patient Information     Date Of Birth          1950        Visit Information        Provider Department      4/17/2018 10:30 AM Neo Galicia MD [facility]        Today's Diagnoses     Preop general physical exam    -  1    Chronic diastolic congestive heart failure (H)        On amiodarone therapy        Encounter for long-term (current) use of medications          Care Instructions      Before Your Surgery      Call your surgeon if there is any change in your health. This includes signs of a cold or flu (such as a sore throat, runny nose, cough, rash or fever).    Do not smoke, drink alcohol or take over the counter medicine (unless your surgeon or primary care doctor tells you to) for the 24 hours before and after surgery.    If you take prescribed drugs: Follow your doctor s orders about which medicines to take and which to stop until after surgery.    Eating and drinking prior to surgery: follow the instructions from your surgeon    Take a shower or bath the night before surgery. Use the soap your surgeon gave you to gently clean your skin. If you do not have soap from your surgeon, use your regular soap. Do not shave or scrub the surgery site.  Wear clean pajamas and have clean sheets on your bed.           Follow-ups after your visit        Your next 10 appointments already scheduled     May 11, 2018  8:15 AM CDT   Anticoagulation Visit with [facility] ANTI COAG   [facility]    [address] [facility] [id number] [phone]              Who to contact     If you have questions or need follow up information about today's clinic visit or your schedule please contact [facility] directly at [phone].  Normal or non-critical lab and imaging results will be communicated to you by MyChart, letter or phone within 4 business days after the clinic has  "received the results. If you do not hear from us within 7 days, please contact the clinic through [employer] or phone. If you have a critical or abnormal lab result, we will notify you by phone as soon as possible.  Submit refill requests through [employer] or call your pharmacy and they will forward the refill request to us. Please allow 3 business days for your refill to be completed.          Additional Information About Your Visit        [employer] Information     [employer] lets you send messages to your doctor, view your test results, renew your prescriptions, schedule appointments and more. To sign up, go to [email or web address] . Click on \"Log in\" on the left side of the screen, which will take you to the Welcome page. Then click on \"Sign up Now\" on the right side of the page.     You will be asked to enter the access code listed below, as well as some personal information. Please follow the directions to create your username and password.     Your access code is: [email or web address]  Expires: 2018 11:17 AM     Your access code will  in 90 days. If you need help or a new code, please call your [facility] clinic or [phone].        Care EveryWhere ID     This is your Care EveryWhere ID. This could be used by other organizations to access your [facility] medical records  [id number]        Your Vitals Were     Pulse Temperature Respirations Height Pulse Oximetry BMI (Body Mass Index)    74 97.9  F (36.6  C) (Tympanic) 18 5' 10\" (1.778 m) 91% 35.51 kg/m2       Blood Pressure from Last 3 Encounters:   18 128/64   17 136/78   17 (!) 148/92    Weight from Last 3 Encounters:   18 247 lb 8 oz (112.3 kg)   17 251 lb 11.2 oz (114.2 kg)   17 250 lb 4.8 oz (113.5 kg)              We Performed the Following     *UA reflex to Microscopic     Basic metabolic panel     CBC with platelets differential     Hepatic panel     TSH          Today's Medication Changes          These changes are " accurate as of 4/17/18 11:32 AM.  If you have any questions, ask your nurse or doctor.               These medicines have changed or have updated prescriptions.        Dose/Directions    torsemide 20 MG tablet   Commonly known as:  DEMADEX   This may have changed:    - how much to take  - how to take this  - when to take this  - reasons to take this  - additional instructions   Used for:  Chronic diastolic congestive heart failure (H)        Take 10 mg po daily. Take an extra 10mg po daily as needed for swelling.   Quantity:  180 tablet   Refills:  3                Primary Care Provider Office Phone # Fax #    Sandip Vega,  [phone] [address] [place] [address]        Equal Access to Services     MILKA ORTIZ : Radha monroe Sofede, wajelena mccullough, qasamia kaalmada compa, jase bhandari. So [facility] [phone].    ATENCIÓN: Si habla español, tiene a calabrese disposición servicios gratuitos de asistencia lingüística. Llame al [phone].    We comply with applicable federal civil rights laws and [place] laws. We do not discriminate on the basis of race, color, national origin, age, disability, sex, sexual orientation, or gender identity.            Thank you!     Thank you for choosing [facility]  for your care. Our goal is always to provide you with excellent care. Hearing back from our patients is one way we can continue to improve our services. Please take a few minutes to complete the written survey that you may receive in the mail after your visit with us. Thank you!             Your Updated Medication List - Protect others around you: Learn how to safely use, store and throw away your medicines at www.disposemymeds.org.          This list is accurate as of 4/17/18 11:32 AM.  Always use your most recent med list.                   Brand Name Dispense Instructions for use Diagnosis    acetaminophen 325 MG tablet     TYLENOL    100 tablet    Take 2 tablets (650 mg) by mouth every 4 hours as needed for other (mild pain)        albuterol 108 (90 Base) MCG/ACT Inhaler    PROAIR HFA/PROVENTIL HFA/VENTOLIN HFA    6 Inhaler    Inhale 2 puffs into the lungs every 4 hours as needed for shortness of breath / dyspnea    Chronic obstructive pulmonary disease with acute exacerbation (H)       amiodarone 200 MG tablet    PACERONE/CODARONE    45 tablet    Take 0.5 tablets (100 mg) by mouth daily    Atrial fibrillation (H)       buPROPion 150 MG 12 hr tablet    WELLBUTRIN SR    180 tablet    Take 1 tablet (150 mg) by mouth 2 times daily    Personal history of tobacco use, presenting hazards to health       * cefuroxime 250 MG tablet    CEFTIN    20 tablet    Take 1 tablet (250 mg) by mouth 2 times daily    Obstructive chronic bronchitis with exacerbation (H)       * cefuroxime 250 MG tablet    CEFTIN    20 tablet    Take 1 tablet (250 mg) by mouth 2 times daily    Acute bronchitis with coexisting condition requiring prophylactic treatment       cetirizine 10 MG tablet    zyrTEC    90 tablet    TAKE 1 TABLET BY MOUTH EVERY EVENING    Chronic seasonal allergic rhinitis due to other allergen       fluticasone 50 MCG/ACT spray    FLONASE    16 g    USE ONE TO TWO SPRAYS IN EACH NOSTRIL EVERY DAY    Chronic rhinitis       fluticasone-salmeterol 250-50 MCG/DOSE diskus inhaler    ADVAIR    3 Inhaler    Inhale 1 puff into the lungs 2 times daily    Panlobular emphysema (H)       ipratropium - albuterol 0.5 mg/2.5 mg/3 mL 0.5-2.5 (3) MG/3ML neb solution    DUONEB    540 mL    TAKE 1 VIAL (3 MLS) BY NEBULIZATION EVERY 4 HOURS AS NEEDED FOR SHORTNESS OF BREATH / DYSPNEA OR WHEEZING    COPD exacerbation (H)       losartan 50 MG tablet    COZAAR    180 tablet    Take 2 tablets (100 mg) by mouth daily    Atrial fibrillation with RVR (H), Hypertension goal BP (blood pressure) < 140/90       methylPREDNISolone 4 MG tablet    MEDROL DOSEPAK    21 tablet     Follow package instructions    Obstructive chronic bronchitis with exacerbation (H)       metoprolol succinate 50 MG 24 hr tablet    TOPROL-XL    90 tablet    Take 1 tablet (50 mg) by mouth daily    Atrial fibrillation with RVR (H), CHF (congestive heart failure) (H)       montelukast 10 MG tablet    SINGULAIR    30 tablet    TAKE 1 TABLET BY MOUTH DAILY AT BEDTIME    Chronic seasonal allergic rhinitis due to other allergen       multivitamin, therapeutic Tabs tablet      Take 1 tablet by mouth daily        nebulizer/tubing/mouthpiece Kit     1 kit    Use every 4-6 hours PRN    Chronic obstructive pulmonary disease, unspecified COPD type (H)       order for DME     1 Device    Equipment being ordered: Nebulizer    COPD (chronic obstructive pulmonary disease) (H)       oxyCODONE IR 5 MG tablet    ROXICODONE    30 tablet    Take 1-2 tablets (5-10 mg) by mouth every 3 hours as needed for pain or other (Moderate to Severe)        potassium chloride SA 20 MEQ CR tablet    K-DUR/KLOR-CON M    90 tablet    Take 1 tablet (20 mEq) by mouth daily    CHF (congestive heart failure) (H), Peripheral edema       * senna-docusate 8.6-50 MG per tablet    SENOKOT-S;PERICOLACE     Take 2 tablets by mouth 2 times daily        * senna-docusate 8.6-50 MG per tablet    SENOKOT-S;PERICOLACE    30 tablet    Take 1-2 tablets by mouth 2 times daily Take while on oral narcotics to prevent or treat constipation.        torsemide 20 MG tablet    DEMADEX    180 tablet    Take 10 mg po daily. Take an extra 10mg po daily as needed for swelling.    Chronic diastolic congestive heart failure (H)       * warfarin 2.5 MG tablet    JANTOVEN    235 tablet    Take 7.5 mg (3 tablets) on Tuesday, Thursday, Saturday and 5 mg (2 tablets) all other days or as directed by coumadin clinic.    Atrial fibrillation with RVR (H)       * warfarin 2.5 MG tablet    JANTOVEN    210 tablet    Take 7.5 mg (3 tabs) on Tues, Thurs, Sat and 5 mg (2 tabs)all other days, or as  directed by the coumadin clinic.    Atrial fibrillation with RVR (H)       * Notice:  This list has 6 medication(s) that are the same as other medications prescribed for you. Read the directions carefully, and ask your doctor or other care provider to review them with you.

## 2018-04-17 NOTE — NURSING NOTE
"Chief Complaint   Patient presents with     Pre-Op Exam       Initial /64 (BP Location: Right arm, Patient Position: Chair, Cuff Size: Adult Large)  Pulse 74  Temp 97.9  F (36.6  C) (Tympanic)  Resp 18  Ht 5' 10\" (1.778 m)  Wt 247 lb 8 oz (112.3 kg)  SpO2 91%  BMI 35.51 kg/m2 Estimated body mass index is 35.51 kg/(m^2) as calculated from the following:    Height as of this encounter: 5' 10\" (1.778 m).    Weight as of this encounter: 247 lb 8 oz (112.3 kg).  Medication Reconciliation: complete     Nkechi Carroll MA     4/17/2018      "

## 2018-04-18 ENCOUNTER — OFFICE VISIT (OUTPATIENT)
Dept: PHARMACY | Facility: OTHER | Age: 68
End: 2018-04-18
Attending: FAMILY MEDICINE
Payer: COMMERCIAL

## 2018-04-18 ENCOUNTER — HOSPITAL ENCOUNTER (OUTPATIENT)
Dept: CT IMAGING | Facility: CLINIC | Age: 68
Discharge: HOME | End: 2018-04-18
Attending: FAMILY MEDICINE | Admitting: FAMILY MEDICINE
Payer: MEDICARE

## 2018-04-18 DIAGNOSIS — I48.91 ATRIAL FIBRILLATION WITH RVR (H): ICD-10-CM

## 2018-04-18 DIAGNOSIS — I50.32 CHRONIC DIASTOLIC CONGESTIVE HEART FAILURE (H): Primary | ICD-10-CM

## 2018-04-18 DIAGNOSIS — J44.9 CHRONIC OBSTRUCTIVE PULMONARY DISEASE, UNSPECIFIED COPD TYPE (H): ICD-10-CM

## 2018-04-18 DIAGNOSIS — E56.9 VITAMIN DEFICIENCY: ICD-10-CM

## 2018-04-18 DIAGNOSIS — I10 BENIGN ESSENTIAL HYPERTENSION: ICD-10-CM

## 2018-04-18 DIAGNOSIS — I71.40 ABDOMINAL AORTIC ANEURYSM (AAA) WITHOUT RUPTURE (H): ICD-10-CM

## 2018-04-18 DIAGNOSIS — R91.1 PULMONARY NODULE: ICD-10-CM

## 2018-04-18 DIAGNOSIS — Z87.891 PERSONAL HISTORY OF TOBACCO USE, PRESENTING HAZARDS TO HEALTH: ICD-10-CM

## 2018-04-18 DIAGNOSIS — I50.9 CHF (CONGESTIVE HEART FAILURE) (H): ICD-10-CM

## 2018-04-18 DIAGNOSIS — H10.45 CHRONIC ALLERGIC CONJUNCTIVITIS: ICD-10-CM

## 2018-04-18 PROCEDURE — 71250 CT THORAX DX C-: CPT

## 2018-04-18 PROCEDURE — 99605 MTMS BY PHARM NP 15 MIN: CPT | Performed by: PHARMACIST

## 2018-04-18 PROCEDURE — 99607 MTMS BY PHARM ADDL 15 MIN: CPT | Performed by: PHARMACIST

## 2018-04-18 RX ORDER — METOPROLOL SUCCINATE 50 MG/1
50 TABLET, EXTENDED RELEASE ORAL DAILY
Qty: 90 TABLET | Refills: 1 | Status: ON HOLD | OUTPATIENT
Start: 2018-04-18 | End: 2018-07-31

## 2018-04-18 ASSESSMENT — PATIENT HEALTH QUESTIONNAIRE - PHQ9: SUM OF ALL RESPONSES TO PHQ QUESTIONS 1-9: 0

## 2018-04-18 ASSESSMENT — ANXIETY QUESTIONNAIRES: GAD7 TOTAL SCORE: 0

## 2018-04-18 NOTE — MR AVS SNAPSHOT
After Visit Summary   4/18/2018    Home Valdez    MRN: [id number]           Patient Information     Date Of Birth          1950        Visit Information        Provider Department      4/18/2018 2:30 PM Starr Arceo RPH [facility]        Today's Diagnoses     Chronic diastolic congestive heart failure (H)    -  1    Atrial fibrillation with RVR (H)        Abdominal aortic aneurysm (AAA) without rupture (H)        Chronic obstructive pulmonary disease, unspecified COPD type (H)        PERS HX TOBACCO USE        Chronic allergic conjunctivitis        Vitamin deficiency        Benign essential hypertension           Follow-ups after your visit        Your next 10 appointments already scheduled     May 03, 2018   Procedure with Meir Angelo MD   [facility] ([facility])    [address] [place] [id number] [phone]           From [address]: Exit at [email or web address] on [place] side of [place]. Turn right on [email or web address]. Turn left at stoplight on [address]. [facility] will be in view two blocks ahead            May 11, 2018  8:15 AM CDT   Anticoagulation Visit with [facility]IL[facility] ANTI COAG   [facility]    [address] [facility] [address] [phone]              Who to contact     If you have questions or need follow up information about today's clinic visit or your schedule please contact [facility] directly at [phone].  Normal or non-critical lab and imaging results will be communicated to you by MyChart, letter or phone within 4 business days after the clinic has received the results. If you do not hear from us within 7 days, please contact the clinic through MyChart or phone. If you have a critical or abnormal lab result, we will notify you by phone as soon as possible.  Submit refill requests through [employer] or call your pharmacy and they  "will forward the refill request to us. Please allow 3 business days for your refill to be completed.          Additional Information About Your Visit        [employer] Information     [employer] lets you send messages to your doctor, view your test results, renew your prescriptions, schedule appointments and more. To sign up, go to [email or web address] . Click on \"Log in\" on the left side of the screen, which will take you to the Welcome page. Then click on \"Sign up Now\" on the right side of the page.     You will be asked to enter the access code listed below, as well as some personal information. Please follow the directions to create your username and password.     Your access code is: [email or web address]  Expires: 2018 11:17 AM     Your access code will  in 90 days. If you need help or a new code, please call your [facility] clinic or [phone].        Care EveryWhere ID     This is your Care EveryWhere ID. This could be used by other organizations to access your [facility] medical records  [id number]         Blood Pressure from Last 3 Encounters:   18 128/64   17 136/78   17 (!) 148/92    Weight from Last 3 Encounters:   18 247 lb 8 oz (112.3 kg)   17 251 lb 11.2 oz (114.2 kg)   17 250 lb 4.8 oz (113.5 kg)              Today, you had the following     No orders found for display         Today's Medication Changes          These changes are accurate as of 18 11:59 PM.  If you have any questions, ask your nurse or doctor.               These medicines have changed or have updated prescriptions.        Dose/Directions    torsemide 20 MG tablet   Commonly known as:  DEMADEX   This may have changed:    - how much to take  - how to take this  - when to take this  - reasons to take this  - additional instructions   Used for:  Chronic diastolic congestive heart failure (H)        Take 10 mg po daily. Take an extra 10mg po daily as needed for swelling.   Quantity:  180 tablet "   Refills:  3            Where to get your medicines      These medications were sent to [id number] - [place], [place] - [address] [place] [address]  [address]  [facility] [address]    Hours:  M-F 8:30-6:30; Sat 9-4; closed Sunday Phone:  [phone]     metoprolol succinate 50 MG 24 hr tablet                Primary Care Provider Office Phone # Fax #    Sandip Vega  [phone]       [address] [place] [address]        Equal Access to Services     [facility]: Hadii aad ku hadasho Soomaali, waaxda luqadaha, qaybta kaalmada adeegyada, waxay idiin hayaan adeeg kharapillo john . So [facility] [phone].    ATENCIÓN: Si habla español, tiene a calabrese disposición servicios gratuitos de asistencia lingüística. [facility] [phone].    We comply with applicable federal civil rights laws and [place] laws. We do not discriminate on the basis of race, color, national origin, age, disability, sex, sexual orientation, or gender identity.            Thank you!     Thank you for choosing [facility]  for your care. Our goal is always to provide you with excellent care. Hearing back from our patients is one way we can continue to improve our services. Please take a few minutes to complete the written survey that you may receive in the mail after your visit with us. Thank you!             Your Updated Medication List - Protect others around you: Learn how to safely use, store and throw away your medicines at www.disposemymeds.org.          This list is accurate as of 4/18/18 11:59 PM.  Always use your most recent med list.                   Brand Name Dispense Instructions for use Diagnosis    acetaminophen 325 MG tablet    TYLENOL    100 tablet    Take 2 tablets (650 mg) by mouth every 4 hours as needed for other (mild pain)        albuterol 108 (90 Base) MCG/ACT Inhaler    PROAIR HFA/PROVENTIL HFA/VENTOLIN HFA    6 Inhaler    Inhale 2 puffs into the lungs every 4 hours as needed for  shortness of breath / dyspnea    Chronic obstructive pulmonary disease with acute exacerbation (H)       amiodarone 200 MG tablet    PACERONE/CODARONE    45 tablet    Take 0.5 tablets (100 mg) by mouth daily    Atrial fibrillation (H)       buPROPion 150 MG 12 hr tablet    WELLBUTRIN SR    180 tablet    Take 1 tablet (150 mg) by mouth 2 times daily    Personal history of tobacco use, presenting hazards to health       cefuroxime 250 MG tablet    CEFTIN    20 tablet    Take 1 tablet (250 mg) by mouth 2 times daily    Obstructive chronic bronchitis with exacerbation (H)       cetirizine 10 MG tablet    zyrTEC    90 tablet    TAKE 1 TABLET BY MOUTH EVERY EVENING    Chronic seasonal allergic rhinitis due to other allergen       fluticasone 50 MCG/ACT spray    FLONASE    16 g    USE ONE TO TWO SPRAYS IN EACH NOSTRIL EVERY DAY    Chronic rhinitis       fluticasone-salmeterol 250-50 MCG/DOSE diskus inhaler    ADVAIR    3 Inhaler    Inhale 1 puff into the lungs 2 times daily    Panlobular emphysema (H)       ipratropium - albuterol 0.5 mg/2.5 mg/3 mL 0.5-2.5 (3) MG/3ML neb solution    DUONEB    540 mL    TAKE 1 VIAL (3 MLS) BY NEBULIZATION EVERY 4 HOURS AS NEEDED FOR SHORTNESS OF BREATH / DYSPNEA OR WHEEZING    COPD exacerbation (H)       losartan 50 MG tablet    COZAAR    180 tablet    Take 2 tablets (100 mg) by mouth daily    Atrial fibrillation with RVR (H), Hypertension goal BP (blood pressure) < 140/90       methylPREDNISolone 4 MG tablet    MEDROL DOSEPAK    21 tablet    Follow package instructions    Obstructive chronic bronchitis with exacerbation (H)       metoprolol succinate 50 MG 24 hr tablet    TOPROL-XL    90 tablet    Take 1 tablet (50 mg) by mouth daily    Atrial fibrillation with RVR (H), CHF (congestive heart failure) (H)       montelukast 10 MG tablet    SINGULAIR    30 tablet    TAKE 1 TABLET BY MOUTH DAILY AT BEDTIME    Chronic seasonal allergic rhinitis due to other allergen       multivitamin,  therapeutic Tabs tablet      Take 1 tablet by mouth daily        nebulizer/tubing/mouthpiece Kit     1 kit    Use every 4-6 hours PRN    Chronic obstructive pulmonary disease, unspecified COPD type (H)       order for DME     1 Device    Equipment being ordered: Nebulizer    COPD (chronic obstructive pulmonary disease) (H)       potassium chloride SA 20 MEQ CR tablet    K-DUR/KLOR-CON M    90 tablet    Take 1 tablet (20 mEq) by mouth daily    CHF (congestive heart failure) (H), Peripheral edema       senna-docusate 8.6-50 MG per tablet    SENOKOT-S;PERICOLACE    30 tablet    Take 1-2 tablets by mouth 2 times daily Take while on oral narcotics to prevent or treat constipation.        torsemide 20 MG tablet    DEMADEX    180 tablet    Take 10 mg po daily. Take an extra 10mg po daily as needed for swelling.    Chronic diastolic congestive heart failure (H)       * warfarin 2.5 MG tablet    JANTOVEN    235 tablet    Take 7.5 mg (3 tablets) on Tuesday, Thursday, Saturday and 5 mg (2 tablets) all other days or as directed by coumadin clinic.    Atrial fibrillation with RVR (H)       * warfarin 2.5 MG tablet    JANTOVEN    210 tablet    Take 7.5 mg (3 tabs) on Tues, Thurs, Sat and 5 mg (2 tabs)all other days, or as directed by the coumadin clinic.    Atrial fibrillation with RVR (H)       * Notice:  This list has 2 medication(s) that are the same as other medications prescribed for you. Read the directions carefully, and ask your doctor or other care provider to review them with you.

## 2018-04-18 NOTE — PROGRESS NOTES
SUBJECTIVE/OBJECTIVE:                           Home Valdez is a 68 year old male coming in for an initial visit for Medication Therapy Management.  He was referred to me from Dr. Galicia.     Chief Complaint: CMR.    Allergies/ADRs: Reviewed in Epic  Tobacco: History of tobacco dependence - quit 2014  Alcohol: 1-3 beverages / week  Caffeine: 1-2 cups/day of coffee  Activity: He enjoys being outside hunting and fishing  PMH: Reviewed in Epic    Medication Adherence/Access:  no issues reported    Hypertension/HFpEF EF 45-50%: Current medications include losartan 100 mg daily, metoprolol XL 50 mg daily, torsemide 10 mg daily as needed and KCl 20 mEq daily.  Patient does not self-monitor BP.  Patient reports no current medication side effects.    COPD: Current medications: Duonebs, Albuterol MDI and Fluticasone+Salmeterol (Advair) DPI twice daily. Pt rinses their mouth after using steroid inhaler.  He also has standing orders for antibiotics and prednisone should he get sick.  Pt is not experiencing side effects. However, he does believe that his Advair causes constipation. As a result, he takes senna twice daily to help with the symptoms. He has no complaints and feels it works well.   Pt reports the following symptoms: none. The frequency that he uses his albuterol and his nebs varies based on how his allergies are doing and whether he is sick or not.  Sometimes he never uses them, sometimes he uses them a few times a week.  If he is sick, he finds that he needs to take them more than once a day.   Pt does not have an COPD Action Plan on file.   Has spirometry been completed: Yes      AFib: Current medications include amiodarone 100 mg daily (no history of lung disease with amiodarone per Dr. Murphy, 11/17) and warfarin as directed. He has no concerns and does not endorse side effects today. He works with our anticoagulation service in [place] and always calls her if he has questions about his INR or a new  antibiotic.     Last INR: 2.5 (4/13/18)    Hx/o Tobacco Abuse: Current medications include bupropion  mg twice daily. He feels that it also helps with his mood. He is not interested in discontinuing this at this time. However, he does note that sometimes if he takes this too late, it keeps him awake.     Allergies: Current medications include fluticasone 50 mcg/act 1 puff in each nostril daily, cetirizine 10 mg daily and montelukast 10 mg daily. He takes these daily throughout the season and does not complain of side effects.  He feels they work well to reduce his symptom burden.     ASCVD Risk:  No current therapy.  Patient states that he doesn't believe he has been treated with a cholesterol medication in the past.   The 10-year ASCVD risk score (Gurinder SARAH Jr, et al., 2013) is: 19.2%    Values used to calculate the score:      Age: 68 years      Sex: Male      Is Non- : No      Diabetic: No      Tobacco smoker: No      Systolic Blood Pressure: 128 mmHg      Is BP treated: Yes      HDL Cholesterol: 37 mg/dL      Total Cholesterol: 169 mg/dL    Vitamins: MVI daily. No questions or concerns today.     Current labs include:  Today's Vitals: There were no vitals taken for this visit. - measured with PCP this week    BP Readings from Last 3 Encounters:   04/17/18 128/64   11/28/17 136/78   11/07/17 (!) 148/92     Recent Labs   Lab Test  05/30/17   0805  12/20/13   0851  05/21/13   0856   CHOL  169  156  168   HDL  37*  37*  32*   LDL  105*  99  107   TRIG  135  100  142   CHOLHDLRATIO   --   4.0  5.0     Liver Function Studies -   Recent Labs   Lab Test  04/17/18   1113   PROTTOTAL  7.1   ALBUMIN  3.6   BILITOTAL  0.7   ALKPHOS  82   AST  21   ALT  26     Lab Results   Component Value Date    UCRR 28 12/20/2013    MICROL 39 12/20/2013    UMALCR 139.29 (H) 12/20/2013     Last Basic Metabolic Panel:  Lab Results   Component Value Date     04/17/2018      Lab Results   Component Value Date     POTASSIUM 4.4 04/17/2018     Lab Results   Component Value Date    CHLORIDE 106 04/17/2018     Lab Results   Component Value Date    BUN 19 04/17/2018     Lab Results   Component Value Date    CR 1.23 04/17/2018     GFR Estimate   Date Value Ref Range Status   04/17/2018 58 (L) >60 mL/min/1.7m2 Final     Comment:     Non  GFR Calc   11/01/2017 57 (L) >60 mL/min/1.7m2 Final     Comment:     Non  GFR Calc   06/03/2017 40 (L) >60 mL/min/1.7m2 Final     Comment:     Non  GFR Calc     Most Recent Immunizations   Administered Date(s) Administered     Influenza (High Dose) 3 valent vaccine 10/19/2017     Influenza (IIV3) PF 02/10/2012     Influenza (intradermal) 01/21/2013     Influenza Vaccine IM 3yrs+ 4 Valent IIV4 10/06/2014     Pneumo Conj 13-V (2010&after) 10/13/2015     Pneumococcal 23 valent 02/10/2012     TD (ADULT, 7+) 12/04/2000     TDAP Vaccine (Adacel) 02/23/2011     Zoster vaccine, live 07/23/2012       ASSESSMENT:                             Current medications were reviewed today.     Medication Adherence: excellent, no issues identified    Hypertension/HFpEF EF 45-50%: Stable. Patient is meeting BP goal of < 140/90mmHg. K+ WNL.    COPD: Stable. Patient would benefit from no changes at this time.    AFib: Stable. HR <100 BPM.  Per cardiology, no evidence of pulmonary fibrosis is evident.     Hx/o Tobacco Abuse: Stable. It is safe to continue bupropion indefinitely to help with tobacco abstinence. However, consider taking bupropion earlier in the afternoon to prevent insomnia.     Allergies: Stable.     ASCVD Risk: Stable. Patient may benefit from a moderate intensity statin for primary prevention of ASCVD.  However, cardiology feels he is well managed without medical therapy.     Vitamins: Stable.    PLAN:                            1) Take 2nd dose of bupropion with dinner.     I spent 40 minutes with this patient today. All changes were made via  collaborative practice agreement with Sandip Vega. A copy of the visit note was provided to the patient's primary care provider.    Will follow up in 1-2 months.    The patient declined a summary of these recommendations as an after visit summary.     Starr Arceo, Pharm.D., [facility]  Medication Therapy Management Pharmacist  Page/VM:  [phone]

## 2018-05-02 DIAGNOSIS — I10 HYPERTENSION GOAL BP (BLOOD PRESSURE) < 140/90: ICD-10-CM

## 2018-05-02 DIAGNOSIS — I48.91 ATRIAL FIBRILLATION WITH RVR (H): ICD-10-CM

## 2018-05-02 RX ORDER — LOSARTAN POTASSIUM 50 MG/1
TABLET ORAL
Qty: 180 TABLET | Refills: 0 | Status: ON HOLD | OUTPATIENT
Start: 2018-05-02 | End: 2018-07-31
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
## 2018-05-02 NOTE — TELEPHONE ENCOUNTER
"Last Written Prescription Date:  5/09/2017  Last Fill Quantity: 180,  # refills: 3   Last office visit: 4/17/2018 with prescribing provider:  Dr. Galicia   Future Office Visit:    Requested Prescriptions   Pending Prescriptions Disp Refills     losartan (COZAAR) 50 MG tablet [Pharmacy Med Name: LOSARTAN 50MG TABLET] 180 tablet      Sig: TAKE 2 TABLETS (100MG) BY MOUTH EVERY DAY    Angiotensin-II Receptors Passed    5/2/2018  8:03 AM       Passed - Blood pressure under 140/90 in past 12 months    BP Readings from Last 3 Encounters:   04/17/18 128/64   11/28/17 136/78   11/07/17 (!) 148/92                Passed - Recent (12 mo) or future (30 days) visit within the authorizing provider's specialty    Patient had office visit in the last 12 months or has a visit in the next 30 days with authorizing provider or within the authorizing provider's specialty.  See \"Patient Info\" tab in inbasket, or \"Choose Columns\" in Meds & Orders section of the refill encounter.           Passed - Patient is age 18 or older       Passed - Normal serum creatinine on file in past 12 months    Recent Labs   Lab Test  04/17/18   1113   CR  1.23            Passed - Normal serum potassium on file in past 12 months    Recent Labs   Lab Test  04/17/18   1113   POTASSIUM  4.4                      "

## 2018-05-02 NOTE — TELEPHONE ENCOUNTER
Prescription approved per [facility] Refill Protocol.    Kari Mora RN  [facility]

## 2018-05-03 ENCOUNTER — ANESTHESIA EVENT (OUTPATIENT)
Dept: SURGERY | Facility: CLINIC | Age: 68
End: 2018-05-03
Payer: MEDICARE

## 2018-05-03 ENCOUNTER — SURGERY (OUTPATIENT)
Age: 68
End: 2018-05-03

## 2018-05-03 ENCOUNTER — HOSPITAL ENCOUNTER (OUTPATIENT)
Facility: CLINIC | Age: 68
Discharge: HOME | End: 2018-05-03
Attending: OPHTHALMOLOGY | Admitting: OPHTHALMOLOGY
Payer: MEDICARE

## 2018-05-03 ENCOUNTER — ANESTHESIA (OUTPATIENT)
Dept: SURGERY | Facility: CLINIC | Age: 68
End: 2018-05-03
Payer: MEDICARE

## 2018-05-03 VITALS
SYSTOLIC BLOOD PRESSURE: 115 MMHG | DIASTOLIC BLOOD PRESSURE: 69 MMHG | HEIGHT: 70 IN | RESPIRATION RATE: 16 BRPM | OXYGEN SATURATION: 98 % | BODY MASS INDEX: 35.36 KG/M2 | WEIGHT: 247 LBS | TEMPERATURE: 98 F

## 2018-05-03 DIAGNOSIS — H25.11 NUCLEAR SCLEROTIC CATARACT, RIGHT: Primary | ICD-10-CM

## 2018-05-03 PROCEDURE — 37000012 ZZH ANESTHESIA CATARACT PACKAGE: Performed by: OPHTHALMOLOGY

## 2018-05-03 PROCEDURE — V2632 POST CHMBR INTRAOCULAR LENS: HCPCS | Performed by: OPHTHALMOLOGY

## 2018-05-03 PROCEDURE — 71000022 ZZH RECOVERY CATRACT PACKAGE: Performed by: OPHTHALMOLOGY

## 2018-05-03 PROCEDURE — 25000128 H RX IP 250 OP 636: Performed by: NURSE ANESTHETIST, CERTIFIED REGISTERED

## 2018-05-03 PROCEDURE — 25000125 ZZHC RX 250: Performed by: OPHTHALMOLOGY

## 2018-05-03 PROCEDURE — 25000125 ZZHC RX 250: Performed by: NURSE ANESTHETIST, CERTIFIED REGISTERED

## 2018-05-03 PROCEDURE — 36000025 ZZH CATARACT SURGICAL PACKAGE: Performed by: OPHTHALMOLOGY

## 2018-05-03 PROCEDURE — 25000128 H RX IP 250 OP 636: Performed by: OPHTHALMOLOGY

## 2018-05-03 DEVICE — EYE IMP IOL AMO PCL TECNIS ZCB00 18.5: Type: IMPLANTABLE DEVICE | Site: EYE | Status: FUNCTIONAL

## 2018-05-03 RX ORDER — TROPICAMIDE 10 MG/ML
1 SOLUTION/ DROPS OPHTHALMIC
Status: COMPLETED | OUTPATIENT
Start: 2018-05-03 | End: 2018-05-03

## 2018-05-03 RX ORDER — PROPARACAINE HYDROCHLORIDE 5 MG/ML
1 SOLUTION/ DROPS OPHTHALMIC ONCE
Status: COMPLETED | OUTPATIENT
Start: 2018-05-03 | End: 2018-05-03

## 2018-05-03 RX ORDER — CYCLOPENTOLATE HYDROCHLORIDE 10 MG/ML
1 SOLUTION/ DROPS OPHTHALMIC
Status: COMPLETED | OUTPATIENT
Start: 2018-05-03 | End: 2018-05-03

## 2018-05-03 RX ORDER — FENTANYL CITRATE 50 UG/ML
25-50 INJECTION, SOLUTION INTRAMUSCULAR; INTRAVENOUS
Status: CANCELLED | OUTPATIENT
Start: 2018-05-03

## 2018-05-03 RX ORDER — KETAMINE HYDROCHLORIDE 10 MG/ML
INJECTION INTRAMUSCULAR; INTRAVENOUS PRN
Status: DISCONTINUED | OUTPATIENT
Start: 2018-05-03 | End: 2018-05-03

## 2018-05-03 RX ORDER — PHENYLEPHRINE HYDROCHLORIDE 25 MG/ML
1 SOLUTION/ DROPS OPHTHALMIC
Status: COMPLETED | OUTPATIENT
Start: 2018-05-03 | End: 2018-05-03

## 2018-05-03 RX ORDER — ALBUTEROL SULFATE 0.83 MG/ML
2.5 SOLUTION RESPIRATORY (INHALATION) EVERY 4 HOURS PRN
Status: CANCELLED | OUTPATIENT
Start: 2018-05-03

## 2018-05-03 RX ORDER — LIDOCAINE HYDROCHLORIDE 10 MG/ML
INJECTION, SOLUTION INFILTRATION; PERINEURAL PRN
Status: DISCONTINUED | OUTPATIENT
Start: 2018-05-03 | End: 2018-05-03 | Stop reason: HOSPADM

## 2018-05-03 RX ORDER — NALOXONE HYDROCHLORIDE 0.4 MG/ML
.1-.4 INJECTION, SOLUTION INTRAMUSCULAR; INTRAVENOUS; SUBCUTANEOUS
Status: CANCELLED | OUTPATIENT
Start: 2018-05-03 | End: 2018-05-04

## 2018-05-03 RX ORDER — SODIUM CHLORIDE, SODIUM LACTATE, POTASSIUM CHLORIDE, CALCIUM CHLORIDE 600; 310; 30; 20 MG/100ML; MG/100ML; MG/100ML; MG/100ML
INJECTION, SOLUTION INTRAVENOUS CONTINUOUS
Status: CANCELLED | OUTPATIENT
Start: 2018-05-03

## 2018-05-03 RX ADMIN — TROPICAMIDE 1 DROP: 10 SOLUTION/ DROPS OPHTHALMIC at 09:30

## 2018-05-03 RX ADMIN — PHENYLEPHRINE HYDROCHLORIDE 1 DROP: 25 SOLUTION/ DROPS OPHTHALMIC at 09:41

## 2018-05-03 RX ADMIN — EPINEPHRINE 300 ML: 1 INJECTION, SOLUTION INTRAMUSCULAR; SUBCUTANEOUS at 10:04

## 2018-05-03 RX ADMIN — CYCLOPENTOLATE HYDROCHLORIDE 1 DROP: 10 SOLUTION/ DROPS OPHTHALMIC at 09:19

## 2018-05-03 RX ADMIN — LIDOCAINE HYDROCHLORIDE 1 ML: 10 INJECTION, SOLUTION INFILTRATION; PERINEURAL at 10:05

## 2018-05-03 RX ADMIN — MIDAZOLAM 1 MG: 1 INJECTION INTRAMUSCULAR; INTRAVENOUS at 09:59

## 2018-05-03 RX ADMIN — PROPARACAINE HYDROCHLORIDE 1 DROP: 5 SOLUTION/ DROPS OPHTHALMIC at 09:19

## 2018-05-03 RX ADMIN — TROPICAMIDE 1 DROP: 10 SOLUTION/ DROPS OPHTHALMIC at 09:19

## 2018-05-03 RX ADMIN — PHENYLEPHRINE HYDROCHLORIDE 1 DROP: 25 SOLUTION/ DROPS OPHTHALMIC at 09:19

## 2018-05-03 RX ADMIN — MIDAZOLAM 1 MG: 1 INJECTION INTRAMUSCULAR; INTRAVENOUS at 09:54

## 2018-05-03 RX ADMIN — PHENYLEPHRINE HYDROCHLORIDE 1 DROP: 25 SOLUTION/ DROPS OPHTHALMIC at 09:30

## 2018-05-03 RX ADMIN — CYCLOPENTOLATE HYDROCHLORIDE 1 DROP: 10 SOLUTION/ DROPS OPHTHALMIC at 09:30

## 2018-05-03 RX ADMIN — Medication 1.4 ML GIVEN: at 10:05

## 2018-05-03 RX ADMIN — TROPICAMIDE 1 DROP: 10 SOLUTION/ DROPS OPHTHALMIC at 09:40

## 2018-05-03 RX ADMIN — LIDOCAINE HYDROCHLORIDE 1 ML: 10 INJECTION, SOLUTION EPIDURAL; INFILTRATION; INTRACAUDAL; PERINEURAL at 09:25

## 2018-05-03 RX ADMIN — CYCLOPENTOLATE HYDROCHLORIDE 1 DROP: 10 SOLUTION/ DROPS OPHTHALMIC at 09:40

## 2018-05-03 RX ADMIN — Medication 20 MG: at 09:59

## 2018-05-03 RX ADMIN — PROPARACAINE HYDROCHLORIDE 1 DROP: 5 SOLUTION/ DROPS OPHTHALMIC at 09:40

## 2018-05-03 ASSESSMENT — ENCOUNTER SYMPTOMS: DYSRHYTHMIAS: 1

## 2018-05-03 ASSESSMENT — COPD QUESTIONNAIRES
CAT_SEVERITY: MODERATE
COPD: 1

## 2018-05-03 NOTE — DISCHARGE INSTRUCTIONS
POST CATARACT SURGERY EYE INSTRUCTIONS  DR. HERRMANN           Eye Medications      *If you opted for the one bottle containing all 3 eye medications, follow these instructions.    *Instill one drop into the operative eye 3 times a day until the bottle is empty.       - If your are currently being treated for glaucoma please continue your    medication as normally prescribed.     - Wear eye shield while sleeping for 3 days     - Refrain from heavy lifting, bending, straining, or strenuous activity for 1      week.     - Do not rub or push on the operative eye for 1 week (you may wipe the    eye lid(s) gently with a wet wash cloth to remove matter from                         eyelashes).     - You may bathe or shower, but do not get the eye wet for 1 month      (swimming, hot tubs or other water activities).     - Minor itching, scratching sensation, burning sensation, etc. is normal.     Report any severe eye pain or loss of vision.     - Please call our office at [phone] if you have questions or     concerns.  [facility]   Adult Discharge Orders & Instructions     For 24 hours after surgery    1. Get plenty of rest.  A responsible adult must stay with you for at least 24 hours after you leave the hospital.   2. Do not drive or use heavy equipment.  If you have weakness or tingling, don't drive or use heavy equipment until this feeling goes away.  3. Do not drink alcohol.  4. Avoid strenuous or risky activities.  Ask for help when climbing stairs.   5. You may feel lightheaded.  IF so, sit for a few minutes before standing.  Have someone help you get up.   6. If you have nausea (feel sick to your stomach): Drink only clear liquids such as apple juice, ginger ale, broth or 7-Up.  Rest may also help.  Be sure to drink enough fluids.  Move to a regular diet as you feel able.  7. You may have a slight fever. Call the doctor if your fever is over 100 F (37.7 C) (taken under the tongue) or lasts  longer than 24 hours.  8. You may have a dry mouth, a sore throat, muscle aches or trouble sleeping.  These should go away after 24 hours.  9. Do not make important or legal decisions.   Call your doctor for any of the followin.  Signs of infection (fever, growing tenderness at the surgery site, a large amount of drainage or bleeding, severe pain, foul-smelling drainage, redness, swelling).    [phone] Nurse Advice LIne

## 2018-05-03 NOTE — ANESTHESIA PREPROCEDURE EVALUATION
Anesthesia Evaluation     . Pt has had prior anesthetic. Type: General    No history of anesthetic complications          ROS/MED HX    ENT/Pulmonary:     (+)sleep apnea, moderate COPD, uses CPAP Bipap 15/7 cmH2O , recent URI resolved On Ceftin.  completed Medrol. : . .    Neurologic:     (+)Trigeminal neuralgia    Cardiovascular:     (+) Dyslipidemia, hypertension-range: 120-150 systolic, ---. Taking blood thinners : Instructions Given to patient: Warfarin . CHF etiology: NICM Last EF: 45-50% date: 10/2016 . fainting (syncope). :. dysrhythmias a-fib, . pulmonary hypertension, Previous cardiac testing Echodate:10/2016results:EF 45-50%date: results:ECG reviewed date:11/2017 results:SB with BBBCath date: 10/2014 results:          METS/Exercise Tolerance: Comment: Can walk 6 blocks.  Does yard work.  >4 METS   Hematologic:  - neg hematologic  ROS       Musculoskeletal:  - neg musculoskeletal ROS       GI/Hepatic:  - neg GI/hepatic ROS       Renal/Genitourinary:  - ROS Renal section negative       Endo: Comment: Recent steroids.     (+) Obesity, .      Psychiatric:     (+) psychiatric history other (comment) (PTSD)      Infectious Disease:   (+) Other Infectious Disease (recent URI, achy, runny nose.  Feels fine now.  Cough resolved. )       Malignancy:      - no malignancy   Other:    - neg other ROS                 Physical Exam  Normal systems: cardiovascular, pulmonary and dental    Airway   Mallampati: II  TM distance: >3 FB  Neck ROM: full    Dental     Cardiovascular   Rhythm and rate: regular and normal      Pulmonary    breath sounds clear to auscultation                    Anesthesia Plan      History & Physical Review  History and physical reviewed and following examination; no interval change.    ASA Status:  3 .    NPO Status:  > 6 hours    Plan for MAC with Other induction. Reason for MAC:  Deep or markedly invasive procedure (G8)         Postoperative Care      Consents  Anesthetic plan, risks, benefits  and alternatives discussed with:  Patient.  Use of blood products discussed: No .   .                          .

## 2018-05-03 NOTE — ANESTHESIA POSTPROCEDURE EVALUATION
Patient: Home Valdez    Procedure(s):  PHACOEMULSIFICATION WITH STANDARD INTRAOCULAR LENS IMPLANT RIGHT - Wound Class: I-Clean    Diagnosis:CATARACT  Diagnosis Additional Information: No value filed.    Anesthesia Type:  MAC    Note:  Anesthesia Post Evaluation    Patient location during evaluation: Phase 2  Patient participation: Able to fully participate in evaluation  Level of consciousness: awake  Pain management: adequate  Airway patency: patent  Cardiovascular status: acceptable and hemodynamically stable  Respiratory status: acceptable, room air and nonlabored ventilation  Hydration status: stable  PONV: none     Anesthetic complications: None    Comments: Patient was happy with the anesthesia care received and no anesthesia related complications were noted.  I will follow up with the patient again if it is needed.        Last vitals:  Vitals:    05/03/18 0921 05/03/18 1024   BP: 142/83    Resp: 16 (P) 16   Temp: 98  F (36.7  C)    SpO2: 95%          Electronically Signed By: REJI Marie CRNA  May 3, 2018  11:14 AM

## 2018-05-03 NOTE — ANESTHESIA CARE TRANSFER NOTE
Patient: Home Valdez    Procedure(s):  PHACOEMULSIFICATION WITH STANDARD INTRAOCULAR LENS IMPLANT RIGHT - Wound Class: I-Clean    Diagnosis: CATARACT  Diagnosis Additional Information: No value filed.    Anesthesia Type:   MAC     Note:  Airway :Room Air  Patient transferred to:Phase II  Handoff Report: Identifed the Patient, Identified the Reponsible Provider, Reviewed the pertinent medical history, Discussed the surgical course, Reviewed Intra-OP anesthesia mangement and issues during anesthesia, Set expectations for post-procedure period and Allowed opportunity for questions and acknowledgement of understanding      Vitals: (Last set prior to Anesthesia Care Transfer)    CRNA VITALS  5/3/2018 0953 - 5/3/2018 1026      5/3/2018             Pulse: 57    SpO2: 98 %    Resp Rate (observed): 14                Electronically Signed By: REJI Marie CRNA  May 3, 2018  10:26 AM

## 2018-05-03 NOTE — IP AVS SNAPSHOT
[facility] Phase II    [address]     [place] [address]    Phone:  [phone]                                       After Visit Summary   5/3/2018    Home Valdez    MRN: [id number]           After Visit Summary Signature Page     I have received my discharge instructions, and my questions have been answered. I have discussed any challenges I see with this plan with the nurse or doctor.    ..........................................................................................................................................  Patient/Patient Representative Signature      ..........................................................................................................................................  Patient Representative Print Name and Relationship to Patient    ..................................................               ................................................  Date                                            Time    ..........................................................................................................................................  Reviewed by Signature/Title    ...................................................              ..............................................  Date                                                            Time

## 2018-05-08 DIAGNOSIS — J30.2 CHRONIC SEASONAL ALLERGIC RHINITIS DUE TO OTHER ALLERGEN: ICD-10-CM

## 2018-05-08 DIAGNOSIS — Z87.891 PERSONAL HISTORY OF TOBACCO USE, PRESENTING HAZARDS TO HEALTH: ICD-10-CM

## 2018-05-09 DIAGNOSIS — J44.1 COPD EXACERBATION (H): ICD-10-CM

## 2018-05-09 RX ORDER — BUPROPION HYDROCHLORIDE 150 MG/1
TABLET, EXTENDED RELEASE ORAL
Qty: 180 TABLET | Refills: 1 | Status: SHIPPED | OUTPATIENT
Start: 2018-05-09 | End: 2018-09-10

## 2018-05-09 RX ORDER — IPRATROPIUM BROMIDE AND ALBUTEROL SULFATE 2.5; .5 MG/3ML; MG/3ML
SOLUTION RESPIRATORY (INHALATION)
Qty: 540 ML | Refills: 3 | Status: ON HOLD | OUTPATIENT
Start: 2018-05-09 | End: 2018-09-19

## 2018-05-09 RX ORDER — CETIRIZINE HYDROCHLORIDE 10 MG/1
TABLET ORAL
Qty: 90 TABLET | Refills: 1 | Status: SHIPPED | OUTPATIENT
Start: 2018-05-09 | End: 2018-07-30

## 2018-05-09 NOTE — TELEPHONE ENCOUNTER
"Bupropion  Last Written Prescription Date:  5/09/2017  Last Fill Quantity: 180,  # refills: 3   Last office visit: 4/17/2018 with prescribing provider:  Dr. Frida Ingram Office Visit:      Cetirizine  Last Written Prescription Date:  4/11/2018  Last Fill Quantity: 90,  # refills: 1   Last office visit: 4/17/2018 with prescribing provider:  Dr. Frida Ingram Office Visit:      Requested Prescriptions   Pending Prescriptions Disp Refills     buPROPion (WELLBUTRIN SR) 150 MG 12 hr tablet [Pharmacy Med Name: BUPROPION 150MG ER (SR) TAB] 180 tablet      Sig: TAKE 1 TABLET BY MOUTH TWICE A DAY    SSRIs Protocol Passed    5/8/2018  8:39 PM       Passed - Recent (12 mo) or future (30 days) visit within the authorizing provider's specialty    Patient had office visit in the last 12 months or has a visit in the next 30 days with authorizing provider or within the authorizing provider's specialty.  See \"Patient Info\" tab in inbasket, or \"Choose Columns\" in Meds & Orders section of the refill encounter.           Passed - Medication is Bupropion    If the medication is Bupropion (Wellbutrin), and the patient is taking for smoking cessation; OK to refill.         Passed - Patient is age 18 or older        cetirizine (ZYRTEC) 10 MG tablet [Pharmacy Med Name: CETIRIZINE 10MG TAB] 90 tablet      Sig: TAKE 1 TABLET BY MOUTH EVERY EVENING    Antihistamines Protocol Failed    5/8/2018  8:39 PM       Failed - Patient is 3-64 years of age    Apply weight-based dosing for peds patients age 3 - 12 years of age.    Forward request to provider for patients under the age of 3 or over the age of 64.         Passed - Recent (12 mo) or future (30 days) visit within the authorizing provider's specialty    Patient had office visit in the last 12 months or has a visit in the next 30 days with authorizing provider or within the authorizing provider's specialty.  See \"Patient Info\" tab in inbasket, or \"Choose Columns\" in Meds & Orders section of the " refill encounter.

## 2018-05-09 NOTE — TELEPHONE ENCOUNTER
Routing refill request to provider for review/approval because:  Has not been seen other than pre ops in almost a year. Will have provider review refill request    Kari Mora RN  [facility]

## 2018-05-09 NOTE — TELEPHONE ENCOUNTER
"Requested Prescriptions   Pending Prescriptions Disp Refills     ipratropium - albuterol 0.5 mg/2.5 mg/3 mL (DUONEB) 0.5-2.5 (3) MG/3ML neb solution [Pharmacy Med Name: IPRATROP/ALBUT 0.5-3MG/3ML INH] 540 mL      Sig: TAKE 1 VIAL (3 MLS) BY NEBULIZATION EVERY 4 HOURS AS NEEDED FOR SHORTNESS OF BREATH / DYSPNEA OR WHEEZING    Short-Acting Beta Agonist Inhalers Protocol  Passed    5/9/2018 11:20 AM       Passed - Patient is age 12 or older       Passed - Recent (12 mo) or future (30 days) visit within the authorizing provider's specialty    Patient had office visit in the last 12 months or has a visit in the next 30 days with authorizing provider or within the authorizing provider's specialty.  See \"Patient Info\" tab in inbasket, or \"Choose Columns\" in Meds & Orders section of the refill encounter.            Last Written Prescription Date:  3/28/18  Last Fill Quantity: 540,  # refills: 0   Last office visit: 4/17/2018 with prescribing provider:     Future Office Visit:      "

## 2018-05-09 NOTE — TELEPHONE ENCOUNTER
Wellbutrin  Routing refill request to provider for review/approval because:  Drug not on the [facility] refill protocol for associated diagnosis    Zyrtec  Routing refill request to provider for review/approval because:  Patient older than 64    Kari Mora RN  [facility]

## 2018-05-14 ENCOUNTER — ANTICOAGULATION THERAPY VISIT (OUTPATIENT)
Dept: ANTICOAGULATION | Facility: OTHER | Age: 68
End: 2018-05-14
Payer: MEDICARE

## 2018-05-14 DIAGNOSIS — Z79.01 LONG-TERM (CURRENT) USE OF ANTICOAGULANTS: ICD-10-CM

## 2018-05-14 DIAGNOSIS — I48.91 ATRIAL FIBRILLATION WITH RVR (H): ICD-10-CM

## 2018-05-14 LAB — INR POINT OF CARE: 2.6 (ref 0.86–1.14)

## 2018-05-14 PROCEDURE — 99207 ZZC NO CHARGE NURSE ONLY: CPT

## 2018-05-14 PROCEDURE — 36416 COLLJ CAPILLARY BLOOD SPEC: CPT

## 2018-05-14 PROCEDURE — 85610 PROTHROMBIN TIME: CPT | Mod: QW

## 2018-05-14 NOTE — PROGRESS NOTES
ANTICOAGULATION FOLLOW-UP CLINIC VISIT    Patient Name:  Home Valdez  Date:  5/14/2018  Contact Type:  Face to Face    SUBJECTIVE:     Patient Findings     Positives No Problem Findings           OBJECTIVE    INR Protime   Date Value Ref Range Status   05/14/2018 2.6 (A) 0.86 - 1.14 Final       ASSESSMENT / PLAN  INR assessment THER    Recheck INR In: 4 WEEKS    INR Location Clinic      Anticoagulation Summary as of 5/14/2018     INR goal 2.0-3.0   Today's INR 2.6   Maintenance plan 7.5 mg (2.5 mg x 3) on Tue, Thu, Sat; 5 mg (2.5 mg x 2) all other days   Full instructions 7.5 mg on Tue, Thu, Sat; 5 mg all other days   Weekly total 42.5 mg   No change documented Shameka Carey RN   Plan last modified Shameka Carey RN (1/19/2018)   Next INR check 6/11/2018   Target end date     Indications   Atrial fibrillation with RVR (H) [I48.91]  Long-term (current) use of anticoagulants [Z79.01] [Z79.01]         Anticoagulation Episode Summary     INR check location     Preferred lab     Send INR reminders to [facility]    Comments 2.5 MG TABLETS, likes print out, PM dose      Anticoagulation Care Providers     Provider Role Specialty Phone number    Sandip Vega DO [facility] Internal Medicine [phone]            See the Encounter Report to view Anticoagulation Flowsheet and Dosing Calendar (Go to Encounters tab in chart review, and find the Anticoagulation Therapy Visit)    Dosage adjustment made based on physician directed care plan.    Shameka Carey RN

## 2018-05-14 NOTE — MR AVS SNAPSHOT
Home Valdez   5/14/2018 1:30 PM   Anticoagulation Therapy Visit    Description:  68 year old male   Provider:  [facility]   Department:  [facility]           INR as of 5/14/2018     Today's INR 2.6      Anticoagulation Summary as of 5/14/2018     INR goal 2.0-3.0   Today's INR 2.6   Full instructions 7.5 mg on Tue, Thu, Sat; 5 mg all other days   Next INR check 6/11/2018    Indications   Atrial fibrillation with RVR (H) [I48.91]  Long-term (current) use of anticoagulants [Z79.01] [Z79.01]         Your next Anticoagulation Clinic appointment(s)     Jun 11, 2018  8:15 AM CDT   Anticoagulation [facility] with [facility]   [facility]    [address] [facility] [address]   [phone]              Contact Numbers     Clinic Number:         May 2018 Details    Sun Mon Tue Wed Thu Fri Sat       1               2               3               4               5                 6               7               8               9               10               11               12                 13               14      5 mg   See details      15      7.5 mg         16      5 mg         17      7.5 mg         18      5 mg         19      7.5 mg           20      5 mg         21      5 mg         22      7.5 mg         23      5 mg         24      7.5 mg         25      5 mg         26      7.5 mg           27      5 mg         28      5 mg         29      7.5 mg         30      5 mg         31      7.5 mg            Date Details   05/14 This INR check               How to take your warfarin dose     To take:  5 mg Take 2 of the 2.5 mg tablets.    To take:  7.5 mg Take 3 of the 2.5 mg tablets.           June 2018 Details    Sun Mon Tue Wed Thu Fri Sat          1      5 mg         2      7.5 mg           3      5 mg         4      5 mg         5      7.5 mg         6      5 mg         7      7.5 mg         8      5 mg         9      7.5 mg           10      5 mg         11             12               13               14               15               16                 17               18               19               20               21               22               23                 24               25               26               27               28               29               30                Date Details   No additional details    Date of next INR:  6/11/2018         How to take your warfarin dose     To take:  5 mg Take 2 of the 2.5 mg tablets.    To take:  7.5 mg Take 3 of the 2.5 mg tablets.

## 2018-05-18 DIAGNOSIS — J44.1 CHRONIC OBSTRUCTIVE PULMONARY DISEASE WITH ACUTE EXACERBATION (H): ICD-10-CM

## 2018-05-18 RX ORDER — ALBUTEROL SULFATE 90 UG/1
AEROSOL, METERED RESPIRATORY (INHALATION)
Qty: 18 G | Refills: 3 | Status: SHIPPED | OUTPATIENT
Start: 2018-05-18 | End: 2018-08-27

## 2018-05-18 NOTE — TELEPHONE ENCOUNTER
Home called and he made a mistake, did not realize he only had a couple uses left on his inhaler and he really needs it when it's hot out.    Wilma RO

## 2018-05-18 NOTE — TELEPHONE ENCOUNTER
"Last Written Prescription Date:  5/9/17  Last Fill Quantity: 6,  # refills: 3   Last office visit: with prescribing provider:  7/19/17   Future Office Visit:    Requested Prescriptions   Pending Prescriptions Disp Refills     VENTOLIN  (90 Base) MCG/ACT Inhaler [Pharmacy Med Name: VENTOLIN HFA 90MCG/ACT INHALER] 18 g      Sig: INHALE 2 PUFFS BY MOUTH EVERY 4 HOURS AS NEEDED FOR SHORTNESS OF BREATH/DYSPNEA    Asthma Maintenance Inhalers - Anticholinergics Passed    5/18/2018  3:53 PM       Passed - Patient is age 12 years or older       Passed - Recent (12 mo) or future (30 days) visit within the authorizing provider's specialty    Patient had office visit in the last 12 months or has a visit in the next 30 days with authorizing provider or within the authorizing provider's specialty.  See \"Patient Info\" tab in inbasket, or \"Choose Columns\" in Meds & Orders section of the refill encounter.            Sarahi Beckham MA  "

## 2018-06-06 DIAGNOSIS — I48.91 ATRIAL FIBRILLATION WITH RVR (H): ICD-10-CM

## 2018-06-06 RX ORDER — WARFARIN SODIUM 2.5 MG/1
TABLET ORAL
Qty: 210 TABLET | Refills: 0 | Status: ON HOLD | OUTPATIENT
Start: 2018-06-06 | End: 2018-07-18

## 2018-06-06 NOTE — TELEPHONE ENCOUNTER
"Requested Prescriptions   Pending Prescriptions Disp Refills     JANTOVEN 2.5 MG tablet [Pharmacy Med Name: JANTOVEN 2.5MG TAB] 210 tablet      Sig: TAKE 7.5 MG (3 TABS) ON TUES, THURS, SAT AND 5 MG (2 TABS) ALL OTHERDAYS, OR AS DIRECTED BY THE COUMADIN CLINIC.    Vitamin K Antagonists Failed    6/6/2018  4:37 AM       Failed - INR is within goal in the past 6 weeks    Confirm INR is within goal in the past 6 weeks.     Recent Labs   Lab Test 05/14/18   INR  2.6*                      Passed - Recent (12 mo) or future (30 days) visit within the authorizing provider's specialty    Patient had office visit in the last 12 months or has a visit in the next 30 days with authorizing provider or within the authorizing provider's specialty.  See \"Patient Info\" tab in inbasket, or \"Choose Columns\" in Meds & Orders section of the refill encounter.           Passed - Patient is 18 years of age or older          Last Written Prescription Date:  3/2/18  Last Fill Quantity: 210,  # refills: 0   Last Office Visit with G, P or [facility] prescribing provider:  4/17/18   Future Office Visit:       "

## 2018-06-11 ENCOUNTER — ANTICOAGULATION THERAPY VISIT (OUTPATIENT)
Dept: ANTICOAGULATION | Facility: OTHER | Age: 68
End: 2018-06-11
Payer: MEDICARE

## 2018-06-11 DIAGNOSIS — I48.91 ATRIAL FIBRILLATION WITH RVR (H): ICD-10-CM

## 2018-06-11 DIAGNOSIS — Z79.01 LONG-TERM (CURRENT) USE OF ANTICOAGULANTS: ICD-10-CM

## 2018-06-11 LAB — INR POINT OF CARE: 2.5 (ref 0.86–1.14)

## 2018-06-11 PROCEDURE — 85610 PROTHROMBIN TIME: CPT | Mod: QW

## 2018-06-11 PROCEDURE — 99207 ZZC NO CHARGE NURSE ONLY: CPT

## 2018-06-11 PROCEDURE — 36416 COLLJ CAPILLARY BLOOD SPEC: CPT

## 2018-06-11 NOTE — MR AVS SNAPSHOT
Home Valdez   6/11/2018 8:15 AM   Anticoagulation Therapy Visit    Description:  68 year old male   Provider:  [facility]   Department:  [facility]           INR as of 6/11/2018     Today's INR 2.5      Anticoagulation Summary as of 6/11/2018     INR goal 2.0-3.0   Today's INR 2.5   Full warfarin instructions 7.5 mg on Tue, Thu, Sat; 5 mg all other days   Next INR check 7/16/2018    Indications   Atrial fibrillation with RVR (H) [I48.91]  Long-term (current) use of anticoagulants [Z79.01] [Z79.01]         Your next Anticoagulation Clinic appointment(s)     Jun 11, 2018  8:15 AM CDT   Anticoagulation Visit with  ANTI COAG   [facility] ([facility]    [address] [facility] [address]   [phone]            Jul 16, 2018  8:15 AM CDT   Anticoagulation Visit with  ANTI COAG   [facility] ([facility]    [address] [facility] [address]   [phone]              Contact Numbers     Clinic Number:         June 2018 Details    Sun Mon Tue Wed Thu Fri Sat          1               2                 3               4               5               6               7               8               9                 10               11      5 mg   See details      12      7.5 mg         13      5 mg         14      7.5 mg         15      5 mg         16      7.5 mg           17      5 mg         18      5 mg         19      7.5 mg         20      5 mg         21      7.5 mg         22      5 mg         23      7.5 mg           24      5 mg         25      5 mg         26      7.5 mg         27      5 mg         28      7.5 mg         29      5 mg         30      7.5 mg          Date Details   06/11 This INR check               How to take your warfarin dose     To take:  5 mg Take 2 of the 2.5 mg tablets.    To take:  7.5 mg Take 3 of the 2.5 mg tablets.           July 2018 Details    Sun Mon Tue Wed Thu Fri Sat     1      5 mg         2      5 mg          3      7.5 mg         4      5 mg         5      7.5 mg         6      5 mg         7      7.5 mg           8      5 mg         9      5 mg         10      7.5 mg         11      5 mg         12      7.5 mg         13      5 mg         14      7.5 mg           15      5 mg         16            17               18               19               20               21                 22               23               24               25               26               27               28                 29               30               31                    Date Details   No additional details    Date of next INR:  7/16/2018         How to take your warfarin dose     To take:  5 mg Take 2 of the 2.5 mg tablets.    To take:  7.5 mg Take 3 of the 2.5 mg tablets.

## 2018-06-11 NOTE — PROGRESS NOTES
ANTICOAGULATION FOLLOW-UP CLINIC VISIT    Patient Name:  Home Valdez  Date:  6/11/2018  Contact Type:  Face to Face    SUBJECTIVE:     Patient Findings     Positives No Problem Findings           OBJECTIVE    INR Protime   Date Value Ref Range Status   06/11/2018 2.5 (A) 0.86 - 1.14 Final       ASSESSMENT / PLAN  INR assessment THER    Recheck INR In: 5 WEEKS    INR Location Clinic      Anticoagulation Summary as of 6/11/2018     INR goal 2.0-3.0   Today's INR 2.5   Warfarin maintenance plan 7.5 mg (2.5 mg x 3) on Tue, Thu, Sat; 5 mg (2.5 mg x 2) all other days   Full warfarin instructions 7.5 mg on Tue, Thu, Sat; 5 mg all other days   Weekly warfarin total 42.5 mg   No change documented Shameka Carey RN   Plan last modified Shameka Carey RN (1/19/2018)   Next INR check 7/16/2018   Target end date     Indications   Atrial fibrillation with RVR (H) [I48.91]  Long-term (current) use of anticoagulants [Z79.01] [Z79.01]         Anticoagulation Episode Summary     INR check location     Preferred lab     Send INR reminders to [facility]    Comments 2.5 MG TABLETS, likes print out, PM dose      Anticoagulation Care Providers     Provider Role Specialty Phone number    PrimosoraidaSandip DO [facility] Internal Medicine [phone]            See the Encounter Report to view Anticoagulation Flowsheet and Dosing Calendar (Go to Encounters tab in chart review, and find the Anticoagulation Therapy Visit)    Dosage adjustment made based on physician directed care plan.    Shameka Carey RN

## 2018-06-13 DIAGNOSIS — J43.1 PANLOBULAR EMPHYSEMA (H): Primary | ICD-10-CM

## 2018-06-13 RX ORDER — NEBULIZER
EACH MISCELLANEOUS
Qty: 1 EACH | Refills: 1 | Status: ON HOLD | OUTPATIENT
Start: 2018-06-13 | End: 2018-09-19

## 2018-06-13 NOTE — TELEPHONE ENCOUNTER
Nebulizers (AIRS DISPOSABLE NEBULIZER) MISC      Last Written Prescription Date:  4/19/17  Last Fill Quantity: 1,   # refills: 11  Last Office Visit: 4/17/18  Future Office visit:       Routing refill request to provider for review/approval because:  Drug not on the [facility], P or [facility] refill protocol or controlled substance

## 2018-06-27 DIAGNOSIS — I48.91 ATRIAL FIBRILLATION (H): ICD-10-CM

## 2018-06-27 RX ORDER — AMIODARONE HYDROCHLORIDE 200 MG/1
100 TABLET ORAL DAILY
Qty: 45 TABLET | Refills: 1 | Status: ON HOLD | OUTPATIENT
Start: 2018-06-27 | End: 2018-07-31

## 2018-07-03 DIAGNOSIS — J43.1 PANLOBULAR EMPHYSEMA (H): ICD-10-CM

## 2018-07-03 DIAGNOSIS — J30.2 CHRONIC SEASONAL ALLERGIC RHINITIS DUE TO OTHER ALLERGEN: ICD-10-CM

## 2018-07-03 DIAGNOSIS — J31.0 CHRONIC RHINITIS: ICD-10-CM

## 2018-07-03 NOTE — TELEPHONE ENCOUNTER
"Requested Prescriptions   Pending Prescriptions Disp Refills     fluticasone (FLONASE) 50 MCG/ACT spray [Pharmacy Med Name: FLUTICASONE 50MCG/ACT SPRAY] 16 g      Sig: INHALE 1 TO 2 SPRAYS INTO EACH NOSTRIL EVERY DAY    Inhaled Steroids Protocol Passed    7/3/2018  2:05 PM       Passed - Patient is age 12 or older       Passed - Recent (12 mo) or future (30 days) visit within the authorizing provider's specialty    Patient had office visit in the last 12 months or has a visit in the next 30 days with authorizing provider or within the authorizing provider's specialty.  See \"Patient Info\" tab in inbasket, or \"Choose Columns\" in Meds & Orders section of the refill encounter.            ADVAIR DISKUS 250-50 MCG/DOSE diskus inhaler [Pharmacy Med Name: fluticasone-salmeterol (ADVAIR) 250-50 MCG/DOSE diskus inhaler]       Sig: INHALE 1 PUFF BY MOUTH TWICE A DAY    Inhaled Steroids Protocol Passed    7/3/2018  2:05 PM       Passed - Patient is age 12 or older       Passed - Recent (12 mo) or future (30 days) visit within the authorizing provider's specialty    Patient had office visit in the last 12 months or has a visit in the next 30 days with authorizing provider or within the authorizing provider's specialty.  See \"Patient Info\" tab in inbasket, or \"Choose Columns\" in Meds & Orders section of the refill encounter.              Last Written Prescription Date:  5/9/17  Last Fill Quantity: 16 g,  # refills: 6   [place] [facility] [place] [facility] or [facility] prescribing provider:  4/17/18   Future Office Visit:     Advair Diskus       Last Written Prescription Date:  5/9/17  Last Fill Quantity: 3 inhaler,   # refills: 3  Last Office Visit: 4/17/18  Future Office visit:           "

## 2018-07-03 NOTE — TELEPHONE ENCOUNTER
"Requested Prescriptions   Pending Prescriptions Disp Refills     montelukast (SINGULAIR) 10 MG tablet 90 tablet 1     Sig: TAKE 1 TABLET BY MOUTH DAILY AT BEDTIME    Leukotriene Inhibitors Protocol Passed    7/3/2018  3:47 PM       Passed - Patient is age 12 or older    If patient is under 16, ok to refill using age based dosing.          Passed - Recent (12 mo) or future (30 days) visit within the authorizing provider's specialty    Patient had office visit in the last 12 months or has a visit in the next 30 days with authorizing provider or within the authorizing provider's specialty.  See \"Patient Info\" tab in inbasket, or \"Choose Columns\" in Meds & Orders section of the refill encounter.              Last Written Prescription Date:  9-6-17  Last Fill Quantity: 30,  # refills: 10   Last Office Visit with [facility], P or [facility] prescribing provider:  4/17/18   Future Office Visit:       "

## 2018-07-05 RX ORDER — MONTELUKAST SODIUM 10 MG/1
TABLET ORAL
Qty: 90 TABLET | Refills: 0 | Status: SHIPPED | OUTPATIENT
Start: 2018-07-05 | End: 2018-09-13

## 2018-07-05 RX ORDER — FLUTICASONE PROPIONATE 50 MCG
SPRAY, SUSPENSION (ML) NASAL
Qty: 16 G | Refills: 1 | Status: SHIPPED | OUTPATIENT
Start: 2018-07-05 | End: 2018-08-17

## 2018-07-05 NOTE — TELEPHONE ENCOUNTER
Prescription approved per [facility] Refill Protocol.    Kari Mora RN  [facility]

## 2018-07-05 NOTE — TELEPHONE ENCOUNTER
Prescription approved per [facility] Refill Protocol.    Kari Mora RN  [facility]

## 2018-07-06 ENCOUNTER — TRANSFERRED RECORDS (OUTPATIENT)
Dept: HEALTH INFORMATION MANAGEMENT | Facility: CLINIC | Age: 68
End: 2018-07-06

## 2018-07-17 ENCOUNTER — APPOINTMENT (OUTPATIENT)
Dept: GENERAL RADIOLOGY | Facility: CLINIC | Age: 68
DRG: 190 | End: 2018-07-17
Attending: EMERGENCY MEDICINE
Payer: MEDICARE

## 2018-07-17 ENCOUNTER — HOSPITAL ENCOUNTER (INPATIENT)
Facility: CLINIC | Age: 68
LOS: 1 days | Discharge: HOME | DRG: 190 | End: 2018-07-18
Attending: EMERGENCY MEDICINE | Admitting: FAMILY MEDICINE
Payer: MEDICARE

## 2018-07-17 DIAGNOSIS — J18.9 COMMUNITY ACQUIRED PNEUMONIA, UNSPECIFIED LATERALITY: ICD-10-CM

## 2018-07-17 DIAGNOSIS — Z79.01 LONG-TERM (CURRENT) USE OF ANTICOAGULANTS: Primary | ICD-10-CM

## 2018-07-17 DIAGNOSIS — J18.9 COMMUNITY ACQUIRED PNEUMONIA OF RIGHT UPPER LOBE OF LUNG: ICD-10-CM

## 2018-07-17 DIAGNOSIS — I48.91 ATRIAL FIBRILLATION WITH RVR (H): ICD-10-CM

## 2018-07-17 LAB
ANION GAP SERPL CALCULATED.3IONS-SCNC: 5 MMOL/L (ref 3–14)
BASOPHILS # BLD AUTO: 0 10E9/L (ref 0–0.2)
BASOPHILS NFR BLD AUTO: 0.2 %
BUN SERPL-MCNC: 18 MG/DL (ref 7–30)
CALCIUM SERPL-MCNC: 8.5 MG/DL (ref 8.5–10.1)
CHLORIDE SERPL-SCNC: 107 MMOL/L (ref 94–109)
CO2 SERPL-SCNC: 31 MMOL/L (ref 20–32)
CREAT SERPL-MCNC: 1.38 MG/DL (ref 0.66–1.25)
DIFFERENTIAL METHOD BLD: ABNORMAL
EOSINOPHIL NFR BLD AUTO: 2.3 %
ERYTHROCYTE [DISTWIDTH] IN BLOOD BY AUTOMATED COUNT: 13.1 % (ref 10–15)
GFR SERPL CREATININE-BSD FRML MDRD: 51 ML/MIN/1.7M2
GLUCOSE SERPL-MCNC: 123 MG/DL (ref 70–99)
GRAM STN SPEC: NORMAL
HCT VFR BLD AUTO: 47.2 % (ref 40–53)
HGB BLD-MCNC: 15 G/DL (ref 13.3–17.7)
IMM GRANULOCYTES # BLD: 0.2 10E9/L (ref 0–0.4)
IMM GRANULOCYTES NFR BLD: 1.2 %
INR PPP: 5.54 (ref 0.86–1.14)
LACTATE BLD-SCNC: 1.1 MMOL/L (ref 0.4–1.9)
LYMPHOCYTES # BLD AUTO: 0.9 10E9/L (ref 0.8–5.3)
LYMPHOCYTES NFR BLD AUTO: 7.7 %
MCH RBC QN AUTO: 29.5 PG (ref 26.5–33)
MCHC RBC AUTO-ENTMCNC: 31.8 G/DL (ref 31.5–36.5)
MCV RBC AUTO: 93 FL (ref 78–100)
MONOCYTES # BLD AUTO: 1.2 10E9/L (ref 0–1.3)
MONOCYTES NFR BLD AUTO: 9.8 %
NEUTROPHILS # BLD AUTO: 9.6 10E9/L (ref 1.6–8.3)
NEUTROPHILS NFR BLD AUTO: 78.8 %
NRBC # BLD AUTO: 0 10*3/UL
NRBC BLD AUTO-RTO: 0 /100
NT-PROBNP SERPL-MCNC: ABNORMAL PG/ML (ref 0–900)
PLATELET # BLD AUTO: 262 10E9/L (ref 150–450)
POTASSIUM SERPL-SCNC: 4.1 MMOL/L (ref 3.4–5.3)
RBC # BLD AUTO: 5.08 10E12/L (ref 4.4–5.9)
SODIUM SERPL-SCNC: 143 MMOL/L (ref 133–144)
SPECIMEN SOURCE: NORMAL
SPECIMEN SOURCE: NORMAL
TROPONIN I SERPL-MCNC: 0.03 UG/L (ref 0–0.04)
WBC # BLD AUTO: 12.1 10E9/L (ref 4–11)

## 2018-07-17 PROCEDURE — 99285 EMERGENCY DEPT VISIT HI MDM: CPT | Mod: 25 | Performed by: EMERGENCY MEDICINE

## 2018-07-17 PROCEDURE — 25000128 H RX IP 250 OP 636: Performed by: EMERGENCY MEDICINE

## 2018-07-17 PROCEDURE — 85025 COMPLETE CBC W/AUTO DIFF WBC: CPT | Performed by: EMERGENCY MEDICINE

## 2018-07-17 PROCEDURE — 36415 COLL VENOUS BLD VENIPUNCTURE: CPT | Performed by: OTOLARYNGOLOGY

## 2018-07-17 PROCEDURE — 71046 X-RAY EXAM CHEST 2 VIEWS: CPT | Mod: TC

## 2018-07-17 PROCEDURE — 83880 ASSAY OF NATRIURETIC PEPTIDE: CPT | Performed by: EMERGENCY MEDICINE

## 2018-07-17 PROCEDURE — 93010 ELECTROCARDIOGRAM REPORT: CPT | Performed by: EMERGENCY MEDICINE

## 2018-07-17 PROCEDURE — 94640 AIRWAY INHALATION TREATMENT: CPT | Mod: 76

## 2018-07-17 PROCEDURE — 12000007 ZZH R&B INTERMEDIATE

## 2018-07-17 PROCEDURE — 87040 BLOOD CULTURE FOR BACTERIA: CPT | Performed by: EMERGENCY MEDICINE

## 2018-07-17 PROCEDURE — 83605 ASSAY OF LACTIC ACID: CPT | Performed by: OTOLARYNGOLOGY

## 2018-07-17 PROCEDURE — A9270 NON-COVERED ITEM OR SERVICE: HCPCS | Mod: GY | Performed by: FAMILY MEDICINE

## 2018-07-17 PROCEDURE — 93005 ELECTROCARDIOGRAM TRACING: CPT | Performed by: EMERGENCY MEDICINE

## 2018-07-17 PROCEDURE — 25000125 ZZHC RX 250: Performed by: FAMILY MEDICINE

## 2018-07-17 PROCEDURE — 99222 1ST HOSP IP/OBS MODERATE 55: CPT | Performed by: FAMILY MEDICINE

## 2018-07-17 PROCEDURE — 25000125 ZZHC RX 250: Performed by: EMERGENCY MEDICINE

## 2018-07-17 PROCEDURE — 36415 COLL VENOUS BLD VENIPUNCTURE: CPT | Performed by: FAMILY MEDICINE

## 2018-07-17 PROCEDURE — 84484 ASSAY OF TROPONIN QUANT: CPT | Performed by: EMERGENCY MEDICINE

## 2018-07-17 PROCEDURE — 25000132 ZZH RX MED GY IP 250 OP 250 PS 637: Mod: GY | Performed by: FAMILY MEDICINE

## 2018-07-17 PROCEDURE — 87070 CULTURE OTHR SPECIMN AEROBIC: CPT | Performed by: FAMILY MEDICINE

## 2018-07-17 PROCEDURE — 87081 CULTURE SCREEN ONLY: CPT | Performed by: FAMILY MEDICINE

## 2018-07-17 PROCEDURE — 25000125 ZZHC RX 250

## 2018-07-17 PROCEDURE — 85610 PROTHROMBIN TIME: CPT | Performed by: FAMILY MEDICINE

## 2018-07-17 PROCEDURE — 94640 AIRWAY INHALATION TREATMENT: CPT

## 2018-07-17 PROCEDURE — 87205 SMEAR GRAM STAIN: CPT | Performed by: FAMILY MEDICINE

## 2018-07-17 PROCEDURE — 80048 BASIC METABOLIC PNL TOTAL CA: CPT | Performed by: EMERGENCY MEDICINE

## 2018-07-17 RX ORDER — IPRATROPIUM BROMIDE AND ALBUTEROL SULFATE 2.5; .5 MG/3ML; MG/3ML
3 SOLUTION RESPIRATORY (INHALATION) ONCE
Status: COMPLETED | OUTPATIENT
Start: 2018-07-17 | End: 2018-07-17

## 2018-07-17 RX ORDER — METHYLPREDNISOLONE SODIUM SUCCINATE 125 MG/2ML
125 INJECTION, POWDER, LYOPHILIZED, FOR SOLUTION INTRAMUSCULAR; INTRAVENOUS ONCE
Status: COMPLETED | OUTPATIENT
Start: 2018-07-17 | End: 2018-07-17

## 2018-07-17 RX ORDER — BUPROPION HYDROCHLORIDE 150 MG/1
150 TABLET, FILM COATED, EXTENDED RELEASE ORAL 2 TIMES DAILY
Status: DISCONTINUED | OUTPATIENT
Start: 2018-07-17 | End: 2018-07-18 | Stop reason: HOSPADM

## 2018-07-17 RX ORDER — NALOXONE HYDROCHLORIDE 0.4 MG/ML
.1-.4 INJECTION, SOLUTION INTRAMUSCULAR; INTRAVENOUS; SUBCUTANEOUS
Status: DISCONTINUED | OUTPATIENT
Start: 2018-07-17 | End: 2018-07-18 | Stop reason: HOSPADM

## 2018-07-17 RX ORDER — FLUTICASONE PROPIONATE 50 MCG
1 SPRAY, SUSPENSION (ML) NASAL DAILY
Status: DISCONTINUED | OUTPATIENT
Start: 2018-07-17 | End: 2018-07-18 | Stop reason: HOSPADM

## 2018-07-17 RX ORDER — AMOXICILLIN 250 MG
2 CAPSULE ORAL 2 TIMES DAILY PRN
Status: DISCONTINUED | OUTPATIENT
Start: 2018-07-17 | End: 2018-07-18 | Stop reason: HOSPADM

## 2018-07-17 RX ORDER — CEFTRIAXONE 2 G/1
2 INJECTION, POWDER, FOR SOLUTION INTRAMUSCULAR; INTRAVENOUS EVERY 24 HOURS
Status: DISCONTINUED | OUTPATIENT
Start: 2018-07-17 | End: 2018-07-18 | Stop reason: HOSPADM

## 2018-07-17 RX ORDER — PROCHLORPERAZINE 25 MG
12.5 SUPPOSITORY, RECTAL RECTAL EVERY 12 HOURS PRN
Status: DISCONTINUED | OUTPATIENT
Start: 2018-07-17 | End: 2018-07-18 | Stop reason: HOSPADM

## 2018-07-17 RX ORDER — POTASSIUM CHLORIDE 1500 MG/1
20 TABLET, EXTENDED RELEASE ORAL DAILY
Status: DISCONTINUED | OUTPATIENT
Start: 2018-07-17 | End: 2018-07-18 | Stop reason: HOSPADM

## 2018-07-17 RX ORDER — ALBUTEROL SULFATE 0.83 MG/ML
3 SOLUTION RESPIRATORY (INHALATION)
Status: DISCONTINUED | OUTPATIENT
Start: 2018-07-17 | End: 2018-07-18 | Stop reason: HOSPADM

## 2018-07-17 RX ORDER — PROCHLORPERAZINE MALEATE 5 MG
5 TABLET ORAL EVERY 6 HOURS PRN
Status: DISCONTINUED | OUTPATIENT
Start: 2018-07-17 | End: 2018-07-18 | Stop reason: HOSPADM

## 2018-07-17 RX ORDER — MONTELUKAST SODIUM 10 MG/1
10 TABLET ORAL AT BEDTIME
Status: DISCONTINUED | OUTPATIENT
Start: 2018-07-17 | End: 2018-07-18 | Stop reason: HOSPADM

## 2018-07-17 RX ORDER — LIDOCAINE 40 MG/G
CREAM TOPICAL
Status: DISCONTINUED | OUTPATIENT
Start: 2018-07-17 | End: 2018-07-18 | Stop reason: HOSPADM

## 2018-07-17 RX ORDER — ONDANSETRON 4 MG/1
4 TABLET, ORALLY DISINTEGRATING ORAL EVERY 6 HOURS PRN
Status: DISCONTINUED | OUTPATIENT
Start: 2018-07-17 | End: 2018-07-18 | Stop reason: HOSPADM

## 2018-07-17 RX ORDER — PREDNISONE 20 MG/1
40 TABLET ORAL DAILY
Status: DISCONTINUED | OUTPATIENT
Start: 2018-07-17 | End: 2018-07-18 | Stop reason: HOSPADM

## 2018-07-17 RX ORDER — CEFTRIAXONE SODIUM 2 G/50ML
2 INJECTION, SOLUTION INTRAVENOUS EVERY 24 HOURS
Status: DISCONTINUED | OUTPATIENT
Start: 2018-07-17 | End: 2018-07-17 | Stop reason: CLARIF

## 2018-07-17 RX ORDER — ACETAMINOPHEN 325 MG/1
650 TABLET ORAL EVERY 4 HOURS PRN
Status: DISCONTINUED | OUTPATIENT
Start: 2018-07-17 | End: 2018-07-18 | Stop reason: HOSPADM

## 2018-07-17 RX ORDER — CETIRIZINE HYDROCHLORIDE 10 MG/1
10 TABLET ORAL AT BEDTIME
Status: DISCONTINUED | OUTPATIENT
Start: 2018-07-17 | End: 2018-07-18 | Stop reason: HOSPADM

## 2018-07-17 RX ORDER — METOPROLOL SUCCINATE 50 MG/1
50 TABLET, EXTENDED RELEASE ORAL DAILY
Status: DISCONTINUED | OUTPATIENT
Start: 2018-07-17 | End: 2018-07-18 | Stop reason: HOSPADM

## 2018-07-17 RX ORDER — DOCUSATE SODIUM 100 MG/1
100 CAPSULE, LIQUID FILLED ORAL 2 TIMES DAILY
Status: DISCONTINUED | OUTPATIENT
Start: 2018-07-17 | End: 2018-07-18 | Stop reason: HOSPADM

## 2018-07-17 RX ORDER — NALOXONE HYDROCHLORIDE 0.4 MG/ML
.1-.4 INJECTION, SOLUTION INTRAMUSCULAR; INTRAVENOUS; SUBCUTANEOUS
Status: DISCONTINUED | OUTPATIENT
Start: 2018-07-17 | End: 2018-07-17

## 2018-07-17 RX ORDER — LOSARTAN POTASSIUM 50 MG/1
100 TABLET ORAL DAILY
Status: DISCONTINUED | OUTPATIENT
Start: 2018-07-17 | End: 2018-07-18 | Stop reason: HOSPADM

## 2018-07-17 RX ORDER — ALBUTEROL SULFATE 0.83 MG/ML
2.5 SOLUTION RESPIRATORY (INHALATION)
Status: DISCONTINUED | OUTPATIENT
Start: 2018-07-17 | End: 2018-07-17

## 2018-07-17 RX ORDER — AMOXICILLIN 250 MG
1 CAPSULE ORAL 2 TIMES DAILY PRN
Status: DISCONTINUED | OUTPATIENT
Start: 2018-07-17 | End: 2018-07-18 | Stop reason: HOSPADM

## 2018-07-17 RX ORDER — IPRATROPIUM BROMIDE AND ALBUTEROL SULFATE 2.5; .5 MG/3ML; MG/3ML
3 SOLUTION RESPIRATORY (INHALATION) EVERY 4 HOURS
Status: DISCONTINUED | OUTPATIENT
Start: 2018-07-17 | End: 2018-07-18

## 2018-07-17 RX ORDER — ONDANSETRON 2 MG/ML
4 INJECTION INTRAMUSCULAR; INTRAVENOUS EVERY 6 HOURS PRN
Status: DISCONTINUED | OUTPATIENT
Start: 2018-07-17 | End: 2018-07-18 | Stop reason: HOSPADM

## 2018-07-17 RX ORDER — POLYETHYLENE GLYCOL 3350 17 G/17G
17 POWDER, FOR SOLUTION ORAL DAILY PRN
Status: DISCONTINUED | OUTPATIENT
Start: 2018-07-17 | End: 2018-07-18 | Stop reason: HOSPADM

## 2018-07-17 RX ORDER — IPRATROPIUM BROMIDE AND ALBUTEROL SULFATE 2.5; .5 MG/3ML; MG/3ML
SOLUTION RESPIRATORY (INHALATION)
Status: COMPLETED
Start: 2018-07-17 | End: 2018-07-17

## 2018-07-17 RX ORDER — MULTIPLE VITAMINS W/ MINERALS TAB 9MG-400MCG
1 TAB ORAL DAILY
Status: DISCONTINUED | OUTPATIENT
Start: 2018-07-17 | End: 2018-07-18 | Stop reason: HOSPADM

## 2018-07-17 RX ADMIN — BUPROPION HYDROCHLORIDE 150 MG: 150 TABLET, FILM COATED, EXTENDED RELEASE ORAL at 10:23

## 2018-07-17 RX ADMIN — METHYLPREDNISOLONE SODIUM SUCCINATE 125 MG: 125 INJECTION, POWDER, FOR SOLUTION INTRAMUSCULAR; INTRAVENOUS at 04:45

## 2018-07-17 RX ADMIN — FLUTICASONE FUROATE AND VILANTEROL TRIFENATATE 1 PUFF: 200; 25 POWDER RESPIRATORY (INHALATION) at 10:22

## 2018-07-17 RX ADMIN — CETIRIZINE HYDROCHLORIDE 10 MG: 10 TABLET, FILM COATED ORAL at 20:17

## 2018-07-17 RX ADMIN — IPRATROPIUM BROMIDE AND ALBUTEROL SULFATE 3 ML: 2.5; .5 SOLUTION RESPIRATORY (INHALATION) at 04:31

## 2018-07-17 RX ADMIN — BUPROPION HYDROCHLORIDE 150 MG: 150 TABLET, FILM COATED, EXTENDED RELEASE ORAL at 20:17

## 2018-07-17 RX ADMIN — IPRATROPIUM BROMIDE AND ALBUTEROL SULFATE 3 ML: .5; 3 SOLUTION RESPIRATORY (INHALATION) at 04:31

## 2018-07-17 RX ADMIN — IPRATROPIUM BROMIDE AND ALBUTEROL SULFATE 3 ML: .5; 3 SOLUTION RESPIRATORY (INHALATION) at 09:50

## 2018-07-17 RX ADMIN — IPRATROPIUM BROMIDE AND ALBUTEROL SULFATE 3 ML: .5; 3 SOLUTION RESPIRATORY (INHALATION) at 05:46

## 2018-07-17 RX ADMIN — Medication 1 MG: at 21:12

## 2018-07-17 RX ADMIN — IPRATROPIUM BROMIDE AND ALBUTEROL SULFATE 3 ML: .5; 3 SOLUTION RESPIRATORY (INHALATION) at 21:12

## 2018-07-17 RX ADMIN — ALBUTEROL SULFATE 2.5 MG: 2.5 SOLUTION RESPIRATORY (INHALATION) at 04:46

## 2018-07-17 RX ADMIN — LOSARTAN POTASSIUM 100 MG: 50 TABLET, FILM COATED ORAL at 10:21

## 2018-07-17 RX ADMIN — IPRATROPIUM BROMIDE AND ALBUTEROL SULFATE 3 ML: .5; 3 SOLUTION RESPIRATORY (INHALATION) at 17:53

## 2018-07-17 RX ADMIN — MULTIPLE VITAMINS W/ MINERALS TAB 1 TABLET: TAB at 10:24

## 2018-07-17 RX ADMIN — FLUTICASONE PROPIONATE 1 SPRAY: 50 SPRAY, METERED NASAL at 10:21

## 2018-07-17 RX ADMIN — DOCUSATE SODIUM 100 MG: 100 CAPSULE, LIQUID FILLED ORAL at 10:23

## 2018-07-17 RX ADMIN — PREDNISONE 40 MG: 20 TABLET ORAL at 10:22

## 2018-07-17 RX ADMIN — Medication 100 MG: at 10:41

## 2018-07-17 RX ADMIN — Medication 1 LOZENGE: at 21:08

## 2018-07-17 RX ADMIN — METOPROLOL SUCCINATE 50 MG: 50 TABLET, EXTENDED RELEASE ORAL at 10:23

## 2018-07-17 RX ADMIN — IPRATROPIUM BROMIDE AND ALBUTEROL SULFATE 3 ML: .5; 3 SOLUTION RESPIRATORY (INHALATION) at 14:48

## 2018-07-17 RX ADMIN — CEFTRIAXONE SODIUM 2 G: 2 INJECTION, POWDER, FOR SOLUTION INTRAMUSCULAR; INTRAVENOUS at 05:38

## 2018-07-17 RX ADMIN — DOCUSATE SODIUM 100 MG: 100 CAPSULE, LIQUID FILLED ORAL at 20:16

## 2018-07-17 RX ADMIN — MONTELUKAST SODIUM 10 MG: 10 TABLET, FILM COATED ORAL at 20:16

## 2018-07-17 RX ADMIN — POTASSIUM CHLORIDE 20 MEQ: 1500 TABLET, EXTENDED RELEASE ORAL at 10:20

## 2018-07-17 ASSESSMENT — ACTIVITIES OF DAILY LIVING (ADL)
ADLS_ACUITY_SCORE: 11
ADLS_ACUITY_SCORE: 11
BATHING: 0 - INDEPENDENT
TRANSFERRING: 0-->INDEPENDENT
TOILETING: 0-->INDEPENDENT
COGNITION: 0 - NO COGNITION ISSUES REPORTED
SWALLOWING: 0 - SWALLOWS FOODS/LIQUIDS WITHOUT DIFFICULTY
FALL_HISTORY_WITHIN_LAST_SIX_MONTHS: NO
COMMUNICATION: 0 - UNDERSTANDS/COMMUNICATES WITHOUT DIFFICULTY
EATING: 0 - INDEPENDENT
RETIRED_COMMUNICATION: 0-->UNDERSTANDS/COMMUNICATES WITHOUT DIFFICULTY
SWALLOWING: 0-->SWALLOWS FOODS/LIQUIDS WITHOUT DIFFICULTY
BATHING: 0-->INDEPENDENT
TOILETING: 0 - INDEPENDENT
ADLS_ACUITY_SCORE: 11
TRANSFERRING: 0 - INDEPENDENT
AMBULATION: 2 - ASSISTIVE PERSON
DRESS: 0-->INDEPENDENT
AMBULATION: 0-->INDEPENDENT
DRESS: 0 - INDEPENDENT
RETIRED_EATING: 0-->INDEPENDENT

## 2018-07-17 NOTE — IP AVS SNAPSHOT
MRN:[id number]                      After Visit Summary   7/17/2018    Home Valdez    MRN: [id number]           Thank you!     Thank you for choosing [facility] for your care. Our goal is always to provide you with excellent care. Hearing back from our patients is one way we can continue to improve our services. Please take a few minutes to complete the written survey that you may receive in the mail after you visit with us. Thank you!        Patient Information     Date Of Birth          1950        Designated Caregiver       Most Recent Value    Caregiver    Will someone help with your care after discharge? no      About your hospital stay     You were admitted on:  July 17, 2018 You last received care in the:  [address] [facility]    You were discharged on:  July 18, 2018       Who to Call     For medical emergencies, please call 911.  For non-urgent questions about your medical care, please call your primary care provider or clinic, [phone]          Attending Provider     Provider Specialty    Storm Lopez MD Emergency Medicine    [place], Tony Akins MD Family Practice       Primary Care Provider Office Phone # Fax #    Sandip Vega [phone]      After Care Instructions     Activity       Your activity upon discharge: activity as tolerated            Diet       Follow this diet upon discharge: Orders Placed This Encounter      Room Service      Combination Diet Regular Diet Adult                  Follow-up Appointments     Follow-up and recommended labs and tests        Follow up with primary care provider, Sandip Vega, within 7 days for hospital follow- up.    Follow up with INR clinic on Friday (7/20/18) as scheduled                  Your next 10 appointments already scheduled     Jul 20, 2018  9:45 AM CDT   [facility]    [address] [facility] [address]   [phone]            Jul 24, 2018  1:00 PM CDT   Office Visit with Sandip Vega,    [facility]    [address] [facility] [address]   [phone]           Bring a current list of meds and any records pertaining to this visit. For Physicals, please bring immunization records and any forms needing to be filled out. Please arrive 10 minutes early to complete paperwork.              Additional Services     INR Clinic Referral                 Warfarin Instruction     You have started taking a medicine called warfarin. This is a blood-thinning medicine (anticoagulant). It helps prevent and treat blood clots.      Before leaving the hospital, make sure you know how much to take and how long to take it.      You will need regular blood tests to make sure your blood is clotting safely. It is very important to see your doctor for regular blood tests.    Talk to your doctor before taking any new medicine (this includes over-the-counter drugs and herbal products). Many medicines can interact with warfarin. This may cause more bleeding or too much clotting.     Eating a lot of vitamin K--found in green, leafy vegetables--can change the way warfarin works in your body. Do NOT avoid these foods. Instead, try to eat the same amount each day.     Bleeding is the most common side-effect of warfarin. You may notice bleeding gums, a bloody nose, bruises and bleeding longer when you cut yourself. See a doctor at once if:   o You cough up blood  o You find blood in your stool (poop)  o You have a deep cut, or a cut that bleeds longer than 10 minutes   o You have a bad cut, hard fall, accident or hit your head (go to urgent care or the emergency room).    For women who can get pregnant: This medicine can harm an unborn baby. Be very careful not to get pregnant while taking this medicine. If you think you might be pregnant, call your doctor  "right away.    For more information, read \"Guide to Warfarin Therapy,  the booklet you received in the hospital.        Pending Results     Date and Time Order Name Status Description    7/17/2018 0827 Sputum Culture Aerobic Bacterial Preliminary     7/17/2018 0523 Blood culture Preliminary     7/17/2018 0523 Blood culture Preliminary             Statement of Approval     Ordered          07/18/18 0630  I have reviewed and agree with all the recommendations and orders detailed in this document.  EFFECTIVE NOW     Approved and electronically signed by:  Tony Rankin MD             Admission Information     Date & Time Provider Department Dept. Phone    7/17/2018 Tony Rankin MD [address] [facility] [phone]      Your Vitals Were     Blood Pressure Pulse Temperature Respirations Height Weight    101/68 107 98.2  F (36.8  C) (Oral) 20 1.778 m (5' 10\") 111.9 kg (246 lb 11.1 oz)    Pulse Oximetry BMI (Body Mass Index)                95% 35.4 kg/m2          Care EveryWhere ID     This is your Care EveryWhere ID. This could be used by other organizations to access your [facility] medical records  [id number]        Equal Access to Services     MILKA ORTIZ AH: Hadii lissette Ignacio, wajelena mccullough, qamarbellata trualleslee chatman, jase john . So [facility] [phone].    ATENCIÓN: Si habla español, tiene a calabrese disposición servicios gratuitos de asistencia lingüística. Llame al [phone].    We comply with applicable federal civil rights laws and [place] laws. We do not discriminate on the basis of race, color, national origin, age, disability, sex, sexual orientation, or gender identity.               Review of your medicines      START taking        Dose / Directions    cefdinir 300 MG capsule   Commonly known as:  OMNICEF        Dose:  300 mg   Start taking on:  7/19/2018   Take 1 capsule (300 mg) by mouth 2 times daily for 8 days   Quantity:  16 " capsule   Refills:  0       predniSONE 20 MG tablet   Commonly known as:  DELTASONE        Take 40 mg daily for 4 days, then 20 mg daily for four days then 10 mg daily for four days then stop   Quantity:  14 tablet   Refills:  0         CONTINUE these medicines which may have CHANGED, or have new prescriptions. If we are uncertain of the size of tablets/capsules you have at home, strength may be listed as something that might have changed.        Dose / Directions    JANTOVEN 2.5 MG tablet   This may have changed:  See the new instructions.   Used for:  Atrial fibrillation with RVR (H)   Generic drug:  warfarin        HOLD your coumadin dose today (7/18/18) and tomorrow (7/19/19) and follow up with the INR clinic on Friday (7/20/18).  Further dosing of coumadin as directed by the INR nurse Friday.   Quantity:  210 tablet   Refills:  0       torsemide 20 MG tablet   Commonly known as:  DEMADEX   This may have changed:    - how much to take  - how to take this  - when to take this  - reasons to take this  - additional instructions   Used for:  Chronic diastolic congestive heart failure (H)        Take 10 mg po daily. Take an extra 10mg po daily as needed for swelling.   Quantity:  180 tablet   Refills:  3         CONTINUE these medicines which have NOT CHANGED        Dose / Directions    acetaminophen 325 MG tablet   Commonly known as:  TYLENOL        Dose:  650 mg   Take 2 tablets (650 mg) by mouth every 4 hours as needed for other (mild pain)   Quantity:  100 tablet   Refills:  0       ADVAIR DISKUS 250-50 MCG/DOSE diskus inhaler   Used for:  Panlobular emphysema (H)   Generic drug:  fluticasone-salmeterol        INHALE 1 PUFF BY MOUTH TWICE A DAY   Quantity:  1 Inhaler   Refills:  1       AIRS DISPOSABLE NEBULIZER Misc   Used for:  Panlobular emphysema (H)        USE EVERY 4 TO 6 HOURS AS NEEDED   Quantity:  1 each   Refills:  1       amiodarone 200 MG tablet   Commonly known as:  PACERONE/CODARONE   Used for:   Atrial fibrillation (H)        Dose:  100 mg   Take 0.5 tablets (100 mg) by mouth daily   Quantity:  45 tablet   Refills:  1       buPROPion 150 MG 12 hr tablet   Commonly known as:  WELLBUTRIN SR   Used for:  Personal history of tobacco use, presenting hazards to health        TAKE 1 TABLET BY MOUTH TWICE A DAY   Quantity:  180 tablet   Refills:  1       cetirizine 10 MG tablet   Commonly known as:  zyrTEC   Used for:  Chronic seasonal allergic rhinitis due to other allergen        TAKE 1 TABLET BY MOUTH EVERY EVENING   Quantity:  90 tablet   Refills:  1       fluticasone 50 MCG/ACT spray   Commonly known as:  FLONASE   Used for:  Chronic rhinitis        INHALE 1 TO 2 SPRAYS INTO EACH NOSTRIL EVERY DAY   Quantity:  16 g   Refills:  1       ipratropium - albuterol 0.5 mg/2.5 mg/3 mL 0.5-2.5 (3) MG/3ML neb solution   Commonly known as:  DUONEB   Used for:  COPD exacerbation (H)        TAKE 1 VIAL (3 MLS) BY NEBULIZATION EVERY 4 HOURS AS NEEDED FOR SHORTNESS OF BREATH / DYSPNEA OR WHEEZING   Quantity:  540 mL   Refills:  3       losartan 50 MG tablet   Commonly known as:  COZAAR   Used for:  Atrial fibrillation with RVR (H), Hypertension goal BP (blood pressure) < 140/90        TAKE 2 TABLETS (100MG) BY MOUTH EVERY DAY   Quantity:  180 tablet   Refills:  0       metoprolol succinate 50 MG 24 hr tablet   Commonly known as:  TOPROL-XL   Used for:  Atrial fibrillation with RVR (H), CHF (congestive heart failure) (H)        Dose:  50 mg   Take 1 tablet (50 mg) by mouth daily   Quantity:  90 tablet   Refills:  1       montelukast 10 MG tablet   Commonly known as:  SINGULAIR   Used for:  Chronic seasonal allergic rhinitis due to other allergen        TAKE 1 TABLET BY MOUTH DAILY AT BEDTIME   Quantity:  90 tablet   Refills:  0       multivitamin, therapeutic Tabs tablet        Dose:  1 tablet   Take 1 tablet by mouth daily   Refills:  0       nebulizer/tubing/mouthpiece Kit   Used for:  Chronic obstructive pulmonary disease,  unspecified COPD type (H)        Use every 4-6 hours PRN   Quantity:  1 kit   Refills:  11       order for DME   Used for:  COPD (chronic obstructive pulmonary disease) (H)        Equipment being ordered: Nebulizer   Quantity:  1 Device   Refills:  0       potassium chloride SA 20 MEQ CR tablet   Commonly known as:  K-DUR/KLOR-CON M   Used for:  CHF (congestive heart failure) (H), Peripheral edema        Dose:  20 mEq   Take 1 tablet (20 mEq) by mouth daily   Quantity:  90 tablet   Refills:  2       senna-docusate 8.6-50 MG per tablet   Commonly known as:  SENOKOT-S;PERICOLACE        Dose:  1-2 tablet   Take 1-2 tablets by mouth 2 times daily Take while on oral narcotics to prevent or treat constipation.   Quantity:  30 tablet   Refills:  0       VENTOLIN  (90 Base) MCG/ACT Inhaler   Used for:  Chronic obstructive pulmonary disease with acute exacerbation (H)   Generic drug:  albuterol        INHALE 2 PUFFS BY MOUTH EVERY 4 HOURS AS NEEDED FOR SHORTNESS OF BREATH/DYSPNEA   Quantity:  18 g   Refills:  3            Where to get your medicines      These medications were sent to [id number] - [place], [place] - [address] [place] [id number]    Hours:  M-F 8:30-6:30; Sat 9-4; closed Sunday Phone:  [phone]     cefdinir 300 MG capsule    predniSONE 20 MG tablet                Protect others around you: Learn how to safely use, store and throw away your medicines at www.disposemymeds.org.        ANTIBIOTIC INSTRUCTION     You've Been Prescribed an Antibiotic - Now What?  Your healthcare team thinks that you or your loved one might have an infection. Some infections can be treated with antibiotics, which are powerful, life-saving drugs. Like all medications, antibiotics have side effects and should only be used when necessary. There are some important things you should know about your antibiotic treatment.      Your healthcare team may run tests before you start taking an  antibiotic.    Your team may take samples (e.g., from your blood, urine or other areas) to run tests to look for bacteria. These test can be important to determine if you need an antibiotic at all and, if you do, which antibiotic will work best.      Within a few days, your healthcare team might change or even stop your antibiotic.    Your team may start you on an antibiotic while they are working to find out what is making you sick.    Your team might change your antibiotic because test results show that a different antibiotic would be better to treat your infection.    In some cases, once your team has more information, they learn that you do not need an antibiotic at all. They may find out that you don't have an infection, or that the antibiotic you're taking won't work against your infection. For example, an infection caused by a virus can't be treated with antibiotics. Staying on an antibiotic when you don't need it is more likely to be harmful than helpful.      You may experience side effects from your antibiotic.    Like all medications, antibiotics have side effects. Some of these can be serious.    Let you healthcare team know if you have any known allergies when you are admitted to the hospital.    One significant side effect of nearly all antibiotics is the risk of severe and sometimes deadly diarrhea caused by Clostridium difficile (C. Difficile). This occurs when a person takes antibiotics because some good germs are destroyed. Antibiotic use allows C. diificile to take over, putting patients at high risk for this serious infection.    As a patient or caregiver, it is important to understand your or your loved one's antibiotic treatment. It is especially important for caregivers to speak up when patients can't speak for themselves. Here are some important questions to ask your healthcare team.    What infection is this antibiotic treating and how do you know I have that infection?    What side effects  might occur from this antibiotic?    How long will I need to take this antibiotic?    Is it safe to take this antibiotic with other medications or supplements (e.g., vitamins) that I am taking?     Are there any special directions I need to know about taking this antibiotic? For example, should I take it with food?    How will I be monitored to know whether my infection is responding to the antibiotic?    What tests may help to make sure the right antibiotic is prescribed for me?      Information provided by:  www.cdc.gov/getsmart  U.S. Department of Health and Human Services  Centers for disease Control and Prevention  National Center for Emerging and Zoonotic Infectious Diseases  Division of Healthcare Quality Promotion             Medication List: This is a list of all your medications and when to take them. Check marks below indicate your daily home schedule. Keep this list as a reference.      Medications           Morning Afternoon Evening Bedtime As Needed    acetaminophen 325 MG tablet   Commonly known as:  TYLENOL   Take 2 tablets (650 mg) by mouth every 4 hours as needed for other (mild pain)                                ADVAIR DISKUS 250-50 MCG/DOSE diskus inhaler   INHALE 1 PUFF BY MOUTH TWICE A DAY   Generic drug:  fluticasone-salmeterol                                AIRS DISPOSABLE NEBULIZER Misc   USE EVERY 4 TO 6 HOURS AS NEEDED                                amiodarone 200 MG tablet   Commonly known as:  PACERONE/CODARONE   Take 0.5 tablets (100 mg) by mouth daily   Last time this was given:  100 mg on 7/18/2018  9:47 AM         Given to you this morning                       buPROPion 150 MG 12 hr tablet   Commonly known as:  WELLBUTRIN SR   TAKE 1 TABLET BY MOUTH TWICE A DAY                                cefdinir 300 MG capsule   Commonly known as:  OMNICEF   Take 1 capsule (300 mg) by mouth 2 times daily for 8 days   Start taking on:  7/19/2018                                cetirizine 10 MG  tablet   Commonly known as:  zyrTEC   TAKE 1 TABLET BY MOUTH EVERY EVENING   Last time this was given:  10 mg on 7/17/2018  8:17 PM                                fluticasone 50 MCG/ACT spray   Commonly known as:  FLONASE   INHALE 1 TO 2 SPRAYS INTO EACH NOSTRIL EVERY DAY   Last time this was given:  1 spray on 7/18/2018  9:48 AM         Given to you this morning                       ipratropium - albuterol 0.5 mg/2.5 mg/3 mL 0.5-2.5 (3) MG/3ML neb solution   Commonly known as:  DUONEB   TAKE 1 VIAL (3 MLS) BY NEBULIZATION EVERY 4 HOURS AS NEEDED FOR SHORTNESS OF BREATH / DYSPNEA OR WHEEZING   Last time this was given:  3 mLs on 7/18/2018  5:28 AM                                JANTOVEN 2.5 MG tablet   HOLD your coumadin dose today (7/18/18) and tomorrow (7/19/19) and follow up with the INR clinic on Friday (7/20/18).  Further dosing of coumadin as directed by the INR nurse Friday.   Generic drug:  warfarin                                losartan 50 MG tablet   Commonly known as:  COZAAR   TAKE 2 TABLETS (100MG) BY MOUTH EVERY DAY   Last time this was given:  100 mg on 7/18/2018  9:48 AM         Given to you this morning                       metoprolol succinate 50 MG 24 hr tablet   Commonly known as:  TOPROL-XL   Take 1 tablet (50 mg) by mouth daily   Last time this was given:  50 mg on 7/18/2018  9:48 AM         Given to you this morning                       montelukast 10 MG tablet   Commonly known as:  SINGULAIR   TAKE 1 TABLET BY MOUTH DAILY AT BEDTIME   Last time this was given:  10 mg on 7/17/2018  8:16 PM                                multivitamin, therapeutic Tabs tablet   Take 1 tablet by mouth daily                                nebulizer/tubing/mouthpiece Kit   Use every 4-6 hours PRN                                order for DME   Equipment being ordered: Nebulizer                                potassium chloride SA 20 MEQ CR tablet   Commonly known as:  K-DUR/KLOR-CON M   Take 1 tablet (20 mEq) by  mouth daily   Last time this was given:  20 mEq on 7/18/2018  9:48 AM                                predniSONE 20 MG tablet   Commonly known as:  DELTASONE   Take 40 mg daily for 4 days, then 20 mg daily for four days then 10 mg daily for four days then stop   Last time this was given:  40 mg on 7/18/2018  9:48 AM                                senna-docusate 8.6-50 MG per tablet   Commonly known as:  SENOKOT-S;PERICOLACE   Take 1-2 tablets by mouth 2 times daily Take while on oral narcotics to prevent or treat constipation.                                torsemide 20 MG tablet   Commonly known as:  DEMADEX   Take 10 mg po daily. Take an extra 10mg po daily as needed for swelling.                                VENTOLIN  (90 Base) MCG/ACT Inhaler   INHALE 2 PUFFS BY MOUTH EVERY 4 HOURS AS NEEDED FOR SHORTNESS OF BREATH/DYSPNEA   Generic drug:  albuterol

## 2018-07-17 NOTE — PROGRESS NOTES
SPIRITUAL HEALTH SERVICES  SPIRITUAL ASSESSMENT Progress Note  [facility]      During Rounding,  introduced himself to Home Ruiz and informed him of his availability.    Wyatt Sheldon M.Div., [facility]  Staff   Office tel: [phone]

## 2018-07-17 NOTE — ED NOTES
ED Nursing criteria listed below was addressed during verbal handoff:     Abnormal vitals: Yes  Abnormal results: Yes  Med Reconciliation completed: Yes  Meds given in ED: Yes  Any Overdue Meds: Yes  Core Measures: N/A  Isolation: Yes  Special needs: N/A  Skin assessment: Yes    Observation Patient  Education provided: N/A
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
## 2018-07-17 NOTE — ED TRIAGE NOTES
Pt in with increased SOA, he has COPD and a recent cold which was treated but tonight the cough and breathing is worse

## 2018-07-17 NOTE — ASSESSMENT & PLAN NOTE
Presented with one-week history of worsening shortness of breath associated with dyspnea on exertion and productive cough.  Not improve with outpatient treatment with azithromycin and prednisone.  Chest x-ray with right upper lobe pneumonia with associated tachycardia, leukocytosis.  Admit inpatient status with treatment with IV Rocephin and will treat for COPD exacerbation as well

## 2018-07-17 NOTE — PHARMACY-ANTICOAGULATION SERVICE
Clinical Pharmacy - Warfarin Dosing Consult     Pharmacy has been consulted to manage this patient s warfarin therapy.  Indication: Atrial Fibrillation  Therapy Goal: INR 2-3  Warfarin Prior to Admission: Yes  Warfarin PTA Regimen: 7.5 mg Tues, Thurs, Sat and 5 mg all other days of the week  Recent documented change in oral intake/nutrition: Unknown    INR   Date Value Ref Range Status   07/17/2018 5.54 (HH) 0.86 - 1.14 Final     Comment:     Critical Value called to and read back by  DOMINIQUE RUSH RN IN MED SURG AT 0850 ON 7/17/18 DC       INR Protime   Date Value Ref Range Status   06/11/2018 2.5 (A) 0.86 - 1.14 Final       Recommend HOLDING warfarin dose today.  Pharmacy will monitor Home Valdez daily and order warfarin doses to achieve specified goal.      Please contact pharmacy as soon as possible if the warfarin needs to be held for a procedure or if the warfarin goals change.
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
## 2018-07-17 NOTE — ASSESSMENT & PLAN NOTE
Long-standing history, taking Coumadin for prophylaxis, on amiodarone and metoprolol for rate control  Today in atrial fibrillation with RVR, heart rate in the 120s  Continue metoprolol and amiodarone, monitor with telemetry, continue Coumadin to be monitored and dosed by pharmacy

## 2018-07-17 NOTE — ASSESSMENT & PLAN NOTE
Normal coronary arteries per previous study.  Followed by cardiology on ARB, Toprol and diuretic.  Continue home medications, hold diuretic for now due to rising creatinine

## 2018-07-17 NOTE — ASSESSMENT & PLAN NOTE
Chronic issue, previous smoker  For now we will treat a COPD exacerbation with steroids, nebs.  Treat underlying pneumonia

## 2018-07-17 NOTE — CONSULTS
Care Transition Initial Assessment - RN  Reason For Consult: discharge planning    Met with: Patient.    DATA   Principal Problem:    Community acquired pneumonia of right upper lobe of lung (H)  Active Problems:    Hypertension goal BP (blood pressure) < 140/90    SEVERE JAMES (obstructive sleep apnea)    Nonischemic cardiomyopathy EF 45-50% by echo 2016    Atrial fibrillation with RVR (H)    Panlobular emphysema (H)    CAP (community acquired pneumonia)       [facility] Care [facility] Name: [facility]  Primary Care MD Name: Dr Galicia  Contact information and PCP information verified: Yes      ASSESSMENT  Cognitive Status: awake, alert and oriented.             Lives With: spouse  Living Arrangements: house  Quality Of Family Relationships: supportive, involved  Description of Support System: Supportive, Involved   Who is your support system?: Wife   Support Assessment: Adequate family and caregiver support   Insurance Concerns: No Insurance issues identified          This writer met with pt in his room, introduced self and role. Patient currently lives at home with his wife is still working full time. Patient manages his own medications and has been to an MTM appointment and feels confident in his medications and is followed by the coumadin clinic. Patient is knowledgeable about his COPD and has a plan with Dr Galicia of what to do if he starts feeling like he is getting a cold he should start on a Z-ailyn and steroids which he did this time but feels like he started to late. Writer went over the pneumonia prevention education and patient verbalized understanding. Patient is a current care coordination patient and a hand off will be sent on discharge.       PLAN    Patient will return home with family support and clinic follow up      Gail Carey CTS RN [facility] [phone] [facility] [phone]    Discharge Planner   Discharge Plans in progress: Home with family support and follow up  Barriers to discharge plan: none  Follow  up plan: Clinic follow up CC referral will be sent and patient will continue with coumadin clinic       Entered by: Gail Carey 07/17/2018 3:08 PM

## 2018-07-17 NOTE — LETTER
Transition Communication Hand-off for Care Transitions to Next Level of Care Provider    Name: Home Valdez  : 1950  MRN #: [id number]  Primary Care Provider: Sandip Vega  Primary Care MD Name: Dr Galicia  Primary Clinic: [address] [place] [address] DR JESSIKA FENG [id number]  Primary Care Clinic Name: [facility]  Reason for Hospitalization:  Community acquired pneumonia of right upper lobe of lung (H) [J18.1]  Admit Date/Time: 2018  4:24 AM  Discharge Date: 2018  Payor Source: Payor: MEDICARE / Plan: MEDICARE / Product Type: Medicare /     Readmission Assessment Measure (LOREN) Risk Score/category: Low         Reason for Communication Hand-off Referral: Admission diagnoses: PN    Discharge Plan:  Discharge Plan:       Most Recent Value    Concerns To Be Addressed all concerns addressed in this encounter           Concern for non-adherence with plan of care:   Y/N no  Discharge Needs Assessment:  Needs       Most Recent Value    Anticipated Changes Related to Illness none    Equipment Currently Used at Home none    Transportation Available car, family or friend will provide    # of Referrals Placed by [facility] Care Coordination          Follow-up plan:  Future Appointments  Date Time Provider Department Center   2018 9:45 AM  [facility]   2018 1:00 PM Sandip Vega DO [facility]       Any outstanding tests or procedures:        Referrals     Future Labs/Procedures    INR Clinic Referral             Key Recommendations:  Patient currently lives at home with his wife is still working full time. Patient manages his own medications and has been to an MTM appointment and feels confident in his medications and is followed by the coumadin clinic. Patient is knowledgeable about his COPD and has a plan with Dr Galicia of what to do if he starts feeling like he is getting a cold he should start on a Z-ailyn and steroids which he did this time but feels like  he started to late. Writer went over the pneumonia prevention education and patient verbalized understanding. Patient is a current care coordination patient and a hand off will be sent on discharge.     Patito Slaughter, MSN, RN, Care Coordinator  [facility] [phone]  [facility] [phone]    AVS/Discharge Summary is the source of truth; this is a helpful guide for improved communication of patient story

## 2018-07-17 NOTE — IP AVS SNAPSHOT
[address] [facility]    [address] [facility] [address] JESSIKA FENG [address]    Phone:  [phone]                                       After Visit Summary   7/17/2018    Home Valdez    MRN: [id number]           After Visit Summary Signature Page     I have received my discharge instructions, and my questions have been answered. I have discussed any challenges I see with this plan with the nurse or doctor.    ..........................................................................................................................................  Patient/Patient Representative Signature      ..........................................................................................................................................  Patient Representative Print Name and Relationship to Patient    ..................................................               ................................................  Date                                            Time    ..........................................................................................................................................  Reviewed by Signature/Title    ...................................................              ..............................................  Date                                                            Time
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
## 2018-07-17 NOTE — PLAN OF CARE
"Problem: Patient Care Overview  Goal: Plan of Care/Patient Progress Review  Outcome: Improving  Pt states, \" I feel much better.\" Up ambulating in the halls, good oral intake, voiding, VSS, afebrile.  LS tight, exp wheezes which resolve with nebs.  INR  5.54   Saline locked with scheduled Rocephin.     Problem: Cardiac Disease Comorbidity  Goal: Cardiac Disease  Patient comorbidity will be monitored for signs and symptoms of Cardiac Disease.  Problems will be absent, minimized or managed by discharge/transition of care.  Outcome: No Change  Tele A Fib    Problem: Chronic Respiratory Difficulty Comorbidity  Goal: Chronic Respiratory Difficulty  Patient comorbidity will be monitored for signs and symptoms of Respiratory Difficulty (Chronic) condition.  Problems will be absent, minimized or managed by discharge/transition of care.  Outcome: Improving  Sat's 93-94% on RA. With ambulation/ activity pt's sat's 87-88% pt will rebound within a minute to low 90\"s.  Pt using his Cpap when ever he naps or sleeps.      "

## 2018-07-18 ENCOUNTER — PATIENT OUTREACH (OUTPATIENT)
Dept: CARE COORDINATION | Facility: CLINIC | Age: 68
End: 2018-07-18

## 2018-07-18 VITALS
HEIGHT: 70 IN | WEIGHT: 246.69 LBS | TEMPERATURE: 98.2 F | RESPIRATION RATE: 20 BRPM | SYSTOLIC BLOOD PRESSURE: 101 MMHG | HEART RATE: 107 BPM | OXYGEN SATURATION: 95 % | DIASTOLIC BLOOD PRESSURE: 68 MMHG | BODY MASS INDEX: 35.32 KG/M2

## 2018-07-18 LAB
ANION GAP SERPL CALCULATED.3IONS-SCNC: 7 MMOL/L (ref 3–14)
BACTERIA SPEC CULT: NORMAL
BUN SERPL-MCNC: 28 MG/DL (ref 7–30)
CALCIUM SERPL-MCNC: 8.6 MG/DL (ref 8.5–10.1)
CHLORIDE SERPL-SCNC: 104 MMOL/L (ref 94–109)
CO2 SERPL-SCNC: 31 MMOL/L (ref 20–32)
CREAT SERPL-MCNC: 1.34 MG/DL (ref 0.66–1.25)
CREAT SERPL-MCNC: 1.34 MG/DL (ref 0.66–1.25)
ERYTHROCYTE [DISTWIDTH] IN BLOOD BY AUTOMATED COUNT: 13.2 % (ref 10–15)
GFR SERPL CREATININE-BSD FRML MDRD: 53 ML/MIN/1.7M2
GFR SERPL CREATININE-BSD FRML MDRD: 53 ML/MIN/1.7M2
GLUCOSE SERPL-MCNC: 127 MG/DL (ref 70–99)
HCT VFR BLD AUTO: 46.5 % (ref 40–53)
HGB BLD-MCNC: 14.7 G/DL (ref 13.3–17.7)
INR PPP: 5.82 (ref 0.86–1.14)
LACTATE BLD-SCNC: 2 MMOL/L (ref 0.7–2)
LACTATE BLD-SCNC: 2.3 MMOL/L (ref 0.4–1.9)
MCH RBC QN AUTO: 29.4 PG (ref 26.5–33)
MCHC RBC AUTO-ENTMCNC: 31.6 G/DL (ref 31.5–36.5)
MCV RBC AUTO: 93 FL (ref 78–100)
PLATELET # BLD AUTO: 283 10E9/L (ref 150–450)
POTASSIUM SERPL-SCNC: 4.3 MMOL/L (ref 3.4–5.3)
RBC # BLD AUTO: 5 10E12/L (ref 4.4–5.9)
SODIUM SERPL-SCNC: 142 MMOL/L (ref 133–144)
SPECIMEN SOURCE: NORMAL
WBC # BLD AUTO: 15.8 10E9/L (ref 4–11)

## 2018-07-18 PROCEDURE — 25000125 ZZHC RX 250: Performed by: FAMILY MEDICINE

## 2018-07-18 PROCEDURE — 82565 ASSAY OF CREATININE: CPT | Performed by: FAMILY MEDICINE

## 2018-07-18 PROCEDURE — 36415 COLL VENOUS BLD VENIPUNCTURE: CPT | Performed by: FAMILY MEDICINE

## 2018-07-18 PROCEDURE — 25000132 ZZH RX MED GY IP 250 OP 250 PS 637: Mod: GY | Performed by: FAMILY MEDICINE

## 2018-07-18 PROCEDURE — 83605 ASSAY OF LACTIC ACID: CPT | Performed by: FAMILY MEDICINE

## 2018-07-18 PROCEDURE — 99238 HOSP IP/OBS DSCHRG MGMT 30/<: CPT | Performed by: FAMILY MEDICINE

## 2018-07-18 PROCEDURE — A9270 NON-COVERED ITEM OR SERVICE: HCPCS | Mod: GY | Performed by: FAMILY MEDICINE

## 2018-07-18 PROCEDURE — 25000128 H RX IP 250 OP 636: Performed by: FAMILY MEDICINE

## 2018-07-18 PROCEDURE — 80048 BASIC METABOLIC PNL TOTAL CA: CPT | Performed by: FAMILY MEDICINE

## 2018-07-18 PROCEDURE — 25000128 H RX IP 250 OP 636: Performed by: EMERGENCY MEDICINE

## 2018-07-18 PROCEDURE — 85610 PROTHROMBIN TIME: CPT | Performed by: FAMILY MEDICINE

## 2018-07-18 PROCEDURE — 85027 COMPLETE CBC AUTOMATED: CPT | Performed by: FAMILY MEDICINE

## 2018-07-18 RX ORDER — IPRATROPIUM BROMIDE AND ALBUTEROL SULFATE 2.5; .5 MG/3ML; MG/3ML
3 SOLUTION RESPIRATORY (INHALATION) EVERY 4 HOURS PRN
Status: DISCONTINUED | OUTPATIENT
Start: 2018-07-18 | End: 2018-07-18 | Stop reason: HOSPADM

## 2018-07-18 RX ORDER — CEFDINIR 300 MG/1
300 CAPSULE ORAL 2 TIMES DAILY
Qty: 16 CAPSULE | Refills: 0 | Status: SHIPPED | OUTPATIENT
Start: 2018-07-19 | End: 2018-07-24

## 2018-07-18 RX ORDER — PREDNISONE 20 MG/1
TABLET ORAL
Qty: 14 TABLET | Refills: 0 | Status: ON HOLD | OUTPATIENT
Start: 2018-07-18 | End: 2018-07-31

## 2018-07-18 RX ORDER — WARFARIN SODIUM 2.5 MG/1
TABLET ORAL
Qty: 210 TABLET | Refills: 0 | Status: ON HOLD | COMMUNITY
Start: 2018-07-18 | End: 2018-09-20

## 2018-07-18 RX ADMIN — BUPROPION HYDROCHLORIDE 150 MG: 150 TABLET, FILM COATED, EXTENDED RELEASE ORAL at 09:48

## 2018-07-18 RX ADMIN — Medication 1 LOZENGE: at 01:20

## 2018-07-18 RX ADMIN — PREDNISONE 40 MG: 20 TABLET ORAL at 09:48

## 2018-07-18 RX ADMIN — FLUTICASONE PROPIONATE 1 SPRAY: 50 SPRAY, METERED NASAL at 09:48

## 2018-07-18 RX ADMIN — IPRATROPIUM BROMIDE AND ALBUTEROL SULFATE 3 ML: .5; 3 SOLUTION RESPIRATORY (INHALATION) at 01:20

## 2018-07-18 RX ADMIN — CEFTRIAXONE SODIUM 2 G: 2 INJECTION, POWDER, FOR SOLUTION INTRAMUSCULAR; INTRAVENOUS at 05:02

## 2018-07-18 RX ADMIN — METOPROLOL SUCCINATE 50 MG: 50 TABLET, EXTENDED RELEASE ORAL at 09:48

## 2018-07-18 RX ADMIN — FLUTICASONE FUROATE AND VILANTEROL TRIFENATATE 1 PUFF: 200; 25 POWDER RESPIRATORY (INHALATION) at 09:49

## 2018-07-18 RX ADMIN — LOSARTAN POTASSIUM 100 MG: 50 TABLET, FILM COATED ORAL at 09:48

## 2018-07-18 RX ADMIN — Medication 100 MG: at 09:47

## 2018-07-18 RX ADMIN — SODIUM CHLORIDE 500 ML: 9 INJECTION, SOLUTION INTRAVENOUS at 10:37

## 2018-07-18 RX ADMIN — DOCUSATE SODIUM 100 MG: 100 CAPSULE, LIQUID FILLED ORAL at 09:48

## 2018-07-18 RX ADMIN — POTASSIUM CHLORIDE 20 MEQ: 1500 TABLET, EXTENDED RELEASE ORAL at 09:48

## 2018-07-18 RX ADMIN — IPRATROPIUM BROMIDE AND ALBUTEROL SULFATE 3 ML: .5; 3 SOLUTION RESPIRATORY (INHALATION) at 05:28

## 2018-07-18 RX ADMIN — MULTIPLE VITAMINS W/ MINERALS TAB 1 TABLET: TAB at 09:48

## 2018-07-18 ASSESSMENT — ACTIVITIES OF DAILY LIVING (ADL)
ADLS_ACUITY_SCORE: 11

## 2018-07-18 NOTE — PHARMACY-ANTICOAGULATION SERVICE
Clinical Pharmacy - Warfarin Dosing Consult     Pharmacy has been consulted to manage this patient s warfarin therapy.  Indication: Atrial Fibrillation  Therapy Goal: INR 2-3  Warfarin Prior to Admission: Yes  Warfarin PTA Regimen: 7.5 mg Tues, Thurs, Sat and 5 mg all other days of the week  Recent documented change in oral intake/nutrition: Unknown    INR   Date Value Ref Range Status   07/18/2018 5.82 (HH) 0.86 - 1.14 Final     Comment:     Critical Value called to and read back by  CAYDEN DIXON RN AT 0652     07/17/2018 5.54 (HH) 0.86 - 1.14 Final     Comment:     Critical Value called to and read back by  DOMINIQUE RUSH RN IN MED SURG AT 0850 ON 7/17/18 DC         Recommend warfarin HOLDING dose today and tomorrow with recheck of INR on Friday if able.  Please contact pharmacy as soon as possible if the warfarin needs to be held for a procedure or if the warfarin goals change.

## 2018-07-18 NOTE — PROVIDER NOTIFICATION
DATE:  7/18/2018   TIME OF RECEIPT FROM LAB:  0650  LAB TEST:  INR  LAB VALUE:  5.82  RESULTS GIVEN WITH READ-BACK TO (PROVIDER):  Tony Rankin MD  TIME LAB VALUE REPORTED TO PROVIDER:   0619

## 2018-07-18 NOTE — PROVIDER NOTIFICATION
Pt has sore throat from neb treatments. MD paged for throat lozenges.  Orders have been placed by MD.

## 2018-07-18 NOTE — PLAN OF CARE
"Problem: Patient Care Overview  Goal: Plan of Care/Patient Progress Review  Outcome: Improving  Vital signs stable. /87 (BP Location: Left arm)  Pulse 105  Temp 97.5  F (36.4  C) (Oral)  Resp 20  Ht 1.778 m (5' 10\")  Wt 111.9 kg (246 lb 11.1 oz)  SpO2 95%  BMI 35.4 kg/m2.  Afebrile. CPAP used with 2L O2 while sleeping. Tele rhythm Afib with PVC's. Neb treatments given per MAR. Expiratory wheeze. Pt is able to make needs known. Will monitor per care plan.                 "

## 2018-07-18 NOTE — PROGRESS NOTES
[facility]    Sepsis Evaluation Progress Note    Date of Service: 07/18/2018    I was called to see Home Valdez due to abnormal vital signs triggering the Sepsis SIRS screening alert. He is not known to have an infection. Ongoing weakness but patient reports it continues to improve.  No lower extremity edema.  Appetite is improving but still slightly off.  Was really looking forward to leaving today.  Has been getting nebulizers every 4 hours since time of admission.  Creatinine still elevated from his baseline despite holding home diuretics.      Physical Exam    Vital Signs:  Temp: 98.2  F (36.8  C) Temp src: Oral BP: 101/68 Pulse: 107 Heart Rate: 105 Resp: 20 SpO2: 95 % O2 Device: None (Room air) Oxygen Delivery: 2 LPM    Lab:  Lactate for Sepsis Protocol   Date Value Ref Range Status   07/18/2018 2.3 (HH) 0.4 - 1.9 mmol/L Final     Comment:     Significant value called to and read back by  ALEXANDREA GIANG RN AT 1000 TA         The patient is at baseline mental status.    The rest of their physical exam is significant for ongoing improvement of air movement and reduction of wheezing.  No edema in bilateral legs, HR is slightly tachy in the low 100 range.    Assessment and Plan    The SIRS and exam findings are likely due to mild dehydration and COPD exacerbation with scheduled Duonebs, there is no sign of sepsis at this time.    Disposition: The patient will remain on the current unit. We will continue to monitor this patient closely.  Will perform a 500 cc bolus given over 2 hours secondary to patient's known EF of 45-50%, hold the next scheduled neb treatment and plan to recheck lactic again at 1230.  If it is trending downward, patient will still be able to discharge today without change to treatment plan as per discharge summary note.  If it continues to increase despite these interventions, will need to re-evaluate.      Aarti Scott MD  `

## 2018-07-18 NOTE — DISCHARGE SUMMARY
[facility]    Discharge Summary  Hospitalist    Date of Admission:  7/17/2018  Date of Discharge:  7/18/2018  Discharging Provider: Tony Rankin  Date of Service (when I saw the patient): 07/18/18    Discharge Diagnoses   Principal Problem:    Community acquired pneumonia of right upper lobe of lung (H)  Active Problems:    Hypertension goal BP (blood pressure) < 140/90    SEVERE JAMES (obstructive sleep apnea)    Nonischemic cardiomyopathy EF 45-50% by echo 2016    Atrial fibrillation with RVR (H)    Panlobular emphysema (H)    CAP (community acquired pneumonia)        History of Present Illness   Copied from H&P:  Home Valdez is a 68 year old male who presents with worsening shortness of breath over the past week.  States it has been difficult to catch his breath.  He has known COPD and started taking azithromycin and prednisone week ago.  He finished these medicines yesterday and has not felt much better.  He is short of breath with exertion.  He denies fever but has had chills and sweats.  He denies chest pain although it does hurt to cough.  He is bringing up a colored yellow phlegm.  He denies nausea or vomiting.  Patient has a known history of atrial fibrillation, taking warfarin and is usually rate controlled taking amiodarone and metoprolol.  He has nonischemic cardiomyopathy with last echo showing an EF of 45-50% with akinetic inferior wall.  He is normally followed by cardiology for this.  He is a previous smoker, history of sleep apnea, not using CPAP.  He does take daily warfarin.  He denies increased weight gain or increased pedal edema.    Hospital Course   Home Valdez was admitted on 7/17/2018.  The following problems were addressed during his hospitalization:    Principal Problem:    Community acquired pneumonia of right upper lobe of lung (H)  Active Problems:    Hypertension goal BP (blood pressure) < 140/90    SEVERE JAMES (obstructive sleep apnea)     Nonischemic cardiomyopathy EF 45-50% by echo 2016    Atrial fibrillation with RVR (H)    Panlobular emphysema (H)    CAP (community acquired pneumonia)    68-year-old male who presents to the hospital with increasing shortness of breath associate productive cough.  He had been treated as an outpatient with a 5 day course of azithromycin and oral steroids and had not improved and on the day of admission was actually feeling worse.  In the emergency department he was dyspneic requiring supplemental oxygen with chest x-ray showing a right upper lobe pneumonia.  He was noted to be in atrial fibrillation which is chronic for him with tachycardia.  Patient was admitted to inpatient status and treated for community-acquired pneumonia which had failed outpatient management.  Was treated with IV Rocephin and started on IV steroids.  There is no other change in his medications.  Over the next 24 hours she greatly improved returning back to baseline status  with no more use of oxygen.  He continued use of CPAP for sleep.  Will be discharged home and will finish out of a day course of Omnicef, 300 mg twice daily and will be on a tapering dose of prednisone.  His INR on the first day of admission was elevated with his Coumadin being held in a discharge his INR for that he is pending with recommendations from pharmacy pending as well for ongoing dosing of Coumadin.  He will have continued management by the INR clinic after discharge.  # Discharge Pain Plan:   - Patient currently has NO PAIN and is not being prescribed pain medications on discharge.    Tony Rankin    Significant Results and Procedures   none    Pending Results   These results will be followed up by Dr Larson Labs Ordered in the Past 30 Days of this Admission     Date and Time Order Name Status Description    7/18/2018 0000 INR In process     7/18/2018 0000 Basic metabolic panel In process     7/17/2018 1040 Methicillin resistant staph aureus cult In  process     7/17/2018 0827 Sputum Culture Aerobic Bacterial In process     7/17/2018 0523 Blood culture In process     7/17/2018 0523 Blood culture In process           Code Status   Full Code       Primary Care Physician   Sandip Vega    Physical Exam   Temp: 97.5  F (36.4  C) Temp src: Oral BP: 123/87 Pulse: 105 Heart Rate: 105 Resp: 20 SpO2: 95 % O2 Device: BiPAP/CPAP Oxygen Delivery: 2 LPM  Vitals:    07/17/18 0424 07/17/18 1620 07/18/18 0544   Weight: 112 kg (247 lb) 112 kg (246 lb 14.4 oz) 111.9 kg (246 lb 11.1 oz)     Vital Signs with Ranges  Temp:  [97.4  F (36.3  C)-98  F (36.7  C)] 97.5  F (36.4  C)  Pulse:  [105-125] 105  Heart Rate:  [105-118] 105  Resp:  [18-20] 20  BP: (123-154)/(83-87) 123/87  SpO2:  [85 %-95 %] 95 %  I/O last 3 completed shifts:  In: 1640 [P.O.:1640]  Out: 650 [Urine:650]    Constitutional: awake, alert, cooperative, no apparent distress, and appears stated age  Respiratory: No increased work of breathing, good air exchange, clear to auscultation bilaterally, no crackles or wheezing  Cardiovascular: regular rate and rhythm  GI: normal bowel sounds, soft, non-distended and non-tender    Discharge Disposition   Discharged to home  Condition at discharge: Satisfactory    Consultations This Hospital Stay   CARE COORDINATOR IP CONSULT  RESPIRATORY CARE IP CONSULT  PHARMACY TO DOSE WARFARIN    Time Spent on this Encounter   I, Tony Rankin, personally saw the patient today and spent less than or equal to 30 minutes discharging this patient.    Discharge Orders     INR Clinic Referral     Follow-up and recommended labs and tests    Follow up with primary care provider, Sandip Vega, within 7 days for hospital follow- up.  No follow up labs or test are needed.     Activity   Your activity upon discharge: activity as tolerated     Full Code     Diet   Follow this diet upon discharge: Orders Placed This Encounter     Room Service     Combination Diet Regular  Diet Adult       Discharge Medications   Current Discharge Medication List      START taking these medications    Details   cefdinir (OMNICEF) 300 MG capsule Take 1 capsule (300 mg) by mouth 2 times daily for 8 days  Qty: 16 capsule, Refills: 0    Associated Diagnoses: Community acquired pneumonia of right upper lobe of lung (H)      predniSONE (DELTASONE) 20 MG tablet Take 40 mg daily for 4 days, then 20 mg daily for four days then 10 mg daily for four days then stop  Qty: 14 tablet, Refills: 0    Associated Diagnoses: Community acquired pneumonia of right upper lobe of lung (H)         CONTINUE these medications which have NOT CHANGED    Details   acetaminophen (TYLENOL) 325 MG tablet Take 2 tablets (650 mg) by mouth every 4 hours as needed for other (mild pain)  Qty: 100 tablet, Refills: 0      ADVAIR DISKUS 250-50 MCG/DOSE diskus inhaler INHALE 1 PUFF BY MOUTH TWICE A DAY  Qty: 1 Inhaler, Refills: 1    Associated Diagnoses: Panlobular emphysema (H)      amiodarone (PACERONE/CODARONE) 200 MG tablet Take 0.5 tablets (100 mg) by mouth daily  Qty: 45 tablet, Refills: 1    Associated Diagnoses: Atrial fibrillation (H)      buPROPion (WELLBUTRIN SR) 150 MG 12 hr tablet TAKE 1 TABLET BY MOUTH TWICE A DAY  Qty: 180 tablet, Refills: 1    Associated Diagnoses: Personal history of tobacco use, presenting hazards to health      cetirizine (ZYRTEC) 10 MG tablet TAKE 1 TABLET BY MOUTH EVERY EVENING  Qty: 90 tablet, Refills: 1    Associated Diagnoses: Chronic seasonal allergic rhinitis due to other allergen      fluticasone (FLONASE) 50 MCG/ACT spray INHALE 1 TO 2 SPRAYS INTO EACH NOSTRIL EVERY DAY  Qty: 16 g, Refills: 1    Associated Diagnoses: Chronic rhinitis      ipratropium - albuterol 0.5 mg/2.5 mg/3 mL (DUONEB) 0.5-2.5 (3) MG/3ML neb solution TAKE 1 VIAL (3 MLS) BY NEBULIZATION EVERY 4 HOURS AS NEEDED FOR SHORTNESS OF BREATH / DYSPNEA OR WHEEZING  Qty: 540 mL, Refills: 3    Associated Diagnoses: COPD exacerbation (H)       JANTOVEN 2.5 MG tablet TAKE 7.5 MG (3 TABS) ON TUES, THURS, SAT AND 5 MG (2 TABS) ALL OTHERDAYS, OR AS DIRECTED BY THE COUMADIN CLINIC.  Qty: 210 tablet, Refills: 0    Associated Diagnoses: Atrial fibrillation with RVR (H)      losartan (COZAAR) 50 MG tablet TAKE 2 TABLETS (100MG) BY MOUTH EVERY DAY  Qty: 180 tablet, Refills: 0    Associated Diagnoses: Atrial fibrillation with RVR (H); Hypertension goal BP (blood pressure) < 140/90      metoprolol succinate (TOPROL-XL) 50 MG 24 hr tablet Take 1 tablet (50 mg) by mouth daily  Qty: 90 tablet, Refills: 1    Associated Diagnoses: Atrial fibrillation with RVR (H); CHF (congestive heart failure) (H)      montelukast (SINGULAIR) 10 MG tablet TAKE 1 TABLET BY MOUTH DAILY AT BEDTIME  Qty: 90 tablet, Refills: 0    Associated Diagnoses: Chronic seasonal allergic rhinitis due to other allergen      multivitamin, therapeutic (THERA-VIT) TABS tablet Take 1 tablet by mouth daily      potassium chloride SA (K-DUR/KLOR-CON M) 20 MEQ CR tablet Take 1 tablet (20 mEq) by mouth daily  Qty: 90 tablet, Refills: 2    Associated Diagnoses: CHF (congestive heart failure) (H); Peripheral edema      senna-docusate (SENOKOT-S;PERICOLACE) 8.6-50 MG per tablet Take 1-2 tablets by mouth 2 times daily Take while on oral narcotics to prevent or treat constipation.  Qty: 30 tablet, Refills: 0    Comments: While on narcotics      torsemide (DEMADEX) 20 MG tablet Take 10 mg po daily. Take an extra 10mg po daily as needed for swelling.  Qty: 180 tablet, Refills: 3    Associated Diagnoses: Chronic diastolic congestive heart failure (H)      VENTOLIN  (90 Base) MCG/ACT Inhaler INHALE 2 PUFFS BY MOUTH EVERY 4 HOURS AS NEEDED FOR SHORTNESS OF BREATH/DYSPNEA  Qty: 18 g, Refills: 3    Associated Diagnoses: Chronic obstructive pulmonary disease with acute exacerbation (H)      Nebulizers (AIRS DISPOSABLE NEBULIZER) MISC USE EVERY 4 TO 6 HOURS AS NEEDED  Qty: 1 each, Refills: 1    Associated Diagnoses:  Panlobular emphysema (H)      order for DME Equipment being ordered: Nebulizer  Qty: 1 Device, Refills: 0    Associated Diagnoses: COPD (chronic obstructive pulmonary disease) (H)      Respiratory Therapy Supplies (NEBULIZER/TUBING/MOUTHPIECE) KIT Use every 4-6 hours PRN  Qty: 1 kit, Refills: 11    Associated Diagnoses: Chronic obstructive pulmonary disease, unspecified COPD type (H)           Allergies   Allergies   Allergen Reactions     Contrast Dye Anaphylaxis     Data   Most Recent 3 CBC's:  Recent Labs   Lab Test  07/18/18   0555  07/17/18   0445  04/17/18   1113   WBC  15.8*  12.1*  9.1   HGB  14.7  15.0  16.0   MCV  93  93  92   PLT  283  262  243      Most Recent 3 BMP's:  Recent Labs   Lab Test  07/17/18   0445  04/17/18   1113  11/07/17   0957  11/01/17   1728   NA  143  143   --   141   POTASSIUM  4.1  4.4  4.1  3.9   CHLORIDE  107  106   --   105   CO2  31  30   --   29   BUN  18  19   --   20   CR  1.38*  1.23   --   1.26*   ANIONGAP  5  7   --   7   BHAVNA  8.5  8.1*   --   8.8   GLC  123*  79   --   104*     Most Recent 2 LFT's:  Recent Labs   Lab Test  04/17/18   1113  11/28/17   1323   AST  21  14   ALT  26  29   ALKPHOS  82  90   BILITOTAL  0.7  0.4     Most Recent INR's and Anticoagulation Dosing History:  Anticoagulation Dose History     Recent Dosing and Labs Latest Ref Rng & Units 2/16/2018 3/2/2018 3/23/2018 4/13/2018 5/14/2018 6/11/2018 7/17/2018    INR 0.86 - 1.14 - - - - - - 5.54(HH)    INR 0.86 - 1.14 2.1(A) 3.0(A) 3.0(A) 2.5(A) 2.6(A) 2.5(A) -    INR Point of Care 0.86 - 1.14 - - - - - - -        Most Recent 3 Troponin's:  Recent Labs   Lab Test  07/17/18   0445  03/28/16   0418  12/31/14   0855   TROPI  0.027  <0.015  The 99th percentile for upper reference range is 0.045 ug/L.  Troponin values in   the range of 0.045 - 0.120 ug/L may be associated with risks of adverse   clinical events.    0.044     Most Recent Cholesterol Panel:  Recent Labs   Lab Test  05/30/17   0805   CHOL  169   LDL   105*   HDL  37*   TRIG  135     Most Recent 6 Bacteria Isolates From Any Culture (See EPIC Reports for Culture Details):  Recent Labs   Lab Test  03/28/16   1045  12/31/14   1510  12/14/10   1421   CULT  No MRSA isolated  No MRSA isolated  Moderate growth Staphylococcus lugdunensis This Staphylococcus is presumed to be  clindamycin resistant based on detection of inducible clindamycin resistance.     Most Recent TSH, T4 and A1c Labs:  Recent Labs   Lab Test  04/17/18   1113   TSH  1.79     Results for orders placed or performed during the hospital encounter of 07/17/18   XR Chest 2 Views    Narrative    XR CHEST 2 VW  7/17/2018 5:10 AM     INDICATION: SOA.    COMPARISON: Chest x-ray 6/2/2017. Chest CT dated 4/18/2018.      Impression    IMPRESSION: New patchy right upper lung and perihilar infiltrate which  is likely due to pneumonia. Recommend clinical correlation and  short-term follow-up imaging to ensure resolution. Emphysema. Heart  size is within normal limits.    GLENROY BELTRE MD

## 2018-07-18 NOTE — PROGRESS NOTES
DATE:  7/18/2018   TIME OF RECEIPT FROM LAB:  1002am  LAB TEST:  lactic  LAB VALUE:  2.3  RESULTS GIVEN WITH READ-BACK TO (PROVIDER):  TIFFANIE Scott  TIME LAB VALUE REPORTED TO PROVIDER:   1000

## 2018-07-18 NOTE — PROGRESS NOTES
As expected, the patient's lactic acid has returned to the normal range with the fluid bolus and holding duo nebs ×1 with patient continuing to feel significant clinical improvement and very eager to discharge home.  He will be discharged home without change to plan as previously described on a discharge summary from today's date.    Electronically Signed:  Aarti Scott MD

## 2018-07-19 LAB
BACTERIA SPEC CULT: NORMAL
SPECIMEN SOURCE: NORMAL

## 2018-07-19 NOTE — PROGRESS NOTES
Clinic Care Coordination Contact    Clinic Care Coordination Contact  OUTREACH    Referral Information:  Referral Source: IP Handoff    Primary Diagnosis: Pneumonia    Chief Complaint   Patient presents with     Clinic Care Coordination - Post Hospital     RN assessment     Universal Utilization:   Clinic Utilization  Difficulty keeping appointments:: No  Utilization    Last refreshed: 7/19/2018  9:48 AM:  No Show Count (past year) 6       Last refreshed: 7/19/2018  9:48 AM:  ED visits 0       Last refreshed: 7/19/2018  9:48 AM:  Hospital admissions 1          Current as of: 7/19/2018  9:48 AM           Clinical Concerns:  Current Medical Concerns:  Called and spoke with pt, introduced self and role.  Pt states she is doing well and feels like he is getting better. States he still gets fatigued when he does to much so he has been taking it easy.  States he has about 6 days left of the antibiotic and is also taking prednisone.  Pt also states he is using his nebulizer every 4 hours and feels this is helping.  Pt does have a follow up appt scheduled with PCP.  Pt's wife is a LPN here at the clinic and is very knowledgeable about when to bring him back in.   Current Behavioral Concerns: no current concerns    Education Provided to patient: continue to watch for fever, increase SOB or worsening symptoms   Pain  Chronic pain (GOAL):: No  Health Maintenance Reviewed: Not assessed  Clinical Pathway: None    Medication Management:  Self management     Functional Status:  Dependent ADLs:: Independent  Mobility Status: Independent    Living Situation:  Current living arrangement:: I live in a private home with spouse    Diet/Exercise/Sleep:       Transportation:  Transportation concerns (GOAL):: No  Transportation means:: Family, Regular car     Psychosocial:  Mental health DX:: No  Mental health management concern (GOAL):: No  Informal Support system:: Family     Financial/Insurance: no concerns     Resources and  Interventions:  Current Resources:      Equipment Currently Used at Home: other (see comments) (Nebulizer)    Advance Care Plan/Directive  Advanced Care Plans/Directives on file:: Yes  Type Advanced Care Plans/Directives: Advanced Directive - On File    Referrals Placed: None     Goals: Na    Patient/Caregiver understanding: Pt and wife verbalize understanding of instructions and follow up.        Future Appointments              Tomorrow  [facility]    In 5 days Sandip Vega DO [facility]          Plan:   Pt will complete antibiotics as prescribed  Pt will follow up in clinic as scheduled.   RN CC will remain available if needed int he future.       Arianna GOODMANN, RN, PHN  Care Coordination    [facility]  [address] [place] [address]  Office: [phone]  Fax [phone]   [facility]  [address] [place] [address]  Office: [phone] Fax [phone]  [email or web address]   [email or web address]   Connect with [facility] on social media.

## 2018-07-20 ENCOUNTER — ANTICOAGULATION THERAPY VISIT (OUTPATIENT)
Dept: ANTICOAGULATION | Facility: OTHER | Age: 68
End: 2018-07-20
Payer: MEDICARE

## 2018-07-20 DIAGNOSIS — Z79.01 LONG-TERM (CURRENT) USE OF ANTICOAGULANTS: ICD-10-CM

## 2018-07-20 DIAGNOSIS — I48.91 ATRIAL FIBRILLATION WITH RVR (H): ICD-10-CM

## 2018-07-20 LAB — INR POINT OF CARE: 2 (ref 0.86–1.14)

## 2018-07-20 PROCEDURE — 99207 ZZC NO CHARGE NURSE ONLY: CPT

## 2018-07-20 PROCEDURE — 85610 PROTHROMBIN TIME: CPT | Mod: QW

## 2018-07-20 PROCEDURE — 36416 COLLJ CAPILLARY BLOOD SPEC: CPT

## 2018-07-20 NOTE — PROGRESS NOTES
ANTICOAGULATION FOLLOW-UP CLINIC VISIT    Patient Name:  Home Valdez  Date:  7/20/2018  Contact Type:  Face to Face    SUBJECTIVE:     Patient Findings     Positives Change in medications (Currently on a prednisone taper), Antibiotic use or infection (Currently on Ceftin)           OBJECTIVE    INR Protime   Date Value Ref Range Status   07/20/2018 2.0 (A) 0.86 - 1.14 Final       ASSESSMENT / PLAN  INR assessment THER    Recheck INR In: 3 DAYS    INR Location Clinic      Anticoagulation Summary as of 7/20/2018     INR goal 2.0-3.0   Today's INR 2.0   Warfarin maintenance plan 7.5 mg (2.5 mg x 3) on Tue, Thu, Sat; 5 mg (2.5 mg x 2) all other days   Full warfarin instructions 7/21: 2.5 mg; Otherwise 7.5 mg on Tue, Thu, Sat; 5 mg all other days   Weekly warfarin total 42.5 mg   Plan last modified Shameka Carey RN (1/19/2018)   Next INR check 7/23/2018   Target end date     Indications   Atrial fibrillation with RVR (H) [I48.91]  Long-term (current) use of anticoagulants [Z79.01] [Z79.01]         Anticoagulation Episode Summary     INR check location     Preferred lab     Send INR reminders to [facility]    Comments 2.5 MG TABLETS, likes print out, PM dose      Anticoagulation Care Providers     Provider Role Specialty Phone number    Sandip Vega DO [facility] [phone]            See the Encounter Report to view Anticoagulation Flowsheet and Dosing Calendar (Go to Encounters tab in chart review, and find the Anticoagulation Therapy Visit)    Dosage adjustment made based on physician directed care plan.    Shameka Carey RN

## 2018-07-21 NOTE — PROGRESS NOTES
"Home WANG José Miguel  Gender: male  : 1950  [address]  [facility] [address] [phone] (home)     Medical Record: [id number]  Pharmacy:    [facility] [place], [place] - [address] [facility] [place] [address]  [id number] - [place], [place] - [address] [place] [address] INPATIENT RX - [place], [place] - [address] [facility]  Primary Care Provider: Sandip Vega    Parent's names are: Data Unavailable (mother) and Data Unavailable (father).      [facility]  2018     Discharge Phone Call:  Key Words/Key Times      Introduction - AIDET (Acknowledge, Introduce, Duration, Explanation)      Empathy-   We are calling to see how you are since your recent stay in the hospital?     Call back COMMENTS: Pt.was doing neb so is wife answered questions    Clinical Questions -  (f/u appts, medication side effects/purpose, ability to care for self at home) \"For your safety, it is important to us that you understand the purpose and side effects of your medications, can you tell me what your new medications are?\"     Call back COMMENTS: Pt.still has cough.He does have an appt.with  on Tuesday.No questions about medication or other cares.      Staff Recognition -  We like to recognize staff and physicians who have done an excellent job.  Do you remember any people from your care team that you would like recognize?     Call back COMMENTS: Nothing       Very Good Care -  We want to provide very good care to all patients.  How was your care?     Call back COMMENTS: Pt.was happy with his care.      Opportunities for Improvement -  Our goal is to be the best.  Do you have any suggestions for things that we could improve upon?     Call back COMMENTS: No suggestions for improvement.      Thank You       2    "

## 2018-07-22 LAB
BACTERIA SPEC CULT: NORMAL
BACTERIA SPEC CULT: NORMAL
SPECIMEN SOURCE: NORMAL
SPECIMEN SOURCE: NORMAL

## 2018-07-23 ENCOUNTER — ANTICOAGULATION THERAPY VISIT (OUTPATIENT)
Dept: ANTICOAGULATION | Facility: OTHER | Age: 68
End: 2018-07-23
Payer: MEDICARE

## 2018-07-23 DIAGNOSIS — Z79.01 LONG-TERM (CURRENT) USE OF ANTICOAGULANTS: ICD-10-CM

## 2018-07-23 DIAGNOSIS — I48.91 ATRIAL FIBRILLATION WITH RVR (H): ICD-10-CM

## 2018-07-23 LAB — INR POINT OF CARE: 1.6 (ref 0.86–1.14)

## 2018-07-23 PROCEDURE — 85610 PROTHROMBIN TIME: CPT | Mod: QW

## 2018-07-23 PROCEDURE — 36416 COLLJ CAPILLARY BLOOD SPEC: CPT

## 2018-07-23 PROCEDURE — 99207 ZZC NO CHARGE NURSE ONLY: CPT

## 2018-07-23 NOTE — MR AVS SNAPSHOT
Home Valdez   7/23/2018 8:15 AM   Anticoagulation Therapy Visit    Description:  68 year old male   Provider:  [facility]   Department:  [facility]           INR as of 7/23/2018     Today's INR 1.6!      Anticoagulation Summary as of 7/23/2018     INR goal 2.0-3.0   Today's INR 1.6!   Full warfarin instructions 7.5 mg on Tue, Thu, Sat; 5 mg all other days   Next INR check 7/27/2018    Indications   Atrial fibrillation with RVR (H) [I48.91]  Long-term (current) use of anticoagulants [Z79.01] [Z79.01]         Your next Anticoagulation Clinic appointment(s)     Jul 23, 2018  8:15 AM CDT   [facility] with  [facility]   [facility]    [address] [facility] [address]   [phone]            Jul 27, 2018  1:15 PM CDT   Anticoagulation Visit with  [facility]   [facility]    [address] [facility] [address]   [phone]              Contact Numbers     Clinic Number:         July 2018 Details    Sun Mon Tue Wed Thu Fri Sat     1               2               3               4               5               6               7                 8               9               10               11               12               13               14                 15               16               17               18               19               20               21                 22               23      5 mg   See details      24      7.5 mg         25      5 mg         26      7.5 mg         27            28                 29               30               31                    Date Details   07/23 This INR check       Date of next INR:  7/27/2018         How to take your warfarin dose     To take:  5 mg Take 2 of the 2.5 mg tablets.    To take:  7.5 mg Take 3 of the 2.5 mg tablets.

## 2018-07-24 ENCOUNTER — OFFICE VISIT (OUTPATIENT)
Dept: FAMILY MEDICINE | Facility: OTHER | Age: 68
End: 2018-07-24
Payer: MEDICARE

## 2018-07-24 VITALS
SYSTOLIC BLOOD PRESSURE: 122 MMHG | HEART RATE: 74 BPM | RESPIRATION RATE: 18 BRPM | OXYGEN SATURATION: 92 % | BODY MASS INDEX: 35.3 KG/M2 | TEMPERATURE: 97.5 F | DIASTOLIC BLOOD PRESSURE: 80 MMHG | WEIGHT: 246 LBS

## 2018-07-24 DIAGNOSIS — I48.91 ATRIAL FIBRILLATION WITH RVR (H): ICD-10-CM

## 2018-07-24 DIAGNOSIS — J18.9 COMMUNITY ACQUIRED PNEUMONIA OF RIGHT UPPER LOBE OF LUNG: Primary | ICD-10-CM

## 2018-07-24 DIAGNOSIS — I42.8 NONISCHEMIC CARDIOMYOPATHY (H): ICD-10-CM

## 2018-07-24 DIAGNOSIS — J43.1 PANLOBULAR EMPHYSEMA (H): ICD-10-CM

## 2018-07-24 DIAGNOSIS — E66.01 MORBID OBESITY (H): ICD-10-CM

## 2018-07-24 DIAGNOSIS — Z79.01 LONG-TERM (CURRENT) USE OF ANTICOAGULANTS: ICD-10-CM

## 2018-07-24 DIAGNOSIS — I10 HYPERTENSION GOAL BP (BLOOD PRESSURE) < 140/90: ICD-10-CM

## 2018-07-24 LAB
ANION GAP SERPL CALCULATED.3IONS-SCNC: 8 MMOL/L (ref 3–14)
BUN SERPL-MCNC: 22 MG/DL (ref 7–30)
CALCIUM SERPL-MCNC: 8.5 MG/DL (ref 8.5–10.1)
CHLORIDE SERPL-SCNC: 106 MMOL/L (ref 94–109)
CO2 SERPL-SCNC: 29 MMOL/L (ref 20–32)
CREAT SERPL-MCNC: 1.41 MG/DL (ref 0.66–1.25)
GFR SERPL CREATININE-BSD FRML MDRD: 50 ML/MIN/1.7M2
GLUCOSE SERPL-MCNC: 109 MG/DL (ref 70–99)
POTASSIUM SERPL-SCNC: 4.6 MMOL/L (ref 3.4–5.3)
SODIUM SERPL-SCNC: 143 MMOL/L (ref 133–144)

## 2018-07-24 PROCEDURE — 80048 BASIC METABOLIC PNL TOTAL CA: CPT | Performed by: INTERNAL MEDICINE

## 2018-07-24 PROCEDURE — 36415 COLL VENOUS BLD VENIPUNCTURE: CPT | Performed by: INTERNAL MEDICINE

## 2018-07-24 PROCEDURE — 99495 TRANSJ CARE MGMT MOD F2F 14D: CPT | Performed by: INTERNAL MEDICINE

## 2018-07-24 RX ORDER — CEFDINIR 300 MG/1
300 CAPSULE ORAL 2 TIMES DAILY
Qty: 10 CAPSULE | Refills: 0 | Status: ON HOLD | OUTPATIENT
Start: 2018-07-24 | End: 2018-07-31

## 2018-07-24 ASSESSMENT — PAIN SCALES - GENERAL: PAINLEVEL: NO PAIN (0)

## 2018-07-24 NOTE — MR AVS SNAPSHOT
After Visit Summary   7/24/2018    Home Valdez    MRN: [id number]           Patient Information     Date Of Birth          1950        Visit Information        Provider Department      7/24/2018 1:00 PM Sandip Vega DO [facility]        Today's Diagnoses     Community acquired pneumonia of right upper lobe of lung (H)    -  1    Morbid obesity (H)        Panlobular emphysema (H)        Hypertension goal BP (blood pressure) < 140/90        Nonischemic cardiomyopathy EF 45-50% by echo 2016        Atrial fibrillation with RVR (H)        Long-term (current) use of anticoagulants [Z79.01]           Follow-ups after your visit        Your next 10 appointments already scheduled     Jul 27, 2018  1:15 PM CDT   Anticoagulation Visit with  ANTI COAG   [facility] ([facility])    [address] [facility] [address] [phone]              Who to contact     If you have questions or need follow up information about today's clinic visit or your schedule please contact [facility] directly at [phone].  Normal or non-critical lab and imaging results will be communicated to you by MyChart, letter or phone within 4 business days after the clinic has received the results. If you do not hear from us within 7 days, please contact the clinic through MyChart or phone. If you have a critical or abnormal lab result, we will notify you by phone as soon as possible.  Submit refill requests through [employer] or call your pharmacy and they will forward the refill request to us. Please allow 3 business days for your refill to be completed.          Additional Information About Your Visit        Care EveryWhere ID     This is your Care EveryWhere ID. This could be used by other organizations to access your [facility] medical records  [id number]        Your Vitals Were     Pulse Temperature Respirations Pulse Oximetry BMI (Body Mass Index)       74  97.5  F (36.4  C) (Temporal) 18 92% 35.3 kg/m2        Blood Pressure from Last 3 Encounters:   07/24/18 122/80   07/18/18 101/68   05/03/18 115/69    Weight from Last 3 Encounters:   07/24/18 246 lb (111.6 kg)   07/18/18 246 lb 11.1 oz (111.9 kg)   05/03/18 247 lb (112 kg)              We Performed the Following     Basic metabolic panel          Where to get your medicines      These medications were sent to [id number] - [place], [place] - [address]   [address] [facility] [address]    Hours:  M-F 8:30-6:30; Sat 9-4; closed Sunday Phone:  [phone]     cefdinir 300 MG capsule          Primary Care Provider Office Phone # Fax #    Sandip Tillmanmelitain,  [phone]       [address] [place] [address]        Equal Access to Services     REBECCA ORTIZ AH: Hadii aad ku hadasho Soomaali, waaxda luqadaha, qaybta kaalmada adeegyada, waxay idiin hayaan heikeeg esa john . So [facility] [phone].    ATENCIÓN: Si habla español, tiene a calabrese disposición servicios gratuitos de asistencia lingüística. Llame al [phone].    We comply with applicable federal civil rights laws and [place] laws. We do not discriminate on the basis of race, color, national origin, age, disability, sex, sexual orientation, or gender identity.            Thank you!     Thank you for choosing [facility]  for your care. Our goal is always to provide you with excellent care. Hearing back from our patients is one way we can continue to improve our services. Please take a few minutes to complete the written survey that you may receive in the mail after your visit with us. Thank you!             Your Updated Medication List - Protect others around you: Learn how to safely use, store and throw away your medicines at www.disposemymeds.org.          This list is accurate as of 7/24/18  1:23 PM.  Always use your most recent med list.                   Brand Name Dispense Instructions for use Diagnosis     acetaminophen 325 MG tablet    TYLENOL    100 tablet    Take 2 tablets (650 mg) by mouth every 4 hours as needed for other (mild pain)        ADVAIR DISKUS 250-50 MCG/DOSE diskus inhaler   Generic drug:  fluticasone-salmeterol     1 Inhaler    INHALE 1 PUFF BY MOUTH TWICE A DAY    Panlobular emphysema (H)       AIRS DISPOSABLE NEBULIZER Misc     1 each    USE EVERY 4 TO 6 HOURS AS NEEDED    Panlobular emphysema (H)       amiodarone 200 MG tablet    PACERONE/CODARONE    45 tablet    Take 0.5 tablets (100 mg) by mouth daily    Atrial fibrillation (H)       buPROPion 150 MG 12 hr tablet    WELLBUTRIN SR    180 tablet    TAKE 1 TABLET BY MOUTH TWICE A DAY    Personal history of tobacco use, presenting hazards to health       cefdinir 300 MG capsule    OMNICEF    10 capsule    Take 1 capsule (300 mg) by mouth 2 times daily    Community acquired pneumonia of right upper lobe of lung (H)       cetirizine 10 MG tablet    zyrTEC    90 tablet    TAKE 1 TABLET BY MOUTH EVERY EVENING    Chronic seasonal allergic rhinitis due to other allergen       fluticasone 50 MCG/ACT spray    FLONASE    16 g    INHALE 1 TO 2 SPRAYS INTO EACH NOSTRIL EVERY DAY    Chronic rhinitis       ipratropium - albuterol 0.5 mg/2.5 mg/3 mL 0.5-2.5 (3) MG/3ML neb solution    DUONEB    540 mL    TAKE 1 VIAL (3 MLS) BY NEBULIZATION EVERY 4 HOURS AS NEEDED FOR SHORTNESS OF BREATH / DYSPNEA OR WHEEZING    COPD exacerbation (H)       JANTOVEN 2.5 MG tablet   Generic drug:  warfarin     210 tablet    HOLD your coumadin dose today (7/18/18) and tomorrow (7/19/19) and follow up with the INR clinic on Friday (7/20/18).  Further dosing of coumadin as directed by the INR nurse Friday.    Atrial fibrillation with RVR (H)       losartan 50 MG tablet    COZAAR    180 tablet    TAKE 2 TABLETS (100MG) BY MOUTH EVERY DAY    Atrial fibrillation with RVR (H), Hypertension goal BP (blood pressure) < 140/90       metoprolol succinate 50 MG 24 hr tablet    TOPROL-XL    90  tablet    Take 1 tablet (50 mg) by mouth daily    Atrial fibrillation with RVR (H), CHF (congestive heart failure) (H)       montelukast 10 MG tablet    SINGULAIR    90 tablet    TAKE 1 TABLET BY MOUTH DAILY AT BEDTIME    Chronic seasonal allergic rhinitis due to other allergen       multivitamin, therapeutic Tabs tablet      Take 1 tablet by mouth daily        nebulizer/tubing/mouthpiece Kit     1 kit    Use every 4-6 hours PRN    Chronic obstructive pulmonary disease, unspecified COPD type (H)       order for DME     1 Device    Equipment being ordered: Nebulizer    COPD (chronic obstructive pulmonary disease) (H)       potassium chloride SA 20 MEQ CR tablet    K-DUR/KLOR-CON M    90 tablet    Take 1 tablet (20 mEq) by mouth daily    CHF (congestive heart failure) (H), Peripheral edema       predniSONE 20 MG tablet    DELTASONE    14 tablet    Take 40 mg daily for 4 days, then 20 mg daily for four days then 10 mg daily for four days then stop    Community acquired pneumonia of right upper lobe of lung (H)       senna-docusate 8.6-50 MG per tablet    SENOKOT-S;PERICOLACE    30 tablet    Take 1-2 tablets by mouth 2 times daily Take while on oral narcotics to prevent or treat constipation.        torsemide 20 MG tablet    DEMADEX    180 tablet    Take 10 mg po daily. Take an extra 10mg po daily as needed for swelling.    Chronic diastolic congestive heart failure (H)       VENTOLIN  (90 Base) MCG/ACT Inhaler   Generic drug:  albuterol     18 g    INHALE 2 PUFFS BY MOUTH EVERY 4 HOURS AS NEEDED FOR SHORTNESS OF BREATH/DYSPNEA    Chronic obstructive pulmonary disease with acute exacerbation (H)
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
## 2018-07-24 NOTE — LETTER
August 1, 2018      Home FELIPE Valdez  [address] [facility] [address]        Dear ,    We are writing to inform you of your test results.    Your test results fall within the expected range(s) or remain unchanged from previous results.  Please continue with current treatment plan.    Resulted Orders   Basic metabolic panel   Result Value Ref Range    Sodium 143 133 - 144 mmol/L    Potassium 4.6 3.4 - 5.3 mmol/L    Chloride 106 94 - 109 mmol/L    Carbon Dioxide 29 20 - 32 mmol/L    Anion Gap 8 3 - 14 mmol/L    Glucose 109 (H) 70 - 99 mg/dL    Urea Nitrogen 22 7 - 30 mg/dL    Creatinine 1.41 (H) 0.66 - 1.25 mg/dL    GFR Estimate 50 (L) >60 mL/min/1.7m2      Comment:      Non  GFR Calc    GFR Estimate If Black 60 (L) >60 mL/min/1.7m2      Comment:       GFR Calc    Calcium 8.5 8.5 - 10.1 mg/dL       If you have any questions or concerns, please call the clinic at the number listed above.       Sincerely,        Sandip Vega DO

## 2018-07-27 ENCOUNTER — ANTICOAGULATION THERAPY VISIT (OUTPATIENT)
Dept: ANTICOAGULATION | Facility: OTHER | Age: 68
End: 2018-07-27
Payer: MEDICARE

## 2018-07-27 DIAGNOSIS — I48.91 ATRIAL FIBRILLATION WITH RVR (H): ICD-10-CM

## 2018-07-27 DIAGNOSIS — Z79.01 LONG-TERM (CURRENT) USE OF ANTICOAGULANTS: ICD-10-CM

## 2018-07-27 LAB — INR POINT OF CARE: 3 (ref 0.86–1.14)

## 2018-07-27 PROCEDURE — 85610 PROTHROMBIN TIME: CPT | Mod: QW

## 2018-07-27 PROCEDURE — 99207 ZZC NO CHARGE NURSE ONLY: CPT

## 2018-07-27 PROCEDURE — 36416 COLLJ CAPILLARY BLOOD SPEC: CPT

## 2018-07-27 NOTE — MR AVS SNAPSHOT
Home Valdez   7/27/2018 1:15 PM   Anticoagulation Therapy Visit    Description:  68 year old male   Provider:  [facility]   Department:  [facility]           INR as of 7/27/2018     Today's INR 3.0      Anticoagulation Summary as of 7/27/2018     INR goal 2.0-3.0   Today's INR 3.0   Full warfarin instructions 7/28: 2.5 mg; Otherwise 7.5 mg on Tue, Thu, Sat; 5 mg all other days   Next INR check 7/30/2018    Indications   Atrial fibrillation with RVR (H) [I48.91]  Long-term (current) use of anticoagulants [Z79.01] [Z79.01]         Your next Anticoagulation Clinic appointment(s)     Jul 30, 2018  3:45 PM CDT   Anticoagulation Visit with [facility] ANTI COAG   [facility]    [address] [facility] [address]   [phone]              Contact Numbers     Clinic Number:         July 2018 Details    Sun Mon Tue Wed Thu Fri Sat     1               2               3               4               5               6               7                 8               9               10               11               12               13               14                 15               16               17               18               19               20               21                 22               23               24               25               26               27      5 mg   See details      28      2.5 mg           29      5 mg         30            31                    Date Details   07/27 This INR check       Date of next INR:  7/30/2018         How to take your warfarin dose     To take:  2.5 mg Take 1 of the 2.5 mg tablets.    To take:  5 mg Take 2 of the 2.5 mg tablets.

## 2018-07-27 NOTE — PROGRESS NOTES
ANTICOAGULATION FOLLOW-UP CLINIC VISIT    Patient Name:  Home Valdez  Date:  7/27/2018  Contact Type:  Face to Face    SUBJECTIVE:     Patient Findings     Positives Antibiotic use or infection (Still has 4 more days of prednisone and antiboitics. Dose adjusted and recheck Monday), No Problem Findings           OBJECTIVE    INR Protime   Date Value Ref Range Status   07/27/2018 3.0 (A) 0.86 - 1.14 Final       ASSESSMENT / PLAN  INR assessment THER    Recheck INR In: 3 DAYS    INR Location Clinic      Anticoagulation Summary as of 7/27/2018     INR goal 2.0-3.0   Today's INR 3.0   Warfarin maintenance plan 7.5 mg (2.5 mg x 3) on Tue, Thu, Sat; 5 mg (2.5 mg x 2) all other days   Full warfarin instructions 7/28: 2.5 mg; Otherwise 7.5 mg on Tue, Thu, Sat; 5 mg all other days   Weekly warfarin total 42.5 mg   Plan last modified Shameka Carey RN (1/19/2018)   Next INR check 7/30/2018   Target end date     Indications   Atrial fibrillation with RVR (H) [I48.91]  Long-term (current) use of anticoagulants [Z79.01] [Z79.01]         Anticoagulation Episode Summary     INR check location     Preferred lab     Send INR reminders to [facility]    Comments 2.5 MG TABLETS, likes print out, PM dose      Anticoagulation Care Providers     Provider Role Specialty Phone number    Sandip VegaDO [facility] Internal Medicine [phone]            See the Encounter Report to view Anticoagulation Flowsheet and Dosing Calendar (Go to Encounters tab in chart review, and find the Anticoagulation Therapy Visit)    Dosage adjustment made based on physician directed care plan.    Shameka Carey RN

## 2018-07-27 NOTE — PROGRESS NOTES
Please contact the patient and notify him of the following:    The chemistry panel shows slight decrease in kidney function but it is stable.  Thank you.   DO SANJAY Lock

## 2018-07-30 ENCOUNTER — APPOINTMENT (OUTPATIENT)
Dept: CARDIOLOGY | Facility: CLINIC | Age: 68
End: 2018-07-30
Attending: EMERGENCY MEDICINE
Payer: MEDICARE

## 2018-07-30 ENCOUNTER — APPOINTMENT (OUTPATIENT)
Dept: GENERAL RADIOLOGY | Facility: CLINIC | Age: 68
End: 2018-07-30
Attending: FAMILY MEDICINE
Payer: MEDICARE

## 2018-07-30 ENCOUNTER — HOSPITAL ENCOUNTER (OUTPATIENT)
Facility: CLINIC | Age: 68
Setting detail: OBSERVATION
Discharge: HOME | End: 2018-07-31
Attending: FAMILY MEDICINE | Admitting: FAMILY MEDICINE
Payer: MEDICARE

## 2018-07-30 DIAGNOSIS — I10 HYPERTENSION GOAL BP (BLOOD PRESSURE) < 140/90: ICD-10-CM

## 2018-07-30 DIAGNOSIS — I48.91 ATRIAL FIBRILLATION WITH RAPID VENTRICULAR RESPONSE (H): ICD-10-CM

## 2018-07-30 DIAGNOSIS — I42.8 NONISCHEMIC CARDIOMYOPATHY (H): Primary | ICD-10-CM

## 2018-07-30 DIAGNOSIS — I50.9 CONGESTIVE HEART FAILURE, UNSPECIFIED CONGESTIVE HEART FAILURE CHRONICITY, UNSPECIFIED CONGESTIVE HEART FAILURE TYPE: ICD-10-CM

## 2018-07-30 DIAGNOSIS — I48.91 ATRIAL FIBRILLATION WITH RVR (H): ICD-10-CM

## 2018-07-30 PROBLEM — N18.30 CKD (CHRONIC KIDNEY DISEASE) STAGE 3, GFR 30-59 ML/MIN (H): Status: ACTIVE | Noted: 2018-07-30

## 2018-07-30 PROBLEM — R79.89 ELEVATED TROPONIN: Status: ACTIVE | Noted: 2018-07-30

## 2018-07-30 LAB
ALBUMIN SERPL-MCNC: 3.3 G/DL (ref 3.4–5)
ALP SERPL-CCNC: 54 U/L (ref 40–150)
ALT SERPL W P-5'-P-CCNC: 42 U/L (ref 0–70)
ANION GAP SERPL CALCULATED.3IONS-SCNC: 8 MMOL/L (ref 3–14)
AST SERPL W P-5'-P-CCNC: 22 U/L (ref 0–45)
BASE EXCESS BLDV CALC-SCNC: 3.7 MMOL/L
BASOPHILS # BLD AUTO: 0 10E9/L (ref 0–0.2)
BASOPHILS NFR BLD AUTO: 0.3 %
BILIRUB SERPL-MCNC: 0.9 MG/DL (ref 0.2–1.3)
BUN SERPL-MCNC: 19 MG/DL (ref 7–30)
CALCIUM SERPL-MCNC: 8.5 MG/DL (ref 8.5–10.1)
CHLORIDE SERPL-SCNC: 108 MMOL/L (ref 94–109)
CO2 SERPL-SCNC: 28 MMOL/L (ref 20–32)
CREAT SERPL-MCNC: 1.43 MG/DL (ref 0.66–1.25)
DIFFERENTIAL METHOD BLD: ABNORMAL
EOSINOPHIL NFR BLD AUTO: 0.9 %
ERYTHROCYTE [DISTWIDTH] IN BLOOD BY AUTOMATED COUNT: 14.4 % (ref 10–15)
GFR SERPL CREATININE-BSD FRML MDRD: 49 ML/MIN/1.7M2
GLUCOSE SERPL-MCNC: 114 MG/DL (ref 70–99)
HCO3 BLDV-SCNC: 29 MMOL/L (ref 21–28)
HCT VFR BLD AUTO: 45.9 % (ref 40–53)
HGB BLD-MCNC: 14.3 G/DL (ref 13.3–17.7)
IMM GRANULOCYTES # BLD: 0.1 10E9/L (ref 0–0.4)
IMM GRANULOCYTES NFR BLD: 0.6 %
INR PPP: 2.6 (ref 0.86–1.14)
LACTATE BLD-SCNC: 1.1 MMOL/L (ref 0.7–2)
LYMPHOCYTES # BLD AUTO: 0.9 10E9/L (ref 0.8–5.3)
LYMPHOCYTES NFR BLD AUTO: 7.6 %
MAGNESIUM SERPL-MCNC: 2 MG/DL (ref 1.6–2.3)
MCH RBC QN AUTO: 29.7 PG (ref 26.5–33)
MCHC RBC AUTO-ENTMCNC: 31.2 G/DL (ref 31.5–36.5)
MCV RBC AUTO: 95 FL (ref 78–100)
MONOCYTES # BLD AUTO: 1.2 10E9/L (ref 0–1.3)
MONOCYTES NFR BLD AUTO: 9.5 %
NEUTROPHILS # BLD AUTO: 10.1 10E9/L (ref 1.6–8.3)
NEUTROPHILS NFR BLD AUTO: 81.1 %
NRBC # BLD AUTO: 0 10*3/UL
NRBC BLD AUTO-RTO: 0 /100
NT-PROBNP SERPL-MCNC: ABNORMAL PG/ML (ref 0–900)
O2/TOTAL GAS SETTING VFR VENT: 21 %
PCO2 BLDV: 42 MM HG (ref 40–50)
PH BLDV: 7.44 PH (ref 7.32–7.43)
PLATELET # BLD AUTO: 218 10E9/L (ref 150–450)
PO2 BLDV: 52 MM HG (ref 25–47)
POTASSIUM SERPL-SCNC: 4 MMOL/L (ref 3.4–5.3)
POTASSIUM SERPL-SCNC: 4.5 MMOL/L (ref 3.4–5.3)
PROT SERPL-MCNC: 5.8 G/DL (ref 6.8–8.8)
RBC # BLD AUTO: 4.81 10E12/L (ref 4.4–5.9)
SODIUM SERPL-SCNC: 144 MMOL/L (ref 133–144)
TROPONIN I SERPL-MCNC: 0.05 UG/L (ref 0–0.04)
TROPONIN I SERPL-MCNC: 0.06 UG/L (ref 0–0.04)
WBC # BLD AUTO: 12.5 10E9/L (ref 4–11)

## 2018-07-30 PROCEDURE — 96376 TX/PRO/DX INJ SAME DRUG ADON: CPT

## 2018-07-30 PROCEDURE — 25000132 ZZH RX MED GY IP 250 OP 250 PS 637: Mod: GY | Performed by: FAMILY MEDICINE

## 2018-07-30 PROCEDURE — 25000132 ZZH RX MED GY IP 250 OP 250 PS 637: Performed by: INTERNAL MEDICINE

## 2018-07-30 PROCEDURE — A9270 NON-COVERED ITEM OR SERVICE: HCPCS | Mod: GY | Performed by: EMERGENCY MEDICINE

## 2018-07-30 PROCEDURE — 99220 ZZC INITIAL OBSERVATION CARE,LEVL III: CPT | Performed by: INTERNAL MEDICINE

## 2018-07-30 PROCEDURE — 83735 ASSAY OF MAGNESIUM: CPT | Performed by: INTERNAL MEDICINE

## 2018-07-30 PROCEDURE — 99285 EMERGENCY DEPT VISIT HI MDM: CPT | Mod: 25 | Performed by: FAMILY MEDICINE

## 2018-07-30 PROCEDURE — A9270 NON-COVERED ITEM OR SERVICE: HCPCS | Mod: GY | Performed by: FAMILY MEDICINE

## 2018-07-30 PROCEDURE — 96375 TX/PRO/DX INJ NEW DRUG ADDON: CPT | Performed by: FAMILY MEDICINE

## 2018-07-30 PROCEDURE — 25000132 ZZH RX MED GY IP 250 OP 250 PS 637: Mod: GY | Performed by: EMERGENCY MEDICINE

## 2018-07-30 PROCEDURE — 85025 COMPLETE CBC W/AUTO DIFF WBC: CPT | Performed by: FAMILY MEDICINE

## 2018-07-30 PROCEDURE — 71045 X-RAY EXAM CHEST 1 VIEW: CPT | Mod: TC

## 2018-07-30 PROCEDURE — 25500064 ZZH RX 255 OP 636: Performed by: INTERNAL MEDICINE

## 2018-07-30 PROCEDURE — 36415 COLL VENOUS BLD VENIPUNCTURE: CPT | Performed by: INTERNAL MEDICINE

## 2018-07-30 PROCEDURE — 82803 BLOOD GASES ANY COMBINATION: CPT | Performed by: FAMILY MEDICINE

## 2018-07-30 PROCEDURE — 93010 ELECTROCARDIOGRAM REPORT: CPT | Mod: Z6 | Performed by: FAMILY MEDICINE

## 2018-07-30 PROCEDURE — 83880 ASSAY OF NATRIURETIC PEPTIDE: CPT | Performed by: FAMILY MEDICINE

## 2018-07-30 PROCEDURE — 25000128 H RX IP 250 OP 636: Performed by: EMERGENCY MEDICINE

## 2018-07-30 PROCEDURE — 93005 ELECTROCARDIOGRAM TRACING: CPT | Performed by: FAMILY MEDICINE

## 2018-07-30 PROCEDURE — 96376 TX/PRO/DX INJ SAME DRUG ADON: CPT | Performed by: FAMILY MEDICINE

## 2018-07-30 PROCEDURE — 85610 PROTHROMBIN TIME: CPT | Performed by: FAMILY MEDICINE

## 2018-07-30 PROCEDURE — 25000125 ZZHC RX 250: Performed by: EMERGENCY MEDICINE

## 2018-07-30 PROCEDURE — G0378 HOSPITAL OBSERVATION PER HR: HCPCS

## 2018-07-30 PROCEDURE — 93306 TTE W/DOPPLER COMPLETE: CPT | Mod: 26 | Performed by: INTERNAL MEDICINE

## 2018-07-30 PROCEDURE — 99207 ZZC CDG-MDM COMPONENT: MEETS MODERATE - UP CODED: CPT | Performed by: INTERNAL MEDICINE

## 2018-07-30 PROCEDURE — 25000128 H RX IP 250 OP 636: Performed by: INTERNAL MEDICINE

## 2018-07-30 PROCEDURE — 84484 ASSAY OF TROPONIN QUANT: CPT | Mod: 91 | Performed by: FAMILY MEDICINE

## 2018-07-30 PROCEDURE — 84484 ASSAY OF TROPONIN QUANT: CPT | Performed by: INTERNAL MEDICINE

## 2018-07-30 PROCEDURE — 96361 HYDRATE IV INFUSION ADD-ON: CPT | Performed by: FAMILY MEDICINE

## 2018-07-30 PROCEDURE — 83605 ASSAY OF LACTIC ACID: CPT | Performed by: FAMILY MEDICINE

## 2018-07-30 PROCEDURE — A9270 NON-COVERED ITEM OR SERVICE: HCPCS | Performed by: INTERNAL MEDICINE

## 2018-07-30 PROCEDURE — 84132 ASSAY OF SERUM POTASSIUM: CPT | Mod: 91 | Performed by: INTERNAL MEDICINE

## 2018-07-30 PROCEDURE — 93306 TTE W/DOPPLER COMPLETE: CPT

## 2018-07-30 PROCEDURE — 25000128 H RX IP 250 OP 636: Performed by: FAMILY MEDICINE

## 2018-07-30 PROCEDURE — 80053 COMPREHEN METABOLIC PANEL: CPT | Performed by: FAMILY MEDICINE

## 2018-07-30 PROCEDURE — 96374 THER/PROPH/DIAG INJ IV PUSH: CPT | Performed by: FAMILY MEDICINE

## 2018-07-30 RX ORDER — METOPROLOL TARTRATE 1 MG/ML
5 INJECTION, SOLUTION INTRAVENOUS EVERY 5 MIN PRN
Status: DISCONTINUED | OUTPATIENT
Start: 2018-07-30 | End: 2018-07-30

## 2018-07-30 RX ORDER — FUROSEMIDE 10 MG/ML
40 INJECTION INTRAMUSCULAR; INTRAVENOUS ONCE
Status: COMPLETED | OUTPATIENT
Start: 2018-07-30 | End: 2018-07-30

## 2018-07-30 RX ORDER — LIDOCAINE 40 MG/G
CREAM TOPICAL
Status: DISCONTINUED | OUTPATIENT
Start: 2018-07-30 | End: 2018-07-31 | Stop reason: HOSPADM

## 2018-07-30 RX ORDER — POTASSIUM CHLORIDE 7.45 MG/ML
10 INJECTION INTRAVENOUS
Status: DISCONTINUED | OUTPATIENT
Start: 2018-07-30 | End: 2018-07-31 | Stop reason: HOSPADM

## 2018-07-30 RX ORDER — POTASSIUM CHLORIDE 29.8 MG/ML
20 INJECTION INTRAVENOUS
Status: DISCONTINUED | OUTPATIENT
Start: 2018-07-30 | End: 2018-07-31 | Stop reason: HOSPADM

## 2018-07-30 RX ORDER — ALBUTEROL SULFATE 90 UG/1
2 AEROSOL, METERED RESPIRATORY (INHALATION) EVERY 4 HOURS PRN
Status: DISCONTINUED | OUTPATIENT
Start: 2018-07-30 | End: 2018-07-31 | Stop reason: HOSPADM

## 2018-07-30 RX ORDER — POTASSIUM CHLORIDE 1500 MG/1
20-40 TABLET, EXTENDED RELEASE ORAL
Status: DISCONTINUED | OUTPATIENT
Start: 2018-07-30 | End: 2018-07-31 | Stop reason: HOSPADM

## 2018-07-30 RX ORDER — METOPROLOL TARTRATE 1 MG/ML
5 INJECTION, SOLUTION INTRAVENOUS ONCE
Status: COMPLETED | OUTPATIENT
Start: 2018-07-30 | End: 2018-07-30

## 2018-07-30 RX ORDER — POTASSIUM CL/LIDO/0.9 % NACL 10MEQ/0.1L
10 INTRAVENOUS SOLUTION, PIGGYBACK (ML) INTRAVENOUS
Status: DISCONTINUED | OUTPATIENT
Start: 2018-07-30 | End: 2018-07-31 | Stop reason: HOSPADM

## 2018-07-30 RX ORDER — POTASSIUM CHLORIDE 1.5 G/1.58G
20-40 POWDER, FOR SOLUTION ORAL
Status: DISCONTINUED | OUTPATIENT
Start: 2018-07-30 | End: 2018-07-31 | Stop reason: HOSPADM

## 2018-07-30 RX ORDER — AMIODARONE HYDROCHLORIDE 200 MG/1
200 TABLET ORAL DAILY
Status: DISCONTINUED | OUTPATIENT
Start: 2018-07-31 | End: 2018-07-31 | Stop reason: HOSPADM

## 2018-07-30 RX ORDER — MAGNESIUM SULFATE HEPTAHYDRATE 40 MG/ML
4 INJECTION, SOLUTION INTRAVENOUS EVERY 4 HOURS PRN
Status: DISCONTINUED | OUTPATIENT
Start: 2018-07-30 | End: 2018-07-31 | Stop reason: HOSPADM

## 2018-07-30 RX ORDER — NALOXONE HYDROCHLORIDE 0.4 MG/ML
.1-.4 INJECTION, SOLUTION INTRAMUSCULAR; INTRAVENOUS; SUBCUTANEOUS
Status: DISCONTINUED | OUTPATIENT
Start: 2018-07-30 | End: 2018-07-30

## 2018-07-30 RX ORDER — SODIUM CHLORIDE 9 MG/ML
1000 INJECTION, SOLUTION INTRAVENOUS CONTINUOUS
Status: DISCONTINUED | OUTPATIENT
Start: 2018-07-30 | End: 2018-07-30

## 2018-07-30 RX ORDER — IPRATROPIUM BROMIDE AND ALBUTEROL SULFATE 2.5; .5 MG/3ML; MG/3ML
3 SOLUTION RESPIRATORY (INHALATION) EVERY 4 HOURS PRN
Status: DISCONTINUED | OUTPATIENT
Start: 2018-07-30 | End: 2018-07-31 | Stop reason: HOSPADM

## 2018-07-30 RX ORDER — METOPROLOL TARTRATE 50 MG
50 TABLET ORAL 2 TIMES DAILY
Status: DISCONTINUED | OUTPATIENT
Start: 2018-07-30 | End: 2018-07-31 | Stop reason: HOSPADM

## 2018-07-30 RX ORDER — WARFARIN SODIUM 5 MG/1
5 TABLET ORAL
Status: COMPLETED | OUTPATIENT
Start: 2018-07-30 | End: 2018-07-30

## 2018-07-30 RX ORDER — POTASSIUM CHLORIDE 1500 MG/1
20 TABLET, EXTENDED RELEASE ORAL DAILY
Status: DISCONTINUED | OUTPATIENT
Start: 2018-07-30 | End: 2018-07-31 | Stop reason: HOSPADM

## 2018-07-30 RX ADMIN — SODIUM CHLORIDE 1000 ML: 9 INJECTION, SOLUTION INTRAVENOUS at 12:08

## 2018-07-30 RX ADMIN — FUROSEMIDE 40 MG: 10 INJECTION, SOLUTION INTRAVENOUS at 20:18

## 2018-07-30 RX ADMIN — FUROSEMIDE 40 MG: 10 INJECTION, SOLUTION INTRAVENOUS at 08:09

## 2018-07-30 RX ADMIN — AMIODARONE HYDROCHLORIDE 100 MG: 200 TABLET ORAL at 20:25

## 2018-07-30 RX ADMIN — WARFARIN SODIUM 5 MG: 5 TABLET ORAL at 20:18

## 2018-07-30 RX ADMIN — HUMAN ALBUMIN MICROSPHERES AND PERFLUTREN 9 ML: 10; .22 INJECTION, SOLUTION INTRAVENOUS at 14:49

## 2018-07-30 RX ADMIN — METOPROLOL TARTRATE 50 MG: 50 TABLET, FILM COATED ORAL at 20:18

## 2018-07-30 RX ADMIN — SODIUM CHLORIDE 1000 ML: 9 INJECTION, SOLUTION INTRAVENOUS at 06:48

## 2018-07-30 RX ADMIN — METOPROLOL TARTRATE 5 MG: 5 INJECTION, SOLUTION INTRAVENOUS at 09:27

## 2018-07-30 RX ADMIN — Medication 100 MG: at 11:31

## 2018-07-30 RX ADMIN — POTASSIUM CHLORIDE 20 MEQ: 1500 TABLET, EXTENDED RELEASE ORAL at 20:18

## 2018-07-30 RX ADMIN — ALBUTEROL SULFATE 2 PUFF: 90 INHALANT RESPIRATORY (INHALATION) at 20:19

## 2018-07-30 RX ADMIN — METOPROLOL TARTRATE 5 MG: 5 INJECTION, SOLUTION INTRAVENOUS at 08:14

## 2018-07-30 NOTE — ED NOTES
ED Nursing criteria listed below was addressed during verbal handoff:     Abnormal vitals: Yes  Abnormal results: Yes  Med Reconciliation completed: Yes  Meds given in ED: Yes  Any Overdue Meds: N/A  Core Measures: N/A  Isolation: No  Special needs: No  Skin assessment: N/A    Observation Patient  Education provided: Yes
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
## 2018-07-30 NOTE — IP AVS SNAPSHOT
[address] [facility]    [address] [place] [address]    Phone:  [phone]                                       After Visit Summary   7/30/2018    Home Valdez    MRN: [id number]           After Visit Summary Signature Page     I have received my discharge instructions, and my questions have been answered. I have discussed any challenges I see with this plan with the nurse or doctor.    ..........................................................................................................................................  Patient/Patient Representative Signature      ..........................................................................................................................................  Patient Representative Print Name and Relationship to Patient    ..................................................               ................................................  Date                                            Time    ..........................................................................................................................................  Reviewed by Signature/Title    ...................................................              ..............................................  Date                                                            Time

## 2018-07-30 NOTE — ED TRIAGE NOTES
Patient was recently hospitalized for Pneumonia.  During hospitalization patient was noted to be in Atrial Fibrillation but during a clinic recheck he was found not to be in A-Fib.  Patient states over last 3 days he has been weaker than when he was discharged from hospital.  Tonight patient had increasing SOB, completed nebulizer treatments and spouse noted a fast pulse around 120bpm.  During triage patient noted to have a consistent irregular pulse in the 120's.

## 2018-07-30 NOTE — IP AVS SNAPSHOT
MRN:[id number]                      After Visit Summary   7/30/2018    [facility]    MRN: [id number]           Thank you!     Thank you for choosing [facility] for your care. Our goal is always to provide you with excellent care. Hearing back from our patients is one way we can continue to improve our services. Please take a few minutes to complete the written survey that you may receive in the mail after you visit with us. Thank you!        Patient Information     Date Of Birth          1950        Designated Caregiver       Most Recent Value    Caregiver    Name of designated caregiver Chrissy (wife)    Phone number of caregiver [phone]      About your hospital stay     You were admitted on:  July 30, 2018 You last received care in the:  [address] [facility]    You were discharged on:  July 31, 2018       Who to Call     For medical emergencies, please call 911.  For non-urgent questions about your medical care, please call your primary care provider or clinic, [phone]          Attending Provider     Provider Specialty    Gerson Dorsey MD Emergency Medicine    Scott Lopez MD Emergency Medicine    Aarti Scott MD Family Practice       Primary Care Provider Office Phone # Fax #    [facility] [phone]      After Care Instructions     Activity       Your activity upon discharge: activity as tolerated            Diet       Follow this diet upon discharge: 2g salt                  Follow-up Appointments     Follow-up and recommended labs and tests        Follow up with Dr. Vega within 3 days  Recheck INR within 3 days                  Your next 10 appointments already scheduled     Aug 03, 2018  2:15 PM CDT   LAB with NL LAB [facility] ([facility])    [address] [facility] [address] [phone]           Please do not eat 10-12 hours before your appointment  "if you are coming in fasting for labs on lipids, cholesterol, or glucose (sugar). This does not apply to pregnant women. Water, hot tea and black coffee (with nothing added) are okay. Do not drink other fluids, diet soda or chew gum.            Aug 03, 2018  2:40 PM CDT   [facility] Sandip [facility] DO   [facility]    [address] [facility] [address] [phone]           Bring a current list of meds and any records pertaining to this visit. For Physicals, please bring immunization records and any forms needing to be filled out. Please arrive 10 minutes early to complete paperwork.              Pending Results     Date and Time Order Name Status Description    7/30/2018 1200 ECHO COMPLETE WITH [facility] In process             Statement of Approval     Ordered          07/31/18 0605  I have reviewed and agree with all the recommendations and orders detailed in this document.  EFFECTIVE NOW     Approved and electronically signed by:  Sumit Stock MD             Admission Information     Date & Time Provider Department Dept. Phone    7/30/2018 Aarti Scott MD [address] [facility] [phone]      Your Vitals Were     Blood Pressure Pulse Temperature Respirations Height Weight    95/72 (BP Location: Right arm) 77 98.4  F (36.9  C) (Axillary) 20 1.791 m (5' 10.5\") 111.2 kg (245 lb 3.2 oz)    Pulse Oximetry BMI (Body Mass Index)                92% 34.69 kg/m2          Care EveryWhere ID     This is your Care EveryWhere ID. This could be used by other organizations to access your [facility] medical records  [id number]        Equal Access to Services     [facility]: Hadii lissette monroe Sofede, waaxda luqadaha, qaybta kaalmajase mackey. So [facility] [phone].    ATENCIÓN: Si habla español, tiene a calabrese disposición servicios gratuitos de asistencia lingüística. Llame al " [phone].    We comply with applicable federal civil rights laws and [place] laws. We do not discriminate on the basis of race, color, national origin, age, disability, sex, sexual orientation, or gender identity.               Review of your medicines      START taking        Dose / Directions    metoprolol tartrate 50 MG tablet   Commonly known as:  LOPRESSOR        Dose:  50 mg   Take 1 tablet (50 mg) by mouth 2 times daily   Quantity:  60 tablet   Refills:  0         CONTINUE these medicines which may have CHANGED, or have new prescriptions. If we are uncertain of the size of tablets/capsules you have at home, strength may be listed as something that might have changed.        Dose / Directions    amiodarone 200 MG tablet   Commonly known as:  PACERONE/CODARONE   This may have changed:  how much to take        Dose:  200 mg   Take 1 tablet (200 mg) by mouth daily   Quantity:  30 tablet   Refills:  0       losartan 25 MG tablet   Commonly known as:  COZAAR   This may have changed:    - medication strength  - See the new instructions.   Used for:  Hypertension goal BP (blood pressure) < 140/90, Nonischemic cardiomyopathy (H)        Dose:  25 mg   Take 1 tablet (25 mg) by mouth daily   Quantity:  30 tablet   Refills:  0         CONTINUE these medicines which have NOT CHANGED        Dose / Directions    acetaminophen 325 MG tablet   Commonly known as:  TYLENOL        Dose:  650 mg   Take 2 tablets (650 mg) by mouth every 4 hours as needed for other (mild pain)   Quantity:  100 tablet   Refills:  0       ADVAIR DISKUS 250-50 MCG/DOSE diskus inhaler   Used for:  Panlobular emphysema (H)   Generic drug:  fluticasone-salmeterol        INHALE 1 PUFF BY MOUTH TWICE A DAY   Quantity:  1 Inhaler   Refills:  1       AIRS DISPOSABLE NEBULIZER Misc   Used for:  Panlobular emphysema (H)        USE EVERY 4 TO 6 HOURS AS NEEDED   Quantity:  1 each   Refills:  1       buPROPion 150 MG 12 hr tablet   Commonly known as:   WELLBUTRIN SR   Used for:  Personal history of tobacco use, presenting hazards to health        TAKE 1 TABLET BY MOUTH TWICE A DAY   Quantity:  180 tablet   Refills:  1       fluticasone 50 MCG/ACT spray   Commonly known as:  FLONASE   Used for:  Chronic rhinitis        INHALE 1 TO 2 SPRAYS INTO EACH NOSTRIL EVERY DAY   Quantity:  16 g   Refills:  1       ipratropium - albuterol 0.5 mg/2.5 mg/3 mL 0.5-2.5 (3) MG/3ML neb solution   Commonly known as:  DUONEB   Used for:  COPD exacerbation (H)        TAKE 1 VIAL (3 MLS) BY NEBULIZATION EVERY 4 HOURS AS NEEDED FOR SHORTNESS OF BREATH / DYSPNEA OR WHEEZING   Quantity:  540 mL   Refills:  3       JANTOVEN 2.5 MG tablet   Used for:  Atrial fibrillation with RVR (H)   Generic drug:  warfarin        Took 5mg yesterday, due to see INR clinic today (7/30/18)   Quantity:  210 tablet   Refills:  0       montelukast 10 MG tablet   Commonly known as:  SINGULAIR   Used for:  Chronic seasonal allergic rhinitis due to other allergen        TAKE 1 TABLET BY MOUTH DAILY AT BEDTIME   Quantity:  90 tablet   Refills:  0       multivitamin, therapeutic Tabs tablet        Dose:  1 tablet   Take 1 tablet by mouth daily   Refills:  0       nebulizer/tubing/mouthpiece Kit   Used for:  Chronic obstructive pulmonary disease, unspecified COPD type (H)        Use every 4-6 hours PRN   Quantity:  1 kit   Refills:  11       order for DME   Used for:  COPD (chronic obstructive pulmonary disease) (H)        Equipment being ordered: Nebulizer   Quantity:  1 Device   Refills:  0       potassium chloride SA 20 MEQ CR tablet   Commonly known as:  K-DUR/KLOR-CON M   Used for:  CHF (congestive heart failure) (H), Peripheral edema        Dose:  20 mEq   Take 1 tablet (20 mEq) by mouth daily   Quantity:  90 tablet   Refills:  2       torsemide 20 MG tablet   Commonly known as:  DEMADEX   Used for:  Chronic diastolic congestive heart failure (H)        Take 10 mg po daily. Take an extra 10mg po daily as  needed for swelling.   Quantity:  180 tablet   Refills:  3       VENTOLIN  (90 Base) MCG/ACT Inhaler   Used for:  Chronic obstructive pulmonary disease with acute exacerbation (H)   Generic drug:  albuterol        INHALE 2 PUFFS BY MOUTH EVERY 4 HOURS AS NEEDED FOR SHORTNESS OF BREATH/DYSPNEA   Quantity:  18 g   Refills:  3         STOP taking     cefdinir 300 MG capsule   Commonly known as:  OMNICEF           metoprolol succinate 50 MG 24 hr tablet   Commonly known as:  TOPROL-XL           predniSONE 20 MG tablet   Commonly known as:  DELTASONE           senna-docusate 8.6-50 MG per tablet   Commonly known as:  SENOKOT-S;PERICOLACE                Where to get your medicines      These medications were sent to [id number] - [place], [place] - [address] [place] [address]  [address] [place] [id number]    Hours:  M-F 8:30-6:30; Sat 9-4; closed Sunday Phone:  [phone]     amiodarone 200 MG tablet    losartan 25 MG tablet    metoprolol tartrate 50 MG tablet                Protect others around you: Learn how to safely use, store and throw away your medicines at www.disposemymeds.org.             Medication List: This is a list of all your medications and when to take them. Check marks below indicate your daily home schedule. Keep this list as a reference.      Medications           Morning Afternoon Evening Bedtime As Needed    acetaminophen 325 MG tablet   Commonly known as:  TYLENOL   Take 2 tablets (650 mg) by mouth every 4 hours as needed for other (mild pain)                                   ADVAIR DISKUS 250-50 MCG/DOSE diskus inhaler   INHALE 1 PUFF BY MOUTH TWICE A DAY   Last time this was given:  1 puff on 7/30/2018  8:27 PM   Generic drug:  fluticasone-salmeterol                                      AIRS DISPOSABLE NEBULIZER Misc   USE EVERY 4 TO 6 HOURS AS NEEDED                                   amiodarone 200 MG tablet   Commonly known as:  PACERONE/CODARONE   Take 1 tablet (200 mg) by mouth daily    Last time this was given:  100 mg on 7/30/2018  8:25 PM                                   buPROPion 150 MG 12 hr tablet   Commonly known as:  WELLBUTRIN SR   TAKE 1 TABLET BY MOUTH TWICE A DAY                                      fluticasone 50 MCG/ACT spray   Commonly known as:  FLONASE   INHALE 1 TO 2 SPRAYS INTO EACH NOSTRIL EVERY DAY                                   ipratropium - albuterol 0.5 mg/2.5 mg/3 mL 0.5-2.5 (3) MG/3ML neb solution   Commonly known as:  DUONEB   TAKE 1 VIAL (3 MLS) BY NEBULIZATION EVERY 4 HOURS AS NEEDED FOR SHORTNESS OF BREATH / DYSPNEA OR WHEEZING                                   JANTOVEN 2.5 MG tablet   Took 5mg yesterday, due to see INR clinic today (7/30/18)   Last time this was given:  5 mg on 7/30/2018  8:18 PM   Generic drug:  warfarin                                losartan 25 MG tablet   Commonly known as:  COZAAR   Take 1 tablet (25 mg) by mouth daily                                   metoprolol tartrate 50 MG tablet   Commonly known as:  LOPRESSOR   Take 1 tablet (50 mg) by mouth 2 times daily   Last time this was given:  50 mg on 7/30/2018  8:18 PM                                      montelukast 10 MG tablet   Commonly known as:  SINGULAIR   TAKE 1 TABLET BY MOUTH DAILY AT BEDTIME                                   multivitamin, therapeutic Tabs tablet   Take 1 tablet by mouth daily                                   nebulizer/tubing/mouthpiece Kit   Use every 4-6 hours PRN                                   order for DME   Equipment being ordered: Nebulizer                                potassium chloride SA 20 MEQ CR tablet   Commonly known as:  K-DUR/KLOR-CON M   Take 1 tablet (20 mEq) by mouth daily   Last time this was given:  20 mEq on 7/30/2018  8:18 PM                                   torsemide 20 MG tablet   Commonly known as:  DEMADEX   Take 10 mg po daily. Take an extra 10mg po daily as needed for swelling.                                      VENTOLIN   (90 Base) MCG/ACT Inhaler   INHALE 2 PUFFS BY MOUTH EVERY 4 HOURS AS NEEDED FOR SHORTNESS OF BREATH/DYSPNEA   Last time this was given:  2 puffs on 7/30/2018  8:19 PM   Generic drug:  albuterol

## 2018-07-30 NOTE — PROGRESS NOTES
S-(situation): Patient registered to Observation. Patient arrived to room 272 via W/C from ED    B-(background): Increased weakness/atrial fib    A-(assessment): Pt is A&O.  B/P stable.  Tachycardia.  No SOA at this time.  Sitting up in recliner.  Has skin old skin graphs on lower legs.  No ulcers noted.  Lower legs are darker in color and swollen - +2.  Encouraged to keep his legs elevated.    R-(recommendations): Orders and observation goals reviewed with pt.      Nursing Observation criteria listed below was met:    Skin issues/needs documented:Yes  Isolation needs addressed, if appropriate: NA  Fall Prevention: Education given and documented: NA  Education Assessment documented:Yes  Education Documented (Pre-existing chronic infection such as, MRSA/VRE need education on admission): N/A  Medication Reconciliation Complete: Yes  New medication patient education completed and documented (Possible Side Effects of Common Medications handout): No- no new meds ordered yet  Home medications if not able to send immediately home with family stored here: NA  Reminder note placed in discharge instructions: NA  Patient has discharge needs (If yes, please explain): No

## 2018-07-30 NOTE — CONSULTS
Clinical Pharmacy - Warfarin Dosing Consult     Pharmacy has been consulted to manage this patient s warfarin therapy.  Indication: Atrial Fibrillation  Therapy Goal: INR 2-3  Provider/Team: Dr. Stock  Warfarin Prior to Admission: Yes  Warfarin PTA Regimen: 7.5 mg on Tu, Th, Sat, and 5 mg all other days  Significant drug interactions: Amiodarone  Recent documented change in oral intake/nutrition: Unknown    INR   Date Value Ref Range Status   07/30/2018 2.60 (H) 0.86 - 1.14 Final     INR Protime   Date Value Ref Range Status   07/27/2018 3.0 (A) 0.86 - 1.14 Final       Recommend warfarin 5 mg today.  Pharmacy will monitor [facility] daily and order warfarin doses to achieve specified goal.      Please contact pharmacy as soon as possible if the warfarin needs to be held for a procedure or if the warfarin goals change.

## 2018-07-30 NOTE — ED PROVIDER NOTES
Patient is a 68-year-old male who was recently hospitalized for pneumonia presents with 2-3 days of increasing weakness and dyspnea with exertion.  Patient was initially seen by Dr. Dorsey.  He was signed out to me for follow-up of his blood work and imaging studies.  Patient has a history of COPD and has been steroids and recent nebulizers.  Despite this he has no improvement.  Says with activity he becomes more fatigued and short of breath.  In the department patient's heart rate is ranging up into the 130 range and is in A. fib.  He has been noted to be this before.  He is on Coumadin for this.  He is on amiodarone and Toprol-XL for rate control.  He has been followed by Dr. Murphy from cardiology and had an echo done 2016 which showed EF of 45-50%.  A cardiac echo was ordered for November of last year but never was completed.  Patient's blood work today showed a BMP to be improved from his most recent admission but still elevated.  He was given Lasix IV and had good diuresis from this.  Troponin was 0.055.  Venous blood gas relatively normal.  Lactic acid is 1.1.  Comprehensive metabolic panel was unremarkable except his creatinine slightly elevated at 1.43.  His INR was therapeutic at 2.6.  Chest x-ray showed cardiomegaly with congestive failure.    After consultation with Dr. Lundy from cardiology this point he recommended increasing his amiodarone to 200 mg daily.  Other considerations are to increase his Toprol or to add digoxin.  We did give him some IV Lopressor here and that did temporarily decrease his heart rate but at one point dropped his pressure into the 90 range so this was discontinued.  At this point patient be admitted for A. fib RVR and congestive failure.  Dr. Scott from the hospitalist service was apprised and will follow on admission.  Cardiac echo is ordered to reassess his EF.     Scott Lopez MD  07/30/18 4330
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
## 2018-07-31 VITALS
TEMPERATURE: 98.4 F | HEART RATE: 77 BPM | RESPIRATION RATE: 20 BRPM | HEIGHT: 71 IN | WEIGHT: 245.2 LBS | OXYGEN SATURATION: 92 % | SYSTOLIC BLOOD PRESSURE: 95 MMHG | DIASTOLIC BLOOD PRESSURE: 72 MMHG | BODY MASS INDEX: 34.33 KG/M2

## 2018-07-31 LAB
ANION GAP SERPL CALCULATED.3IONS-SCNC: 6 MMOL/L (ref 3–14)
BUN SERPL-MCNC: 19 MG/DL (ref 7–30)
CALCIUM SERPL-MCNC: 8 MG/DL (ref 8.5–10.1)
CHLORIDE SERPL-SCNC: 107 MMOL/L (ref 94–109)
CO2 SERPL-SCNC: 29 MMOL/L (ref 20–32)
CREAT SERPL-MCNC: 1.42 MG/DL (ref 0.66–1.25)
GFR SERPL CREATININE-BSD FRML MDRD: 50 ML/MIN/1.7M2
GLUCOSE SERPL-MCNC: 92 MG/DL (ref 70–99)
INR PPP: 2.69 (ref 0.86–1.14)
POTASSIUM SERPL-SCNC: 4.2 MMOL/L (ref 3.4–5.3)
SODIUM SERPL-SCNC: 142 MMOL/L (ref 133–144)
TROPONIN I SERPL-MCNC: 0.03 UG/L (ref 0–0.04)

## 2018-07-31 PROCEDURE — 36415 COLL VENOUS BLD VENIPUNCTURE: CPT | Performed by: INTERNAL MEDICINE

## 2018-07-31 PROCEDURE — G0378 HOSPITAL OBSERVATION PER HR: HCPCS

## 2018-07-31 PROCEDURE — 85610 PROTHROMBIN TIME: CPT | Performed by: INTERNAL MEDICINE

## 2018-07-31 PROCEDURE — 99217 ZZC OBSERVATION CARE DISCHARGE: CPT | Performed by: INTERNAL MEDICINE

## 2018-07-31 PROCEDURE — 84484 ASSAY OF TROPONIN QUANT: CPT | Performed by: INTERNAL MEDICINE

## 2018-07-31 PROCEDURE — 80048 BASIC METABOLIC PNL TOTAL CA: CPT | Performed by: INTERNAL MEDICINE

## 2018-07-31 RX ORDER — AMIODARONE HYDROCHLORIDE 200 MG/1
200 TABLET ORAL DAILY
Qty: 30 TABLET | Refills: 0 | Status: SHIPPED | OUTPATIENT
Start: 2018-07-31 | End: 2018-08-08

## 2018-07-31 RX ORDER — LOSARTAN POTASSIUM 25 MG/1
25 TABLET ORAL DAILY
Qty: 30 TABLET | Refills: 0 | Status: SHIPPED | OUTPATIENT
Start: 2018-07-31 | End: 2018-09-04

## 2018-07-31 RX ORDER — METOPROLOL TARTRATE 50 MG
50 TABLET ORAL 2 TIMES DAILY
Qty: 60 TABLET | Refills: 0 | Status: SHIPPED | OUTPATIENT
Start: 2018-07-31 | End: 2018-08-08

## 2018-07-31 NOTE — PLAN OF CARE
Problem: Patient Care Overview  Goal: Plan of Care/Patient Progress Review  Outcome: Therapy, progress toward functional goals as expected  S-(situation): END SHIFT NOTE    B-(background): 7/30/18 A-FIB, SOB    A-(assessment): Patient from home with wife, came in with increased SOB and swelling, pt received 40 lasix, states breathing is better, I&O pt weight 245.3 upon admission, >700 output from 7-11p after. Pt set up CPAP and applied at bedtime, O2@RA 94%, other VSS also, mild edema in bi lateral lower extremities.  LS- DIMINISHED.       R-(recommendations): Daily wt at 0600, monitor urinal as pt is urination has increased(monitor for dizziness when standing- per lasix dose given), CPAP on .

## 2018-07-31 NOTE — DISCHARGE SUMMARY
[facility]    Discharge Summary  Hospitalist    Date of Admission:  7/30/2018  Date of Discharge:  7/31/2018  Discharging Provider: Sumit Stock MD  Date of Service (when I saw the patient): 07/31/18    Discharge Diagnoses   Active Problems:    Atrial fibrillation with rapid ventricular response (H)    Nonischemic cardiomyopathy EF 45-50% by echo 2016    Elevated troponin    Hypertension goal BP (blood pressure) < 140/90    COPD (chronic obstructive pulmonary disease) (H)    AAA (abdominal aortic aneurysm) 4.0 cm    SEVERE JAMES (obstructive sleep apnea)    CKD (chronic kidney disease) stage 3, GFR 30-59 ml/min       History of Present Illness   Copied from H&P:   Home Valdez is a 68 year old male who presents with fatigue.   He has noticed for the past week he has felt very fatigued.  He gets very fatigued even with minimal exertion.  He had a fleeting episode of CP yesterday that happened when he took a deep breath but none since that time.  He has also felt SOB with activity but his cough has been improving and he has not had fever, chills or sweats.  His wife checked his pulse this am and found it was very elevated so he presented to the ED for evaluation    Hospital Course   Home Valdez was admitted on 7/30/2018.  The following problems were addressed during his hospitalization:    Active Problems:    Atrial fibrillation with rapid ventricular response (H)    Assessment: he was given 100 mg of amiodarone and his metoprolol dose was increased from 50 mg daily to 50 mg twice daily.  With those interventions his heart rate came down nicely and he was ranging from 65 to the mid 80's.  He still felt somewhat fatigued but stated he felt much better than on admission with regard to fatigue and SOB. His BP tolerated it well with SBP of 119 at discharge.  His echocardiogram was completed but no formal reading available yet at discharge.      Plan: discharge home on twice  daily metoprolol and increased dose of amiodarone.  He will recheck in clinic within 3 days and will need a repeat INR done as well      Nonischemic cardiomyopathy EF 45-50% by echo 2016    Assessment: echo pending    Plan: resume cozaar but at lower dose to accommodate for the changes in his metoprolol      Elevated troponin    Assessment: due to rapid a fib.  His EKG showed no ischemic and no ischemic chest pain and his troponin had normalized as his rate came down    Plan: no intervention      Hypertension goal BP (blood pressure) < 140/90    Assessment: controlled    Plan: changes as above      COPD (chronic obstructive pulmonary disease) (H)    Assessment: chronic and stable    Plan: continue home meds      AAA (abdominal aortic aneurysm) 4.0 cm    Assessment: noted past history    Plan: no intervention      SEVERE JAMES (obstructive sleep apnea)    Assessment: chronic and stable    Plan: continue CPAP      CKD (chronic kidney disease) stage 3, GFR 30-59 ml/min    Assessment: chronic and stable    Plan: outpatient recheck      # Discharge Pain Plan:   - Patient currently has NO PAIN and is not being prescribed pain medications on discharge.      Sumit Stock MD    Significant Results and Procedures   No procedures performed during this admission    Pending Results   These results will be followed up by   Unresulted Labs Ordered in the Past 30 Days of this Admission     No orders found for last 61 day(s).          Code Status   Full Code       Primary Care Physician   Sandip Vega    Physical Exam   Temp: 98.4  F (36.9  C) Temp src: Axillary BP: 95/72 Pulse: 77 Heart Rate: 65 Resp: 20 SpO2: 92 % O2 Device: BiPAP/CPAP    Vitals:    07/30/18 1227   Weight: 111.2 kg (245 lb 3.2 oz)     Vital Signs with Ranges  Temp:  [98  F (36.7  C)-98.4  F (36.9  C)] 98.4  F (36.9  C)  Pulse:  [] 77  Heart Rate:  [] 65  Resp:  [13-42] 20  BP: ()/() 95/72  SpO2:  [86 %-94 %] 92 %  I/O  last 3 completed shifts:  In: 300 [P.O.:300]  Out: 1025 [Urine:1025]    Lungs:  CTA throughout  CV:  Irregular with controlled rate without murmur  Abdomen: +BS, Soft, NT  Edema is markedly improved from admission      Discharge Disposition   Discharged to home  Condition at discharge: Stable    Consultations This Hospital Stay   PHARMACY TO DOSE WARFARIN    Time Spent on this Encounter   I, Sumit Stock MD, personally saw the patient today and spent less than or equal to 30 minutes discharging this patient.    Discharge Orders     Follow-up and recommended labs and tests    Follow up with Dr. Vega within 3 days  Recheck INR within 3 days     Activity   Your activity upon discharge: activity as tolerated     Diet   Follow this diet upon discharge: 2g salt       Discharge Medications   Current Discharge Medication List      START taking these medications    Details   metoprolol tartrate (LOPRESSOR) 50 MG tablet Take 1 tablet (50 mg) by mouth 2 times daily  Qty: 60 tablet, Refills: 0    Associated Diagnoses: Atrial fibrillation with rapid ventricular response (H)         CONTINUE these medications which have CHANGED    Details   amiodarone (PACERONE/CODARONE) 200 MG tablet Take 1 tablet (200 mg) by mouth daily  Qty: 30 tablet, Refills: 0    Associated Diagnoses: Atrial fibrillation with rapid ventricular response (H)      losartan (COZAAR) 25 MG tablet Take 1 tablet (25 mg) by mouth daily  Qty: 30 tablet, Refills: 0    Associated Diagnoses: Hypertension goal BP (blood pressure) < 140/90; Nonischemic cardiomyopathy (H)         CONTINUE these medications which have NOT CHANGED    Details   acetaminophen (TYLENOL) 325 MG tablet Take 2 tablets (650 mg) by mouth every 4 hours as needed for other (mild pain)  Qty: 100 tablet, Refills: 0      ADVAIR DISKUS 250-50 MCG/DOSE diskus inhaler INHALE 1 PUFF BY MOUTH TWICE A DAY  Qty: 1 Inhaler, Refills: 1    Associated Diagnoses: Panlobular emphysema (H)      buPROPion  (WELLBUTRIN SR) 150 MG 12 hr tablet TAKE 1 TABLET BY MOUTH TWICE A DAY  Qty: 180 tablet, Refills: 1    Associated Diagnoses: Personal history of tobacco use, presenting hazards to health      fluticasone (FLONASE) 50 MCG/ACT spray INHALE 1 TO 2 SPRAYS INTO EACH NOSTRIL EVERY DAY  Qty: 16 g, Refills: 1    Associated Diagnoses: Chronic rhinitis      ipratropium - albuterol 0.5 mg/2.5 mg/3 mL (DUONEB) 0.5-2.5 (3) MG/3ML neb solution TAKE 1 VIAL (3 MLS) BY NEBULIZATION EVERY 4 HOURS AS NEEDED FOR SHORTNESS OF BREATH / DYSPNEA OR WHEEZING  Qty: 540 mL, Refills: 3    Associated Diagnoses: COPD exacerbation (H)      montelukast (SINGULAIR) 10 MG tablet TAKE 1 TABLET BY MOUTH DAILY AT BEDTIME  Qty: 90 tablet, Refills: 0    Associated Diagnoses: Chronic seasonal allergic rhinitis due to other allergen      multivitamin, therapeutic (THERA-VIT) TABS tablet Take 1 tablet by mouth daily      potassium chloride SA (K-DUR/KLOR-CON M) 20 MEQ CR tablet Take 1 tablet (20 mEq) by mouth daily  Qty: 90 tablet, Refills: 2    Associated Diagnoses: CHF (congestive heart failure) (H); Peripheral edema      torsemide (DEMADEX) 20 MG tablet Take 10 mg po daily. Take an extra 10mg po daily as needed for swelling.  Qty: 180 tablet, Refills: 3    Associated Diagnoses: Chronic diastolic congestive heart failure (H)      VENTOLIN  (90 Base) MCG/ACT Inhaler INHALE 2 PUFFS BY MOUTH EVERY 4 HOURS AS NEEDED FOR SHORTNESS OF BREATH/DYSPNEA  Qty: 18 g, Refills: 3    Associated Diagnoses: Chronic obstructive pulmonary disease with acute exacerbation (H)      warfarin (JANTOVEN) 2.5 MG tablet Took 5mg yesterday, due to see INR clinic today (7/30/18)  Qty: 210 tablet, Refills: 0    Associated Diagnoses: Atrial fibrillation with RVR (H)      Nebulizers (AIRS DISPOSABLE NEBULIZER) MISC USE EVERY 4 TO 6 HOURS AS NEEDED  Qty: 1 each, Refills: 1    Associated Diagnoses: Panlobular emphysema (H)      order for DME Equipment being ordered: Nebulizer  Qty:  1 Device, Refills: 0    Associated Diagnoses: COPD (chronic obstructive pulmonary disease) (H)      Respiratory Therapy Supplies (NEBULIZER/TUBING/MOUTHPIECE) KIT Use every 4-6 hours PRN  Qty: 1 kit, Refills: 11    Associated Diagnoses: Chronic obstructive pulmonary disease, unspecified COPD type (H)         STOP taking these medications       cefdinir (OMNICEF) 300 MG capsule Comments:   Reason for Stopping:         metoprolol succinate (TOPROL-XL) 50 MG 24 hr tablet Comments:   Reason for Stopping:         predniSONE (DELTASONE) 20 MG tablet Comments:   Reason for Stopping:         senna-docusate (SENOKOT-S;PERICOLACE) 8.6-50 MG per tablet Comments:   Reason for Stopping:             Allergies   Allergies   Allergen Reactions     Contrast Dye Anaphylaxis     Data   Most Recent 3 CBC's:  Recent Labs   Lab Test  07/30/18   0635  07/18/18   0555  07/17/18   0445   WBC  12.5*  15.8*  12.1*   HGB  14.3  14.7  15.0   MCV  95  93  93   PLT  218  283  262      Most Recent 3 BMP's:  Recent Labs   Lab Test  07/31/18   0514  07/30/18   1856  07/30/18   0635  07/24/18   1314   NA  142   --   144  143   POTASSIUM  4.2  4.0  4.5  4.6   CHLORIDE  107   --   108  106   CO2  29   --   28  29   BUN  19   --   19  22   CR  1.42*   --   1.43*  1.41*   ANIONGAP  6   --   8  8   BHAVNA  8.0*   --   8.5  8.5   GLC  92   --   114*  109*     Most Recent 2 LFT's:  Recent Labs   Lab Test  07/30/18   0635  04/17/18   1113   AST  22  21   ALT  42  26   ALKPHOS  54  82   BILITOTAL  0.9  0.7     Most Recent INR's and Anticoagulation Dosing History:  Anticoagulation Dose History     Recent Dosing and Labs Latest Ref Rng & Units 7/17/2018 7/18/2018 7/20/2018 7/23/2018 7/27/2018 7/30/2018 7/31/2018    Warfarin 5 mg - - - - - - 5 mg -    INR 0.86 - 1.14 5.54(HH) 5.82(HH) - - - 2.60(H) 2.69(H)    INR 0.86 - 1.14 - - 2.0(A) 1.6(A) 3.0(A) - -    INR Point of Care 0.86 - 1.14 - - - - - - -        Most Recent 3 Troponin's:  Recent Labs   Lab Test   07/31/18   0514  07/30/18   2023  07/30/18   0635   TROPI  0.033  0.048*  0.055*     Most Recent Cholesterol Panel:  Recent Labs   Lab Test  05/30/17   0805   CHOL  169   LDL  105*   HDL  37*   TRIG  135     Most Recent 6 Bacteria Isolates From Any Culture (See EPIC Reports for Culture Details):  Recent Labs   Lab Test  07/17/18   1330  07/17/18   1040  07/17/18   0529  07/17/18   0528  03/28/16   1045  12/31/14   1510   CULT  Moderate growth  Normal mj    No MRSA isolated  No growth after 5 days  No growth after 5 days  No MRSA isolated  No MRSA isolated     Most Recent TSH, T4 and A1c Labs:  Recent Labs   Lab Test  04/17/18   1113   TSH  1.79

## 2018-07-31 NOTE — PROGRESS NOTES
Vital signs stable on RA and home CPAP.  Afebrile.  A&O x4.  Pt denying any chest pain.  Continues to have dyspnea upon exertion.   Lung sounds tight very diminished, PRN duoneb improved movement of air, post neb then had expiratory wheezes.  Tele Afib.  Bowel sounds normoactive.  Up independently in room, steady on feet, took shower this am.  Voiding frequently.  Skin is intact with exception to scarring.  Bilateral lower extremeties continue to have +2-+3 pitting edema and subhash in color.  IV saline locked, site asymptomatic.

## 2018-08-01 ENCOUNTER — CARE COORDINATION (OUTPATIENT)
Dept: CARDIOLOGY | Facility: CLINIC | Age: 68
End: 2018-08-01

## 2018-08-01 ENCOUNTER — TELEPHONE (OUTPATIENT)
Dept: FAMILY MEDICINE | Facility: OTHER | Age: 68
End: 2018-08-01

## 2018-08-01 NOTE — TELEPHONE ENCOUNTER
"Hospital/TCU/ED for chronic condition Discharge Protocol    \"Hi, my name is Kari Mora, a registered nurse, and I am calling from [facility].  I am calling to follow up and see how things are going for you after your recent emergency visit/hospital/TCU stay.\"    Tell me how you are doing now that you are home?\" Doing alright. Had a rough night. Every time I lay down I get that panic feeling that I can't breath. Some pitting edema in the legs. BP, pulse, and oxygen all look good.\"    **RN did huddle with Dr. Vega. Demadex was increased**      Discharge Instructions    \"Let's review your discharge instructions.  What is/are the follow-up recommendations?  Pt. Response: follow up with PCP    \"Has an appointment with your primary care provider been scheduled?\"   Yes. (confirm)    \"When you see the provider, I would recommend that you bring your medications with you.\"    Medications    \"Tell me what changed about your medicines when you discharged?\"    Changes to chronic meds?    0-1    \"What questions do you have about your medications?\"    None     New diagnoses of heart failure, COPD, diabetes, or MI?    No          On warfarin: \"Were you given any recommendations for follow-up with the anticoagulation clinic?\" Yes - Anticoagulation clinic appointment is already scheduled at appropriate interval    Medication reconciliation completed? Yes  Was MTM referral placed (*Make sure to put transitions as reason for referral)?   No    Call Summary    \"What questions or concerns do you have about your recent visit and your follow-up care?\"     none    \"If you have questions or things don't continue to improve, we encourage you contact us through the main clinic number (give number).  Even if the clinic is not open, triage nurses are available 24/7 to help you.     We would like you to know that our clinic has extended hours (provide information).  We also have urgent care (provide details on closest location " "and hours/contact info)\"      \"Thank you for your time and take care!\"    Kari Mora RN  [facility]               "

## 2018-08-01 NOTE — PROGRESS NOTES
Received call from Belen with Dr. Vega's office at [facility], requesting to move up appt with Dr. Murphy. Pt currently scheduled with Dr. Murphy for 8/14 at [place]. That is his soonest available appt. for Dr. Murphy but Dr. Lima has CORE appt on Tues 8/7 or Wed 8/8. Spoke with Belen, offered appt with Dr. Lima. Pt scheduled for 8/8/ @ 1:00pm in [place] with Dr. Lima. Will keep appt with Dr. Murphy for now as well.

## 2018-08-01 NOTE — TELEPHONE ENCOUNTER
Spoke to patient's wife. She said the patient was recently hospitalized for a 24 hour observation. She said they gave him IV lasix and patient had great results with a lot of output. Patient was sent home yesterday.     Wife said this morning he has pitting edema to his calves. She said last night he was sitting at the edge of the bed 5 times with shortness of breath. She said when he lays down he gets the panic feeling like he can't breathe. He was able to lay  Wife said he feels completely washed out. She said his pulse has been staying under 100 which is what the provider wanted it to be at. She said his BP is 112/78 and oxygen around 91-94.     Wife is wondering if Dr. Vega thinks he should go back into the ED for more IV lasix? Come in and see Dr. Vega? Wait it out until his appointment on Friday?    RN huddled with Dr. Vega. He would like patient to increase his Demadex to 20 mg BID (morning dose and one dose around 3 PM). Watch for red flag symptoms. Keep appointment for Friday. Call with any new, worsening, or changing symptoms.     RN called patient and wife back. Informed them of Dr. Vega's advise. Both understand and agree with the plan of care.     Kari Mora RN  [facility]

## 2018-08-01 NOTE — TELEPHONE ENCOUNTER
Patient called to schedule an appointment for a hospital follow-up or appeared on a report showing that they were recently discharged from the hospital.    Patient was admitted to :  [facility]  Discharged date: 07-  Reason for hospital admission:  Atrial Fibrillation With Rapid Ventricular Response (H), Nonischemic Cardiomyopathy (H)  Does patient have future appointment scheduled with provider? Yes -Gary  Date of future appointment:  08-      This information will be used to help the care team plan for the patients upcoming visit.  The triage RN may determine that a follow up call is necessary and reach out to the patient via the phone number listed in the chart.     Please route this message on routine priority to the Triage RN pool.

## 2018-08-03 ENCOUNTER — OFFICE VISIT (OUTPATIENT)
Dept: FAMILY MEDICINE | Facility: OTHER | Age: 68
End: 2018-08-03
Payer: MEDICARE

## 2018-08-03 ENCOUNTER — ANTICOAGULATION THERAPY VISIT (OUTPATIENT)
Dept: ANTICOAGULATION | Facility: OTHER | Age: 68
End: 2018-08-03
Payer: MEDICARE

## 2018-08-03 VITALS
SYSTOLIC BLOOD PRESSURE: 112 MMHG | WEIGHT: 241 LBS | OXYGEN SATURATION: 91 % | RESPIRATION RATE: 18 BRPM | BODY MASS INDEX: 34.09 KG/M2 | TEMPERATURE: 98.3 F | HEART RATE: 120 BPM | DIASTOLIC BLOOD PRESSURE: 70 MMHG

## 2018-08-03 DIAGNOSIS — I48.91 ATRIAL FIBRILLATION WITH RVR (H): ICD-10-CM

## 2018-08-03 DIAGNOSIS — I10 HYPERTENSION GOAL BP (BLOOD PRESSURE) < 140/90: ICD-10-CM

## 2018-08-03 DIAGNOSIS — Z79.01 LONG-TERM (CURRENT) USE OF ANTICOAGULANTS: Primary | ICD-10-CM

## 2018-08-03 DIAGNOSIS — J43.1 PANLOBULAR EMPHYSEMA (H): ICD-10-CM

## 2018-08-03 DIAGNOSIS — I50.21 ACUTE SYSTOLIC CHF (CONGESTIVE HEART FAILURE) (H): ICD-10-CM

## 2018-08-03 DIAGNOSIS — I50.32 CHRONIC DIASTOLIC CONGESTIVE HEART FAILURE (H): ICD-10-CM

## 2018-08-03 DIAGNOSIS — Z79.01 LONG-TERM (CURRENT) USE OF ANTICOAGULANTS: ICD-10-CM

## 2018-08-03 LAB
ANION GAP SERPL CALCULATED.3IONS-SCNC: 10 MMOL/L (ref 3–14)
BUN SERPL-MCNC: 31 MG/DL (ref 7–30)
CALCIUM SERPL-MCNC: 8.4 MG/DL (ref 8.5–10.1)
CHLORIDE SERPL-SCNC: 102 MMOL/L (ref 94–109)
CO2 SERPL-SCNC: 30 MMOL/L (ref 20–32)
CREAT SERPL-MCNC: 1.66 MG/DL (ref 0.66–1.25)
GFR SERPL CREATININE-BSD FRML MDRD: 41 ML/MIN/1.7M2
GLUCOSE SERPL-MCNC: 95 MG/DL (ref 70–99)
INR BLD: >8 (ref 0.86–1.14)
INR PPP: 4.77 (ref 0.86–1.14)
POTASSIUM SERPL-SCNC: 3.7 MMOL/L (ref 3.4–5.3)
SODIUM SERPL-SCNC: 142 MMOL/L (ref 133–144)

## 2018-08-03 PROCEDURE — 80048 BASIC METABOLIC PNL TOTAL CA: CPT | Performed by: INTERNAL MEDICINE

## 2018-08-03 PROCEDURE — 36415 COLL VENOUS BLD VENIPUNCTURE: CPT | Performed by: INTERNAL MEDICINE

## 2018-08-03 PROCEDURE — 99495 TRANSJ CARE MGMT MOD F2F 14D: CPT | Performed by: INTERNAL MEDICINE

## 2018-08-03 PROCEDURE — 85610 PROTHROMBIN TIME: CPT | Mod: QW | Performed by: INTERNAL MEDICINE

## 2018-08-03 PROCEDURE — 99207 ZZC NO CHARGE NURSE ONLY: CPT | Performed by: INTERNAL MEDICINE

## 2018-08-03 PROCEDURE — 85610 PROTHROMBIN TIME: CPT | Performed by: INTERNAL MEDICINE

## 2018-08-03 RX ORDER — TORSEMIDE 20 MG/1
TABLET ORAL
Qty: 180 TABLET | Refills: 3 | Status: SHIPPED | OUTPATIENT
Start: 2018-08-03 | End: 2018-08-08

## 2018-08-03 RX ORDER — SPIRONOLACTONE 25 MG/1
12.5 TABLET ORAL 2 TIMES DAILY
Qty: 7 TABLET | Refills: 0 | Status: SHIPPED | OUTPATIENT
Start: 2018-08-03 | End: 2018-08-08

## 2018-08-03 ASSESSMENT — PAIN SCALES - GENERAL: PAINLEVEL: NO PAIN (0)

## 2018-08-03 NOTE — LETTER
My COPD Action Plan     Name: Home Valdez    YOB: 1950   Date: 8/3/2018    My doctor: Sandip Vega, DO   My clinic: [address] [address] [phone]  My Controller Medicine:    Dose:      My Rescue Medicine:    Dose:      My Flare Up Medicine:    Dose:      My COPD Severity:       Use of Oxygen:      Make sure you've had your pneumonia   vaccines.          GREEN ZONE       Doing well today      Usual level of activity and exercise    Usual amount of cough and mucus    No shortness of breath    Usual level of health (thinking clearly, sleeping well, feel like eating) Actions:      Take daily medicines    Use oxygen as prescribed    Follow regular exercise and diet plan    Avoid cigarette smoke and other irritants that harm the lungs           YELLOW ZONE          Having a bad day or flare up      Short of breath more than usual    A lot more sputum (mucus) than usual    Sputum looks yellow, green, tan, brown or bloody    More coughing or wheezing    Fever or chills    Less energy; trouble completing activities    Trouble thinking or focusing    Using quick relief inhaler or nebulizer more often    Poor sleep; symptoms wake me up    Do not feel like eating Actions:      Get plenty of rest    Take daily medicines    Use quick relief inhaler every  hours    If you use oxygen, call you doctor to see if you should adjust your oxygen    Do breathing exercises or other things to help you relax    Let a loved one, friend or neighbor know you are feeling worse    Call your care team if you have 2 or more symptoms.  Start taking steroids or antibiotics if directed by your care team           RED ZONE       Need medical care now      Severe shortness of breath (feel you can't breathe)    Fever, chills    Not enough breath to do any activity    Trouble coughing up mucus, walking or talking    Blood in mucus    Frequent coughing   Rescue  medicines are not working    Not able to sleep because of breathing    Feel confused or drowsy    Chest pain    Actions:      Call your health care team.  If you cannot reach your care team, call 911 or go to the emergency room.        Annual Reminders:  Meet with Care Team, Flu Shot every Fall  Pharmacy:    [facility] [address] [facility], [place] - [address] [place] [address] [phone] - [place], [place] - [address] [place] [address] INPATIENT RX - [address]

## 2018-08-03 NOTE — MR AVS SNAPSHOT
After Visit Summary   8/3/2018    Home Valdez    MRN: [id number]           Patient Information     Date Of Birth          1950        Visit Information        Provider Department      8/3/2018 2:40 PM Sandip Vega DO [facility]        Today's Diagnoses     Long-term (current) use of anticoagulants [Z79.01]    -  1    Hypertension goal BP (blood pressure) < 140/90        Atrial fibrillation with RVR (H)        Acute systolic CHF (congestive heart failure) (H)        Panlobular emphysema (H)           Follow-ups after your visit        Additional Services     INR CLINIC REFERRAL       Your provider has referred you to INR Services.    Please be aware that coverage of these services is subject to the terms and limitations of your health insurance plan.  Call member services at your health plan with any benefit or coverage questions.    Indication for Anticoagulation: Atrial Fibrillation  If nonstandard INR is desired, indicate goal range and explanation:   Expected Duration of Therapy: Lifetime                  Your next 10 appointments already scheduled     Aug 06, 2018  1:30 PM CDT   [facility] with [facility]   [facility]    [address] [facility] [id number] [phone]            Aug 08, 2018  1:00 PM CDT   Core MD Nagy with Patrick Lima MD   [facility] ([address] [address] [phone]            Aug 14, 2018  1:45 PM CDT   Core MD Nagy with Jimenez Murphy MD   [facility] ([address] [address] [phone]              Who to contact     If you have questions or need follow up information about today's clinic visit or your schedule please contact [facility] directly at [phone].  Normal or  non-critical lab and imaging results will be communicated to you by MyChart, letter or phone within 4 business days after the clinic has received the results. If you do not hear from us within 7 days, please contact the clinic through MyChart or phone. If you have a critical or abnormal lab result, we will notify you by phone as soon as possible.  Submit refill requests through [employer] or call your pharmacy and they will forward the refill request to us. Please allow 3 business days for your refill to be completed.          Additional Information About Your Visit        Care EveryWhere ID     This is your Care EveryWhere ID. This could be used by other organizations to access your [facility] medical records  [id number]        Your Vitals Were     Pulse Temperature Respirations Pulse Oximetry BMI (Body Mass Index)       120 98.3  F (36.8  C) (Temporal) 18 91% 34.09 kg/m2        Blood Pressure from Last 3 Encounters:   08/03/18 112/70   07/31/18 95/72   07/24/18 122/80    Weight from Last 3 Encounters:   08/03/18 241 lb (109.3 kg)   07/30/18 245 lb 3.2 oz (111.2 kg)   07/24/18 246 lb (111.6 kg)              We Performed the Following     Basic metabolic panel     COPD ACTION PLAN     HEART FAILURE ACTION PLAN     INR CLINIC REFERRAL     INR point of care     INR          Today's Medication Changes          These changes are accurate as of 8/3/18 11:59 PM.  If you have any questions, ask your nurse or doctor.               Start taking these medicines.        Dose/Directions    spironolactone 25 MG tablet   Commonly known as:  ALDACTONE   Used for:  Acute systolic CHF (congestive heart failure) (H)   Started by:  Sandip Vega DO        Dose:  12.5 mg   Take 0.5 tablets (12.5 mg) by mouth 2 times daily   Quantity:  7 tablet   Refills:  0            Where to get your medicines      These medications were sent to [id number] - [place] - [address]   [address] [place] [address]     Hours:  M-F 8:30-6:30; Sat 9-4; closed Sunday Phone:  [phone]     spironolactone 25 MG tablet    torsemide 20 MG tablet                Primary Care Provider Office Phone # Fax #    Sandip Vega [phone]       [address] [place] [address]        Equal Access to Services     [facility]: Hadii aad ku hadasho Soomaali, waaxda luqadaha, qaybta kaalmada adeegyada, waxay idiin hayaan adeeg kharash la'aan . So [facility] [phone].    ATENCIÓN: Si habla español, tiene a calabrese disposición servicios gratuitos de asistencia lingüística. [facility] [phone].    We comply with applicable federal civil rights laws and [place] laws. We do not discriminate on the basis of race, color, national origin, age, disability, sex, sexual orientation, or gender identity.            Thank you!     Thank you for choosing [facility]  for your care. Our goal is always to provide you with excellent care. Hearing back from our patients is one way we can continue to improve our services. Please take a few minutes to complete the written survey that you may receive in the mail after your visit with us. Thank you!             Your Updated Medication List - Protect others around you: Learn how to safely use, store and throw away your medicines at www.disposemymeds.org.          This list is accurate as of 8/3/18 11:59 PM.  Always use your most recent med list.                   Brand Name Dispense Instructions for use Diagnosis    acetaminophen 325 MG tablet    TYLENOL    100 tablet    Take 2 tablets (650 mg) by mouth every 4 hours as needed for other (mild pain)        ADVAIR DISKUS 250-50 MCG/DOSE diskus inhaler   Generic drug:  fluticasone-salmeterol     1 Inhaler    INHALE 1 PUFF BY MOUTH TWICE A DAY    Panlobular emphysema (H)       AIRS DISPOSABLE NEBULIZER Misc     1 each    USE EVERY 4 TO 6 HOURS AS NEEDED    Panlobular emphysema (H)       amiodarone 200 MG tablet     PACERONE/CODARONE    30 tablet    Take 1 tablet (200 mg) by mouth daily    Atrial fibrillation with rapid ventricular response (H)       buPROPion 150 MG 12 hr tablet    WELLBUTRIN SR    180 tablet    TAKE 1 TABLET BY MOUTH TWICE A DAY    Personal history of tobacco use, presenting hazards to health       fluticasone 50 MCG/ACT spray    FLONASE    16 g    INHALE 1 TO 2 SPRAYS INTO EACH NOSTRIL EVERY DAY    Chronic rhinitis       ipratropium - albuterol 0.5 mg/2.5 mg/3 mL 0.5-2.5 (3) MG/3ML neb solution    DUONEB    540 mL    TAKE 1 VIAL (3 MLS) BY NEBULIZATION EVERY 4 HOURS AS NEEDED FOR SHORTNESS OF BREATH / DYSPNEA OR WHEEZING    COPD exacerbation (H)       JANTOVEN 2.5 MG tablet   Generic drug:  warfarin     210 tablet    Took 5mg yesterday, due to see INR clinic today (7/30/18)    Atrial fibrillation with RVR (H)       losartan 25 MG tablet    COZAAR    30 tablet    Take 1 tablet (25 mg) by mouth daily    Hypertension goal BP (blood pressure) < 140/90, Nonischemic cardiomyopathy (H)       metoprolol tartrate 50 MG tablet    LOPRESSOR    60 tablet    Take 1 tablet (50 mg) by mouth 2 times daily    Atrial fibrillation with rapid ventricular response (H)       montelukast 10 MG tablet    SINGULAIR    90 tablet    TAKE 1 TABLET BY MOUTH DAILY AT BEDTIME    Chronic seasonal allergic rhinitis due to other allergen       multivitamin, therapeutic Tabs tablet      Take 1 tablet by mouth daily        nebulizer/tubing/mouthpiece Kit     1 kit    Use every 4-6 hours PRN    Chronic obstructive pulmonary disease, unspecified COPD type (H)       order for DME     1 Device    Equipment being ordered: Nebulizer    COPD (chronic obstructive pulmonary disease) (H)       potassium chloride SA 20 MEQ CR tablet    K-DUR/KLOR-CON M    90 tablet    Take 1 tablet (20 mEq) by mouth daily    CHF (congestive heart failure) (H), Peripheral edema       spironolactone 25 MG tablet    ALDACTONE    7 tablet    Take 0.5 tablets (12.5 mg) by  mouth 2 times daily    Acute systolic CHF (congestive heart failure) (H)       torsemide 20 MG tablet    DEMADEX    180 tablet    Take 10 mg po daily. Take an extra 10mg po daily as needed for swelling.    Chronic diastolic congestive heart failure (H)       VENTOLIN  (90 Base) MCG/ACT Inhaler   Generic drug:  albuterol     18 g    INHALE 2 PUFFS BY MOUTH EVERY 4 HOURS AS NEEDED FOR SHORTNESS OF BREATH/DYSPNEA    Chronic obstructive pulmonary disease with acute exacerbation (H)

## 2018-08-03 NOTE — TELEPHONE ENCOUNTER
"Requested Prescriptions   Pending Prescriptions Disp Refills     torsemide (DEMADEX) 20 MG tablet 180 tablet 3    Last Written Prescription Date:  11/08/2016  Last Fill Quantity: 180,  # refills: 3   Last office visit: 7/24/2018 with prescribing provider:  07/24/2018   Future Office Visit:   Next 5 appointments (look out 90 days)     Aug 03, 2018  2:40 PM CDT   [facility]    [address] [facility] [address] [phone]                  Sig: Take 10 mg po daily. Take an extra 10mg po daily as needed for swelling.    Diuretics (Including Combos) Protocol Failed    8/3/2018  9:15 AM       Failed - Normal serum creatinine on file in past 12 months    Recent Labs   Lab Test  07/31/18   0514   CR  1.42*             Passed - Blood pressure under 140/90 in past 12 months    BP Readings from Last 3 Encounters:   07/31/18 95/72   07/24/18 122/80   07/18/18 101/68                Passed - Recent (12 mo) or future (30 days) visit within the authorizing provider's specialty    Patient had office visit in the last 12 months or has a visit in the next 30 days with authorizing provider or within the authorizing provider's specialty.  See \"Patient Info\" tab in inbasket, or \"Choose Columns\" in Meds & Orders section of the refill encounter.           Passed - Patient is age 18 or older       Passed - Normal serum potassium on file in past 12 months    Recent Labs   Lab Test  07/31/18   0514   POTASSIUM  4.2                   Passed - Normal serum sodium on file in past 12 months    Recent Labs   Lab Test  07/31/18   0514   NA  142                "
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
## 2018-08-03 NOTE — PROGRESS NOTES
ANTICOAGULATION FOLLOW-UP CLINIC VISIT    Patient Name:  Home Valdez  Date:  8/3/2018  Contact Type:  Face to Face    SUBJECTIVE:     Patient Findings     Positives Change in medications (Amiodarone was doubled), Hospital admission (Recent hospitalization for A-fib)           OBJECTIVE    INR Point of Care   Date Value Ref Range Status   08/03/2018 >8.0 (HH) 0.86 - 1.14 Final     Comment:     Result is outside of 's reportable range.  If clinically   indicated, recollecting a sample and sending it to the laboratory is   recommended.  This test is intended for monitoring Coumadin therapy.  Results are not   accurate in patients with prolonged INR due to factor deficiency.  Critical Value called to and read back by  UYE MCQUEEN RN AT [phone] DRT VENOUS DRAWN TO SEND          ASSESSMENT / PLAN  INR assessment SUPRA    Recheck INR In: 3 DAYS    INR Location Outside lab      Anticoagulation Summary as of 8/3/2018     INR goal 2.0-3.0   Today's INR >8.0!   Warfarin maintenance plan 7.5 mg (2.5 mg x 3) on Tue, Thu, Sat; 5 mg (2.5 mg x 2) all other days   Full warfarin instructions 8/3: Hold; 8/4: Hold; Otherwise 7.5 mg on Tue, Thu, Sat; 5 mg all other days   Weekly warfarin total 42.5 mg   Plan last modified Yue Carey, RN (1/19/2018)   Next INR check 8/6/2018   Target end date     Indications   Atrial fibrillation with RVR (H) [I48.91]  Long-term (current) use of anticoagulants [Z79.01] [Z79.01]         Anticoagulation Episode Summary     INR check location     Preferred lab     Send INR reminders to [facility]    Comments 2.5 MG TABLETS, likes print out, PM dose      Anticoagulation Care Providers     Provider Role Specialty Phone number    Sandip Vega DO [facility] Internal Medicine [phone]            See the Encounter Report to view Anticoagulation Flowsheet and Dosing Calendar (Go to Encounters tab in chart review, and find the Anticoagulation Therapy  Visit)    Dosage adjustment made based on physician directed care plan.    Shameka Carey RN

## 2018-08-03 NOTE — LETTER
My Heart Failure Action Plan   Name: Home Valdez    YOB: 1950   Date: 8/3/2018    My doctor: Sandip Vega     [address] [address] [phone]  My Diagnosis:    My Ejection Fraction:     My Exercise Goal: 30 minutes daily  .     My Weight Goal:   Wt Readings from Last 2 Encounters:   08/03/18 241 lb (109.3 kg)   07/30/18 245 lb 3.2 oz (111.2 kg)     Weigh yourself daily using the same scale. If you gain more than 2 pounds in 24 hours or 5 pounds in a week     My Diet Goal:     Emergency Room Visits:    Our goal is to improve your quality of life and help you avoid a visit to the emergency room or hospital.  If we work together, we can achieve this goal. But, if you feel you need to call 911 or go to the emergency room, please do so.  If you go to the emergency room, please bring your list of medicines and your daily weight chart with you.       GREEN ZONE     Doing well today    Weight gained is no more than 2 pounds a day or 5 pounds a week.    No swelling in feet, ankles, legs or stomach.    No more swelling than usual.    No more trouble breathing than usual.    No change in my sleep.    No other problems. Actions:    I am doing fine.  I will take my medicine, follow my diet, see my doctor, exercise, and watch for symptoms.           YELLOW ZONE         Having a bad day or flare up    Weight gain of more than 2 pounds in one day or 5 pounds in one week.    New swelling in ankle, leg, knee or thigh.    Bloating in belly, pants feel tighter.    Swelling in hands or face.    Coughing or trouble breathing while walking or talking.    Harder to breathe last night.    Have trouble sleeping, wake up short of breath.    Much more tired than usual.    Not eating.    Pain in my chest or bad leg cramps.    Feel weak or dizzy. Actions:    I need to take action and call my doctor or nurse today.                 RED ZONE         Need medical  care now    Weight gain of 5 pounds overnight.    Chest pain or pressure that does not go away.    Feel less alert.    Wheezing or have trouble breathing when at rest.    Cannot sleep lying down.    Cannot take my water pill.    Pass out or faint. Actions:    I need to call my doctor or nurse now!    Call 911 if I have chest pain or cannot breathe.

## 2018-08-03 NOTE — MR AVS SNAPSHOT
Home Valdez   8/3/2018   Anticoagulation Therapy Visit    Description:  68 year old male   Provider:  Sandip Vega DO   Department:   Anticoag           INR as of 8/3/2018     Today's INR >8.0!      Anticoagulation Summary as of 8/3/2018     INR goal 2.0-3.0   Today's INR >8.0!   Full warfarin instructions 8/3: Hold; 8/4: Hold; Otherwise 7.5 mg on Tue, Thu, Sat; 5 mg all other days   Next INR check 8/6/2018    Indications   Atrial fibrillation with RVR (H) [I48.91]  Long-term (current) use of anticoagulants [Z79.01] [Z79.01]         Your next Anticoagulation Clinic appointment(s)     Aug 06, 2018  1:30 PM CDT   Anticoagulation Visit with  [facility]   [facility]    [address] [facility] [address]   [phone]              Contact Numbers     Clinic Number:         August 2018 Details    Sun Mon Tue Wed Thu Fri Sat        1               2               3      Hold   See details      4      Hold           5      5 mg         6            7               8               9               10               11                 12               13               14               15               16               17               18                 19               20               21               22               23               24               25                 26               27               28               29               30               31                 Date Details   08/03 This INR check       Date of next INR:  8/6/2018         How to take your warfarin dose     To take:  5 mg Take 2 of the 2.5 mg tablets.    Hold Do not take your warfarin dose. See the Details table to the right for additional instructions.

## 2018-08-03 NOTE — TELEPHONE ENCOUNTER
Routing refill request to provider for review/approval because:  Labs out of range:  Creatinine    MAXINE YanceyN, RN  [facility]

## 2018-08-06 ENCOUNTER — ANTICOAGULATION THERAPY VISIT (OUTPATIENT)
Dept: ANTICOAGULATION | Facility: OTHER | Age: 68
End: 2018-08-06
Payer: MEDICARE

## 2018-08-06 DIAGNOSIS — I48.91 ATRIAL FIBRILLATION WITH RVR (H): ICD-10-CM

## 2018-08-06 DIAGNOSIS — Z79.01 LONG-TERM (CURRENT) USE OF ANTICOAGULANTS: ICD-10-CM

## 2018-08-06 LAB — INR POINT OF CARE: 4.5 (ref 0.86–1.14)

## 2018-08-06 PROCEDURE — 99207 ZZC NO CHARGE NURSE ONLY: CPT

## 2018-08-06 PROCEDURE — 85610 PROTHROMBIN TIME: CPT | Mod: QW

## 2018-08-06 PROCEDURE — 36416 COLLJ CAPILLARY BLOOD SPEC: CPT

## 2018-08-06 NOTE — MR AVS SNAPSHOT
Home Valdez   8/6/2018 1:30 PM   Anticoagulation Therapy Visit    Description:  68 year old male   Provider:  [facility] ANTI DANI   Department:  [facility]           INR as of 8/6/2018     Today's INR 4.5!      Anticoagulation Summary as of 8/6/2018     INR goal 2.0-3.0   Today's INR 4.5!   Full warfarin instructions 8/6: Hold; 8/7: 5 mg; Otherwise 7.5 mg on Tue, Thu, Sat; 5 mg all other days   Next INR check 8/8/2018    Indications   Atrial fibrillation with RVR (H) [I48.91]  Long-term (current) use of anticoagulants [Z79.01] [Z79.01]         Contact Numbers     Clinic Number:         August 2018 Details    Sun Mon Tue Wed Thu Fri Sat        1               2               3               4                 5               6      Hold   See details      7      5 mg         8            9               10               11                 12               13               14               15               16               17               18                 19               20               21               22               23               24               25                 26               27               28               29               30               31                 Date Details   08/06 This INR check       Date of next INR:  8/8/2018         How to take your warfarin dose     To take:  5 mg Take 2 of the 2.5 mg tablets.    Hold Do not take your warfarin dose. See the Details table to the right for additional instructions.

## 2018-08-06 NOTE — PROGRESS NOTES
ANTICOAGULATION FOLLOW-UP CLINIC VISIT    Patient Name:  Home Valdez  Date:  8/6/2018  Contact Type:  Face to Face    SUBJECTIVE:     Patient Findings     Positives Change in medications (Pt's amiodarone was doubled. INR Friday was >8. Today was 4.5)           OBJECTIVE    INR Protime   Date Value Ref Range Status   08/06/2018 4.5 (A) 0.86 - 1.14 Final       ASSESSMENT / PLAN  INR assessment SUPRA    Recheck INR In: 2 DAYS    INR Location Clinic      Anticoagulation Summary as of 8/6/2018     INR goal 2.0-3.0   Today's INR 4.5!   Warfarin maintenance plan 7.5 mg (2.5 mg x 3) on Tue, Thu, Sat; 5 mg (2.5 mg x 2) all other days   Full warfarin instructions 8/6: Hold; 8/7: 5 mg; Otherwise 7.5 mg on Tue, Thu, Sat; 5 mg all other days   Weekly warfarin total 42.5 mg   Plan last modified Shameka Carey RN (1/19/2018)   Next INR check 8/8/2018   Target end date     Indications   Atrial fibrillation with RVR (H) [I48.91]  Long-term (current) use of anticoagulants [Z79.01] [Z79.01]         Anticoagulation Episode Summary     INR check location     Preferred lab     Send INR reminders to [facility]    Comments 2.5 MG TABLETS, likes print out, PM dose, Amiodarone doubled 7/31/18      Anticoagulation Care Providers     Provider Role Specialty Phone number    Sandip VegaDO [facility] Internal Medicine [phone]            See the Encounter Report to view Anticoagulation Flowsheet and Dosing Calendar (Go to Encounters tab in chart review, and find the Anticoagulation Therapy Visit)    Dosage adjustment made based on physician directed care plan.    Shameka Carey, [facility]

## 2018-08-08 ENCOUNTER — HOSPITAL ENCOUNTER (OUTPATIENT)
Dept: CARDIOLOGY | Facility: CLINIC | Age: 68
Discharge: HOME | End: 2018-08-08
Attending: INTERNAL MEDICINE | Admitting: INTERNAL MEDICINE
Payer: MEDICARE

## 2018-08-08 ENCOUNTER — OFFICE VISIT (OUTPATIENT)
Dept: CARDIOLOGY | Facility: CLINIC | Age: 68
End: 2018-08-08
Payer: MEDICARE

## 2018-08-08 ENCOUNTER — ANTICOAGULATION THERAPY VISIT (OUTPATIENT)
Dept: ANTICOAGULATION | Facility: OTHER | Age: 68
End: 2018-08-08
Payer: MEDICARE

## 2018-08-08 VITALS
DIASTOLIC BLOOD PRESSURE: 86 MMHG | HEART RATE: 89 BPM | BODY MASS INDEX: 34.2 KG/M2 | HEIGHT: 70 IN | SYSTOLIC BLOOD PRESSURE: 106 MMHG | WEIGHT: 238.9 LBS | OXYGEN SATURATION: 97 %

## 2018-08-08 DIAGNOSIS — I48.91 ATRIAL FIBRILLATION WITH RVR (H): ICD-10-CM

## 2018-08-08 DIAGNOSIS — Z79.01 LONG-TERM (CURRENT) USE OF ANTICOAGULANTS: ICD-10-CM

## 2018-08-08 DIAGNOSIS — I48.91 ATRIAL FIBRILLATION, UNSPECIFIED TYPE (H): Primary | ICD-10-CM

## 2018-08-08 DIAGNOSIS — I48.91 ATRIAL FIBRILLATION WITH RAPID VENTRICULAR RESPONSE (H): ICD-10-CM

## 2018-08-08 DIAGNOSIS — I50.32 CHRONIC DIASTOLIC CONGESTIVE HEART FAILURE (H): ICD-10-CM

## 2018-08-08 LAB — INR BLD: 3.5 (ref 0.86–1.14)

## 2018-08-08 PROCEDURE — 99207 ZZC NO CHARGE NURSE ONLY: CPT | Performed by: INTERNAL MEDICINE

## 2018-08-08 PROCEDURE — 85610 PROTHROMBIN TIME: CPT | Mod: QW | Performed by: INTERNAL MEDICINE

## 2018-08-08 PROCEDURE — 99215 OFFICE O/P EST HI 40 MIN: CPT | Mod: 25 | Performed by: INTERNAL MEDICINE

## 2018-08-08 PROCEDURE — 93010 ELECTROCARDIOGRAM REPORT: CPT | Performed by: INTERNAL MEDICINE

## 2018-08-08 PROCEDURE — 93005 ELECTROCARDIOGRAM TRACING: CPT

## 2018-08-08 RX ORDER — METOPROLOL TARTRATE 50 MG
75 TABLET ORAL 2 TIMES DAILY
Qty: 60 TABLET | Refills: 0 | Status: SHIPPED | OUTPATIENT
Start: 2018-08-08 | End: 2018-08-14

## 2018-08-08 RX ORDER — TORSEMIDE 20 MG/1
20 TABLET ORAL 2 TIMES DAILY
Qty: 180 TABLET | Refills: 3 | Status: SHIPPED | OUTPATIENT
Start: 2018-08-08 | End: 2018-09-04

## 2018-08-08 RX ORDER — AMOXICILLIN 250 MG
2 CAPSULE ORAL 2 TIMES DAILY
COMMUNITY
End: 2018-10-22

## 2018-08-08 NOTE — MR AVS SNAPSHOT
Home Valdez   8/8/2018   Anticoagulation Therapy Visit    Description:  68 year old male   Provider:  Sandip Vega DO   Department:   Anticoag           INR as of 8/8/2018     Today's INR 3.5!      Anticoagulation Summary as of 8/8/2018     INR goal 2.0-3.0   Today's INR 3.5!   Full warfarin instructions 8/8: 1.25 mg; 8/9: 2.5 mg; 8/10: 2.5 mg; 8/11: 5 mg; Otherwise 7.5 mg on Tue, Thu, Sat; 5 mg all other days   Next INR check 8/10/2018    Indications   Atrial fibrillation with RVR (H) [I48.91]  Long-term (current) use of anticoagulants [Z79.01] [Z79.01]         Your next Anticoagulation Clinic appointment(s)     Aug 10, 2018  8:45 AM CDT   [facility]  [facility]   [facility]    [address] [facility] [id number] [phone]              Contact Numbers     Clinic Number:         August 2018 Details    Sun Mon Tue Wed Thu Fri Sat        1               2               3               4                 5               6               7               8      1.25 mg   See details      9      2.5 mg         10            11                 12               13               14               15               16               17               18                 19               20               21               22               23               24               25                 26               27               28               29               30               31                 Date Details   08/08 This INR check       Date of next INR:  8/10/2018         How to take your warfarin dose     To take:  1.25 mg Take 0.5 of a 2.5 mg tablet.    To take:  2.5 mg Take 1 of the 2.5 mg tablets.

## 2018-08-08 NOTE — LETTER
8/8/2018      Sandip Vega, DO  [address] [place] [address]      RE: Home FELIPE Valdez       Dear Colleague,    I had the pleasure of seeing Home Valdez in the [facility].    Service Date: 08/08/2018      REASON FOR CARDIOLOGY VISIT:  Followup recent hospitalization for congestive heart failure, atrial fibrillation.      HISTORY OF PRESENT ILLNESS:  Mr. Valdez is a very pleasant 60-year-old gentleman with history of nonischemic cardiomyopathy, history of paroxysmal atrial fibrillation, was on rhythm control strategy on low dose amiodarone 100 mg daily, on Coumadin for stroke prophylaxis.        To be noted in terms of cardiac history, back in 2014, the patient had cardiomyopathy and underwent coronary angiogram that showed normal coronary arteries.  He had atrial fibrillation with rapid ventricular rate at that time and this was felt to be the likely etiology of his cardiomyopathy.  Subsequently, his LV function improved to around 45%-50%.  He follows my colleague, Dr. Murphy, in Cardiology Clinic.        Last month the patient was noted to have some breathing issues and he went to the ER, was diagnosed with pneumonia.  Remarkably that time he was also diagnosed with atrial fibrillation with rapid ventricular rate.  No significant changes were made to his cardiac medication.  He was subsequently discharged and then came back a week later with worsening shortness of breath, weight gain, pedal edema and was diagnosed with acute decompensated congestive heart failure, atrial fibrillation, rapid ventricular rate.        Echocardiogram done about a week ago showed LVEF less than 20%, moderately dilated LV, severely dilated left atrium with the LA volume index of 51 mL per meter square.  The RV was mild to moderate reduced in systolic function.  At that time his amiodarone dose was increased to 200 mg daily.  He was also started on spironolactone and metoprolol  was changed to metoprolol succinate 50 mg daily to 50 mg b.i.d.        Today, patient is coming to clinic accompanied by his wife.  I am seeing patient in the absence of Dr. Murphy.  The patient tells me overall he is feeling much better compared to what he felt at the time of admission.  He still struggles with dyspnea on exertion and some fatigue.  No dizziness, no palpitation, no chest discomfort, no orthopnea.  His weight has slowly come down.  It was 246 pounds last month; came down to 238 pounds now.  He is on 20 mg b.i.d. of torsemide.  Additionally, is on 12.5 mg b.i.d. of spironolactone, 25 mg daily of losartan and Coumadin.        The patient does have severe COPD and PFT last year showed severe obstructive disease but normal diffusion capacity.  TSH, AST and ALT have been normal.        In terms of his INR, it has recently fluctuated.  Today it was 3.5.  Going back, his INR was 1.6 on 07/23/2018.        The patient also has sleep apnea and is quite compliant with CPAP.  He is also very compliant with salt-restricted diet.      PHYSICAL EXAMINATION:   VITAL SIGNS:  Blood pressure 106/86, heart rate 89 and irregular, weight 238 pounds, BMI 34.35.   GENERAL:  The patient appears pleasant, comfortable.   NECK:  Normal JVP, negative hepatojugular reflux.   CARDIOVASCULAR SYSTEM:  Irregular, normal rate to borderline tachycardia, no murmur, rub or gallop.   RESPIRATORY SYSTEM:  Decreased air entry bilaterally, no rhonchi or crackles.   ABDOMEN:  Soft, nontender.   EXTREMITIES:  There is 1+ pitting pedal edema bilaterally.   PSYCH:  Normal affect.   SKIN:  No obvious rash.   HEENT:  No pallor or icterus.        EKG done today was personally reviewed by me and shows atrial fibrillation.  Ventricular rates of 89 beats per minute, left bundle branch block.      IMPRESSION AND PLAN:  A very pleasant 68-year-old gentleman with history of tachycardia-mediated cardiomyopathy in the past with normal coronary arteries on  coronary angiogram 2014.  The patient spontaneously converted in the past to sinus rhythm and was maintained on low dose amiodarone since then and on Coumadin for stroke prophylaxis.  About a month ago, patient had pneumonia and was diagnosed with atrial fibrillation with rapid ventricular rate.  Also, on more detailed discussion with the wife, it looks like patient may have run out of metoprolol in the middle of July, last month.     He was recently admitted with acute decompensated congestive heart failure in the setting of atrial fibrillation with rapid ventricular rate.  I think this is all likely tachycardia-mediated cardiomyopathy.  I had a long discussion, the patient regarding the etiology of his congestive heart failure which is likely tachycardia-mediated cardiomyopathy.     Regarding atrial fibrillation, we had a long discussion about the pathophysiology of atrial fibrillation, rate versus rhythm control strategy and stroke prophylaxis.  Given elevated [id number] score of 4, Coumadin continuation is recommended.     Regarding rhythm versus rate control strategy, there are certain features which are make rhythm control strategy somewhat difficult, namely severely dilated left atrium, underlying severe COPD and the fact that despite being on amiodarone, although it was slightly lower dose, he still went into atrial fibrillation.  I am somewhat wary of continuing amiodarone at this time given that he has underlying lung disease and still continues to be in atrial fibrillation.  I recommend at this time discontinuing amiodarone.     Regarding rate versus rhythm control strategy, the patient is feeling better with ventricular rate controlled.  They may be slightly on the higher side of normal.  I recommend increasing metoprolol to 75 mg b.i.d.  Ideally, I would use multiple succinate, but for now to avoid making too many changes at the same time, I recommend continuing metoprolol tartrate and increase the  dose to 75 mg b.i.d.  To allow room for the blood pressure, I also recommend withholding spironolactone at this time and discontinuing it for now.     The patient already has an appointment with his established primary cardiologist, Dr. Murphy, next week.  I also recommend doing a 24-hour Holter, which hopefully can be done by this weekend to see the adequacy of rate control of increased dose of metoprolol tartrate.  We also briefly discussed about oral anticoagulation guided cardioversion and also permanent pacemaker implantation and AV node ablation.  Regarding latter, I think this is probably not needed at this time as ventricular rates are already much better controlled and hopefully even more controlled with increasing beta blocker therapy.  Regarding oral anticoagulation guided cardioversion, I am not sure whether he will be able to maintain sinus rhythm given the above-mentioned factors.  Regardless, his INR was less than 2 on 07/23.  This can be further discussed when patient sees Dr. Murphy.  For now, I favor continuing rate control strategy.   1.  Recent admission with acute decompensated congestive heart failure.  LVEF less than 20%.  Likely etiology tachycardia-mediated cardiomyopathy.  Somewhat similar presentation back in 2014.  Appears compensated, some evidence of pedal edema and extravascular volume overload on examination today.  Weight has steadily decreased over the last several days.   2.  History of proximal atrial fibrillation.  Was on low dose amiodarone for maintenance of sinus rhythm.  Now persistent atrial fibrillation.  Ventricular rates better controlled.  [id number] score of 4 on Coumadin for stroke prophylaxis.   3.  History of nonischemic cardiomyopathy, likely due to tachycardia-mediated cardiomyopathy in the past.  Baseline LVEF did improve to around 45%-50%.   4.  Chronic kidney disease, stage III.      RECOMMENDATIONS:   1.  Increase metoprolol tartrate to 75 mg b.i.d.   2.   Discontinue amiodarone.   3.  Discontinue spironolactone.   4.  Continue torsemide, Coumadin and losartan.   5.  A 24-hour Holter evaluation.   6.  Followup next week, which is already scheduled, with a BMP.         DANIEL CHAVIRA MD             D: 2018   T: 2018   MT: [id number]      Name:     KARTIK HUERTA   MRN:      [id number]        Account:      [id number]   :      1950           Service Date: 2018      Document: [id number]         Outpatient Encounter Prescriptions as of 2018   Medication Sig Dispense Refill     acetaminophen (TYLENOL) 325 MG tablet Take 2 tablets (650 mg) by mouth every 4 hours as needed for other (mild pain) 100 tablet 0     ADVAIR DISKUS 250-50 MCG/DOSE diskus inhaler INHALE 1 PUFF BY MOUTH TWICE A DAY 1 Inhaler 1     buPROPion (WELLBUTRIN SR) 150 MG 12 hr tablet TAKE 1 TABLET BY MOUTH TWICE A  tablet 1     fluticasone (FLONASE) 50 MCG/ACT spray INHALE 1 TO 2 SPRAYS INTO EACH NOSTRIL EVERY DAY 16 g 1     ipratropium - albuterol 0.5 mg/2.5 mg/3 mL (DUONEB) 0.5-2.5 (3) MG/3ML neb solution TAKE 1 VIAL (3 MLS) BY NEBULIZATION EVERY 4 HOURS AS NEEDED FOR SHORTNESS OF BREATH / DYSPNEA OR WHEEZING 540 mL 3     losartan (COZAAR) 25 MG tablet Take 1 tablet (25 mg) by mouth daily 30 tablet 0     metoprolol tartrate (LOPRESSOR) 50 MG tablet Take 1.5 tablets (75 mg) by mouth 2 times daily 60 tablet 0     montelukast (SINGULAIR) 10 MG tablet TAKE 1 TABLET BY MOUTH DAILY AT BEDTIME 90 tablet 0     multivitamin, therapeutic (THERA-VIT) TABS tablet Take 1 tablet by mouth daily       Nebulizers (AIRS DISPOSABLE NEBULIZER) MISC USE EVERY 4 TO 6 HOURS AS NEEDED 1 each 1     potassium chloride SA (K-DUR/KLOR-CON M) 20 MEQ CR tablet Take 1 tablet (20 mEq) by mouth daily 90 tablet 2     senna-docusate (SENOKOT-S;PERICOLACE) 8.6-50 MG per tablet Take 1 tablet by mouth daily       torsemide (DEMADEX) 20 MG tablet Take 1 tablet (20 mg) by mouth 2 times daily 180 tablet 3      VENTOLIN  (90 Base) MCG/ACT Inhaler INHALE 2 PUFFS BY MOUTH EVERY 4 HOURS AS NEEDED FOR SHORTNESS OF BREATH/DYSPNEA 18 g 3     warfarin (JANTOVEN) 2.5 MG tablet Took 5mg yesterday, due to see INR clinic today (7/30/18) 210 tablet 0     order for DME Equipment being ordered: Nebulizer 1 Device 0     Respiratory Therapy Supplies (NEBULIZER/TUBING/MOUTHPIECE) KIT Use every 4-6 hours PRN 1 kit 11     [DISCONTINUED] amiodarone (PACERONE/CODARONE) 200 MG tablet Take 1 tablet (200 mg) by mouth daily 30 tablet 0     [DISCONTINUED] metoprolol tartrate (LOPRESSOR) 50 MG tablet Take 1 tablet (50 mg) by mouth 2 times daily 60 tablet 0     [DISCONTINUED] spironolactone (ALDACTONE) 25 MG tablet Take 0.5 tablets (12.5 mg) by mouth 2 times daily 7 tablet 0     [DISCONTINUED] torsemide (DEMADEX) 20 MG tablet Take 10 mg po daily. Take an extra 10mg po daily as needed for swelling. (Patient taking differently: 20 mg 2 times daily Take 10 mg po daily. Take an extra 10mg po daily as needed for swelling.) 180 tablet 3     No facility-administered encounter medications on file as of 8/8/2018.        Again, thank you for allowing me to participate in the care of your patient.      Sincerely,    Patrick Lima MD     [facility]

## 2018-08-08 NOTE — MR AVS SNAPSHOT
After Visit Summary   8/8/2018    Home Valdez    MRN: [id number]           Patient Information     Date Of Birth          1950        Visit Information        Provider Department      8/8/2018 1:00 PM Patrick Lima MD [facility]        Today's Diagnoses     Atrial fibrillation, unspecified type (H)    -  1    Atrial fibrillation with rapid ventricular response (H)        Chronic diastolic congestive heart failure (H)           Follow-ups after your visit        Your next 10 appointments already scheduled     Aug 10, 2018 11:30 AM CDT   Holter Monitor with PH EVENT/HOLTER MONITOR   [facility] ([facility])    [address] [place] [id number] [phone]            Aug 14, 2018  1:45 PM CDT   Core MD Nagy with Jimenez Murphy MD   [facility] ([facility])    [address] [place] [id number] [phone]              Future tests that were ordered for you today     Open Future Orders        Priority Expected Expires Ordered    Basic metabolic panel Routine 8/14/2018 8/8/2019 8/8/2018    Holter Monitor 24 hour - Adult Routine 8/8/2018 8/8/2019 8/8/2018            Who to contact     If you have questions or need follow up information about today's clinic visit or your schedule please contact [facility] directly at [phone].  Normal or non-critical lab and imaging results will be communicated to you by MyChart, letter or phone within 4 business days after the clinic has received the results. If you do not hear from us within 7 days, please contact the clinic through MyChart or phone. If you have a critical or abnormal lab result, we will notify you by phone as soon as possible.  Submit refill requests through [employer] or call your pharmacy and they will forward the refill request  "to us. Please allow 3 business days for your refill to be completed.          Additional Information About Your Visit        Care EveryWhere ID     This is your Care EveryWhere ID. This could be used by other organizations to access your [facility] medical records  [id number]        Your Vitals Were     Pulse Height Pulse Oximetry BMI (Body Mass Index)          89 1.778 m (5' 10\") 97% 34.28 kg/m2         Blood Pressure from Last 3 Encounters:   08/08/18 106/86   08/03/18 112/70   07/31/18 95/72    Weight from Last 3 Encounters:   08/08/18 108.4 kg (238 lb 14.4 oz)   08/03/18 109.3 kg (241 lb)   07/30/18 111.2 kg (245 lb 3.2 oz)              We Performed the Following     EKG 12-LEAD CLINIC READ     EKG 12-LEAD CLINIC READ          Today's Medication Changes          These changes are accurate as of 8/8/18  1:57 PM.  If you have any questions, ask your nurse or doctor.               These medicines have changed or have updated prescriptions.        Dose/Directions    metoprolol tartrate 50 MG tablet   Commonly known as:  LOPRESSOR   This may have changed:  how much to take   Used for:  Atrial fibrillation with rapid ventricular response (H)   Changed by:  Patrick Lima MD        Dose:  75 mg   Take 1.5 tablets (75 mg) by mouth 2 times daily   Quantity:  60 tablet   Refills:  0       torsemide 20 MG tablet   Commonly known as:  DEMADEX   This may have changed:    - how much to take  - how to take this  - when to take this  - additional instructions   Used for:  Chronic diastolic congestive heart failure (H)   Changed by:  Patrick Lima MD        Dose:  20 mg   Take 1 tablet (20 mg) by mouth 2 times daily   Quantity:  180 tablet   Refills:  3         Stop taking these medicines if you haven't already. Please contact your care team if you have questions.     amiodarone 200 MG tablet   Commonly known as:  PACERONE/CODARONE   Stopped by:  Patrick Lima MD           spironolactone 25 MG tablet   Commonly known as:  " ALDACTONE   Stopped by:  Patrick Lima MD                Where to get your medicines      These medications were sent to [id number] - [place], [place] - [address]   [address] [place] [address]    Hours:  M-F 8:30-6:30; Sat 9-4; closed Sunday Phone:  [phone]     metoprolol tartrate 50 MG tablet    torsemide 20 MG tablet                Primary Care Provider Office Phone # Fax #    Sandip Vega,  [phone]       [address] [place] [address]        Equal Access to Services     [facility]: Hadii aad ku hadasho Soomaali, waaxda luqadaha, qaybta kaalmada adeegyada, waxay idiin hayaan ademalou john . So [facility] [phone].    ATENCIÓN: Si habla español, tiene a calabrese disposición servicios gratuitos de asistencia lingüística. [facility] [phone].    We comply with applicable federal civil rights laws and [place] laws. We do not discriminate on the basis of race, color, national origin, age, disability, sex, sexual orientation, or gender identity.            Thank you!     Thank you for choosing [facility]  for your care. Our goal is always to provide you with excellent care. Hearing back from our patients is one way we can continue to improve our services. Please take a few minutes to complete the written survey that you may receive in the mail after your visit with us. Thank you!             Your Updated Medication List - Protect others around you: Learn how to safely use, store and throw away your medicines at www.disposemymeds.org.          This list is accurate as of 8/8/18  1:57 PM.  Always use your most recent med list.                   Brand Name Dispense Instructions for use Diagnosis    acetaminophen 325 MG tablet    TYLENOL    100 tablet    Take 2 tablets (650 mg) by mouth every 4 hours as needed for other (mild pain)        ADVAIR DISKUS 250-50 MCG/DOSE diskus inhaler   Generic drug:   fluticasone-salmeterol     1 Inhaler    INHALE 1 PUFF BY MOUTH TWICE A DAY    Panlobular emphysema (H)       AIRS DISPOSABLE NEBULIZER Misc     1 each    USE EVERY 4 TO 6 HOURS AS NEEDED    Panlobular emphysema (H)       buPROPion 150 MG 12 hr tablet    WELLBUTRIN SR    180 tablet    TAKE 1 TABLET BY MOUTH TWICE A DAY    Personal history of tobacco use, presenting hazards to health       fluticasone 50 MCG/ACT spray    FLONASE    16 g    INHALE 1 TO 2 SPRAYS INTO EACH NOSTRIL EVERY DAY    Chronic rhinitis       ipratropium - albuterol 0.5 mg/2.5 mg/3 mL 0.5-2.5 (3) MG/3ML neb solution    DUONEB    540 mL    TAKE 1 VIAL (3 MLS) BY NEBULIZATION EVERY 4 HOURS AS NEEDED FOR SHORTNESS OF BREATH / DYSPNEA OR WHEEZING    COPD exacerbation (H)       JANTOVEN 2.5 MG tablet   Generic drug:  warfarin     210 tablet    Took 5mg yesterday, due to see INR clinic today (7/30/18)    Atrial fibrillation with RVR (H)       losartan 25 MG tablet    COZAAR    30 tablet    Take 1 tablet (25 mg) by mouth daily    Hypertension goal BP (blood pressure) < 140/90, Nonischemic cardiomyopathy (H)       metoprolol tartrate 50 MG tablet    LOPRESSOR    60 tablet    Take 1.5 tablets (75 mg) by mouth 2 times daily    Atrial fibrillation with rapid ventricular response (H)       montelukast 10 MG tablet    SINGULAIR    90 tablet    TAKE 1 TABLET BY MOUTH DAILY AT BEDTIME    Chronic seasonal allergic rhinitis due to other allergen       multivitamin, therapeutic Tabs tablet      Take 1 tablet by mouth daily        nebulizer/tubing/mouthpiece Kit     1 kit    Use every 4-6 hours PRN    Chronic obstructive pulmonary disease, unspecified COPD type (H)       order for DME     1 Device    Equipment being ordered: Nebulizer    COPD (chronic obstructive pulmonary disease) (H)       potassium chloride SA 20 MEQ CR tablet    K-DUR/KLOR-CON M    90 tablet    Take 1 tablet (20 mEq) by mouth daily    CHF (congestive heart failure) (H), Peripheral edema        senna-docusate 8.6-50 MG per tablet    SENOKOT-S;PERICOLACE     Take 1 tablet by mouth daily        torsemide 20 MG tablet    DEMADEX    180 tablet    Take 1 tablet (20 mg) by mouth 2 times daily    Chronic diastolic congestive heart failure (H)       VENTOLIN  (90 Base) MCG/ACT Inhaler   Generic drug:  albuterol     18 g    INHALE 2 PUFFS BY MOUTH EVERY 4 HOURS AS NEEDED FOR SHORTNESS OF BREATH/DYSPNEA    Chronic obstructive pulmonary disease with acute exacerbation (H)
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
## 2018-08-08 NOTE — PROGRESS NOTES
ANTICOAGULATION FOLLOW-UP CLINIC VISIT    Patient Name:  Home Valdez  Date:  8/8/2018  Contact Type:  Telephone spouse    SUBJECTIVE:     Patient Findings     Positives Change in medications (Pt's amiodarone d/c'd today, but will likely continue to affect his INR for another month or so. Pt was started on demedex in the past month which increases INR as well. )           OBJECTIVE    INR Point of Care   Date Value Ref Range Status   08/08/2018 3.5 (H) 0.86 - 1.14 Final     Comment:     This test is intended for monitoring Coumadin therapy.  Results are not   accurate in patients with prolonged INR due to factor deficiency.         ASSESSMENT / PLAN  INR assessment SUPRA    Recheck INR In: 2 DAYS    INR Location Outside lab      Anticoagulation Summary as of 8/8/2018     INR goal 2.0-3.0   Today's INR 3.5!   Warfarin maintenance plan 7.5 mg (2.5 mg x 3) on Tue, Thu, Sat; 5 mg (2.5 mg x 2) all other days   Full warfarin instructions 8/8: 1.25 mg; 8/9: 2.5 mg; 8/10: 2.5 mg; 8/11: 5 mg; Otherwise 7.5 mg on Tue, Thu, Sat; 5 mg all other days   Weekly warfarin total 42.5 mg   Plan last modified Shameka Carey RN (1/19/2018)   Next INR check 8/10/2018   Target end date     Indications   Atrial fibrillation with RVR (H) [I48.91]  Long-term (current) use of anticoagulants [Z79.01] [Z79.01]         Anticoagulation Episode Summary     INR check location     Preferred lab     Send INR reminders to [facility]    Comments 2.5 MG TABLETS, likes print out, PM dose, Amiodarone doubled 7/31/18      Anticoagulation Care Providers     Provider Role Specialty Phone number    Sandip VegaDO [facility] [phone]            See the Encounter Report to view Anticoagulation Flowsheet and Dosing Calendar (Go to Encounters tab in chart review, and find the Anticoagulation Therapy Visit)    Dosage adjustment made based on physician directed care plan.    Fabiola Kent RN

## 2018-08-10 ENCOUNTER — HOSPITAL ENCOUNTER (OUTPATIENT)
Dept: CARDIOLOGY | Facility: CLINIC | Age: 68
Discharge: HOME | End: 2018-08-10
Attending: INTERNAL MEDICINE | Admitting: INTERNAL MEDICINE
Payer: MEDICARE

## 2018-08-10 ENCOUNTER — ANTICOAGULATION THERAPY VISIT (OUTPATIENT)
Dept: ANTICOAGULATION | Facility: OTHER | Age: 68
End: 2018-08-10
Payer: MEDICARE

## 2018-08-10 DIAGNOSIS — I48.91 ATRIAL FIBRILLATION WITH RAPID VENTRICULAR RESPONSE (H): ICD-10-CM

## 2018-08-10 DIAGNOSIS — Z79.01 LONG-TERM (CURRENT) USE OF ANTICOAGULANTS: ICD-10-CM

## 2018-08-10 DIAGNOSIS — I48.91 ATRIAL FIBRILLATION WITH RVR (H): ICD-10-CM

## 2018-08-10 LAB — INR POINT OF CARE: 2.9 (ref 0.86–1.14)

## 2018-08-10 PROCEDURE — 85610 PROTHROMBIN TIME: CPT | Mod: QW

## 2018-08-10 PROCEDURE — 93226 XTRNL ECG REC<48 HR SCAN A/R: CPT

## 2018-08-10 PROCEDURE — 99207 ZZC NO CHARGE NURSE ONLY: CPT

## 2018-08-10 PROCEDURE — 93227 XTRNL ECG REC<48 HR R&I: CPT | Performed by: INTERNAL MEDICINE

## 2018-08-10 PROCEDURE — 36416 COLLJ CAPILLARY BLOOD SPEC: CPT

## 2018-08-10 NOTE — PROGRESS NOTES
Please contact the patient and notify him of the following:  The chemistry panel shows a slight decline in kidney function.  This is associated with a decrease in cardiac function.  We will want to recheck this in a month or so.    Thank you.   DO SANJAY Lock

## 2018-08-10 NOTE — PROGRESS NOTES
ANTICOAGULATION FOLLOW-UP CLINIC VISIT    Patient Name:  Home Valdez  Date:  8/10/2018  Contact Type:  Face to Face    SUBJECTIVE:        OBJECTIVE    INR Protime   Date Value Ref Range Status   08/10/2018 2.9 (A) 0.86 - 1.14 Final       ASSESSMENT / PLAN  INR assessment THER    Recheck INR In: 3 DAYS    INR Location Clinic      Anticoagulation Summary as of 8/10/2018     INR goal 2.0-3.0   Today's INR 2.9   Warfarin maintenance plan 7.5 mg (2.5 mg x 3) on Tue, Thu, Sat; 5 mg (2.5 mg x 2) all other days   Full warfarin instructions 8/10: 2.5 mg; 8/11: 2.5 mg; Otherwise 7.5 mg on Tue, Thu, Sat; 5 mg all other days   Weekly warfarin total 42.5 mg   Plan last modified Shameka Carey RN (1/19/2018)   Next INR check 8/13/2018   Target end date     Indications   Atrial fibrillation with RVR (H) [I48.91]  Long-term (current) use of anticoagulants [Z79.01] [Z79.01]         Anticoagulation Episode Summary     INR check location     Preferred lab     Send INR reminders to [facility] POOL    Comments 2.5 MG TABLETS, likes print out, PM dose, Amiodarone doubled 7/31/18 but then discontinued completely 8/8/18      Anticoagulation Care Providers     Provider Role Specialty Phone number    Sandip VegaDO [facility] Internal [facility] [phone]            See the Encounter Report to view Anticoagulation Flowsheet and Dosing Calendar (Go to Encounters tab in chart review, and find the Anticoagulation Therapy Visit)    Dosage adjustment made based on physician directed care plan.    Shameka Carey, RN

## 2018-08-10 NOTE — MR AVS SNAPSHOT
Home Valdez   8/10/2018 8:45 AM   Anticoagulation Therapy Visit    Description:  68 year old male   Provider:  [facility]   Department:  [facility]           INR as of 8/10/2018     Today's INR 2.9      Anticoagulation Summary as of 8/10/2018     INR goal 2.0-3.0   Today's INR 2.9   Full warfarin instructions 8/10: 2.5 mg; 8/11: 2.5 mg; Otherwise 7.5 mg on Tue, Thu, Sat; 5 mg all other days   Next INR check 8/13/2018    Indications   Atrial fibrillation with RVR (H) [I48.91]  Long-term (current) use of anticoagulants [Z79.01] [Z79.01]         Your next Anticoagulation Clinic appointment(s)     Aug 13, 2018 11:30 AM CDT   [facility]   [facility]    [address] [facility] [address]   [phone]              Contact Numbers     Clinic Number:         August 2018 Details    Sun Mon Tue Wed Thu Fri Sat        1               2               3               4                 5               6               7               8               9               10      2.5 mg   See details      11      2.5 mg           12      5 mg         13            14               15               16               17               18                 19               20               21               22               23               24               25                 26               27               28               29               30               31                 Date Details   08/10 This INR check       Date of next INR:  8/13/2018         How to take your warfarin dose     To take:  2.5 mg Take 1 of the 2.5 mg tablets.    To take:  5 mg Take 2 of the 2.5 mg tablets.

## 2018-08-13 ENCOUNTER — ANTICOAGULATION THERAPY VISIT (OUTPATIENT)
Dept: ANTICOAGULATION | Facility: OTHER | Age: 68
End: 2018-08-13
Payer: MEDICARE

## 2018-08-13 DIAGNOSIS — Z79.01 LONG-TERM (CURRENT) USE OF ANTICOAGULANTS: ICD-10-CM

## 2018-08-13 DIAGNOSIS — I48.91 ATRIAL FIBRILLATION WITH RVR (H): ICD-10-CM

## 2018-08-13 LAB
INR POINT OF CARE: 1.9 (ref 0.86–1.14)
INTERPRETATION MONITOR -MUSE: NORMAL

## 2018-08-13 PROCEDURE — 99207 ZZC NO CHARGE NURSE ONLY: CPT

## 2018-08-13 PROCEDURE — 36416 COLLJ CAPILLARY BLOOD SPEC: CPT

## 2018-08-13 PROCEDURE — 85610 PROTHROMBIN TIME: CPT | Mod: QW

## 2018-08-13 NOTE — MR AVS SNAPSHOT
Home Valdez   8/13/2018 11:30 AM   Anticoagulation Therapy Visit    Description:  68 year old male   Provider:  [facility]   Department:  [facility]           INR as of 8/13/2018     Today's INR 1.9!      Anticoagulation Summary as of 8/13/2018     INR goal 2.0-3.0   Today's INR 1.9!   Full warfarin instructions 7.5 mg on Tue, Thu, Sat; 5 mg all other days   Next INR check 8/16/2018    Indications   Atrial fibrillation with RVR (H) [I48.91]  Long-term (current) use of anticoagulants [Z79.01] [Z79.01]         Your next Anticoagulation Clinic appointment(s)     Aug 13, 2018 11:30 AM CDT   [facility]  [facility]   [facility]    [address] [facility] [address]   [phone]            Aug 16, 2018 10:45 AM CDT   Anticoagulation Visit with [facility]   [facility] ([facility]    [address] [facility] [address]   [phone]              Contact Numbers     Clinic Number:         August 2018 Details    Sun Mon Tue Wed Thu Fri Sat        1               2               3               4                 5               6               7               8               9               10               11                 12               13      5 mg   See details      14      7.5 mg         15      5 mg         16            17               18                 19               20               21               22               23               24               25                 26               27               28               29               30               31                 Date Details   08/13 This INR check       Date of next INR:  8/16/2018         How to take your warfarin dose     To take:  5 mg Take 2 of the 2.5 mg tablets.    To take:  7.5 mg Take 3 of the 2.5 mg tablets.

## 2018-08-13 NOTE — PROGRESS NOTES
ANTICOAGULATION FOLLOW-UP CLINIC VISIT    Patient Name:  Home Valdez  Date:  8/13/2018  Contact Type:  Face to Face    SUBJECTIVE:     Patient Findings     Positives Change in medications (Still monitoring closely due to amiodarone being discontinued on 8/8/18), No Problem Findings           OBJECTIVE    INR Protime   Date Value Ref Range Status   08/13/2018 1.9 (A) 0.86 - 1.14 Final       ASSESSMENT / PLAN  INR assessment THER    Recheck INR In: 3 DAYS    INR Location Clinic      Anticoagulation Summary as of 8/13/2018     INR goal 2.0-3.0   Today's INR 1.9!   Warfarin maintenance plan 7.5 mg (2.5 mg x 3) on Tue, Thu, Sat; 5 mg (2.5 mg x 2) all other days   Full warfarin instructions 7.5 mg on Tue, Thu, Sat; 5 mg all other days   Weekly warfarin total 42.5 mg   Plan last modified Shameka Carey RN (1/19/2018)   Next INR check 8/16/2018   Target end date     Indications   Atrial fibrillation with RVR (H) [I48.91]  Long-term (current) use of anticoagulants [Z79.01] [Z79.01]         Anticoagulation Episode Summary     INR check location     Preferred lab     Send INR reminders to [facility]    Comments 2.5 MG TABLETS, likes print out, PM dose, Amiodarone doubled 7/31/18 but then discontinued completely 8/8/18      Anticoagulation Care Providers     Provider Role Specialty Phone number    Sandip Vega DO [facility] [phone]            See the Encounter Report to view Anticoagulation Flowsheet and Dosing Calendar (Go to Encounters tab in chart review, and find the Anticoagulation Therapy Visit)    Dosage adjustment made based on physician directed care plan.    Shameka Carey, FRENCH

## 2018-08-14 ENCOUNTER — OFFICE VISIT (OUTPATIENT)
Dept: CARDIOLOGY | Facility: CLINIC | Age: 68
End: 2018-08-14
Payer: MEDICARE

## 2018-08-14 VITALS
SYSTOLIC BLOOD PRESSURE: 110 MMHG | OXYGEN SATURATION: 95 % | BODY MASS INDEX: 34.13 KG/M2 | HEIGHT: 70 IN | HEART RATE: 80 BPM | WEIGHT: 238.4 LBS | DIASTOLIC BLOOD PRESSURE: 78 MMHG

## 2018-08-14 DIAGNOSIS — I48.19 PERSISTENT ATRIAL FIBRILLATION (H): ICD-10-CM

## 2018-08-14 DIAGNOSIS — E78.5 HYPERLIPIDEMIA LDL GOAL <130: ICD-10-CM

## 2018-08-14 DIAGNOSIS — I10 ESSENTIAL HYPERTENSION WITH GOAL BLOOD PRESSURE LESS THAN 140/90: ICD-10-CM

## 2018-08-14 DIAGNOSIS — I50.32 CHRONIC DIASTOLIC CONGESTIVE HEART FAILURE (H): ICD-10-CM

## 2018-08-14 DIAGNOSIS — I48.91 ATRIAL FIBRILLATION WITH RAPID VENTRICULAR RESPONSE (H): Primary | ICD-10-CM

## 2018-08-14 DIAGNOSIS — I71.40 ABDOMINAL AORTIC ANEURYSM (AAA) WITHOUT RUPTURE (H): ICD-10-CM

## 2018-08-14 PROCEDURE — 99214 OFFICE O/P EST MOD 30 MIN: CPT | Performed by: INTERNAL MEDICINE

## 2018-08-14 RX ORDER — SPIRONOLACTONE 25 MG/1
12.5 TABLET ORAL DAILY
Qty: 45 TABLET | Refills: 3 | Status: SHIPPED | OUTPATIENT
Start: 2018-08-14 | End: 2018-09-04

## 2018-08-14 RX ORDER — METOPROLOL TARTRATE 100 MG
100 TABLET ORAL 2 TIMES DAILY
Qty: 180 TABLET | Refills: 3 | Status: ON HOLD | OUTPATIENT
Start: 2018-08-14 | End: 2018-09-26
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
## 2018-08-14 NOTE — PROGRESS NOTES
Service Date: 08/14/2018      HISTORY OF PRESENT ILLNESS:  I had the opportunity to see Mr. Home Valdez in Cardiology Clinic today at the [facility] in [place] for reevaluation of chronic diastolic heart failure and paroxysmal atrial fibrillation.        I last saw Mr. Valdez in 11/2017 and he was doing well with stable shortness of breath due primarily to his COPD and mild left ventricular dysfunction with an ejection fraction of 45%-50%, but maintaining sinus rhythm nicely on amiodarone.        Since then, he developed pneumonia in mid July and was seen in the emergency room at [facility] in [place] on 07/17/2018 and started on treatment.  His atrial fibrillation was back again at that time, but may not have been noticed.  His heart rates were rapid at about 120 beats per minute but his medications were not adjusted.  Unfortunately, he seems to have made a mistake with his medications and was not taking any metoprolol between 07/18 and 07/30/2018.  As a result, he likely had persistent very rapid heart rates and developed recurrence of severe cardiomyopathy, presumably due to atrial fibrillation with rapid ventricular response.  His heart rates were in the 30s and he came back to the emergency room with difficulty breathing and was diagnosed with acute decompensated heart failure.  I had started him back on his metoprolol and treated with diuretic therapy and he improved somewhat.  He followed up with Dr. Lima here in the Cardiology Clinic on 08/08/2018 and his metoprolol was increased further and his amiodarone and spironolactone were discontinued.  He tells me his breathing has improved further since seeing Dr. Lima.  He wonders whether it was due to stopping amiodarone.  A Holter monitor was also performed since then which shows an average heart rate of 89 beats per minute without any slow heart rates.  His peak heart rate was 129 beats per minute.  Most of his daytime  heart rates were greater than 100.      He does have sleep apnea and continues to use his CPAP at night.  He notices more lower extremity edema and abdominal distention and fullness as well.      PHYSICAL EXAMINATION:  His blood pressure is 110/78, heart rate 80 and weight 238 pounds.  His weight has been very stable since his discharge from the hospital after his heart failure exacerbation at 238 pounds.  His lungs sound fairly clear now.  His heart rhythm is irregular but not particularly rapid.  His abdomen is distended and firm, but nontender and he has 2+ bilateral lower extremity edema.      His creatinine has risen a little bit from 1.4 to 1.66 with a BUN of 31 and potassium of 3.7.      IMPRESSIONS:  Mr. Home Valdez is a 68-year-old gentleman with acute exacerbation of heart failure.  He has chronic diastolic heart failure but now has had recurrence of atrial fibrillation with rapid ventricular response, possibly triggered by pneumonia in mid July, but failed to be controlled with medical therapy in part due to a mistake with medication dosing on his part.  In any case, with diuresis and heart rate control, he seems to be doing better despite the fact that his ejection fraction has dropped from 45%-50% down to 20% on his most recent echo.  He has mild chronic kidney disease which is a little worse with aggressive diuresis.  His heart rate control is not quite optimal yet.  I agree with discontinuation of amiodarone and continuation with a strategy of rate control and anticoagulation rather than attempts at rhythm control going forward.  He does have significant chronic COPD issues contributing to his shortness of breath, which probably prevent us from maintaining sinus rhythm adequately.      I will increase his metoprolol tartrate to 100 mg p.o. b.i.d. and consider switching that over to succinate if his left ventricular function does not improve significantly.  I will continue his torsemide 20 mg p.o.  b.i.d. and restart a small dose of spironolactone 12.5 mg daily.  I will have him repeat laboratory testing in 2 weeks and see him back in Cardiology Clinic in the [facility] here in [place] in 4 weeks with another set of blood tests at that time.      cc:      [place] [address]         MARCO A YARBROUGH MD, [facility]             D: 2018   T: 2018   MT: [id number]      Name:     KARTIK HUERTA   MRN:      [id number]        Account:      [id number]   :      1950           Service Date: 2018      Document: [id number]

## 2018-08-14 NOTE — MR AVS SNAPSHOT
After Visit Summary   8/14/2018    Home Valdez    MRN: [id number]           Patient Information     Date Of Birth          1950        Visit Information        Provider Department      8/14/2018 1:45 PM Jimenez Murphy MD [facility]        Today's Diagnoses     Atrial fibrillation with rapid ventricular response (H)    -  1    Persistent atrial fibrillation (H)        Essential hypertension with goal blood pressure less than 140/90        Abdominal aortic aneurysm (AAA) without rupture (H)        Chronic diastolic congestive heart failure (H)        Hyperlipidemia LDL goal <130           Follow-ups after your visit        Additional Services     Follow-Up with [facility] - Return MD visit                 Your next 10 appointments already scheduled     Aug 20, 2018  3:00 PM CDT   Anticoagulation Visit with  ANTI COAG   [facility]    [address] [facility] [id number] [phone]            Aug 28, 2018  8:15 AM CDT   LAB with [facility])    [address] [facility] [address]   [phone]           Please do not eat 10-12 hours before your appointment if you are coming in fasting for labs on lipids, cholesterol, or glucose (sugar). This does not apply to pregnant women. Water, hot tea and black coffee (with nothing added) are okay. Do not drink other fluids, diet soda or chew gum.            Sep 04, 2018  1:45 PM CDT   Return Visit with Jimenez Murphy MD   [facility] ([facility])    [address] [facility] [id number] [phone]              Who to contact     If you have questions or need follow up information about today's clinic visit or your schedule please contact [facility] directly at [phone].  Normal or non-critical lab  "and imaging results will be communicated to you by MyChart, letter or phone within 4 business days after the clinic has received the results. If you do not hear from us within 7 days, please contact the clinic through MyChart or phone. If you have a critical or abnormal lab result, we will notify you by phone as soon as possible.  Submit refill requests through [employer] or call your pharmacy and they will forward the refill request to us. Please allow 3 business days for your refill to be completed.          Additional Information About Your Visit        Care EveryWhere ID     This is your Care EveryWhere ID. This could be used by other organizations to access your [facility] medical records  [id number]        Your Vitals Were     Pulse Height Pulse Oximetry BMI (Body Mass Index)          80 1.778 m (5' 10\") 95% 34.21 kg/m2         Blood Pressure from Last 3 Encounters:   08/14/18 110/78   08/08/18 106/86   08/03/18 112/70    Weight from Last 3 Encounters:   08/14/18 108.1 kg (238 lb 6.4 oz)   08/08/18 108.4 kg (238 lb 14.4 oz)   08/03/18 109.3 kg (241 lb)                 Today's Medication Changes          These changes are accurate as of 8/14/18 11:59 PM.  If you have any questions, ask your nurse or doctor.               Start taking these medicines.        Dose/Directions    spironolactone 25 MG tablet   Commonly known as:  ALDACTONE   Used for:  Chronic diastolic congestive heart failure (H)        Dose:  12.5 mg   Take 0.5 tablets (12.5 mg) by mouth daily   Quantity:  45 tablet   Refills:  3         These medicines have changed or have updated prescriptions.        Dose/Directions    metoprolol tartrate 100 MG tablet   Commonly known as:  LOPRESSOR   This may have changed:    - medication strength  - how much to take   Used for:  Atrial fibrillation with rapid ventricular response (H)        Dose:  100 mg   Take 1 tablet (100 mg) by mouth 2 times daily   Quantity:  180 tablet   Refills:  3            Where to " get your medicines      These medications were sent to [id number] - [place], [place] - [address]   [address] [place] [address]    Hours:  M-F 8:30-6:30; Sat 9-4; closed Sunday Phone:  [phone]     metoprolol tartrate 100 MG tablet    spironolactone 25 MG tablet                Primary Care Provider Office Phone # Fax #    Sandip Vega,  [phone]       [address] [place] [address]        Equal Access to Services     [facility]: Hadii aad ku hadasho Soomaali, waaxda luqadaha, qaybta kaalmada adeegyada, waxay idiin hayaan ademalou john . So [facility] [phone].    ATENCIÓN: Si habla español, tiene a calabrese disposición servicios gratuitos de asistencia lingüística. [facility] [phone].    We comply with applicable federal civil rights laws and [place] laws. We do not discriminate on the basis of race, color, national origin, age, disability, sex, sexual orientation, or gender identity.            Thank you!     Thank you for choosing [facility]  for your care. Our goal is always to provide you with excellent care. Hearing back from our patients is one way we can continue to improve our services. Please take a few minutes to complete the written survey that you may receive in the mail after your visit with us. Thank you!             Your Updated Medication List - Protect others around you: Learn how to safely use, store and throw away your medicines at www.disposemymeds.org.          This list is accurate as of 8/14/18 11:59 PM.  Always use your most recent med list.                   Brand Name Dispense Instructions for use Diagnosis    acetaminophen 325 MG tablet    TYLENOL    100 tablet    Take 2 tablets (650 mg) by mouth every 4 hours as needed for other (mild pain)        ADVAIR DISKUS 250-50 MCG/DOSE diskus inhaler   Generic drug:  fluticasone-salmeterol     1 Inhaler    INHALE 1 PUFF BY MOUTH TWICE A  DAY    Panlobular emphysema (H)       AIRS DISPOSABLE NEBULIZER Misc     1 each    USE EVERY 4 TO 6 HOURS AS NEEDED    Panlobular emphysema (H)       buPROPion 150 MG 12 hr tablet    WELLBUTRIN SR    180 tablet    TAKE 1 TABLET BY MOUTH TWICE A DAY    Personal history of tobacco use, presenting hazards to health       fluticasone 50 MCG/ACT spray    FLONASE    16 g    INHALE 1 TO 2 SPRAYS INTO EACH NOSTRIL EVERY DAY    Chronic rhinitis       ipratropium - albuterol 0.5 mg/2.5 mg/3 mL 0.5-2.5 (3) MG/3ML neb solution    DUONEB    540 mL    TAKE 1 VIAL (3 MLS) BY NEBULIZATION EVERY 4 HOURS AS NEEDED FOR SHORTNESS OF BREATH / DYSPNEA OR WHEEZING    COPD exacerbation (H)       JANTOVEN 2.5 MG tablet   Generic drug:  warfarin     210 tablet    Took 5mg yesterday, due to see INR clinic today (7/30/18)    Atrial fibrillation with RVR (H)       losartan 25 MG tablet    COZAAR    30 tablet    Take 1 tablet (25 mg) by mouth daily    Hypertension goal BP (blood pressure) < 140/90, Nonischemic cardiomyopathy (H)       metoprolol tartrate 100 MG tablet    LOPRESSOR    180 tablet    Take 1 tablet (100 mg) by mouth 2 times daily    Atrial fibrillation with rapid ventricular response (H)       montelukast 10 MG tablet    SINGULAIR    90 tablet    TAKE 1 TABLET BY MOUTH DAILY AT BEDTIME    Chronic seasonal allergic rhinitis due to other allergen       multivitamin, therapeutic Tabs tablet      Take 1 tablet by mouth daily        nebulizer/tubing/mouthpiece Kit     1 kit    Use every 4-6 hours PRN    Chronic obstructive pulmonary disease, unspecified COPD type (H)       order for DME     1 Device    Equipment being ordered: Nebulizer    COPD (chronic obstructive pulmonary disease) (H)       potassium chloride SA 20 MEQ CR tablet    K-DUR/KLOR-CON M    90 tablet    Take 1 tablet (20 mEq) by mouth daily    CHF (congestive heart failure) (H), Peripheral edema       senna-docusate 8.6-50 MG per tablet    SENOKOT-S;PERICOLACE     Take 2  tablets by mouth 2 times daily        spironolactone 25 MG tablet    ALDACTONE    45 tablet    Take 0.5 tablets (12.5 mg) by mouth daily    Chronic diastolic congestive heart failure (H)       torsemide 20 MG tablet    DEMADEX    180 tablet    Take 1 tablet (20 mg) by mouth 2 times daily    Chronic diastolic congestive heart failure (H)       VENTOLIN  (90 Base) MCG/ACT inhaler   Generic drug:  albuterol     18 g    INHALE 2 PUFFS BY MOUTH EVERY 4 HOURS AS NEEDED FOR SHORTNESS OF BREATH/DYSPNEA    Chronic obstructive pulmonary disease with acute exacerbation (H)

## 2018-08-14 NOTE — LETTER
8/14/2018      Sandip Vega, DO  [address] [place] [address]      RE: Home FELIPE Valdez       Dear Colleague,    I had the pleasure of seeing Home Valdez in the [facility].    Service Date: 08/14/2018      HISTORY OF PRESENT ILLNESS:  I had the opportunity to see Mr. Home Valdez in Cardiology Clinic today at the [facility] in [place] for reevaluation of chronic diastolic heart failure and paroxysmal atrial fibrillation.        I last saw Mr. Valdez in 11/2017 and he was doing well with stable shortness of breath due primarily to his COPD and mild left ventricular dysfunction with an ejection fraction of 45%-50%, but maintaining sinus rhythm nicely on amiodarone.        Since then, he developed pneumonia in mid July and was seen in the emergency room at [facility] in [place] on 07/17/2018 and started on treatment.  His atrial fibrillation was back again at that time, but may not have been noticed.  His heart rates were rapid at about 120 beats per minute but his medications were not adjusted.  Unfortunately, he seems to have made a mistake with his medications and was not taking any metoprolol between 07/18 and 07/30/2018.  As a result, he likely had persistent very rapid heart rates and developed recurrence of severe cardiomyopathy, presumably due to atrial fibrillation with rapid ventricular response.  His heart rates were in the 30s and he came back to the emergency room with difficulty breathing and was diagnosed with acute decompensated heart failure.  I had started him back on his metoprolol and treated with diuretic therapy and he improved somewhat.  He followed up with Dr. Lima here in the Cardiology Clinic on 08/08/2018 and his metoprolol was increased further and his amiodarone and spironolactone were discontinued.  He tells me his breathing has improved further since seeing Dr. Lima.  He wonders whether it  was due to stopping amiodarone.  A Holter monitor was also performed since then which shows an average heart rate of 89 beats per minute without any slow heart rates.  His peak heart rate was 129 beats per minute.  Most of his daytime heart rates were greater than 100.      He does have sleep apnea and continues to use his CPAP at night.  He notices more lower extremity edema and abdominal distention and fullness as well.      PHYSICAL EXAMINATION:  His blood pressure is 110/78, heart rate 80 and weight 238 pounds.  His weight has been very stable since his discharge from the hospital after his heart failure exacerbation at 238 pounds.  His lungs sound fairly clear now.  His heart rhythm is irregular but not particularly rapid.  His abdomen is distended and firm, but nontender and he has 2+ bilateral lower extremity edema.      His creatinine has risen a little bit from 1.4 to 1.66 with a BUN of 31 and potassium of 3.7.      IMPRESSIONS:  Mr. Home Valdez is a 68-year-old gentleman with acute exacerbation of heart failure.  He has chronic diastolic heart failure but now has had recurrence of atrial fibrillation with rapid ventricular response, possibly triggered by pneumonia in mid July, but failed to be controlled with medical therapy in part due to a mistake with medication dosing on his part.  In any case, with diuresis and heart rate control, he seems to be doing better despite the fact that his ejection fraction has dropped from 45%-50% down to 20% on his most recent echo.  He has mild chronic kidney disease which is a little worse with aggressive diuresis.  His heart rate control is not quite optimal yet.  I agree with discontinuation of amiodarone and continuation with a strategy of rate control and anticoagulation rather than attempts at rhythm control going forward.  He does have significant chronic COPD issues contributing to his shortness of breath, which probably prevent us from maintaining sinus  rhythm adequately.      I will increase his metoprolol tartrate to 100 mg p.o. b.i.d. and consider switching that over to succinate if his left ventricular function does not improve significantly.  I will continue his torsemide 20 mg p.o. b.i.d. and restart a small dose of spironolactone 12.5 mg daily.  I will have him repeat laboratory testing in 2 weeks and see him back in Cardiology Clinic in the [facility] here in [place] in 4 weeks with another set of blood tests at that time.      cc:      Sandip Vega,     [address] [address]         MARCO A YARBROUGH MD, [facility]             D: 2018   T: 2018   MT: [id number]      Name:     KARTIK HUERTA   MRN:      [id number]        Account:      [id number]   :      1950           Service Date: 2018      Document: [id number]         Outpatient Encounter Prescriptions as of 2018   Medication Sig Dispense Refill     acetaminophen (TYLENOL) 325 MG tablet Take 2 tablets (650 mg) by mouth every 4 hours as needed for other (mild pain) 100 tablet 0     ADVAIR DISKUS 250-50 MCG/DOSE diskus inhaler INHALE 1 PUFF BY MOUTH TWICE A DAY 1 Inhaler 1     buPROPion (WELLBUTRIN SR) 150 MG 12 hr tablet TAKE 1 TABLET BY MOUTH TWICE A  tablet 1     fluticasone (FLONASE) 50 MCG/ACT spray INHALE 1 TO 2 SPRAYS INTO EACH NOSTRIL EVERY DAY 16 g 1     losartan (COZAAR) 25 MG tablet Take 1 tablet (25 mg) by mouth daily 30 tablet 0     metoprolol tartrate (LOPRESSOR) 100 MG tablet Take 1 tablet (100 mg) by mouth 2 times daily 180 tablet 3     montelukast (SINGULAIR) 10 MG tablet TAKE 1 TABLET BY MOUTH DAILY AT BEDTIME 90 tablet 0     Nebulizers (AIRS DISPOSABLE NEBULIZER) MISC USE EVERY 4 TO 6 HOURS AS NEEDED 1 each 1     order for DME Equipment being ordered: Nebulizer 1 Device 0     potassium chloride SA (K-DUR/KLOR-CON M) 20 MEQ CR tablet Take 1 tablet (20 mEq) by mouth daily 90 tablet 2     senna-docusate  (SENOKOT-S;PERICOLACE) 8.6-50 MG per tablet Take 2 tablets by mouth 2 times daily        spironolactone (ALDACTONE) 25 MG tablet Take 0.5 tablets (12.5 mg) by mouth daily 45 tablet 3     torsemide (DEMADEX) 20 MG tablet Take 1 tablet (20 mg) by mouth 2 times daily 180 tablet 3     VENTOLIN  (90 Base) MCG/ACT Inhaler INHALE 2 PUFFS BY MOUTH EVERY 4 HOURS AS NEEDED FOR SHORTNESS OF BREATH/DYSPNEA 18 g 3     warfarin (JANTOVEN) 2.5 MG tablet Took 5mg yesterday, due to see INR clinic today (7/30/18) 210 tablet 0     ipratropium - albuterol 0.5 mg/2.5 mg/3 mL (DUONEB) 0.5-2.5 (3) MG/3ML neb solution TAKE 1 VIAL (3 MLS) BY NEBULIZATION EVERY 4 HOURS AS NEEDED FOR SHORTNESS OF BREATH / DYSPNEA OR WHEEZING 540 mL 3     multivitamin, therapeutic (THERA-VIT) TABS tablet Take 1 tablet by mouth daily       Respiratory Therapy Supplies (NEBULIZER/TUBING/MOUTHPIECE) KIT Use every 4-6 hours PRN 1 kit 11     [DISCONTINUED] metoprolol tartrate (LOPRESSOR) 50 MG tablet Take 1.5 tablets (75 mg) by mouth 2 times daily 60 tablet 0     No facility-administered encounter medications on file as of 8/14/2018.        Again, thank you for allowing me to participate in the care of your patient.      Sincerely,    MARCO A YARBROUGH MD     [facility]

## 2018-08-16 ENCOUNTER — ANTICOAGULATION THERAPY VISIT (OUTPATIENT)
Dept: ANTICOAGULATION | Facility: OTHER | Age: 68
End: 2018-08-16
Payer: MEDICARE

## 2018-08-16 DIAGNOSIS — I48.91 ATRIAL FIBRILLATION WITH RVR (H): ICD-10-CM

## 2018-08-16 DIAGNOSIS — Z79.01 LONG-TERM (CURRENT) USE OF ANTICOAGULANTS: ICD-10-CM

## 2018-08-16 LAB — INR POINT OF CARE: 3.9 (ref 0.86–1.14)

## 2018-08-16 PROCEDURE — 85610 PROTHROMBIN TIME: CPT | Mod: QW

## 2018-08-16 PROCEDURE — 36416 COLLJ CAPILLARY BLOOD SPEC: CPT

## 2018-08-16 PROCEDURE — 99207 ZZC NO CHARGE NURSE ONLY: CPT

## 2018-08-16 NOTE — MR AVS SNAPSHOT
Home Valdez   8/16/2018 10:45 AM   Anticoagulation Therapy Visit    Description:  68 year old male   Provider:  CAMILO ANTI COAEBEN   Department:  [facility]           INR as of 8/16/2018     Today's INR 3.9!      Anticoagulation Summary as of 8/16/2018     INR goal 2.0-3.0   Today's INR 3.9!   Full warfarin instructions 8/16: 2.5 mg; 8/18: 5 mg; Otherwise 7.5 mg on Tue, Thu, Sat; 5 mg all other days   Next INR check 8/20/2018    Indications   Atrial fibrillation with RVR (H) [I48.91]  Long-term (current) use of anticoagulants [Z79.01] [Z79.01]         Your next Anticoagulation Clinic appointment(s)     Aug 16, 2018 10:45 AM CDT   [facility] CAM[facility]O [facility]TI [facility]   [facility]    [address] [facility] [address]   [phone]            Aug 20, 2018  3:00 PM CDT   Anticoagulation [facility] with CAMILO ANTI COAG   [facility]    [address] [facility] [address]   [phone]              Contact Numbers     Clinic Number:         August 2018 Details    Sun Mon Tue Wed Thu Fri Sat        1               2               3               4                 5               6               7               8               9               10               11                 12               13               14               15               16      2.5 mg   See details      17      5 mg         18      5 mg           19      5 mg         20            21               22               23               24               25                 26               27               28               29               30               31                 Date Details   08/16 This INR check       Date of next INR:  8/20/2018         How to take your warfarin dose     To take:  2.5 mg Take 1 of the 2.5 mg tablets.    To take:  5 mg Take 2 of the 2.5 mg tablets.

## 2018-08-16 NOTE — PROGRESS NOTES
"  ANTICOAGULATION FOLLOW-UP CLINIC VISIT    Patient Name:  Home Valdez  Date:  8/16/2018  Contact Type:  Face to Face    SUBJECTIVE:     Patient Findings     Positives Change in medications (Pt has had medication changes the last few weeks and we are just trying to find his \"maintnenance\" dose again)           OBJECTIVE    INR Protime   Date Value Ref Range Status   08/16/2018 3.9 (A) 0.86 - 1.14 Final       ASSESSMENT / PLAN  INR assessment SUPRA    Recheck INR In: 4 DAYS    INR Location Clinic      Anticoagulation Summary as of 8/16/2018     INR goal 2.0-3.0   Today's INR 3.9!   Warfarin maintenance plan 7.5 mg (2.5 mg x 3) on Tue, Thu, Sat; 5 mg (2.5 mg x 2) all other days   Full warfarin instructions 8/16: 2.5 mg; 8/18: 5 mg; Otherwise 7.5 mg on Tue, Thu, Sat; 5 mg all other days   Weekly warfarin total 42.5 mg   Plan last modified Shameka Carey RN (1/19/2018)   Next INR check 8/20/2018   Target end date     Indications   Atrial fibrillation with RVR (H) [I48.91]  Long-term (current) use of anticoagulants [Z79.01] [Z79.01]         Anticoagulation Episode Summary     INR check location     Preferred lab     Send INR reminders to [facility]    Comments 2.5 MG TABLETS, likes print out, PM dose, Amiodarone doubled 7/31/18 but then discontinued completely 8/8/18      Anticoagulation Care Providers     Provider Role Specialty Phone number    Sandip Vega DO [facility] [phone]            See the Encounter Report to view Anticoagulation Flowsheet and Dosing Calendar (Go to Encounters tab in chart review, and find the Anticoagulation Therapy Visit)    Dosage adjustment made based on physician directed care plan.    Shameka Carey RN               "

## 2018-08-17 DIAGNOSIS — J31.0 CHRONIC RHINITIS: ICD-10-CM

## 2018-08-17 NOTE — TELEPHONE ENCOUNTER
"Last Written Prescription Date:  07/05/2018  Last Fill Quantity: 16g,  # refills: 1   Last office visit: 8/3/2018 with prescribing provider:  Gary   Future Office Visit:   Next 5 appointments (look out 90 days)     Sep 04, 2018  1:45 PM CDT   Return Visit with Jimenez Murphy MD   [facility]    [address] [place] [id number] [phone]                 Requested Prescriptions   Pending Prescriptions Disp Refills     fluticasone (FLONASE) 50 MCG/ACT spray [Pharmacy Med Name: FLUTICASONE 50MCG/ACT SPRAY] 16 g      Sig: INHALE 1 TO 2 SPRAYS INTO EACH NOSTRIL EVERY DAY    Inhaled Steroids Protocol Passed    8/17/2018  1:08 AM       Passed - Patient is age 12 or older       Passed - Recent (12 mo) or future (30 days) visit within the authorizing provider's specialty    Patient had office visit in the last 12 months or has a visit in the next 30 days with authorizing provider or within the authorizing provider's specialty.  See \"Patient Info\" tab in inbasket, or \"Choose Columns\" in Meds & Orders section of the refill encounter.              "

## 2018-08-20 ENCOUNTER — ANTICOAGULATION THERAPY VISIT (OUTPATIENT)
Dept: ANTICOAGULATION | Facility: OTHER | Age: 68
End: 2018-08-20
Payer: MEDICARE

## 2018-08-20 ENCOUNTER — TELEPHONE (OUTPATIENT)
Dept: FAMILY MEDICINE | Facility: OTHER | Age: 68
End: 2018-08-20

## 2018-08-20 ENCOUNTER — TELEPHONE (OUTPATIENT)
Dept: CARDIOLOGY | Facility: CLINIC | Age: 68
End: 2018-08-20

## 2018-08-20 DIAGNOSIS — Z79.01 LONG-TERM (CURRENT) USE OF ANTICOAGULANTS: ICD-10-CM

## 2018-08-20 DIAGNOSIS — I48.91 ATRIAL FIBRILLATION WITH RVR (H): ICD-10-CM

## 2018-08-20 LAB — INR POINT OF CARE: 3.3 (ref 0.86–1.14)

## 2018-08-20 PROCEDURE — 85610 PROTHROMBIN TIME: CPT | Mod: QW

## 2018-08-20 PROCEDURE — 99207 ZZC NO CHARGE NURSE ONLY: CPT

## 2018-08-20 PROCEDURE — 36416 COLLJ CAPILLARY BLOOD SPEC: CPT

## 2018-08-20 RX ORDER — FLUTICASONE PROPIONATE 50 MCG
SPRAY, SUSPENSION (ML) NASAL
Qty: 16 G | Refills: 3 | Status: SHIPPED | OUTPATIENT
Start: 2018-08-20

## 2018-08-20 NOTE — TELEPHONE ENCOUNTER
Jeffrey told them that he would like the patient to continue his 100 mg Metoprolol twice daily. Chrissy would like us to call Brad and let him know if we agree with this recommendation. Tricia LAU

## 2018-08-20 NOTE — MR AVS SNAPSHOT
Home Valdez   8/20/2018 3:00 PM   Anticoagulation Therapy Visit    Description:  68 year old male   Provider:  CAMILO ANTI COAG   Department:   Anticoag           INR as of 8/20/2018     Today's INR 3.3!      Anticoagulation Summary as of 8/20/2018     INR goal 2.0-3.0   Today's INR 3.3!   Full warfarin instructions 7.5 mg on Thu; 5 mg all other days   Next INR check 8/30/2018    Indications   Atrial fibrillation with RVR (H) [I48.91]  Long-term (current) use of anticoagulants [Z79.01] [Z79.01]         Your next Anticoagulation Clinic appointment(s)     Aug 20, 2018  3:00 PM CDT   Anticoagulation [facility] with  ANTI COAG   [facility]    [address] [facility] [address]   [phone]            Aug 30, 2018  4:00 PM CDT   Anticoagulation Visit with  ANTI COAG   [facility]    [address] [facility] [address]   [phone]              Contact Numbers     Clinic Number:         August 2018 Details    Sun Mon Tue Wed Thu Fri Sat        1               2               3               4                 5               6               7               8               9               10               11                 12               13               14               15               16               17               18                 19               20      5 mg   See details      21      5 mg         22      5 mg         23      7.5 mg         24      5 mg         25      5 mg           26      5 mg         27      5 mg         28      5 mg         29      5 mg         30            31                 Date Details   08/20 This INR check       Date of next INR:  8/30/2018         How to take your warfarin dose     To take:  5 mg Take 2 of the 2.5 mg tablets.    To take:  7.5 mg Take 3 of the 2.5 mg tablets.

## 2018-08-20 NOTE — TELEPHONE ENCOUNTER
Prescription approved per [facility] Refill Protocol    MAXINE YanceyN, RN  [facility]

## 2018-08-20 NOTE — TELEPHONE ENCOUNTER
Spoke to Chrissy and Brad about his Metoprolol and Melatonin questions. Discussed their concern with Dr Murphy. He would like to stick to the medical plan discussed on Aug 14 th and follow up as planned. They agreed and will call should any other questions or concerns arise.

## 2018-08-20 NOTE — TELEPHONE ENCOUNTER
Reason for call:  Other   Patient called regarding (reason for call): appointment  Additional comments: Pt wife called cardio increased medication (maetrtrol 100 mg twice a day) last Tuesday since than he has not been able to sleep at night and one of the side effects are insomnia or is 02 sat are dropping at night and he has a bi pap PLEASE CALL IN REGARDS IN A APPT     Phone number to reach patient:  Cell number on file:    Telephone Information:   Mobile [phone]       Best Time:  Anytime     Can we leave a detailed message on this number?  YES

## 2018-08-20 NOTE — TELEPHONE ENCOUNTER
Please let the patient and his spouse know that I certainly agree with the recommendations of the cardiologist.  If however the patient does not tolerate this, he should contact his cardiologist.    Gary

## 2018-08-24 ENCOUNTER — TELEPHONE (OUTPATIENT)
Dept: FAMILY MEDICINE | Facility: OTHER | Age: 68
End: 2018-08-24

## 2018-08-25 ENCOUNTER — HOSPITAL ENCOUNTER (EMERGENCY)
Facility: CLINIC | Age: 68
Discharge: HOME | End: 2018-08-25
Attending: EMERGENCY MEDICINE | Admitting: EMERGENCY MEDICINE
Payer: MEDICARE

## 2018-08-25 VITALS
RESPIRATION RATE: 20 BRPM | OXYGEN SATURATION: 100 % | WEIGHT: 240.8 LBS | BODY MASS INDEX: 34.55 KG/M2 | DIASTOLIC BLOOD PRESSURE: 81 MMHG | SYSTOLIC BLOOD PRESSURE: 112 MMHG | TEMPERATURE: 97.4 F

## 2018-08-25 DIAGNOSIS — M79.605 PAIN IN BOTH LOWER EXTREMITIES: ICD-10-CM

## 2018-08-25 DIAGNOSIS — M79.604 PAIN IN BOTH LOWER EXTREMITIES: ICD-10-CM

## 2018-08-25 LAB
ANION GAP SERPL CALCULATED.3IONS-SCNC: 9 MMOL/L (ref 3–14)
BASOPHILS # BLD AUTO: 0 10E9/L (ref 0–0.2)
BASOPHILS NFR BLD AUTO: 0.4 %
BUN SERPL-MCNC: 32 MG/DL (ref 7–30)
CALCIUM SERPL-MCNC: 8.6 MG/DL (ref 8.5–10.1)
CHLORIDE SERPL-SCNC: 102 MMOL/L (ref 94–109)
CO2 SERPL-SCNC: 33 MMOL/L (ref 20–32)
CREAT SERPL-MCNC: 1.87 MG/DL (ref 0.66–1.25)
DIFFERENTIAL METHOD BLD: NORMAL
EOSINOPHIL NFR BLD AUTO: 0.7 %
ERYTHROCYTE [DISTWIDTH] IN BLOOD BY AUTOMATED COUNT: 14.5 % (ref 10–15)
GFR SERPL CREATININE-BSD FRML MDRD: 36 ML/MIN/1.7M2
GLUCOSE SERPL-MCNC: 103 MG/DL (ref 70–99)
HCT VFR BLD AUTO: 48.9 % (ref 40–53)
HGB BLD-MCNC: 15.7 G/DL (ref 13.3–17.7)
IMM GRANULOCYTES # BLD: 0 10E9/L (ref 0–0.4)
IMM GRANULOCYTES NFR BLD: 0.4 %
LYMPHOCYTES # BLD AUTO: 1 10E9/L (ref 0.8–5.3)
LYMPHOCYTES NFR BLD AUTO: 12 %
MCH RBC QN AUTO: 30.3 PG (ref 26.5–33)
MCHC RBC AUTO-ENTMCNC: 32.1 G/DL (ref 31.5–36.5)
MCV RBC AUTO: 94 FL (ref 78–100)
MONOCYTES # BLD AUTO: 0.8 10E9/L (ref 0–1.3)
MONOCYTES NFR BLD AUTO: 9.7 %
NEUTROPHILS # BLD AUTO: 6.6 10E9/L (ref 1.6–8.3)
NEUTROPHILS NFR BLD AUTO: 76.8 %
NRBC # BLD AUTO: 0 10*3/UL
NRBC BLD AUTO-RTO: 0 /100
NT-PROBNP SERPL-MCNC: ABNORMAL PG/ML (ref 0–900)
PLATELET # BLD AUTO: 238 10E9/L (ref 150–450)
POTASSIUM SERPL-SCNC: 3.7 MMOL/L (ref 3.4–5.3)
RBC # BLD AUTO: 5.18 10E12/L (ref 4.4–5.9)
SODIUM SERPL-SCNC: 144 MMOL/L (ref 133–144)
WBC # BLD AUTO: 8.6 10E9/L (ref 4–11)

## 2018-08-25 PROCEDURE — 99283 EMERGENCY DEPT VISIT LOW MDM: CPT | Performed by: EMERGENCY MEDICINE

## 2018-08-25 PROCEDURE — 80048 BASIC METABOLIC PNL TOTAL CA: CPT | Performed by: EMERGENCY MEDICINE

## 2018-08-25 PROCEDURE — 85025 COMPLETE CBC W/AUTO DIFF WBC: CPT | Performed by: EMERGENCY MEDICINE

## 2018-08-25 PROCEDURE — 83880 ASSAY OF NATRIURETIC PEPTIDE: CPT | Performed by: EMERGENCY MEDICINE

## 2018-08-25 PROCEDURE — 99285 EMERGENCY DEPT VISIT HI MDM: CPT | Mod: Z6 | Performed by: EMERGENCY MEDICINE

## 2018-08-25 RX ORDER — HYDROCODONE BITARTRATE AND ACETAMINOPHEN 5; 325 MG/1; MG/1
1 TABLET ORAL EVERY 4 HOURS PRN
Qty: 15 TABLET | Refills: 0 | Status: ON HOLD | OUTPATIENT
Start: 2018-08-25 | End: 2018-09-20
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
## 2018-08-25 NOTE — ED AVS SNAPSHOT
[facility] Emergency Department    [address] [place] [address]    Phone:  [phone]    Fax:  [phone]                                       Home Valdez   MRN: [id number]    Department:  [facility] Emergency Department   Date of Visit:  8/25/2018           After Visit Summary Signature Page     I have received my discharge instructions, and my questions have been answered. I have discussed any challenges I see with this plan with the nurse or doctor.    ..........................................................................................................................................  Patient/Patient Representative Signature      ..........................................................................................................................................  Patient Representative Print Name and Relationship to Patient    ..................................................               ................................................  Date                                            Time    ..........................................................................................................................................  Reviewed by Signature/Title    ...................................................              ..............................................  Date                                                            Time          22EPIC Rev 08/18

## 2018-08-25 NOTE — DISCHARGE INSTRUCTIONS
Take Tylenol for the leg pains.  If that does not help try Norco as prescribed.  Return to the ER if new or worsening symptoms.  Your doctor may consider using Neurontin for this pain but I would follow-up with your doctor.  Your BNP was elevated today so if you develop signs of heart failure please return to the emergency department.

## 2018-08-25 NOTE — ED AVS SNAPSHOT
[facility] Emergency Department    [address] [place] [address]    Phone:  [phone]    Fax:  [phone]                                       Home [place]   MRN: [id number]    Department:  [facility] Emergency Department   Date of Visit:  8/25/2018           Patient Information     Date Of Birth          1950        Your diagnoses for this visit were:     Pain in both lower extremities        You were seen by Sumit Yao MD.      Follow-up Information     Follow up with Sandip Vega DO. Schedule an appointment as soon as possible for a visit in 3 days.    Specialty:  Internal Medicine    Why:  ER follow up    Contact information:    [facility]   [place] [address] [phone]          Discharge Instructions       Take Tylenol for the leg pains.  If that does not help try Norco as prescribed.  Return to the ER if new or worsening symptoms.  Your doctor may consider using Neurontin for this pain but I would follow-up with your doctor.  Your BNP was elevated today so if you develop signs of heart failure please return to the emergency department.    Your next 10 appointments already scheduled     Aug 28, 2018  8:15 AM CDT   LAB with NL LAB [facility] ([facility])    [address] [facility] [id number] [phone]           Please do not eat 10-12 hours before your appointment if you are coming in fasting for labs on lipids, cholesterol, or glucose (sugar). This does not apply to pregnant women. Water, hot tea and black coffee (with nothing added) are okay. Do not drink other fluids, diet soda or chew gum.            Aug 30, 2018  4:00 PM CDT   Anticoagulation Visit with CAMILO ANTI COAG   [facility] ([facility])    [address] [facility] [address] [phone]            Sep 04, 2018  1:45 PM CDT   Return Visit with Jimenez Murphy MD   [facility]  [facility] ([facility])    [address] [place] [id number] [phone]              24 Hour Appointment Hotline       To make an appointment at any [facility], call [phone] ([phone]). If you don't have a family doctor or clinic, we will help you find one. [facility] are conveniently located to serve the needs of you and your family.             Review of your medicines      START taking        Dose / Directions Last dose taken    HYDROcodone-acetaminophen 5-325 MG per tablet   Commonly known as:  NORCO   Dose:  1 tablet   Quantity:  15 tablet        Take 1 tablet by mouth every 4 hours as needed for pain   Refills:  0          Our records show that you are taking the medicines listed below. If these are incorrect, please call your family doctor or clinic.        Dose / Directions Last dose taken    acetaminophen 325 MG tablet   Commonly known as:  TYLENOL   Dose:  650 mg   Quantity:  100 tablet        Take 2 tablets (650 mg) by mouth every 4 hours as needed for other (mild pain)   Refills:  0        ADVAIR DISKUS 250-50 MCG/DOSE diskus inhaler   Quantity:  1 Inhaler   Generic drug:  fluticasone-salmeterol        INHALE 1 PUFF BY MOUTH TWICE A DAY   Refills:  1        AIRS DISPOSABLE NEBULIZER Misc   Quantity:  1 each        USE EVERY 4 TO 6 HOURS AS NEEDED   Refills:  1        buPROPion 150 MG 12 hr tablet   Commonly known as:  WELLBUTRIN SR   Quantity:  180 tablet        TAKE 1 TABLET BY MOUTH TWICE A DAY   Refills:  1        fluticasone 50 MCG/ACT spray   Commonly known as:  FLONASE   Quantity:  16 g        INHALE 1 TO 2 SPRAYS INTO EACH NOSTRIL EVERY DAY   Refills:  3        ipratropium - albuterol 0.5 mg/2.5 mg/3 mL 0.5-2.5 (3) MG/3ML neb solution   Commonly known as:  DUONEB   Quantity:  540 mL        TAKE 1 VIAL (3 MLS) BY NEBULIZATION EVERY 4 HOURS AS NEEDED FOR SHORTNESS OF BREATH / DYSPNEA OR WHEEZING   Refills:  3        JANTOVEN 2.5 MG tablet    Quantity:  210 tablet   Generic drug:  warfarin        Took 5mg yesterday, due to see INR clinic today (7/30/18)   Refills:  0        losartan 25 MG tablet   Commonly known as:  COZAAR   Dose:  25 mg   Quantity:  30 tablet        Take 1 tablet (25 mg) by mouth daily   Refills:  0        metoprolol tartrate 100 MG tablet   Commonly known as:  LOPRESSOR   Dose:  100 mg   Quantity:  180 tablet        Take 1 tablet (100 mg) by mouth 2 times daily   Refills:  3        montelukast 10 MG tablet   Commonly known as:  SINGULAIR   Quantity:  90 tablet        TAKE 1 TABLET BY MOUTH DAILY AT BEDTIME   Refills:  0        multivitamin, therapeutic Tabs tablet   Dose:  1 tablet        Take 1 tablet by mouth daily   Refills:  0        nebulizer/tubing/mouthpiece Kit   Quantity:  1 kit        Use every 4-6 hours PRN   Refills:  11        order for DME   Quantity:  1 Device        Equipment being ordered: Nebulizer   Refills:  0        potassium chloride SA 20 MEQ CR tablet   Commonly known as:  K-DUR/KLOR-CON M   Dose:  20 mEq   Quantity:  90 tablet        Take 1 tablet (20 mEq) by mouth daily   Refills:  2        senna-docusate 8.6-50 MG per tablet   Commonly known as:  SENOKOT-S;PERICOLACE   Dose:  2 tablet        Take 2 tablets by mouth 2 times daily   Refills:  0        spironolactone 25 MG tablet   Commonly known as:  ALDACTONE   Dose:  12.5 mg   Quantity:  45 tablet        Take 0.5 tablets (12.5 mg) by mouth daily   Refills:  3        torsemide 20 MG tablet   Commonly known as:  DEMADEX   Dose:  20 mg   Quantity:  180 tablet        Take 1 tablet (20 mg) by mouth 2 times daily   Refills:  3        VENTOLIN  (90 Base) MCG/ACT inhaler   Quantity:  18 g   Generic drug:  albuterol        INHALE 2 PUFFS BY MOUTH EVERY 4 HOURS AS NEEDED FOR SHORTNESS OF BREATH/DYSPNEA   Refills:  3                Information about OPIOIDS     PRESCRIPTION OPIOIDS: WHAT YOU NEED TO KNOW   We gave you an opioid (narcotic) pain medicine. It is  important to manage your pain, but opioids are not always the best choice. You should first try all the other options your care team gave you. Take this medicine for as short a time (and as few doses) as possible.    Some activities can increase your pain, such as bandage changes or therapy sessions. It may help to take your pain medicine 30 to 60 minutes before these activities. Reduce your stress by getting enough sleep, working on hobbies you enjoy and practicing relaxation or meditation. Talk to your care team about ways to manage your pain beyond prescription opioids.    These medicines have risks:    DO NOT drive when on new or higher doses of pain medicine. These medicines can affect your alertness and reaction times, and you could be arrested for driving under the influence (DUI). If you need to use opioids long-term, talk to your care team about driving.    DO NOT operate heavy machinery    DO NOT do any other dangerous activities while taking these medicines.    DO NOT drink any alcohol while taking these medicines.     If the opioid prescribed includes acetaminophen, DO NOT take with any other medicines that contain acetaminophen. Read all labels carefully. Look for the word  acetaminophen  or  Tylenol.  Ask your pharmacist if you have questions or are unsure.    You can get addicted to pain medicines, especially if you have a history of addiction (chemical, alcohol or substance dependence). Talk to your care team about ways to reduce this risk.    All opioids tend to cause constipation. Drink plenty of water and eat foods that have a lot of fiber, such as fruits, vegetables, prune juice, apple juice and high-fiber cereal. Take a laxative (Miralax, milk of magnesia, Colace, Senna) if you don t move your bowels at least every other day. Other side effects include upset stomach, sleepiness, dizziness, throwing up, tolerance (needing more of the medicine to have the same effect), physical dependence and  slowed breathing.    Store your pills in a secure place, locked if possible. We will not replace any lost or stolen medicine. If you don t finish your medicine, please throw away (dispose) as directed by your pharmacist. The [place] [employer]ution [employer] has more information about safe disposal: https://www.pca.state.[place]/living-green/managing-unwanted-medications        Prescriptions were sent or printed at these locations (1 Prescription)                   [facility] - [place], [place] - [address] [facility] [facility]   [address] [facility] [address] [facility] [address]    Telephone:  [phone]   Fax:  [phone]   Hours:                  Printed at Department/Unit printer (1 of 1)         HYDROcodone-acetaminophen (NORCO) 5-325 MG per tablet                Procedures and tests performed during your visit     Basic metabolic panel    CBC with platelets differential    NT pro BNP      Orders Needing Specimen Collection     None      Pending Results     No orders found from 8/23/2018 to 8/26/2018.            Pending Culture Results     No orders found from 8/23/2018 to 8/26/2018.            Pending Results Instructions     If you had any lab results that were not finalized at the time of your Discharge, you can call the ED Lab Result RN at [phone]. You will be contacted by this team for any positive Lab results or changes in treatment. The nurses are available 7 days a week from 10A to 6:30P.  You can leave a message 24 hours per day and they will return your call.        Thank you for choosing [facility]       Thank you for choosing [facility] for your care. Our goal is always to provide you with excellent care. Hearing back from our patients is one way we can continue to improve our services. Please take a few minutes to complete the written survey that you may receive in the mail after you visit with us. Thank you!        Care EveryWhere ID     This is your Care EveryWhere ID. This could be used by other  organizations to access your [facility] medical records  [id number]        Equal Access to Services     MILKA ORTIZ : Radha Ignacio, yamila mccullough, jase davis. So [facility] [place] [phone].    ATENCIÓN: Si habla español, tiene a calabrese disposición servicios gratuitos de asistencia lingüística. Llame al [phone].    We comply with applicable federal civil rights laws and [place] laws. We do not discriminate on the basis of race, color, national origin, age, disability, sex, sexual orientation, or gender identity.            After Visit Summary       This is your record. Keep this with you and show to your community pharmacist(s) and doctor(s) at your next visit.

## 2018-08-25 NOTE — TELEPHONE ENCOUNTER
"Reason for call:  Other   Patient called regarding (reason for call): appointment  Additional comments: Patient want's to know if Dr. Vega would approve to see him on 08/27/2018 at 11am, which is a \"DrPete Only\" spot. Patient needs to be seen for fluid retention in legs.    Phone number to reach patient:  Home number on file [phone] (home)    Best Time:  Anytime     Can we leave a detailed message on this number?  YES    "

## 2018-08-26 NOTE — TELEPHONE ENCOUNTER
"Given the fact that you have told the patient the open time slot was available, and you told him that it requires my \"approval\"; if I were to say no, I would be the worst person in the world.  Wouldn't I be?      Please place the patient in the slot as suggested.    Gary"

## 2018-08-26 NOTE — TELEPHONE ENCOUNTER
Reason for Call:  Other appointment    Detailed comments: Patient no longer needs an appointment for Monday 8/27/18, is scheduled for Tuesday 8/28/18 with Gary. Please disregard previous message. Thank you.     Phone Number Patient can be reached at: Home number on file [phone] (home)    Best Time: Any, but patient does not want a call back.     Can we leave a detailed message on this number? Not Applicable    Call taken on 8/26/2018 at 12:35 PM by Elizabeth Plata

## 2018-08-27 DIAGNOSIS — J44.1 CHRONIC OBSTRUCTIVE PULMONARY DISEASE WITH ACUTE EXACERBATION (H): ICD-10-CM

## 2018-08-28 ENCOUNTER — CARE COORDINATION (OUTPATIENT)
Dept: CARDIOLOGY | Facility: CLINIC | Age: 68
End: 2018-08-28

## 2018-08-28 ENCOUNTER — OFFICE VISIT (OUTPATIENT)
Dept: FAMILY MEDICINE | Facility: OTHER | Age: 68
End: 2018-08-28
Payer: MEDICARE

## 2018-08-28 VITALS
WEIGHT: 240.3 LBS | HEART RATE: 92 BPM | DIASTOLIC BLOOD PRESSURE: 68 MMHG | BODY MASS INDEX: 34.48 KG/M2 | SYSTOLIC BLOOD PRESSURE: 108 MMHG | TEMPERATURE: 97.8 F | OXYGEN SATURATION: 95 % | RESPIRATION RATE: 20 BRPM

## 2018-08-28 DIAGNOSIS — N18.30 CKD (CHRONIC KIDNEY DISEASE) STAGE 3, GFR 30-59 ML/MIN (H): Primary | ICD-10-CM

## 2018-08-28 DIAGNOSIS — I50.9 CONGESTIVE HEART FAILURE, UNSPECIFIED CONGESTIVE HEART FAILURE CHRONICITY, UNSPECIFIED CONGESTIVE HEART FAILURE TYPE: ICD-10-CM

## 2018-08-28 DIAGNOSIS — I48.91 ATRIAL FIBRILLATION WITH RAPID VENTRICULAR RESPONSE (H): ICD-10-CM

## 2018-08-28 DIAGNOSIS — J43.1 PANLOBULAR EMPHYSEMA (H): ICD-10-CM

## 2018-08-28 DIAGNOSIS — Z79.01 LONG-TERM (CURRENT) USE OF ANTICOAGULANTS: ICD-10-CM

## 2018-08-28 DIAGNOSIS — I10 ESSENTIAL HYPERTENSION WITH GOAL BLOOD PRESSURE LESS THAN 140/90: ICD-10-CM

## 2018-08-28 LAB
ANION GAP SERPL CALCULATED.3IONS-SCNC: 6 MMOL/L (ref 3–14)
BUN SERPL-MCNC: 36 MG/DL (ref 7–30)
CALCIUM SERPL-MCNC: 8.6 MG/DL (ref 8.5–10.1)
CHLORIDE SERPL-SCNC: 104 MMOL/L (ref 94–109)
CO2 SERPL-SCNC: 35 MMOL/L (ref 20–32)
CREAT SERPL-MCNC: 1.94 MG/DL (ref 0.66–1.25)
GFR SERPL CREATININE-BSD FRML MDRD: 35 ML/MIN/1.7M2
GLUCOSE SERPL-MCNC: 104 MG/DL (ref 70–99)
POTASSIUM SERPL-SCNC: 3.9 MMOL/L (ref 3.4–5.3)
SODIUM SERPL-SCNC: 145 MMOL/L (ref 133–144)

## 2018-08-28 PROCEDURE — 36415 COLL VENOUS BLD VENIPUNCTURE: CPT | Performed by: INTERNAL MEDICINE

## 2018-08-28 PROCEDURE — 80048 BASIC METABOLIC PNL TOTAL CA: CPT | Performed by: INTERNAL MEDICINE

## 2018-08-28 PROCEDURE — 99214 OFFICE O/P EST MOD 30 MIN: CPT | Performed by: INTERNAL MEDICINE

## 2018-08-28 RX ORDER — ALBUTEROL SULFATE 90 UG/1
AEROSOL, METERED RESPIRATORY (INHALATION)
Qty: 18 G | Refills: 3 | Status: SHIPPED | OUTPATIENT
Start: 2018-08-28

## 2018-08-28 ASSESSMENT — PAIN SCALES - GENERAL: PAINLEVEL: NO PAIN (0)

## 2018-08-28 NOTE — TELEPHONE ENCOUNTER
"Requested Prescriptions   Pending Prescriptions Disp Refills     VENTOLIN  (90 Base) MCG/ACT inhaler [Pharmacy Med Name: VENTOLIN HFA 90MCG/ACT INHALER] 18 g     Last Written Prescription Date:  5/18/2018  Last Fill Quantity: 18 g,  # refills: 3   Last office visit: 8/3/2018 with prescribing provider:  8/03/2018   Future Office Visit:   Next 5 appointments (look out 90 days)     Aug 28, 2018 10:40 AM CDT   [facility] Sandip [facility] DO   [facility]    [address] [facility] [address]   [phone]            Sep 04, 2018  1:45 PM CDT   Return Visit with Jimenez Murphy MD   [facility] ([address] [id number] [phone]                  Sig: INHALE 2 PUFFS BY MOUTH EVERY 4 HOURS AS NEEDED FOR SHORTNESS OF BREATH/DYSPNEA    Asthma Maintenance Inhalers - Anticholinergics Passed    8/27/2018  8:48 PM       Passed - Patient is age 12 years or older       Passed - Recent (12 mo) or future (30 days) visit within the authorizing provider's specialty    Patient had office visit in the last 12 months or has a visit in the next 30 days with authorizing provider or within the authorizing provider's specialty.  See \"Patient Info\" tab in inbasket, or \"Choose Columns\" in Meds & Orders section of the refill encounter.              "

## 2018-08-28 NOTE — PROGRESS NOTES
"BMP results from 8/28/18 noted. Per 's note from 8/14, \"He has mild chronic kidney disease which is a little worse with aggressive diuresis.  His heart rate control is not quite optimal yet.  I agree with discontinuation of amiodarone and continuation with a strategy of rate control and anticoagulation rather than attempts at rhythm control going forward.  He does have significant chronic COPD issues contributing to his shortness of breath, which probably prevent us from maintaining sinus rhythm adequately.       I will increase his metoprolol tartrate to 100 mg p.o. b.i.d. and consider switching that over to succinate if his left ventricular function does not improve significantly.  I will continue his torsemide 20 mg p.o. b.i.d. and restart a small dose of spironolactone 12.5 mg daily.  I will have him repeat laboratory testing in 2 weeks.\"    Pt had OV with PMD today. Weight 240#, and was 238# at time of 8/14 OV with . It looks like pt reported that his SOB and LE edema has improved, but he does still notice it. Pt has OV with  on 9/4. Will send  update as it does look like creat continued to go up further. ESlettjenae RN August 28, 2018, 4:13 PM      Component      Latest Ref Rng & Units 8/3/2018 8/25/2018 8/28/2018   Sodium      133 - 144 mmol/L 142 144 145 (H)   Potassium      3.4 - 5.3 mmol/L 3.7 3.7 3.9   Chloride      94 - 109 mmol/L 102 102 104   Carbon Dioxide      20 - 32 mmol/L 30 33 (H) 35 (H)   Anion Gap      3 - 14 mmol/L 10 9 6   Glucose      70 - 99 mg/dL 95 103 (H) 104 (H)   Urea Nitrogen      7 - 30 mg/dL 31 (H) 32 (H) 36 (H)   Creatinine      0.66 - 1.25 mg/dL 1.66 (H) 1.87 (H) 1.94 (H)   GFR Estimate      >60 mL/min/1.7m2 41 (L) 36 (L) 35 (L)   GFR Estimate If Black      >60 mL/min/1.7m2 50 (L) 44 (L) 42 (L)   Calcium      8.5 - 10.1 mg/dL 8.4 (L) 8.6 8.6     "

## 2018-08-28 NOTE — TELEPHONE ENCOUNTER
Prescription approved per [facility].    MAXINE YanceyN, RN  [facility]

## 2018-08-28 NOTE — MR AVS SNAPSHOT
After Visit Summary   8/28/2018    [place]    MRN: [id number]           Patient Information     Date Of Birth          1950        Visit Information        Provider Department      8/28/2018 10:40 AM Sandip Vega DO [facility]        Today's Diagnoses     CKD (chronic kidney disease) stage 3, GFR 30-59 ml/min    -  1    Congestive heart failure, unspecified congestive heart failure chronicity, unspecified congestive heart failure type (H)        Atrial fibrillation with rapid ventricular response (H)        Panlobular emphysema (H)        Long-term (current) use of anticoagulants [Z79.01]           Follow-ups after your visit        Your next 10 appointments already scheduled     Aug 30, 2018  4:00 PM CDT   Anticoagulation [facility] with  ANTI COAG   [facility]    [address] [facility] [id number] [phone]            Sep 04, 2018  1:45 PM CDT   Return Visit with Jimenez Murphy MD   [facility] ([facility])    [address] [address] [phone]              Who to contact     If you have questions or need follow up information about today's clinic visit or your schedule please contact [facility] directly at [phone].  Normal or non-critical lab and imaging results will be communicated to you by MyChart, letter or phone within 4 business days after the clinic has received the results. If you do not hear from us within 7 days, please contact the clinic through MyChart or phone. If you have a critical or abnormal lab result, we will notify you by phone as soon as possible.  Submit refill requests through [facility] or call your pharmacy and they will forward the refill request to us. Please allow 3 business days for your refill to be completed.          Additional Information About Your Visit        Care EveryWhere ID      This is your Care EveryWhere ID. This could be used by other organizations to access your [facility] medical records  [id number]        Your Vitals Were     Pulse Temperature Respirations Pulse Oximetry BMI (Body Mass Index)       92 97.8  F (36.6  C) (Temporal) 20 95% 34.48 kg/m2        Blood Pressure from Last 3 Encounters:   08/28/18 108/68   08/25/18 112/81   08/14/18 110/78    Weight from Last 3 Encounters:   08/28/18 240 lb 4.8 oz (109 kg)   08/25/18 240 lb 12.8 oz (109.2 kg)   08/14/18 238 lb 6.4 oz (108.1 kg)              Today, you had the following     No orders found for display       Primary Care Provider Office Phone # Fax #    Sandip Tillmanmelitain,  [phone]       [address] [place] [address]        Equal Access to Services     REBECCA ORTIZ : Hadii lissette ku hadasho Soomaali, waaxda luqadaha, qaybta kaalmada adeegyada, jase john . So [facility] [phone].    ATENCIÓN: Si habla español, tiene a calabrese disposición servicios gratuitos de asistencia lingüística. Llame al [phone].    We comply with applicable federal civil rights laws and [place] laws. We do not discriminate on the basis of race, color, national origin, age, disability, sex, sexual orientation, or gender identity.            Thank you!     Thank you for choosing [facility]  for your care. Our goal is always to provide you with excellent care. Hearing back from our patients is one way we can continue to improve our services. Please take a few minutes to complete the written survey that you may receive in the mail after your visit with us. Thank you!             Your Updated Medication List - Protect others around you: Learn how to safely use, store and throw away your medicines at www.disposemymeds.org.          This list is accurate as of 8/28/18  4:48 PM.  Always use your most recent med list.                   Brand Name Dispense Instructions for use Diagnosis     acetaminophen 325 MG tablet    TYLENOL    100 tablet    Take 2 tablets (650 mg) by mouth every 4 hours as needed for other (mild pain)        ADVAIR DISKUS 250-50 MCG/DOSE diskus inhaler   Generic drug:  fluticasone-salmeterol     1 Inhaler    INHALE 1 PUFF BY MOUTH TWICE A DAY    Panlobular emphysema (H)       AIRS DISPOSABLE NEBULIZER Misc     1 each    USE EVERY 4 TO 6 HOURS AS NEEDED    Panlobular emphysema (H)       buPROPion 150 MG 12 hr tablet    WELLBUTRIN SR    180 tablet    TAKE 1 TABLET BY MOUTH TWICE A DAY    Personal history of tobacco use, presenting hazards to health       fluticasone 50 MCG/ACT spray    FLONASE    16 g    INHALE 1 TO 2 SPRAYS INTO EACH NOSTRIL EVERY DAY    Chronic rhinitis       HYDROcodone-acetaminophen 5-325 MG per tablet    NORCO    15 tablet    Take 1 tablet by mouth every 4 hours as needed for pain        ipratropium - albuterol 0.5 mg/2.5 mg/3 mL 0.5-2.5 (3) MG/3ML neb solution    DUONEB    540 mL    TAKE 1 VIAL (3 MLS) BY NEBULIZATION EVERY 4 HOURS AS NEEDED FOR SHORTNESS OF BREATH / DYSPNEA OR WHEEZING    COPD exacerbation (H)       JANTOVEN 2.5 MG tablet   Generic drug:  warfarin     210 tablet    Took 5mg yesterday, due to see INR clinic today (7/30/18)    Atrial fibrillation with RVR (H)       losartan 25 MG tablet    COZAAR    30 tablet    Take 1 tablet (25 mg) by mouth daily    Hypertension goal BP (blood pressure) < 140/90, Nonischemic cardiomyopathy (H)       metoprolol tartrate 100 MG tablet    LOPRESSOR    180 tablet    Take 1 tablet (100 mg) by mouth 2 times daily    Atrial fibrillation with rapid ventricular response (H)       montelukast 10 MG tablet    SINGULAIR    90 tablet    TAKE 1 TABLET BY MOUTH DAILY AT BEDTIME    Chronic seasonal allergic rhinitis due to other allergen       multivitamin, therapeutic Tabs tablet      Take 1 tablet by mouth daily        nebulizer/tubing/mouthpiece Kit     1 kit    Use every 4-6 hours PRN    Chronic obstructive pulmonary  disease, unspecified COPD type (H)       order for DME     1 Device    Equipment being ordered: Nebulizer    COPD (chronic obstructive pulmonary disease) (H)       potassium chloride SA 20 MEQ CR tablet    K-DUR/KLOR-CON M    90 tablet    Take 1 tablet (20 mEq) by mouth daily    CHF (congestive heart failure) (H), Peripheral edema       senna-docusate 8.6-50 MG per tablet    SENOKOT-S;PERICOLACE     Take 2 tablets by mouth 2 times daily        spironolactone 25 MG tablet    ALDACTONE    45 tablet    Take 0.5 tablets (12.5 mg) by mouth daily    Chronic diastolic congestive heart failure (H)       torsemide 20 MG tablet    DEMADEX    180 tablet    Take 1 tablet (20 mg) by mouth 2 times daily    Chronic diastolic congestive heart failure (H)       VENTOLIN  (90 Base) MCG/ACT inhaler   Generic drug:  albuterol     18 g    INHALE 2 PUFFS BY MOUTH EVERY 4 HOURS AS NEEDED FOR SHORTNESS OF BREATH/DYSPNEA    Chronic obstructive pulmonary disease with acute exacerbation (H)

## 2018-08-29 NOTE — PROGRESS NOTES
reviewed bmp results from 8/28/18, and given update on pt. He recommended pt stop his spironolactone as he may be a little dry. Pt to have bmp check prior to OV with  on 9/4. I called pt with these recommendations. He will call us if he has worsening SOB or LE edema, or weight gain of 2# in one day or 5 # in one week. Pt said his weight has been between 238-240#. Eric BRASWELL August 29, 2018, 3:51 PM

## 2018-08-30 ENCOUNTER — ANTICOAGULATION THERAPY VISIT (OUTPATIENT)
Dept: ANTICOAGULATION | Facility: OTHER | Age: 68
End: 2018-08-30
Payer: MEDICARE

## 2018-08-30 DIAGNOSIS — I48.91 ATRIAL FIBRILLATION WITH RVR (H): ICD-10-CM

## 2018-08-30 DIAGNOSIS — Z79.01 LONG-TERM (CURRENT) USE OF ANTICOAGULANTS: ICD-10-CM

## 2018-08-30 LAB — INR POINT OF CARE: >8 (ref 0.86–1.14)

## 2018-08-30 PROCEDURE — 85610 PROTHROMBIN TIME: CPT | Mod: QW

## 2018-08-30 PROCEDURE — 36416 COLLJ CAPILLARY BLOOD SPEC: CPT

## 2018-08-30 PROCEDURE — 99207 ZZC NO CHARGE NURSE ONLY: CPT

## 2018-08-30 NOTE — MR AVS SNAPSHOT
Home Valdez   8/30/2018 4:00 PM   Anticoagulation Therapy Visit    Description:  68 year old male   Provider:  CAMILO ANTI COAEBEN   Department:  [facility]           INR as of 8/30/2018     Today's INR >8!      Anticoagulation Summary as of 8/30/2018     INR goal 2.0-3.0   Today's INR >8!   Full warfarin instructions 8/30: Hold; 8/31: Hold; 9/3: 2.5 mg; Otherwise 7.5 mg on Thu; 5 mg all other days   Next INR check 9/5/2018    Indications   Atrial fibrillation with RVR (H) [I48.91]  Long-term (current) use of anticoagulants [Z79.01] [Z79.01]         Your next Anticoagulation Clinic appointment(s)     Aug 30, 2018  4:00 PM CDT   Anticoagulation Visit with CAMILO ANTI COAG   [facility] ([facility]    [address] [facility] [address]   [phone]            Sep 05, 2018  9:30 AM CDT   Anticoagulation Visit with  ANTI COAG   [facility] ([facility]    [address] [facility] [address]   [phone]              Contact Numbers     Clinic Number:         August 2018 Details    Sun Mon Tue Wed Thu Fri Sat        1               2               3               4                 5               6               7               8               9               10               11                 12               13               14               15               16               17               18                 19               20               21               22               23               24               25                 26               27               28               29               30      Hold   See details      31      Hold           Date Details   08/30 This INR check               How to take your warfarin dose     Hold Do not take your warfarin dose. See the Details table to the right for additional instructions.                September 2018 Details    Sun Mon Tue Wed Thu Fri Sat           1      5 mg           2      5 mg          3      2.5 mg         4      5 mg         5            6               7               8                 9               10               11               12               13               14               15                 16               17               18               19               20               21               22                 23               24               25               26               27               28               29                 30                      Date Details   No additional details    Date of next INR:  9/5/2018         How to take your warfarin dose     To take:  2.5 mg Take 1 of the 2.5 mg tablets.    To take:  5 mg Take 2 of the 2.5 mg tablets.

## 2018-08-30 NOTE — PROGRESS NOTES
ANTICOAGULATION FOLLOW-UP CLINIC VISIT    Patient Name:  Home Valdez  Date:  8/30/2018  Contact Type:  Face to Face    SUBJECTIVE:     Patient Findings     Positives Unexplained INR or factor level change           OBJECTIVE    INR Protime   Date Value Ref Range Status   08/30/2018 >8 0.86 - 1.14 Final       ASSESSMENT / PLAN  INR assessment SUPRA    Recheck INR In: 6 DAYS    INR Location Clinic      Anticoagulation Summary as of 8/30/2018     INR goal 2.0-3.0   Today's INR >8!   Warfarin maintenance plan 7.5 mg (2.5 mg x 3) on Thu; 5 mg (2.5 mg x 2) all other days   Full warfarin instructions 8/30: Hold; 8/31: Hold; 9/3: 2.5 mg; Otherwise 7.5 mg on Thu; 5 mg all other days   Weekly warfarin total 37.5 mg   Plan last modified Shameka Carey RN (8/20/2018)   Next INR check 9/5/2018   Target end date     Indications   Atrial fibrillation with RVR (H) [I48.91]  Long-term (current) use of anticoagulants [Z79.01] [Z79.01]         Anticoagulation Episode Summary     INR check location     Preferred lab     Send INR reminders to [facility]    Comments 2.5 MG TABLETS, likes print out, PM dose, Amiodarone doubled 7/31/18 but then discontinued completely 8/8/18      Anticoagulation Care Providers     Provider Role Specialty Phone number    Sandip Vega DO [facility] [phone]            See the Encounter Report to view Anticoagulation Flowsheet and Dosing Calendar (Go to Encounters tab in chart review, and find the Anticoagulation Therapy Visit)    Dosage adjustment made based on physician directed care plan.    Shameka Carey, RN

## 2018-08-30 NOTE — PROGRESS NOTES
Pt's wife, Chrissy, called and left  wanting to confirm Dr. Murphy's recommendation with the pt's water pills. Returned call, reviewed with Chrissy that pt should cut out spironolactone until he sees Dr. Murphy on 9/4 and we will repeat a BMP prior to the OV. Wife verbalized understanding and had no questions/concerns today.

## 2018-09-04 ENCOUNTER — OFFICE VISIT (OUTPATIENT)
Dept: CARDIOLOGY | Facility: CLINIC | Age: 68
End: 2018-09-04
Attending: INTERNAL MEDICINE
Payer: MEDICARE

## 2018-09-04 ENCOUNTER — TELEPHONE (OUTPATIENT)
Dept: FAMILY MEDICINE | Facility: OTHER | Age: 68
End: 2018-09-04

## 2018-09-04 ENCOUNTER — ANTICOAGULATION THERAPY VISIT (OUTPATIENT)
Dept: ANTICOAGULATION | Facility: OTHER | Age: 68
End: 2018-09-04
Payer: MEDICARE

## 2018-09-04 VITALS
BODY MASS INDEX: 34.52 KG/M2 | DIASTOLIC BLOOD PRESSURE: 62 MMHG | HEART RATE: 82 BPM | SYSTOLIC BLOOD PRESSURE: 96 MMHG | HEIGHT: 70 IN | WEIGHT: 241.1 LBS | OXYGEN SATURATION: 95 %

## 2018-09-04 DIAGNOSIS — R06.02 SOB (SHORTNESS OF BREATH): Primary | ICD-10-CM

## 2018-09-04 DIAGNOSIS — I50.32 CHRONIC DIASTOLIC CONGESTIVE HEART FAILURE (H): ICD-10-CM

## 2018-09-04 DIAGNOSIS — I48.91 ATRIAL FIBRILLATION, UNSPECIFIED TYPE (H): ICD-10-CM

## 2018-09-04 DIAGNOSIS — I48.91 ATRIAL FIBRILLATION WITH RVR (H): ICD-10-CM

## 2018-09-04 DIAGNOSIS — I42.8 NONISCHEMIC CARDIOMYOPATHY (H): ICD-10-CM

## 2018-09-04 DIAGNOSIS — Z79.01 LONG-TERM (CURRENT) USE OF ANTICOAGULANTS: ICD-10-CM

## 2018-09-04 DIAGNOSIS — I10 HYPERTENSION GOAL BP (BLOOD PRESSURE) < 140/90: ICD-10-CM

## 2018-09-04 LAB
ANION GAP SERPL CALCULATED.3IONS-SCNC: 6 MMOL/L (ref 3–14)
BUN SERPL-MCNC: 30 MG/DL (ref 7–30)
CALCIUM SERPL-MCNC: 8.6 MG/DL (ref 8.5–10.1)
CHLORIDE SERPL-SCNC: 103 MMOL/L (ref 94–109)
CO2 SERPL-SCNC: 35 MMOL/L (ref 20–32)
CREAT SERPL-MCNC: 1.75 MG/DL (ref 0.66–1.25)
GFR SERPL CREATININE-BSD FRML MDRD: 39 ML/MIN/1.7M2
GLUCOSE SERPL-MCNC: 100 MG/DL (ref 70–99)
INR PPP: 1.96 (ref 0.86–1.14)
POTASSIUM SERPL-SCNC: 4.1 MMOL/L (ref 3.4–5.3)
SODIUM SERPL-SCNC: 144 MMOL/L (ref 133–144)

## 2018-09-04 PROCEDURE — 99207 ZZC NO CHARGE NURSE ONLY: CPT | Performed by: INTERNAL MEDICINE

## 2018-09-04 PROCEDURE — 36415 COLL VENOUS BLD VENIPUNCTURE: CPT | Performed by: INTERNAL MEDICINE

## 2018-09-04 PROCEDURE — 85610 PROTHROMBIN TIME: CPT | Performed by: INTERNAL MEDICINE

## 2018-09-04 PROCEDURE — 99214 OFFICE O/P EST MOD 30 MIN: CPT | Performed by: INTERNAL MEDICINE

## 2018-09-04 PROCEDURE — 80048 BASIC METABOLIC PNL TOTAL CA: CPT | Performed by: INTERNAL MEDICINE

## 2018-09-04 RX ORDER — LOSARTAN POTASSIUM 25 MG/1
25 TABLET ORAL AT BEDTIME
Qty: 90 TABLET | Refills: 3 | Status: ON HOLD | OUTPATIENT
Start: 2018-09-04 | End: 2018-09-26

## 2018-09-04 RX ORDER — TORSEMIDE 20 MG/1
20 TABLET ORAL DAILY
Qty: 90 TABLET | Refills: 3 | Status: SHIPPED | OUTPATIENT
Start: 2018-09-04 | End: 2018-09-27

## 2018-09-04 NOTE — MR AVS SNAPSHOT
After Visit Summary   9/4/2018    Home aVldez    MRN: [id number]           Patient Information     Date Of Birth          1950        Visit Information        Provider Department      9/4/2018 1:45 PM Jimenez Murphy MD [facility]        Today's Diagnoses     SOB (shortness of breath)    -  1    Chronic diastolic congestive heart failure (H)        Long-term (current) use of anticoagulants        Atrial fibrillation with RVR (H)        Atrial fibrillation, unspecified type (H)        Hypertension goal BP (blood pressure) < 140/90        Nonischemic cardiomyopathy EF 45-50% by echo 2016          Care Instructions    Switch losartan 25 mg to night time dosing    Decrease torsemide to 20 mg once daily in am  Continue the same dose of potassium    Return in 6 weeks for echo.          Follow-ups after your visit        Additional Services     [facility] with [facility] - Return MD visit                 Your next 10 appointments already scheduled     Sep 05, 2018  9:30 AM CDT   Anticoagulation Visit with PH ANTI COAG   [facility]    [address] [place] [id number] [phone]              Future tests that were ordered for you today     Open Future Orders        Priority Expected Expires Ordered    Echocardiogram Routine 10/16/2018 9/4/2019 9/4/2018    N terminal pro BNP outpatient Routine 10/16/2018 9/4/2019 9/4/2018    [facility] with [facility] - Return MD visit Routine 10/16/2018 9/4/2019 9/4/2018    Basic metabolic panel Routine 10/16/2018 9/4/2019 9/4/2018    Hemoglobin Routine 10/16/2018 9/4/2019 9/4/2018            Who to contact     If you have questions or need follow up information about today's clinic visit or your schedule please contact [facility] directly at [phone].  Normal or non-critical lab and imaging results will be  "communicated to you by MyChart, letter or phone within 4 business days after the clinic has received the results. If you do not hear from us within 7 days, please contact the clinic through MyChart or phone. If you have a critical or abnormal lab result, we will notify you by phone as soon as possible.  Submit refill requests through [employer] or call your pharmacy and they will forward the refill request to us. Please allow 3 business days for your refill to be completed.          Additional Information About Your Visit        Care EveryWhere ID     This is your Care EveryWhere ID. This could be used by other organizations to access your [place] medical records  [id number]        Your Vitals Were     Pulse Height Pulse Oximetry BMI (Body Mass Index)          82 1.778 m (5' 10\") 95% 34.59 kg/m2         Blood Pressure from Last 3 Encounters:   09/04/18 96/62   08/28/18 108/68   08/25/18 112/81    Weight from Last 3 Encounters:   09/04/18 109.4 kg (241 lb 1.6 oz)   08/28/18 109 kg (240 lb 4.8 oz)   08/25/18 109.2 kg (240 lb 12.8 oz)              We Performed the Following     Basic metabolic panel     [facility]Up with [facility] - Return MD visit     INR          Today's Medication Changes          These changes are accurate as of 9/4/18  2:25 PM.  If you have any questions, ask your nurse or doctor.               These medicines have changed or have updated prescriptions.        Dose/Directions    losartan 25 MG tablet   Commonly known as:  COZAAR   This may have changed:  when to take this   Used for:  Hypertension goal BP (blood pressure) < 140/90, Nonischemic cardiomyopathy (H)        Dose:  25 mg   Take 1 tablet (25 mg) by mouth At Bedtime   Quantity:  90 tablet   Refills:  3       torsemide 20 MG tablet   Commonly known as:  DEMADEX   This may have changed:  when to take this   Used for:  Chronic diastolic congestive heart failure (H)        Dose:  20 mg   Take 1 tablet (20 mg) by mouth daily   Quantity:  90 " tablet   Refills:  3            Where to get your medicines      These medications were sent to [id number] - [place], [place] - [address]   [address] [place] [address]    Hours:  M-F 8:30-6:30; Sat 9-4; closed Sunday Phone:  [phone]     losartan 25 MG tablet    torsemide 20 MG tablet                Primary Care Provider Office Phone # Fax #    Sandip Vega, [phone]       [address] [place] [address]        Equal Access to Services     [facility]: Hadii aad ku hadasho Soomaali, waaxda luqadaha, qaybta kaalmada adeegyada, waxay idiin hayaan ademalou john . So [facility] [phone].    ATENCIÓN: Si habla español, tiene a calabrese disposición servicios gratuitos de asistencia lingüística. [facility] [phone].    We comply with applicable federal civil rights laws and [place] laws. We do not discriminate on the basis of race, color, national origin, age, disability, sex, sexual orientation, or gender identity.            Thank you!     Thank you for choosing [facility]  for your care. Our goal is always to provide you with excellent care. Hearing back from our patients is one way we can continue to improve our services. Please take a few minutes to complete the written survey that you may receive in the mail after your visit with us. Thank you!             Your Updated Medication List - Protect others around you: Learn how to safely use, store and throw away your medicines at www.disposemymeds.org.          This list is accurate as of 9/4/18  2:25 PM.  Always use your most recent med list.                   Brand Name Dispense Instructions for use Diagnosis    acetaminophen 325 MG tablet    TYLENOL    100 tablet    Take 2 tablets (650 mg) by mouth every 4 hours as needed for other (mild pain)        ADVAIR DISKUS 250-50 MCG/DOSE diskus inhaler   Generic drug:  fluticasone-salmeterol     1 Inhaler    INHALE 1  PUFF BY MOUTH TWICE A DAY    Panlobular emphysema (H)       AIRS DISPOSABLE NEBULIZER Misc     1 each    USE EVERY 4 TO 6 HOURS AS NEEDED    Panlobular emphysema (H)       buPROPion 150 MG 12 hr tablet    WELLBUTRIN SR    180 tablet    TAKE 1 TABLET BY MOUTH TWICE A DAY    Personal history of tobacco use, presenting hazards to health       fluticasone 50 MCG/ACT spray    FLONASE    16 g    INHALE 1 TO 2 SPRAYS INTO EACH NOSTRIL EVERY DAY    Chronic rhinitis       HYDROcodone-acetaminophen 5-325 MG per tablet    NORCO    15 tablet    Take 1 tablet by mouth every 4 hours as needed for pain        ipratropium - albuterol 0.5 mg/2.5 mg/3 mL 0.5-2.5 (3) MG/3ML neb solution    DUONEB    540 mL    TAKE 1 VIAL (3 MLS) BY NEBULIZATION EVERY 4 HOURS AS NEEDED FOR SHORTNESS OF BREATH / DYSPNEA OR WHEEZING    COPD exacerbation (H)       JANTOVEN 2.5 MG tablet   Generic drug:  warfarin     210 tablet    Took 5mg yesterday, due to see INR clinic today (7/30/18)    Atrial fibrillation with RVR (H)       losartan 25 MG tablet    COZAAR    90 tablet    Take 1 tablet (25 mg) by mouth At Bedtime    Hypertension goal BP (blood pressure) < 140/90, Nonischemic cardiomyopathy (H)       metoprolol tartrate 100 MG tablet    LOPRESSOR    180 tablet    Take 1 tablet (100 mg) by mouth 2 times daily    Atrial fibrillation with rapid ventricular response (H)       montelukast 10 MG tablet    SINGULAIR    90 tablet    TAKE 1 TABLET BY MOUTH DAILY AT BEDTIME    Chronic seasonal allergic rhinitis due to other allergen       multivitamin, therapeutic Tabs tablet      Take 1 tablet by mouth daily        nebulizer/tubing/mouthpiece Kit     1 kit    Use every 4-6 hours PRN    Chronic obstructive pulmonary disease, unspecified COPD type (H)       order for DME     1 Device    Equipment being ordered: Nebulizer    COPD (chronic obstructive pulmonary disease) (H)       potassium chloride SA 20 MEQ CR tablet    K-DUR/KLOR-CON M    90 tablet    Take 1  tablet (20 mEq) by mouth daily    CHF (congestive heart failure) (H), Peripheral edema       senna-docusate 8.6-50 MG per tablet    SENOKOT-S;PERICOLACE     Take 2 tablets by mouth 2 times daily        torsemide 20 MG tablet    DEMADEX    90 tablet    Take 1 tablet (20 mg) by mouth daily    Chronic diastolic congestive heart failure (H)       VENTOLIN  (90 Base) MCG/ACT inhaler   Generic drug:  albuterol     18 g    INHALE 2 PUFFS BY MOUTH EVERY 4 HOURS AS NEEDED FOR SHORTNESS OF BREATH/DYSPNEA    Chronic obstructive pulmonary disease with acute exacerbation (H)

## 2018-09-04 NOTE — PATIENT INSTRUCTIONS
Switch losartan 25 mg to night time dosing    Decrease torsemide to 20 mg once daily in am  Continue the same dose of potassium    Return in 6 weeks for echo.

## 2018-09-04 NOTE — LETTER
9/4/2018      Sandip Vega, DO  [address] [place] [address]      RE: Home WANG José Miguel       Dear Colleague,    I had the pleasure of seeing Home Valdez in the [facility].    Service Date: 09/04/2018      HISTORY OF PRESENT ILLNESS:  I had the opportunity to see Mr. Home Valdez in Cardiology Clinic today at the [facility] in [place] for re-evaluation of chronic diastolic heart failure and persistent atrial fibrillation.  I recently saw Mr. Valdez on 08/14/2018 due to worsening diastolic heart failure problems since he was hospitalized with pneumonia and recurrent atrial fibrillation in July.  He had inadvertently forgotten to take his metoprolol for about 12 days in late July leading to rapid heart rate and redevelopment of more severe cardiomyopathy.      I had increased his dose of metoprolol tartrate to 100 mg p.o. b.i.d. at that last visit and he seems to have had better heart rate control since then.  His heart rates have been consistently in the 80s.  He feels tired after he takes his morning medications, but his chronic mild lower extremity edema has been stable and his breathing has been good.  His oxygen saturations have been in the mid to upper 90s without supplemental oxygen.  He is tolerating activity well.      Today, he indicates that his weight has been quite stable, typically in the range of 240 to 241 pounds.  His basic metabolic panel today looks better with a creatinine coming down from 1.94 to 1.75, BUN is 30, potassium is 4.1.        On examination today, his blood pressure is 96/62, heart rate 82 and weight 241 pounds.  His lungs are clear.  Heart rhythm is irregular with distant heart tones, but sounds well controlled.  He has 1-2+ bilateral lower extremity edema.      IMPRESSIONS:  Mr. Home Valdez is a 68-year-old gentleman with chronic diastolic heart failure who had worsening left ventricular  function recently after redeveloping atrial fibrillation with rapid ventricular response.  His heart rate is now better controlled after recent increase in his metoprolol and his heart failure symptoms have been controlled much better as well.  His weight is stable.  His creatinine is improving and his breathing is subjectively better.  His oxygen saturations are normal.      I am pleased that he is recovering.  I will continue his current medications and have him switch his losartan to nighttime dosing.  I did suggest he could decrease his Demadex from 20 b.i.d. to 20 mg once daily in the morning and continue his current dose of potassium.  If he still feels lousy after taking his morning medications, we could consider AV node ablation and pacemaker implantation in the future as needed.      cc:   Sandip Vgea, [place] [address]         MARCO A YARBROUGH MD, [facility]             D: 2018   T: 2018   MT: ALONSO      Name:     KARTIK HUERTA   MRN:      [id number]        Account:      [id number]   :      1950           Service Date: 2018      Document: [id number]         Outpatient Encounter Prescriptions as of 2018   Medication Sig Dispense Refill     acetaminophen (TYLENOL) 325 MG tablet Take 2 tablets (650 mg) by mouth every 4 hours as needed for other (mild pain) 100 tablet 0     ADVAIR DISKUS 250-50 MCG/DOSE diskus inhaler INHALE 1 PUFF BY MOUTH TWICE A DAY 1 Inhaler 1     buPROPion (WELLBUTRIN SR) 150 MG 12 hr tablet TAKE 1 TABLET BY MOUTH TWICE A  tablet 1     fluticasone (FLONASE) 50 MCG/ACT spray INHALE 1 TO 2 SPRAYS INTO EACH NOSTRIL EVERY DAY 16 g 3     HYDROcodone-acetaminophen (NORCO) 5-325 MG per tablet Take 1 tablet by mouth every 4 hours as needed for pain 15 tablet 0     ipratropium - albuterol 0.5 mg/2.5 mg/3 mL (DUONEB) 0.5-2.5 (3) MG/3ML neb solution TAKE 1 VIAL (3 MLS) BY NEBULIZATION EVERY 4  HOURS AS NEEDED FOR SHORTNESS OF BREATH / DYSPNEA OR WHEEZING 540 mL 3     losartan (COZAAR) 25 MG tablet Take 1 tablet (25 mg) by mouth At Bedtime 90 tablet 3     metoprolol tartrate (LOPRESSOR) 100 MG tablet Take 1 tablet (100 mg) by mouth 2 times daily 180 tablet 3     montelukast (SINGULAIR) 10 MG tablet TAKE 1 TABLET BY MOUTH DAILY AT BEDTIME 90 tablet 0     multivitamin, therapeutic (THERA-VIT) TABS tablet Take 1 tablet by mouth daily       Nebulizers (AIRS DISPOSABLE NEBULIZER) MISC USE EVERY 4 TO 6 HOURS AS NEEDED 1 each 1     order for DME Equipment being ordered: Nebulizer 1 Device 0     potassium chloride SA (K-DUR/KLOR-CON M) 20 MEQ CR tablet Take 1 tablet (20 mEq) by mouth daily 90 tablet 2     Respiratory Therapy Supplies (NEBULIZER/TUBING/MOUTHPIECE) KIT Use every 4-6 hours PRN 1 kit 11     senna-docusate (SENOKOT-S;PERICOLACE) 8.6-50 MG per tablet Take 2 tablets by mouth 2 times daily        torsemide (DEMADEX) 20 MG tablet Take 1 tablet (20 mg) by mouth daily 90 tablet 3     VENTOLIN  (90 Base) MCG/ACT inhaler INHALE 2 PUFFS BY MOUTH EVERY 4 HOURS AS NEEDED FOR SHORTNESS OF BREATH/DYSPNEA 18 g 3     warfarin (JANTOVEN) 2.5 MG tablet Took 5mg yesterday, due to see INR clinic today (7/30/18) 210 tablet 0     [DISCONTINUED] losartan (COZAAR) 25 MG tablet Take 1 tablet (25 mg) by mouth daily 30 tablet 0     [DISCONTINUED] spironolactone (ALDACTONE) 25 MG tablet Take 0.5 tablets (12.5 mg) by mouth daily 45 tablet 3     [DISCONTINUED] torsemide (DEMADEX) 20 MG tablet Take 1 tablet (20 mg) by mouth 2 times daily 180 tablet 3     No facility-administered encounter medications on file as of 9/4/2018.        Again, thank you for allowing me to participate in the care of your patient.      Sincerely,    MARCO A YARBROUGH MD     [facility]
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
## 2018-09-04 NOTE — MR AVS SNAPSHOT
Home Valdez   9/4/2018   Anticoagulation Therapy Visit    Description:  68 year old male   Provider:  Sandip Vega DO   Department:   Anticoag           INR as of 9/4/2018     Today's INR 1.96!      Anticoagulation Summary as of 9/4/2018     INR goal 2.0-3.0   Today's INR 1.96!   Full warfarin instructions 9/5: 2.5 mg; 9/6: 5 mg; 9/7: 2.5 mg; Otherwise 7.5 mg on Thu; 5 mg all other days   Next INR check 9/7/2018    Indications   Atrial fibrillation with RVR (H) [I48.91]  Long-term (current) use of anticoagulants [Z79.01] [Z79.01]         Your next Anticoagulation Clinic appointment(s)     Sep 07, 2018  8:45 AM CDT   Anticoagulation [facility] with [facility]    [address] [facility] [address]   [phone]              Contact Numbers     Clinic Number:         September 2018 Details    Sun Mon Tue Wed Thu Fri Sat           1                 2               3               4      5 mg   See details      5      2.5 mg         6      5 mg         7            8                 9               10               11               12               13               14               15                 16               17               18               19               20               21               22                 23               24               25               26               27               28               29                 30                      Date Details   09/04 This INR check       Date of next INR:  9/7/2018         How to take your warfarin dose     To take:  2.5 mg Take 1 of the 2.5 mg tablets.    To take:  5 mg Take 2 of the 2.5 mg tablets.

## 2018-09-04 NOTE — TELEPHONE ENCOUNTER
Reason for Call:  Other call back    Detailed comments: Chrissy calling stating Home had his INR checked at his cardiology appt today. Please call her tonight with medication dosage.     Phone Number Patient can be reached at: Home number on file [phone] (home)    Best Time: anytime    Can we leave a detailed message on this number? YES    Call taken on 9/4/2018 at 3:35 PM by [place] Corona

## 2018-09-04 NOTE — PROGRESS NOTES
Service Date: 09/04/2018      HISTORY OF PRESENT ILLNESS:  I had the opportunity to see Mr. Home Valdez in Cardiology Clinic today at the [facility] in [place] for re-evaluation of chronic diastolic heart failure and persistent atrial fibrillation.  I recently saw Mr. Valdez on 08/14/2018 due to worsening diastolic heart failure problems since he was hospitalized with pneumonia and recurrent atrial fibrillation in July.  He had inadvertently forgotten to take his metoprolol for about 12 days in late July leading to rapid heart rate and redevelopment of more severe cardiomyopathy.      I had increased his dose of metoprolol tartrate to 100 mg p.o. b.i.d. at that last visit and he seems to have had better heart rate control since then.  His heart rates have been consistently in the 80s.  He feels tired after he takes his morning medications, but his chronic mild lower extremity edema has been stable and his breathing has been good.  His oxygen saturations have been in the mid to upper 90s without supplemental oxygen.  He is tolerating activity well.      Today, he indicates that his weight has been quite stable, typically in the range of 240 to 241 pounds.  His basic metabolic panel today looks better with a creatinine coming down from 1.94 to 1.75, BUN is 30, potassium is 4.1.        On examination today, his blood pressure is 96/62, heart rate 82 and weight 241 pounds.  His lungs are clear.  Heart rhythm is irregular with distant heart tones, but sounds well controlled.  He has 1-2+ bilateral lower extremity edema.      IMPRESSIONS:  Mr. Home Valdez is a 68-year-old gentleman with chronic diastolic heart failure who had worsening left ventricular function recently after redeveloping atrial fibrillation with rapid ventricular response.  His heart rate is now better controlled after recent increase in his metoprolol and his heart failure symptoms have been controlled much better as  well.  His weight is stable.  His creatinine is improving and his breathing is subjectively better.  His oxygen saturations are normal.      I am pleased that he is recovering.  I will continue his current medications and have him switch his losartan to nighttime dosing.  I did suggest he could decrease his Demadex from 20 b.i.d. to 20 mg once daily in the morning and continue his current dose of potassium.  If he still feels lousy after taking his morning medications, we could consider AV node ablation and pacemaker implantation in the future as needed.      cc:   Sandip Vega, [address]  [address]         MARCO A YARBROUGH MD, [facility]             D: 2018   T: 2018   MT: ALONSO      Name:     KARTIK HUERTA   MRN:      [id number]        Account:      [id number]   :      1950           Service Date: 2018      Document: [id number]

## 2018-09-04 NOTE — PROGRESS NOTES
ANTICOAGULATION FOLLOW-UP CLINIC VISIT    Patient Name:  Home Valdez  Date:  9/4/2018  Contact Type:  Telephone    SUBJECTIVE:     Patient Findings     Positives No Problem Findings           OBJECTIVE    INR   Date Value Ref Range Status   09/04/2018 1.96 (H) 0.86 - 1.14 Final       ASSESSMENT / PLAN  INR assessment THER    Recheck INR In: 3 DAYS    INR Location Outside lab      Anticoagulation Summary as of 9/4/2018     INR goal 2.0-3.0   Today's INR 1.96!   Warfarin maintenance plan 7.5 mg (2.5 mg x 3) on Thu; 5 mg (2.5 mg x 2) all other days   Full warfarin instructions 9/5: 2.5 mg; 9/6: 5 mg; 9/7: 2.5 mg; Otherwise 7.5 mg on Thu; 5 mg all other days   Weekly warfarin total 37.5 mg   Plan last modified Shameka Carey RN (8/20/2018)   Next INR check 9/7/2018   Target end date     Indications   Atrial fibrillation with RVR (H) [I48.91]  Long-term (current) use of anticoagulants [Z79.01] [Z79.01]         Anticoagulation Episode Summary     INR check location     Preferred lab     Send INR reminders to [facility]    Comments 2.5 MG TABLETS, likes print out, PM dose, Amiodarone doubled 7/31/18 but then discontinued completely 8/8/18      Anticoagulation Care Providers     Provider Role Specialty Phone number    Edgar Vegaphilipp Bhat DO [facility] Internal [facility] [phone]            See the Encounter Report to view Anticoagulation Flowsheet and Dosing Calendar (Go to Encounters tab in chart review, and find the Anticoagulation Therapy Visit)    Dosage adjustment made based on physician directed care plan.    Mayra Lind RN
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
## 2018-09-06 DIAGNOSIS — Z87.891 PERSONAL HISTORY OF TOBACCO USE, PRESENTING HAZARDS TO HEALTH: ICD-10-CM

## 2018-09-06 NOTE — TELEPHONE ENCOUNTER
"Requested Prescriptions   Pending Prescriptions Disp Refills     buPROPion (WELLBUTRIN SR) 150 MG 12 hr tablet [Pharmacy Med Name: BUPROPION 150MG ER (SR) TAB] 180 tablet      Sig: TAKE 1 TABLET BY MOUTH TWICE A DAY    SSRIs Protocol Passed    9/6/2018  1:03 AM       Passed - Recent (12 mo) or future (30 days) visit within the authorizing provider's specialty    Patient had office visit in the last 12 months or has a visit in the next 30 days with authorizing provider or within the authorizing provider's specialty.  See \"Patient Info\" tab in inbasket, or \"Choose Columns\" in Meds & Orders section of the refill encounter.           Passed - Medication is Bupropion    If the medication is Bupropion (Wellbutrin), and the patient is taking for smoking cessation; OK to refill.         Passed - Patient is age 18 or older        Last Written Prescription Date:  5/9/2018  Last Fill Quantity: 180,  # refills: 1   Last office visit: 8/28/2018 with prescribing provider:   Sandip Vega DO  Future Office Visit:   Next 5 appointments (look out 90 days)     Oct 25, 2018  1:00 PM CDT   Return Visit with Jimenez Murphy MD   [facility] ([facility])    [address] [place] [id number] [phone]                   "

## 2018-09-07 ENCOUNTER — ANTICOAGULATION THERAPY VISIT (OUTPATIENT)
Dept: ANTICOAGULATION | Facility: OTHER | Age: 68
End: 2018-09-07
Payer: MEDICARE

## 2018-09-07 DIAGNOSIS — I48.91 ATRIAL FIBRILLATION WITH RVR (H): ICD-10-CM

## 2018-09-07 DIAGNOSIS — Z79.01 LONG-TERM (CURRENT) USE OF ANTICOAGULANTS: ICD-10-CM

## 2018-09-07 LAB — INR POINT OF CARE: 2.3 (ref 0.86–1.14)

## 2018-09-07 PROCEDURE — 85610 PROTHROMBIN TIME: CPT | Mod: QW

## 2018-09-07 PROCEDURE — 99207 ZZC NO CHARGE NURSE ONLY: CPT

## 2018-09-07 PROCEDURE — 36416 COLLJ CAPILLARY BLOOD SPEC: CPT

## 2018-09-07 RX ORDER — BUPROPION HYDROCHLORIDE 150 MG/1
TABLET, EXTENDED RELEASE ORAL
Qty: 180 TABLET | OUTPATIENT
Start: 2018-09-07

## 2018-09-07 NOTE — PROGRESS NOTES
ANTICOAGULATION FOLLOW-UP CLINIC VISIT    Patient Name:  Home Valdez  Date:  9/7/2018  Contact Type:  Face to Face    SUBJECTIVE:     Patient Findings     Positives No Problem Findings           OBJECTIVE    INR Protime   Date Value Ref Range Status   09/07/2018 2.3 (A) 0.86 - 1.14 Final       ASSESSMENT / PLAN  INR assessment THER    Recheck INR In: 1 WEEK    INR Location Clinic      Anticoagulation Summary as of 9/7/2018     INR goal 2.0-3.0   Today's INR 2.3   Warfarin maintenance plan 2.5 mg (2.5 mg x 1) on Mon, Wed, Fri; 5 mg (2.5 mg x 2) all other days   Full warfarin instructions 9/7: 2.5 mg; Otherwise 2.5 mg on Mon, Wed, Fri; 5 mg all other days   Weekly warfarin total 27.5 mg   Plan last modified Shameka Carey RN (9/7/2018)   Next INR check 9/14/2018   Target end date     Indications   Atrial fibrillation with RVR (H) [I48.91]  Long-term (current) use of anticoagulants [Z79.01] [Z79.01]         Anticoagulation Episode Summary     INR check location     Preferred lab     Send INR reminders to [facility] POOL    Comments 2.5 MG TABLETS, likes print out, PM dose, Amiodarone doubled 7/31/18 but then discontinued completely 8/8/18      Anticoagulation Care Providers     Provider Role Specialty Phone number    GarySandip DO [facility] [phone]            See the Encounter Report to view Anticoagulation Flowsheet and Dosing Calendar (Go to Encounters tab in chart review, and find the Anticoagulation Therapy Visit)    Dosage adjustment made based on physician directed care plan.    Shameka Carey RN

## 2018-09-07 NOTE — TELEPHONE ENCOUNTER
Prescription was sent 5/9/18 for #180 with 1 refill.  Pharmacy notified via E-Prescribe refusal.     Kari Mora RN  [facility]

## 2018-09-07 NOTE — MR AVS SNAPSHOT
Home Valdez   9/7/2018 2:15 PM   Anticoagulation Therapy Visit    Description:  68 year old male   Provider:  [facility]   Department:  [facility]           INR as of 9/7/2018     Today's INR 2.3      Anticoagulation Summary as of 9/7/2018     INR goal 2.0-3.0   Today's INR 2.3   Full warfarin instructions 9/7: 2.5 mg; Otherwise 2.5 mg on Mon, Wed, Fri; 5 mg all other days   Next INR check 9/14/2018    Indications   Atrial fibrillation with RVR (H) [I48.91]  Long-term (current) use of anticoagulants [Z79.01] [Z79.01]         Your next Anticoagulation Clinic appointment(s)     Sep 07, 2018  2:15 PM CDT   Anticoagulation Visit with [facility]   [facility]    [address] [facility] [address]   [phone]            Sep 14, 2018  9:00 AM CDT   Anticoagulation Visit with CAMILO ANTI COAG   [facility]    [address] [facility] [address]   [phone]              Contact Numbers     Clinic Number:         September 2018 Details    Sun Mon Tue Wed Thu Fri Sat           1                 2               3               4               5               6               7      2.5 mg   See details      8      5 mg           9      5 mg         10      2.5 mg         11      5 mg         12      2.5 mg         13      5 mg         14            15                 16               17               18               19               20               21               22                 23               24               25               26               27               28               29                 30                      Date Details   09/07 This INR check       Date of next INR:  9/14/2018         How to take your warfarin dose     To take:  2.5 mg Take 1 of the 2.5 mg tablets.    To take:  5 mg Take 2 of the 2.5 mg tablets.

## 2018-09-10 ENCOUNTER — CARE COORDINATION (OUTPATIENT)
Dept: CARDIOLOGY | Facility: CLINIC | Age: 68
End: 2018-09-10

## 2018-09-10 DIAGNOSIS — Z87.891 PERSONAL HISTORY OF TOBACCO USE, PRESENTING HAZARDS TO HEALTH: ICD-10-CM

## 2018-09-10 DIAGNOSIS — I50.23 ACUTE ON CHRONIC SYSTOLIC CONGESTIVE HEART FAILURE (H): Primary | ICD-10-CM

## 2018-09-10 NOTE — PROGRESS NOTES
I called pt's wife back and reviewed 's recommendations. Pt to continue taking torsemide 20 mg daily. Pt to have labs done at [facility] on Wednesday 9/12. Pt's wife inquired whether an annual abdominal US will need to be done prior to OV in October as pt typically has one done. No order currently in EPIC. Will send update to  to confirm whether he wants one to be done. Eric BRASWELL September 10, 2018, 4:32 PM

## 2018-09-10 NOTE — TELEPHONE ENCOUNTER
I am not convinced that this is HF. And renal function is a concern. Let's try going back to torsemide 20 mg daily now and check Hgb and BMP and NT pro-BNP later this week Wed or Thursday. Thanks. EE

## 2018-09-10 NOTE — TELEPHONE ENCOUNTER
"Requested Prescriptions   Pending Prescriptions Disp Refills     buPROPion (WELLBUTRIN SR) 150 MG 12 hr tablet 180 tablet 1    Last Written Prescription Date:  5/9/18  Last Fill Quantity: 180,  # refills: 1   Last office visit: No previous visit found with prescribing provider:  8/28/18   Future Office Visit:   Next 5 appointments (look out 90 days)     Oct 25, 2018  1:00 PM CDT   Return Visit with Jimenez Murphy MD   [facility] ([address] [address] [phone]                 PHQ-9 score:    PHQ-9 SCORE 4/17/2018   Total Score -   Total Score 0    Sig: TAKE 1 TABLET BY MOUTH TWICE A DAY    SSRIs Protocol Passed    9/10/2018 11:44 AM       Passed - Recent (12 mo) or future (30 days) visit within the authorizing provider's specialty    Patient had office visit in the last 12 months or has a visit in the next 30 days with authorizing provider or within the authorizing provider's specialty.  See \"Patient Info\" tab in inbasket, or \"Choose Columns\" in Meds & Orders section of the refill encounter.           Passed - Medication is Bupropion    If the medication is Bupropion (Wellbutrin), and the patient is taking for smoking cessation; OK to refill.         Passed - Patient is age 18 or older          "

## 2018-09-10 NOTE — PROGRESS NOTES
Pt's wife called to report that pt experienced increased LE edema and abdominal bloating on Saturday. She called and spoke to on call MD, and was instructed to have pt take his torsemide 20 mg BID on Sat and Sunday. Pt still has abdominal bloating and LE edema today, but has not worsened in the past couple of days. Pt also also been trying to do more around the yard recently and notices that he gets fatigued and winded more easily. Also, at [employer] on Sunday he had to park closer than usual because he was worried about whether he would tolerate having to walk a lengthy distance to the [employer]. Pt's weights have been: 9/8=241.1#, Sunday-240.5#, and today 240.3 #. Pt's torsemide dose was decreased at 9/4 OV as  was concerned about pt's renal function. Pt's wife states his HRs have been in the 80s to low 90s, and his SBP has typically been in the low 100s after taking medications.There was also mention that his atrial fibrillation could be contributing to his fatigue, and possible EP f/u was mentioned to discuss ablation and pacemaker. Pt scheduled for bmp lab and echo 10/11 and OV with  10/18. Pt's wife wondering what dose of torsemide he should continue to take and whether f/u labs will be needed sooner to monitor his renal function. She also felt pt has appeared more pale recently. His last Hgb 8/25 was 15.7. Will send an update to , and call pt back with further recommendations regarding torsemide dosing and follow up. Eric BRASWELL September 10, 2018, 1:04 PM

## 2018-09-11 NOTE — TELEPHONE ENCOUNTER
"Pharmacy sent the following message: \"#180 filled 5/09; #98 filled 7/29 to sync with the rest of his medications. #82 remaining. We are looking for a qty of #180 o keep this med in sync with the rest of his medications to improve adherence.\"  "

## 2018-09-11 NOTE — PROGRESS NOTES
Pt's wife, Chrissy, left  asking if we could call pt this afternoon as she thinks he could use words of encouragement and also requesting an appt for pt. She reported a 1.5lb weight gain overnight and pt feeling fatigued with some abdominal bloating. Called pt. Pt reports feeling fatigued, weak and agrees his abdomen might feel more bloated. Pt thinks his afib is not controlled which is causing his symptoms. He denies SOB or chest pain or dizziness. Pt inquiring if afib is causing his sx and if he will be in afib forever. Explained to pt that he is being treated for rate control, vs rhythm control. Pt unsure what his BP or P is.  I asked pt to contact his PCP to see if he could get in with them in the next day or two, to evaluate his fluid status and HR since he has a tendency to go into a RVR, as we have no appts available in [place] until 9/20. Pt has labs scheduled for tomorrow. Pt confirmed he was taking proper doses of his cardiac meds. He will have his wife take BP and pulse tonight and call us tomorrow with an update on overall sx, weight and vitals. Pt in agreement with plan.

## 2018-09-12 ENCOUNTER — OFFICE VISIT (OUTPATIENT)
Dept: FAMILY MEDICINE | Facility: OTHER | Age: 68
End: 2018-09-12
Payer: MEDICARE

## 2018-09-12 VITALS
TEMPERATURE: 98.3 F | HEIGHT: 70 IN | SYSTOLIC BLOOD PRESSURE: 102 MMHG | OXYGEN SATURATION: 98 % | DIASTOLIC BLOOD PRESSURE: 62 MMHG | BODY MASS INDEX: 34.79 KG/M2 | WEIGHT: 243 LBS | RESPIRATION RATE: 22 BRPM | HEART RATE: 86 BPM

## 2018-09-12 DIAGNOSIS — I71.40 ABDOMINAL AORTIC ANEURYSM (AAA) WITHOUT RUPTURE (H): ICD-10-CM

## 2018-09-12 DIAGNOSIS — I48.91 ATRIAL FIBRILLATION WITH RAPID VENTRICULAR RESPONSE (H): ICD-10-CM

## 2018-09-12 DIAGNOSIS — I50.23 ACUTE ON CHRONIC SYSTOLIC CONGESTIVE HEART FAILURE (H): ICD-10-CM

## 2018-09-12 DIAGNOSIS — N18.30 CKD (CHRONIC KIDNEY DISEASE) STAGE 3, GFR 30-59 ML/MIN (H): Primary | ICD-10-CM

## 2018-09-12 DIAGNOSIS — Z79.01 LONG-TERM (CURRENT) USE OF ANTICOAGULANTS: ICD-10-CM

## 2018-09-12 DIAGNOSIS — I42.8 NONISCHEMIC CARDIOMYOPATHY (H): ICD-10-CM

## 2018-09-12 LAB
ANION GAP SERPL CALCULATED.3IONS-SCNC: 8 MMOL/L (ref 3–14)
BUN SERPL-MCNC: 42 MG/DL (ref 7–30)
CALCIUM SERPL-MCNC: 8.5 MG/DL (ref 8.5–10.1)
CHLORIDE SERPL-SCNC: 104 MMOL/L (ref 94–109)
CO2 SERPL-SCNC: 30 MMOL/L (ref 20–32)
CREAT SERPL-MCNC: 1.86 MG/DL (ref 0.66–1.25)
GFR SERPL CREATININE-BSD FRML MDRD: 36 ML/MIN/1.7M2
GLUCOSE SERPL-MCNC: 96 MG/DL (ref 70–99)
HGB BLD-MCNC: 16.2 G/DL (ref 13.3–17.7)
NT-PROBNP SERPL-MCNC: ABNORMAL PG/ML (ref 0–125)
POTASSIUM SERPL-SCNC: 3.9 MMOL/L (ref 3.4–5.3)
SODIUM SERPL-SCNC: 142 MMOL/L (ref 133–144)

## 2018-09-12 PROCEDURE — 83880 ASSAY OF NATRIURETIC PEPTIDE: CPT | Performed by: INTERNAL MEDICINE

## 2018-09-12 PROCEDURE — 36415 COLL VENOUS BLD VENIPUNCTURE: CPT | Performed by: INTERNAL MEDICINE

## 2018-09-12 PROCEDURE — 85018 HEMOGLOBIN: CPT | Performed by: INTERNAL MEDICINE

## 2018-09-12 PROCEDURE — 80048 BASIC METABOLIC PNL TOTAL CA: CPT | Performed by: INTERNAL MEDICINE

## 2018-09-12 PROCEDURE — 99214 OFFICE O/P EST MOD 30 MIN: CPT | Performed by: INTERNAL MEDICINE

## 2018-09-12 RX ORDER — BUPROPION HYDROCHLORIDE 150 MG/1
TABLET, EXTENDED RELEASE ORAL
Qty: 180 TABLET | Refills: 1 | Status: SHIPPED | OUTPATIENT
Start: 2018-09-12

## 2018-09-12 ASSESSMENT — PAIN SCALES - GENERAL: PAINLEVEL: NO PAIN (0)

## 2018-09-12 NOTE — MR AVS SNAPSHOT
After Visit Summary   9/12/2018    Home Valdez    MRN: [id number]           Patient Information     Date Of Birth          1950        Visit Information        Provider Department      9/12/2018 9:00 AM Sandip Vega DO [facility]        Today's Diagnoses     CKD (chronic kidney disease) stage 3, GFR 30-59 ml/min    -  1    Nonischemic cardiomyopathy EF 45-50% by echo 2016        Atrial fibrillation with rapid ventricular response (H)        Long-term (current) use of anticoagulants [Z79.01]        Abdominal aortic aneurysm (AAA) without rupture (H)           Follow-ups after your visit        Your next 10 appointments already scheduled     Sep 14, 2018  9:00 AM CDT   Anticoagulation Visit with  ANTI COAG   [facility] ([facility]    [address] [facility] [address] [phone]            Sep 19, 2018  8:45 AM CDT   US ABDOMEN COMPLETE with PHUS1   [facility] ([facility])    [address] [facility] [address] [phone]           How do I prepare for my exam? (Food and drink instructions) Adults: No eating, smoking, gum chewing or drinking for 8 hours before the exam. You may take medicine with a small sip of water.  Children: * Infants, breast-fed: may have breast milk up to 2 hours before exam. * Infants, formula: may have bottle until 4 hours before exam. * Children 1-5 years: No food or drink for 4 hours before exam. * Children 6 -12 years: No food or drink for 6 hours before exam. * Children over 12 years: No food or drink for 8 hours before exam.  * J Tube Fed: No need to stop feedings.  What should I wear: Wear comfortable clothes.  How long does the exam take: Most ultrasounds take 30 to 60 minutes.  What should I bring: Bring a list of your medicines, including vitamins, minerals and over-the-counter drugs. It is safest to leave personal items at home.  Do I need a :   No  is needed.  What do I need to tell my doctor: Tell your doctor about any allergies you may have.  What should I do after the exam: No restrictions, You may resume normal activities.  What is this test: An ultrasound uses sound waves to make pictures of the body. Sound waves do not cause pain. The only discomfort may be the pressure of the wand against your skin or full bladder.  Who should I call with questions: If you have any questions, please call the Imaging Department where you will have your exam. Directions, parking instructions, and other information is available on our website, [email or web address].            Oct 18, 2018  8:45 AM CDT   LAB with NL LAB [facility] ([facility])    [address] [place] [address]   [phone]           Please do not eat 10-12 hours before your appointment if you are coming in fasting for labs on lipids, cholesterol, or glucose (sugar). This does not apply to pregnant women. Water, hot tea and black coffee (with nothing added) are okay. Do not drink other fluids, diet soda or chew gum.            Oct 18, 2018  9:00 AM CDT   Ech Complete with PHECHR1   [facility] ([facility])    [facility] [address] [place] [address]   [phone]           1.  Please bring or wear a comfortable two-piece outfit. 2.  You may eat, drink and take your normal medicines. 3.  For any questions that cannot be answered, please contact the ordering physician 4.  Please do not wear perfumes or scented lotions on the day of your exam.            Oct 25, 2018  1:00 PM CDT   Return Visit with Jimenez Murphy MD   [facility] ([facility])    [address] [place] [address] [phone]              Future tests that were ordered for you today     Open Future Orders        Priority Expected Expires Ordered    US Abdomen  "Complete Routine  9/12/2019 9/12/2018            Who to contact     If you have questions or need follow up information about today's clinic visit or your schedule please contact [facility] directly at [phone].  Normal or non-critical lab and imaging results will be communicated to you by MyChart, letter or phone within 4 business days after the clinic has received the results. If you do not hear from us within 7 days, please contact the clinic through MyChart or phone. If you have a critical or abnormal lab result, we will notify you by phone as soon as possible.  Submit refill requests through [employer] or call your pharmacy and they will forward the refill request to us. Please allow 3 business days for your refill to be completed.          Additional Information About Your Visit        Care EveryWhere ID     This is your Care EveryWhere ID. This could be used by other organizations to access your [facility] medical records  [id number]        Your Vitals Were     Pulse Temperature Respirations Height Pulse Oximetry BMI (Body Mass Index)    86 98.3  F (36.8  C) (Tympanic) 22 5' 10\" (1.778 m) 98% 34.87 kg/m2       Blood Pressure from Last 3 Encounters:   09/12/18 102/62   09/04/18 96/62   08/28/18 108/68    Weight from Last 3 Encounters:   09/12/18 243 lb (110.2 kg)   09/04/18 241 lb 1.6 oz (109.4 kg)   08/28/18 240 lb 4.8 oz (109 kg)               Primary Care Provider Office Phone # Fax #    Sandip Home Vega [phone]       [address] [place] [address]        Equal Access to Services     REBECCA ORTIZ AH: Hadii lissette Ignacio, wajanettda luana, qaybta trualjase guillaume. So [facility] [phone].    ATENCIÓN: Si habla español, tiene a calabrese disposición servicios gratuitos de asistencia lingüística. Kenrick al [phone].    We comply with applicable federal civil rights laws and [place] laws. We do not discriminate " on the basis of race, color, national origin, age, disability, sex, sexual orientation, or gender identity.            Thank you!     Thank you for choosing [facility]  for your care. Our goal is always to provide you with excellent care. Hearing back from our patients is one way we can continue to improve our services. Please take a few minutes to complete the written survey that you may receive in the mail after your visit with us. Thank you!             Your Updated Medication List - Protect others around you: Learn how to safely use, store and throw away your medicines at www.disposemymeds.org.          This list is accurate as of 9/12/18 10:09 AM.  Always use your most recent med list.                   Brand Name Dispense Instructions for use Diagnosis    acetaminophen 325 MG tablet    TYLENOL    100 tablet    Take 2 tablets (650 mg) by mouth every 4 hours as needed for other (mild pain)        ADVAIR DISKUS 250-50 MCG/DOSE diskus inhaler   Generic drug:  fluticasone-salmeterol     1 Inhaler    INHALE 1 PUFF BY MOUTH TWICE A DAY    Panlobular emphysema (H)       AIRS DISPOSABLE NEBULIZER Misc     1 each    USE EVERY 4 TO 6 HOURS AS NEEDED    Panlobular emphysema (H)       buPROPion 150 MG 12 hr tablet    WELLBUTRIN SR    180 tablet    TAKE 1 TABLET BY MOUTH TWICE A DAY    Personal history of tobacco use, presenting hazards to health       fluticasone 50 MCG/ACT spray    FLONASE    16 g    INHALE 1 TO 2 SPRAYS INTO EACH NOSTRIL EVERY DAY    Chronic rhinitis       HYDROcodone-acetaminophen 5-325 MG per tablet    NORCO    15 tablet    Take 1 tablet by mouth every 4 hours as needed for pain        ipratropium - albuterol 0.5 mg/2.5 mg/3 mL 0.5-2.5 (3) MG/3ML neb solution    DUONEB    540 mL    TAKE 1 VIAL (3 MLS) BY NEBULIZATION EVERY 4 HOURS AS NEEDED FOR SHORTNESS OF BREATH / DYSPNEA OR WHEEZING    COPD exacerbation (H)       JANTOVEN 2.5 MG tablet   Generic drug:  warfarin     210 tablet    Took  5mg yesterday, due to see INR clinic today (7/30/18)    Atrial fibrillation with RVR (H)       losartan 25 MG tablet    COZAAR    90 tablet    Take 1 tablet (25 mg) by mouth At Bedtime    Hypertension goal BP (blood pressure) < 140/90, Nonischemic cardiomyopathy (H)       metoprolol tartrate 100 MG tablet    LOPRESSOR    180 tablet    Take 1 tablet (100 mg) by mouth 2 times daily    Atrial fibrillation with rapid ventricular response (H)       montelukast 10 MG tablet    SINGULAIR    90 tablet    TAKE 1 TABLET BY MOUTH DAILY AT BEDTIME    Chronic seasonal allergic rhinitis due to other allergen       nebulizer/tubing/mouthpiece Kit     1 kit    Use every 4-6 hours PRN    Chronic obstructive pulmonary disease, unspecified COPD type (H)       order for DME     1 Device    Equipment being ordered: Nebulizer    COPD (chronic obstructive pulmonary disease) (H)       potassium chloride SA 20 MEQ CR tablet    K-DUR/KLOR-CON M    90 tablet    Take 1 tablet (20 mEq) by mouth daily    CHF (congestive heart failure) (H), Peripheral edema       senna-docusate 8.6-50 MG per tablet    SENOKOT-S;PERICOLACE     Take 2 tablets by mouth 2 times daily        torsemide 20 MG tablet    DEMADEX    90 tablet    Take 1 tablet (20 mg) by mouth daily    Chronic diastolic congestive heart failure (H)       VENTOLIN  (90 Base) MCG/ACT inhaler   Generic drug:  albuterol     18 g    INHALE 2 PUFFS BY MOUTH EVERY 4 HOURS AS NEEDED FOR SHORTNESS OF BREATH/DYSPNEA    Chronic obstructive pulmonary disease with acute exacerbation (H)

## 2018-09-12 NOTE — TELEPHONE ENCOUNTER
Prescription approved per [facility] Refill Protocol.    Yeny Reynoso RN

## 2018-09-12 NOTE — PROGRESS NOTES
SUBJECTIVE:   Home Valdez is a 68 year old male who presents to clinic today for the following health issues:      Abdominal Pain      Duration: 2 weeks on and off    Description (location/character/radiation): Lower , both sides       Associated flank pain: None    Intensity:  6/10    Accompanying signs and symptoms:        Fever/Chills: no        Gas/Bloating: no        Nausea/vomitting: no        Diarrhea: no        Dysuria or Hematuria: no     History (previous similar pain/trauma/previous testing): NONE    Precipitating or alleviating factors:       Pain worse with eating/BM/urination: Right after he eats it is the worse        Pain relieved by BM: no     Therapies tried and outcome: None    LMP:  not applicable      CHIEF COMPLAINT:    The patient is a pleasant 68-year-old gentleman who presents today for follow-up of ischemic cardiomyopathy and congestive heart failure. He's gained recently about 5 or 6 pounds and his breathing is much shorter. Due to his cardiorenal effect we have decreased his torsemide to once daily and he seems to be slightly backwards a little bit with respect to his breathing. He is quite concerned regarding some abdominal distention, he's had no bowel changes etc. This appears to be possibly fluid. He also is now developing 2-3+ edema in the lower extremities once again. He has been in contact with his cardiologist and laboratory work has been ordered. Repeat echocardiogram is scheduled for next month hoping to see an improvement in his extremely decreased ejection fraction. He does have a previous history of a rebound from cardiomyopathy and were hoping that will recur. He has concurrent chronic obstructive pulmonary disease and is using his nebulizer therapy on a regular basis. His most recent creatinine was 1.75.                         PAST, FAMILY,SOCIAL HISTORY:     Medical  History:   has a past medical history of AAA (abdominal aortic aneurysm) (H); Atrial fibrillation  (H); Chronic anticoagulation; COPD (chronic obstructive pulmonary disease) (H); Hyperlipidemia; Hypertension; NICM (nonischemic cardiomyopathy) (H); Obesity (BMI 35.0-39.9 without comorbidity); JAMES (obstructive sleep apnea) (9/3/2014); PTSD (post-traumatic stress disorder); Pulmonary HTN; and Trigeminal neuralgia.     Surgical History:   has a past surgical history that includes CYSTOURETHROSCOPY (1998); LAMINOTOMY,LUMBAR DISK,1 INTRSP (1993); NONSPECIFIC PROCEDURE; NONSPECIFIC PROCEDURE; Phacoemulsification with standard intraocular lens implant (12/26/2013); Vitrectomy anterior (12/26/2013); L cataract surgery[; Soft tissue surgery; colonoscopy; hernia repair; Head and neck surgery; Vitrectomy parsplana with 25 gauge system (2/6/2014); Balloon compression rhizotomy (Left, 9/7/2016); Balloon compression rhizotomy (Left, 6/5/2017); Balloon compression rhizotomy (Left, 11/7/2017); and Phacoemulsification with standard intraocular lens implant (Right, 5/3/2018).     Social History:   reports that he quit smoking about 4 years ago. His smoking use included Cigarettes. He has a 40.00 pack-year smoking history. He has never used smokeless tobacco. He reports that he does not drink alcohol or use illicit drugs.     Family History:  family history includes Depression in his father; Diabetes in his paternal grandmother; Hypertension in his mother and paternal grandfather.            MEDICATIONS  Current Outpatient Prescriptions   Medication Sig Dispense Refill     acetaminophen (TYLENOL) 325 MG tablet Take 2 tablets (650 mg) by mouth every 4 hours as needed for other (mild pain) 100 tablet 0     ADVAIR DISKUS 250-50 MCG/DOSE diskus inhaler INHALE 1 PUFF BY MOUTH TWICE A DAY 1 Inhaler 1     buPROPion (WELLBUTRIN SR) 150 MG 12 hr tablet TAKE 1 TABLET BY MOUTH TWICE A  tablet 1     fluticasone (FLONASE) 50 MCG/ACT spray INHALE 1 TO 2 SPRAYS INTO EACH NOSTRIL EVERY DAY 16 g 3     HYDROcodone-acetaminophen (NORCO) 5-325  MG per tablet Take 1 tablet by mouth every 4 hours as needed for pain 15 tablet 0     ipratropium - albuterol 0.5 mg/2.5 mg/3 mL (DUONEB) 0.5-2.5 (3) MG/3ML neb solution TAKE 1 VIAL (3 MLS) BY NEBULIZATION EVERY 4 HOURS AS NEEDED FOR SHORTNESS OF BREATH / DYSPNEA OR WHEEZING 540 mL 3     losartan (COZAAR) 25 MG tablet Take 1 tablet (25 mg) by mouth At Bedtime 90 tablet 3     metoprolol tartrate (LOPRESSOR) 100 MG tablet Take 1 tablet (100 mg) by mouth 2 times daily 180 tablet 3     montelukast (SINGULAIR) 10 MG tablet TAKE 1 TABLET BY MOUTH DAILY AT BEDTIME 90 tablet 0     Nebulizers (AIRS DISPOSABLE NEBULIZER) MISC USE EVERY 4 TO 6 HOURS AS NEEDED 1 each 1     order for DME Equipment being ordered: Nebulizer 1 Device 0     potassium chloride SA (K-DUR/KLOR-CON M) 20 MEQ CR tablet Take 1 tablet (20 mEq) by mouth daily 90 tablet 2     Respiratory Therapy Supplies (NEBULIZER/TUBING/MOUTHPIECE) KIT Use every 4-6 hours PRN 1 kit 11     senna-docusate (SENOKOT-S;PERICOLACE) 8.6-50 MG per tablet Take 2 tablets by mouth 2 times daily        torsemide (DEMADEX) 20 MG tablet Take 1 tablet (20 mg) by mouth daily 90 tablet 3     VENTOLIN  (90 Base) MCG/ACT inhaler INHALE 2 PUFFS BY MOUTH EVERY 4 HOURS AS NEEDED FOR SHORTNESS OF BREATH/DYSPNEA 18 g 3     warfarin (JANTOVEN) 2.5 MG tablet Took 5mg yesterday, due to see INR clinic today (7/30/18) 210 tablet 0         --------------------------------------------------------------------------------------------------------------------                          REVIEW OF SYSTEMS:         LUNGS: Pt denies: cough,excess sputum, hemoptysis. Patient does have shortness of breath with any activity or exertion. Also has some orthopnea.   HEART: Pt denies: chest pain, sensed arrythmia, syncope, tachy or bradyarrhythmia or excess edema.   GI: Pt denies: nausea, vomitting, diarrhea, constipation, melena, or hematochezia.   NEURO: Pt denies: seizures, strokes, diplopia, weakness,  "paraesthesias, or paralysis.   SKIN: Pt denies: itching, rashes, discoloration, or specific lesions of concern. Denies recent hair loss.                          EXAMINATION:         /62  Pulse 86  Temp 98.3  F (36.8  C) (Tympanic)  Resp 22  Ht 5' 10\" (1.778 m)  Wt 243 lb (110.2 kg)  SpO2 98%  BMI 34.87 kg/m2   Constitutional: The patient appears to be in moderate acute distress. The patient appears to be adequately hydrated. No acute respiratory or hemodynamic distress is noted at this time.   LUNGS: Dependent rales are heard bilaterally, airflow is slowed, no intercostal retraction or stridor is noted. No coughing is noted during visit.   HEART:  Irregularly irregular without rubs, clicks, gallops, or murmurs. PMI is nondisplaced. Upstrokes are brisk. S1,S2 are heard.   GI: Abdomen is soft, without rebound, guarding or tenderness. Bowel sounds are appropriate. No renal bruits are heard.    NEURO: Pt is alert and appropriate. No neurologic lateralization is noted. Cranial nerves 2-12 are intact. Peripheral sensory and motor function are grossly normal                        DECISION MAKIN. Nonischemic cardiomyopathy EF 45-50% by echo 2016  Follow up with cardiology, continue ARB and beta blocker    2. CKD (chronic kidney disease) stage 3, GFR 30-59 ml/min  Watch renal function closely, increase torsemide to twice daily for 2 days only and then return to daily    3. Atrial fibrillation with rapid ventricular response (H)  Intolerant to amiodarone    4. Long-term (current) use of anticoagulants [Z79.01]  Continue anti-coagulation and follow INR    5. Abdominal aortic aneurysm (AAA) without rupture (H)  Patient has a history of abdominal aortic aneurysm, wife is quite concerned and we will repeat his yearly follow-up now and also evaluate his abdominal distention for fluid  - US Abdomen Complete; Future                               FOLLOW UP    I have asked the patient to make an appointment " for follow up with me next week or sooner as needed        I have carefully explained the diagnosis and treatment options with the patient. The patient has displayed an understanding of the above, and all subsequent questions were answered.         DO SANJAY Lock    Portions of this note were produced using [employer]  Although every attempt at real-time proof reading has been made, occasional grammar/syntax errors may have been missed.

## 2018-09-13 DIAGNOSIS — J30.2 CHRONIC SEASONAL ALLERGIC RHINITIS DUE TO OTHER ALLERGEN: ICD-10-CM

## 2018-09-13 RX ORDER — MONTELUKAST SODIUM 10 MG/1
TABLET ORAL
Qty: 90 TABLET | Refills: 1 | Status: SHIPPED | OUTPATIENT
Start: 2018-09-13 | End: 2018-12-26

## 2018-09-13 NOTE — TELEPHONE ENCOUNTER
"Requested Prescriptions   Pending Prescriptions Disp Refills     montelukast (SINGULAIR) 10 MG tablet [Pharmacy Med Name: MONTELUKAST 10MG TABLET] 90 tablet     Last Written Prescription Date:  7/5/18  Last Fill Quantity: 90,  # refills: 0   Last office visit: No previous visit found with prescribing provider:  9-12-18   Future Office Visit:   Next 5 appointments (look out 90 days)     Oct 25, 2018  1:00 PM CDT   Return Visit with Jimenez Murphy MD   [facility] ([address] [id number] [phone]                  Sig: TAKE 1 TABLET BY MOUTH DAILY AT BEDTIME    Leukotriene Inhibitors Protocol Passed    9/13/2018  1:04 AM       Passed - Patient is age 12 or older    If patient is under 16, ok to refill using age based dosing.          Passed - Recent (12 mo) or future (30 days) visit within the authorizing provider's specialty    Patient had office visit in the last 12 months or has a visit in the next 30 days with authorizing provider or within the authorizing provider's specialty.  See \"Patient Info\" tab in inbasket, or \"Choose Columns\" in Meds & Orders section of the refill encounter.              "

## 2018-09-13 NOTE — PROGRESS NOTES
"Pt's wife Chrissy called back. Reviewed with her that renal function slightly worse and NTproBNP elevated. She reports he has 2-3+ bilateral edema, abdominal bloating, decreased appetite and fatigue. She states patient generally feeling \"unwell\" and unable to eat much. Pt saw PCP yesterday, Dr. Vega, as we had no openings. Chrissy confirmed that per Dr. Vega's instructions, pt took an extra torsemide 20 mg yesterday afternoon and that he will again today. Wife states that this week his weight trend has been: up 1.5lb on Tuesday, yesterday up another 0.8lb and today up another 0.9lb = 3.2lb increase in 48 hours. She reports that these are all on the same scale first thing in the morning at home. She is unsure what his dry weight is for sure, because some times they take his weight at the [facility] and sometimes at home, but they also recently got a new home scale. She did confirm, however, that the weight increases over the past couple of days were all taken on their scale at home. They do not have his BP or HR but yesterday at PCP office was 102/62, HR 86, RR 22 and O2 98% RA.     I reviewed importance of being very vigilant about sodium intake and wife stated that he has not had much of an appetite. She was wanting him to eat so they went through the [employer] through yesterday and he had a milkshake and double cheeseburger. Wife thought that not adding salt was all that was necessary. She also said he will just eat a deli meat sandwich at home. Educated her on very high sodium content of fast food and prepared foods including deli meats. Encouraged patient have fresh fruits and vegetables and that if he eats salads to avoid croutons, cheese and to read the sodium content on dressing labels. Advised that patient lay down with elevated legs after taking his torsemide this afternoon. Reviewed [facility] f/u scheduled for 9/20 with S.More at 11:20. Wife asked when it would be appropriate for pt to " go to the ER, reviewed signs/sx to watch for. Pt/wife in agreement to plan.

## 2018-09-13 NOTE — PROGRESS NOTES
Pt's labs reviewed (Hgb, NTproBNP, BMP 9/12/18).     Pt saw PCP on 9/12/18 for sx as we had no appt's available. Dr. Vega had pt increase torsemide 20 mg to BID x 2 days then return to once daily, see OV note in the chart.     Called pt/wife to get an update on sx and weight and to review lab results with them. No answer, left detailed VM. Also scheduled pt for a [facility] OV with S.MICHAEL Keller on 9/20 in [place], will need to review this with pt as well.

## 2018-09-13 NOTE — PROGRESS NOTES
Spoke with Dr. Murphy via telephone. Reviewed lab results and pt symptoms today. Reviewed the plan for pt to take extra torsemide 20 mg yesterday and today as pt saw Dr. Vega yesterday for wt gain/sx. As pt's NTproBNP markedly elevated and pt symptomatic, Dr. Murphy would like pt to increase torsemide further. VORB: Pt to take 40 mg torsemide this afternoon, torsemide 40 mg tomorrow morning and 20 mg tomorrow afternoon, torsemide 20 mg qAM and 20 mg qPM on Saturday and Sunday. Potassium should be increased to 20 mEq BID Friday, Saturday, Sunday. Repeat BMP, NTproBNP on Monday 9/17. Check weekly INR's and maintain > 2.0 x 4 weeks to prepare for possible DCCV, as Dr. Murphy feels he has been unable to tolerate afib.     Called pt's wife, Chrissy. Reviewed this plan. Chrissy repeated back all instructions correctly, she is a nurse herself as well. Will fax lab orders to [facility] to be done Monday. Will call patient for update on Monday and watch for lab results.

## 2018-09-14 ENCOUNTER — ANTICOAGULATION THERAPY VISIT (OUTPATIENT)
Dept: ANTICOAGULATION | Facility: OTHER | Age: 68
End: 2018-09-14
Payer: MEDICARE

## 2018-09-14 DIAGNOSIS — Z79.01 LONG-TERM (CURRENT) USE OF ANTICOAGULANTS: ICD-10-CM

## 2018-09-14 DIAGNOSIS — I48.91 ATRIAL FIBRILLATION WITH RVR (H): ICD-10-CM

## 2018-09-14 LAB — INR POINT OF CARE: 1.9 (ref 0.86–1.14)

## 2018-09-14 PROCEDURE — 99207 ZZC NO CHARGE NURSE ONLY: CPT

## 2018-09-14 PROCEDURE — 36416 COLLJ CAPILLARY BLOOD SPEC: CPT

## 2018-09-14 PROCEDURE — 85610 PROTHROMBIN TIME: CPT | Mod: QW

## 2018-09-14 NOTE — PROGRESS NOTES
Faxed lab orders for BMP and NTproBNP to [facility], to be done on Mon 9/17 and requested results be faxed to us.

## 2018-09-14 NOTE — PROGRESS NOTES
ANTICOAGULATION FOLLOW-UP CLINIC VISIT    Patient Name:  Home Valdez  Date:  9/14/2018  Contact Type:  Face to Face    SUBJECTIVE:     Patient Findings     Positives No Problem Findings           OBJECTIVE    INR Protime   Date Value Ref Range Status   09/14/2018 1.9 (A) 0.86 - 1.14 Final       ASSESSMENT / PLAN  INR assessment THER    Recheck INR In: 3 DAYS    INR Location Clinic      Anticoagulation Summary as of 9/14/2018     INR goal 2.0-3.0   Today's INR 1.9!   Warfarin maintenance plan 2.5 mg (2.5 mg x 1) on Mon, Fri; 5 mg (2.5 mg x 2) all other days   Full warfarin instructions 2.5 mg on Mon, Fri; 5 mg all other days   Weekly warfarin total 30 mg   Plan last modified Shameka Carey RN (9/14/2018)   Next INR check 9/17/2018   Target end date     Indications   Atrial fibrillation with RVR (H) [I48.91]  Long-term (current) use of anticoagulants [Z79.01] [Z79.01]         Anticoagulation Episode Summary     INR check location     Preferred lab     Send INR reminders to [facility]    Comments 2.5 MG TABLETS, likes print out, PM dose, Amiodarone doubled 7/31/18 but then discontinued completely 8/8/18  Possible cardioversion - Weekly INR need to be over 2.0 x 4 weeks      Anticoagulation Care Providers     Provider Role Specialty Phone number    Edgar Vegaifford DO [facility] [phone]            See the Encounter Report to view Anticoagulation Flowsheet and Dosing Calendar (Go to Encounters tab in chart review, and find the Anticoagulation Therapy Visit)    Dosage adjustment made based on physician directed care plan.    Shameka Carey, RN

## 2018-09-14 NOTE — MR AVS SNAPSHOT
Home Valdez   9/14/2018 9:00 AM   Anticoagulation Therapy Visit    Description:  68 year old male   Provider:  CAMILO ANTI COAEBEN   Department:  [facility]           INR as of 9/14/2018     Today's INR 1.9!      Anticoagulation Summary as of 9/14/2018     INR goal 2.0-3.0   Today's INR 1.9!   Full warfarin instructions 2.5 mg on Mon, Fri; 5 mg all other days   Next INR check 9/17/2018    Indications   Atrial fibrillation with RVR (H) [I48.91]  Long-term (current) use of anticoagulants [Z79.01] [Z79.01]         Your next Anticoagulation Clinic appointment(s)     Sep 14, 2018  9:00 AM CDT   [facility]    [address] [facility] [address]   [phone]            Sep 17, 2018  8:30 AM CDT   [facility]agulation Visit with  [facility]   [facility]    [address] [facility] [address]   [phone]              Contact Numbers     Clinic Number:         September 2018 Details    Sun Mon Tue Wed Thu Fri Sat           1                 2               3               4               5               6               7               8                 9               10               11               12               13               14      2.5 mg   See details      15      5 mg           16      5 mg         17            18               19               20               21               22                 23               24               25               26               27               28               29                 30                      Date Details   09/14 This INR check       Date of next INR:  9/17/2018         How to take your warfarin dose     To take:  2.5 mg Take 1 of the 2.5 mg tablets.    To take:  5 mg Take 2 of the 2.5 mg tablets.

## 2018-09-16 NOTE — PROGRESS NOTES
Previous comment made in error, incorrect pt.  No further orders.  Yancy Keller PA-C 9/16/2018 2:04 PM

## 2018-09-17 ENCOUNTER — ANTICOAGULATION THERAPY VISIT (OUTPATIENT)
Dept: ANTICOAGULATION | Facility: OTHER | Age: 68
End: 2018-09-17
Payer: MEDICARE

## 2018-09-17 ENCOUNTER — HOSPITAL ENCOUNTER (OUTPATIENT)
Dept: LAB | Facility: CLINIC | Age: 68
End: 2018-09-17
Payer: MEDICARE

## 2018-09-17 DIAGNOSIS — I48.91 ATRIAL FIBRILLATION WITH RVR (H): ICD-10-CM

## 2018-09-17 DIAGNOSIS — R06.02 SOB (SHORTNESS OF BREATH): ICD-10-CM

## 2018-09-17 DIAGNOSIS — I10 ESSENTIAL HYPERTENSION WITH GOAL BLOOD PRESSURE LESS THAN 140/90: ICD-10-CM

## 2018-09-17 DIAGNOSIS — I50.32 CHRONIC DIASTOLIC CONGESTIVE HEART FAILURE (H): ICD-10-CM

## 2018-09-17 DIAGNOSIS — I50.23 ACUTE ON CHRONIC SYSTOLIC CONGESTIVE HEART FAILURE (H): ICD-10-CM

## 2018-09-17 DIAGNOSIS — Z79.01 LONG-TERM (CURRENT) USE OF ANTICOAGULANTS: ICD-10-CM

## 2018-09-17 LAB
ANION GAP SERPL CALCULATED.3IONS-SCNC: 9 MMOL/L (ref 3–14)
BUN SERPL-MCNC: 34 MG/DL (ref 7–30)
CALCIUM SERPL-MCNC: 8.6 MG/DL (ref 8.5–10.1)
CHLORIDE SERPL-SCNC: 103 MMOL/L (ref 94–109)
CO2 SERPL-SCNC: 30 MMOL/L (ref 20–32)
CREAT SERPL-MCNC: 1.85 MG/DL (ref 0.66–1.25)
GFR SERPL CREATININE-BSD FRML MDRD: 36 ML/MIN/1.7M2
GLUCOSE SERPL-MCNC: 128 MG/DL (ref 70–99)
HGB BLD-MCNC: 16.2 G/DL (ref 13.3–17.7)
INR POINT OF CARE: 1.8 (ref 0.86–1.14)
NT-PROBNP SERPL-MCNC: ABNORMAL PG/ML (ref 0–125)
POTASSIUM SERPL-SCNC: 4.4 MMOL/L (ref 3.4–5.3)
SODIUM SERPL-SCNC: 142 MMOL/L (ref 133–144)

## 2018-09-17 PROCEDURE — 85610 PROTHROMBIN TIME: CPT | Mod: QW

## 2018-09-17 PROCEDURE — 80048 BASIC METABOLIC PNL TOTAL CA: CPT | Performed by: INTERNAL MEDICINE

## 2018-09-17 PROCEDURE — 36416 COLLJ CAPILLARY BLOOD SPEC: CPT

## 2018-09-17 PROCEDURE — 85018 HEMOGLOBIN: CPT | Performed by: INTERNAL MEDICINE

## 2018-09-17 PROCEDURE — 99207 ZZC NO CHARGE NURSE ONLY: CPT

## 2018-09-17 PROCEDURE — 83880 ASSAY OF NATRIURETIC PEPTIDE: CPT | Performed by: INTERNAL MEDICINE

## 2018-09-17 NOTE — MR AVS SNAPSHOT
Home Valdez   9/17/2018 8:30 AM   Anticoagulation Therapy Visit    Description:  68 year old male   Provider:  CAMILO ANTI COAEBEN   Department:  [facility]           INR as of 9/17/2018     Today's INR 1.8!      Anticoagulation Summary as of 9/17/2018     INR goal 2.0-3.0   Today's INR 1.8!   Full warfarin instructions 9/17: 5 mg; Otherwise 2.5 mg on Mon, Fri; 5 mg all other days   Next INR check 9/20/2018    Indications   Atrial fibrillation with RVR (H) [I48.91]  Long-term (current) use of anticoagulants [Z79.01] [Z79.01]         Your next Anticoagulation Clinic appointment(s)     Sep 20, 2018  3:00 PM CDT   [facility]TI [facility]   [facility]    [address] [facility] [address]   [phone]            Sep 28, 2018  8:30 AM CDT   Anticoagulation Visit with CAMILO ANTI COAEBEN   [facility]    [address] [facility] [address]   [phone]              Contact Numbers     Clinic Number:         September 2018 Details    Sun Mon Tue Wed Thu Fri Sat           1                 2               3               4               5               6               7               8                 9               10               11               12               13               14               15                 16               17      5 mg   See details      18      5 mg         19      5 mg         20            21               22                 23               24               25               26               27               28               29                 30                      Date Details   09/17 This INR check       Date of next INR:  9/20/2018         How to take your warfarin dose     To take:  5 mg Take 2 of the 2.5 mg tablets.

## 2018-09-17 NOTE — PROGRESS NOTES
ANTICOAGULATION FOLLOW-UP CLINIC VISIT    Patient Name:  Home Valdez  Date:  9/17/2018  Contact Type:  Face to Face    SUBJECTIVE:     Patient Findings     Positives Unexplained INR or factor level change    Comments INR needs to be above 2.0 for a upcoming cardioversion. Increased his dose and will recheck on Thursday. Patient verbalized understanding and agrees with plan of care. Pt had no further questions or concerns at this time.           OBJECTIVE    INR Protime   Date Value Ref Range Status   09/17/2018 1.8 (A) 0.86 - 1.14 Final       ASSESSMENT / PLAN  INR assessment SUB    Recheck INR In: 3 DAYS    INR Location Clinic      Anticoagulation Summary as of 9/17/2018     INR goal 2.0-3.0   Today's INR 1.8!   Warfarin maintenance plan 2.5 mg (2.5 mg x 1) on Mon, Fri; 5 mg (2.5 mg x 2) all other days   Full warfarin instructions 9/17: 5 mg; Otherwise 2.5 mg on Mon, Fri; 5 mg all other days   Weekly warfarin total 30 mg   Plan last modified Shameka Carey RN (9/14/2018)   Next INR check 9/20/2018   Target end date     Indications   Atrial fibrillation with RVR (H) [I48.91]  Long-term (current) use of anticoagulants [Z79.01] [Z79.01]         Anticoagulation Episode Summary     INR check location     Preferred lab     Send INR reminders to [facility]    Comments 2.5 MG TABLETS, likes print out, PM dose, Amiodarone doubled 7/31/18 but then discontinued completely 8/8/18  Possible cardioversion - Weekly INR need to be over 2.0 x 4 weeks      Anticoagulation Care Providers     Provider Role Specialty Phone number    Edgar Vegaifford DO [facility] Internal Medicine [phone]            See the Encounter Report to view Anticoagulation Flowsheet and Dosing Calendar (Go to Encounters tab in chart review, and find the Anticoagulation Therapy Visit)    Dosage adjustment made based on physician directed care plan.    Shameka Carey RN

## 2018-09-17 NOTE — PROGRESS NOTES
Reviewed lab results from today, BMP and NTproBNP. Called pt's wife, Chrissy, for an update. She reports these weights:    Weds 242.6lb   Thurs 243.5lb  Fri 242.2lb  Sat 239.6lb  Sun 240.0lb  Mon 240.1lb    /74  HR 84, irregular  O2 96% RA, is using his cPAP at night    She reports wt today is 242.2lb. She reports that pt woke up on Sat and his wt was down to 239.6lb, which was down 2lb overnight. Pt was excited to see the wt go down and felt OK so he went fishing. Pt reported to wife that he felt fatigued but nothing unusual. He has been eating fresh foods and really just sat on the boat and his friend did most of the manual work. Wife states he did not do too much exerting himself. Later that day they went to an open house for a niece and he only had water, he skipped the foods there. Sunday [place] went to [employer] and he had an episode of dizziness, but then it passed. Overall wife reports that pt is doing OK just continues to be fatigued with occasional episodes of dizziness, although she said he always seems to have some dizziness and is not sure this is any worse than usual. Today so far pt has just taken his usual torsemide 20 mg and KCl 20 mEq. Pt also had INR checked Friday and it was only 1.9, so they boosted his dose and checked again today but it dropped to 1.8. Wife states that they will recheck it on Thursday and hope to have it > 2.0. Will review with Dr. Murphy for further recommendations until he is seen by Yancy on Thursday.

## 2018-09-19 ENCOUNTER — HOSPITAL ENCOUNTER (OUTPATIENT)
Dept: ULTRASOUND IMAGING | Facility: CLINIC | Age: 68
Discharge: HOME | End: 2018-09-19
Attending: INTERNAL MEDICINE | Admitting: INTERNAL MEDICINE
Payer: MEDICARE

## 2018-09-19 DIAGNOSIS — J43.1 PANLOBULAR EMPHYSEMA (H): ICD-10-CM

## 2018-09-19 DIAGNOSIS — I71.40 ABDOMINAL AORTIC ANEURYSM (AAA) WITHOUT RUPTURE (H): ICD-10-CM

## 2018-09-19 DIAGNOSIS — J44.1 COPD EXACERBATION (H): ICD-10-CM

## 2018-09-19 DIAGNOSIS — I48.91 ATRIAL FIBRILLATION WITH RVR (H): ICD-10-CM

## 2018-09-19 PROCEDURE — 76700 US EXAM ABDOM COMPLETE: CPT

## 2018-09-19 RX ORDER — WARFARIN SODIUM 2.5 MG/1
TABLET ORAL
Qty: 210 TABLET | Refills: 1 | Status: SHIPPED | OUTPATIENT
Start: 2018-09-19 | End: 2019-02-12

## 2018-09-19 NOTE — TELEPHONE ENCOUNTER
Weight, BNP, and symptoms are all worsening. He is really resistant to diuretics. He needs to take torsemide 60 mg today and will likely need to get a dose of IV lasix in clinic tomorrow, then increase his diuretic program as outpatient. Something like torsemide 80 bid, or 40 bid with metolazone. We will have to watch kidneys closely. EE

## 2018-09-19 NOTE — TELEPHONE ENCOUNTER
"Requested Prescriptions   Pending Prescriptions Disp Refills     JANTOVEN 2.5 MG tablet [Pharmacy Med Name: JANTOVEN 2.5MG TAB] 210 tablet     Last Written Prescription Date:  7/18/2018  Last Fill Quantity: 210,  # refills: 0   Last office visit: 9/12/2018 with prescribing provider:  Dr. Vega   Future Office Visit:   Next 5 appointments (look out 90 days)     Oct 25, 2018  1:00 PM CDT   Return Visit with Jimenez Murphy MD   [address] [address] [phone]                  Sig: TAKE 7.5 MG (3 TABS) ON TUES, THURS, SAT AND 5 MG (2 TABS) ALL OTHERDAYS, OR AS DIRECTED BY THE COUMADIN CLINIC.    Vitamin K Antagonists Failed    9/19/2018  9:44 AM       Failed - INR is within goal in the past 6 weeks    Confirm INR is within goal in the past 6 weeks.     Recent Labs   Lab Test 09/17/18   INR  1.8*                      Passed - Recent (12 mo) or future (30 days) visit within the authorizing provider's specialty    Patient had office visit in the last 12 months or has a visit in the next 30 days with authorizing provider or within the authorizing provider's specialty.  See \"Patient Info\" tab in inbasket, or \"Choose Columns\" in Meds & Orders section of the refill encounter.           Passed - Patient is 18 years of age or older          "

## 2018-09-19 NOTE — TELEPHONE ENCOUNTER
"Requested Prescriptions   Pending Prescriptions Disp Refills     ipratropium - albuterol 0.5 mg/2.5 mg/3 mL (DUONEB) 0.5-2.5 (3) MG/3ML neb solution [Pharmacy Med Name: IPRATROP/ALBUT 0.5-3MG/3ML INH] 540 mL      Sig: TAKE 1 VIAL (3 MLS) BY NEBULIZATION EVERY 4 HOURS AS NEEDED FOR SHORTNESS OF BREATH / DYSPNEA OR WHEEZING    Short-Acting Beta Agonist Inhalers Protocol  Passed    9/19/2018  2:12 PM       Passed - Patient is age 12 or older       Passed - Recent (12 mo) or future (30 days) visit within the authorizing provider's specialty    Patient had office visit in the last 12 months or has a visit in the next 30 days with authorizing provider or within the authorizing provider's specialty.  See \"Patient Info\" tab in inbasket, or \"Choose Columns\" in Meds & Orders section of the refill encounter.       Last Written Prescription Date:  5/9/2018  Last Fill Quantity: 540mL,  # refills: 3   Last office visit: 9/12/2018 with prescribing provider:   9/12/2018  Future Office Visit:   Next 5 appointments (look out 90 days)     Oct 25, 2018  1:00 PM CDT   Return Visit with Jimenez Murphy MD   [facility] ([address] [address] [phone]                      Respiratory Therapy Supplies (AIRS DISPOSABLE NEBULIZER) KIT [Pharmacy Med Name: AIRS DISPOSABLE NEBULIZER]       Sig: USE EVERY 4 TO 6 HOURS AS NEEDED    There is no refill protocol information for this order          "

## 2018-09-19 NOTE — PROGRESS NOTES
Pt's wife called with update on his weight and symptoms. Pt's weight 9/.1 #, 9/.1#, 9/.2#. Pt has been back to taking torsemide 20 mg daily since 9/17. Pt feels like his abdominal swelling is getting worse. He continues to have pain across his abdominal area, along with intermittent episodes of nausea and dizziness. Pt became dizzy after getting out of the car today and bp was 96/54, HR 88. Pt's wife rechecked bp while on the phone with me and it was 102/68, HR 88. Pt felt the best on Saturday when weight went down to 239.6# and was able to go fishing. Pt's wife said he has not had a fever, but does look pale. Pt had abdominal US done today and results are available in EPIC. Ascites noted along with possible cholecystitis. Pt has CORE OV and labs with Yancy JIMENEZ tomorrow morning. Will review update with  to see what dose of torsemide he would like pt to take lamar. Eric BRASWELL September 19, 2018, 4:14 PM

## 2018-09-19 NOTE — PROGRESS NOTES
I called pt's wife back with 's recommendations. Pt already took torsemide 20 mg this morning, so will take an additional 40 mg right now for a total of 60 mg today. Per pt's wife he will be out of torsemide after tomorrow's dose so will need a refill. She will request that at appt with Yancy JIMENEZ because if pt's dose is increased he may need different size table. Pt's wife told that he will likely need IV lasix tomorrow after the appointment. FRENCH Baird was also updated. Eric BRASWELL

## 2018-09-20 ENCOUNTER — APPOINTMENT (OUTPATIENT)
Dept: GENERAL RADIOLOGY | Facility: CLINIC | Age: 68
DRG: 292 | End: 2018-09-20
Attending: INTERNAL MEDICINE
Payer: MEDICARE

## 2018-09-20 ENCOUNTER — HOSPITAL ENCOUNTER (INPATIENT)
Facility: CLINIC | Age: 68
LOS: 6 days | Discharge: HOME | DRG: 292 | End: 2018-09-26
Attending: INTERNAL MEDICINE | Admitting: INTERNAL MEDICINE
Payer: MEDICARE

## 2018-09-20 ENCOUNTER — DOCUMENTATION ONLY (OUTPATIENT)
Dept: CARDIOLOGY | Facility: CLINIC | Age: 68
End: 2018-09-20

## 2018-09-20 ENCOUNTER — OFFICE VISIT (OUTPATIENT)
Dept: CARDIOLOGY | Facility: CLINIC | Age: 68
End: 2018-09-20
Payer: MEDICARE

## 2018-09-20 ENCOUNTER — TELEPHONE (OUTPATIENT)
Dept: ANTICOAGULATION | Facility: CLINIC | Age: 68
End: 2018-09-20

## 2018-09-20 VITALS
DIASTOLIC BLOOD PRESSURE: 78 MMHG | HEART RATE: 88 BPM | HEIGHT: 70 IN | BODY MASS INDEX: 34.76 KG/M2 | WEIGHT: 242.8 LBS | OXYGEN SATURATION: 97 % | SYSTOLIC BLOOD PRESSURE: 94 MMHG

## 2018-09-20 DIAGNOSIS — I50.23 ACUTE ON CHRONIC SYSTOLIC CONGESTIVE HEART FAILURE (H): ICD-10-CM

## 2018-09-20 DIAGNOSIS — Z79.01 LONG-TERM (CURRENT) USE OF ANTICOAGULANTS: ICD-10-CM

## 2018-09-20 DIAGNOSIS — I48.91 ATRIAL FIBRILLATION WITH RVR (H): Primary | ICD-10-CM

## 2018-09-20 DIAGNOSIS — I50.9 CONGESTIVE HEART FAILURE, UNSPECIFIED CONGESTIVE HEART FAILURE CHRONICITY, UNSPECIFIED CONGESTIVE HEART FAILURE TYPE: Primary | ICD-10-CM

## 2018-09-20 LAB
ALBUMIN SERPL-MCNC: 3.3 G/DL (ref 3.4–5)
ALP SERPL-CCNC: 76 U/L (ref 40–150)
ALT SERPL W P-5'-P-CCNC: 56 U/L (ref 0–70)
ANION GAP SERPL CALCULATED.3IONS-SCNC: 6 MMOL/L (ref 3–14)
AST SERPL W P-5'-P-CCNC: 38 U/L (ref 0–45)
BILIRUB DIRECT SERPL-MCNC: 0.4 MG/DL (ref 0–0.2)
BILIRUB SERPL-MCNC: 0.8 MG/DL (ref 0.2–1.3)
BUN SERPL-MCNC: 41 MG/DL (ref 7–30)
CALCIUM SERPL-MCNC: 8.5 MG/DL (ref 8.5–10.1)
CHLORIDE SERPL-SCNC: 103 MMOL/L (ref 94–109)
CO2 SERPL-SCNC: 32 MMOL/L (ref 20–32)
CREAT SERPL-MCNC: 1.91 MG/DL (ref 0.66–1.25)
ERYTHROCYTE [DISTWIDTH] IN BLOOD BY AUTOMATED COUNT: 14.6 % (ref 10–15)
GFR SERPL CREATININE-BSD FRML MDRD: 35 ML/MIN/1.7M2
GLUCOSE SERPL-MCNC: 93 MG/DL (ref 70–99)
HCT VFR BLD AUTO: 48.6 % (ref 40–53)
HGB BLD-MCNC: 15.7 G/DL (ref 13.3–17.7)
INR PPP: 3.14 (ref 0.86–1.14)
LIPASE SERPL-CCNC: 115 U/L (ref 73–393)
MCH RBC QN AUTO: 30.8 PG (ref 26.5–33)
MCHC RBC AUTO-ENTMCNC: 32.3 G/DL (ref 31.5–36.5)
MCV RBC AUTO: 96 FL (ref 78–100)
NT-PROBNP SERPL-MCNC: ABNORMAL PG/ML (ref 0–125)
PLATELET # BLD AUTO: 204 10E9/L (ref 150–450)
POTASSIUM SERPL-SCNC: 4.3 MMOL/L (ref 3.4–5.3)
PROT SERPL-MCNC: 6.3 G/DL (ref 6.8–8.8)
RBC # BLD AUTO: 5.09 10E12/L (ref 4.4–5.9)
SODIUM SERPL-SCNC: 141 MMOL/L (ref 133–144)
T4 FREE SERPL-MCNC: 1.21 NG/DL (ref 0.76–1.46)
TSH SERPL DL<=0.005 MIU/L-ACNC: 5.74 MU/L (ref 0.4–4)
WBC # BLD AUTO: 8.7 10E9/L (ref 4–11)

## 2018-09-20 PROCEDURE — 80048 BASIC METABOLIC PNL TOTAL CA: CPT | Performed by: PHYSICIAN ASSISTANT

## 2018-09-20 PROCEDURE — 84443 ASSAY THYROID STIM HORMONE: CPT | Performed by: INTERNAL MEDICINE

## 2018-09-20 PROCEDURE — 40000275 ZZH STATISTIC RCP TIME EA 10 MIN

## 2018-09-20 PROCEDURE — 85610 PROTHROMBIN TIME: CPT | Performed by: INTERNAL MEDICINE

## 2018-09-20 PROCEDURE — 84439 ASSAY OF FREE THYROXINE: CPT | Performed by: INTERNAL MEDICINE

## 2018-09-20 PROCEDURE — 36415 COLL VENOUS BLD VENIPUNCTURE: CPT | Performed by: INTERNAL MEDICINE

## 2018-09-20 PROCEDURE — 85027 COMPLETE CBC AUTOMATED: CPT | Performed by: INTERNAL MEDICINE

## 2018-09-20 PROCEDURE — A9270 NON-COVERED ITEM OR SERVICE: HCPCS | Mod: GY | Performed by: INTERNAL MEDICINE

## 2018-09-20 PROCEDURE — 25000128 H RX IP 250 OP 636: Performed by: INTERNAL MEDICINE

## 2018-09-20 PROCEDURE — 99223 1ST HOSP IP/OBS HIGH 75: CPT | Mod: AI | Performed by: INTERNAL MEDICINE

## 2018-09-20 PROCEDURE — 25000125 ZZHC RX 250: Performed by: INTERNAL MEDICINE

## 2018-09-20 PROCEDURE — 83880 ASSAY OF NATRIURETIC PEPTIDE: CPT | Performed by: PHYSICIAN ASSISTANT

## 2018-09-20 PROCEDURE — 25000132 ZZH RX MED GY IP 250 OP 250 PS 637: Mod: GY | Performed by: INTERNAL MEDICINE

## 2018-09-20 PROCEDURE — 94640 AIRWAY INHALATION TREATMENT: CPT

## 2018-09-20 PROCEDURE — 21000001 ZZH R&B HEART CARE

## 2018-09-20 PROCEDURE — 80076 HEPATIC FUNCTION PANEL: CPT | Performed by: INTERNAL MEDICINE

## 2018-09-20 PROCEDURE — 99215 OFFICE O/P EST HI 40 MIN: CPT | Mod: 25 | Performed by: PHYSICIAN ASSISTANT

## 2018-09-20 PROCEDURE — 71046 X-RAY EXAM CHEST 2 VIEWS: CPT

## 2018-09-20 PROCEDURE — 83690 ASSAY OF LIPASE: CPT | Performed by: INTERNAL MEDICINE

## 2018-09-20 RX ORDER — LOSARTAN POTASSIUM 25 MG/1
25 TABLET ORAL AT BEDTIME
Status: DISCONTINUED | OUTPATIENT
Start: 2018-09-20 | End: 2018-09-26 | Stop reason: HOSPADM

## 2018-09-20 RX ORDER — AMOXICILLIN 250 MG
2 CAPSULE ORAL 2 TIMES DAILY
Status: DISCONTINUED | OUTPATIENT
Start: 2018-09-20 | End: 2018-09-26 | Stop reason: HOSPADM

## 2018-09-20 RX ORDER — FUROSEMIDE 10 MG/ML
60 INJECTION INTRAMUSCULAR; INTRAVENOUS EVERY 12 HOURS
Status: DISCONTINUED | OUTPATIENT
Start: 2018-09-20 | End: 2018-09-21

## 2018-09-20 RX ORDER — ONDANSETRON 2 MG/ML
4 INJECTION INTRAMUSCULAR; INTRAVENOUS EVERY 6 HOURS PRN
Status: DISCONTINUED | OUTPATIENT
Start: 2018-09-20 | End: 2018-09-26 | Stop reason: HOSPADM

## 2018-09-20 RX ORDER — MAGNESIUM SULFATE HEPTAHYDRATE 40 MG/ML
4 INJECTION, SOLUTION INTRAVENOUS EVERY 4 HOURS PRN
Status: DISCONTINUED | OUTPATIENT
Start: 2018-09-20 | End: 2018-09-26 | Stop reason: HOSPADM

## 2018-09-20 RX ORDER — LIDOCAINE 40 MG/G
CREAM TOPICAL
Status: DISCONTINUED | OUTPATIENT
Start: 2018-09-20 | End: 2018-09-26 | Stop reason: HOSPADM

## 2018-09-20 RX ORDER — ACETAMINOPHEN 650 MG/1
650 SUPPOSITORY RECTAL EVERY 4 HOURS PRN
Status: DISCONTINUED | OUTPATIENT
Start: 2018-09-20 | End: 2018-09-26 | Stop reason: HOSPADM

## 2018-09-20 RX ORDER — FLUTICASONE PROPIONATE 50 MCG
1-2 SPRAY, SUSPENSION (ML) NASAL DAILY PRN
Status: DISCONTINUED | OUTPATIENT
Start: 2018-09-20 | End: 2018-09-26 | Stop reason: HOSPADM

## 2018-09-20 RX ORDER — NALOXONE HYDROCHLORIDE 0.4 MG/ML
.1-.4 INJECTION, SOLUTION INTRAMUSCULAR; INTRAVENOUS; SUBCUTANEOUS
Status: DISCONTINUED | OUTPATIENT
Start: 2018-09-20 | End: 2018-09-26 | Stop reason: HOSPADM

## 2018-09-20 RX ORDER — POLYETHYLENE GLYCOL 3350 17 G/17G
17 POWDER, FOR SOLUTION ORAL DAILY PRN
Status: DISCONTINUED | OUTPATIENT
Start: 2018-09-20 | End: 2018-09-26 | Stop reason: HOSPADM

## 2018-09-20 RX ORDER — IPRATROPIUM BROMIDE AND ALBUTEROL SULFATE 2.5; .5 MG/3ML; MG/3ML
3 SOLUTION RESPIRATORY (INHALATION)
Status: DISCONTINUED | OUTPATIENT
Start: 2018-09-20 | End: 2018-09-26 | Stop reason: HOSPADM

## 2018-09-20 RX ORDER — ALBUTEROL SULFATE 90 UG/1
1-2 AEROSOL, METERED RESPIRATORY (INHALATION)
Status: DISCONTINUED | OUTPATIENT
Start: 2018-09-20 | End: 2018-09-26 | Stop reason: HOSPADM

## 2018-09-20 RX ORDER — POTASSIUM CHLORIDE 1500 MG/1
20 TABLET, EXTENDED RELEASE ORAL DAILY
Status: DISCONTINUED | OUTPATIENT
Start: 2018-09-20 | End: 2018-09-20

## 2018-09-20 RX ORDER — MONTELUKAST SODIUM 10 MG/1
10 TABLET ORAL AT BEDTIME
Status: DISCONTINUED | OUTPATIENT
Start: 2018-09-20 | End: 2018-09-26 | Stop reason: HOSPADM

## 2018-09-20 RX ORDER — POTASSIUM CHLORIDE 7.45 MG/ML
10 INJECTION INTRAVENOUS
Status: DISCONTINUED | OUTPATIENT
Start: 2018-09-20 | End: 2018-09-26 | Stop reason: HOSPADM

## 2018-09-20 RX ORDER — POTASSIUM CHLORIDE 29.8 MG/ML
20 INJECTION INTRAVENOUS
Status: DISCONTINUED | OUTPATIENT
Start: 2018-09-20 | End: 2018-09-26 | Stop reason: HOSPADM

## 2018-09-20 RX ORDER — BUPROPION HYDROCHLORIDE 150 MG/1
150 TABLET, EXTENDED RELEASE ORAL 2 TIMES DAILY
Status: DISCONTINUED | OUTPATIENT
Start: 2018-09-20 | End: 2018-09-26 | Stop reason: HOSPADM

## 2018-09-20 RX ORDER — BISACODYL 10 MG
10 SUPPOSITORY, RECTAL RECTAL DAILY PRN
Status: DISCONTINUED | OUTPATIENT
Start: 2018-09-20 | End: 2018-09-26 | Stop reason: HOSPADM

## 2018-09-20 RX ORDER — ACETAMINOPHEN 325 MG/1
650 TABLET ORAL EVERY 4 HOURS PRN
Status: DISCONTINUED | OUTPATIENT
Start: 2018-09-20 | End: 2018-09-25

## 2018-09-20 RX ORDER — WARFARIN SODIUM 5 MG/1
5 TABLET ORAL
Status: COMPLETED | OUTPATIENT
Start: 2018-09-20 | End: 2018-09-20

## 2018-09-20 RX ORDER — METOPROLOL TARTRATE 100 MG
100 TABLET ORAL 2 TIMES DAILY
Status: DISCONTINUED | OUTPATIENT
Start: 2018-09-20 | End: 2018-09-21

## 2018-09-20 RX ORDER — POTASSIUM CHLORIDE 1500 MG/1
20 TABLET, EXTENDED RELEASE ORAL AT BEDTIME
Status: DISCONTINUED | OUTPATIENT
Start: 2018-09-20 | End: 2018-09-26 | Stop reason: HOSPADM

## 2018-09-20 RX ORDER — ONDANSETRON 4 MG/1
4 TABLET, ORALLY DISINTEGRATING ORAL EVERY 6 HOURS PRN
Status: DISCONTINUED | OUTPATIENT
Start: 2018-09-20 | End: 2018-09-26 | Stop reason: HOSPADM

## 2018-09-20 RX ORDER — POTASSIUM CHLORIDE 1.5 G/1.58G
20-40 POWDER, FOR SOLUTION ORAL
Status: DISCONTINUED | OUTPATIENT
Start: 2018-09-20 | End: 2018-09-26 | Stop reason: HOSPADM

## 2018-09-20 RX ORDER — POTASSIUM CL/LIDO/0.9 % NACL 10MEQ/0.1L
10 INTRAVENOUS SOLUTION, PIGGYBACK (ML) INTRAVENOUS
Status: DISCONTINUED | OUTPATIENT
Start: 2018-09-20 | End: 2018-09-26 | Stop reason: HOSPADM

## 2018-09-20 RX ORDER — POTASSIUM CHLORIDE 1500 MG/1
20-40 TABLET, EXTENDED RELEASE ORAL
Status: DISCONTINUED | OUTPATIENT
Start: 2018-09-20 | End: 2018-09-26 | Stop reason: HOSPADM

## 2018-09-20 RX ADMIN — MONTELUKAST SODIUM 10 MG: 10 TABLET, FILM COATED ORAL at 21:15

## 2018-09-20 RX ADMIN — METOPROLOL TARTRATE 100 MG: 100 TABLET, FILM COATED ORAL at 21:18

## 2018-09-20 RX ADMIN — FUROSEMIDE 60 MG: 10 INJECTION, SOLUTION INTRAVENOUS at 21:15

## 2018-09-20 RX ADMIN — LOSARTAN POTASSIUM 25 MG: 25 TABLET ORAL at 21:15

## 2018-09-20 RX ADMIN — SENNOSIDES AND DOCUSATE SODIUM 2 TABLET: 8.6; 5 TABLET ORAL at 21:15

## 2018-09-20 RX ADMIN — POTASSIUM CHLORIDE 20 MEQ: 1500 TABLET, EXTENDED RELEASE ORAL at 21:15

## 2018-09-20 RX ADMIN — IPRATROPIUM BROMIDE AND ALBUTEROL SULFATE 3 ML: 2.5; .5 SOLUTION RESPIRATORY (INHALATION) at 19:56

## 2018-09-20 RX ADMIN — ACETAMINOPHEN 650 MG: 325 TABLET, FILM COATED ORAL at 23:54

## 2018-09-20 RX ADMIN — BUPROPION HYDROCHLORIDE 150 MG: 150 TABLET, FILM COATED, EXTENDED RELEASE ORAL at 21:15

## 2018-09-20 RX ADMIN — WARFARIN SODIUM 5 MG: 5 TABLET ORAL at 21:15

## 2018-09-20 ASSESSMENT — ACTIVITIES OF DAILY LIVING (ADL): ADLS_ACUITY_SCORE: 11

## 2018-09-20 NOTE — IP AVS SNAPSHOT
MRN:[id number]                      After Visit Summary   9/20/2018    Home [place]dez    MRN: [id number]           Thank you!     Thank you for choosing [facility] for your care. Our goal is always to provide you with excellent care. Hearing back from our patients is one way we can continue to improve our services. Please take a few minutes to complete the written survey that you may receive in the mail after you visit with us. Thank you!        Patient Information     Date Of Birth          1950        Designated Caregiver       Most Recent Value    Caregiver    Name of designated caregiver Chrissy    Phone number of caregiver [phone]      About your hospital stay     You were admitted on:  September 20, 2018 You last received care in the:  [facility]    You were discharged on:  September 26, 2018        Reason for your hospital stay       Congestive heart failure                  Who to Call     For medical emergencies, please call 911.  For non-urgent questions about your medical care, please call your primary care provider or clinic, [phone]  For questions related to your surgery, please call your surgery clinic        Attending Provider     Provider Specialty    Paul Chance MD Internal Medicine    Augusto Mina MD Internal Medicine       Primary Care Provider Office Phone # Fax #    [facility],  [phone]      After Care Instructions     Activity       Your activity upon discharge: activity as tolerated            Diet       Follow this diet upon discharge: Orders Placed This Encounter      Fluid restriction 1800 ML FLUID      Combination Diet Low Saturated Fat Na <2400mg Diet            Discharge Instructions       INR recheck tomorrow  If lightheaded after standing call your physician team (primary care doctor or cardiologist)  If > 3 lb weight gain or worsening shortness of breath call  your physician team (primary care doctor or cardiologist)                  Follow-up Appointments     Follow-up and recommended labs and tests        Please call to setup a follow-up appointment in 2-3 weeks with Dr. Bowens (Vascular Surgery) with a noncontrasted CT Abdomen/Pelvis scan to discuss your known AAA.  Call the [facility] [phone] Jennifer to make an appointment.            Follow-up and recommended labs and tests        Follow up with primary care provider, Sandip Vega, within 7 days to evaluate medication change and for hospital follow- up.  The following labs/tests are recommended: bmp.      Follow up INR on 9/27    Follow up with cardiology as outlined                  Your next 10 appointments already scheduled     Sep 27, 2018  9:15 AM CDT   [facility]  [facility]   [facility]    [address] [facility] [id number] [phone]            Oct 04, 2018  2:45 PM CDT   Return Visit with REJI [facility] ([facility])    [address] [place] [id number]   [phone]            Oct 18, 2018  8:45 AM CDT   LAB with NL LAB [facility] ([facility])    [address] [place] [id number] [phone]           Please do not eat 10-12 hours before your appointment if you are coming in fasting for labs on lipids, cholesterol, or glucose (sugar). This does not apply to pregnant women. Water, hot tea and black coffee (with nothing added) are okay. Do not drink other fluids, diet soda or chew gum.            Oct 18, 2018  9:00 AM CDT   Ech Complete with PHECHR1   [facility] ([facility])    [address] [place] [address] [address] [place] [address] [phone]           1.  Please bring or wear a comfortable two-piece outfit. 2.  You may eat, drink and take your  normal medicines. 3.  For any questions that cannot be answered, please contact the ordering physician 4.  Please do not wear perfumes or scented lotions on the day of your exam.            Oct 23, 2018  3:00 PM CDT   Return Visit with Jimenez Murphy MD   [facility] ([facility])    [address] [place] [id number] [phone]              Future tests that were ordered for you     Basic metabolic panel           N terminal pro BNP outpatient                 Further instructions from your care team       -There are no open appointments with Dr. Vega in the next 7-10 days. The  has sent a message to his nurse. The clinic will contact you with an appointment time.     General Recommendations To Control Heart Failure When You Get Home     Heart Failure Instructions for Patients and Families: Please read and check off each of these important instructions as you do them when you get home.     Weight and Symptoms    ___ Put a scale in your bathroom.    ___ Post a weight chart or calendar next to your scale.    ___ Weigh yourself everyday as soon as you get up in the morning (before breakfast).  You should only be wearing your pajamas.  Write your weight on the chart/calendar.    ___ Bring your weight chart/calendar with you to all appointments.    ___ Call your doctor or nurse practitioner if you gain 2 pounds (in 1 day) or 5 pounds in (1 week) from your goal  good  weight.  Your good weight is also called your  dry  weight.  Your doctor or nurse will tell you what your good weight should be.    ___ Call your doctor or nurse practitioner if you have shortness of breath that gets worse over time, leg swelling or fatigue.    Medications and Diet    ___ Make sure to take your medication as prescribed.    ___ Bring a current list of your medication and all of your medicine bottles with you to all appointments.    ___ Limit fluids if you  still have swelling or shortness of breath, or if your doctor tells you to do so.      ___ Eat less than 2000 mg of sodium (salt) every day. Read food labels, and do not add salt to meals. Remember, if you eat less salt you retain less fluid.    ___ Follow a heart healthy diet that is low in saturated fat.         Activity and Suggested Lifestyle Changes   ___ Stay active. Talk to your doctor about an exercise program that is safe for your heart.    ___ Stop smoking. Reduce alcohol use.      ___ Lose weight if you are overweight. Extra weight puts a lot of stress on the heart.    Control for Leg Swelling  ___ Keep your legs elevated (raised) as needed for swelling. If swelling is uncomfortable or elevation doesn t help, ask your doctor about using ACE wraps or support stockings.      What is the [facility] Clinic?     Cardiomyopathy  Optimization  Rehabilitation  Education    The [facility] Clinic is a heart failure specialty clinic within [facility].  It is an outpatient disease management program that is based on a phase-by-phase approach, which is tailored to each patient s individual needs.  The cardiologist, nurse practitioner, physician assistant and nurses provide an ongoing outpatient care and treatment plan that guides heart failure and cardiomyopathy patients from evaluation and education to stabilization. This team works with your current primary care doctor and cardiologist to help you:      Avoid hospitalizations    Slow the progression of the disease    Improve length and quality of life    Know who and when to call if heart failure symptoms appear    Receive easy access to quality health care and advice    Better understand your condition and treatment    Decrease the tremendous cost burden of heart failure care    Detect future heart problems before they become life threatening    Your C.O.R.E. Clinic Team will continue to educate you on your heart failure and may adjust medications based on  your vital signs, lab work, and how you are feeling.  Therefore, it is very important to bring the following to all C.O.R.E. appointments:    - An accurate list of your medications  - Your medicine bottles  - Your weight chart/calendar    [facility] ([place]):    [phone]  [facility] ([place]):    [phone]  [facility] ([place]):  [phone]        Pending Results     Date and Time Order Name Status Description    9/25/2018 1537 EKG 12-lead, tracing only Preliminary     9/25/2018 0743 EKG 12-lead, tracing only Preliminary     9/20/2018 1819 T4 free In process             Statement of Approval     Ordered          09/26/18 1536  I have reviewed and agree with all the recommendations and orders detailed in this document.  EFFECTIVE NOW     Approved and electronically signed by:  Jimenez Velasco DO             Admission Information     Date & Time Provider Department Dept. Phone    9/20/2018 Augusto Mina MD [facility] [phone]      Your Vitals Were     Blood Pressure Pulse Temperature Respirations Weight Pulse Oximetry    101/70 76 98.5  F (36.9  C) (Oral) 18 101.3 kg (223 lb 4.8 oz) 92%    BMI (Body Mass Index)                   32.04 kg/m2           Care EveryWhere ID     This is your Care EveryWhere ID. This could be used by other organizations to access your [facility] medical records  [id number]        Equal Access to Services     MILKA ORTIZ AH: Hadii lissette cheungo Sobonnyali, waaxda luqadaha, qaybta kaalmada ademalouyada, jase bhandari. So [facility] [phone].    ATENCIÓN: Si habla español, tiene a calabrese disposición servicios gratuitos de asistencia lingüística. Llame al [phone].    We comply with applicable federal civil rights laws and [place] laws. We do not discriminate on the basis of race, color, national origin, age, disability, sex, sexual  orientation, or gender identity.               Review of your medicines      START taking        Dose / Directions    amiodarone 200 MG tablet   Commonly known as:  PACERONE/CODARONE        Dose:  200 mg   Take 1 tablet (200 mg) by mouth 2 times daily   Quantity:  60 tablet   Refills:  0       spironolactone 25 MG tablet   Commonly known as:  ALDACTONE        Dose:  25 mg   Start taking on:  9/27/2018   Take 1 tablet (25 mg) by mouth daily   Quantity:  30 tablet   Refills:  0         CONTINUE these medicines which may have CHANGED, or have new prescriptions. If we are uncertain of the size of tablets/capsules you have at home, strength may be listed as something that might have changed.        Dose / Directions    * nebulizer/tubing/mouthpiece Kit   This may have changed:  Another medication with the same name was added. Make sure you understand how and when to take each.   Used for:  Chronic obstructive pulmonary disease, unspecified COPD type (H)        Use every 4-6 hours PRN   Quantity:  1 kit   Refills:  11       * AIRS DISPOSABLE NEBULIZER Kit   This may have changed:  You were already taking a medication with the same name, and this prescription was added. Make sure you understand how and when to take each.   Used for:  Panlobular emphysema (H)   Replaces:  AIRS DISPOSABLE NEBULIZER Misc        USE EVERY 4 TO 6 HOURS AS NEEDED   Quantity:  1 kit   Refills:  0       potassium chloride SA 20 MEQ CR tablet   Commonly known as:  K-DUR/KLOR-CON M   This may have changed:  when to take this   Used for:  Peripheral edema        Dose:  20 mEq   Take 1 tablet (20 mEq) by mouth daily   Quantity:  90 tablet   Refills:  2       * Notice:  This list has 2 medication(s) that are the same as other medications prescribed for you. Read the directions carefully, and ask your doctor or other care provider to review them with you.      CONTINUE these medicines which have NOT CHANGED        Dose / Directions    acetaminophen 325  MG tablet   Commonly known as:  TYLENOL        Dose:  650 mg   Take 2 tablets (650 mg) by mouth every 4 hours as needed for other (mild pain)   Quantity:  100 tablet   Refills:  0       ADVAIR DISKUS 250-50 MCG/DOSE diskus inhaler   Used for:  Panlobular emphysema (H)   Generic drug:  fluticasone-salmeterol        INHALE 1 PUFF BY MOUTH TWICE A DAY   Quantity:  1 Inhaler   Refills:  1       buPROPion 150 MG 12 hr tablet   Commonly known as:  WELLBUTRIN SR   Used for:  Personal history of tobacco use, presenting hazards to health        TAKE 1 TABLET BY MOUTH TWICE A DAY   Quantity:  180 tablet   Refills:  1       fluticasone 50 MCG/ACT spray   Commonly known as:  FLONASE   Used for:  Chronic rhinitis        INHALE 1 TO 2 SPRAYS INTO EACH NOSTRIL EVERY DAY   Quantity:  16 g   Refills:  3       ipratropium - albuterol 0.5 mg/2.5 mg/3 mL 0.5-2.5 (3) MG/3ML neb solution   Commonly known as:  DUONEB   Used for:  COPD exacerbation (H)        TAKE 1 VIAL (3 MLS) BY NEBULIZATION EVERY 4 HOURS AS NEEDED FOR SHORTNESS OF BREATH / DYSPNEA OR WHEEZING   Quantity:  540 mL   Refills:  1       JANTOVEN 2.5 MG tablet   Used for:  Atrial fibrillation with RVR (H)   Generic drug:  warfarin        TAKE 7.5 MG (3 TABS) ON TUES, THURS, SAT AND 5 MG (2 TABS) ALL OTHERDAYS, OR AS DIRECTED BY THE COUMADIN CLINIC.   Quantity:  210 tablet   Refills:  1       montelukast 10 MG tablet   Commonly known as:  SINGULAIR   Used for:  Chronic seasonal allergic rhinitis due to other allergen        TAKE 1 TABLET BY MOUTH DAILY AT BEDTIME   Quantity:  90 tablet   Refills:  1       order for DME   Used for:  COPD (chronic obstructive pulmonary disease) (H)        Equipment being ordered: Nebulizer   Quantity:  1 Device   Refills:  0       senna-docusate 8.6-50 MG per tablet   Commonly known as:  SENOKOT-S;PERICOLACE        Dose:  2 tablet   Take 2 tablets by mouth 2 times daily   Refills:  0       torsemide 20 MG tablet   Commonly known as:  DEMADEX         Dose:  20 mg   Take 1 tablet (20 mg) by mouth daily   Quantity:  90 tablet   Refills:  3       VENTOLIN  (90 Base) MCG/ACT inhaler   Used for:  Chronic obstructive pulmonary disease with acute exacerbation (H)   Generic drug:  albuterol        INHALE 2 PUFFS BY MOUTH EVERY 4 HOURS AS NEEDED FOR SHORTNESS OF BREATH/DYSPNEA   Quantity:  18 g   Refills:  3         STOP taking     AIRS DISPOSABLE NEBULIZER Misc   Replaced by:  AIRS DISPOSABLE NEBULIZER Kit           losartan 25 MG tablet   Commonly known as:  COZAAR           metoprolol tartrate 100 MG tablet   Commonly known as:  LOPRESSOR                Where to get your medicines      These medications were sent to [id number] - [place], [place] - [address] [place] [address]  [address] [place] [address]    Hours:  M-F 8:30-6:30; Sat 9-4; closed Sunday Phone:  [phone]     AIRS DISPOSABLE NEBULIZER Kit    amiodarone 200 MG tablet    ipratropium - albuterol 0.5 mg/2.5 mg/3 mL 0.5-2.5 (3) MG/3ML neb solution    spironolactone 25 MG tablet                Protect others around you: Learn how to safely use, store and throw away your medicines at www.disposemymeds.org.             Medication List: This is a list of all your medications and when to take them. Check marks below indicate your daily home schedule. Keep this list as a reference.      Medications           Morning Afternoon Evening Bedtime As Needed    acetaminophen 325 MG tablet   Commonly known as:  TYLENOL   Take 2 tablets (650 mg) by mouth every 4 hours as needed for other (mild pain)   Last time this was given:  650 mg on 9/23/2018 10:34 AM                                   ADVAIR DISKUS 250-50 MCG/DOSE diskus inhaler   INHALE 1 PUFF BY MOUTH TWICE A DAY   Last time this was given:  1 puff on 9/26/2018  8:05 AM   Generic drug:  fluticasone-salmeterol   Next Dose Due:  9/26/18 bedtime                                        amiodarone 200 MG tablet   Commonly known as:  PACERONE/CODARONE   Take 1  tablet (200 mg) by mouth 2 times daily   Last time this was given:  200 mg on 9/26/2018  8:03 AM   Next Dose Due:  9/26/18 bedtime                                        buPROPion 150 MG 12 hr tablet   Commonly known as:  WELLBUTRIN SR   TAKE 1 TABLET BY MOUTH TWICE A DAY   Last time this was given:  150 mg on 9/26/2018  8:03 AM   Next Dose Due:  9/26/28 bedtime                                        fluticasone 50 MCG/ACT spray   Commonly known as:  FLONASE   INHALE 1 TO 2 SPRAYS INTO EACH NOSTRIL EVERY DAY   Next Dose Due:  Resume as usual                                     ipratropium - albuterol 0.5 mg/2.5 mg/3 mL 0.5-2.5 (3) MG/3ML neb solution   Commonly known as:  DUONEB   TAKE 1 VIAL (3 MLS) BY NEBULIZATION EVERY 4 HOURS AS NEEDED FOR SHORTNESS OF BREATH / DYSPNEA OR WHEEZING   Last time this was given:  3 mLs on 9/26/2018  3:43 PM                                   JANTOVEN 2.5 MG tablet   TAKE 7.5 MG (3 TABS) ON TUES, THURS, SAT AND 5 MG (2 TABS) ALL OTHERDAYS, OR AS DIRECTED BY THE COUMADIN CLINIC.   Last time this was given:  7.5 mg on 9/25/2018  5:54 PM   Generic drug:  warfarin   Next Dose Due:  Resume usual schedule                                      montelukast 10 MG tablet   Commonly known as:  SINGULAIR   TAKE 1 TABLET BY MOUTH DAILY AT BEDTIME   Last time this was given:  10 mg on 9/25/2018  9:12 PM   Next Dose Due:  9/26/18 bedtime                                     * nebulizer/tubing/mouthpiece Kit   Use every 4-6 hours PRN                                   * AIRS DISPOSABLE NEBULIZER Kit   USE EVERY 4 TO 6 HOURS AS NEEDED                                   order for DME   Equipment being ordered: Nebulizer                                potassium chloride SA 20 MEQ CR tablet   Commonly known as:  K-DUR/KLOR-CON M   Take 1 tablet (20 mEq) by mouth daily   Last time this was given:  20 mEq on 9/25/2018  9:11 PM   Next Dose Due:  9/27/18 AM                                      senna-docusate  8.6-50 MG per tablet   Commonly known as:  SENOKOT-S;PERICOLACE   Take 2 tablets by mouth 2 times daily   Last time this was given:  2 tablets on 9/26/2018  8:03 AM   Next Dose Due:  9/26/18 bedtime                                        spironolactone 25 MG tablet   Commonly known as:  ALDACTONE   Take 1 tablet (25 mg) by mouth daily   Start taking on:  9/27/2018   Last time this was given:  25 mg on 9/26/2018  8:03 AM   Next Dose Due:  9/27/18 AM                                    torsemide 20 MG tablet   Commonly known as:  DEMADEX   Take 1 tablet (20 mg) by mouth daily   Next Dose Due:  Resume as usual                                      VENTOLIN  (90 Base) MCG/ACT inhaler   INHALE 2 PUFFS BY MOUTH EVERY 4 HOURS AS NEEDED FOR SHORTNESS OF BREATH/DYSPNEA   Generic drug:  albuterol                                * Notice:  This list has 2 medication(s) that are the same as other medications prescribed for you. Read the directions carefully, and ask your doctor or other care provider to review them with you.              More Information        Amiodarone tablets  Brand Names: Cordarone, Pacerone  What is this medicine?  AMIODARONE (a JEAN PAUL oh da audrey) is an antiarrhythmic drug. It helps make your heart beat regularly. Because of the side effects caused by this medicine, it is only used when other medicines have not worked. It is usually used for heartbeat problems that may be life threatening.  How should I use this medicine?  Take this medicine by mouth with a glass of water. Follow the directions on the prescription label. You can take this medicine with or without food. However, you should always take it the same way each time. Take your doses at regular intervals. Do not take your medicine more often than directed. Do not stop taking except on the advice of your doctor or health care professional.  A special MedGuide will be given to you by the pharmacist with each prescription and refill. Be sure to  read this information carefully each time.  Talk to your pediatrician regarding the use of this medicine in children. Special care may be needed.  What side effects may I notice from receiving this medicine?  Side effects that you should report to your doctor or health care professional as soon as possible:    allergic reactions like skin rash, itching or hives, swelling of the face, lips, or tongue    blue-gray coloring of the skin    blurred vision, seeing blue green halos, increased sensitivity of the eyes to light    breathing problems    chest pain    dark urine    fast, irregular heartbeat    feeling faint or light-headed    intolerance to heat or cold    nausea or vomiting    pain and swelling of the scrotum    pain, tingling, numbness in feet, hands    redness, blistering, peeling or loosening of the skin, including inside the mouth    spitting up blood    stomach pain    sweating    unusual or uncontrolled movements of body    unusually weak or tired    weight gain or loss    yellowing of the eyes or skin  Side effects that usually do not require medical attention (report to your doctor or health care professional if they continue or are bothersome):    change in sex drive or performance    constipation    dizziness    headache    loss of appetite    trouble sleeping  What may interact with this medicine?  Do not take this medicine with any of the following medications:    abarelix    apomorphine    arsenic trioxide    certain antibiotics like erythromycin, gemifloxacin, levofloxacin, pentamidine    certain medicines for depression like amoxapine, tricyclic antidepressants    certain medicines for fungal infections like fluconazole, itraconazole, ketoconazole, posaconazole, voriconazole    certain medicines for irregular heart beat like disopyramide, dofetilide, dronedarone, ibutilide, propafenone, sotalol    certain medicines for malaria like chloroquine,  halofantrine    cisapride    droperidol    haloperidol    hawthorn    maprotiline    methadone    phenothiazines like chlorpromazine, mesoridazine, thioridazine    pimozide    ranolazine    red yeast rice    vardenafil    ziprasidone  This medicine may also interact with the following medications:    antiviral medicines for HIV or AIDS    certain medicines for blood pressure, heart disease, irregular heart beat    certain medicines for cholesterol like atorvastatin, cerivastatin, lovastatin, simvastatin    certain medicines for hepatitis C like sofosbuvir and ledipasvir; sofosbuvir    certain medicines for seizures like phenytoin    certain medicines for thyroid problems    certain medicines that treat or prevent blood clots like warfarin    cholestyramine    cimetidine    clopidogrel    cyclosporine    dextromethorphan    diuretics    fentanyl    general anesthetics    grapefruit juice    lidocaine    loratadine    methotrexate    other medicines that prolong the QT interval (cause an abnormal heart rhythm)    procainamide    quinidine    rifabutin, rifampin, or rifapentine    [place]'s Wort    trazodone  What if I miss a dose?  If you miss a dose, take it as soon as you can. If it is almost time for your next dose, take only that dose. Do not take double or extra doses.  Where should I keep my medicine?  Keep out of the reach of children.  Store at room temperature between 20 and 25 degrees C (68 and 77 degrees F). Protect from light. Keep container tightly closed. Throw away any unused medicine after the expiration date.  What should I tell my health care provider before I take this medicine?  They need to know if you have any of these conditions:    liver disease    lung disease    other heart problems    thyroid disease    an unusual or allergic reaction to amiodarone, iodine, other medicines, foods, dyes, or preservatives    pregnant or trying to get pregnant    breast-feeding  What should I watch for while  using this medicine?  Your condition will be monitored closely when you first begin therapy. Often, this drug is first started in a hospital or other monitored health care setting. Once you are on maintenance therapy, visit your doctor or health care professional for regular checks on your progress. Because your condition and use of this medicine carry some risk, it is a good idea to carry an identification card, necklace or bracelet with details of your condition, medications, and doctor or health care professional.  You may get drowsy or dizzy. Do not drive, use machinery, or do anything that needs mental alertness until you know how this medicine affects you. Do not stand or sit up quickly, especially if you are an older patient. This reduces the risk of dizzy or fainting spells.  This medicine can make you more sensitive to the sun. Keep out of the sun. If you cannot avoid being in the sun, wear protective clothing and use sunscreen. Do not use sun lamps or tanning beds/booths.  You should have regular eye exams before and during treatment. Call your doctor if you have blurred vision, see halos, or your eyes become sensitive to light. Your eyes may get dry. It may be helpful to use a lubricating eye solution or artificial tears solution.  If you are going to have surgery or a procedure that requires contrast dyes, tell your doctor or health care professional that you are taking this medicine.  NOTE:This sheet is a summary. It may not cover all possible information. If you have questions about this medicine, talk to your doctor, pharmacist, or health care provider. Copyright  2018 [employer]                Spironolactone tablets  Brand Name: Aldactone  What is this medicine?  SPIRONOLACTONE (roderick on [place] LAK tone) is a diuretic. It helps you make more urine and to lose excess water from your body. This medicine is used to treat high blood pressure, and edema or swelling from heart, kidney, or liver disease. It is  also used to treat patients who make too much aldosterone or have low potassium.  How should I use this medicine?  Take this medicine by mouth with a drink of water. Follow the directions on your prescription label. You can take it with or without food. If it upsets your stomach, take it with food. Do not take your medicine more often than directed. Remember that you will need to pass more urine after taking this medicine. Do not take your doses at a time of day that will cause you problems. Do not take at bedtime.  Talk to your pediatrician regarding the use of this medicine in children. While this drug may be prescribed for selected conditions, precautions do apply.  What side effects may I notice from receiving this medicine?  Side effects that you should report to your doctor or health care professional as soon as possible:    allergic reactions such as skin rash or itching, hives, swelling of the lips, mouth, tongue, or throat    black or tarry stools    fast, irregular heartbeat    fever    muscle pain, cramps    numbness, tingling in hands or feet    trouble breathing    trouble passing urine    unusual bleeding    unusually weak or tired  Side effects that usually do not require medical attention (report to your doctor or health care professional if they continue or are bothersome):    change in voice or hair growth    confusion    dizzy, drowsy    dry mouth, increased thirst    enlarged or tender breasts    headache    irregular menstrual periods    sexual difficulty, unable to have an erection    stomach upset  What may interact with this medicine?  Do not take this medicine with any of the following medications:    eplerenone  This medicine may also interact with the following medications:    corticosteroids    digoxin    lithium    medicines for high blood pressure like ACE inhibitors    skeletal muscle relaxants like tubocurarine    NSAIDs, medicines for pain and inflammation, like ibuprofen or  naproxen    potassium products like salt substitute or supplements    pressor amines like norepinephrine    some diuretics  What if I miss a dose?  If you miss a dose, take it as soon as you can. If it is almost time for your next dose, take only that dose. Do not take double or extra doses.  Where should I keep my medicine?  Keep out of the reach of children.  Store below 25 degrees C (77 degrees F). Throw away any unused medicine after the expiration date.  What should I tell my health care provider before I take this medicine?  They need to know if you have any of these conditions:    high blood level of potassium    kidney disease or trouble making urine    liver disease    an unusual or allergic reaction to spironolactone, other medicines, foods, dyes, or preservatives    pregnant or trying to get pregnant    breast-feeding  What should I watch for while using this medicine?  Visit your doctor or health care professional for regular checks on your progress. Check your blood pressure as directed. Ask your doctor what your blood pressure should be, and when you should contact them.  You may need to be on a special diet while taking this medicine. Ask your doctor. Also, ask how many glasses of fluid you need to drink a day. You must not get dehydrated.  This medicine may make you feel confused, dizzy or lightheaded. Drinking alcohol and taking some medicines can make this worse. Do not drive, use machinery, or do anything that needs mental alertness until you know how this medicine affects you. Do not sit or stand up quickly.  NOTE:This sheet is a summary. It may not cover all possible information. If you have questions about this medicine, talk to your doctor, pharmacist, or health care provider. Copyright  2018 [employer]
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
## 2018-09-20 NOTE — NURSING NOTE
Per ALEJANDRO Méndez, pt needs admission for Acute Systolic CHF at [facility]. Called patient placement. Hospitalist will call back and speak with ALEJANDRO Méndez directly. Patient placement to call me back with a bed number. Patient is going to go home and pack a bag, ALEJANDRO Méndez stated this would be fine. I reviewed with patient, that once I hear from patient placement, that I will call him with instructions on what room he should go to. Patient and wife stated understanding. FRENCH Lang 12:47 PM 09/20/18

## 2018-09-20 NOTE — PROGRESS NOTES
[phone] Reviewed pt being seen today and will most likely need IV Lasix. Spoke with Theresa, they can fit pt in today at 1:30pm. Scheduled pt for this. Told Theresa I would call back with dose and set plan after pt's visit at 11:20am, and then will also fax order to [phone]. Will update ALEJANDRO Méndez for visit today. FRENCH Lang 8:37 AM 09/20/18
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
## 2018-09-20 NOTE — TELEPHONE ENCOUNTER
I have attempted to contact this patient by phone (home and ), left message to return call at [phone] or [phone].  Will try again later.    aFbiola Kent RN- [facility] RN

## 2018-09-20 NOTE — TELEPHONE ENCOUNTER
Spoke with Chrissy, spouse. Said that [facility] due to fluid build up. Was going to suggest 2.5 mg Fri and 5 mg ROW, but will see what happens if/or pt gets admitted. She will let us know.     Fabiola Kent, RN- [facility] RN
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
## 2018-09-20 NOTE — PROGRESS NOTES
Call from patient placement, pt will be going to [phone]. Called pt's wife, reviewed room number. They have been to [facility] before. Reviewed to go in [place] doors and check in at welcome desk. Wife stated understanding. FRENCH Lang 2:32 PM 09/20/18

## 2018-09-20 NOTE — IP AVS SNAPSHOT
[facility]    [address] [place] [address]., [address]    [place] [address]    Phone:  [phone]                                       After Visit Summary   9/20/2018    Home Valdez    MRN: [id number]           After Visit Summary Signature Page     I have received my discharge instructions, and my questions have been answered. I have discussed any challenges I see with this plan with the nurse or doctor.    ..........................................................................................................................................  Patient/Patient Representative Signature      ..........................................................................................................................................  Patient Representative Print Name and Relationship to Patient    ..................................................               ................................................  Date                                   Time    ..........................................................................................................................................  Reviewed by Signature/Title    ...................................................              ..............................................  Date                                               Time          22EPIC Rev 08/18

## 2018-09-20 NOTE — MR AVS SNAPSHOT
After Visit Summary   9/20/2018    Home Valdez    MRN: [id number]           Patient Information     Date Of Birth          1950        Visit Information        Provider Department      9/20/2018 11:20 AM Yancy Keller PA-C [facility]        Today's Diagnoses     Atrial fibrillation with RVR (H)    -  1    Acute on chronic systolic congestive heart failure (H)        Long-term (current) use of anticoagulants [Z79.01]          Care Instructions    We are going to bring you into the hospital for admission to help get the fluid off. We have called patient placement at [facility] in [place].     The Nurse will call you once we hear back on a bed/room number.           Follow-ups after your visit        Your next 10 appointments already scheduled     Sep 20, 2018  1:30 PM CDT   Level 1 with NL INFUSION CHAIR 2   [facility] ([facility])    [address] [address] [phone]            Sep 28, 2018  8:30 AM CDT   Anticoagulation Visit with  ANTI COAG   [facility]    [address] [facility] [address] [phone]            Oct 18, 2018  8:45 AM CDT   LAB with NL LAB [facility]   [facility]    [address] [address] [id number] [phone]           Please do not eat 10-12 hours before your appointment if you are coming in fasting for labs on lipids, cholesterol, or glucose (sugar). This does not apply to pregnant women. Water, hot tea and black coffee (with nothing added) are okay. Do not drink other fluids, diet soda or chew gum.            Oct 18, 2018  9:00 AM CDT   Ech Complete with PHECHR1   [facility] ([facility])    [address] [id number] [phone]           1.  Please bring or wear a comfortable  "two-piece outfit. 2.  You may eat, drink and take your normal medicines. 3.  For any questions that cannot be answered, please contact the ordering physician 4.  Please do not wear perfumes or scented lotions on the day of your exam.            Oct 25, 2018  1:00 PM CDT   Return Visit with Jimenez Murphy MD   [facility] ([facility])    [address] [place] [address] [phone]              Who to contact     If you have questions or need follow up information about today's clinic visit or your schedule please contact [facility] directly at [phone].  Normal or non-critical lab and imaging results will be communicated to you by MyChart, letter or phone within 4 business days after the clinic has received the results. If you do not hear from us within 7 days, please contact the clinic through MyChart or phone. If you have a critical or abnormal lab result, we will notify you by phone as soon as possible.  Submit refill requests through [employer] or call your pharmacy and they will forward the refill request to us. Please allow 3 business days for your refill to be completed.          Additional Information About Your Visit        Care EveryWhere ID     This is your Care EveryWhere ID. This could be used by other organizations to access your [facility] medical records  [id number]        Your Vitals Were     Pulse Height Pulse Oximetry BMI (Body Mass Index)          88 1.778 m (5' 10\") 97% 34.84 kg/m2         Blood Pressure from Last 3 Encounters:   09/20/18 94/78   09/12/18 102/62   09/04/18 96/62    Weight from Last 3 Encounters:   09/20/18 110.1 kg (242 lb 12.8 oz)   09/12/18 110.2 kg (243 lb)   09/04/18 109.4 kg (241 lb 1.6 oz)              We Performed the Following     Basic metabolic panel     INR     N terminal pro BNP outpatient        Primary Care Provider Office Phone # Fax #    Sandip " Home Vega, [phone]       [address] [place] [address]        Equal Access to Services     MILKA ORTIZ : Hadii aad ku hadnette Sofede, wajanettda luqadaha, sohailta kamarco ada compa, jase andrade heikemalou see laCarylbrendan bhandari. So [facility] [phone].    ATENCIÓN: Si habla español, tiene a calabrese disposición servicios gratuitos de asistencia lingüística. Llame al [phone].    We comply with applicable federal civil rights laws and [place] laws. We do not discriminate on the basis of race, color, national origin, age, disability, sex, sexual orientation, or gender identity.            Thank you!     Thank you for choosing [facility]  for your care. Our goal is always to provide you with excellent care. Hearing back from our patients is one way we can continue to improve our services. Please take a few minutes to complete the written survey that you may receive in the mail after your visit with us. Thank you!             Your Updated Medication List - Protect others around you: Learn how to safely use, store and throw away your medicines at www.disposemymeds.org.          This list is accurate as of 9/20/18 12:54 PM.  Always use your most recent med list.                   Brand Name Dispense Instructions for use Diagnosis    acetaminophen 325 MG tablet    TYLENOL    100 tablet    Take 2 tablets (650 mg) by mouth every 4 hours as needed for other (mild pain)        ADVAIR DISKUS 250-50 MCG/DOSE diskus inhaler   Generic drug:  fluticasone-salmeterol     1 Inhaler    INHALE 1 PUFF BY MOUTH TWICE A DAY    Panlobular emphysema (H)       AIRS DISPOSABLE NEBULIZER Misc     1 each    USE EVERY 4 TO 6 HOURS AS NEEDED    Panlobular emphysema (H)       buPROPion 150 MG 12 hr tablet    WELLBUTRIN SR    180 tablet    TAKE 1 TABLET BY MOUTH TWICE A DAY    Personal history of tobacco use, presenting hazards to health       fluticasone 50 MCG/ACT spray     FLONASE    16 g    INHALE 1 TO 2 SPRAYS INTO EACH NOSTRIL EVERY DAY    Chronic rhinitis       HYDROcodone-acetaminophen 5-325 MG per tablet    NORCO    15 tablet    Take 1 tablet by mouth every 4 hours as needed for pain        ipratropium - albuterol 0.5 mg/2.5 mg/3 mL 0.5-2.5 (3) MG/3ML neb solution    DUONEB    540 mL    TAKE 1 VIAL (3 MLS) BY NEBULIZATION EVERY 4 HOURS AS NEEDED FOR SHORTNESS OF BREATH / DYSPNEA OR WHEEZING    COPD exacerbation (H)       * JANTOVEN 2.5 MG tablet   Generic drug:  warfarin     210 tablet    Took 5mg yesterday, due to see INR clinic today (7/30/18)    Atrial fibrillation with RVR (H)       * JANTOVEN 2.5 MG tablet   Generic drug:  warfarin     210 tablet    TAKE 7.5 MG (3 TABS) ON TUES, THURS, SAT AND 5 MG (2 TABS) ALL OTHERDAYS, OR AS DIRECTED BY THE COUMADIN CLINIC.    Atrial fibrillation with RVR (H)       losartan 25 MG tablet    COZAAR    90 tablet    Take 1 tablet (25 mg) by mouth At Bedtime    Hypertension goal BP (blood pressure) < 140/90, Nonischemic cardiomyopathy (H)       metoprolol tartrate 100 MG tablet    LOPRESSOR    180 tablet    Take 1 tablet (100 mg) by mouth 2 times daily    Atrial fibrillation with rapid ventricular response (H)       montelukast 10 MG tablet    SINGULAIR    90 tablet    TAKE 1 TABLET BY MOUTH DAILY AT BEDTIME    Chronic seasonal allergic rhinitis due to other allergen       nebulizer/tubing/mouthpiece Kit     1 kit    Use every 4-6 hours PRN    Chronic obstructive pulmonary disease, unspecified COPD type (H)       order for DME     1 Device    Equipment being ordered: Nebulizer    COPD (chronic obstructive pulmonary disease) (H)       potassium chloride SA 20 MEQ CR tablet    K-DUR/KLOR-CON M    90 tablet    Take 1 tablet (20 mEq) by mouth daily    CHF (congestive heart failure) (H), Peripheral edema       senna-docusate 8.6-50 MG per tablet    SENOKOT-S;PERICOLACE     Take 2 tablets by mouth 2 times daily        torsemide 20 MG tablet     DEMADEX    90 tablet    Take 1 tablet (20 mg) by mouth daily    Chronic diastolic congestive heart failure (H)       VENTOLIN  (90 Base) MCG/ACT inhaler   Generic drug:  albuterol     18 g    INHALE 2 PUFFS BY MOUTH EVERY 4 HOURS AS NEEDED FOR SHORTNESS OF BREATH/DYSPNEA    Chronic obstructive pulmonary disease with acute exacerbation (H)       * Notice:  This list has 2 medication(s) that are the same as other medications prescribed for you. Read the directions carefully, and ask your doctor or other care provider to review them with you.

## 2018-09-20 NOTE — LETTER
Transition Communication Hand-off for Care Transitions to Next Level of Care Provider    Name: Home Valdez  : 1950  MRN #: [id number]  Primary Care Provider: Sandip Vega     Primary Clinic: [address] [place] [address] [address] [id number]     Reason for Hospitalization:  acute heart failure   CHF (congestive heart failure) (H)  CONGESTIVE HEART FAILURE.  Admit Date/Time: 2018  4:07 PM  Discharge Date: 18  Payor Source: Payor: MEDICARE / Plan: MEDICARE / Product Type: Medicare /     Readmission Assessment Measure (LOREN) Risk Score/category: Elevated    Plan of Care Goals/Milestone Events:    Reason for Communication Hand-off Referral: Admission diagnoses: CHF    Discharge Plan: Home with outpt follow-up with INR check in clinic on        Concern for non-adherence with plan of care:   Y/N : no  Discharge Needs Assessment:  Needs       Most Recent Value    Transportation Available car, family or friend will provide          Already enrolled in Tele-monitoring program and name of program:  no  Follow-up specialty is recommended: Yes    Follow-up plan:  Future Appointments  Date Time Provider Department Center   10/1/2018 10:45 AM  ANTI COAG [facility]   10/4/2018 2:45 PM Mayra Krueger, APRN CNP [facility]   10/18/2018 8:45 AM NL LAB PMC [facility]   10/18/2018 9:00 AM PHECHR1 [facility] [place] [facility]   10/23/2018 3:00 PM Jimenez Murphy MD [facility]       Any outstanding tests or procedures:     INR check on         Key Recommendations:  The pt needs close follow-up with INR check on Friday, . The pt also need to see Dr. Vega left a message with his nurse to see if the pt can be worked in. The pt knows the clinic will be contacting him with his appointment.     Azra Hernandez RN    AVS/Discharge Summary is the source of truth; this is a helpful guide for improved communication of patient story

## 2018-09-20 NOTE — PATIENT INSTRUCTIONS
We are going to bring you into the hospital for admission to help get the fluid off. We have called patient placement at [facility] in [place].     The Nurse will call you once we hear back on a bed/room number.

## 2018-09-20 NOTE — LETTER
[employer] Advance Care Planning          Home Valdez  [address] [facility] [address]    Dear Mr. Valdez,    We have reviewed the Health Care Directive dated 9/20/18 which you presented for addition to   your medical record. Unfortunately, our review indicates the document is not legally valid for   the following reason(s): The document is missing page(s) 4,6,8,10,14;  The document appears to have been altered on page 5.      In order to create a legal document you will need to create a new Health Care Directive.  I have enclosed one for your convenience.    We greatly value the opportunity to assist you in documenting your choices and to honor your   wishes. We apologize for any inconvenience. We have several options to assist you in updating   your document so it meets legal requirements.       Health Care Directives and Advance Care Planning resources can be viewed and printed   for free at our web site:[email or web address].       Free group classes on Advance Care Planning and completing a Health Care Directive are  available at multiple locations and times. These classes are led by trained staff who will   provide information and guide you through a Health Care Directive. They can also review, notarize and add your Health Care Directive to your medical record. [place] for a class at [email or web address] or by calling [employer] at [phone] or toll free [phone].      COPIES of completed Health Care Directives can be brought or mailed to any of our   locations, including the address listed below. You can also email a copy to [email or web address] .       For additional assistance or questions you can email us at [email or web address] or call [phone]    Sincerely,     [employer]  [email or web address] [facility], [facility], and [facility]  [address] [place] [address] [address] [place] [id number]   Email us:  [email or web address] Call us: [phone]

## 2018-09-20 NOTE — LETTER
September 26, 2018        Home Valdez  [address] [facility] [address]    To Whom it May Concern:    Home Valdez was seen on 9/26/2018 and was given the following instructions:  Continue previous lifting and walking restrictions previously documented by .    Sincerely,          Mayra Krueger, NP

## 2018-09-20 NOTE — PROGRESS NOTES
Pt's wife called and left VM that she wanted to update us with pt's response to increased dose of torsemide yesterday (took additional 40 mg in the afternoon). She states that patient lost 2.6# this morning, home wt this morning is 239.6#. She said pt's LE edema is much better today. Pt took the torsemide late in the afternoon yesterday and reports waking up late into the night and had a large amount of urine. She reports he also had a loose stool and is c/o a cramping sensation extending across his central abdomen. She said although his swelling is improved, basica ADL's such as showering and hauling garbage out are tought - he gets very fatigued and SOB. Yesterday afternoon she reported lower BP and dizziness to FRENCH Moran so today she took orthostatics with the following readings: 118/84 lying and 118/78 sitting and 118/78 standing. I recommended they prepare for a longer day today as he might be going to get IV diuretics after his appt. Pt and wife looking forward to OV today.

## 2018-09-21 ENCOUNTER — APPOINTMENT (OUTPATIENT)
Dept: OCCUPATIONAL THERAPY | Facility: CLINIC | Age: 68
DRG: 292 | End: 2018-09-21
Attending: INTERNAL MEDICINE
Payer: MEDICARE

## 2018-09-21 ENCOUNTER — APPOINTMENT (OUTPATIENT)
Dept: CT IMAGING | Facility: CLINIC | Age: 68
DRG: 292 | End: 2018-09-21
Attending: STUDENT IN AN ORGANIZED HEALTH CARE EDUCATION/TRAINING PROGRAM
Payer: MEDICARE

## 2018-09-21 ENCOUNTER — APPOINTMENT (OUTPATIENT)
Dept: CARDIOLOGY | Facility: CLINIC | Age: 68
DRG: 292 | End: 2018-09-21
Attending: INTERNAL MEDICINE
Payer: MEDICARE

## 2018-09-21 LAB
ALBUMIN SERPL-MCNC: 3.5 G/DL (ref 3.4–5)
ALP SERPL-CCNC: 75 U/L (ref 40–150)
ALT SERPL W P-5'-P-CCNC: 51 U/L (ref 0–70)
ANION GAP SERPL CALCULATED.3IONS-SCNC: 7 MMOL/L (ref 3–14)
AST SERPL W P-5'-P-CCNC: 33 U/L (ref 0–45)
BILIRUB SERPL-MCNC: 1 MG/DL (ref 0.2–1.3)
BUN SERPL-MCNC: 38 MG/DL (ref 7–30)
CALCIUM SERPL-MCNC: 8.7 MG/DL (ref 8.5–10.1)
CHLORIDE SERPL-SCNC: 104 MMOL/L (ref 94–109)
CO2 SERPL-SCNC: 31 MMOL/L (ref 20–32)
CREAT SERPL-MCNC: 1.94 MG/DL (ref 0.66–1.25)
ERYTHROCYTE [DISTWIDTH] IN BLOOD BY AUTOMATED COUNT: 14.8 % (ref 10–15)
GFR SERPL CREATININE-BSD FRML MDRD: 35 ML/MIN/1.7M2
GLUCOSE SERPL-MCNC: 87 MG/DL (ref 70–99)
HCT VFR BLD AUTO: 48 % (ref 40–53)
HGB BLD-MCNC: 15.1 G/DL (ref 13.3–17.7)
INR PPP: 3.04 (ref 0.86–1.14)
LACTATE BLD-SCNC: 1 MMOL/L (ref 0.7–2)
MCH RBC QN AUTO: 30 PG (ref 26.5–33)
MCHC RBC AUTO-ENTMCNC: 31.5 G/DL (ref 31.5–36.5)
MCV RBC AUTO: 95 FL (ref 78–100)
PLATELET # BLD AUTO: 183 10E9/L (ref 150–450)
POTASSIUM SERPL-SCNC: 4 MMOL/L (ref 3.4–5.3)
PROT SERPL-MCNC: 6.1 G/DL (ref 6.8–8.8)
RBC # BLD AUTO: 5.03 10E12/L (ref 4.4–5.9)
SODIUM SERPL-SCNC: 142 MMOL/L (ref 133–144)
WBC # BLD AUTO: 7.1 10E9/L (ref 4–11)

## 2018-09-21 PROCEDURE — 97165 OT EVAL LOW COMPLEX 30 MIN: CPT | Mod: GO

## 2018-09-21 PROCEDURE — 94640 AIRWAY INHALATION TREATMENT: CPT | Mod: 76

## 2018-09-21 PROCEDURE — 25000125 ZZHC RX 250: Performed by: INTERNAL MEDICINE

## 2018-09-21 PROCEDURE — 40000275 ZZH STATISTIC RCP TIME EA 10 MIN

## 2018-09-21 PROCEDURE — 21000001 ZZH R&B HEART CARE

## 2018-09-21 PROCEDURE — 25000132 ZZH RX MED GY IP 250 OP 250 PS 637: Mod: GY | Performed by: PHYSICIAN ASSISTANT

## 2018-09-21 PROCEDURE — 93308 TTE F-UP OR LMTD: CPT | Mod: 26 | Performed by: INTERNAL MEDICINE

## 2018-09-21 PROCEDURE — 94640 AIRWAY INHALATION TREATMENT: CPT

## 2018-09-21 PROCEDURE — 83605 ASSAY OF LACTIC ACID: CPT | Performed by: PHYSICIAN ASSISTANT

## 2018-09-21 PROCEDURE — 99207 ZZC CDG-MDM COMPONENT: MEETS MODERATE - UP CODED: CPT | Performed by: INTERNAL MEDICINE

## 2018-09-21 PROCEDURE — A9270 NON-COVERED ITEM OR SERVICE: HCPCS | Mod: GY | Performed by: INTERNAL MEDICINE

## 2018-09-21 PROCEDURE — 97110 THERAPEUTIC EXERCISES: CPT | Mod: GO

## 2018-09-21 PROCEDURE — 25000128 H RX IP 250 OP 636: Performed by: INTERNAL MEDICINE

## 2018-09-21 PROCEDURE — 93321 DOPPLER ECHO F-UP/LMTD STD: CPT | Mod: 26 | Performed by: INTERNAL MEDICINE

## 2018-09-21 PROCEDURE — 99221 1ST HOSP IP/OBS SF/LOW 40: CPT | Performed by: SURGERY

## 2018-09-21 PROCEDURE — 85027 COMPLETE CBC AUTOMATED: CPT | Performed by: INTERNAL MEDICINE

## 2018-09-21 PROCEDURE — 85610 PROTHROMBIN TIME: CPT | Performed by: INTERNAL MEDICINE

## 2018-09-21 PROCEDURE — 74176 CT ABD & PELVIS W/O CONTRAST: CPT

## 2018-09-21 PROCEDURE — 36415 COLL VENOUS BLD VENIPUNCTURE: CPT | Performed by: PHYSICIAN ASSISTANT

## 2018-09-21 PROCEDURE — 80053 COMPREHEN METABOLIC PANEL: CPT | Performed by: INTERNAL MEDICINE

## 2018-09-21 PROCEDURE — 93325 DOPPLER ECHO COLOR FLOW MAPG: CPT | Mod: 26 | Performed by: INTERNAL MEDICINE

## 2018-09-21 PROCEDURE — 36415 COLL VENOUS BLD VENIPUNCTURE: CPT | Performed by: INTERNAL MEDICINE

## 2018-09-21 PROCEDURE — 99221 1ST HOSP IP/OBS SF/LOW 40: CPT | Mod: 25 | Performed by: INTERNAL MEDICINE

## 2018-09-21 PROCEDURE — 25500064 ZZH RX 255 OP 636: Performed by: INTERNAL MEDICINE

## 2018-09-21 PROCEDURE — A9270 NON-COVERED ITEM OR SERVICE: HCPCS | Mod: GY | Performed by: PHYSICIAN ASSISTANT

## 2018-09-21 PROCEDURE — 99233 SBSQ HOSP IP/OBS HIGH 50: CPT | Performed by: INTERNAL MEDICINE

## 2018-09-21 PROCEDURE — 40000133 ZZH STATISTIC OT WARD VISIT

## 2018-09-21 PROCEDURE — 93308 TTE F-UP OR LMTD: CPT

## 2018-09-21 PROCEDURE — 25000132 ZZH RX MED GY IP 250 OP 250 PS 637: Mod: GY | Performed by: INTERNAL MEDICINE

## 2018-09-21 RX ORDER — NEBULIZER ACCESSORIES
KIT MISCELLANEOUS
Qty: 1 KIT | Refills: 0 | Status: SHIPPED | OUTPATIENT
Start: 2018-09-21 | End: 2019-01-07

## 2018-09-21 RX ORDER — METOPROLOL SUCCINATE 50 MG/1
50 TABLET, EXTENDED RELEASE ORAL DAILY
Status: DISCONTINUED | OUTPATIENT
Start: 2018-09-22 | End: 2018-09-22

## 2018-09-21 RX ORDER — FUROSEMIDE 10 MG/ML
40 INJECTION INTRAMUSCULAR; INTRAVENOUS EVERY 8 HOURS
Status: DISCONTINUED | OUTPATIENT
Start: 2018-09-22 | End: 2018-09-23

## 2018-09-21 RX ORDER — IPRATROPIUM BROMIDE AND ALBUTEROL SULFATE 2.5; .5 MG/3ML; MG/3ML
SOLUTION RESPIRATORY (INHALATION)
Qty: 540 ML | Refills: 1 | Status: SHIPPED | OUTPATIENT
Start: 2018-09-21 | End: 2018-10-20

## 2018-09-21 RX ORDER — WARFARIN SODIUM 5 MG/1
5 TABLET ORAL
Status: COMPLETED | OUTPATIENT
Start: 2018-09-21 | End: 2018-09-21

## 2018-09-21 RX ADMIN — IPRATROPIUM BROMIDE AND ALBUTEROL SULFATE 3 ML: 2.5; .5 SOLUTION RESPIRATORY (INHALATION) at 10:58

## 2018-09-21 RX ADMIN — SENNOSIDES AND DOCUSATE SODIUM 2 TABLET: 8.6; 5 TABLET ORAL at 21:46

## 2018-09-21 RX ADMIN — HUMAN ALBUMIN MICROSPHERES AND PERFLUTREN 3 ML: 10; .22 INJECTION, SOLUTION INTRAVENOUS at 14:33

## 2018-09-21 RX ADMIN — MONTELUKAST SODIUM 10 MG: 10 TABLET, FILM COATED ORAL at 21:46

## 2018-09-21 RX ADMIN — ACETAMINOPHEN 650 MG: 325 TABLET, FILM COATED ORAL at 23:04

## 2018-09-21 RX ADMIN — BUPROPION HYDROCHLORIDE 150 MG: 150 TABLET, FILM COATED, EXTENDED RELEASE ORAL at 08:54

## 2018-09-21 RX ADMIN — IPRATROPIUM BROMIDE AND ALBUTEROL SULFATE 3 ML: 2.5; .5 SOLUTION RESPIRATORY (INHALATION) at 23:35

## 2018-09-21 RX ADMIN — WARFARIN SODIUM 5 MG: 5 TABLET ORAL at 18:14

## 2018-09-21 RX ADMIN — SENNOSIDES AND DOCUSATE SODIUM 2 TABLET: 8.6; 5 TABLET ORAL at 08:54

## 2018-09-21 RX ADMIN — METOPROLOL TARTRATE 100 MG: 100 TABLET, FILM COATED ORAL at 20:08

## 2018-09-21 RX ADMIN — IPRATROPIUM BROMIDE AND ALBUTEROL SULFATE 3 ML: 2.5; .5 SOLUTION RESPIRATORY (INHALATION) at 07:22

## 2018-09-21 RX ADMIN — METOPROLOL TARTRATE 100 MG: 100 TABLET, FILM COATED ORAL at 08:54

## 2018-09-21 RX ADMIN — IPRATROPIUM BROMIDE AND ALBUTEROL SULFATE 3 ML: 2.5; .5 SOLUTION RESPIRATORY (INHALATION) at 20:20

## 2018-09-21 RX ADMIN — FUROSEMIDE 60 MG: 10 INJECTION, SOLUTION INTRAVENOUS at 08:53

## 2018-09-21 RX ADMIN — BUPROPION HYDROCHLORIDE 150 MG: 150 TABLET, FILM COATED, EXTENDED RELEASE ORAL at 20:08

## 2018-09-21 RX ADMIN — POTASSIUM CHLORIDE 20 MEQ: 1500 TABLET, EXTENDED RELEASE ORAL at 21:45

## 2018-09-21 RX ADMIN — IPRATROPIUM BROMIDE AND ALBUTEROL SULFATE 3 ML: 2.5; .5 SOLUTION RESPIRATORY (INHALATION) at 15:41

## 2018-09-21 RX ADMIN — FUROSEMIDE 60 MG: 10 INJECTION, SOLUTION INTRAVENOUS at 20:08

## 2018-09-21 ASSESSMENT — ACTIVITIES OF DAILY LIVING (ADL)
PREVIOUS_RESPONSIBILITIES: MEAL PREP;HOUSEKEEPING;LAUNDRY;SHOPPING;YARDWORK;MEDICATION MANAGEMENT;FINANCES;DRIVING;WORK
ADLS_ACUITY_SCORE: 11

## 2018-09-21 ASSESSMENT — PAIN DESCRIPTION - DESCRIPTORS: DESCRIPTORS: ACHING

## 2018-09-21 NOTE — PLAN OF CARE
Problem: Patient Care Overview  Goal: Plan of Care/Patient Progress Review  Outcome: Improving  Pt is A/Ox4. VSS. On room air. Independent in room. Tele is Afib with occasional RVR. PT denies any chest pain or SOB overnight. Plan is to continue to diureses with lasix. Plan is for consult with vascular surgery and any of the following; cardioversion, ablation, or pacemaker. TBD. There is also possible plans for R heart cath. Pt resting well. Continuing to monitor.

## 2018-09-21 NOTE — CONSULTS
REASON FOR ASSESSMENT:  CHF Consult for 2 gm NA Diet Education    NUTRITION HISTORY:  Visited with pt and wife this afternoon    Living situation:   Pt lives with his wife in a home    Grocery shopping:  Wife does most of the shopping  Reads labels    Meal preparation:  Pt and wife prepare meals    Breakfast:  Cup of coffee  Whole grain toast with natural, low Na PB  Is planning to also start eating shredded wheat (low Na)    Lunch:  Might have a sandwich, but lately hasn't felt very hungry    Dinner:   Fresh meat, salad with 2Tbsp dressing, potato    Pt likes to snack on fruit  Wife is very good about keeping track of Na intake   They have not been eating out    Previous diet instructions:  Pt and wife have received diet teaching in the past - are aware of 2000 mg Na recs and have written info at home      CURRENT DIET:  2gm Na    NUTRITION DIAGNOSIS:  No nutrition diagnosis at this time.    INTERVENTIONS:    Nutrition Prescription:  2gm Na    Implementation:    Assessed learning needs, learning preferences, and willingness to learn    Nutrition Education (Content):  a) Discussed continuing to watch Na intake.  Pt declined handouts.    b) Recommended pt try a nutrition supplement drink for added belia/pro while appetite is decreased (he will try the Boost while here).    Nutrition Education (Application):  a) Discussed current eating habits     Anticipated good compliance    Diet Education - refer to Education Flowsheet    Goals:    Patient verbalizes understanding of diet     All of the above goals met during the education session    Follow Up:    Recommend Out-Patient Nutrition Referral, if further diet instructions are needed.
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
## 2018-09-21 NOTE — PROGRESS NOTES
Pt wearing home cpap, nasal mask, no further needs from respiratory, resting comfortably at this time with mask on, declined neb treatment at this time.  Alec Cadet

## 2018-09-21 NOTE — PLAN OF CARE
Problem: Patient Care Overview  Goal: Plan of Care/Patient Progress Review  Outcome: Improving  Heart Failure Care Pathway  GOALS TO BE MET BEFORE DISCHARGE:    1. Decrease congestion and/or edema with diuretic therapy to achieve near      optimal volume status.            Dyspnea improved:  Yes            Edema improved:     Yes        Net I/O and Weights since admission:          08/22 2300 - 09/21 2259  In: 740 [P.O.:740]  Out: 1840 [Urine:1840]  Net: -1100            Vitals:    09/21/18 0616   Weight: 108.9 kg (240 lb 1.6 oz)       2.  O2 sats > 92% on RA or at prior home O2 therapy level.          Current oxygenation status:       SpO2: 91 %         O2 Device: None (Room air),            Able to wean O2 this shift to keep sats > 92%:  Yes       Does patient use Home O2? No    3.  Tolerates ambulation and mobility near baseline: Yes        How many times did the patient ambulate with nursing staff this shift? 2    Please review the Heart Failure Care Pathway for additional HF goal outcomes.    Itzel Ba RN  9/21/2018       Pt AO, VSS on RA. Denies pain. Tele A fib with CVR, HR 70s-80s. BLE subhash, +2 BLE edema. 2g Na diet, 1800ml fluid restriction. Up independently. IV SL. CT abdomen and echo done today. Plan to continue with diuresis. Continue to monitor.

## 2018-09-21 NOTE — PROGRESS NOTES
[facility]    Hospitalist Progress Note    Date of Service (when I saw the patient): 09/21/2018    Assessment & Plan   Home Valdez is a 68 year old male with a past medical history of hypertension,  atrial fibrillation, nonischemic cardiomyopathy felt to be tachycardia induced, systolic congestive heart failure, COPD, CKD, hyperlipidemia, abdominal aortic aneurysm, obstructive sleep apnea, pulmonary hypertension, PTSD and trigeminal neuralgia who presents from cardiology clinic due to acute on chronic systolic heart failure exacerbation.     Acute on chronic systolic heart failure exacerbation  Nonischemic cardiomyopathy  With known biventricular heart failure with ejection fraction of less than 20% on an echo completed at the end of July this year.  He appears to be failing outpatient management with metoprolol 100 mg twice daily, torsemide 20 mg daily with intermittent dosing of an additional 40 mg as needed and Losartan.  He states dietary compliance, medication compliance and has been following with his cardiologist regularly.  He does have a history of atrial fibrillation and it was felt that his tachycardia was the source of his cardiomyopathy in the past and may be contributing to his current failure of therapy. Presents with a BNP > 29K, FARIAS, JVD, LE edema and ascites.     - We will hold his outpatient torsemide.  He was started on IV Lasix 60 mg IV twice daily and will possibly be transitioned to a continuous Lasix drip per cardiology.   - Cardiology has been consulted, input appreciated.   - Continue with prior to admission losartan and metoprolol.    Monitor blood pressures.   - Daily weights.  I's/O's.  So far down approximately 800 cc.   - Fluid restrict to 1800 ml per day   - 2 gram Na diet.    - Will repeat an Echocardiogram to evaluate for any improvement in his ejection fraction to rule out any evidence of effusion.   - Follow renal fxn and correct electrolytes prn   - Blood  pressure is currently soft so we will need to monitor while undergoing diuresis.  Hold parameters placed on antihypertensive medications.  - He may require further evaluation for a possible ischemic cardiomyopathy.  A Lexiscan stress nuclear study could be considered.  - Patient will require more aggressive rate control which could include cardioversion or AV peter ablation and permanent pacemaker placement.  May consider a biventricular pacemaker.     Atrial Fibrillation  Maintained on metoprolol 100 mg twice daily and warfarin currently.  Up until about 2 weeks ago he had been previously on amiodarone and Toprol-XL.  Rates presently controlled in the 90s but could be better.  Currently the thought is that his ongoing atrial fibrillation may be contributing to his heart failure exacerbation and ongoing cardiomyopathy.   - TSH elevated at 5.74, but free T4 is normal at 1.21.   - Cardiology consulted.    - Will need EP input.     - C/w PTA metoprolol 100 mg twice daily   - Continue with warfarin.  INR is currently therapeutic at 3.04.   - Telemetry monitoring    Abdominal discomfort   Associated with abdominal fullness and epigastric abdominal pain that radiates across the abdomen.  Symptoms are actually improved after increased diuresis.  He does note some discomfort after eating but again says that this is improved after improved diuresis.  Denies any nausea, vomiting, diarrhea.  Abdominal exam notable for ascites, edema, but negative Coates sign.  Liver profile does not appear consistent with acute cholecystitis.     - Suspect that his abdominal ultrasound findings of gallbladder wall thickening with pericholecystic fluid is related to his heart failure.   - Treat HF as above.    - Lipase WNL.      AAA  He has a known history of an abdominal aortic aneurysm.  Abd US 9/19/18 shows: Abdominal aortic aneurysm measuring up to a maximum of 3.6 x 4.3 cm. this is a significant increase in size based on his prior study in  November 2017 where it measured 3.2 x 3.1 cm. His infra-renal AAA has been present for several years and followed with US.     - Vascular surgery consulted.   - CT abdomen and pelvis pending   - Plan for outpatient follow up in vascular surgery clinic outpatient after discharge.   - He would probably be a candidate for EVAR in the future if he gets to an aneurysm size > 5.5- 6.0 cm.     - BP control.      Cardiorenal Syndrome  CKD - Stage III  His baseline creatinine had been around 1 until July of this year.  Since that time it is ranged from 1.3-1.9 and more recently in the 1.8-1.9 range.  Currently his BUN is 41 and his creatinine is 1.91.  Given his current volume overload in significantly reduced ejection fraction suspect that his primary issue is a cardiorenal syndrome.   - We will be treating his heart failure and volume status with IV diuretics as above.   - Follow BMP daily, creatinine currently stable.   - C/w Losartan for now.      COPD  Pulmonary hypertension  Obstructive sleep apnea   - Saturating well on room air at rest presently. Not felt to be in acute exacerbation.    - C/w PTA nebulizers and inhalers.    - CPAP with home settings        DVT Prophylaxis: Warfarin  Code Status: Full Code was discussed.      Disposition: Pending clinical course and evaluation by cardiology.     Dariel Allen    Interval History   Patient reports feeling well at rest.  He does develop shortness of breath with exertion.  Denies any chest pain, lightheadedness, palpitations, abdominal pain, nausea, vomiting.  He does feel that his abdomen is more distended than usual.  Wife is present at the bedside and updated.    -Data reviewed today: I reviewed all new labs and imaging results over the last 24 hours.    Physical Exam   Temp: 98  F (36.7  C) Temp src: Oral BP: 93/64 Pulse: 96 Heart Rate: 90 Resp: 16 SpO2: 92 % O2 Device: None (Room air)    Vitals:    09/21/18 0616   Weight: 108.9 kg (240 lb 1.6 oz)     Vital  Signs with Ranges  Temp:  [98  F (36.7  C)] 98  F (36.7  C)  Pulse:  [88-96] 96  Heart Rate:  [90-93] 90  Resp:  [16-18] 16  BP: ()/(64-78) 93/64  SpO2:  [91 %-97 %] 92 %  I/O last 3 completed shifts:  In: 260 [P.O.:260]  Out: 1200 [Urine:1200]    Constitutional: Alert, oriented to person, place, situation.  Cooperative, lying in bed in NAD.   Respiratory: Lungs with decreased breath sounds bilaterally.  No crackles, wheezes, or rhonchi, no labored breathing.  Cardiovascular: Heart irregular rhythm, no MRG, chronic appearing 1+ bilateral lower extremity edema.  GI:  Abdomen soft, moderately distended, nontender, and with normoactive BS  Skin/Integumen:  Warm, dry, non-diaphoretic.  Chronic venous stasis changes to bilateral legs.  MSK: CMS x4 intact.    Medications     Continuing ACE inhibitor/ARB/ARNI from home medication list OR ACE inhibitor/ARB order already placed during this visit       - MEDICATION INSTRUCTIONS -       - MEDICATION INSTRUCTIONS -       Warfarin Therapy Reminder         buPROPion  150 mg Oral BID     fluticasone-salmeterol  1 puff Inhalation Q12H     furosemide  60 mg Intravenous Q12H     ipratropium - albuterol 0.5 mg/2.5 mg/3 mL  3 mL Nebulization Q4H While awake     losartan  25 mg Oral At Bedtime     metoprolol tartrate  100 mg Oral BID     montelukast  10 mg Oral At Bedtime     potassium chloride SA  20 mEq Oral At Bedtime     senna-docusate  2 tablet Oral BID     sodium chloride (PF)  3 mL Intracatheter Q8H       Data     Recent Labs  Lab 09/21/18  0614 09/20/18  1819 09/20/18  1055 09/20/18  1052 09/17/18  0710 09/17/18   WBC 7.1 8.7  --   --   --   --    HGB 15.1 15.7  --   --  16.2  --    MCV 95 96  --   --   --   --     204  --   --   --   --    INR 3.04*  --  3.14*  --   --  1.8*     --   --  141 142  --    POTASSIUM 4.0  --   --  4.3 4.4  --    CHLORIDE 104  --   --  103 103  --    CO2 31  --   --  32 30  --    BUN 38*  --   --  41* 34*  --    CR 1.94*  --   --   1.91* 1.85*  --    ANIONGAP 7  --   --  6 9  --    BHAVNA 8.7  --   --  8.5 8.6  --    GLC 87  --   --  93 128*  --    ALBUMIN 3.5 3.3*  --   --   --   --    PROTTOTAL 6.1* 6.3*  --   --   --   --    BILITOTAL 1.0 0.8  --   --   --   --    ALKPHOS 75 76  --   --   --   --    ALT 51 56  --   --   --   --    AST 33 38  --   --   --   --    LIPASE  --  115  --   --   --   --        Recent Results (from the past 24 hour(s))   XR Chest 2 Views    Narrative    CHEST TWO VIEWS  9/20/2018 8:32 PM     HISTORY: FARIAS.    COMPARISON: 7/30/2018.    FINDINGS: The heart is enlarged but similar to the prior study.  Pulmonary vascularity mildly increased. No edema, pneumothorax or  pleural effusion. No air-space consolidation.       Impression    IMPRESSION: Cardiomegaly with mild vascular congestion.     ARI CASTILLO MD

## 2018-09-21 NOTE — TELEPHONE ENCOUNTER
Routing refill request to provider for review/approval because:  Drug not on the [facility] refill protocol (kit)    MAXINE YanceyN, RN  [facility]

## 2018-09-21 NOTE — PLAN OF CARE
Problem: Patient Care Overview  Goal: Plan of Care/Patient Progress Review  Discharge Planner OT   Patient plan for discharge: home w/spouse and return to work  Current status: Cardiac eval completed and treatment initiated. Pt awaiting ECHO and subsequent determination of medical/cardiac POC. Spouse present. See OT eval for full report of pt's current baseline performance which includes work performance.  Today pt ambulated approx 200ft requiring two brief standing rest breaks due to intermittent dizziness, SOB and fatigue. At completion, BP 89/71,  and 02 88% on RA. Recovered after seated rest and initiation of deep breathing to 94%.    Barriers to return to prior living situation: stairs  Recommendations for discharge: home w/spouse, resumption of work per MD approval and/or restrictions  Rationale for recommendations: anticipate pt will meet OT/CR goals       Entered by: Moshe Brooks 09/21/2018 2:30 PM

## 2018-09-21 NOTE — PROGRESS NOTES
09/21/18 1408   Quick Adds   Type of Visit Initial Occupational Therapy Evaluation   Living Environment   Lives With spouse   Living Arrangements house   Home Accessibility stairs to enter home;stairs within home   Number of Stairs to Enter Home 4   Number of Stairs Within Home 13   Transportation Available car;family or friend will provide   Functional Level Prior   Ambulation 0-->independent   Transferring 0-->independent   Toileting 0-->independent   Bathing 0-->independent   Dressing 0-->independent   Eating 0-->independent   Communication 0-->understands/communicates without difficulty   Swallowing 0-->swallows foods/liquids without difficulty   Cognition 0 - no cognition issues reported   Fall history within last six months no   Which of the above functional risks had a recent onset or change? 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 Pt indep all ADL, IADL, no AD/AE.   General Information   Referring Physician Dr. Augusto Mina   Patient/Family Goals Statement return home w/spouse and to work   Additional Occupational Profile Info/Pertinent History of Current Problem 68 year old male with a past medical history of hypertension,  atrial fibrillation, nonischemic cardiomyopathy felt to be tachycardia induced, systolic congestive heart failure, COPD, CKD, hyperlipidemia, abdominal aortic aneurysm, obstructive sleep apnea, pulmonary hypertension, PTSD and trigeminal neuralgia who presents from cardiology clinic due to acute on chronic systolic heart failure exacerbation. Medical mgmt failing and pt now being worked up to help determine further medical POC.    Precautions/Limitations oxygen therapy device and L/min   Cognitive Status Examination  "  Cognitive Comment grossly intact   Visual Perception   Visual Perception No deficits were identified   Range of Motion (ROM)   ROM Quick Adds No deficits were identified   Strength   Manual Muscle Testing Quick Adds No deficits were identified   Mobility   Bed Mobility Comments indep   Transfer Skills   Transfer Comments Independent   Balance   Balance Comments Indep all mobilities/balance   Instrumental Activities of Daily Living (IADL)   Previous Responsibilities meal prep;housekeeping;laundry;shopping;yardwork;medication management;finances;driving;work   Activities of Daily Living Analysis   Impairments Contributing to Impaired Activities of Daily Living strength decreased  (endurance)   ADL Comments Indep self-cares   General Therapy Interventions   Planned Therapy Interventions home program guidelines;progressive activity/exercise;risk factor education;ADL retraining   Clinical Impression   Criteria for Skilled Therapeutic Interventions Met yes, treatment indicated   OT Diagnosis decreased ADL/IADL performance   Influenced by the following impairments cardiac endurance/respiratory endurance   Assessment of Occupational Performance 1-3 Performance Deficits   Identified Performance Deficits household mgmt, social/work skills   Clinical Decision Making (Complexity) Low complexity   Therapy Frequency 2 times/day   Predicted Duration of Therapy Intervention (days/wks) 5 days   Anticipated Discharge Disposition Home with Assist   Risks and Benefits of Treatment have been explained. Yes   Patient, Family & other staff in agreement with plan of care Yes   [facility] [employer] \"6 Clicks\"   2016, Trustees of [facility], under license to [employer].  All rights reserved.   6 Clicks Short Forms Daily Activity Inpatient Short Form   [facility] [employer]  \"6 Clicks\" Daily Activity Inpatient Short Form   1. Putting on and taking off regular lower body clothing? 4 - None   2. Bathing (including washing, " rinsing, drying)? 4 - None   3. Toileting, which includes using toilet, bedpan or urinal? 4 - None   4. Putting on and taking off regular upper body clothing? 4 - None   5. Taking care of personal grooming such as brushing teeth? 4 - None   6. Eating meals? 4 - None   Daily Activity Raw Score (Score out of 24.Lower scores equate to lower levels of function) 24   Total Evaluation Time   Total Evaluation Time (Minutes) 40

## 2018-09-21 NOTE — PHARMACY-ANTICOAGULATION SERVICE
Clinical Pharmacy - Warfarin Dosing Consult     Pharmacy has been consulted to manage this patient s warfarin therapy.  Indication: Atrial Fibrillation  Therapy Goal: INR 2-3  Warfarin Prior to Admission: Yes  Warfarin PTA Regimen: 7.5mg Tu, thur, Sat 5mg row,  Today INR was high and clinc changed to 5mg daily exept for fridays 2.5mg.      INR   Date Value Ref Range Status   09/20/2018 3.14 (H) 0.86 - 1.14 Final     INR Protime   Date Value Ref Range Status   09/17/2018 1.8 (A) 0.86 - 1.14 Final       Recommend warfarin 5 mg today.  Pharmacy will monitor Home Valdez daily and order warfarin doses to achieve specified goal.      Please contact pharmacy as soon as possible if the warfarin needs to be held for a procedure or if the warfarin goals change.

## 2018-09-22 ENCOUNTER — APPOINTMENT (OUTPATIENT)
Dept: OCCUPATIONAL THERAPY | Facility: CLINIC | Age: 68
DRG: 292 | End: 2018-09-22
Attending: INTERNAL MEDICINE
Payer: MEDICARE

## 2018-09-22 LAB
ALBUMIN SERPL-MCNC: 3.5 G/DL (ref 3.4–5)
ALP SERPL-CCNC: 75 U/L (ref 40–150)
ALT SERPL W P-5'-P-CCNC: 44 U/L (ref 0–70)
ANION GAP SERPL CALCULATED.3IONS-SCNC: 4 MMOL/L (ref 3–14)
AST SERPL W P-5'-P-CCNC: 38 U/L (ref 0–45)
BILIRUB SERPL-MCNC: 1 MG/DL (ref 0.2–1.3)
BUN SERPL-MCNC: 33 MG/DL (ref 7–30)
CALCIUM SERPL-MCNC: 8.8 MG/DL (ref 8.5–10.1)
CHLORIDE SERPL-SCNC: 104 MMOL/L (ref 94–109)
CO2 SERPL-SCNC: 33 MMOL/L (ref 20–32)
CREAT SERPL-MCNC: 1.85 MG/DL (ref 0.66–1.25)
GFR SERPL CREATININE-BSD FRML MDRD: 36 ML/MIN/1.7M2
GLUCOSE SERPL-MCNC: 87 MG/DL (ref 70–99)
INR PPP: 2.94 (ref 0.86–1.14)
POTASSIUM SERPL-SCNC: 4.6 MMOL/L (ref 3.4–5.3)
PROT SERPL-MCNC: 6.4 G/DL (ref 6.8–8.8)
SODIUM SERPL-SCNC: 141 MMOL/L (ref 133–144)
TROPONIN I SERPL-MCNC: <0.015 UG/L (ref 0–0.04)

## 2018-09-22 PROCEDURE — 25000132 ZZH RX MED GY IP 250 OP 250 PS 637: Mod: GY | Performed by: INTERNAL MEDICINE

## 2018-09-22 PROCEDURE — 99232 SBSQ HOSP IP/OBS MODERATE 35: CPT | Performed by: INTERNAL MEDICINE

## 2018-09-22 PROCEDURE — 21000001 ZZH R&B HEART CARE

## 2018-09-22 PROCEDURE — 25000125 ZZHC RX 250: Performed by: INTERNAL MEDICINE

## 2018-09-22 PROCEDURE — 36415 COLL VENOUS BLD VENIPUNCTURE: CPT | Performed by: INTERNAL MEDICINE

## 2018-09-22 PROCEDURE — 94640 AIRWAY INHALATION TREATMENT: CPT | Mod: 76

## 2018-09-22 PROCEDURE — 25000128 H RX IP 250 OP 636: Performed by: INTERNAL MEDICINE

## 2018-09-22 PROCEDURE — 94640 AIRWAY INHALATION TREATMENT: CPT

## 2018-09-22 PROCEDURE — 84484 ASSAY OF TROPONIN QUANT: CPT | Performed by: INTERNAL MEDICINE

## 2018-09-22 PROCEDURE — 40000133 ZZH STATISTIC OT WARD VISIT

## 2018-09-22 PROCEDURE — A9270 NON-COVERED ITEM OR SERVICE: HCPCS | Mod: GY | Performed by: INTERNAL MEDICINE

## 2018-09-22 PROCEDURE — A9270 NON-COVERED ITEM OR SERVICE: HCPCS | Mod: GY | Performed by: PHYSICIAN ASSISTANT

## 2018-09-22 PROCEDURE — 97110 THERAPEUTIC EXERCISES: CPT | Mod: GO

## 2018-09-22 PROCEDURE — 80053 COMPREHEN METABOLIC PANEL: CPT | Performed by: INTERNAL MEDICINE

## 2018-09-22 PROCEDURE — 99233 SBSQ HOSP IP/OBS HIGH 50: CPT | Performed by: INTERNAL MEDICINE

## 2018-09-22 PROCEDURE — 97140 MANUAL THERAPY 1/> REGIONS: CPT | Mod: GO

## 2018-09-22 PROCEDURE — 85610 PROTHROMBIN TIME: CPT | Performed by: INTERNAL MEDICINE

## 2018-09-22 PROCEDURE — 40000275 ZZH STATISTIC RCP TIME EA 10 MIN

## 2018-09-22 PROCEDURE — 25000132 ZZH RX MED GY IP 250 OP 250 PS 637: Mod: GY | Performed by: PHYSICIAN ASSISTANT

## 2018-09-22 RX ORDER — WARFARIN SODIUM 5 MG/1
5 TABLET ORAL
Status: COMPLETED | OUTPATIENT
Start: 2018-09-22 | End: 2018-09-22

## 2018-09-22 RX ORDER — METOPROLOL SUCCINATE 50 MG/1
50 TABLET, EXTENDED RELEASE ORAL 2 TIMES DAILY
Status: DISCONTINUED | OUTPATIENT
Start: 2018-09-22 | End: 2018-09-24

## 2018-09-22 RX ADMIN — MONTELUKAST SODIUM 10 MG: 10 TABLET, FILM COATED ORAL at 21:00

## 2018-09-22 RX ADMIN — METOPROLOL SUCCINATE 50 MG: 50 TABLET, EXTENDED RELEASE ORAL at 10:11

## 2018-09-22 RX ADMIN — IPRATROPIUM BROMIDE AND ALBUTEROL SULFATE 3 ML: 2.5; .5 SOLUTION RESPIRATORY (INHALATION) at 15:08

## 2018-09-22 RX ADMIN — POTASSIUM CHLORIDE 20 MEQ: 1500 TABLET, EXTENDED RELEASE ORAL at 21:00

## 2018-09-22 RX ADMIN — FUROSEMIDE 40 MG: 10 INJECTION, SOLUTION INTRAVENOUS at 10:11

## 2018-09-22 RX ADMIN — IPRATROPIUM BROMIDE AND ALBUTEROL SULFATE 3 ML: 2.5; .5 SOLUTION RESPIRATORY (INHALATION) at 19:02

## 2018-09-22 RX ADMIN — BUPROPION HYDROCHLORIDE 150 MG: 150 TABLET, FILM COATED, EXTENDED RELEASE ORAL at 10:11

## 2018-09-22 RX ADMIN — METOPROLOL SUCCINATE 50 MG: 50 TABLET, EXTENDED RELEASE ORAL at 21:13

## 2018-09-22 RX ADMIN — SENNOSIDES AND DOCUSATE SODIUM 2 TABLET: 8.6; 5 TABLET ORAL at 10:11

## 2018-09-22 RX ADMIN — BUPROPION HYDROCHLORIDE 150 MG: 150 TABLET, FILM COATED, EXTENDED RELEASE ORAL at 21:00

## 2018-09-22 RX ADMIN — IPRATROPIUM BROMIDE AND ALBUTEROL SULFATE 3 ML: 2.5; .5 SOLUTION RESPIRATORY (INHALATION) at 11:55

## 2018-09-22 RX ADMIN — WARFARIN SODIUM 5 MG: 5 TABLET ORAL at 18:35

## 2018-09-22 RX ADMIN — SENNOSIDES AND DOCUSATE SODIUM 2 TABLET: 8.6; 5 TABLET ORAL at 20:59

## 2018-09-22 RX ADMIN — IPRATROPIUM BROMIDE AND ALBUTEROL SULFATE 3 ML: 2.5; .5 SOLUTION RESPIRATORY (INHALATION) at 07:15

## 2018-09-22 RX ADMIN — FUROSEMIDE 40 MG: 10 INJECTION, SOLUTION INTRAVENOUS at 18:35

## 2018-09-22 ASSESSMENT — ACTIVITIES OF DAILY LIVING (ADL)
ADLS_ACUITY_SCORE: 9
ADLS_ACUITY_SCORE: 11

## 2018-09-22 NOTE — PROGRESS NOTES
Severe LV dysfunction on repeat echo.  Recommend Electrophysiology Consult for pacer with AVN or antiarrhythmic therapy/cardioversion.

## 2018-09-22 NOTE — PROGRESS NOTES
09/22/18 0900   General Information   Discipline OT   Onset of Edema 09/20/18   Affected Body Part(s) Left LE;Right LE   Pertinent history of current problem (PT: include personal factors and/or comorbidities that impact the POC; OT: include additional occupational profile info) Per chart: Home Valdez is a 68 year old male who was admitted on 9/20/2018. He is admitted to admitted to [facility] with complaints of edema, abdominal distention and shortness of breath.   His w/u is consistent with BiV HF thought to be secondary to tachycardia-induced cardiomyopathy.   Pain   Patient currently in pain Yes, see Vital Signs flowsheet   Edema Examination / Assessment   Skin Condition Pitting  (3+ pitting in BLE's)   Skin Condition Comments ON BLE's Pt has dryness on feet and distal portion of the BLE's. BLE's have previous scarring from grafting, skin grafting was completed in 1973. Dried over scabes on BLE's. There is a small scratch (.5 inches) on the LLE anterior lateral portion of the shin. BLE's has divots from skin grafting from 1973.   Scar Yes   Location anterior and posterior of BLE's scarring from skin grafting in 1973.    Stemmer Sign Negative   ROM   Range of Motion (WFL) (WFL)   Strength   Strength (WFL) (WFL)   Assessment/Plan   Patient presents with Stage 2 Lymphedema   Assessment Previously pt independent in all I/ADLs. Edema impacts functional mobility and endurance for I/ADLs   Assessment of Occupational Performance 1-3 Performance Deficits   Identified Performance Deficits I/ADLs (working, dressing, bathing, leisure)   Clinical Decision Making (Complexity) Low complexity   Planned Edema Interventions Manual therapy;Education;Exercises;Gradient compression bandaging   Treatment Frequency daily   Treatment Duration 4 days   Patient, Family and/or Staff in agreement with plan of care. Yes   Risks and benefits of treatment have been explained. Yes   Total Evaluation Time   Total  Evaluation Time (Minutes) 5

## 2018-09-22 NOTE — PLAN OF CARE
Problem: Patient Care Overview  Goal: Plan of Care/Patient Progress Review  Discharge Planner OT   Patient plan for discharge: Home  Current status: OT/CR/Lymph: Lymphedema evaluation complete and treatment initiated, pt also seen for cardiac rehab treatment.  Pt is appropriate for LE quick wrap,using short stretch (SS) bandages. Pt Agreeable to therapist assessment of edema; Pt presents with 3+ edema in BLE's with dryness noted on bilateral LE's. Pt has scabbed over scabs on BLE's. BLE's have previous scarring from skin grafting, skin grafting was completed in 1973. There is a small scratch (.5 inches) on the left LE anterior lateral portion of the shin. Completed quick wrap to BLE's. Pt was educated on importance of skin care and daily checks to ensure skin integrity. Pt was educated on signs and sx's of edema intolerance; Pt verbalized understanding. Issued educational handouts for Edema in LE. Increased time also spent coordinating and educating patient's RN on rehab plan for B LE edema wrapping. Updated white board with schedule and quick wrap instruction sheet if wraps become loose or are otherwise not tolerated. If pt does not tolerate wraps well, please remove wraps but keep LEs elevated. RN to remove wraps at 7am on 9/23/18 and complete skin care, OT will be back at 8 am on 9/23 to rewrap. Pt ambulated in the hallway with independence for 240 feet. Pt demonstrated some fatigue and SOB however tolerated session well.      Barriers to return to prior living situation: stairs, swelling in LE's    Recommendations for discharge: home with spouse assist as needed, resumption of work per MD approval and/or restrictions. Pt may benefit from OP for lymphedema management     Rationale for recommendations: anticipate pt will meet OT/CR goals areas with continued IP therapy        Entered by: Lorelei Zamora 09/22/2018 10:27 AM

## 2018-09-22 NOTE — PROGRESS NOTES
[facility]    Cardiology Progress Note     Assessment & Plan   Home Valdez is a 68 year old male who was admitted on 9/20/2018. He is admitted to admitted to [facility] with complaints of edema, abdominal distention and shortness of breath.   His w/u is consistent with BiV HF thought to be secondary to tachycardia-induced cardiomyopathy.    He requires aggressive diuresis.  He also will be provided an ischemic heart disease evaluation with a MPI stress on Monday.  He also needs EP's evaluation.     Summary:   # Biventricular Systolic HF  # Cardiomyopathy, likely non-ischemic, likely tachycardia-induced  # Atrial Fibrillation  # COPD  # JAMES  # HL  # HTN  # CKD  # PTSD and Trigeminal Neuralgia  # AAA  - continue Lasix 40 mg TID  - providing losartan 25 mg Q daily  - providing metoprolol succinate 50 mg BID  - will plan for a EP Consult for pacer with AVN or antiarrhythmic therapy/cardioversion Monday  - will plan for a Lexiscan stress nuclear study Monday    Juliano Ring MD    Interval History   No overnight events.  Patient reports sleeping well last night.    Physical Exam   Temp: 97.4  F (36.3  C) Temp src: Oral BP: 102/64   Heart Rate: 85 Resp: 18 SpO2: 91 % O2 Device: BiPAP/CPAP    Vitals:    09/21/18 0616   Weight: 108.9 kg (240 lb 1.6 oz)     Vital Signs with Ranges  Temp:  [97.4  F (36.3  C)-98.6  F (37  C)] 97.4  F (36.3  C)  Heart Rate:  [] 85  Resp:  [16-18] 18  BP: ()/(62-76) 102/64  SpO2:  [89 %-96 %] 91 %  I/O last 3 completed shifts:  In: 720 [P.O.:720]  Out: 2190 [Urine:2190]  Patient Active Problem List   Diagnosis     Posttraumatic stress disorder     PERS HX TOBACCO USE     Pulmonary emphysema (H)     Pulmonary nodule, needs annual ct      Hypertension goal BP (blood pressure) < 140/90     Encounter for long-term current use of medication     COPD (chronic obstructive pulmonary disease) (H)     Hyperlipidemia LDL goal <130     AAA (abdominal aortic  aneurysm) 4.0 cm     SEVERE JAMES (obstructive sleep apnea)     Ejection fraction < 50%     Nonischemic cardiomyopathy EF 45-50% by echo 2016     Other peripheral vascular disease(443.89)     Dermatophytosis of nail     Syncope     Atrial fibrillation with RVR (H)     Acute systolic CHF (congestive heart failure) (H)     Neutrophilic leukocytosis     DVT prophylaxis     Rapid atrial fibrillation (H)     Hypopotassemia     Hypomagnesemia     Acute bronchitis     CHF (congestive heart failure) (H)     Thrombocytopenia (H)     Long-term (current) use of anticoagulants [Z79.01]     History of atrial fibrillation     COPD exacerbation (H)     Acute respiratory failure (H)     Trigeminal neuralgia of left side of face     Panlobular emphysema (H)     Trigeminal neuralgia     On amiodarone therapy     Community acquired pneumonia of right upper lobe of lung (H)     CAP (community acquired pneumonia)     Morbid obesity (H)     Atrial fibrillation with rapid ventricular response (H)     Elevated troponin     CKD (chronic kidney disease) stage 3, GFR 30-59 ml/min       General:  Well-appearing, appears as stated age, friendly, sensorium clear, cognition intact, friendly, cooperative.  HEENT:  No major abnormalities.  Vessels: Elevated JVP.   Heart:  Irregular,  No murmurs appreciated.  No S3 or S4.    Lungs:  Clear to auscultation bilaterally.  No wheezes, rales, rhonchi.  Adequate air movement.  Breathing comfortably.  Abdomen:  Soft, nontender, nondistended.   Extremities:  Severe edema.  Normal perfusion.    Medications     Continuing ACE inhibitor/ARB/ARNI from home medication list OR ACE inhibitor/ARB order already placed during this visit       - MEDICATION INSTRUCTIONS -       - MEDICATION INSTRUCTIONS -       Warfarin Therapy Reminder         buPROPion  150 mg Oral BID     fluticasone-salmeterol  1 puff Inhalation Q12H     furosemide  40 mg Intravenous Q8H     ipratropium - albuterol 0.5 mg/2.5 mg/3 mL  3 mL  Nebulization Q4H While awake     losartan  25 mg Oral At Bedtime     metoprolol succinate  50 mg Oral Daily     montelukast  10 mg Oral At Bedtime     potassium chloride SA  20 mEq Oral At Bedtime     senna-docusate  2 tablet Oral BID     sodium chloride (PF)  3 mL Intracatheter Q8H       Data   Results for orders placed or performed during the hospital encounter of 09/20/18 (from the past 24 hour(s))   INR   Result Value Ref Range    INR 3.04 (H) 0.86 - 1.14   Comprehensive metabolic panel   Result Value Ref Range    Sodium 142 133 - 144 mmol/L    Potassium 4.0 3.4 - 5.3 mmol/L    Chloride 104 94 - 109 mmol/L    Carbon Dioxide 31 20 - 32 mmol/L    Anion Gap 7 3 - 14 mmol/L    Glucose 87 70 - 99 mg/dL    Urea Nitrogen 38 (H) 7 - 30 mg/dL    Creatinine 1.94 (H) 0.66 - 1.25 mg/dL    GFR Estimate 35 (L) >60 mL/min/1.7m2    GFR Estimate If Black 42 (L) >60 mL/min/1.7m2    Calcium 8.7 8.5 - 10.1 mg/dL    Bilirubin Total 1.0 0.2 - 1.3 mg/dL    Albumin 3.5 3.4 - 5.0 g/dL    Protein Total 6.1 (L) 6.8 - 8.8 g/dL    Alkaline Phosphatase 75 40 - 150 U/L    ALT 51 0 - 70 U/L    AST 33 0 - 45 U/L   CBC with platelets   Result Value Ref Range    WBC 7.1 4.0 - 11.0 10e9/L    RBC Count 5.03 4.4 - 5.9 10e12/L    Hemoglobin 15.1 13.3 - 17.7 g/dL    Hematocrit 48.0 40.0 - 53.0 %    MCV 95 78 - 100 fl    MCH 30.0 26.5 - 33.0 pg    MCHC 31.5 31.5 - 36.5 g/dL    RDW 14.8 10.0 - 15.0 %    Platelet Count 183 150 - 450 10e9/L   CT Abdomen Pelvis w/o Contrast    Narrative    CT ABDOMEN AND PELVIS WITHOUT CONTRAST 9/21/2018 12:35 PM     HISTORY: Evaluate known infrarenal abdominal aortic aneurysm.      COMPARISON: Abdominal aortic ultrasound 11/4/2016. CT abdomen/pelvis  4/18/2016.    TECHNIQUE: Axial images are obtained from the lung bases to the  symphysis without oral or IV contrast. Coronal reformatted images are  also generated.  Radiation dose for this scan was reduced using  automated exposure control, adjustment of the mA and/or kV  according  to patient size, or iterative reconstruction technique.    FINDINGS:     The lung bases are clear. Mild dependent atelectasis is present  bilaterally. Trace amount left pleural fluid is noted.    Abdomen: Calcified granulomas are noted in the spleen. The liver,  gallbladder, pancreas and adrenal glands are unremarkable. Multiple  renal cortical cysts are present. No hydronephrosis or urinary tract  calculi. No enlarged abdominal lymph nodes. Infrarenal abdominal  aortic aneurysm on series 2, image 84 currently measures 4.4 x 4.4 cm,  previously 4.1 x 4.1 cm suggesting mild interval progression. Aneurysm  appears to extend into the proximal common right iliac artery.  Scattered calcified plaque is present. The bowel is normal in caliber  without obstruction. Trace amount of fluid is noted near the inferior  tip of the liver. This is new since the prior study.    Pelvis: The bladder, prostate and rectum are unremarkable. No enlarged  pelvic or inguinal lymph nodes. Degenerative spine changes are  present. Small fat-containing left inguinal hernia is unchanged.      Impression    IMPRESSION:  1. Slight interval increase in the size of the infrarenal abdominal  aortic aneurysm now measuring 4.4 cm, previously 4.1 cm in 2016.  No evidence of retroperitoneal hemorrhage.  2. Evidence of old granulomatous disease.  3. Bilateral renal cortical cysts. No evidence of hydronephrosis or  urinary tract calculi.    SERA BUCKNER MD   ECHO LIMITED WITH OPTISON    Narrative    [id number]  [id number]  [id number]  [id number]^^[id number]           [facility]  Echocardiography Laboratory  [address] [place], [place] [address]        Name: KARTIK HUERTA  MRN: [id number]  : 1950  Study Date: 2018 02:10 PM  Age: 68 yrs  Gender: Male  Patient Location: [facility]  Reason For Study: Cardiomyopathy  Ordering Physician: CANDACE NOLASCO  Referring Physician: KAISER BLACK MD  Performed By: Binta  RENETTA Cadet     BSA: 2.3 m2  Height: 70 in  Weight: 240 lb  HR: 90  BP: 101/79 mmHg  _____________________________________________________________________________  __        Procedure  Limited Portable Echo Adult. Contrast Optison.  _____________________________________________________________________________  __        Interpretation Summary     The visual ejection fraction is estimated at <20%.  Left ventricular systolic function is severely reduced.  The rhythm was atrial fibrillation.  The study was technically difficult. Compared to prior study, there is no  significant change.  _____________________________________________________________________________  __        Left Ventricle  The left ventricle is moderately dilated. Left ventricular systolic function  is severely reduced. The visual ejection fraction is estimated at <20%. There  is severe global hypokinesia of the left ventricle.     Right Ventricle  Severely decreased right ventricular systolic function. Only the basal portion  of the right ventricle is tyler normally.     Mitral Valve  There is mild (1+) mitral regurgitation.     Tricuspid Valve  There is mild to moderate (1-2+) tricuspid regurgitation. The right  ventricular systolic pressure is approximated at 23.3 mmHg plus the right  atrial pressure.        Aortic Valve  There is mild (1+) aortic regurgitation.     Pulmonic Valve  There is no pulmonic valvular regurgitation.     Pericardium  There is no pericardial effusion.     Rhythm  The rhythm was atrial fibrillation. The patient exhibited frequent PVCs.     _____________________________________________________________________________  __        Doppler Measurements & Calculations  AI P1/2t: 432.9 msec  TR max emily: 241.0 cm/sec  TR max P.3 mmHg              _____________________________________________________________________________  __        Report approved by: Clemente Moffett 2018 04:03 PM      Lactic acid level  STAT for sepsis protocol   Result Value Ref Range    Lactate for Sepsis Protocol 1.0 0.7 - 2.0 mmol/L   INR   Result Value Ref Range    INR 2.94 (H) 0.86 - 1.14       Recent Labs  Lab 09/22/18  0545 09/21/18  0614 09/20/18  1819 09/20/18  1055 09/20/18  1052 09/17/18  0710   WBC  --  7.1 8.7  --   --   --    HGB  --  15.1 15.7  --   --  16.2   MCV  --  95 96  --   --   --    PLT  --  183 204  --   --   --    INR 2.94* 3.04*  --  3.14*  --   --    NA  --  142  --   --  141 142   POTASSIUM  --  4.0  --   --  4.3 4.4   CHLORIDE  --  104  --   --  103 103   CO2  --  31  --   --  32 30   BUN  --  38*  --   --  41* 34*   CR  --  1.94*  --   --  1.91* 1.85*   ANIONGAP  --  7  --   --  6 9   BHAVNA  --  8.7  --   --  8.5 8.6   GLC  --  87  --   --  93 128*   ALBUMIN  --  3.5 3.3*  --   --   --    PROTTOTAL  --  6.1* 6.3*  --   --   --    BILITOTAL  --  1.0 0.8  --   --   --    ALKPHOS  --  75 76  --   --   --    ALT  --  51 56  --   --   --    AST  --  33 38  --   --   --      Recent Results (from the past 24 hour(s))   CT Abdomen Pelvis w/o Contrast    Narrative    CT ABDOMEN AND PELVIS WITHOUT CONTRAST 9/21/2018 12:35 PM     HISTORY: Evaluate known infrarenal abdominal aortic aneurysm.      COMPARISON: Abdominal aortic ultrasound 11/4/2016. CT abdomen/pelvis  4/18/2016.    TECHNIQUE: Axial images are obtained from the lung bases to the  symphysis without oral or IV contrast. Coronal reformatted images are  also generated.  Radiation dose for this scan was reduced using  automated exposure control, adjustment of the mA and/or kV according  to patient size, or iterative reconstruction technique.    FINDINGS:     The lung bases are clear. Mild dependent atelectasis is present  bilaterally. Trace amount left pleural fluid is noted.    Abdomen: Calcified granulomas are noted in the spleen. The liver,  gallbladder, pancreas and adrenal glands are unremarkable. Multiple  renal cortical cysts are present. No hydronephrosis or  urinary tract  calculi. No enlarged abdominal lymph nodes. Infrarenal abdominal  aortic aneurysm on series 2, image 84 currently measures 4.4 x 4.4 cm,  previously 4.1 x 4.1 cm suggesting mild interval progression. Aneurysm  appears to extend into the proximal common right iliac artery.  Scattered calcified plaque is present. The bowel is normal in caliber  without obstruction. Trace amount of fluid is noted near the inferior  tip of the liver. This is new since the prior study.    Pelvis: The bladder, prostate and rectum are unremarkable. No enlarged  pelvic or inguinal lymph nodes. Degenerative spine changes are  present. Small fat-containing left inguinal hernia is unchanged.      Impression    IMPRESSION:  1. Slight interval increase in the size of the infrarenal abdominal  aortic aneurysm now measuring 4.4 cm, previously 4.1 cm in April 2016.  No evidence of retroperitoneal hemorrhage.  2. Evidence of old granulomatous disease.  3. Bilateral renal cortical cysts. No evidence of hydronephrosis or  urinary tract calculi.    SERA BUCKNER MD

## 2018-09-22 NOTE — PLAN OF CARE
Problem: Patient Care Overview  Goal: Plan of Care/Patient Progress Review  Outcome: No Change  Alert and oriented x4. VSS on RA. Tele A-fib, CVR/RVR. Denies any pain/sob. Up independently in the room. Fluid restriction of 1800 mL. Edema +2 in lower extremities. Metoprolol decreased from 100 mg to 50mg. Lasix decreased from 60mg to 40mg. Plan for lymphedema consult; and EP consult. Will continue to monitor.

## 2018-09-22 NOTE — PROGRESS NOTES
Patient is on RA with SpO2 in the low 90's. BS diminished. All nebs were given as ordered.  Will cont to follow.  9/22/2018  Bhavana Bob RRT

## 2018-09-22 NOTE — PROGRESS NOTES
[facility]    Hospitalist Progress Note    Assessment & Plan   Home Valdez is a 68 year old male with a past medical history of hypertension,  atrial fibrillation, nonischemic cardiomyopathy felt to be tachycardia induced, systolic congestive heart failure, COPD, CKD, hyperlipidemia, abdominal aortic aneurysm, obstructive sleep apnea, pulmonary hypertension, PTSD and trigeminal neuralgia who presents from cardiology clinic due to acute on chronic systolic heart failure exacerbation.      Acute on chronic systolic heart failure exacerbation  Nonischemic cardiomyopathy  With known biventricular heart failure with ejection fraction of less than 20% on an echo completed at the end of July this year.  He appears to be failing outpatient management with metoprolol 100 mg twice daily, torsemide 20 mg daily with intermittent dosing of an additional 40 mg as needed and Losartan.  He reported dietary compliance, medication compliance and has been following with his cardiologist regularly.  He does have a history of atrial fibrillation and it was felt that his tachycardia was the source of his cardiomyopathy in the past and may be contributing to his current failure of therapy. Presented with a BNP > 29K, FARIAS, JVD, LE edema and ascites.    An echocardiogram this hospitalization did not show any significant change from previous one 2 months ago.  EF still severely reduced at <20%   - Holding his outpatient torsemide   - Cardiology is following and appreciate their recommendations.  Lasix 40 mg IV TID currently.  Plan to have EP evaluation on Monday    - Continue with prior to admission losartan and Toprol XL    - Daily weights.  Strict I's/O's   - 2 gram Na diet  - Patient will require more aggressive rate control which could include cardioversion or AV peter ablation and permanent pacemaker placement.  May consider a biventricular pacemaker.      Atrial Fibrillation  Maintained on metoprolol 100 mg twice  daily and warfarin currently.  Up until about 2 weeks ago he had been previously on amiodarone and Toprol-XL.  Rates presently controlled.  Currently the thought is that his ongoing atrial fibrillation may be contributing to his heart failure exacerbation and ongoing cardiomyopathy.  TSH elevated at 5.74, but free T4 is normal at 1.21.   - Cardiology following.  Plan for EP evaluation    - Toprol XL 50 mg BID    - Pharmacy to dose Coumadin    - Telemetry monitoring     Abdominal discomfort.  Improved   Associated with abdominal fullness and epigastric abdominal pain that radiates across the abdomen.  Symptoms are actually improved after increased diuresis.  He does note some discomfort after eating but again says that this is improved after improved diuresis.  Denies any nausea, vomiting, diarrhea.  Abdominal exam notable for ascites, edema, but negative Coates sign.  Liver profile does not appear consistent with acute cholecystitis and lipase normal    - Suspect that his abdominal ultrasound findings of gallbladder wall thickening with pericholecystic fluid is related to his heart failure.   - Treat HF as above.       AAA  He has a known history of an abdominal aortic aneurysm.  Abd US 9/19/18 shows: Abdominal aortic aneurysm measuring up to a maximum of 3.6 x 4.3 cm. this is a significant increase in size based on his prior study in November 2017 where it measured 3.2 x 3.1 cm. His infra-renal AAA has been present for several years and followed with US.     - Vascular surgery consulted.  Plan for further outpatient management      Cardiorenal Syndrome  CKD Stage III  His baseline creatinine had been around 1 until July of this year.  Since that time it is ranged from 1.3-1.9 and more recently in the 1.8-1.9 range.  Cr is currently 1.5.  Given his current volume overload in significantly reduced ejection fraction suspect that his primary issue is a cardiorenal syndrome.   - We will be treating his heart failure and  volume status with IV diuretics as above.    COPD  Pulmonary hypertension  Obstructive sleep apnea   - Saturating well on room air at rest presently. Not felt to be in acute exacerbation.    - C/w PTA nebulizers and inhalers.    - CPAP with home settings      DVT Prophylaxis: Warfarin  Code Status: Full Code    Disposition: Expected discharge TBD.  Still requiring IV diuresis and needs further cardiac evaluation    Nick Mcqueen DO  Text Page (7am to 6pm)    Interval History   Patient seen and examined.  States his swelling and abdominal distension has improved but notes the distension does come back after exertion.  No SOB currently.  No chest pain.      -Data reviewed today: I reviewed all new labs and imaging results over the last 24 hours. I personally reviewed no images or EKG's today.    Physical Exam   Temp: 97.6  F (36.4  C) Temp src: Oral BP: 112/71   Heart Rate: 119 Resp: 18 SpO2: (!) 87 % O2 Device: None (Room air)    Vitals:    09/21/18 0616 09/22/18 0614   Weight: 108.9 kg (240 lb 1.6 oz) 108 kg (238 lb)     Vital Signs with Ranges  Temp:  [97.4  F (36.3  C)-98.6  F (37  C)] 97.6  F (36.4  C)  Heart Rate:  [] 119  Resp:  [18] 18  BP: ()/(62-76) 112/71  SpO2:  [87 %-96 %] 87 %  I/O last 3 completed shifts:  In: 840 [P.O.:840]  Out: 1515 [Urine:1515]    Constitutional: Awake, alert, cooperative, no apparent distress  Respiratory: Clear to auscultation bilaterally, no crackles or wheezing  Cardiovascular: Irregularly irregular, normal S1 and S2, and no murmur noted  GI: Distended.  Normal bowel sounds, soft, non-tender  Skin/Integumen: No rashes, no cyanosis  MSK: Bilateral lower extremities wrapped but edema still present     Medications     Continuing ACE inhibitor/ARB/ARNI from home medication list OR ACE inhibitor/ARB order already placed during this visit       - MEDICATION INSTRUCTIONS -       - MEDICATION INSTRUCTIONS -       Warfarin Therapy Reminder         buPROPion  150 mg Oral  BID     fluticasone-salmeterol  1 puff Inhalation Q12H     furosemide  40 mg Intravenous Q8H     ipratropium - albuterol 0.5 mg/2.5 mg/3 mL  3 mL Nebulization Q4H While awake     losartan  25 mg Oral At Bedtime     metoprolol succinate  50 mg Oral BID 09 12     montelukast  10 mg Oral At Bedtime     potassium chloride SA  20 mEq Oral At Bedtime     senna-docusate  2 tablet Oral BID     sodium chloride (PF)  3 mL Intracatheter Q8H     warfarin  5 mg Oral ONCE at 18:00       Data     Recent Labs  Lab 09/22/18  0545 09/21/18  0614 09/20/18  1819  09/20/18  1055 09/20/18  1052 09/17/18  0710   WBC  --  7.1 8.7  --   --   --   --    HGB  --  15.1 15.7  --   --   --  16.2   MCV  --  95 96  --   --   --   --    PLT  --  183 204  --   --   --   --    INR 2.94* 3.04*  --   --  3.14*  --   --     142  --   --   --  141 142   POTASSIUM 4.6 4.0  --   --   --  4.3 4.4   CHLORIDE 104 104  --   --   --  103 103   CO2 33* 31  --   --   --  32 30   BUN 33* 38*  --   --   --  41* 34*   CR 1.85* 1.94*  --   --   --  1.91* 1.85*   ANIONGAP 4 7  --   --   --  6 9   BHAVNA 8.8 8.7  --   --   --  8.5 8.6   GLC 87 87  --   --   --  93 128*   ALBUMIN 3.5 3.5 3.3*  < >  --   --   --    PROTTOTAL 6.4* 6.1* 6.3*  < >  --   --   --    BILITOTAL 1.0 1.0 0.8  < >  --   --   --    ALKPHOS 75 75 76  < >  --   --   --    ALT 44 51 56  < >  --   --   --    AST 38 33 38  < >  --   --   --    LIPASE  --   --  115  --   --   --   --    TROPI <0.015  --   --   --   --   --   --    < > = values in this interval not displayed.    Imaging:   Recent Results (from the past 24 hour(s))   CT Abdomen Pelvis w/o Contrast    Narrative    CT ABDOMEN AND PELVIS WITHOUT CONTRAST 9/21/2018 12:35 PM     HISTORY: Evaluate known infrarenal abdominal aortic aneurysm.      COMPARISON: Abdominal aortic ultrasound 11/4/2016. CT abdomen/pelvis  4/18/2016.    TECHNIQUE: Axial images are obtained from the lung bases to the  symphysis without oral or IV contrast. Coronal  reformatted images are  also generated.  Radiation dose for this scan was reduced using  automated exposure control, adjustment of the mA and/or kV according  to patient size, or iterative reconstruction technique.    FINDINGS:     The lung bases are clear. Mild dependent atelectasis is present  bilaterally. Trace amount left pleural fluid is noted.    Abdomen: Calcified granulomas are noted in the spleen. The liver,  gallbladder, pancreas and adrenal glands are unremarkable. Multiple  renal cortical cysts are present. No hydronephrosis or urinary tract  calculi. No enlarged abdominal lymph nodes. Infrarenal abdominal  aortic aneurysm on series 2, image 84 currently measures 4.4 x 4.4 cm,  previously 4.1 x 4.1 cm suggesting mild interval progression. Aneurysm  appears to extend into the proximal common right iliac artery.  Scattered calcified plaque is present. The bowel is normal in caliber  without obstruction. Trace amount of fluid is noted near the inferior  tip of the liver. This is new since the prior study.    Pelvis: The bladder, prostate and rectum are unremarkable. No enlarged  pelvic or inguinal lymph nodes. Degenerative spine changes are  present. Small fat-containing left inguinal hernia is unchanged.      Impression    IMPRESSION:  1. Slight interval increase in the size of the infrarenal abdominal  aortic aneurysm now measuring 4.4 cm, previously 4.1 cm in April 2016.  No evidence of retroperitoneal hemorrhage.  2. Evidence of old granulomatous disease.  3. Bilateral renal cortical cysts. No evidence of hydronephrosis or  urinary tract calculi.    SERA BUCKNER MD

## 2018-09-22 NOTE — PLAN OF CARE
Problem: Patient Care Overview  Goal: Plan of Care/Patient Progress Review  Outcome: No Change  AOx4. Up independently in room, IV SL. Tele Afib CVR/RVR with HR of 's with activity. Diuresing with IV Lasix 40 mg Tid, poor UO. On 1800 mL fluid restriction, pt is very compliant with that. ACE wraps on per PT, 23/24 hrs. BLE subhash and blotchy with 2-3+ edema. If patient is uncomfortable with wraps please remove them. Plan: EP consult, Ablation/PPM and Lexiscan Monday.

## 2018-09-23 ENCOUNTER — APPOINTMENT (OUTPATIENT)
Dept: OCCUPATIONAL THERAPY | Facility: CLINIC | Age: 68
DRG: 292 | End: 2018-09-23
Attending: INTERNAL MEDICINE
Payer: MEDICARE

## 2018-09-23 LAB
INR PPP: 2.74 (ref 0.86–1.14)
TROPONIN I SERPL-MCNC: 0.02 UG/L (ref 0–0.04)

## 2018-09-23 PROCEDURE — 25000128 H RX IP 250 OP 636: Performed by: INTERNAL MEDICINE

## 2018-09-23 PROCEDURE — 84484 ASSAY OF TROPONIN QUANT: CPT | Performed by: INTERNAL MEDICINE

## 2018-09-23 PROCEDURE — A9270 NON-COVERED ITEM OR SERVICE: HCPCS | Mod: GY | Performed by: INTERNAL MEDICINE

## 2018-09-23 PROCEDURE — 21000001 ZZH R&B HEART CARE

## 2018-09-23 PROCEDURE — 94640 AIRWAY INHALATION TREATMENT: CPT

## 2018-09-23 PROCEDURE — 97110 THERAPEUTIC EXERCISES: CPT | Mod: GO

## 2018-09-23 PROCEDURE — 97140 MANUAL THERAPY 1/> REGIONS: CPT | Mod: GO

## 2018-09-23 PROCEDURE — 99232 SBSQ HOSP IP/OBS MODERATE 35: CPT | Performed by: INTERNAL MEDICINE

## 2018-09-23 PROCEDURE — 25000132 ZZH RX MED GY IP 250 OP 250 PS 637: Mod: GY | Performed by: INTERNAL MEDICINE

## 2018-09-23 PROCEDURE — 25000132 ZZH RX MED GY IP 250 OP 250 PS 637: Mod: GY | Performed by: PHYSICIAN ASSISTANT

## 2018-09-23 PROCEDURE — 94640 AIRWAY INHALATION TREATMENT: CPT | Mod: 76

## 2018-09-23 PROCEDURE — A9270 NON-COVERED ITEM OR SERVICE: HCPCS | Mod: GY | Performed by: PHYSICIAN ASSISTANT

## 2018-09-23 PROCEDURE — 40000275 ZZH STATISTIC RCP TIME EA 10 MIN

## 2018-09-23 PROCEDURE — 25000125 ZZHC RX 250: Performed by: INTERNAL MEDICINE

## 2018-09-23 PROCEDURE — 85610 PROTHROMBIN TIME: CPT | Performed by: INTERNAL MEDICINE

## 2018-09-23 PROCEDURE — 36415 COLL VENOUS BLD VENIPUNCTURE: CPT | Performed by: INTERNAL MEDICINE

## 2018-09-23 PROCEDURE — 40000133 ZZH STATISTIC OT WARD VISIT

## 2018-09-23 RX ORDER — FUROSEMIDE 10 MG/ML
80 INJECTION INTRAMUSCULAR; INTRAVENOUS EVERY 12 HOURS
Status: DISCONTINUED | OUTPATIENT
Start: 2018-09-23 | End: 2018-09-24

## 2018-09-23 RX ORDER — SPIRONOLACTONE 25 MG/1
25 TABLET ORAL DAILY
Status: DISCONTINUED | OUTPATIENT
Start: 2018-09-23 | End: 2018-09-26 | Stop reason: HOSPADM

## 2018-09-23 RX ORDER — METOLAZONE 2.5 MG/1
2.5 TABLET ORAL ONCE
Status: COMPLETED | OUTPATIENT
Start: 2018-09-23 | End: 2018-09-23

## 2018-09-23 RX ORDER — WARFARIN SODIUM 5 MG/1
5 TABLET ORAL
Status: COMPLETED | OUTPATIENT
Start: 2018-09-23 | End: 2018-09-23

## 2018-09-23 RX ADMIN — METOPROLOL SUCCINATE 50 MG: 50 TABLET, EXTENDED RELEASE ORAL at 12:27

## 2018-09-23 RX ADMIN — BUPROPION HYDROCHLORIDE 150 MG: 150 TABLET, FILM COATED, EXTENDED RELEASE ORAL at 07:33

## 2018-09-23 RX ADMIN — IPRATROPIUM BROMIDE AND ALBUTEROL SULFATE 3 ML: 2.5; .5 SOLUTION RESPIRATORY (INHALATION) at 10:55

## 2018-09-23 RX ADMIN — BUPROPION HYDROCHLORIDE 150 MG: 150 TABLET, FILM COATED, EXTENDED RELEASE ORAL at 20:48

## 2018-09-23 RX ADMIN — WARFARIN SODIUM 5 MG: 5 TABLET ORAL at 18:42

## 2018-09-23 RX ADMIN — POTASSIUM CHLORIDE 20 MEQ: 1500 TABLET, EXTENDED RELEASE ORAL at 21:08

## 2018-09-23 RX ADMIN — IPRATROPIUM BROMIDE AND ALBUTEROL SULFATE 3 ML: 2.5; .5 SOLUTION RESPIRATORY (INHALATION) at 23:19

## 2018-09-23 RX ADMIN — FUROSEMIDE 80 MG: 10 INJECTION, SOLUTION INTRAVENOUS at 08:11

## 2018-09-23 RX ADMIN — SENNOSIDES AND DOCUSATE SODIUM 2 TABLET: 8.6; 5 TABLET ORAL at 20:48

## 2018-09-23 RX ADMIN — ACETAMINOPHEN 650 MG: 325 TABLET, FILM COATED ORAL at 10:34

## 2018-09-23 RX ADMIN — METOLAZONE 2.5 MG: 2.5 TABLET ORAL at 07:33

## 2018-09-23 RX ADMIN — IPRATROPIUM BROMIDE AND ALBUTEROL SULFATE 3 ML: 2.5; .5 SOLUTION RESPIRATORY (INHALATION) at 07:34

## 2018-09-23 RX ADMIN — IPRATROPIUM BROMIDE AND ALBUTEROL SULFATE 3 ML: 2.5; .5 SOLUTION RESPIRATORY (INHALATION) at 14:53

## 2018-09-23 RX ADMIN — FUROSEMIDE 80 MG: 10 INJECTION, SOLUTION INTRAVENOUS at 20:46

## 2018-09-23 RX ADMIN — LOSARTAN POTASSIUM 25 MG: 25 TABLET ORAL at 21:09

## 2018-09-23 RX ADMIN — METOPROLOL SUCCINATE 50 MG: 50 TABLET, EXTENDED RELEASE ORAL at 07:33

## 2018-09-23 RX ADMIN — MONTELUKAST SODIUM 10 MG: 10 TABLET, FILM COATED ORAL at 21:08

## 2018-09-23 RX ADMIN — SPIRONOLACTONE 25 MG: 25 TABLET ORAL at 07:34

## 2018-09-23 RX ADMIN — IPRATROPIUM BROMIDE AND ALBUTEROL SULFATE 3 ML: 2.5; .5 SOLUTION RESPIRATORY (INHALATION) at 19:55

## 2018-09-23 RX ADMIN — FUROSEMIDE 40 MG: 10 INJECTION, SOLUTION INTRAVENOUS at 01:06

## 2018-09-23 RX ADMIN — SENNOSIDES AND DOCUSATE SODIUM 2 TABLET: 8.6; 5 TABLET ORAL at 07:33

## 2018-09-23 ASSESSMENT — ACTIVITIES OF DAILY LIVING (ADL)
ADLS_ACUITY_SCORE: 9

## 2018-09-23 NOTE — PLAN OF CARE
Problem: Patient Care Overview  Goal: Plan of Care/Patient Progress Review  Outcome: Improving  Pt A&O. VSS on RA. LS crackles. BS active. Independent in room. Voiding without difficulty. 2g Sodium, no caffeine diet. Edema LE. PIV salin lock. Pt denies pain and SOB.

## 2018-09-23 NOTE — PLAN OF CARE
"Problem: Patient Care Overview  Goal: Plan of Care/Patient Progress Review  Discharge Planner OT   Patient plan for discharge: Home    Current status: OT/CR/Lymph: Pt and RN reported that they took the wraps off at 9:30pm on 9/22/18. Pt reported that wraps became \"bothersome\" and \"irritating.\" Assessed skin further today.  Pt demonstrated some redness on BLE's anterior portion of the shin. Dried scabs on BLE's. Overall, swelling decreased in appearance. Pt reported that he is not interested in continuing the wraps, reporting that it is \"irritating.\" Pt was educated on importance of skin care and daily checks to ensure skin integrity. Pt ambulated in the hallway with independence for 120 feet. Pt demonstrated increase in fatigue and SOB during session. Plans to discontinue Wraps and complete lymphedema orders and will continue to see pt for IP OT/CR.      Barriers to return to prior living situation: stairs, per chart: plan for PM implant with AVN ablation vs. antiarrhythmic therapy/cardioversion      Recommendations for discharge: home with spouse assist as needed, resumption of work per MD approval and/or restrictions.     Rationale for recommendations: anticipate pt will meet OT/CR goal areas with continued IP therapy        Entered by: Lorelei Zamora 09/23/2018 8:45 AM            "

## 2018-09-23 NOTE — PROGRESS NOTES
[facility]    Hospitalist Progress Note    Assessment & Plan   Home Valdez is a 68 year old male with a past medical history of hypertension,  atrial fibrillation, nonischemic cardiomyopathy felt to be tachycardia induced, systolic congestive heart failure, COPD, CKD, hyperlipidemia, abdominal aortic aneurysm, obstructive sleep apnea, pulmonary hypertension, PTSD and trigeminal neuralgia who presents from cardiology clinic due to acute on chronic systolic heart failure exacerbation.      Acute on chronic systolic heart failure exacerbation  Nonischemic cardiomyopathy  With known biventricular heart failure with ejection fraction of less than 20% on an echo completed at the end of July this year.  He appears to be failing outpatient management with metoprolol 100 mg twice daily, torsemide 20 mg daily with intermittent dosing of an additional 40 mg as needed and Losartan.  He reported dietary compliance, medication compliance and has been following with his cardiologist regularly.  He does have a history of atrial fibrillation and it was felt that his tachycardia was the source of his cardiomyopathy in the past and may be contributing to his current failure of therapy. Presented with a BNP > 29K, FARIAS, JVD, LE edema and ascites.    An echocardiogram this hospitalization did not show any significant change from previous one 2 months ago.  EF still severely reduced at <20%  - Holding his outpatient torsemide  - Cardiology is following and appreciate their recommendations.  Lasix now 80 mg IV BID.  1x dose of Metolazone 2.5 mg.  Added Spironolactone 25 mg daily   - Continue with prior to admission losartan and Toprol XL   - Daily weights.  Strict I's/O's  - 2 gram Na diet      Atrial Fibrillation  Maintained on metoprolol 100 mg twice daily and warfarin currently.  Up until about 2 weeks ago he had been previously on amiodarone and Toprol-XL.  Rates presently controlled.  Currently the thought is  that his ongoing atrial fibrillation may be contributing to his heart failure exacerbation and ongoing cardiomyopathy. TSH elevated at 5.74, but free T4 is normal at 1.21.  - Cardiology following.  Plan for EP evaluation   - Toprol XL 50 mg BID   - Pharmacy to dose Coumadin   - Telemetry monitoring  - Patient will require more aggressive rate control which could include cardioversion or AV peter ablation and permanent pacemaker placement.  May consider a biventricular pacemaker.      Abdominal discomfort.  Resolved  Associated with abdominal fullness and epigastric abdominal pain that radiates across the abdomen.  Symptoms are actually improved after increased diuresis.  He does note some discomfort after eating but again says that this is improved after improved diuresis.  Denies any nausea, vomiting, diarrhea.  Abdominal exam notable for ascites, edema, but negative Coates sign.  Liver profile does not appear consistent with acute cholecystitis and lipase normal   - Suspect that his abdominal ultrasound findings of gallbladder wall thickening with pericholecystic fluid is related to his heart failure.  - Treat HF as above.       AAA  He has a known history of an abdominal aortic aneurysm.  Abd US 9/19/18 shows: Abdominal aortic aneurysm measuring up to a maximum of 3.6 x 4.3 cm. this is a significant increase in size based on his prior study in November 2017 where it measured 3.2 x 3.1 cm. His infra-renal AAA has been present for several years and followed with US.     - Vascular surgery consulted.  Plan for further outpatient management      Cardiorenal Syndrome  CKD Stage III  His baseline creatinine had been around 1 until July of this year.  Since that time it is ranged from 1.3-1.9 and more recently in the 1.8-1.9 range.  Cr is currently 1.5.  Given his current volume overload in significantly reduced ejection fraction suspect that his primary issue is a cardiorenal syndrome.  - We will be treating his heart  failure and volume status with IV diuretics as above  - Repeat BMP tomorrow     COPD  Pulmonary hypertension  Obstructive sleep apnea   - Saturating well on room air at rest presently. Not felt to be in acute exacerbation.    - C/w PTA nebulizers and inhalers.    - CPAP with home settings      DVT Prophylaxis: Warfarin  Code Status: Full Code    Disposition: Expected discharge TBD.  Still undergoing cardiac evaluation.     Nick Mcqueen DO  Text Page (7am to 6pm)    Interval History   Patient seen and examined.  Distension significantly improved and no longer having abdominal discomfort.  Still has fatigue.  No chest pain or SOB currently.  No fevers over night.     -Data reviewed today: I reviewed all new labs and imaging results over the last 24 hours. I personally reviewed no images or EKG's today.    Physical Exam   Temp: 97.9  F (36.6  C) Temp src: Oral BP: 103/73 Pulse: 90 Heart Rate: 128 Resp: 16 SpO2: (!) 88 % O2 Device: None (Room air)    Vitals:    09/21/18 0616 09/22/18 0614 09/23/18 0620   Weight: 108.9 kg (240 lb 1.6 oz) 108 kg (238 lb) 107.4 kg (236 lb 12.8 oz)     Vital Signs with Ranges  Temp:  [97.9  F (36.6  C)-98.5  F (36.9  C)] 97.9  F (36.6  C)  Pulse:  [90-92] 90  Heart Rate:  [] 128  Resp:  [16-18] 16  BP: (103-118)/(67-78) 103/73  SpO2:  [87 %-94 %] 88 %  I/O last 3 completed shifts:  In: 1593 [P.O.:1590; I.V.:3]  Out: 1570 [Urine:1570]    Constitutional: Awake, alert, cooperative, no apparent distress  Respiratory: Clear to auscultation bilaterally, no crackles or wheezing  Cardiovascular: Irregular, normal S1 and S2, and no murmur noted  GI: Minimal distension.  Normal bowel sounds, soft, non-distended  Skin/Integumen: No rashes, no cyanosis  MSK: 2-3+ Pitting edema to bilateral lower extremities     Medications     Continuing ACE inhibitor/ARB/ARNI from home medication list OR ACE inhibitor/ARB order already placed during this visit       - MEDICATION INSTRUCTIONS -       -  MEDICATION INSTRUCTIONS -       Warfarin Therapy Reminder         buPROPion  150 mg Oral BID     fluticasone-salmeterol  1 puff Inhalation Q12H     furosemide  80 mg Intravenous Q12H     ipratropium - albuterol 0.5 mg/2.5 mg/3 mL  3 mL Nebulization Q4H While awake     losartan  25 mg Oral At Bedtime     metoprolol succinate  50 mg Oral BID 09 12     montelukast  10 mg Oral At Bedtime     potassium chloride SA  20 mEq Oral At Bedtime     senna-docusate  2 tablet Oral BID     sodium chloride (PF)  3 mL Intracatheter Q8H     spironolactone  25 mg Oral Daily       Data     Recent Labs  Lab 09/23/18  0550 09/22/18  0545 09/21/18  0614 09/20/18  1819  09/20/18  1052 09/17/18  0710   WBC  --   --  7.1 8.7  --   --   --    HGB  --   --  15.1 15.7  --   --  16.2   MCV  --   --  95 96  --   --   --    PLT  --   --  183 204  --   --   --    INR 2.74* 2.94* 3.04*  --   < >  --   --    NA  --  141 142  --   --  141 142   POTASSIUM  --  4.6 4.0  --   --  4.3 4.4   CHLORIDE  --  104 104  --   --  103 103   CO2  --  33* 31  --   --  32 30   BUN  --  33* 38*  --   --  41* 34*   CR  --  1.85* 1.94*  --   --  1.91* 1.85*   ANIONGAP  --  4 7  --   --  6 9   BHAVNA  --  8.8 8.7  --   --  8.5 8.6   GLC  --  87 87  --   --  93 128*   ALBUMIN  --  3.5 3.5 3.3*  < >  --   --    PROTTOTAL  --  6.4* 6.1* 6.3*  < >  --   --    BILITOTAL  --  1.0 1.0 0.8  < >  --   --    ALKPHOS  --  75 75 76  < >  --   --    ALT  --  44 51 56  < >  --   --    AST  --  38 33 38  < >  --   --    LIPASE  --   --   --  115  --   --   --    TROPI 0.019 <0.015  --   --   --   --   --    < > = values in this interval not displayed.    Imaging:   No results found for this or any previous visit (from the past 24 hour(s)).

## 2018-09-23 NOTE — PLAN OF CARE
Problem: Patient Care Overview  Goal: Plan of Care/Patient Progress Review  Outcome: Improving  [id number]   A/OX4. Crackles on LLL and diminished LS on RLL. CPAP at bedtime. A. Fib with RVR. Voiding with urinal. 2g Na diet with 1800ml fluid restriction. Independent ADLs. Lymphoedema wrap on BLE. Continue lasix Q8h. Plan on possible guido scan, cath lab on Monday. Afebrile. VSS on RA.

## 2018-09-23 NOTE — PROGRESS NOTES
[facility]    Cardiology Progress Note     Assessment & Plan   Home Valdez is a 68 year old male who was admitted on 9/20/2018. He is admitted to admitted to [facility] with complaints of edema, abdominal distention and shortness of breath.   His w/u is consistent with BiV HF thought to be secondary to tachycardia-induced cardiomyopathy.    He requires aggressive diuresis.  He also will be provided an ischemic heart disease evaluation with a MPI stress on Monday.  He also needs EP's evaluation to outline/determine long-term atrial fibrillation management options    Summary:   # Biventricular Systolic HF  # Cardiomyopathy, likely non-ischemic, likely tachycardia-induced  # Atrial Fibrillation  # COPD  # JAMES  # HL  # HTN  # CKD  # PTSD and Trigeminal Neuralgia  # AAA  - will increase Lasix 80 mg BID  - will add metolazone 2.5 mg today (X1 dose)  - will start aldactone 25 mg Q daily   - providing losartan 25 mg Q daily  - providing metoprolol succinate 50 mg BID  - will plan for a EP Consult        ---- will consider PM implant with AVN ablation vs. antiarrhythmic therapy/cardioversion Monday  - will plan for a Lexiscan stress nuclear study Monday (ordered)    Juliano Ring MD    Interval History   No overnight events.  Suboptimal urinary output with TID Lasix regimen.      Patient reports sleeping well last night.    Physical Exam   Temp: 97.9  F (36.6  C) Temp src: Oral BP: 109/67 Pulse: 90 Heart Rate: 102 Resp: 16 SpO2: 92 % O2 Device: BiPAP/CPAP    Vitals:    09/21/18 0616 09/22/18 0614   Weight: 108.9 kg (240 lb 1.6 oz) 108 kg (238 lb)     Vital Signs with Ranges  Temp:  [97.6  F (36.4  C)-98.5  F (36.9  C)] 97.9  F (36.6  C)  Pulse:  [90-92] 90  Heart Rate:  [] 102  Resp:  [16-18] 16  BP: (104-112)/(67-78) 109/67  SpO2:  [87 %-94 %] 92 %  I/O last 3 completed shifts:  In: 1233 [P.O.:1230; I.V.:3]  Out: 1320 [Urine:1320]  Patient Active Problem List   Diagnosis      Posttraumatic stress disorder     PERS HX TOBACCO USE     Pulmonary emphysema (H)     Pulmonary nodule, needs annual ct      Hypertension goal BP (blood pressure) < 140/90     Encounter for long-term current use of medication     COPD (chronic obstructive pulmonary disease) (H)     Hyperlipidemia LDL goal <130     AAA (abdominal aortic aneurysm) 4.0 cm     SEVERE JAMES (obstructive sleep apnea)     Ejection fraction < 50%     Nonischemic cardiomyopathy EF 45-50% by echo 2016     Other peripheral vascular disease(443.89)     Dermatophytosis of nail     Syncope     Atrial fibrillation with RVR (H)     Acute systolic CHF (congestive heart failure) (H)     Neutrophilic leukocytosis     DVT prophylaxis     Rapid atrial fibrillation (H)     Hypopotassemia     Hypomagnesemia     Acute bronchitis     CHF (congestive heart failure) (H)     Thrombocytopenia (H)     Long-term (current) use of anticoagulants [Z79.01]     History of atrial fibrillation     COPD exacerbation (H)     Acute respiratory failure (H)     Trigeminal neuralgia of left side of face     Panlobular emphysema (H)     Trigeminal neuralgia     On amiodarone therapy     Community acquired pneumonia of right upper lobe of lung (H)     CAP (community acquired pneumonia)     Morbid obesity (H)     Atrial fibrillation with rapid ventricular response (H)     Elevated troponin     CKD (chronic kidney disease) stage 3, GFR 30-59 ml/min       General:  Well-appearing, appears as stated age, friendly, sensorium clear, cognition intact, friendly, cooperative.  HEENT:  No major abnormalities.  Vessels: Elevated JVP.   Heart:  Irregular,  No murmurs appreciated.  No S3 or S4.    Lungs:  Clear to auscultation bilaterally.  No wheezes or rhonchi.  Adequate air movement.  Breathing comfortably.  + bibasilar rales  Abdomen:  Soft, nontender, nondistended.   Extremities:  Severe edema.  Normal perfusion.    Medications     Continuing ACE inhibitor/ARB/ARNI from home medication  list OR ACE inhibitor/ARB order already placed during this visit       - MEDICATION INSTRUCTIONS -       - MEDICATION INSTRUCTIONS -       Warfarin Therapy Reminder         buPROPion  150 mg Oral BID     fluticasone-salmeterol  1 puff Inhalation Q12H     furosemide  40 mg Intravenous Q8H     ipratropium - albuterol 0.5 mg/2.5 mg/3 mL  3 mL Nebulization Q4H While awake     losartan  25 mg Oral At Bedtime     metoprolol succinate  50 mg Oral BID 09 12     montelukast  10 mg Oral At Bedtime     potassium chloride SA  20 mEq Oral At Bedtime     senna-docusate  2 tablet Oral BID     sodium chloride (PF)  3 mL Intracatheter Q8H       Data   Results for orders placed or performed during the hospital encounter of 09/20/18 (from the past 24 hour(s))   INR   Result Value Ref Range    INR 2.94 (H) 0.86 - 1.14   Comprehensive metabolic panel   Result Value Ref Range    Sodium 141 133 - 144 mmol/L    Potassium 4.6 3.4 - 5.3 mmol/L    Chloride 104 94 - 109 mmol/L    Carbon Dioxide 33 (H) 20 - 32 mmol/L    Anion Gap 4 3 - 14 mmol/L    Glucose 87 70 - 99 mg/dL    Urea Nitrogen 33 (H) 7 - 30 mg/dL    Creatinine 1.85 (H) 0.66 - 1.25 mg/dL    GFR Estimate 36 (L) >60 mL/min/1.7m2    GFR Estimate If Black 44 (L) >60 mL/min/1.7m2    Calcium 8.8 8.5 - 10.1 mg/dL    Bilirubin Total 1.0 0.2 - 1.3 mg/dL    Albumin 3.5 3.4 - 5.0 g/dL    Protein Total 6.4 (L) 6.8 - 8.8 g/dL    Alkaline Phosphatase 75 40 - 150 U/L    ALT 44 0 - 70 U/L    AST 38 0 - 45 U/L   Troponin I   Result Value Ref Range    Troponin I ES <0.015 0.000 - 0.045 ug/L       Recent Labs  Lab 09/22/18  0545 09/21/18  0614 09/20/18  1819  09/20/18  1055 09/20/18  1052 09/17/18  0710   WBC  --  7.1 8.7  --   --   --   --    HGB  --  15.1 15.7  --   --   --  16.2   MCV  --  95 96  --   --   --   --    PLT  --  183 204  --   --   --   --    INR 2.94* 3.04*  --   --  3.14*  --   --     142  --   --   --  141 142   POTASSIUM 4.6 4.0  --   --   --  4.3 4.4   CHLORIDE 104 104  --    --   --  103 103   CO2 33* 31  --   --   --  32 30   BUN 33* 38*  --   --   --  41* 34*   CR 1.85* 1.94*  --   --   --  1.91* 1.85*   ANIONGAP 4 7  --   --   --  6 9   BHAVNA 8.8 8.7  --   --   --  8.5 8.6   GLC 87 87  --   --   --  93 128*   ALBUMIN 3.5 3.5 3.3*  < >  --   --   --    PROTTOTAL 6.4* 6.1* 6.3*  < >  --   --   --    BILITOTAL 1.0 1.0 0.8  < >  --   --   --    ALKPHOS 75 75 76  < >  --   --   --    ALT 44 51 56  < >  --   --   --    AST 38 33 38  < >  --   --   --    TROPI <0.015  --   --   --   --   --   --    < > = values in this interval not displayed.  No results found for this or any previous visit (from the past 24 hour(s)).

## 2018-09-23 NOTE — PLAN OF CARE
Problem: Patient Care Overview  Goal: Plan of Care/Patient Progress Review  AOx4. Up independently in room, IV SL. Tele Afib CVR/RVR with HR of 's with activity. IV Lasix dose increased to 80 mg bid. PTA spironolactone restarted with improved UO. On 1800 mL fluid restriction, pt is very compliant with that. ACE wraps discontinued d/t pt intolerance. BLE subhash and blotchy with 2-3+ edema, encourage lifting legs up to help with edema. Plan: EP consult, Ablation/PPM and Lexiscan Monday. Pt and family aware of plan. Please update family with a time for lexiscan and ablation on Monday. They would like to be present.

## 2018-09-24 ENCOUNTER — APPOINTMENT (OUTPATIENT)
Dept: NUCLEAR MEDICINE | Facility: CLINIC | Age: 68
DRG: 292 | End: 2018-09-24
Attending: INTERNAL MEDICINE
Payer: MEDICARE

## 2018-09-24 LAB
ANION GAP SERPL CALCULATED.3IONS-SCNC: 7 MMOL/L (ref 3–14)
BUN SERPL-MCNC: 28 MG/DL (ref 7–30)
CALCIUM SERPL-MCNC: 9 MG/DL (ref 8.5–10.1)
CHLORIDE SERPL-SCNC: 99 MMOL/L (ref 94–109)
CO2 SERPL-SCNC: 33 MMOL/L (ref 20–32)
CREAT SERPL-MCNC: 1.55 MG/DL (ref 0.66–1.25)
GFR SERPL CREATININE-BSD FRML MDRD: 45 ML/MIN/1.7M2
GLUCOSE SERPL-MCNC: 99 MG/DL (ref 70–99)
INR PPP: 2.6 (ref 0.86–1.14)
POTASSIUM SERPL-SCNC: 3.7 MMOL/L (ref 3.4–5.3)
SODIUM SERPL-SCNC: 139 MMOL/L (ref 133–144)

## 2018-09-24 PROCEDURE — A9270 NON-COVERED ITEM OR SERVICE: HCPCS | Mod: GY | Performed by: INTERNAL MEDICINE

## 2018-09-24 PROCEDURE — 94640 AIRWAY INHALATION TREATMENT: CPT | Mod: 76

## 2018-09-24 PROCEDURE — 99222 1ST HOSP IP/OBS MODERATE 55: CPT | Mod: 25 | Performed by: INTERNAL MEDICINE

## 2018-09-24 PROCEDURE — 34300033 ZZH RX 343: Performed by: INTERNAL MEDICINE

## 2018-09-24 PROCEDURE — 40000275 ZZH STATISTIC RCP TIME EA 10 MIN

## 2018-09-24 PROCEDURE — 36415 COLL VENOUS BLD VENIPUNCTURE: CPT | Performed by: INTERNAL MEDICINE

## 2018-09-24 PROCEDURE — 25000128 H RX IP 250 OP 636: Performed by: INTERNAL MEDICINE

## 2018-09-24 PROCEDURE — 93017 CV STRESS TEST TRACING ONLY: CPT | Performed by: REHABILITATION PRACTITIONER

## 2018-09-24 PROCEDURE — 99232 SBSQ HOSP IP/OBS MODERATE 35: CPT | Performed by: HOSPITALIST

## 2018-09-24 PROCEDURE — 93016 CV STRESS TEST SUPVJ ONLY: CPT | Performed by: INTERNAL MEDICINE

## 2018-09-24 PROCEDURE — 25000132 ZZH RX MED GY IP 250 OP 250 PS 637: Mod: GY | Performed by: INTERNAL MEDICINE

## 2018-09-24 PROCEDURE — 93018 CV STRESS TEST I&R ONLY: CPT | Performed by: INTERNAL MEDICINE

## 2018-09-24 PROCEDURE — 40000855 ZZH STATISTIC ECHO STRESS OR NM NPI

## 2018-09-24 PROCEDURE — 80048 BASIC METABOLIC PNL TOTAL CA: CPT | Performed by: INTERNAL MEDICINE

## 2018-09-24 PROCEDURE — 85610 PROTHROMBIN TIME: CPT | Performed by: INTERNAL MEDICINE

## 2018-09-24 PROCEDURE — 21000001 ZZH R&B HEART CARE

## 2018-09-24 PROCEDURE — 78452 HT MUSCLE IMAGE SPECT MULT: CPT | Mod: 26 | Performed by: INTERNAL MEDICINE

## 2018-09-24 PROCEDURE — 94640 AIRWAY INHALATION TREATMENT: CPT

## 2018-09-24 PROCEDURE — 25000125 ZZHC RX 250: Performed by: INTERNAL MEDICINE

## 2018-09-24 PROCEDURE — A9502 TC99M TETROFOSMIN: HCPCS | Performed by: INTERNAL MEDICINE

## 2018-09-24 PROCEDURE — 78452 HT MUSCLE IMAGE SPECT MULT: CPT

## 2018-09-24 RX ORDER — WARFARIN SODIUM 7.5 MG/1
7.5 TABLET ORAL
Status: COMPLETED | OUTPATIENT
Start: 2018-09-24 | End: 2018-09-24

## 2018-09-24 RX ORDER — AMINOPHYLLINE 25 MG/ML
50-100 INJECTION, SOLUTION INTRAVENOUS
Status: DISCONTINUED | OUTPATIENT
Start: 2018-09-24 | End: 2018-09-25

## 2018-09-24 RX ORDER — ACYCLOVIR 200 MG/1
0-1 CAPSULE ORAL
Status: DISCONTINUED | OUTPATIENT
Start: 2018-09-24 | End: 2018-09-25

## 2018-09-24 RX ORDER — AMIODARONE HYDROCHLORIDE 200 MG/1
200 TABLET ORAL 2 TIMES DAILY
Status: DISCONTINUED | OUTPATIENT
Start: 2018-09-24 | End: 2018-09-26 | Stop reason: HOSPADM

## 2018-09-24 RX ORDER — REGADENOSON 0.08 MG/ML
0.4 INJECTION, SOLUTION INTRAVENOUS ONCE
Status: COMPLETED | OUTPATIENT
Start: 2018-09-24 | End: 2018-09-24

## 2018-09-24 RX ORDER — ALBUTEROL SULFATE 90 UG/1
2 AEROSOL, METERED RESPIRATORY (INHALATION) EVERY 5 MIN PRN
Status: DISCONTINUED | OUTPATIENT
Start: 2018-09-24 | End: 2018-09-25

## 2018-09-24 RX ORDER — FUROSEMIDE 10 MG/ML
40 INJECTION INTRAMUSCULAR; INTRAVENOUS EVERY 12 HOURS
Status: DISCONTINUED | OUTPATIENT
Start: 2018-09-24 | End: 2018-09-24 | Stop reason: CLARIF

## 2018-09-24 RX ADMIN — BUPROPION HYDROCHLORIDE 150 MG: 150 TABLET, FILM COATED, EXTENDED RELEASE ORAL at 21:17

## 2018-09-24 RX ADMIN — SENNOSIDES AND DOCUSATE SODIUM 2 TABLET: 8.6; 5 TABLET ORAL at 10:22

## 2018-09-24 RX ADMIN — TETROFOSMIN 30.5 MCI.: 1.38 INJECTION, POWDER, LYOPHILIZED, FOR SOLUTION INTRAVENOUS at 09:36

## 2018-09-24 RX ADMIN — REGADENOSON 0.4 MG: 0.08 INJECTION, SOLUTION INTRAVENOUS at 09:33

## 2018-09-24 RX ADMIN — METOPROLOL SUCCINATE 50 MG: 50 TABLET, EXTENDED RELEASE ORAL at 13:42

## 2018-09-24 RX ADMIN — BUPROPION HYDROCHLORIDE 150 MG: 150 TABLET, FILM COATED, EXTENDED RELEASE ORAL at 09:48

## 2018-09-24 RX ADMIN — IPRATROPIUM BROMIDE AND ALBUTEROL SULFATE 3 ML: 2.5; .5 SOLUTION RESPIRATORY (INHALATION) at 15:57

## 2018-09-24 RX ADMIN — WARFARIN SODIUM 7.5 MG: 7.5 TABLET ORAL at 18:49

## 2018-09-24 RX ADMIN — SPIRONOLACTONE 25 MG: 25 TABLET ORAL at 09:48

## 2018-09-24 RX ADMIN — IPRATROPIUM BROMIDE AND ALBUTEROL SULFATE 3 ML: 2.5; .5 SOLUTION RESPIRATORY (INHALATION) at 12:00

## 2018-09-24 RX ADMIN — TETROFOSMIN 11 MCI.: 1.38 INJECTION, POWDER, LYOPHILIZED, FOR SOLUTION INTRAVENOUS at 07:30

## 2018-09-24 RX ADMIN — IPRATROPIUM BROMIDE AND ALBUTEROL SULFATE 3 ML: 2.5; .5 SOLUTION RESPIRATORY (INHALATION) at 08:12

## 2018-09-24 RX ADMIN — AMIODARONE HYDROCHLORIDE 1 MG/MIN: 50 INJECTION, SOLUTION INTRAVENOUS at 19:02

## 2018-09-24 RX ADMIN — POTASSIUM CHLORIDE 20 MEQ: 1500 TABLET, EXTENDED RELEASE ORAL at 21:17

## 2018-09-24 RX ADMIN — SENNOSIDES AND DOCUSATE SODIUM 2 TABLET: 8.6; 5 TABLET ORAL at 21:16

## 2018-09-24 RX ADMIN — AMIODARONE HYDROCHLORIDE 150 MG: 1.5 INJECTION, SOLUTION INTRAVENOUS at 18:47

## 2018-09-24 RX ADMIN — IPRATROPIUM BROMIDE AND ALBUTEROL SULFATE 3 ML: 2.5; .5 SOLUTION RESPIRATORY (INHALATION) at 19:42

## 2018-09-24 RX ADMIN — MONTELUKAST SODIUM 10 MG: 10 TABLET, FILM COATED ORAL at 21:16

## 2018-09-24 RX ADMIN — AMIODARONE HYDROCHLORIDE 200 MG: 200 TABLET ORAL at 21:16

## 2018-09-24 RX ADMIN — FUROSEMIDE 80 MG: 10 INJECTION, SOLUTION INTRAVENOUS at 09:47

## 2018-09-24 ASSESSMENT — ACTIVITIES OF DAILY LIVING (ADL)
ADLS_ACUITY_SCORE: 9

## 2018-09-24 NOTE — PROGRESS NOTES
Lexiscan Stress: Pt tolerated well. VSS. Pt denied chest pain or pressure prior to injection. After injection Lexican, denies any chest pain or pressure, c/o being a little sweating and some burning hot sensation after injection, IV site flushed well, no sx of redness.  Sx resolved after a few minutes.   0942 Pt transferred back to Rm 260-2  per w/c. Detailed report called to Zaria  bedside RN.
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
## 2018-09-24 NOTE — PLAN OF CARE
Problem: Patient Care Overview  Goal: Plan of Care/Patient Progress Review  OT/CR: attempted OT/CR, pt mid stress test. Will hold.    2nd attempted-pt reports wants to eat first before therapy as he hasn't eaten all day and lunch will be coming soon. Educated pt unsure if will be able to return and agrees to walk with nursing after he eats.

## 2018-09-24 NOTE — PROGRESS NOTES
Patient is on room air with 98 SpO2%, BBS clear diminished and all nebs is given as ordered. Patient was placed back on pt's home unit and will continue to monitor.    Katie Jj RT  9/23/2018

## 2018-09-24 NOTE — PLAN OF CARE
Problem: Cardiac: Heart Failure (Adult)  Goal: Signs and Symptoms of Listed Potential Problems Will be Absent, Minimized or Managed (Cardiac: Heart Failure)  Signs and symptoms of listed potential problems will be absent, minimized or managed by discharge/transition of care (reference Cardiac: Heart Failure (Adult) CPG).   Outcome: Improving  Heart Failure Care Pathway  GOALS TO BE MET BEFORE DISCHARGE:    1. Decrease congestion and/or edema with diuretic therapy to achieve near      optimal volume status.            Dyspnea improved:  Yes            Edema improved:     Yes        Net I/O and Weights since admission:          08/25 2300 - 09/24 2259  In: 3983 [P.O.:3980; I.V.:3]  Out: 48809 [Urine:78485]  Net: -7152            Vitals:    09/21/18 0616 09/22/18 0614 09/23/18 0620 09/24/18 0536   Weight: 108.9 kg (240 lb 1.6 oz) 108 kg (238 lb) 107.4 kg (236 lb 12.8 oz) 103.5 kg (228 lb 3.2 oz)       2.  O2 sats > 92% on RA or at prior home O2 therapy level.          Current oxygenation status:       SpO2: 92 %         O2 Device: None (Room air),  Oxygen Delivery: 2 LPM         Able to wean O2 this shift to keep sats > 92%:  Yes       Does patient use Home O2? No    3.  Tolerates ambulation and mobility near baseline: Yes        How many times did the patient ambulate with nursing staff this shift? 4    Please review the Heart Failure Care Pathway for additional HF goal outcomes.    VSS, no c/o pain, amio gtt to start-->run overnoc and DCCV in am, consent signed, continue to monitor.     Zaria Encarnacion RN  9/24/2018

## 2018-09-24 NOTE — PROGRESS NOTES
[place] [facility] Hospitalist Progress Note    Assessment & Plan   Home Valdez is a 68 year old male with a past medical history of hypertension,  atrial fibrillation, nonischemic cardiomyopathy felt to be tachycardia induced, systolic congestive heart failure, COPD, CKD, hyperlipidemia, abdominal aortic aneurysm, obstructive sleep apnea, pulmonary hypertension, PTSD and trigeminal neuralgia who presents from cardiology clinic due to acute on chronic systolic heart failure exacerbation.      Acute on chronic systolic heart failure exacerbation  Cardiomyopathy rate related versus ischemic or both  Normal [facility] 2014  With known biventricular heart failure with ejection fraction of less than 20% on an echo completed at the end of July this year.  He appears to be failing outpatient management with metoprolol 100 mg twice daily, torsemide 20 mg daily with intermittent dosing of an additional 40 mg as needed and Losartan.  He reported dietary compliance, medication compliance and has been following with his cardiologist regularly.  He does have a history of atrial fibrillation and it was felt that his tachycardia was the source of his cardiomyopathy in the past and may be contributing to his current failure of therapy. Presented with a BNP > 29K, FARIAS, JVD, LE edema and ascites.    An echocardiogram this hospitalization did not show any significant change from previous one 2 months ago.  EF still severely reduced at <20%  Lexiscan concerning for moderate to large fixed severe intensity defect of the basal to apical inferior wall AND a separate medium sized mild intensity fixed defect of the basal to mid anterior wall that could be nontransmural infarct.  - Cardiology is following: will discuss with them if he needs additional ischemic workup. Creatinine is improving from diuresis  - reduce lasix to 40 mg IV bid to avoid overdiuresis (net output of 4 liters on 80 IV BID yesterday). Still volume overloaded  on examination.   - Continue with prior to admission losartan and Toprol XL   - Daily weights.  Strict I's/O's  - 2 gram Na diet  - treat atrial fibrillation as below      Atrial Fibrillation  Maintained on metoprolol 100 mg twice daily and warfarin currently.  Up until about 2 weeks ago he had been previously on amiodarone and Toprol-XL.  Rates are marginally controlled, with resting in the 110's  - Cardiology following.  Plan for EP evaluation   - Toprol XL 50 mg BID   - Pharmacy to dose Coumadin   - Telemetry monitoring  - Patient will require more aggressive rate control which could include cardioversion or AV peter ablation and permanent pacemaker placement.  May consider a biventricular pacemaker.      Abdominal discomfort.  Resolved  Associated with abdominal fullness and epigastric abdominal pain that radiates across the abdomen.  Symptoms are actually improved after increased diuresis.  He does note some discomfort after eating but again says that this is improved after improved diuresis.  Denies any nausea, vomiting, diarrhea.  Abdominal exam notable for ascites, edema, but negative Coates sign.  Liver profile does not appear consistent with acute cholecystitis and lipase normal   - this has improved significantly with diuresis      AAA  He has a known history of an abdominal aortic aneurysm.  Abd US 9/19/18 shows: Abdominal aortic aneurysm measuring up to a maximum of 3.6 x 4.3 cm. this is a significant increase in size based on his prior study in November 2017 where it measured 3.2 x 3.1 cm. His infra-renal AAA has been present for several years and followed with US.     - Vascular surgery consulted.  Plan for further outpatient management      Cardiorenal Syndrome  CKD Stage III  His baseline creatinine had been around 1 until July of this year.  Since that time it is ranged from 1.3-1.9 and more recently in the 1.8-1.9 range.  Cr is currently 1.5.  Given his current volume overload in significantly  reduced ejection fraction suspect that his primary issue is a cardiorenal syndrome.  - improving with diuresis     COPD  Pulmonary hypertension  Obstructive sleep apnea   - Saturating well on room air at rest presently. Not felt to be in acute exacerbation.    - C/w PTA nebulizers and inhalers.    - CPAP with home settings      DVT Prophylaxis: Warfarin  Code Status: Full Code    Disposition: Expected discharge TBD.  Still undergoing cardiac evaluation.     Jimenez Velasco,   Text Page (7am to 6pm)    Interval History   Patient seen and examined.    Distension significantly improved and no longer having abdominal discomfort.    States he is now 10 lbs lighter    -Data reviewed today: I reviewed all new labs and imaging results over the last 24 hours. I personally reviewed no images or EKG's today.    Physical Exam   Temp: 98.5  F (36.9  C) Temp src: Oral BP: 97/59   Heart Rate: 101 Resp: 20 SpO2: 92 % O2 Device: None (Room air) Oxygen Delivery: 2 LPM  Vitals:    09/22/18 0614 09/23/18 0620 09/24/18 0536   Weight: 108 kg (238 lb) 107.4 kg (236 lb 12.8 oz) 103.5 kg (228 lb 3.2 oz)     Vital Signs with Ranges  Temp:  [97.6  F (36.4  C)-98.7  F (37.1  C)] 98.5  F (36.9  C)  Heart Rate:  [] 101  Resp:  [20-22] 20  BP: ()/(46-75) 97/59  SpO2:  [92 %-98 %] 92 %  I/O last 3 completed shifts:  In: 330 [P.O.:330]  Out: 5450 [Urine:5450]    Constitutional: Awake, alert, cooperative, no apparent distress  Respiratory: Clear to auscultation bilaterally, no crackles or wheezing  Cardiovascular: Irregular, normal S1 and S2, and no murmur noted  GI: Minimal distension.  Normal bowel sounds, soft, non-distended  Skin/Integumen: No rashes, no cyanosis  MSK: 2-3+ Pitting edema to bilateral lower extremities     Medications     Continuing ACE inhibitor/ARB/ARNI from home medication list OR ACE inhibitor/ARB order already placed during this visit       - MEDICATION INSTRUCTIONS -       - MEDICATION INSTRUCTIONS -        Warfarin Therapy Reminder         buPROPion  150 mg Oral BID     fluticasone-salmeterol  1 puff Inhalation Q12H     furosemide  40 mg Intravenous Q12H     ipratropium - albuterol 0.5 mg/2.5 mg/3 mL  3 mL Nebulization Q4H While awake     losartan  25 mg Oral At Bedtime     metoprolol succinate  50 mg Oral BID 09 12     montelukast  10 mg Oral At Bedtime     potassium chloride SA  20 mEq Oral At Bedtime     senna-docusate  2 tablet Oral BID     sodium chloride (PF)  3 mL Intracatheter Q8H     spironolactone  25 mg Oral Daily     warfarin  7.5 mg Oral ONCE at 18:00       Data     Recent Labs  Lab 09/24/18  0605 09/23/18  0550 09/22/18  0545 09/21/18  0614 09/20/18  1819   WBC  --   --   --  7.1 8.7   HGB  --   --   --  15.1 15.7   MCV  --   --   --  95 96   PLT  --   --   --  183 204   INR 2.60* 2.74* 2.94* 3.04*  --      --  141 142  --    POTASSIUM 3.7  --  4.6 4.0  --    CHLORIDE 99  --  104 104  --    CO2 33*  --  33* 31  --    BUN 28  --  33* 38*  --    CR 1.55*  --  1.85* 1.94*  --    ANIONGAP 7  --  4 7  --    BHAVNA 9.0  --  8.8 8.7  --    GLC 99  --  87 87  --    ALBUMIN  --   --  3.5 3.5 3.3*   PROTTOTAL  --   --  6.4* 6.1* 6.3*   BILITOTAL  --   --  1.0 1.0 0.8   ALKPHOS  --   --  75 75 76   ALT  --   --  44 51 56   AST  --   --  38 33 38   LIPASE  --   --   --   --  115   TROPI  --  0.019 <0.015  --   --        Imaging:   Recent Results (from the past 24 hour(s))   NM Lexiscan stress test (nuc card)    Narrative    GATED MYOCARDIAL PERFUSION SCINTIGRAPHY WITH INTRAVENOUS PHARMACOLOGIC  VASODILATATION LEXISCAN -ONE DAY STUDY     9/24/2018 11:01 AM  KARTIK HUERTA  68 years  Male  1950.BMI  33    Indication/Clinical History: 68-year-old male with rapid atrial  fibrillation and acute systolic congestive heart failure undergoing  stress test to determine etiology of cardiomyopathy.    Impression  1.  Myocardial perfusion imaging using single isotope technique  demonstrated abnormal perfusion. There  is a moderate to large, fixed,  severe intensity defect of the basal to apical inferior wall  suggesting infarct. There is a separate medium sized, mild intensity  fixed defect of the basal to mid anterior wall which could represent  nontransmural infarct. No ischemia.   2. Gated images not performed.  3. Compared to the prior study from 2004, the above perfusion defects  are new. Angiogram from October 2014 showed no severe obstructive  coronary artery disease .    Procedure  Pharmacologic stress testing was performed with Lexiscan at a rate of  0.08 mg/ml rapid bolus injection, for 15 seconds, 0.4 mg/5ml  intravenously. Low-level exercise was not performed along with the  vasodilator infusion.  The heart rate was 110 at baseline and kaushal to  118 beats per minute during the Lexiscan infusion. The rest blood  pressure was 101/68 mmHg and was 109/66 mm Hg during Lexiscan  infusion. The patient experienced no symptoms  during the test.    Myocardial perfusion imaging was performed at rest, approximately 45  minutes after the injection intravenously of 11 mCi of Tc-99m Myoview.  At peak pharmacologic effect, 10-20 seconds after Lexiscan,  the  patient was injected intravenously with 30.5 mCi of  Tc-99m Myoview.  The post-stress tomographic imaging was performed approximately 60  minutes after stress.    EKG Findings  The resting EKG demonstrated atrial fibrillation, inferior Q waves,  poor anterior R wave progression. The stress EKG demonstrated no  changes from baseline, negative for ischemia.    Tomographic Findings  Overall, the study quality is good . On the stress images, moderate to  large sized, severe intensity fixed defect of the basal to apical  inferior wall, second defect of moderate size, mild intensity of the  basal to mid anterior wall. On the rest images, similar to stress  imaging . Gated images not performed. TID was was not appreciated.    DORY MEJIA MD

## 2018-09-24 NOTE — CONSULTS
[facility] evaluation referral received from Dr. Mina. Patient is currently established in the [facility]. Patient has been followed by Dr. Murphy, patient was seen as first [facility] Enrollment visit last week on 9/20/18 by ALEJANDRO Méndez, and was direct admitted from the clinic. Patient lives in [place], will arrange for follow up in our [place] office. No openings with ALEJANDRO Méndez or Dr. Murphy in the next month. Per notes, plan for EP evaluation and could include cardioversion or AV peter ablation and PPM placement. Will arrange for follow up with Mayra Krueger NP in [place] next week ([facility] for [facility] and EP). Discussed follow up plan with ALEJANDRO Méndez and Mayra Chand. Nutrition consult noted.     Hospital nurse, please give pt [facility] Clinic/HF education.       [facility] Clinic appointment made for:   [place]: 10/4/18 at 2:45pm with Mayra Krueger NP      We look forward to seeing Home in the clinic.   Please call with questions.     Brie Agarwal RN  [facility] RN Care Coordinator  [facility]  [phone]    [facility]: Cardiomyopathy, Optimization, Rehabilitation, Education   The [facility] is a heart failure specialty clinic within the [facility] where you will work with your cardiologist, nurse practitioners, physician assistants and registered nurses who specialize in heart failure care. They are dedicated to helping patients with heart failure to carefully adjust medications, receive education, and learn who and when to call if symptoms develop. They specialize in helping you better understand your condition, slow the progression of your disease, improve the length and quality of your life, help you detect future heart problems before they become life threatening, and avoid hospitalizations.

## 2018-09-24 NOTE — PLAN OF CARE
Problem: Cardiac: Heart Failure (Adult)  Goal: Signs and Symptoms of Listed Potential Problems Will be Absent, Minimized or Managed (Cardiac: Heart Failure)  Signs and symptoms of listed potential problems will be absent, minimized or managed by discharge/transition of care (reference Cardiac: Heart Failure (Adult) CPG).   Pt wt down 4 kg this am, states he feels much better, much easier for him to take a deep breath and he is not desatting after activity, voiding large amt after lasix. Pt denies any discomfort

## 2018-09-24 NOTE — PLAN OF CARE
Problem: Cardiac: Heart Failure (Adult)  Goal: Signs and Symptoms of Listed Potential Problems Will be Absent, Minimized or Managed (Cardiac: Heart Failure)  Signs and symptoms of listed potential problems will be absent, minimized or managed by discharge/transition of care (reference Cardiac: Heart Failure (Adult) CPG).   Outcome: Improving  Heart Failure Care Pathway  GOALS TO BE MET BEFORE DISCHARGE:    1. Decrease congestion and/or edema with diuretic therapy to achieve near      optimal volume status.            Dyspnea improved:  Yes            Edema improved:     Yes        Net I/O and Weights since admission:          08/24 2300 - 09/23 2259  In: 3293 [P.O.:3290; I.V.:3]  Out: 5935 [Urine:5935]  Net: -2642            Vitals:    09/21/18 0616 09/22/18 0614 09/23/18 0620   Weight: 108.9 kg (240 lb 1.6 oz) 108 kg (238 lb) 107.4 kg (236 lb 12.8 oz)       2.  O2 sats > 92% on RA or at prior home O2 therapy level.          Current oxygenation status:       SpO2: 95 %         O2 Device: None (Room air),            Able to wean O2 this shift to keep sats > 92%:  Yes       Does patient use Home O2? No    3.  Tolerates ambulation and mobility near baseline: Yes        How many times did the patient ambulate with nursing staff this shift? 2    Please review the Heart Failure Care Pathway for additional HF goal outcomes.    VSS, no c/o pain, diuresing on lasix, up independently, voiding in urinal, plan for EP consult for ablation and PPM tomorrow, in addition to lexiscan scheduled for tomorrow am, monitor.     Zaria Encarnacion RN  9/23/2018

## 2018-09-25 ENCOUNTER — APPOINTMENT (OUTPATIENT)
Dept: CARDIOLOGY | Facility: CLINIC | Age: 68
DRG: 292 | End: 2018-09-25
Attending: INTERNAL MEDICINE
Payer: MEDICARE

## 2018-09-25 ENCOUNTER — ANESTHESIA (OUTPATIENT)
Dept: SURGERY | Facility: CLINIC | Age: 68
DRG: 292 | End: 2018-09-25
Payer: MEDICARE

## 2018-09-25 ENCOUNTER — ANESTHESIA EVENT (OUTPATIENT)
Dept: SURGERY | Facility: CLINIC | Age: 68
DRG: 292 | End: 2018-09-25
Payer: MEDICARE

## 2018-09-25 LAB
ANION GAP SERPL CALCULATED.3IONS-SCNC: 8 MMOL/L (ref 3–14)
BUN SERPL-MCNC: 28 MG/DL (ref 7–30)
CALCIUM SERPL-MCNC: 8.8 MG/DL (ref 8.5–10.1)
CHLORIDE SERPL-SCNC: 96 MMOL/L (ref 94–109)
CO2 SERPL-SCNC: 33 MMOL/L (ref 20–32)
CREAT SERPL-MCNC: 1.55 MG/DL (ref 0.66–1.25)
ERYTHROCYTE [DISTWIDTH] IN BLOOD BY AUTOMATED COUNT: 14.5 % (ref 10–15)
GFR SERPL CREATININE-BSD FRML MDRD: 45 ML/MIN/1.7M2
GLUCOSE SERPL-MCNC: 117 MG/DL (ref 70–99)
HCT VFR BLD AUTO: 48.7 % (ref 40–53)
HGB BLD-MCNC: 15.8 G/DL (ref 13.3–17.7)
INR PPP: 2.43 (ref 0.86–1.14)
MAGNESIUM SERPL-MCNC: 2.1 MG/DL (ref 1.6–2.3)
MCH RBC QN AUTO: 30.6 PG (ref 26.5–33)
MCHC RBC AUTO-ENTMCNC: 32.4 G/DL (ref 31.5–36.5)
MCV RBC AUTO: 94 FL (ref 78–100)
PLATELET # BLD AUTO: 201 10E9/L (ref 150–450)
POTASSIUM SERPL-SCNC: 3.5 MMOL/L (ref 3.4–5.3)
POTASSIUM SERPL-SCNC: 3.6 MMOL/L (ref 3.4–5.3)
RBC # BLD AUTO: 5.17 10E12/L (ref 4.4–5.9)
SODIUM SERPL-SCNC: 137 MMOL/L (ref 133–144)
WBC # BLD AUTO: 7.8 10E9/L (ref 4–11)

## 2018-09-25 PROCEDURE — 84132 ASSAY OF SERUM POTASSIUM: CPT | Performed by: INTERNAL MEDICINE

## 2018-09-25 PROCEDURE — 93312 ECHO TRANSESOPHAGEAL: CPT | Mod: 26 | Performed by: INTERNAL MEDICINE

## 2018-09-25 PROCEDURE — 25000132 ZZH RX MED GY IP 250 OP 250 PS 637: Mod: GY | Performed by: INTERNAL MEDICINE

## 2018-09-25 PROCEDURE — 99233 SBSQ HOSP IP/OBS HIGH 50: CPT | Mod: 25 | Performed by: INTERNAL MEDICINE

## 2018-09-25 PROCEDURE — 25000125 ZZHC RX 250: Performed by: INTERNAL MEDICINE

## 2018-09-25 PROCEDURE — 85610 PROTHROMBIN TIME: CPT | Performed by: INTERNAL MEDICINE

## 2018-09-25 PROCEDURE — 5A2204Z RESTORATION OF CARDIAC RHYTHM, SINGLE: ICD-10-PCS | Performed by: INTERNAL MEDICINE

## 2018-09-25 PROCEDURE — 36415 COLL VENOUS BLD VENIPUNCTURE: CPT | Performed by: INTERNAL MEDICINE

## 2018-09-25 PROCEDURE — 40000235 ZZH STATISTIC TELEMETRY

## 2018-09-25 PROCEDURE — A9270 NON-COVERED ITEM OR SERVICE: HCPCS | Mod: GY | Performed by: INTERNAL MEDICINE

## 2018-09-25 PROCEDURE — 85027 COMPLETE CBC AUTOMATED: CPT | Performed by: INTERNAL MEDICINE

## 2018-09-25 PROCEDURE — 37000008 ZZH ANESTHESIA TECHNICAL FEE, 1ST 30 MIN

## 2018-09-25 PROCEDURE — 93010 ELECTROCARDIOGRAM REPORT: CPT | Mod: 76 | Performed by: INTERNAL MEDICINE

## 2018-09-25 PROCEDURE — 25000128 H RX IP 250 OP 636: Performed by: INTERNAL MEDICINE

## 2018-09-25 PROCEDURE — 80048 BASIC METABOLIC PNL TOTAL CA: CPT | Performed by: INTERNAL MEDICINE

## 2018-09-25 PROCEDURE — 25000125 ZZHC RX 250: Performed by: REGISTERED NURSE

## 2018-09-25 PROCEDURE — 40000858 ZZH STATISTIC CARDIOVERSION

## 2018-09-25 PROCEDURE — 83735 ASSAY OF MAGNESIUM: CPT | Performed by: INTERNAL MEDICINE

## 2018-09-25 PROCEDURE — 40000857 ZZH STATISTIC TEE INCLUDES SEDATION

## 2018-09-25 PROCEDURE — 40000275 ZZH STATISTIC RCP TIME EA 10 MIN

## 2018-09-25 PROCEDURE — 21000001 ZZH R&B HEART CARE

## 2018-09-25 PROCEDURE — 94640 AIRWAY INHALATION TREATMENT: CPT

## 2018-09-25 PROCEDURE — 93005 ELECTROCARDIOGRAM TRACING: CPT

## 2018-09-25 PROCEDURE — 93320 DOPPLER ECHO COMPLETE: CPT | Mod: 26 | Performed by: INTERNAL MEDICINE

## 2018-09-25 PROCEDURE — 93325 DOPPLER ECHO COLOR FLOW MAPG: CPT | Mod: 26 | Performed by: INTERNAL MEDICINE

## 2018-09-25 PROCEDURE — 93320 DOPPLER ECHO COMPLETE: CPT

## 2018-09-25 PROCEDURE — 99232 SBSQ HOSP IP/OBS MODERATE 35: CPT | Performed by: HOSPITALIST

## 2018-09-25 PROCEDURE — 94640 AIRWAY INHALATION TREATMENT: CPT | Mod: 76

## 2018-09-25 RX ORDER — FLUMAZENIL 0.1 MG/ML
0.2 INJECTION, SOLUTION INTRAVENOUS
Status: DISCONTINUED | OUTPATIENT
Start: 2018-09-25 | End: 2018-09-26 | Stop reason: HOSPADM

## 2018-09-25 RX ORDER — FUROSEMIDE 40 MG
40 TABLET ORAL DAILY
Status: DISCONTINUED | OUTPATIENT
Start: 2018-09-26 | End: 2018-09-26 | Stop reason: HOSPADM

## 2018-09-25 RX ORDER — ACETAMINOPHEN 325 MG/1
325-975 TABLET ORAL EVERY 4 HOURS PRN
Status: DISCONTINUED | OUTPATIENT
Start: 2018-09-25 | End: 2018-09-26 | Stop reason: HOSPADM

## 2018-09-25 RX ORDER — POTASSIUM CHLORIDE 1500 MG/1
40 TABLET, EXTENDED RELEASE ORAL
Status: DISCONTINUED | OUTPATIENT
Start: 2018-09-25 | End: 2018-09-25

## 2018-09-25 RX ORDER — ATROPINE SULFATE 0.1 MG/ML
.5-1 INJECTION INTRAVENOUS
Status: DISCONTINUED | OUTPATIENT
Start: 2018-09-25 | End: 2018-09-26 | Stop reason: HOSPADM

## 2018-09-25 RX ORDER — FENTANYL CITRATE 50 UG/ML
25 INJECTION, SOLUTION INTRAMUSCULAR; INTRAVENOUS
Status: ACTIVE | OUTPATIENT
Start: 2018-09-25 | End: 2018-09-26

## 2018-09-25 RX ORDER — ETOMIDATE 2 MG/ML
INJECTION INTRAVENOUS PRN
Status: DISCONTINUED | OUTPATIENT
Start: 2018-09-25 | End: 2018-09-25

## 2018-09-25 RX ORDER — MAGNESIUM SULFATE HEPTAHYDRATE 40 MG/ML
2 INJECTION, SOLUTION INTRAVENOUS
Status: DISCONTINUED | OUTPATIENT
Start: 2018-09-25 | End: 2018-09-25

## 2018-09-25 RX ORDER — ALUMINA, MAGNESIA, AND SIMETHICONE 2400; 2400; 240 MG/30ML; MG/30ML; MG/30ML
20 SUSPENSION ORAL EVERY 8 HOURS PRN
Status: DISCONTINUED | OUTPATIENT
Start: 2018-09-25 | End: 2018-09-26 | Stop reason: HOSPADM

## 2018-09-25 RX ORDER — FUROSEMIDE 10 MG/ML
40 INJECTION INTRAMUSCULAR; INTRAVENOUS EVERY 12 HOURS
Status: DISCONTINUED | OUTPATIENT
Start: 2018-09-25 | End: 2018-09-25

## 2018-09-25 RX ORDER — POTASSIUM CHLORIDE 1500 MG/1
20 TABLET, EXTENDED RELEASE ORAL
Status: DISCONTINUED | OUTPATIENT
Start: 2018-09-25 | End: 2018-09-25

## 2018-09-25 RX ORDER — SODIUM CHLORIDE 9 MG/ML
1000 INJECTION, SOLUTION INTRAVENOUS CONTINUOUS
Status: DISCONTINUED | OUTPATIENT
Start: 2018-09-25 | End: 2018-09-26 | Stop reason: HOSPADM

## 2018-09-25 RX ORDER — MAGNESIUM SULFATE HEPTAHYDRATE 40 MG/ML
2 INJECTION, SOLUTION INTRAVENOUS
Status: DISCONTINUED | OUTPATIENT
Start: 2018-09-25 | End: 2018-09-26 | Stop reason: HOSPADM

## 2018-09-25 RX ORDER — AMIODARONE HYDROCHLORIDE 200 MG/1
400 TABLET ORAL ONCE
Status: COMPLETED | OUTPATIENT
Start: 2018-09-25 | End: 2018-09-25

## 2018-09-25 RX ORDER — FENTANYL CITRATE 50 UG/ML
25-50 INJECTION, SOLUTION INTRAMUSCULAR; INTRAVENOUS
Status: COMPLETED | OUTPATIENT
Start: 2018-09-25 | End: 2018-09-25

## 2018-09-25 RX ORDER — POTASSIUM CHLORIDE 1500 MG/1
40 TABLET, EXTENDED RELEASE ORAL
Status: DISCONTINUED | OUTPATIENT
Start: 2018-09-25 | End: 2018-09-26 | Stop reason: HOSPADM

## 2018-09-25 RX ORDER — NALOXONE HYDROCHLORIDE 0.4 MG/ML
.1-.4 INJECTION, SOLUTION INTRAMUSCULAR; INTRAVENOUS; SUBCUTANEOUS
Status: DISCONTINUED | OUTPATIENT
Start: 2018-09-25 | End: 2018-09-25

## 2018-09-25 RX ORDER — POTASSIUM CHLORIDE 1500 MG/1
20 TABLET, EXTENDED RELEASE ORAL
Status: COMPLETED | OUTPATIENT
Start: 2018-09-25 | End: 2018-09-25

## 2018-09-25 RX ORDER — WARFARIN SODIUM 7.5 MG/1
7.5 TABLET ORAL
Status: COMPLETED | OUTPATIENT
Start: 2018-09-25 | End: 2018-09-25

## 2018-09-25 RX ORDER — POTASSIUM CHLORIDE 1500 MG/1
20 TABLET, EXTENDED RELEASE ORAL
Status: DISCONTINUED | OUTPATIENT
Start: 2018-09-25 | End: 2018-09-26 | Stop reason: HOSPADM

## 2018-09-25 RX ORDER — LIDOCAINE HYDROCHLORIDE 40 MG/ML
1.5 SOLUTION TOPICAL ONCE
Status: COMPLETED | OUTPATIENT
Start: 2018-09-25 | End: 2018-09-25

## 2018-09-25 RX ORDER — GLYCOPYRROLATE 0.2 MG/ML
0.1 INJECTION, SOLUTION INTRAMUSCULAR; INTRAVENOUS ONCE
Status: COMPLETED | OUTPATIENT
Start: 2018-09-25 | End: 2018-09-25

## 2018-09-25 RX ADMIN — MONTELUKAST SODIUM 10 MG: 10 TABLET, FILM COATED ORAL at 21:12

## 2018-09-25 RX ADMIN — MIDAZOLAM 1 MG: 1 INJECTION INTRAMUSCULAR; INTRAVENOUS at 14:41

## 2018-09-25 RX ADMIN — GLYCOPYRROLATE 0.1 MG: 0.2 INJECTION, SOLUTION INTRAMUSCULAR; INTRAVENOUS at 13:46

## 2018-09-25 RX ADMIN — POTASSIUM CHLORIDE 20 MEQ: 1500 TABLET, EXTENDED RELEASE ORAL at 10:23

## 2018-09-25 RX ADMIN — MIDAZOLAM 2 MG: 1 INJECTION INTRAMUSCULAR; INTRAVENOUS at 14:39

## 2018-09-25 RX ADMIN — SENNOSIDES AND DOCUSATE SODIUM 2 TABLET: 8.6; 5 TABLET ORAL at 21:11

## 2018-09-25 RX ADMIN — AMIODARONE HYDROCHLORIDE 200 MG: 200 TABLET ORAL at 09:47

## 2018-09-25 RX ADMIN — ETOMIDATE 8 MG: 2 INJECTION, SOLUTION INTRAVENOUS at 15:00

## 2018-09-25 RX ADMIN — SENNOSIDES AND DOCUSATE SODIUM 2 TABLET: 8.6; 5 TABLET ORAL at 09:46

## 2018-09-25 RX ADMIN — AMIODARONE HYDROCHLORIDE 0.5 MG/MIN: 50 INJECTION, SOLUTION INTRAVENOUS at 01:36

## 2018-09-25 RX ADMIN — AMIODARONE HYDROCHLORIDE 400 MG: 200 TABLET ORAL at 21:13

## 2018-09-25 RX ADMIN — TOPICAL ANESTHETIC 0.5 ML: 200 SPRAY DENTAL; PERIODONTAL at 14:38

## 2018-09-25 RX ADMIN — TOPICAL ANESTHETIC 0.5 ML: 200 SPRAY DENTAL; PERIODONTAL at 14:39

## 2018-09-25 RX ADMIN — FENTANYL CITRATE 25 MCG: 50 INJECTION INTRAMUSCULAR; INTRAVENOUS at 14:40

## 2018-09-25 RX ADMIN — AMIODARONE HYDROCHLORIDE 0.5 MG/MIN: 50 INJECTION, SOLUTION INTRAVENOUS at 07:31

## 2018-09-25 RX ADMIN — IPRATROPIUM BROMIDE AND ALBUTEROL SULFATE 3 ML: 2.5; .5 SOLUTION RESPIRATORY (INHALATION) at 11:05

## 2018-09-25 RX ADMIN — FENTANYL CITRATE 25 MCG: 50 INJECTION INTRAMUSCULAR; INTRAVENOUS at 14:39

## 2018-09-25 RX ADMIN — IPRATROPIUM BROMIDE AND ALBUTEROL SULFATE 3 ML: 2.5; .5 SOLUTION RESPIRATORY (INHALATION) at 07:04

## 2018-09-25 RX ADMIN — AMIODARONE HYDROCHLORIDE 1 MG/MIN: 50 INJECTION, SOLUTION INTRAVENOUS at 00:11

## 2018-09-25 RX ADMIN — WARFARIN SODIUM 7.5 MG: 7.5 TABLET ORAL at 17:54

## 2018-09-25 RX ADMIN — SPIRONOLACTONE 25 MG: 25 TABLET ORAL at 09:47

## 2018-09-25 RX ADMIN — Medication 1.5 ML: at 13:44

## 2018-09-25 RX ADMIN — POTASSIUM CHLORIDE 20 MEQ: 1500 TABLET, EXTENDED RELEASE ORAL at 21:11

## 2018-09-25 RX ADMIN — IPRATROPIUM BROMIDE AND ALBUTEROL SULFATE 3 ML: 2.5; .5 SOLUTION RESPIRATORY (INHALATION) at 18:26

## 2018-09-25 RX ADMIN — BUPROPION HYDROCHLORIDE 150 MG: 150 TABLET, FILM COATED, EXTENDED RELEASE ORAL at 09:47

## 2018-09-25 RX ADMIN — BUPROPION HYDROCHLORIDE 150 MG: 150 TABLET, FILM COATED, EXTENDED RELEASE ORAL at 21:11

## 2018-09-25 ASSESSMENT — ACTIVITIES OF DAILY LIVING (ADL)
ADLS_ACUITY_SCORE: 9

## 2018-09-25 ASSESSMENT — ENCOUNTER SYMPTOMS
SEIZURES: 0
DYSRHYTHMIAS: 1

## 2018-09-25 ASSESSMENT — COPD QUESTIONNAIRES: COPD: 1

## 2018-09-25 NOTE — PROGRESS NOTES
ELVIRA/DCCV performed in special echo room, after ELVIRA performed anesthesia present for DCCV by cardiologist, pt defib at 200J for successful DCCV to SR rate 70. BP low during ELVIRA but rebounded nicely post procedure, pt sleepy but arousable,report called to [facility] RN and pt transferred back to room via cart.

## 2018-09-25 NOTE — PROGRESS NOTES
[facility] Hospitalist Progress Note    Assessment & Plan   Home Valdez is a 68 year old male with a past medical history of hypertension,  atrial fibrillation, nonischemic cardiomyopathy felt to be tachycardia induced, systolic congestive heart failure, COPD, CKD, hyperlipidemia, abdominal aortic aneurysm, obstructive sleep apnea, pulmonary hypertension, PTSD and trigeminal neuralgia who presents from cardiology clinic due to acute on chronic systolic heart failure exacerbation.      Acute on chronic systolic heart failure exacerbation  Cardiomyopathy rate related versus ischemic or both  Normal [facility] 2014  With known biventricular heart failure with ejection fraction of less than 20% on an echo completed at the end of July this year.  He appears to be failing outpatient management with metoprolol 100 mg twice daily, torsemide 20 mg daily with intermittent dosing of an additional 40 mg as needed and Losartan.  He reported dietary compliance, medication compliance and has been following with his cardiologist regularly.  He does have a history of atrial fibrillation and it was felt that his tachycardia was the source of his cardiomyopathy in the past and may be contributing to his current failure of therapy. Presented with a BNP > 29K, FARIAS, JVD, LE edema and ascites.    An echocardiogram this hospitalization did not show any significant change from previous one 2 months ago.  EF still severely reduced at <20%  Lexiscan concerning for moderate to large fixed severe intensity defect of the basal to apical inferior wall AND a separate medium sized mild intensity fixed defect of the basal to mid anterior wall that could be nontransmural infarct.  - Cardiology is following  - reduce lasix to 40 mg oral daily to start tomorrow as per Dr. Quiles  - Continue with prior to admission losartan and Toprol XL   - Daily weights.  Strict I's/O's  - 2 gram Na diet  - treat atrial fibrillation as  below      Atrial Fibrillation  Maintained on metoprolol 100 mg twice daily and warfarin currently.  Up until about 2 weeks ago he had been previously on amiodarone and Toprol-XL.  Rates are marginally controlled, with resting in the 110's  - Cardiology following.  Plan for EP evaluation   - Toprol XL 50 mg BID was discontinued, amiodarone was started  - Pharmacy to dose Coumadin   - Telemetry monitoring  -s/p DCCV on 9/25      Abdominal discomfort.  Resolved  Associated with abdominal fullness and epigastric abdominal pain that radiates across the abdomen.  Symptoms are actually improved after increased diuresis.  He does note some discomfort after eating but again says that this is improved after improved diuresis.  Denies any nausea, vomiting, diarrhea.  Abdominal exam notable for ascites, edema, but negative Coates sign.  Liver profile does not appear consistent with acute cholecystitis and lipase normal   - this has improved significantly with diuresis      AAA  He has a known history of an abdominal aortic aneurysm.  Abd US 9/19/18 shows: Abdominal aortic aneurysm measuring up to a maximum of 3.6 x 4.3 cm. this is a significant increase in size based on his prior study in November 2017 where it measured 3.2 x 3.1 cm. His infra-renal AAA has been present for several years and followed with US.     - Vascular surgery consulted.  Plan for further outpatient management      Cardiorenal Syndrome  CKD Stage III  His baseline creatinine had been around 1 until July of this year.  Since that time it is ranged from 1.3-1.9 and more recently in the 1.8-1.9 range.  Cr is currently 1.5.  Given his current volume overload in significantly reduced ejection fraction suspect that his primary issue is a cardiorenal syndrome.  - improving with diuresis     COPD  Pulmonary hypertension  Obstructive sleep apnea   - Saturating well on room air at rest presently. Not felt to be in acute exacerbation.    - C/w PTA nebulizers and  inhalers.    - CPAP with home settings      DVT Prophylaxis: Warfarin  Code Status: Full Code    Disposition: Expected discharge 1-2.  Still undergoing cardiac evaluation.     Jimenez Velasco,   Text Page (7am to 6pm)    Interval History   Patient seen and examined after ELVIRA with cardioversion. Currently sinus. We discussed goal of rhythm control with better rate controls leading to eventual improvement in EF.    -Data reviewed today: I reviewed all new labs and imaging results over the last 24 hours. I personally reviewed no images or EKG's today.    Physical Exam   Temp: 98.2  F (36.8  C) Temp src: Oral BP: 91/69 Pulse: 73 Heart Rate: 96 Resp: 20 SpO2: 96 % O2 Device: None (Room air)    Vitals:    09/23/18 0620 09/24/18 0536 09/25/18 0700   Weight: 107.4 kg (236 lb 12.8 oz) 103.5 kg (228 lb 3.2 oz) 102.5 kg (226 lb)     Vital Signs with Ranges  Temp:  [98.2  F (36.8  C)] 98.2  F (36.8  C)  Pulse:  [] 73  Heart Rate:  [] 96  Resp:  [20] 20  BP: ()/(43-84) 91/69  SpO2:  [91 %-99 %] 96 %  I/O last 3 completed shifts:  In: 943 [P.O.:840; I.V.:103]  Out: 1620 [Urine:1620]    Constitutional: Awake, alert, cooperative, no apparent distress  Respiratory: Clear to auscultation bilaterally, no crackles or wheezing  Cardiovascular: Irregular, normal S1 and S2, and no murmur noted  GI: Minimal distension.  Normal bowel sounds, soft, non-distended  Skin/Integumen: No rashes, no cyanosis  MSK: 1-2+ Pitting edema to bilateral lower extremities     Medications     amiodarone 0.5 mg/min (09/25/18 0731)     Continuing ACE inhibitor/ARB/ARNI from home medication list OR ACE inhibitor/ARB order already placed during this visit       - MEDICATION INSTRUCTIONS -       - MEDICATION INSTRUCTIONS -       - MEDICATION INSTRUCTIONS -       sodium chloride       Warfarin Therapy Reminder         amiodarone  200 mg Oral BID     amiodarone  400 mg Oral Once     buPROPion  150 mg Oral BID     fluticasone-salmeterol  1  puff Inhalation Q12H     [START ON 9/26/2018] furosemide  40 mg Oral Daily     ipratropium - albuterol 0.5 mg/2.5 mg/3 mL  3 mL Nebulization Q4H While awake     losartan  25 mg Oral At Bedtime     montelukast  10 mg Oral At Bedtime     potassium chloride SA  20 mEq Oral At Bedtime     senna-docusate  2 tablet Oral BID     sodium chloride (PF)  3 mL Intravenous Q8H     spironolactone  25 mg Oral Daily     warfarin  7.5 mg Oral ONCE at 18:00       Data     Recent Labs  Lab 09/25/18  1321 09/25/18  0711 09/24/18  0605 09/23/18  0550 09/22/18  0545 09/21/18  0614 09/20/18  1819   WBC  --  7.8  --   --   --  7.1 8.7   HGB  --  15.8  --   --   --  15.1 15.7   MCV  --  94  --   --   --  95 96   PLT  --  201  --   --   --  183 204   INR  --  2.43* 2.60* 2.74* 2.94* 3.04*  --    NA  --  137 139  --  141 142  --    POTASSIUM 3.6 3.5 3.7  --  4.6 4.0  --    CHLORIDE  --  96 99  --  104 104  --    CO2  --  33* 33*  --  33* 31  --    BUN  --  28 28  --  33* 38*  --    CR  --  1.55* 1.55*  --  1.85* 1.94*  --    ANIONGAP  --  8 7  --  4 7  --    BHAVNA  --  8.8 9.0  --  8.8 8.7  --    GLC  --  117* 99  --  87 87  --    ALBUMIN  --   --   --   --  3.5 3.5 3.3*   PROTTOTAL  --   --   --   --  6.4* 6.1* 6.3*   BILITOTAL  --   --   --   --  1.0 1.0 0.8   ALKPHOS  --   --   --   --  75 75 76   ALT  --   --   --   --  44 51 56   AST  --   --   --   --  38 33 38   LIPASE  --   --   --   --   --   --  115   TROPI  --   --   --  0.019 <0.015  --   --        Imaging:   No results found for this or any previous visit (from the past 24 hour(s)).

## 2018-09-25 NOTE — PLAN OF CARE
"Problem: Cardiac: Heart Failure (Adult)  Goal: Signs and Symptoms of Listed Potential Problems Will be Absent, Minimized or Managed (Cardiac: Heart Failure)  Signs and symptoms of listed potential problems will be absent, minimized or managed by discharge/transition of care (reference Cardiac: Heart Failure (Adult) CPG).   Outcome: No Change  Heart Failure Care Pathway  GOALS TO BE MET BEFORE DISCHARGE:    1. Decrease congestion and/or edema with diuretic therapy to achieve near      optimal volume status.            Dyspnea improved:  Yes            Edema improved:     Yes        Net I/O and Weights since admission:          08/26 2300 - 09/25 2259  In: 4446 [P.O.:4340; I.V.:106]  Out: 48570 [Urine:29623]  Net: -7809            Vitals:    09/21/18 0616 09/22/18 0614 09/23/18 0620 09/24/18 0536   Weight: 108.9 kg (240 lb 1.6 oz) 108 kg (238 lb) 107.4 kg (236 lb 12.8 oz) 103.5 kg (228 lb 3.2 oz)    09/25/18 0700   Weight: 102.5 kg (226 lb)       2.  O2 sats > 92% on RA or at prior home O2 therapy level.          Current oxygenation status:       SpO2: 96 %         O2 Device: None (Room air),            Able to wean O2 this shift to keep sats > 92%:  NA       Does patient use Home O2? No    3.  Tolerates ambulation and mobility near baseline: Yes        How many times did the patient ambulate with nursing staff this shift? 1    Please review the Heart Failure Care Pathway for additional HF goal outcomes.  Tele afib with CVR.  Had ELVIRA cardioversion, tele now SR.  Off amio drip and amio PO started.  Denies pain.  Says shortness of breath has \"improved greatly.\"      Rekha Cosme RN  9/25/2018           "

## 2018-09-25 NOTE — ANESTHESIA CARE TRANSFER NOTE
Patient: Home [facility]    Procedure(s):  ELVIRA, CARDIOVERSION.    Diagnosis: CONGESTIVE HEART FAILURE.  Diagnosis Additional Information: No value filed.    Anesthesia Type:   No value filed.     Note:  Airway :Nasal Cannula  Patient transferred to:Phase II  Comments: Transferred care to special echo RN, spontaneous respirations, 4L via NC.  All monitors and alarms on and functioning, VSS.  Patient awake, comfortable.  Report to special echo RN.Handoff Report: Identifed the Patient, Identified the Reponsible Provider, Reviewed the pertinent medical history, Discussed the surgical course, Reviewed Intra-OP anesthesia mangement and issues during anesthesia, Set expectations for post-procedure period and Allowed opportunity for questions and acknowledgement of understanding      Vitals: (Last set prior to Anesthesia Care Transfer)    CRNA VITALS  9/25/2018 1502 - 9/25/2018 1532      9/25/2018             NIBP: 99/60    Ht Rate: 74    SpO2: 98 %    EKG: Sinus rhythm                Electronically Signed By: REJI Hernandez CRNA  September 25, 2018  3:32 PM

## 2018-09-25 NOTE — PLAN OF CARE
Problem: Patient Care Overview  Goal: Plan of Care/Patient Progress Review  OT/CR: pt is currently having a cardioversion at this time. Will hold for today.

## 2018-09-25 NOTE — PROGRESS NOTES
Nebs given as ordered. LS are diminished t/o and pt is on RA. Will continue to follow.  Vasile Bernabe

## 2018-09-25 NOTE — PLAN OF CARE
Problem: Patient Care Overview  Goal: Plan of Care/Patient Progress Review  Outcome: Improving  Full code. Amio gtt 0.5 mg/min. AF 90s. CPAP overnight. Rash to groin. LS fine crackles to bases but pt states significant decrease in swelling from diureses 9/24 and states significant improvement of breathing. VSS. Plan cardioversion today. NPO overnight.

## 2018-09-25 NOTE — ANESTHESIA POSTPROCEDURE EVALUATION
Patient: Home [facility]    Procedure(s):  ELVIRA, CARDIOVERSION.    Diagnosis:CONGESTIVE HEART FAILURE.  Diagnosis Additional Information: afib    Anesthesia Type:  General    Note:  Anesthesia Post Evaluation    Patient location during evaluation: Bedside  Patient participation: Able to fully participate in evaluation  Level of consciousness: awake and alert  Pain management: adequate  Airway patency: patent  Cardiovascular status: hemodynamically stable  Respiratory status: acceptable and unassisted  Hydration status: balanced  PONV: none     Anesthetic complications: None          Last vitals:  Vitals:    09/25/18 1450 09/25/18 1454 09/25/18 1457   BP: (!) 82/58 93/71 94/75   Pulse: 109 116 108   Resp: 20 20 20   Temp:      SpO2:            Electronically Signed By: Seth Cotto MD  September 25, 2018  3:33 PM

## 2018-09-25 NOTE — PROGRESS NOTES
Patient is on room air and SpO2 low 90`s. Scheduled neb tx given and tolerated well. BBS clear/diminished. Will continue to follow.

## 2018-09-25 NOTE — PROGRESS NOTES
EP- Cardiology Progress Note           Assessment and Plan:      67 yo M with Hx of HTN, HL, COPD, JAMES, AAA and severe CMP, who p/w CHF exacerbation, associated with Afib with RVR.  Echo showed worsening in EF (20%) as well.    He was started on Amio drip with plans for CDV today.  Unfortunately INR was subtherapeutic   09/4: 1.96  09/14: 1.9  09/17: 1.8     Plan:  1. Afib.  ELVIRA/ CDV today.  Procedure was explained in details.  He understands there is 1-2% risk of complication associated with procedure. Consent was signed.  We will change amio to oral after procedure.  2. Embolic prevention.  INR is therapeutic today.  3. CHF. Continue diuresis.  Contineu spirolactone, losartan and lasix.  Consider BBlocker after CDV.  4. HTN. BP is well controlled.    Physical Exam:  Vitals: BP 91/66 (BP Location: Right arm)  Pulse 90  Temp 98.2  F (36.8  C) (Oral)  Resp 20  Wt 102.5 kg (226 lb)  SpO2 92%  BMI 32.43 kg/m2      Intake/Output Summary (Last 24 hours) at 09/25/18 0947  Last data filed at 09/25/18 0900   Gross per 24 hour   Intake             1003 ml   Output             2820 ml   Net            -1817 ml     Vitals:    09/21/18 0616 09/22/18 0614 09/23/18 0620 09/24/18 0536   Weight: 108.9 kg (240 lb 1.6 oz) 108 kg (238 lb) 107.4 kg (236 lb 12.8 oz) 103.5 kg (228 lb 3.2 oz)    09/25/18 0700   Weight: 102.5 kg (226 lb)       Constitutional:  AAO x3.  Pt is in NAD.  HEAD: Normocephalic.  SKIN: Skin normal color, texture and turgor with no lesions or eruptions.  Eyes: PERRL, EOMI.  ENT:  Supple, normal JVP. No lymphadenopathy or thyroid enlargement.  Chest:   Decrease breath sound in base B/L.  Cardiac:  IIR, variable sound of S1 and Normal S2.  No murmurs rubs or gallop.    Abdomen:  Normal BS.  Soft, non-tender and non-distended.  No rebound or guarding.    Extremities:  Pedious pulses palpable B/L.  LE edema noticed (2+).   Neurological: Strength and sensation grossly symmetric and intact throughout.                             Review of Systems:   As per subjective, otherwise 5 systems reviewed and negative.         Medications:          amiodarone  200 mg Oral BID     buPROPion  150 mg Oral BID     fluticasone-salmeterol  1 puff Inhalation Q12H     ipratropium - albuterol 0.5 mg/2.5 mg/3 mL  3 mL Nebulization Q4H While awake     losartan  25 mg Oral At Bedtime     montelukast  10 mg Oral At Bedtime     potassium chloride SA  20 mEq Oral At Bedtime     senna-docusate  2 tablet Oral BID     sodium chloride (PF)  3 mL Intracatheter Q8H     spironolactone  25 mg Oral Daily     warfarin  7.5 mg Oral ONCE at 18:00     PRN Meds: acetaminophen, acetaminophen, albuterol, bisacodyl, Continuing ACE inhibitor/ARB/ARNI from home medication list OR ACE inhibitor/ARB order already placed during this visit, - MEDICATION INSTRUCTIONS -, fluticasone, HOLD MEDICATION, HOLD MEDICATION, HOLD MEDICATION, HOLD MEDICATION, lidocaine 4%, lidocaine (buffered or not buffered), magnesium sulfate, magnesium sulfate, - MEDICATION INSTRUCTIONS -, melatonin, naloxone, ondansetron **OR** ondansetron, - MEDICATION INSTRUCTIONS -, polyethylene glycol, potassium chloride, potassium chloride with lidocaine, potassium chloride, potassium chloride, potassium chloride SA, potassium chloride, potassium chloride SA, sodium chloride (PF), sodium chloride (PF), sodium chloride (PF), Warfarin Therapy Reminder             Data:       Recent Labs  Lab 09/25/18  0711 09/24/18  0605 09/23/18  0550 09/22/18  0545 09/21/18  0614 09/20/18  1819   WBC 7.8  --   --   --  7.1 8.7   HGB 15.8  --   --   --  15.1 15.7   MCV 94  --   --   --  95 96     --   --   --  183 204   INR 2.43* 2.60* 2.74* 2.94* 3.04*  --     139  --  141 142  --    POTASSIUM 3.5 3.7  --  4.6 4.0  --    CHLORIDE 96 99  --  104 104  --    CO2 33* 33*  --  33* 31  --    BUN 28 28  --  33* 38*  --    CR 1.55* 1.55*  --  1.85* 1.94*  --    ANIONGAP 8 7  --  4 7  --    BHAVNA 8.8 9.0  --  8.8 8.7  --     * 99  --  87 87  --    ALBUMIN  --   --   --  3.5 3.5 3.3*   PROTTOTAL  --   --   --  6.4* 6.1* 6.3*   BILITOTAL  --   --   --  1.0 1.0 0.8   ALKPHOS  --   --   --  75 75 76   ALT  --   --   --  44 51 56   AST  --   --   --  38 33 38   LIPASE  --   --   --   --   --  115   TROPI  --   --  0.019 <0.015  --   --
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
## 2018-09-26 ENCOUNTER — TELEPHONE (OUTPATIENT)
Dept: INTERNAL MEDICINE | Facility: OTHER | Age: 68
End: 2018-09-26

## 2018-09-26 ENCOUNTER — APPOINTMENT (OUTPATIENT)
Dept: OCCUPATIONAL THERAPY | Facility: CLINIC | Age: 68
DRG: 292 | End: 2018-09-26
Attending: INTERNAL MEDICINE
Payer: MEDICARE

## 2018-09-26 VITALS
DIASTOLIC BLOOD PRESSURE: 70 MMHG | HEART RATE: 76 BPM | SYSTOLIC BLOOD PRESSURE: 101 MMHG | BODY MASS INDEX: 32.04 KG/M2 | OXYGEN SATURATION: 92 % | TEMPERATURE: 98.5 F | WEIGHT: 223.3 LBS | RESPIRATION RATE: 18 BRPM

## 2018-09-26 LAB
ANION GAP SERPL CALCULATED.3IONS-SCNC: 8 MMOL/L (ref 3–14)
BUN SERPL-MCNC: 27 MG/DL (ref 7–30)
CALCIUM SERPL-MCNC: 8.7 MG/DL (ref 8.5–10.1)
CHLORIDE SERPL-SCNC: 99 MMOL/L (ref 94–109)
CO2 SERPL-SCNC: 31 MMOL/L (ref 20–32)
CREAT SERPL-MCNC: 1.68 MG/DL (ref 0.66–1.25)
ERYTHROCYTE [DISTWIDTH] IN BLOOD BY AUTOMATED COUNT: 14.5 % (ref 10–15)
GFR SERPL CREATININE-BSD FRML MDRD: 41 ML/MIN/1.7M2
GLUCOSE SERPL-MCNC: 96 MG/DL (ref 70–99)
HCT VFR BLD AUTO: 48.5 % (ref 40–53)
HGB BLD-MCNC: 15.3 G/DL (ref 13.3–17.7)
INR PPP: 3.48 (ref 0.86–1.14)
MCH RBC QN AUTO: 30.1 PG (ref 26.5–33)
MCHC RBC AUTO-ENTMCNC: 31.5 G/DL (ref 31.5–36.5)
MCV RBC AUTO: 95 FL (ref 78–100)
PLATELET # BLD AUTO: 183 10E9/L (ref 150–450)
POTASSIUM SERPL-SCNC: 4 MMOL/L (ref 3.4–5.3)
RBC # BLD AUTO: 5.09 10E12/L (ref 4.4–5.9)
SODIUM SERPL-SCNC: 138 MMOL/L (ref 133–144)
WBC # BLD AUTO: 8.1 10E9/L (ref 4–11)

## 2018-09-26 PROCEDURE — A9270 NON-COVERED ITEM OR SERVICE: HCPCS | Mod: GY | Performed by: INTERNAL MEDICINE

## 2018-09-26 PROCEDURE — A9270 NON-COVERED ITEM OR SERVICE: HCPCS | Mod: GY | Performed by: HOSPITALIST

## 2018-09-26 PROCEDURE — 25000132 ZZH RX MED GY IP 250 OP 250 PS 637: Mod: GY | Performed by: INTERNAL MEDICINE

## 2018-09-26 PROCEDURE — 85610 PROTHROMBIN TIME: CPT | Performed by: INTERNAL MEDICINE

## 2018-09-26 PROCEDURE — 97110 THERAPEUTIC EXERCISES: CPT | Mod: GO

## 2018-09-26 PROCEDURE — 97535 SELF CARE MNGMENT TRAINING: CPT | Mod: GO

## 2018-09-26 PROCEDURE — 25000132 ZZH RX MED GY IP 250 OP 250 PS 637: Mod: GY | Performed by: HOSPITALIST

## 2018-09-26 PROCEDURE — 40000133 ZZH STATISTIC OT WARD VISIT

## 2018-09-26 PROCEDURE — 40000235 ZZH STATISTIC TELEMETRY

## 2018-09-26 PROCEDURE — 25000125 ZZHC RX 250: Performed by: INTERNAL MEDICINE

## 2018-09-26 PROCEDURE — 99239 HOSP IP/OBS DSCHRG MGMT >30: CPT | Performed by: HOSPITALIST

## 2018-09-26 PROCEDURE — 80048 BASIC METABOLIC PNL TOTAL CA: CPT | Performed by: INTERNAL MEDICINE

## 2018-09-26 PROCEDURE — 94640 AIRWAY INHALATION TREATMENT: CPT | Mod: 76

## 2018-09-26 PROCEDURE — 85027 COMPLETE CBC AUTOMATED: CPT | Performed by: INTERNAL MEDICINE

## 2018-09-26 PROCEDURE — 36415 COLL VENOUS BLD VENIPUNCTURE: CPT | Performed by: INTERNAL MEDICINE

## 2018-09-26 PROCEDURE — 94640 AIRWAY INHALATION TREATMENT: CPT

## 2018-09-26 PROCEDURE — 99232 SBSQ HOSP IP/OBS MODERATE 35: CPT | Performed by: INTERNAL MEDICINE

## 2018-09-26 PROCEDURE — 40000275 ZZH STATISTIC RCP TIME EA 10 MIN

## 2018-09-26 RX ORDER — AMIODARONE HYDROCHLORIDE 200 MG/1
200 TABLET ORAL DAILY
Status: DISCONTINUED | OUTPATIENT
Start: 2018-10-25 | End: 2018-09-26 | Stop reason: HOSPADM

## 2018-09-26 RX ORDER — AMIODARONE HYDROCHLORIDE 200 MG/1
200 TABLET ORAL 2 TIMES DAILY
Qty: 60 TABLET | Refills: 0 | Status: SHIPPED | OUTPATIENT
Start: 2018-09-26 | End: 2018-10-23

## 2018-09-26 RX ORDER — SPIRONOLACTONE 25 MG/1
25 TABLET ORAL DAILY
Qty: 30 TABLET | Refills: 0 | Status: SHIPPED | OUTPATIENT
Start: 2018-09-27 | End: 2018-10-31

## 2018-09-26 RX ADMIN — IPRATROPIUM BROMIDE AND ALBUTEROL SULFATE 3 ML: 2.5; .5 SOLUTION RESPIRATORY (INHALATION) at 15:43

## 2018-09-26 RX ADMIN — SENNOSIDES AND DOCUSATE SODIUM 2 TABLET: 8.6; 5 TABLET ORAL at 08:03

## 2018-09-26 RX ADMIN — FUROSEMIDE 40 MG: 40 TABLET ORAL at 08:03

## 2018-09-26 RX ADMIN — SPIRONOLACTONE 25 MG: 25 TABLET ORAL at 08:03

## 2018-09-26 RX ADMIN — BUPROPION HYDROCHLORIDE 150 MG: 150 TABLET, FILM COATED, EXTENDED RELEASE ORAL at 08:03

## 2018-09-26 RX ADMIN — IPRATROPIUM BROMIDE AND ALBUTEROL SULFATE 3 ML: 2.5; .5 SOLUTION RESPIRATORY (INHALATION) at 07:43

## 2018-09-26 RX ADMIN — AMIODARONE HYDROCHLORIDE 200 MG: 200 TABLET ORAL at 08:03

## 2018-09-26 ASSESSMENT — ACTIVITIES OF DAILY LIVING (ADL)
ADLS_ACUITY_SCORE: 9

## 2018-09-26 NOTE — PLAN OF CARE
Problem: Patient Care Overview  Goal: Plan of Care/Patient Progress Review  Discharge Planner OT   Patient plan for discharge: Home  Current status:  Pt ambulated in the hallway with independence for 600 feet. Pt completed 13 stairs with Mod I. Pt demonstrated increase in fatigue and SOB during session. During session, pt required 2 standing rest breaks and 1 seated rest break. Educated in depth on CHF concepts.  Barriers to return to prior living situation: none anticipated     Recommendations for discharge: home with spouse assist as needed, resumption of work per MD approval and/or restrictions. Pt would benefit from OP CR per MD and if qualifies (per chart EF 20%).    Rationale for recommendations: Pt continues to make steady progress in established goal areas.        Entered by: Lorelei Zamora 09/26/2018 10:24 AM

## 2018-09-26 NOTE — DISCHARGE INSTRUCTIONS
-There are no open appointments with Dr. Vega in the next 7-10 days. The  has sent a message to his nurse. The clinic will contact you with an appointment time.

## 2018-09-26 NOTE — PLAN OF CARE
Problem: Patient Care Overview  Goal: Discharge Needs Assessment  Outcome: No Change  Heart Failure Care Pathway  GOALS TO BE MET BEFORE DISCHARGE:    1. Decrease congestion and/or edema with diuretic therapy to achieve near      optimal volume status.            Dyspnea improved:  Yes            Edema improved:     Yes        Net I/O and Weights since admission:          08/26 2300 - 09/25 2259  In: 4446 [P.O.:4340; I.V.:106]  Out: 82570 [Urine:84350]  Net: -7809            Vitals:    09/21/18 0616 09/22/18 0614 09/23/18 0620 09/24/18 0536   Weight: 108.9 kg (240 lb 1.6 oz) 108 kg (238 lb) 107.4 kg (236 lb 12.8 oz) 103.5 kg (228 lb 3.2 oz)    09/25/18 0700   Weight: 102.5 kg (226 lb)       2.  O2 sats > 92% on RA or at prior home O2 therapy level.          Current oxygenation status:       SpO2: 92 %         O2 Device: None (Room air),            Able to wean O2 this shift to keep sats > 92%:  NA       Does patient use Home O2? No    3.  Tolerates ambulation and mobility near baseline: Yes        How many times did the patient ambulate with nursing staff this shift? 1    Alert and oriented. BP soft, other VSS. Denies chest pain. Denies SOB. Up independently. Tele NSR. Tolerating diet. Plan for possible discharge tomorrow.    Please review the Heart Failure Care Pathway for additional HF goal outcomes.    Mary Ellen Louise RN  9/25/2018

## 2018-09-26 NOTE — TELEPHONE ENCOUNTER
Reason for Call:  Same Day Appointment, Requested Provider:  Sandip Vega D.O.    PCP: Sandip Vega    Reason for visit: hosp f/u acute heart failure- cardioversion     Duration of symptoms: being discharges today     Have you been treated for this in the past? No    Additional comments: Pt needs to be seen in 7-10 days. Azra from  [facility] calling. Please call pt at home tomorrow with appt time.     Can we leave a detailed message on this number? YES    Phone number patient can be reached at: Home number on file [phone] (home)    Best Time: any     Call taken on 9/26/2018 at 2:24 PM by Patito Harris

## 2018-09-26 NOTE — PLAN OF CARE
Problem: Patient Care Overview  Goal: Plan of Care/Patient Progress Review  Occupational Therapy Discharge Summary    Reason for therapy discharge:    Discharged to home.    Progress towards therapy goal(s). See goals on Care Plan in Psychiatric electronic health record for goal details.  Goals partially met.  Barriers to achieving goals:   discharge from facility.    Therapy recommendation(s):    home with spouse assist as needed for I/ADLs, resumption of work per MD approval and/or restrictions. Pt would benefit from OP CR per MD and if qualifies

## 2018-09-26 NOTE — PLAN OF CARE
Problem: Cardiac: Heart Failure (Adult)  Goal: Signs and Symptoms of Listed Potential Problems Will be Absent, Minimized or Managed (Cardiac: Heart Failure)  Signs and symptoms of listed potential problems will be absent, minimized or managed by discharge/transition of care (reference Cardiac: Heart Failure (Adult) CPG).   Outcome: Improving  Heart Failure Care Pathway  GOALS TO BE MET BEFORE DISCHARGE:    1. Decrease congestion and/or edema with diuretic therapy to achieve near      optimal volume status.            Dyspnea improved:  Yes            Edema improved:     Yes still some some DEBBIE, but improved         Net I/O and Weights since admission:          08/27 2300 - 09/26 2259  In: 4806 [P.O.:4700; I.V.:106]  Out: 56751 [Urine:10258]  Net: -8299            Vitals:    09/21/18 0616 09/22/18 0614 09/23/18 0620 09/24/18 0536   Weight: 108.9 kg (240 lb 1.6 oz) 108 kg (238 lb) 107.4 kg (236 lb 12.8 oz) 103.5 kg (228 lb 3.2 oz)    09/25/18 0700 09/26/18 0500   Weight: 102.5 kg (226 lb) 101.3 kg (223 lb 4.8 oz)       2.  O2 sats > 92% on RA or at prior home O2 therapy level.          Current oxygenation status:       SpO2: 92 %         O2 Device: None (Room air),            Able to wean O2 this shift to keep sats > 92%:  Yes       Does patient use Home O2? No    3.  Tolerates ambulation and mobility near baseline: Yes        How many times did the patient ambulate with nursing staff this shift? 4    Please review the Heart Failure Care Pathway for additional HF goal outcomes.    Saige Gifford RN  9/26/2018     VSS. Tele: SR IV out and monitor off. Reviewed discharge instructions with pt and his wife. All questions and concerns were addressed. Pt states that he has all of his belongings. Pt brought down to skyway lobby doors where his wife picked him up.

## 2018-09-26 NOTE — PROGRESS NOTES
SPIRITUAL HEALTH SERVICES Progress Note  [facility]    Visited pt Home per the length of stay. Pt's wife was present. Pt addressed that he has been in the hospital for a week for his heart issue and expressed his emotion. confirmed his emotion. Pt's wife said that pt is going to be discharged this afternoon.    provided a care in presence and blessings. There is no further plan for the pt.     Charity Kate  Chaplain Resident

## 2018-09-26 NOTE — PROGRESS NOTES
EP Progress Note          Assessment and Plan:   67 yo M with Hx of HTN, HL, COPD, JAMES, AAA and severe cardiomyopathy, who presented with CHF exacerbation, associated with Afib with RVR.  Echo showed worsening in EF (20%) as well. EP was consulted to assist with his care.    1. Afib  S/P ELVIRA and cardioversion on 9/25-18  continues to be in sinus 55-85 bpm on tele  On amiodarone 200 mg bid x 1 month then 200 mg every day thereafter.  Warfarin dose will need to be adjusted r.t amiodarone loading    Results for KARTIK HUERTA (MRN [id number]) as of 9/26/2018 10:12   Ref. Range 9/24/2018 06:05 9/25/2018 07:11 9/26/2018 06:29   INR Latest Ref Range: 0.86 - 1.14  2.60 (H) 2.43 (H) 3.48 (H)     2. CHF  Continue diuresis. Continue spirolactone, losartan and lasix. Wt trending down 101.3 kg (admit 108.9 kg)  Will consider coreg as an outpt  Results for KARTIK HUERTA (MRN [id number]) as of 9/26/2018 10:12   Ref. Range 9/21/2018 06:14 9/22/2018 05:45 9/24/2018 06:05 9/25/2018 07:11 9/26/2018 06:29   Urea Nitrogen Latest Ref Range: 7 - 30 mg/dL 38 (H) 33 (H) 28 28 27   Creatinine Latest Ref Range: 0.66 - 1.25 mg/dL 1.94 (H) 1.85 (H) 1.55 (H) 1.55 (H) 1.68 (H)     3. HTN  BP soft, but stable.    4. AAA  He has a known history of an abdominal aortic aneurysm.  Abd US 9/19/18 shows: Abdominal aortic aneurysm measuring up to a maximum of 3.6 x 4.3 cm. this is a significant increase in size based on his prior study in November 2017 where it measured 3.2 x 3.1 cm. His infra-renal AAA has been present for several years and followed with US.      Saw  for vascular consult on 9/21/18 his recommendation was as follows:  He can see me in clinic outpatient after discharge with a non-contrast CT abdomen/pelvis to evaluate his aneurysm size at that time.  He would probably be a candidate for EVAR in the future if he gets to an aneurysm size > 5.5- 6.0 cm.     Pt and wife prefer to see  at [place]. I will have  Kirsty RN try to accomodate their wishes.  EP signing off  Mayra Krueger CNP        Interval History:   Pt feeling well and offers no concerns. Up walking without sob. Wt down ~7 kgs. Blood pressure is soft, but stable. EKG remains sinus in the 80's.       Medications:       amiodarone  200 mg Oral BID     buPROPion  150 mg Oral BID     fluticasone-salmeterol  1 puff Inhalation Q12H     furosemide  40 mg Oral Daily     ipratropium - albuterol 0.5 mg/2.5 mg/3 mL  3 mL Nebulization Q4H While awake     losartan  25 mg Oral At Bedtime     montelukast  10 mg Oral At Bedtime     potassium chloride SA  20 mEq Oral At Bedtime     senna-docusate  2 tablet Oral BID     sodium chloride (PF)  3 mL Intravenous Q8H     spironolactone  25 mg Oral Daily             Review of Systems: If done, described below  The Review of Systems is negative other than noted in the HPI             Physical Exam:   Blood pressure 101/70, pulse 76, temperature 98  F (36.7  C), temperature source Oral, resp. rate 18, weight 101.3 kg (223 lb 4.8 oz), SpO2 (!) 86 %.        Vital Sign Ranges  Temperature Temp  Av.2  F (36.8  C)  Min: 98  F (36.7  C)  Max: 98.3  F (36.8  C)   Blood pressure Systolic (24hrs), Av , Min:71 , Max:101        Diastolic (24hrs), Av, Min:43, Max:84      Pulse Pulse  Av.7  Min: 69  Max: 124   Respirations Resp  Av.6  Min: 18  Max: 20   Pulse oximetry SpO2  Av.7 %  Min: 86 %  Max: 99 %         Intake/Output Summary (Last 24 hours) at 18 1004  Last data filed at 18 0750   Gross per 24 hour   Intake              240 ml   Output              600 ml   Net             -360 ml       Constitutional:   in no apparent distress     Lungs:   Dim throughout but clear     Cardiovascular:   regular rate and rhythm, normal S1 and S2, no S3, no S4 and no murmur noted     Extremities and Back:   No edema, chronic venous stasis changes are noted              Data:     Lab Results   Component Value Date    WBC  8.1 09/26/2018    HGB 15.3 09/26/2018    HCT 48.5 09/26/2018     09/26/2018     09/26/2018    POTASSIUM 4.0 09/26/2018    CHLORIDE 99 09/26/2018    CO2 31 09/26/2018    BUN 27 09/26/2018    CR 1.68 (H) 09/26/2018    GLC 96 09/26/2018    NTBNPI 54474 (H) 08/25/2018    NTBNP 98416 (H) 09/20/2018    TROPONIN <0.04 08/06/2006    TROPI 0.019 09/23/2018    AST 38 09/22/2018    ALT 44 09/22/2018    ALKPHOS 75 09/22/2018    BILITOTAL 1.0 09/22/2018    INR 3.48 (H) 09/26/2018

## 2018-09-26 NOTE — PROGRESS NOTES
Care Coordination:    -Per MD pt needs close INR follow-up. Change INR clinic appointment to 9/27 per MD request. Unable to make PCP appointment.  will contact Dr. Vega to try work the pt into his schedule. The Clinic nurse will contact the patient.     -Per Mayra Krueger NP, the pt is to follow-up with Dr. Murphy in [place]. Placed a call to [facility] to inquire about an appointment.     The following appointments have been made for this patient.     Sep 27, 2018  9:15 AM CDT   [facility]  [facility]    [address] [facility] [address]   [phone]        Oct 23, 2018  3:00 PM CDT   Return Visit with Jimenez Murphy MD   [facility] ([facility] [place]    [address] [facility] [id number] [phone]                   Azra Hernandez RN, BAN   Care Coordinator  Ph. [phone]

## 2018-09-26 NOTE — DISCHARGE SUMMARY
[facility]    Discharge Summary  Hospitalist    Date of Admission:  9/20/2018  Date of Discharge:  9/26/2018  4:17 PM  Discharging Provider: Jimenez Velasco DO    Discharge Diagnoses   Acute on chronic systolic heart failure exacerbation  Cardiomyopathy tachycardia induced  Atrial fibrillation with rapid ventricular response    History of Present Illness   Home Valdez is an 68 year old male who presented with shortness of breath and dyspnea on exertion concerning for CHF exacerbation.    Hospital Course   Home Valdez was admitted on 9/20/2018.  The following problems were addressed during his hospitalization:    Active Problems:    CHF (congestive heart failure) (H)    Acute on chronic systolic heart failure exacerbation  Cardiomyopathy tachycardia induced  Normal [facility] 2014  With known biventricular heart failure with ejection fraction of less than 20% on an echo completed at the end of July this year.  He appears to be failing outpatient management with metoprolol 100 mg twice daily, torsemide 20 mg daily with intermittent dosing of an additional 40 mg as needed and Losartan.  He reported dietary compliance, medication compliance and has been following with his cardiologist regularly.  He does have a history of atrial fibrillation and it was felt that his tachycardia was the source of his cardiomyopathy in the past and may be contributing to his current failure of therapy. Presented with a BNP > 29K, FARIAS, JVD, LE edema and ascites.    An echocardiogram this hospitalization did not show any significant change from previous one 2 months ago.  EF still severely reduced at <20%  Lexiscan concerning for moderate to large fixed severe intensity defect of the basal to apical inferior wall AND a separate medium sized mild intensity fixed defect of the basal to mid anterior wall that could be nontransmural infarct.  Discussed with Dr. Pineda, this is likely artifact.  The patient underwent  DCCV with restoration of NSR and amiodarone was added  The goal is that NSR will improve his EF through rate control.  His medications were reviewed with Dr. Pineda  - Cardiology consulted and followed.  - discharge medications discussed with Dr. Pineda:,   - continue home torsemide dose  - holding the losartan for HYPOtension,   - continue the spironolactone  - continue the amiodarone  - stop the metoprolol  - patient's wife is LPN, we discussed warning signs of hyper and hypovolemia. She will be following his vital signs including weight and pulse on a daily basis  - he ambulated around the unit several times on discharge, much improved from admission  - follow up as written with cardiology      Atrial Fibrillation  Maintained on metoprolol 100 mg twice daily and warfarin currently.  Up until about 2 weeks ago he had been previously on amiodarone and Toprol-XL.  Rates are marginally controlled, with resting in the 110's  Underwent DCCV on 9/25 with return to NSR  Discussed with Dr. Pineda, continue the amiodarone 200 mg bid  - Cardiology following.  Plan for EP evaluation   - Toprol XL 50 mg BID was discontinued, amiodarone was started  - discussed coumadin transition with the inpatient pharmacist (as he is now starting amiodarone, plan to check INR on 9/27 and have his coumadin dose adjusted after results known)      Abdominal discomfort.  Resolved  Associated with abdominal fullness and epigastric abdominal pain that radiates across the abdomen.  Symptoms are actually improved after increased diuresis.  He does note some discomfort after eating but again says that this is improved after improved diuresis.  Denies any nausea, vomiting, diarrhea.  Abdominal exam notable for ascites, edema, but negative Coates sign.  Liver profile does not appear consistent with acute cholecystitis and lipase normal   - this has improved significantly with diuresis      AAA  He has a known history of an abdominal aortic aneurysm.  Abd US  9/19/18 shows: Abdominal aortic aneurysm measuring up to a maximum of 3.6 x 4.3 cm. this is a significant increase in size based on his prior study in November 2017 where it measured 3.2 x 3.1 cm. His infra-renal AAA has been present for several years and followed with US.     - Vascular surgery consulted.  Plan for further outpatient management      Cardiorenal Syndrome  CKD Stage III  His baseline creatinine had been around 1 until July of this year.  Since that time it is ranged from 1.3-1.9 and more recently in the 1.8-1.9 range.  Cr is currently 1.5.  Given his current volume overload in significantly reduced ejection fraction suspect that his primary issue is a cardiorenal syndrome.  - improved  - recheck bmp in 1 week suggested      COPD  Pulmonary hypertension  Obstructive sleep apnea   - Saturating well on room air at rest presently. Not felt to be in acute exacerbation.    - C/w PTA nebulizers and inhalers.    - CPAP with home settings       # Discharge Pain Plan:   - Patient currently has NO PAIN and is not being prescribed pain medications on discharge.      Jimenez Velasco, DO    Significant Results and Procedures   none    Pending Results   These results will be followed up by primary care physician  Unresulted Labs Ordered in the Past 30 Days of this Admission     Date and Time Order Name Status Description    9/20/2018 1819 T4 free In process           Code Status   Full Code       Primary Care Physician   Sandip Vega    Physical Exam   Temp: 98.5  F (36.9  C) Temp src: Oral BP: 101/70   Heart Rate: 96 Resp: 18 SpO2: 92 % O2 Device: None (Room air)    Vitals:    09/24/18 0536 09/25/18 0700 09/26/18 0500   Weight: 103.5 kg (228 lb 3.2 oz) 102.5 kg (226 lb) 101.3 kg (223 lb 4.8 oz)     Vital Signs with Ranges  Temp:  [98  F (36.7  C)-98.5  F (36.9  C)] 98.5  F (36.9  C)  Heart Rate:  [78-96] 96  Resp:  [18] 18  BP: ()/(49-70) 101/70  SpO2:  [86 %-92 %] 92 %  I/O last 3 completed  shifts:  In: 360 [P.O.:360]  Out: 850 [Urine:850]    Constitutional: Awake, alert, cooperative, no apparent distress.  Eyes: Conjunctiva and pupils examined and normal.  HEENT: Moist mucous membranes, normal dentition.  Respiratory: Clear to auscultation bilaterally, no crackles or wheezing.  Cardiovascular: Regular rate and rhythm, normal S1 and S2, and no murmur noted.  GI: Soft, non-distended, non-tender, normal bowel sounds.  Lymph/Hematologic: No anterior cervical or supraclavicular adenopathy.  Skin: No rashes, no cyanosis, no edema.  Psychiatric: Alert, oriented to person, place and time, no obvious anxiety or depression.    Discharge Disposition   Discharged to home  Condition at discharge: Stable    Consultations This Hospital Stay   PHARMACY TO DOSE WARFARIN  CORE CLINIC EVALUATION IP CONSULT  CARDIAC REHAB IP CONSULT  CARE COORDINATOR IP CONSULT  NUTRITION SERVICES ADULT IP CONSULT  CARDIOLOGY IP CONSULT  VASCULAR SURGERY IP CONSULT  LYMPHEDEMA THERAPY IP CONSULT  ELECTROPHYSIOLOGY IP CONSULT  CARE TRANSITION RN/SW IP CONSULT    Time Spent on this Encounter   IJimenez, personally saw the patient today and spent greater than 30 minutes discharging this patient.    Discharge Orders     Basic metabolic panel     N terminal pro BNP outpatient     Follow-up and recommended labs and tests    Please call to setup a follow-up appointment in 2-3 weeks with Dr. Bowens (Vascular Surgery) with a noncontrasted CT Abdomen/Pelvis scan to discuss your known AAA.  Call the Vascular [facility] [phone], [place] to make an appointment.     Reason for your hospital stay   Congestive heart failure     Follow-up and recommended labs and tests    Follow up with primary care provider, Sandip Vega, within 7 days to evaluate medication change and for hospital follow- up.  The following labs/tests are recommended: bmp.      Follow up INR on 9/27    Follow up with cardiology as outlined     Activity    Your activity upon discharge: activity as tolerated     Discharge Instructions   INR recheck tomorrow  If lightheaded after standing call your physician team (primary care doctor or cardiologist)  If > 3 lb weight gain or worsening shortness of breath call your physician team (primary care doctor or cardiologist)     Full Code     Diet   Follow this diet upon discharge: Orders Placed This Encounter     Fluid restriction 1800 ML FLUID     Combination Diet Low Saturated Fat Na <2400mg Diet       Discharge Medications   Discharge Medication List as of 9/26/2018  3:58 PM      START taking these medications    Details   amiodarone (PACERONE/CODARONE) 200 MG tablet Take 1 tablet (200 mg) by mouth 2 times daily, Disp-60 tablet, R-0, E-Prescribe      spironolactone (ALDACTONE) 25 MG tablet Take 1 tablet (25 mg) by mouth daily, Disp-30 tablet, R-0, E-Prescribe         CONTINUE these medications which have NOT CHANGED    Details   ADVAIR DISKUS 250-50 MCG/DOSE diskus inhaler INHALE 1 PUFF BY MOUTH TWICE A DAY, Disp-1 Inhaler, R-1, E-Prescribe      buPROPion (WELLBUTRIN SR) 150 MG 12 hr tablet TAKE 1 TABLET BY MOUTH TWICE A DAY, Disp-180 tablet, R-1, E-Prescribe      JANTOVEN 2.5 MG tablet TAKE 7.5 MG (3 TABS) ON TUES, THURS, SAT AND 5 MG (2 TABS) ALL OTHERDAYS, OR AS DIRECTED BY THE COUMADIN CLINIC., Disp-210 tablet, R-1, E-Prescribe      montelukast (SINGULAIR) 10 MG tablet TAKE 1 TABLET BY MOUTH DAILY AT BEDTIME, Disp-90 tablet, R-1, E-PrescribePatient enrolled in our Rx Med Sync service to improveadherence. We are requesting a refill authorization inadvance to ensure an active prescription is on file.      potassium chloride SA (K-DUR/KLOR-CON M) 20 MEQ CR tablet Take 1 tablet (20 mEq) by mouth daily, Disp-90 tablet, R-2, E-Prescribe      senna-docusate (SENOKOT-S;PERICOLACE) 8.6-50 MG per tablet Take 2 tablets by mouth 2 times daily , Historical      torsemide (DEMADEX) 20 MG tablet Take 1 tablet (20 mg) by mouth daily,  Disp-90 tablet, R-3, E-Prescribe      VENTOLIN  (90 Base) MCG/ACT inhaler INHALE 2 PUFFS BY MOUTH EVERY 4 HOURS AS NEEDED FOR SHORTNESS OF BREATH/DYSPNEA, Disp-18 g, R-3, E-Prescribe      acetaminophen (TYLENOL) 325 MG tablet Take 2 tablets (650 mg) by mouth every 4 hours as needed for other (mild pain), Disp-100 tablet, R-0, E-Prescribe      fluticasone (FLONASE) 50 MCG/ACT spray INHALE 1 TO 2 SPRAYS INTO EACH NOSTRIL EVERY DAY, Disp-16 g, R-3, E-Prescribe      ipratropium - albuterol 0.5 mg/2.5 mg/3 mL (DUONEB) 0.5-2.5 (3) MG/3ML neb solution TAKE 1 VIAL (3 MLS) BY NEBULIZATION EVERY 4 HOURS AS NEEDED FOR SHORTNESS OF BREATH / DYSPNEA OR WHEEZING, Disp-540 mL, R-1, E-Prescribe      order for DME Equipment being ordered: NebulizerDisp-1 Device, R-0, Local Print      !! Respiratory Therapy Supplies (AIRS DISPOSABLE NEBULIZER) KIT USE EVERY 4 TO 6 HOURS AS NEEDED, Disp-1 kit, R-0, E-Prescribe      !! Respiratory Therapy Supplies (NEBULIZER/TUBING/MOUTHPIECE) KIT Use every 4-6 hours PRN, Disp-1 kit, R-11, Local Print       !! - Potential duplicate medications found. Please discuss with provider.      STOP taking these medications       losartan (COZAAR) 25 MG tablet Comments:   Reason for Stopping:         metoprolol tartrate (LOPRESSOR) 100 MG tablet Comments:   Reason for Stopping:             Allergies   Allergies   Allergen Reactions     Contrast Dye Anaphylaxis     Data   Most Recent 3 CBC's:  Recent Labs   Lab Test  09/26/18   0629  09/25/18   0711  09/21/18   0614   WBC  8.1  7.8  7.1   HGB  15.3  15.8  15.1   MCV  95  94  95   PLT  183  201  183      Most Recent 3 BMP's:  Recent Labs   Lab Test  09/26/18   0629  09/25/18   1321  09/25/18   0711  09/24/18   0605   NA  138   --   137  139   POTASSIUM  4.0  3.6  3.5  3.7   CHLORIDE  99   --   96  99   CO2  31   --   33*  33*   BUN  27   --   28  28   CR  1.68*   --   1.55*  1.55*   ANIONGAP  8   --   8  7   BHAVNA  8.7   --   8.8  9.0   GLC  96   --   117*   99     Most Recent 2 LFT's:  Recent Labs   Lab Test  09/22/18   0545  09/21/18   0614   AST  38  33   ALT  44  51   ALKPHOS  75  75   BILITOTAL  1.0  1.0     Most Recent INR's and Anticoagulation Dosing History:  Anticoagulation Dose History     Recent Dosing and Labs Latest Ref Rng & Units 9/20/2018 9/21/2018 9/22/2018 9/23/2018 9/24/2018 9/25/2018 9/26/2018    Warfarin 5 mg - 5 mg 5 mg 5 mg 5 mg - - -    Warfarin 7.5 mg - - - - - 7.5 mg 7.5 mg -    INR 0.86 - 1.14 3.14(H) 3.04(H) 2.94(H) 2.74(H) 2.60(H) 2.43(H) 3.48(H)    INR 0.86 - 1.14 - - - - - - -    INR Point of Care 0.86 - 1.14 - - - - - - -        Most Recent 3 Troponin's:  Recent Labs   Lab Test  09/23/18   0550  09/22/18   0545  07/31/18   0514   TROPI  0.019  <0.015  0.033     Most Recent Cholesterol Panel:  Recent Labs   Lab Test  05/30/17   0805   CHOL  169   LDL  105*   HDL  37*   TRIG  135     Most Recent 6 Bacteria Isolates From Any Culture (See EPIC Reports for Culture Details):  Recent Labs   Lab Test  07/17/18   1330  07/17/18   1040  07/17/18   0529  07/17/18   0528  03/28/16   1045  12/31/14   1510   CULT  Moderate growth  Normal mj    No MRSA isolated  No growth after 5 days  No growth after 5 days  No MRSA isolated  No MRSA isolated     Most Recent TSH, T4 and A1c Labs:  Recent Labs   Lab Test  09/20/18   1819   TSH  5.74*   T4  1.21     Results for orders placed or performed during the hospital encounter of 09/20/18   XR Chest 2 Views    Narrative    CHEST TWO VIEWS  9/20/2018 8:32 PM     HISTORY: FARIAS.    COMPARISON: 7/30/2018.    FINDINGS: The heart is enlarged but similar to the prior study.  Pulmonary vascularity mildly increased. No edema, pneumothorax or  pleural effusion. No air-space consolidation.       Impression    IMPRESSION: Cardiomegaly with mild vascular congestion.     ARI CASTILLO MD   CT Abdomen Pelvis w/o Contrast    Narrative    CT ABDOMEN AND PELVIS WITHOUT CONTRAST 9/21/2018 12:35 PM     HISTORY: Evaluate known  infrarenal abdominal aortic aneurysm.      COMPARISON: Abdominal aortic ultrasound 11/4/2016. CT abdomen/pelvis  4/18/2016.    TECHNIQUE: Axial images are obtained from the lung bases to the  symphysis without oral or IV contrast. Coronal reformatted images are  also generated.  Radiation dose for this scan was reduced using  automated exposure control, adjustment of the mA and/or kV according  to patient size, or iterative reconstruction technique.    FINDINGS:     The lung bases are clear. Mild dependent atelectasis is present  bilaterally. Trace amount left pleural fluid is noted.    Abdomen: Calcified granulomas are noted in the spleen. The liver,  gallbladder, pancreas and adrenal glands are unremarkable. Multiple  renal cortical cysts are present. No hydronephrosis or urinary tract  calculi. No enlarged abdominal lymph nodes. Infrarenal abdominal  aortic aneurysm on series 2, image 84 currently measures 4.4 x 4.4 cm,  previously 4.1 x 4.1 cm suggesting mild interval progression. Aneurysm  appears to extend into the proximal common right iliac artery.  Scattered calcified plaque is present. The bowel is normal in caliber  without obstruction. Trace amount of fluid is noted near the inferior  tip of the liver. This is new since the prior study.    Pelvis: The bladder, prostate and rectum are unremarkable. No enlarged  pelvic or inguinal lymph nodes. Degenerative spine changes are  present. Small fat-containing left inguinal hernia is unchanged.      Impression    IMPRESSION:  1. Slight interval increase in the size of the infrarenal abdominal  aortic aneurysm now measuring 4.4 cm, previously 4.1 cm in April 2016.  No evidence of retroperitoneal hemorrhage.  2. Evidence of old granulomatous disease.  3. Bilateral renal cortical cysts. No evidence of hydronephrosis or  urinary tract calculi.    SERA BUCKNER MD   NM Lexiscan stress test (nuc card)    Narrative    GATED MYOCARDIAL PERFUSION SCINTIGRAPHY WITH  INTRAVENOUS PHARMACOLOGIC  VASODILATATION LEXISCAN -ONE DAY STUDY     9/24/2018 11:01 AM  KARTIK HUERTA  68 years  Male  1950.BMI  33    Indication/Clinical History: 68-year-old male with rapid atrial  fibrillation and acute systolic congestive heart failure undergoing  stress test to determine etiology of cardiomyopathy.    Impression  1.  Myocardial perfusion imaging using single isotope technique  demonstrated abnormal perfusion. There is a moderate to large, fixed,  severe intensity defect of the basal to apical inferior wall  suggesting infarct. There is a separate medium sized, mild intensity  fixed defect of the basal to mid anterior wall which could represent  nontransmural infarct. No ischemia.   2. Gated images not performed.  3. Compared to the prior study from 2004, the above perfusion defects  are new. Angiogram from October 2014 showed no severe obstructive  coronary artery disease .    Procedure  Pharmacologic stress testing was performed with Lexiscan at a rate of  0.08 mg/ml rapid bolus injection, for 15 seconds, 0.4 mg/5ml  intravenously. Low-level exercise was not performed along with the  vasodilator infusion.  The heart rate was 110 at baseline and kaushal to  118 beats per minute during the Lexiscan infusion. The rest blood  pressure was 101/68 mmHg and was 109/66 mm Hg during Lexiscan  infusion. The patient experienced no symptoms  during the test.    Myocardial perfusion imaging was performed at rest, approximately 45  minutes after the injection intravenously of 11 mCi of Tc-99m Myoview.  At peak pharmacologic effect, 10-20 seconds after Lexiscan,  the  patient was injected intravenously with 30.5 mCi of  Tc-99m Myoview.  The post-stress tomographic imaging was performed approximately 60  minutes after stress.    EKG Findings  The resting EKG demonstrated atrial fibrillation, inferior Q waves,  poor anterior R wave progression. The stress EKG demonstrated no  changes from baseline,  negative for ischemia.    Tomographic Findings  Overall, the study quality is good . On the stress images, moderate to  large sized, severe intensity fixed defect of the basal to apical  inferior wall, second defect of moderate size, mild intensity of the  basal to mid anterior wall. On the rest images, similar to stress  imaging . Gated images not performed. TID was was not appreciated.    DORY MEJIA MD

## 2018-09-26 NOTE — PLAN OF CARE
Problem: Patient Care Overview  Goal: Plan of Care/Patient Progress Review  Outcome: Improving  Heart Failure Care Pathway  GOALS TO BE MET BEFORE DISCHARGE:    1. Decrease congestion and/or edema with diuretic therapy to achieve near      optimal volume status.            Dyspnea improved:  Yes            Edema improved:     Yes        Net I/O and Weights since admission:          08/27 0700 - 09/26 0659  In: 4446 [P.O.:4340; I.V.:106]  Out: 84264 [Urine:98160]  Net: -7809            Vitals:    09/21/18 0616 09/22/18 0614 09/23/18 0620 09/24/18 0536   Weight: 108.9 kg (240 lb 1.6 oz) 108 kg (238 lb) 107.4 kg (236 lb 12.8 oz) 103.5 kg (228 lb 3.2 oz)    09/25/18 0700   Weight: 102.5 kg (226 lb)       2.  O2 sats > 92% on RA or at prior home O2 therapy level.          Current oxygenation status:       SpO2: 92 %         O2 Device: None (Room air),            Able to wean O2 this shift to keep sats > 92%:  Yes       Does patient use Home O2? No    3.  Tolerates ambulation and mobility near baseline: Yes        How many times did the patient ambulate with nursing staff this shift? 1    Please review the Heart Failure Care Pathway for additional HF goal outcomes.    Pt. A&O. VSS, BP run soft per pt baseline (90's/60's). Denies Pain/SOB/chest pain. Up ind. Tele is NSR.  Plan is possible discharge tomorrow.    Constanza Lopez RN  9/26/2018

## 2018-09-27 ENCOUNTER — PATIENT OUTREACH (OUTPATIENT)
Dept: FAMILY MEDICINE | Facility: CLINIC | Age: 68
End: 2018-09-27

## 2018-09-27 ENCOUNTER — ANTICOAGULATION THERAPY VISIT (OUTPATIENT)
Dept: ANTICOAGULATION | Facility: OTHER | Age: 68
End: 2018-09-27
Payer: MEDICARE

## 2018-09-27 ENCOUNTER — TELEPHONE (OUTPATIENT)
Dept: CARDIOLOGY | Facility: CLINIC | Age: 68
End: 2018-09-27

## 2018-09-27 DIAGNOSIS — I50.32 CHRONIC DIASTOLIC CONGESTIVE HEART FAILURE (H): ICD-10-CM

## 2018-09-27 DIAGNOSIS — I48.91 ATRIAL FIBRILLATION WITH RVR (H): ICD-10-CM

## 2018-09-27 DIAGNOSIS — Z79.01 LONG-TERM (CURRENT) USE OF ANTICOAGULANTS: ICD-10-CM

## 2018-09-27 LAB — INR POINT OF CARE: 4.8 (ref 0.86–1.14)

## 2018-09-27 PROCEDURE — 36416 COLLJ CAPILLARY BLOOD SPEC: CPT

## 2018-09-27 PROCEDURE — 99207 ZZC NO CHARGE NURSE ONLY: CPT

## 2018-09-27 PROCEDURE — 85610 PROTHROMBIN TIME: CPT | Mod: QW

## 2018-09-27 RX ORDER — TORSEMIDE 20 MG/1
20 TABLET ORAL DAILY
Qty: 135 TABLET | Refills: 3 | Status: SHIPPED | OUTPATIENT
Start: 2018-09-27

## 2018-09-27 NOTE — PROGRESS NOTES
ANTICOAGULATION FOLLOW-UP CLINIC VISIT    Patient Name:  Home Valdez  Date:  9/27/2018  Contact Type:  Face to Face    SUBJECTIVE:     Patient Findings     Positives Change in medications (Restarted amiodarone), Hospital admission (Resent cardioversion )           OBJECTIVE    INR Protime   Date Value Ref Range Status   09/27/2018 4.8 (A) 0.86 - 1.14 Final       ASSESSMENT / PLAN  INR assessment SUPRA    Recheck INR In: 4 DAYS    INR Location Clinic      Anticoagulation Summary as of 9/27/2018     INR goal 2.0-3.0   Today's INR 4.8!   Warfarin maintenance plan 2.5 mg (2.5 mg x 1) on Mon, Fri; 5 mg (2.5 mg x 2) all other days   Full warfarin instructions 9/27: Hold; 9/28: Hold; 9/29: 2.5 mg; 9/30: 2.5 mg; Otherwise 2.5 mg on Mon, Fri; 5 mg all other days   Weekly warfarin total 30 mg   Plan last modified Shameka Carey RN (9/14/2018)   Next INR check 10/1/2018   Target end date     Indications   Atrial fibrillation with RVR (H) [I48.91]  Long-term (current) use of anticoagulants [Z79.01] [Z79.01]         Anticoagulation Episode Summary     INR check location     Preferred lab     Send INR reminders to \[facility] [place] [facility]\""    Comments 2.5 MG TABLETS, likes print out, PM dose, Amiodarone started 9/26/18      Anticoagulation Care Providers     Provider Role Specialty Phone number    Sandip VegaDO [facility] [phone]            See the Encounter Report to view Anticoagulation Flowsheet and Dosing Calendar (Go to Encounters tab in chart review, and find the Anticoagulation Therapy Visit)    Dosage adjustment made based on physician directed care plan.    Shameka Carey RN

## 2018-09-27 NOTE — LETTER
[facility] CARE COORDINATION  [address] [place] [address]    September 28, 2018    Home FELIPE Valdez  [address]  [place] [facility] [address]      Dear Home,    I am a clinic care coordinator who works with Sandip Vega DO at [facility]. I wanted to thank you for spending the time to talk with me.  I wanted to provide you with my contact information so that you can call me with questions or concerns about your health care. Below is a description of clinic care coordination and how I can further assist you.     The clinic care coordinator is a registered nurse and/or  who understand the health care system. The goal of clinic care coordination is to help you manage your health and improve access to the [facility] system in the most efficient manner. The registered nurse can assist you in meeting your health care goals by providing education, coordinating services, and strengthening the communication among your providers. The  can assist you with financial, behavioral, psychosocial, chemical dependency, counseling, and/or psychiatric resources.    Please feel free to contact me at [phone], with any questions or concerns. We at [facility] are focused on providing you with the highest-quality healthcare experience possible and that all starts with you.     Sincerely,   ZENA Samuel RN Clinic Care Coordinator   [place], [place], [place]  Phone: [phone]     Enclosed: I have enclosed a copy of the Complex Care Plan. This has helpful information and goals that we have talked about. Please keep this in an easy to access place to use as needed.

## 2018-09-27 NOTE — LETTER
[facility]  Complex Care Plan  About Me  Patient Name:  Home Valdez    YOB: 1950  Age:     68 year old   [facility] MRN:   [id number] Telephone Information:    Home Phone [phone]   Mobile [phone]       Address:    [address] [place] [id number] Email address:  [email or web address]      Emergency Contact(s)  Name Relationship Lgl Grd Work Phone Home Phone Mobile Phone   1. BAKARI VALDEZ Spouse No [phone]   2. MAXWELL BROOKS* Daughter No  [phone]           Primary language:  English     needed? No   [facility]:  [phone] op. 1  Other communication barriers:    Preferred Method of Communication:  Mail  Current living arrangement: I live in a private home with spouse  Mobility Status/ Medical Equipment:      Health Maintenance  Health Maintenance Reviewed:   Health Maintenance Due   Topic Date Due     HEPATITIS C SCREENING  02/27/1968     MICROALBUMIN Q1 YEAR  12/20/2014     PNEUMOCOCCAL (2 of 2 - PPSV23) 02/10/2017     OP ANNUAL INR REFERRAL  07/11/2017     LIPID MONITORING Q1 YEAR  05/30/2018     INFLUENZA VACCINE (1) 09/01/2018       My Access Plan  Medical Emergency 911   Primary Clinic Line [facility] [phone]   24 Hour Appointment Line [phone] or  [phone]) (toll-free)   24 Hour Nurse Line [phone] (toll-free)   Preferred Urgent Care     Preferred Hospital [facility]  [phone]   Preferred Pharmacy [facility] [address]     Behavioral Health Crisis Line The National Suicide Prevention Lifeline at 1-391.401.2556 or 911     My Care Team Members    Patient Care Team       Relationship Specialty Notifications Start End    Sandip Vega DO PCP - General Internal Medicine  5/2/17     Phone: [phone] Fax: [phone]         [address] [facility] [place]  [address]    Zheng Anderson MD   Neurology  7/1/15     Phone: [phone] Fax: [phone]         [facility] [address] [place] [address]    Marilee Gerber, RN Nurse Coordinator Neurosurgery Abnormal results only, Admissions 10/25/17     Phone: [phone]         Cocchiarella, Zaria S, RN Clinic Care Coordinator  Admissions 9/28/18     Phone: [phone]                 My Care Plans  Goals and (Comments)  Goals        General    Medical (pt-stated)     Notes - Note created  9/28/2018 12:01 PM by Zaria Gonzales, RN    Goal Statement: I will monitor my weight, blood pressure, pulse, swelling in extremities, fluid and sodium intake, and report any concerning or new symptoms to my care team daily.   Measure of Success: reporting symptoms to care team   Supportive Steps to Achieve: patient has a cardiology care team specifically there to meet patients questions or concerns.   Barriers: complex medical diagnosis.   Strengths: patient and wife are involved in medical care, and current treatment plans  Date to Achieve By: on going  Patient expressed understanding of goal: yes                 Action Plans on File:   COPD  Heart Failure  Advance Care Plans/Directives Type: not on file       My Medical and Care Information  Problem List   Patient Active Problem List   Diagnosis     Posttraumatic stress disorder     PERS HX TOBACCO USE     Pulmonary emphysema (H)     Pulmonary nodule, needs annual ct      Hypertension goal BP (blood pressure) < 140/90     Encounter for long-term current use of medication     COPD (chronic obstructive pulmonary disease) (H)     Hyperlipidemia LDL goal <130     AAA (abdominal aortic aneurysm) 4.0 cm     SEVERE JAMES (obstructive sleep apnea)     Ejection fraction < 50%     Nonischemic cardiomyopathy EF 45-50% by echo 2016     Other peripheral vascular disease(443.89)     Dermatophytosis of nail     Syncope     Atrial fibrillation with RVR (H)     Acute  systolic CHF (congestive heart failure) (H)     Neutrophilic leukocytosis     DVT prophylaxis     Rapid atrial fibrillation (H)     Hypopotassemia     Hypomagnesemia     Acute bronchitis     CHF (congestive heart failure) (H)     Thrombocytopenia (H)     Long-term (current) use of anticoagulants [Z79.01]     History of atrial fibrillation     COPD exacerbation (H)     Acute respiratory failure (H)     Trigeminal neuralgia of left side of face     Panlobular emphysema (H)     Trigeminal neuralgia     On amiodarone therapy     Community acquired pneumonia of right upper lobe of lung (H)     CAP (community acquired pneumonia)     Morbid obesity (H)     Atrial fibrillation with rapid ventricular response (H)     Elevated troponin     CKD (chronic kidney disease) stage 3, GFR 30-59 ml/min      Current Medications and Allergies:  See printed Medication Report.    Care Coordination Start Date: linked episodes   Frequency of Care Coordination: monthly   Form Last Updated: 09/28/2018

## 2018-09-27 NOTE — TELEPHONE ENCOUNTER
Patient was evaluated by cardiology while inpatient for edema, abdominal distention, 3 pillow orthopnea, worsening EF to 20% and shortness of breath. Hx of  non ischemic CM, chronic A.Fib, AAA. Nuclear stress test shows fixed inferior defect, no reversible perfusion defect likely artifacts. IV diuresis achieved, with wt loss of 7.6 kg prior to discharge. 9/25/18 ELVIRA with DCCV completed for A. Fib with RVR. Will take Amiodarone x one month and then re-evaluate with cardiology. Spironolactone started and pt already takes Coumadin. Called patient to discuss any post hospital d/c questions he may have, review medication changes, and confirm f/u appts. Patient denied any questions regarding new or changes to PTA medications. Patient denied any increased SOB, chest pain, edema, or light headedness. RN confirmed with patient that he has an apt scheduled with Mayra Krueger  on 10/4/18.  Instructed patient to take daily weights and call clinic for a weight gain of 2 lbs overnight or 5 lbs in a week, and to bring daily wt and BP diaries to f/u OV. Pt's wife states they have f/u with INR clinic and informed pt was started on Amiodarone. Patient advised to call clinic with any cardiac related questions or concerns prior to his appt. Patient verbalized understanding and agreed with plan. SAM Mancuso RN.

## 2018-09-27 NOTE — MR AVS SNAPSHOT
Home Valdez   9/27/2018 9:15 AM   Anticoagulation Therapy Visit    Description:  68 year old male   Provider:  [facility]   Department:  [facility]           INR as of 9/27/2018     Today's INR 4.8!      Anticoagulation Summary as of 9/27/2018     INR goal 2.0-3.0   Today's INR 4.8!   Full warfarin instructions 9/27: Hold; 9/28: Hold; 9/29: 2.5 mg; 9/30: 2.5 mg; Otherwise 2.5 mg on Mon, Fri; 5 mg all other days   Next INR check 10/1/2018    Indications   Atrial fibrillation with RVR (H) [I48.91]  Long-term (current) use of anticoagulants [Z79.01] [Z79.01]         Contact Numbers     Clinic Number:         September 2018 Details    Sun Mon Tue Wed Thu Fri Sat           1                 2               3               4               5               6               7               8                 9               10               11               12               13               14               15                 16               17               18               19               20               21               22                 23               24               25               26               27      Hold   See details      28      Hold         29      2.5 mg           30      2.5 mg                Date Details   09/27 This INR check               How to take your warfarin dose     To take:  2.5 mg Take 1 of the 2.5 mg tablets.    Hold Do not take your warfarin dose. See the Details table to the right for additional instructions.                October 2018 Details    Sun Mon Tue Wed Thu Fri Sat      1            2               3               4               5               6                 7               8               9               10               11               12               13                 14               15               16               17               18               19               20                 21               22               23               24                25               26               27                 28               29               30               31                   Date Details   No additional details    Date of next INR:  10/1/2018         How to take your warfarin dose     To take:  2.5 mg Take 1 of the 2.5 mg tablets.

## 2018-09-27 NOTE — TELEPHONE ENCOUNTER
Received message from pharmacist at [facility] that pt is out of torsemide. Insurance says it is too early to fill. New script needs to be sent stating pt to take 20 mg daily or as directed by [facility], up to at least 3 tabs daily, so pt can have extra tabs in the future to use. New script sent to pharmacy. I called back pharmacy to let them know a new script has been sent.Eric BRASWELL September 27, 2018, 10:59 AM

## 2018-09-28 LAB
INTERPRETATION ECG - MUSE: NORMAL
INTERPRETATION ECG - MUSE: NORMAL

## 2018-09-28 NOTE — PROGRESS NOTES
"Clinic Care Coordination Contact  Clinic Care Coordination Contact  OUTREACH  Referral Information:  Referral Source: IP Report  Primary Diagnosis: CHF  Chief Complaint   Patient presents with     Clinic Care Coordination - Post Hospital     RN assessment, IP to home   Difficulty keeping appointments:: No  Compliance Concerns: No  No-Show Concerns: No  No PCP office visit in Past Year: No  Utilization    Last refreshed: 9/28/2018  7:16 AM:  No Show Count (past year) 4       Last refreshed: 9/28/2018  7:16 AM:  ED visits 1       Last refreshed: 9/28/2018  7:16 AM:  Hospital admissions 3          Current as of: 9/28/2018  7:16 AM         Clinical Concerns:  Current Medical Concerns:  Patient was out moving his truck when CC RN called, wife answered and stated patient will be in the house in a minute. When patient got on the phone he was not short of breath and denies symptoms. Over all he is weak, but feeling better. His wife is home with him and plays an active role in his health care. Patient and wife set up meds. New meds, and med changes were discussed with patient. Reviewed pending appointments. Gave him the phone # for the vascular surgeons office in [place]. Patient was not aware he needed to make this appointment, but is happy to. Verified what symptoms he could call cardiology triage with. He does not have a \"My heart failure action plan\" or the \"self help guide to HF.\" These educational written documents will be mailed to patient. My heart failure action plan was verbally discussed over the phone with patient. Advised he look at this chart daily while taking daily weight.   Patient had INR completed 9/27, and recheck is 10/1/18.   Patient verbalized feeling \"in control\" over his symptoms, fluid intake and sodium intake. His blood pressure was 110/64, and 108/68, this is improved from the hospital where blood pressure was in the 90's, still holding metoprolol, and losartan.   Current Behavioral Concerns: none " noted.     Education Provided to patient: as above   Pain  Pain (GOAL):: No  Health Maintenance Reviewed: Not assessed  Clinical Pathway: None    Medication Management:  Patient independent in medication management and verbalizes adherence and understanding of medication regimen.   Functional Status:na    Living Situation:  Current living arrangement:: I live in a private home with spouse    Diet/Exercise/Sleep:na    Transportation:  Transportation concerns (GOAL):: No  Transportation means:: Regular car     Psychosocial:na     Financial/Insurance:   Financial/Insurance concerns (GOAL):: No     Resources and Interventions:  Current Resources:      Advance Care Plan/Directive  Advanced Care Plans/Directives on file:: No     Goals: Goal Statement: I will monitor my weight, blood pressure, pulse, swelling in extremities, fluid and sodium intake, and report any concerning or new symptoms to my care team daily.   Measure of Success: reporting symptoms to care team   Supportive Steps to Achieve: patient has a cardiology care team specifically there to meet patients questions or concerns.   Barriers: complex medical diagnosis.   Strengths: patient and wife are involved in medical care, and current treatment plans  Date to Achieve By: on going  Patient expressed understanding of goal: yes    Patient/Caregiver understanding: yes  Outreach Frequency: monthly  Future Appointments              In 3 days [facility]    In 6 days Mayra Krueger APRN CNP [facility]    In 1 week Sandip Vega DO [facility]    In 2 weeks NL LAB [facility]    In 2 weeks PHECHR1 [facility] Echocardiography, [facility]    In 3 weeks Jimenez Murphy MD [facility]          Plan:   1. Patient will complete all follow  up as above.   2. Patient will monitor his symptoms and follow up as needed.   3. Patient will follow discharge summary.  4. CC RN will mail intro letter, access plan, and educational materials noted above.   5. CC RN will follow up with patient in 4 weeks, and patient will reach out sooner if any questions or concerns.     ZENA Samuel RN Clinic Care Coordinator   [place], [place], [place]  Phone: [phone]

## 2018-09-29 ENCOUNTER — HOSPITAL ENCOUNTER (EMERGENCY)
Facility: CLINIC | Age: 68
Discharge: HOME | End: 2018-09-29
Attending: PHYSICIAN ASSISTANT | Admitting: PHYSICIAN ASSISTANT
Payer: MEDICARE

## 2018-09-29 VITALS
TEMPERATURE: 98.2 F | DIASTOLIC BLOOD PRESSURE: 88 MMHG | BODY MASS INDEX: 31.85 KG/M2 | SYSTOLIC BLOOD PRESSURE: 137 MMHG | WEIGHT: 222 LBS | OXYGEN SATURATION: 97 % | RESPIRATION RATE: 20 BRPM | HEART RATE: 93 BPM

## 2018-09-29 DIAGNOSIS — I49.3 PVC'S (PREMATURE VENTRICULAR CONTRACTIONS): ICD-10-CM

## 2018-09-29 LAB
ANION GAP SERPL CALCULATED.3IONS-SCNC: 7 MMOL/L (ref 3–14)
BASOPHILS # BLD AUTO: 0 10E9/L (ref 0–0.2)
BASOPHILS NFR BLD AUTO: 0.5 %
BUN SERPL-MCNC: 15 MG/DL (ref 7–30)
CALCIUM SERPL-MCNC: 9.4 MG/DL (ref 8.5–10.1)
CHLORIDE SERPL-SCNC: 104 MMOL/L (ref 94–109)
CO2 SERPL-SCNC: 30 MMOL/L (ref 20–32)
CREAT SERPL-MCNC: 1.41 MG/DL (ref 0.66–1.25)
DIFFERENTIAL METHOD BLD: ABNORMAL
EOSINOPHIL NFR BLD AUTO: 1.9 %
ERYTHROCYTE [DISTWIDTH] IN BLOOD BY AUTOMATED COUNT: 14 % (ref 10–15)
GFR SERPL CREATININE-BSD FRML MDRD: 50 ML/MIN/1.7M2
GLUCOSE SERPL-MCNC: 86 MG/DL (ref 70–99)
HCT VFR BLD AUTO: 54.2 % (ref 40–53)
HGB BLD-MCNC: 17.1 G/DL (ref 13.3–17.7)
IMM GRANULOCYTES # BLD: 0 10E9/L (ref 0–0.4)
IMM GRANULOCYTES NFR BLD: 0.4 %
INR PPP: 2.6 (ref 0.86–1.14)
LYMPHOCYTES # BLD AUTO: 0.8 10E9/L (ref 0.8–5.3)
LYMPHOCYTES NFR BLD AUTO: 11.2 %
MAGNESIUM SERPL-MCNC: 1.9 MG/DL (ref 1.6–2.3)
MCH RBC QN AUTO: 30.1 PG (ref 26.5–33)
MCHC RBC AUTO-ENTMCNC: 31.5 G/DL (ref 31.5–36.5)
MCV RBC AUTO: 95 FL (ref 78–100)
MONOCYTES # BLD AUTO: 1 10E9/L (ref 0–1.3)
MONOCYTES NFR BLD AUTO: 12.8 %
NEUTROPHILS # BLD AUTO: 5.4 10E9/L (ref 1.6–8.3)
NEUTROPHILS NFR BLD AUTO: 73.2 %
NRBC # BLD AUTO: 0 10*3/UL
NRBC BLD AUTO-RTO: 0 /100
PLATELET # BLD AUTO: 239 10E9/L (ref 150–450)
POTASSIUM SERPL-SCNC: 4.4 MMOL/L (ref 3.4–5.3)
RBC # BLD AUTO: 5.69 10E12/L (ref 4.4–5.9)
SODIUM SERPL-SCNC: 141 MMOL/L (ref 133–144)
WBC # BLD AUTO: 7.4 10E9/L (ref 4–11)

## 2018-09-29 PROCEDURE — 93010 ELECTROCARDIOGRAM REPORT: CPT | Mod: Z6 | Performed by: PHYSICIAN ASSISTANT

## 2018-09-29 PROCEDURE — 99284 EMERGENCY DEPT VISIT MOD MDM: CPT | Performed by: PHYSICIAN ASSISTANT

## 2018-09-29 PROCEDURE — 80048 BASIC METABOLIC PNL TOTAL CA: CPT | Performed by: PHYSICIAN ASSISTANT

## 2018-09-29 PROCEDURE — 83735 ASSAY OF MAGNESIUM: CPT | Performed by: PHYSICIAN ASSISTANT

## 2018-09-29 PROCEDURE — 99283 EMERGENCY DEPT VISIT LOW MDM: CPT | Mod: 25 | Performed by: PHYSICIAN ASSISTANT

## 2018-09-29 PROCEDURE — 93005 ELECTROCARDIOGRAM TRACING: CPT | Performed by: PHYSICIAN ASSISTANT

## 2018-09-29 PROCEDURE — 85610 PROTHROMBIN TIME: CPT | Performed by: PHYSICIAN ASSISTANT

## 2018-09-29 PROCEDURE — 85025 COMPLETE CBC W/AUTO DIFF WBC: CPT | Performed by: PHYSICIAN ASSISTANT

## 2018-09-29 PROCEDURE — 36415 COLL VENOUS BLD VENIPUNCTURE: CPT | Performed by: PHYSICIAN ASSISTANT

## 2018-09-29 NOTE — DISCHARGE INSTRUCTIONS
"It was a pleasure working with you today!  Thankfully, we did not find anything concerning today.    You are having PVC's ( premature ventricular contractions), but you are not in atrial fibrillation.    Please continue your regular medication regimen and follow-up with your cardiologist at your scheduled appointment this upcoming Thursday.    Return to the emergency department for repeat evaluation if you develop \"rapid ventricular response \"with a continuous elevated heart rate above 110 while at rest associated with lightheadedness, dizziness, dyspnea, or chest discomfort.      "
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
## 2018-09-29 NOTE — ED AVS SNAPSHOT
[facility] Emergency Department    [address] [place] [address]    Phone:  [phone]    Fax:  [phone]                                       Home Valdez   MRN: [id number]    Department:  [facility] Emergency Department   Date of Visit:  9/29/2018           After Visit Summary Signature Page     I have received my discharge instructions, and my questions have been answered. I have discussed any challenges I see with this plan with the nurse or doctor.    ..........................................................................................................................................  Patient/Patient Representative Signature      ..........................................................................................................................................  Patient Representative Print Name and Relationship to Patient    ..................................................               ................................................  Date                                   Time    ..........................................................................................................................................  Reviewed by Signature/Title    ...................................................              ..............................................  Date                                               Time          22EPIC Rev 08/18

## 2018-09-29 NOTE — ED TRIAGE NOTES
Pt discharged Wednesday from [facility] after cardioversion on Tuesday.  Pt was in a-fib.  Today wife checked his pulse several times throughout the day and it has been irregular.  Pt denies any symptoms.  At this time pulse feels regular.

## 2018-09-29 NOTE — ED AVS SNAPSHOT
" [facility] Emergency Department    [address] [place] [address]    Phone:  [phone]    Fax:  [phone]                                       Home Valdez   MRN: [id number]    Department:  [facility] Emergency Department   Date of Visit:  9/29/2018           Patient Information     Date Of Birth          1950        Your diagnoses for this visit were:     PVC's (premature ventricular contractions)        You were seen by Cristobal Staley PA-C.      Follow-up Information     Follow up with [facility] Emergency Department.    Specialty:  EMERGENCY MEDICINE    Why:  As needed, If symptoms worsen    Contact information:    [address] [place] [address] [phone]    Additional information:    From [address]: Exit at [address] on south side of [place]. Turn right on [address]. Turn left at stoplight on [address]. [facility] will be in view two blocks ahead        Discharge Instructions       It was a pleasure working with you today!  Thankfully, we did not find anything concerning today.    You are having PVC's ( premature ventricular contractions), but you are not in atrial fibrillation.    Please continue your regular medication regimen and follow-up with your cardiologist at your scheduled appointment this upcoming Thursday.    Return to the emergency department for repeat evaluation if you develop \"rapid ventricular response \"with a continuous elevated heart rate above 110 while at rest associated with lightheadedness, dizziness, dyspnea, or chest discomfort.        Your next 10 appointments already scheduled     Oct 01, 2018 10:45 AM CDT   Anticoagulation Visit with [facility]   [facility]    [address] [facility] [id number] [phone]            Oct 04, 2018  2:45 PM CDT   Return Visit with [facility]   " [place] ([facility])    [address] [place] [address]   [phone]            Oct 05, 2018  9:00 AM CDT   Office Visit with Sandip Vega DO   [facility] ([facility]    [address] [facility] [id number] [phone]           Bring a current list of meds and any records pertaining to this visit. For Physicals, please bring immunization records and any forms needing to be filled out. Please arrive 10 minutes early to complete paperwork.            Oct 09, 2018 10:30 AM CDT   New Visit with Zane Bowens MD   [facility] ([facility])    [address] [address]   [phone]            Oct 18, 2018  8:45 AM CDT   LAB with [facility] ([facility])    [address] [place] [address]   [phone]           Please do not eat 10-12 hours before your appointment if you are coming in fasting for labs on lipids, cholesterol, or glucose (sugar). This does not apply to pregnant women. Water, hot tea and black coffee (with nothing added) are okay. Do not drink other fluids, diet soda or chew gum.            Oct 18, 2018  9:00 AM CDT   Ech Complete with RYAN   [facility]ocardi[facility] ([facility])    [address] [place] [address]  [place] [address]   [phone]           1.  Please bring or wear a comfortable two-piece outfit. 2.  You may eat, drink and take your normal medicines. 3.  For any questions that cannot be answered, please contact the ordering physician 4.  Please do not wear perfumes or scented lotions on the day of your exam.            Oct 23, 2018  3:00 PM CDT   Return Visit with Jimenez Murphy MD   [facility] Heart [facility] ([facility])    [address] [place] [address]   [phone]              24 Hour Appointment Hotline       To  make an appointment at any [facility] clinic, call [phone] ([phone]). If you don't have a family doctor or clinic, we will help you find one. [facility] clinics are conveniently located to serve the needs of you and your family.             Review of your medicines      Our records show that you are taking the medicines listed below. If these are incorrect, please call your family doctor or clinic.        Dose / Directions Last dose taken    acetaminophen 325 MG tablet   Commonly known as:  TYLENOL   Dose:  650 mg   Quantity:  100 tablet        Take 2 tablets (650 mg) by mouth every 4 hours as needed for other (mild pain)   Refills:  0        ADVAIR DISKUS 250-50 MCG/DOSE diskus inhaler   Quantity:  1 Inhaler   Generic drug:  fluticasone-salmeterol        INHALE 1 PUFF BY MOUTH TWICE A DAY   Refills:  1        amiodarone 200 MG tablet   Commonly known as:  PACERONE/CODARONE   Dose:  200 mg   Quantity:  60 tablet        Take 1 tablet (200 mg) by mouth 2 times daily   Refills:  0        buPROPion 150 MG 12 hr tablet   Commonly known as:  WELLBUTRIN SR   Quantity:  180 tablet        TAKE 1 TABLET BY MOUTH TWICE A DAY   Refills:  1        fluticasone 50 MCG/ACT spray   Commonly known as:  FLONASE   Quantity:  16 g        INHALE 1 TO 2 SPRAYS INTO EACH NOSTRIL EVERY DAY   Refills:  3        ipratropium - albuterol 0.5 mg/2.5 mg/3 mL 0.5-2.5 (3) MG/3ML neb solution   Commonly known as:  DUONEB   Quantity:  540 mL        TAKE 1 VIAL (3 MLS) BY NEBULIZATION EVERY 4 HOURS AS NEEDED FOR SHORTNESS OF BREATH / DYSPNEA OR WHEEZING   Refills:  1        JANTOVEN 2.5 MG tablet   Quantity:  210 tablet   Generic drug:  warfarin        TAKE 7.5 MG (3 TABS) ON TUES, THURS, SAT AND 5 MG (2 TABS) ALL OTHERDAYS, OR AS DIRECTED BY THE COUMADIN CLINIC.   Refills:  1        montelukast 10 MG tablet   Commonly known as:  SINGULAIR   Quantity:  90 tablet        TAKE 1 TABLET BY MOUTH DAILY AT BEDTIME   Refills:  1        *  nebulizer/tubing/mouthpiece Kit   Quantity:  1 kit        Use every 4-6 hours PRN   Refills:  11        * AIRS DISPOSABLE NEBULIZER Kit   Quantity:  1 kit        USE EVERY 4 TO 6 HOURS AS NEEDED   Refills:  0        order for DME   Quantity:  1 Device        Equipment being ordered: Nebulizer   Refills:  0        potassium chloride SA 20 MEQ CR tablet   Commonly known as:  K-DUR/KLOR-CON M   Dose:  20 mEq   Quantity:  90 tablet        Take 1 tablet (20 mEq) by mouth daily   Refills:  2        senna-docusate 8.6-50 MG per tablet   Commonly known as:  SENOKOT-S;PERICOLACE   Dose:  2 tablet        Take 2 tablets by mouth 2 times daily   Refills:  0        spironolactone 25 MG tablet   Commonly known as:  ALDACTONE   Dose:  25 mg   Quantity:  30 tablet        Take 1 tablet (25 mg) by mouth daily   Refills:  0        torsemide 20 MG tablet   Commonly known as:  DEMADEX   Dose:  20 mg   Quantity:  135 tablet        Take 1 tablet (20 mg) by mouth daily Or as directed by [facility] clinic, up to 3 tabs daily   Refills:  3        VENTOLIN  (90 Base) MCG/ACT inhaler   Quantity:  18 g   Generic drug:  albuterol        INHALE 2 PUFFS BY MOUTH EVERY 4 HOURS AS NEEDED FOR SHORTNESS OF BREATH/DYSPNEA   Refills:  3        * Notice:  This list has 2 medication(s) that are the same as other medications prescribed for you. Read the directions carefully, and ask your doctor or other care provider to review them with you.            Procedures and tests performed during your visit     Basic metabolic panel    CBC with platelets differential    EKG 12-lead, tracing only    INR    Magnesium      Orders Needing Specimen Collection     None      Pending Results     No orders found from 9/27/2018 to 9/30/2018.            Pending Culture Results     No orders found from 9/27/2018 to 9/30/2018.            Pending Results Instructions     If you had any lab results that were not finalized at the time of your Discharge, you can call the ED Lab  Result RN at [phone]. You will be contacted by this team for any positive Lab results or changes in treatment. The nurses are available 7 days a week from 10A to 6:30P.  You can leave a message 24 hours per day and they will return your call.        Thank you for choosing [facility]       Thank you for choosing [facility] for your care. Our goal is always to provide you with excellent care. Hearing back from our patients is one way we can continue to improve our services. Please take a few minutes to complete the written survey that you may receive in the mail after you visit with us. Thank you!        Care EveryWhere ID     This is your Care EveryWhere ID. This could be used by other organizations to access your [facility] medical records  [id number]        Equal Access to Services     MILKA ORTIZ : Radha Ignacio, yamila mccullough, inge chatman, jase bhandari. So [facility] [phone].    ATENCIÓN: Si habla español, tiene a calabrese disposición servicios gratuitos de asistencia lingüística. Llame al [phone].    We comply with applicable federal civil rights laws and [place] laws. We do not discriminate on the basis of race, color, national origin, age, disability, sex, sexual orientation, or gender identity.            After Visit Summary       This is your record. Keep this with you and show to your community pharmacist(s) and doctor(s) at your next visit.

## 2018-10-01 ENCOUNTER — ANTICOAGULATION THERAPY VISIT (OUTPATIENT)
Dept: ANTICOAGULATION | Facility: OTHER | Age: 68
End: 2018-10-01
Payer: MEDICARE

## 2018-10-01 DIAGNOSIS — Z79.01 LONG-TERM (CURRENT) USE OF ANTICOAGULANTS: ICD-10-CM

## 2018-10-01 DIAGNOSIS — I48.91 ATRIAL FIBRILLATION WITH RVR (H): ICD-10-CM

## 2018-10-01 LAB — INR POINT OF CARE: 2.1 (ref 0.86–1.14)

## 2018-10-01 PROCEDURE — 99207 ZZC NO CHARGE NURSE ONLY: CPT

## 2018-10-01 PROCEDURE — 36416 COLLJ CAPILLARY BLOOD SPEC: CPT

## 2018-10-01 PROCEDURE — 85610 PROTHROMBIN TIME: CPT | Mod: QW

## 2018-10-01 NOTE — PROGRESS NOTES
ANTICOAGULATION FOLLOW-UP CLINIC VISIT    Patient Name:  Home Valdez  Date:  10/1/2018  Contact Type:  Face to Face    SUBJECTIVE:     Patient Findings     Positives No Problem Findings           OBJECTIVE    INR Protime   Date Value Ref Range Status   10/01/2018 2.1 (A) 0.86 - 1.14 Final       ASSESSMENT / PLAN  INR assessment THER    Recheck INR In: 4 DAYS    INR Location Clinic      Anticoagulation Summary as of 10/1/2018     INR goal 2.0-3.0   Today's INR 2.1   Warfarin maintenance plan 2.5 mg (2.5 mg x 1) on Mon, Fri; 5 mg (2.5 mg x 2) all other days   Full warfarin instructions 2.5 mg on Mon, Fri; 5 mg all other days   Weekly warfarin total 30 mg   No change documented Shameka Carey RN   Plan last modified Shameka Carey RN (9/14/2018)   Next INR check 10/5/2018   Target end date     Indications   Atrial fibrillation with RVR (H) [I48.91]  Long-term (current) use of anticoagulants [Z79.01] [Z79.01]         Anticoagulation Episode Summary     INR check location     Preferred lab     Send INR reminders to [facility]    Comments 2.5 MG TABLETS, likes print out, PM dose, Amiodarone started 9/26/18      Anticoagulation Care Providers     Provider Role Specialty Phone number    Sandip Vega DO [facility] Internal Medicine [phone]            See the Encounter Report to view Anticoagulation Flowsheet and Dosing Calendar (Go to Encounters tab in chart review, and find the Anticoagulation Therapy Visit)    Dosage adjustment made based on physician directed care plan.    Shameka Carey RN

## 2018-10-01 NOTE — MR AVS SNAPSHOT
Home Valdez   10/1/2018 10:45 AM   Anticoagulation Therapy Visit    Description:  68 year old male   Provider:  [facility]   Department:  [facility]           INR as of 10/1/2018     Today's INR 2.1      Anticoagulation Summary as of 10/1/2018     INR goal 2.0-3.0   Today's INR 2.1   Full warfarin instructions 2.5 mg on Mon, Fri; 5 mg all other days   Next INR check 10/5/2018    Indications   Atrial fibrillation with RVR (H) [I48.91]  Long-term (current) use of anticoagulants [Z79.01] [Z79.01]         Your next Anticoagulation Clinic appointment(s)     Oct 05, 2018  4:15 PM CDT   Anticoagulation Visit with [facility] ANTI COAG   [facility]    [address] [facility] [address]   [phone]              Contact Numbers     Clinic Number:         October 2018 Details    Sun Mon Tue Wed Thu Fri Sat      1      2.5 mg   See details      2      5 mg         3      5 mg         4      5 mg         5            6                 7               8               9               10               11               12               13                 14               15               16               17               18               19               20                 21               22               23               24               25               26               27                 28               29               30               31                   Date Details   10/01 This INR check       Date of next INR:  10/5/2018         How to take your warfarin dose     To take:  2.5 mg Take 1 of the 2.5 mg tablets.    To take:  5 mg Take 2 of the 2.5 mg tablets.

## 2018-10-04 ENCOUNTER — OFFICE VISIT (OUTPATIENT)
Dept: CARDIOLOGY | Facility: CLINIC | Age: 68
End: 2018-10-04
Payer: MEDICARE

## 2018-10-04 VITALS
DIASTOLIC BLOOD PRESSURE: 72 MMHG | HEIGHT: 70 IN | SYSTOLIC BLOOD PRESSURE: 112 MMHG | BODY MASS INDEX: 31.25 KG/M2 | HEART RATE: 88 BPM | OXYGEN SATURATION: 94 % | WEIGHT: 218.3 LBS

## 2018-10-04 DIAGNOSIS — Z79.899 ON AMIODARONE THERAPY: ICD-10-CM

## 2018-10-04 DIAGNOSIS — I42.8 NONISCHEMIC CARDIOMYOPATHY (H): ICD-10-CM

## 2018-10-04 DIAGNOSIS — I48.91 ATRIAL FIBRILLATION WITH RAPID VENTRICULAR RESPONSE (H): ICD-10-CM

## 2018-10-04 DIAGNOSIS — I50.23 ACUTE ON CHRONIC SYSTOLIC CONGESTIVE HEART FAILURE (H): Primary | ICD-10-CM

## 2018-10-04 PROCEDURE — 99215 OFFICE O/P EST HI 40 MIN: CPT | Performed by: NURSE PRACTITIONER

## 2018-10-04 RX ORDER — CARVEDILOL 6.25 MG/1
6.25 TABLET ORAL 2 TIMES DAILY WITH MEALS
Qty: 60 TABLET | Refills: 1 | Status: SHIPPED | OUTPATIENT
Start: 2018-10-04 | End: 2018-11-29

## 2018-10-04 NOTE — PROGRESS NOTES
Service Date: 10/04/2018      HISTORY OF PRESENT ILLNESS:  Mr. Valdez is a delightful 68-year-old gentleman that I had the pleasure of meeting while he was hospitalized last week at [facility].  Briefly, he has a past medical history significant for congestive heart failure, COPD, chronic kidney disease, hypertension, hyperlipidemia and abdominal aortic aneurysm.  He was hospitalized in 07/2018 found to be in atrial fibrillation with rapid ventricular response.  Echocardiogram showed his ejection fraction that was previously normal to now being less than 20% with biventricular failure.  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  He was seen by Dr. Murphy as well as Yancy Keller in the [facility].      Unfortunately, he presented to [facility] on 09/20, discharged on 09/26.  He was found to be in exacerbated heart failure associated with atrial fibrillation with RVR.  EP was consulted to assist with his care.  He underwent a transesophageal echocardiogram and a cardioversion on the 09/25.  His metoprolol and losartan were discontinued during his hospital stay due to bradycardia as well as hypotension.  He was initiated on amiodarone at 200 mg b.i.d. for a month, then 200 mg daily thereafter.  He is on warfarin therapy and his last INR was 2.1.      His wife states that she became nervous when she was assessing his pulse and noted that he was having PVCs.  She decided to bring him to the Emergency Department.  EKG was completed that showed sinus rhythm with PVCs at 96 beats per minute.  Lab work was completed as well which showed an improvement of his creatinine at 1.4, GFR 50, BUN 15, potassium of 4.4.  He was discharged home from the Emergency Department.      Currently, he is on torsemide 20 mg daily, spironolactone 25 mg daily, potassium 20 mEq daily.  His weight has trended down nicely.  He is down approximately 24 pounds since his hospital admission.  He  states that his energy level has improved.  He is now walking and doing yard work without difficulty.  He has also been minimizing his fluid intake as he was directed to do so as well as follow a 2-gram sodium diet.  He jokes that it has not been easy; however, he feels that it has made all the difference in the world.      Transesophageal echo completed prior to his cardioversion showed his EF to be 10%-15%.  He also had moderate RV dysfunction.  In reviewing his echos from 07/30, 09/21 and 09/25, all of the EFs are diminished and completed when the patient was in atrial fibrillation.        He is here today with his wife who is a retired LPN.  All other review of systems, past medical history and physical exam are noted in my encounter.      ASSESSMENT AND PLAN:  Mr. Valdez is a delightful 68-year-old gentleman here today for followup.   1.  Atrial fibrillation.   It appears this has been ongoing since 07/2018.  His had a ELVIRA and was cardioverted several weeks ago on amiodarone 200 mg b.i.d.  Approximately 10/24, the amiodarone should be decreased to 200 mg once daily.  He is monitoring his warfarin.  His INR was last noted to be 2.1 on 10/01.  The patient and wife are well aware that amiodarone can affect his INR and they are following this closely.   2.  Biventricular heart failure.  is thought to be secondary to RVR of his fibrillation.   His ejection fraction has declined.  Previously he struggled with HFpEF.  He diuresed over 20 pounds during his hospital stay.  His losartan was on hold due to hypotension.  He will continue with his spironolactone 25 mg daily, torsemide 20 mg daily, potassium 20 mEq daily.  I have asked that we start carvedilol 6.25 mg b.i.d.  He was previously on Lopressor which I was reluctant to start.  I explained to the patient and his wife and wanted to remain focused on the approved beta blockers for cardiomyopathy.  He is scheduled to have an echocardiogram in the next several weeks.   I have encouraged him to keep this appointment.  I am hopeful with restoration of his rhythm that we will see an improvement in his ejection fraction.   We spent some time today discussing his restrictions and limitations.  I reassured his wife that the patient is able to do some yard work and I have also encouraged him to start walking on a regular basis.  He is very motivated to try to get to 200 pounds.  He is 218 pounds here and 211 pounds at home this morning.  Once the echocardiogram is completed, if the EF remains diminished, I will leave it up to Dr. Murphy's discretion if Entresto should be considered.   3.  Abdominal aortic aneurysm.    The patient has a history of abdominal aortic aneurysm.  His abdominal ultrasound 09/19 showed the aneurysm measuring at a maximum of 3.6 x 4.3 cm.  This was increased significantly in size based on his study 11/2017 where it was 3.2 x 3 0.1 cm.  His infrarenal AAA has been present for several years and followed with ultrasounds.  He was seen in consultation by Dr. Bowens on 09/21/2018.  He stated that he would be happy to see the patient in clinic as an outpatient with a noncontrast CT of the, abdomen and pelvis to evaluate his aneurysm size at that time.  He felt he would be a candidate for EVAR in the future if he gets to an aneurysmal size greater than 5.5 to 6.0 cm.   4.  Chronic kidney disease.    The patient's creatinine did improve to 1.41.  Losartan is currently on hold.   5.  COPD.    The patient is not oxygen dependent.  He states that he has done some exertion in the yard and he will come in the house and his sats will be about 87%.  He states now he is able to sit for 30 seconds and his oxygen saturation rebounds back to greater than 90%.  He states that his shortness of breath has improved significantly.      As always, thank you for including me in his care.  He will follow up with Dr. Murphy in October.  If there are any questions or concerns, please feel free  to contact us.         DAIANA OLIVEIRA NP             D: 10/04/2018   T: 10/04/2018   MT: [id number]      Name:     KARTIK HUERTA   MRN:      [id number]        Account:      [id number]   :      1950           Service Date: 10/04/2018      Document: [id number]

## 2018-10-04 NOTE — LETTER
10/4/2018      Sandip Vega, DO  [address] [place] [address]      RE: Home WANG José Miguel       Dear Colleague,    I had the pleasure of seeing Home Valdez in the [facility].    Service Date: 10/04/2018      HISTORY OF PRESENT ILLNESS:  Mr. Valdez is a delightful 68-year-old gentleman that I had the pleasure of meeting while he was hospitalized last week at [facility].  Briefly, he has a past medical history significant for congestive heart failure, COPD, chronic kidney disease, hypertension, hyperlipidemia and abdominal aortic aneurysm.  He was hospitalized in 07/2018 found to be in atrial fibrillation with rapid ventricular response.  Echocardiogram showed his ejection fraction that was previously normal to now being less than 20% with biventricular failure.  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  He was seen by Dr. Murphy as well as Yancy Keller in the [facility].      Unfortunately, he presented to [facility] on 09/20, discharged on 09/26.  He was found to be in exacerbated heart failure associated with atrial fibrillation with RVR.  EP was consulted to assist with his care.  He underwent a transesophageal echocardiogram and a cardioversion on the 09/25.  His metoprolol and losartan were discontinued during his hospital stay due to bradycardia as well as hypotension.  He was initiated on amiodarone at 200 mg b.i.d. for a month, then 200 mg daily thereafter.  He is on warfarin therapy and his last INR was 2.1.      His wife states that she became nervous when she was assessing his pulse and noted that he was having PVCs.  She decided to bring him to the Emergency Department.  EKG was completed that showed sinus rhythm with PVCs at 96 beats per minute.  Lab work was completed as well which showed an improvement of his creatinine at 1.4, GFR 50, BUN 15, potassium of 4.4.  He was discharged home from  the Emergency Department.      Currently, he is on torsemide 20 mg daily, spironolactone 25 mg daily, potassium 20 mEq daily.  His weight has trended down nicely.  He is down approximately 24 pounds since his hospital admission.  He states that his energy level has improved.  He is now walking and doing yard work without difficulty.  He has also been minimizing his fluid intake as he was directed to do so as well as follow a 2-gram sodium diet.  He jokes that it has not been easy; however, he feels that it has made all the difference in the world.      Transesophageal echo completed prior to his cardioversion showed his EF to be 10%-15%.  He also had moderate RV dysfunction.  In reviewing his echos from 07/30, 09/21 and 09/25, all of the EFs are diminished and completed when the patient was in atrial fibrillation.        He is here today with his wife who is a retired LPN.  All other review of systems, past medical history and physical exam are noted in my encounter.      ASSESSMENT AND PLAN:  Mr. Valdez is a delightful 68-year-old gentleman here today for followup.   1.  Atrial fibrillation.  It appears this has been ongoing since 07/2018.  His had a ELVIRA and was cardioverted several weeks ago on amiodarone 200 mg b.i.d.  Approximately 10/24, the amiodarone should be decreased to 200 mg once daily.  He is monitoring his warfarin.  His INR was last noted to be 2.1 on 10/01.  The patient and wife are well aware that amiodarone can affect his INR and they are following this closely.   2.  Biventricular heart failure.  is thought to be secondary to RVR of his fibrillation.  His ejection fraction has declined.  Previously he struggled with HFpEF.  He diuresed over 20 pounds during his hospital stay.  His losartan was on hold due to hypotension.  He will continue with his spironolactone 25 mg daily, torsemide 20 mg daily, potassium 20 mEq daily.  I have asked that we start carvedilol 6.25 mg b.i.d.  He was previously on  Lopressor which I was reluctant to start.  I explained to the patient and his wife and wanted to remain focused on the approved beta blockers for cardiomyopathy.  He is scheduled to have an echocardiogram in the next several weeks.  I have encouraged him to keep this appointment.  I am hopeful with restoration of his rhythm that we will see an improvement in his ejection fraction.   We spent some time today discussing his restrictions and limitations.  I reassured his wife that the patient is able to do some yard work and I have also encouraged him to start walking on a regular basis.  He is very motivated to try to get to 200 pounds.  He is 218 pounds here and 211 pounds at home this morning.  Once the echocardiogram is completed, if the EF remains diminished, I will leave it up to Dr. Murphy's discretion if Entresto should be considered.   3.  Abdominal aortic aneurysm.  The patient has a history of abdominal aortic aneurysm.  His abdominal ultrasound 09/19 showed the aneurysm measuring at a maximum of 3.6 x 4.3 cm.  This was increased significantly in size based on his study 11/2017 where it was 3.2 x 3 0.1 cm.  His infrarenal AAA has been present for several years and followed with ultrasounds.  He was seen in consultation by Dr. Bowens on 09/21/2018.  He stated that he would be happy to see the patient in clinic as an outpatient with a noncontrast CT of the, abdomen and pelvis to evaluate his aneurysm size at that time.  He felt he would be a candidate for EVAR in the future if he gets to an aneurysmal size greater than 5.5 to 6.0 cm.   4.  Chronic kidney disease.  The patient's creatinine did improve to 1.41.  Losartan is currently on hold.   5.  COPD.  The patient is not oxygen dependent.  He states that he has done some exertion in the yard and he will come in the house and his sats will be about 87%.  He states now he is able to sit for 30 seconds and his oxygen saturation rebounds back to greater than 90%.   He states that his shortness of breath has improved significantly.      As always, thank you for including me in his care.  He will follow up with Dr. Murphy in October.  If there are any questions or concerns, please feel free to contact us.         DAIANA OLIVEIRA NP             D: 10/04/2018   T: 10/04/2018   MT: [id number]      Name:     KARTIK HUERTA   MRN:      [id number]        Account:      [id number]   :      1950           Service Date: 10/04/2018      Document: [id number]         Outpatient Encounter Prescriptions as of 10/4/2018   Medication Sig Dispense Refill     acetaminophen (TYLENOL) 325 MG tablet Take 2 tablets (650 mg) by mouth every 4 hours as needed for other (mild pain) 100 tablet 0     ADVAIR DISKUS 250-50 MCG/DOSE diskus inhaler INHALE 1 PUFF BY MOUTH TWICE A DAY 1 Inhaler 1     amiodarone (PACERONE/CODARONE) 200 MG tablet Take 1 tablet (200 mg) by mouth 2 times daily 60 tablet 0     buPROPion (WELLBUTRIN SR) 150 MG 12 hr tablet TAKE 1 TABLET BY MOUTH TWICE A  tablet 1     carvedilol (COREG) 6.25 MG tablet Take 1 tablet (6.25 mg) by mouth 2 times daily (with meals) 60 tablet 1     fluticasone (FLONASE) 50 MCG/ACT spray INHALE 1 TO 2 SPRAYS INTO EACH NOSTRIL EVERY DAY 16 g 3     ipratropium - albuterol 0.5 mg/2.5 mg/3 mL (DUONEB) 0.5-2.5 (3) MG/3ML neb solution TAKE 1 VIAL (3 MLS) BY NEBULIZATION EVERY 4 HOURS AS NEEDED FOR SHORTNESS OF BREATH / DYSPNEA OR WHEEZING 540 mL 1     JANTOVEN 2.5 MG tablet TAKE 7.5 MG (3 TABS) ON TUES, THURS, SAT AND 5 MG (2 TABS) ALL OTHERDAYS, OR AS DIRECTED BY THE COUMADIN CLINIC. 210 tablet 1     montelukast (SINGULAIR) 10 MG tablet TAKE 1 TABLET BY MOUTH DAILY AT BEDTIME 90 tablet 1     potassium chloride SA (K-DUR/KLOR-CON M) 20 MEQ CR tablet Take 1 tablet (20 mEq) by mouth daily (Patient taking differently: Take 20 mEq by mouth At Bedtime ) 90 tablet 2     Respiratory Therapy Supplies (AIRS DISPOSABLE NEBULIZER) KIT USE EVERY 4 TO 6 HOURS AS  NEEDED 1 kit 0     Respiratory Therapy Supplies (NEBULIZER/TUBING/MOUTHPIECE) KIT Use every 4-6 hours PRN 1 kit 11     senna-docusate (SENOKOT-S;PERICOLACE) 8.6-50 MG per tablet Take 2 tablets by mouth 2 times daily        spironolactone (ALDACTONE) 25 MG tablet Take 1 tablet (25 mg) by mouth daily 30 tablet 0     torsemide (DEMADEX) 20 MG tablet Take 1 tablet (20 mg) by mouth daily Or as directed by [facility] clinic, up to 3 tabs daily 135 tablet 3     VENTOLIN  (90 Base) MCG/ACT inhaler INHALE 2 PUFFS BY MOUTH EVERY 4 HOURS AS NEEDED FOR SHORTNESS OF BREATH/DYSPNEA 18 g 3     order for DME Equipment being ordered: Nebulizer 1 Device 0     No facility-administered encounter medications on file as of 10/4/2018.        Again, thank you for allowing me to participate in the care of your patient.      Sincerely,    Mayra Krueger NP, APRN CNP     [facility]
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
## 2018-10-04 NOTE — PATIENT INSTRUCTIONS
Coreg 6.25 mg twice daily  Continue low salt diet and fluid restriction  Amiodarone 200 mg twice daily for 1 month then decrease to 200 mg once daily (ask  at your visit, October 24th)

## 2018-10-04 NOTE — MR AVS SNAPSHOT
After Visit Summary   10/4/2018    Home Valdez    MRN: [id number]           Patient Information     Date Of Birth          1950        Visit Information        Provider Department      10/4/2018 2:45 PM Mayra Krueger APRN CNP [facility]        Today's Diagnoses     Acute on chronic systolic congestive heart failure (H)    -  1      Care Instructions    Coreg 6.25 mg twice daily  Continue low salt diet and fluid restriction  Amiodarone 200 mg twice daily for 1 month then decrease to 200 mg once daily (ask  at your visit, October 24th)          Follow-ups after your visit        Your next 10 appointments already scheduled     Oct 05, 2018  9:00 AM CDT   [facility] Visit with Sandip Vega DO   [facility]    [address] [facility] [id number] [phone]           Bring a current list of meds and any records pertaining to this visit. For Physicals, please bring immunization records and any forms needing to be filled out. Please arrive 10 minutes early to complete paperwork.            Oct 05, 2018  4:15 PM CDT   Anticoagulation Visit with CAMILO ANTI COAG   [facility]    [address] [facility] [id number] [phone]            Oct 09, 2018 10:30 AM CDT   New Visit with Zane Bowens MD   [facility] ([facility]    [facility] [facility] [address]   [phone]            Oct 18, 2018  8:45 AM CDT   LAB with NL LAB [facility] ([facility])    [address] [facility] [address] [phone]           Please do not eat 10-12 hours before your appointment if you are coming in fasting for labs on lipids, cholesterol, or glucose (sugar). This does not apply to pregnant women. Water, hot tea and black coffee (with nothing added) are okay. Do not drink  "other fluids, diet soda or chew gum.            Oct 18, 2018  9:00 AM CDT   Ech Complete with PHECHR1   [facility] ([facility])    [address]  [place] [address] [phone]           1.  Please bring or wear a comfortable two-piece outfit. 2.  You may eat, drink and take your normal medicines. 3.  For any questions that cannot be answered, please contact the ordering physician 4.  Please do not wear perfumes or scented lotions on the day of your exam.            Oct 23, 2018  3:00 PM CDT   Return Visit with Jimenez Murphy MD   [facility] ([facility])    [address] [facility] [id number] [phone]              Who to contact     If you have questions or need follow up information about today's clinic visit or your schedule please contact [facility] directly at [phone].  Normal or non-critical lab and imaging results will be communicated to you by MyChart, letter or phone within 4 business days after the clinic has received the results. If you do not hear from us within 7 days, please contact the clinic through PayLeasehart or phone. If you have a critical or abnormal lab result, we will notify you by phone as soon as possible.  Submit refill requests through [employer] or call your pharmacy and they will forward the refill request to us. Please allow 3 business days for your refill to be completed.          Additional Information About Your Visit        Care EveryWhere ID     This is your Care EveryWhere ID. This could be used by other organizations to access your [place] medical records  [id number]        Your Vitals Were     Pulse Height Pulse Oximetry BMI (Body Mass Index)          88 1.778 m (5' 10\") 94% 31.32 kg/m2         Blood Pressure from Last 3 Encounters:   10/04/18 112/72   09/29/18 137/88   09/26/18 101/70    Weight from Last 3 " Encounters:   10/04/18 99 kg (218 lb 4.8 oz)   09/29/18 100.7 kg (222 lb)   09/26/18 101.3 kg (223 lb 4.8 oz)              Today, you had the following     No orders found for display         Today's Medication Changes          These changes are accurate as of 10/4/18  3:10 PM.  If you have any questions, ask your nurse or doctor.               Start taking these medicines.        Dose/Directions    carvedilol 6.25 MG tablet   Commonly known as:  COREG   Used for:  Acute on chronic systolic congestive heart failure (H)        Dose:  6.25 mg   Take 1 tablet (6.25 mg) by mouth 2 times daily (with meals)   Quantity:  60 tablet   Refills:  1         These medicines have changed or have updated prescriptions.        Dose/Directions    potassium chloride SA 20 MEQ CR tablet   Commonly known as:  K-DUR/KLOR-CON M   This may have changed:  when to take this   Used for:  CHF (congestive heart failure) (H), Peripheral edema        Dose:  20 mEq   Take 1 tablet (20 mEq) by mouth daily   Quantity:  90 tablet   Refills:  2            Where to get your medicines      These medications were sent to [id number] - [place], [place] - [address] [place] [address]  [address] [place] [id number]    Hours:  M-F 8:30-6:30; Sat 9-4; closed Sunday Phone:  [phone]     carvedilol 6.25 MG tablet                Primary Care Provider Office Phone # Fax #    Sandip Vega [phone]       [address] [place] [address]        Goals        General    Medical (pt-stated)     Notes - Note created  9/28/2018 12:01 PM by Zaria Gonzales, RN    Goal Statement: I will monitor my weight, blood pressure, pulse, swelling in extremities, fluid and sodium intake, and report any concerning or new symptoms to my care team daily.   Measure of Success: reporting symptoms to care team   Supportive Steps to Achieve: patient has a cardiology care team specifically there to meet patients questions or concerns.   Barriers:  complex medical diagnosis.   Strengths: patient and wife are involved in medical care, and current treatment plans  Date to Achieve By: on going  Patient expressed understanding of goal: yes            Equal Access to Services     MILKA ORTIZ : Radha Ignacio, yamila mccullough, destinsamia ottmarco amanuel burroughsreneemanuel, jase adamin hayaamanav burroughsmalou see dora bhandari. So [facility] [phone].    ATENCIÓN: Si habla español, tiene a calabrese disposición servicios gratuitos de asistencia lingüística. Llame al [phone].    We comply with applicable federal civil rights laws and [place] laws. We do not discriminate on the basis of race, color, national origin, age, disability, sex, sexual orientation, or gender identity.            Thank you!     Thank you for choosing [facility]  for your care. Our goal is always to provide you with excellent care. Hearing back from our patients is one way we can continue to improve our services. Please take a few minutes to complete the written survey that you may receive in the mail after your visit with us. Thank you!             Your Updated Medication List - Protect others around you: Learn how to safely use, store and throw away your medicines at www.disposemymeds.org.          This list is accurate as of 10/4/18  3:10 PM.  Always use your most recent med list.                   Brand Name Dispense Instructions for use Diagnosis    acetaminophen 325 MG tablet    TYLENOL    100 tablet    Take 2 tablets (650 mg) by mouth every 4 hours as needed for other (mild pain)        ADVAIR DISKUS 250-50 MCG/DOSE diskus inhaler   Generic drug:  fluticasone-salmeterol     1 Inhaler    INHALE 1 PUFF BY MOUTH TWICE A DAY    Panlobular emphysema (H)       amiodarone 200 MG tablet    PACERONE/CODARONE    60 tablet    Take 1 tablet (200 mg) by mouth 2 times daily    Congestive heart failure, unspecified congestive heart failure chronicity, unspecified congestive  heart failure type       buPROPion 150 MG 12 hr tablet    WELLBUTRIN SR    180 tablet    TAKE 1 TABLET BY MOUTH TWICE A DAY    Personal history of tobacco use, presenting hazards to health       carvedilol 6.25 MG tablet    COREG    60 tablet    Take 1 tablet (6.25 mg) by mouth 2 times daily (with meals)    Acute on chronic systolic congestive heart failure (H)       fluticasone 50 MCG/ACT spray    FLONASE    16 g    INHALE 1 TO 2 SPRAYS INTO EACH NOSTRIL EVERY DAY    Chronic rhinitis       ipratropium - albuterol 0.5 mg/2.5 mg/3 mL 0.5-2.5 (3) MG/3ML neb solution    DUONEB    540 mL    TAKE 1 VIAL (3 MLS) BY NEBULIZATION EVERY 4 HOURS AS NEEDED FOR SHORTNESS OF BREATH / DYSPNEA OR WHEEZING    COPD exacerbation (H)       JANTOVEN 2.5 MG tablet   Generic drug:  warfarin     210 tablet    TAKE 7.5 MG (3 TABS) ON TUES, THURS, SAT AND 5 MG (2 TABS) ALL OTHERDAYS, OR AS DIRECTED BY THE COUMADIN CLINIC.    Atrial fibrillation with RVR (H)       montelukast 10 MG tablet    SINGULAIR    90 tablet    TAKE 1 TABLET BY MOUTH DAILY AT BEDTIME    Chronic seasonal allergic rhinitis due to other allergen       * nebulizer/tubing/mouthpiece Kit     1 kit    Use every 4-6 hours PRN    Chronic obstructive pulmonary disease, unspecified COPD type (H)       * AIRS DISPOSABLE NEBULIZER Kit     1 kit    USE EVERY 4 TO 6 HOURS AS NEEDED    Panlobular emphysema (H)       order for DME     1 Device    Equipment being ordered: Nebulizer    COPD (chronic obstructive pulmonary disease) (H)       potassium chloride SA 20 MEQ CR tablet    K-DUR/KLOR-CON M    90 tablet    Take 1 tablet (20 mEq) by mouth daily    CHF (congestive heart failure) (H), Peripheral edema       senna-docusate 8.6-50 MG per tablet    SENOKOT-S;PERICOLACE     Take 2 tablets by mouth 2 times daily        spironolactone 25 MG tablet    ALDACTONE    30 tablet    Take 1 tablet (25 mg) by mouth daily    Congestive heart failure, unspecified congestive heart failure chronicity,  unspecified congestive heart failure type       torsemide 20 MG tablet    DEMADEX    135 tablet    Take 1 tablet (20 mg) by mouth daily Or as directed by [facility] clinic, up to 3 tabs daily    Chronic diastolic congestive heart failure (H)       VENTOLIN  (90 Base) MCG/ACT inhaler   Generic drug:  albuterol     18 g    INHALE 2 PUFFS BY MOUTH EVERY 4 HOURS AS NEEDED FOR SHORTNESS OF BREATH/DYSPNEA    Chronic obstructive pulmonary disease with acute exacerbation (H)       * Notice:  This list has 2 medication(s) that are the same as other medications prescribed for you. Read the directions carefully, and ask your doctor or other care provider to review them with you.

## 2018-10-05 ENCOUNTER — OFFICE VISIT (OUTPATIENT)
Dept: FAMILY MEDICINE | Facility: OTHER | Age: 68
End: 2018-10-05
Payer: MEDICARE

## 2018-10-05 ENCOUNTER — ANTICOAGULATION THERAPY VISIT (OUTPATIENT)
Dept: ANTICOAGULATION | Facility: OTHER | Age: 68
End: 2018-10-05
Payer: MEDICARE

## 2018-10-05 VITALS
TEMPERATURE: 97.1 F | OXYGEN SATURATION: 92 % | RESPIRATION RATE: 18 BRPM | HEART RATE: 91 BPM | SYSTOLIC BLOOD PRESSURE: 90 MMHG | WEIGHT: 217 LBS | BODY MASS INDEX: 31.14 KG/M2 | DIASTOLIC BLOOD PRESSURE: 62 MMHG

## 2018-10-05 DIAGNOSIS — I48.91 ATRIAL FIBRILLATION WITH RAPID VENTRICULAR RESPONSE (H): ICD-10-CM

## 2018-10-05 DIAGNOSIS — J43.1 PANLOBULAR EMPHYSEMA (H): Primary | ICD-10-CM

## 2018-10-05 DIAGNOSIS — I48.91 ATRIAL FIBRILLATION WITH RVR (H): ICD-10-CM

## 2018-10-05 DIAGNOSIS — I50.21 ACUTE SYSTOLIC CHF (CONGESTIVE HEART FAILURE) (H): ICD-10-CM

## 2018-10-05 DIAGNOSIS — I10 HYPERTENSION GOAL BP (BLOOD PRESSURE) < 140/90: ICD-10-CM

## 2018-10-05 LAB — INR POINT OF CARE: 1.9 (ref 0.86–1.14)

## 2018-10-05 PROCEDURE — 85610 PROTHROMBIN TIME: CPT | Mod: QW

## 2018-10-05 PROCEDURE — 99495 TRANSJ CARE MGMT MOD F2F 14D: CPT | Performed by: INTERNAL MEDICINE

## 2018-10-05 PROCEDURE — 36416 COLLJ CAPILLARY BLOOD SPEC: CPT

## 2018-10-05 PROCEDURE — 99207 ZZC NO CHARGE NURSE ONLY: CPT

## 2018-10-05 ASSESSMENT — PAIN SCALES - GENERAL: PAINLEVEL: NO PAIN (0)
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
## 2018-10-05 NOTE — PROGRESS NOTES
ANTICOAGULATION FOLLOW-UP CLINIC VISIT    Patient Name:  Home Valdez  Date:  10/5/2018  Contact Type:  Face to Face    SUBJECTIVE:     Patient Findings     Positives No Problem Findings           OBJECTIVE    INR Protime   Date Value Ref Range Status   10/05/2018 1.9 (A) 0.86 - 1.14 Final       ASSESSMENT / PLAN  INR assessment THER    Recheck INR In: 1 WEEK    INR Location Clinic      Anticoagulation Summary as of 10/5/2018     INR goal 2.0-3.0   Today's INR 1.9!   Warfarin maintenance plan 5 mg (2.5 mg x 2) on Tue, Thu, Sat; 2.5 mg (2.5 mg x 1) all other days   Full warfarin instructions 5 mg on Tue, Thu, Sat; 2.5 mg all other days   Weekly warfarin total 25 mg   Plan last modified Shameka Carey RN (10/5/2018)   Next INR check 10/12/2018   Target end date     Indications   Atrial fibrillation with RVR (H) [I48.91]  Long-term (current) use of anticoagulants [Z79.01] [Z79.01]         Anticoagulation Episode Summary     INR check location     Preferred lab     Send INR reminders to [facility]    Comments 2.5 MG TABLETS, likes print out, PM dose, Amiodarone started 9/26/18      Anticoagulation Care Providers     Provider Role Specialty Phone number    Sandip Vega DO [facility] [phone]            See the Encounter Report to view Anticoagulation Flowsheet and Dosing Calendar (Go to Encounters tab in chart review, and find the Anticoagulation Therapy Visit)    Dosage adjustment made based on physician directed care plan.    Shameka Carey RN

## 2018-10-05 NOTE — NURSING NOTE
"Chief Complaint   Patient presents with     Hospital F/U       Initial BP 90/62  Pulse 91  Temp 97.1  F (36.2  C) (Temporal)  Resp 18  Wt 217 lb (98.4 kg)  SpO2 92%  BMI 31.14 kg/m2 Estimated body mass index is 31.14 kg/(m^2) as calculated from the following:    Height as of 10/4/18: 5' 10\" (1.778 m).    Weight as of this encounter: 217 lb (98.4 kg).  BP completed using cuff size: virginia Dumas MA      "

## 2018-10-05 NOTE — MR AVS SNAPSHOT
Home Valdez   10/5/2018 4:15 PM   Anticoagulation Therapy Visit    Description:  68 year old male   Provider:  [facility]   Department:   Anticoag           INR as of 10/5/2018     Today's INR 1.9!      Anticoagulation Summary as of 10/5/2018     INR goal 2.0-3.0   Today's INR 1.9!   Full warfarin instructions 5 mg on Tue, Thu, Sat; 2.5 mg all other days   Next INR check 10/12/2018    Indications   Atrial fibrillation with RVR (H) [I48.91]  Long-term (current) use of anticoagulants [Z79.01] [Z79.01]         Your next Anticoagulation Clinic appointment(s)     Oct 05, 2018  4:15 PM CDT   [facility]    [address] [facility] [address]   [phone]            Oct 12, 2018  4:15 PM CDT   [facility]  [facility]    [address] [facility] [address]   [phone]              Contact Numbers     Clinic Number:         October 2018 Details    Sun Mon Tue Wed Thu Fri Sat      1               2               3               4               5      2.5 mg   See details      6      5 mg           7      2.5 mg         8      2.5 mg         9      5 mg         10      2.5 mg         11      5 mg         12            13                 14               15               16               17               18               19               20                 21               22               23               24               25               26               27                 28               29               30               31                   Date Details   10/05 This INR check       Date of next INR:  10/12/2018         How to take your warfarin dose     To take:  2.5 mg Take 1 of the 2.5 mg tablets.    To take:  5 mg Take 2 of the 2.5 mg tablets.

## 2018-10-05 NOTE — MR AVS SNAPSHOT
After Visit Summary   10/5/2018    Home Valdez    MRN: [id number]           Patient Information     Date Of Birth          1950        Visit Information        Provider Department      10/5/2018 9:00 AM Sandip Vega DO [facility]        Today's Diagnoses     Panlobular emphysema (H)    -  1    Acute systolic CHF (congestive heart failure) (H)        Hypertension goal BP (blood pressure) < 140/90        Atrial fibrillation with rapid ventricular response (H)           Follow-ups after your visit        Follow-up notes from your care team     Return in about 1 month (around 11/5/2018) for follow up, Oh, yes we can !.      Your next 10 appointments already scheduled     Oct 05, 2018  4:15 PM CDT   Anticoagulation Visit with  ANTI COAG   [facility] ([facility])    [address] [facility] [address]   [phone]            Oct 09, 2018 10:30 AM CDT   New Visit with Zane Bowens MD   [facility] ([facility])    [address] [facility] [address]   [phone]            Oct 12, 2018  4:15 PM CDT   Anticoagulation Visit with [facility] ANTI COAG   [facility] ([facility])    [address] [facility] [address]   [phone]            Oct 18, 2018  8:45 AM CDT   LAB with NL LAB [facility] ([facility])    [address]  [place] [id number] [phone]           Please do not eat 10-12 hours before your appointment if you are coming in fasting for labs on lipids, cholesterol, or glucose (sugar). This does not apply to pregnant women. Water, hot tea and black coffee (with nothing added) are okay. Do not drink other fluids, diet soda or chew gum.            Oct 18, 2018  9:00 AM CDT   Ech Complete with RYAN Mosqueda [facility] ([facility])    [address] [place] [address] Dr [place] [address]    [phone]           1.  Please bring or wear a comfortable two-piece outfit. 2.  You may eat, drink and take your normal medicines. 3.  For any questions that cannot be answered, please contact the ordering physician 4.  Please do not wear perfumes or scented lotions on the day of your exam.            Oct 23, 2018  3:00 PM CDT   Return Visit with Jimenez Murphy MD   [facility] ([facility])    [address] [place] [id number] [phone]              Future tests that were ordered for you today     Open Future Orders        Priority Expected Expires Ordered    Hepatic panel Routine 12/4/2018 10/4/2019 10/4/2018    TSH with free T4 reflex Routine 12/4/2018 10/4/2019 10/4/2018    Basic metabolic panel Routine 12/3/2018 10/4/2019 10/4/2018            Who to contact     If you have questions or need follow up information about today's clinic visit or your schedule please contact [facility] directly at [phone].  Normal or non-critical lab and imaging results will be communicated to you by MyChart, letter or phone within 4 business days after the clinic has received the results. If you do not hear from us within 7 days, please contact the clinic through MyChart or phone. If you have a critical or abnormal lab result, we will notify you by phone as soon as possible.  Submit refill requests through [employer] or call your pharmacy and they will forward the refill request to us. Please allow 3 business days for your refill to be completed.          Additional Information About Your Visit        Care EveryWhere ID     This is your Care EveryWhere ID. This could be used by other organizations to access your [place] medical records  [id number]        Your Vitals Were     Pulse Temperature Respirations Pulse Oximetry BMI (Body Mass Index)       91 97.1  F (36.2  C) (Temporal) 18 92% 31.14 kg/m2        Blood Pressure from Last 3  Encounters:   10/05/18 90/62   10/04/18 112/72   09/29/18 137/88    Weight from Last 3 Encounters:   10/05/18 217 lb (98.4 kg)   10/04/18 218 lb 4.8 oz (99 kg)   09/29/18 222 lb (100.7 kg)              Today, you had the following     No orders found for display         Today's Medication Changes          These changes are accurate as of 10/5/18  9:40 AM.  If you have any questions, ask your nurse or doctor.               These medicines have changed or have updated prescriptions.        Dose/Directions    potassium chloride SA 20 MEQ CR tablet   Commonly known as:  K-DUR/KLOR-CON M   This may have changed:  when to take this   Used for:  CHF (congestive heart failure) (H), Peripheral edema        Dose:  20 mEq   Take 1 tablet (20 mEq) by mouth daily   Quantity:  90 tablet   Refills:  2                Primary Care Provider Office Phone # Fax #    Sandip Tillmansoraida,  [phone]       [address] [place] [address]        Goals        General    Medical (pt-stated)     Notes - Note created  9/28/2018 12:01 PM by Zaria Gonzales, RN    Goal Statement: I will monitor my weight, blood pressure, pulse, swelling in extremities, fluid and sodium intake, and report any concerning or new symptoms to my care team daily.   Measure of Success: reporting symptoms to care team   Supportive Steps to Achieve: patient has a cardiology care team specifically there to meet patients questions or concerns.   Barriers: complex medical diagnosis.   Strengths: patient and wife are involved in medical care, and current treatment plans  Date to Achieve By: on going  Patient expressed understanding of goal: yes            Equal Access to Services     [facility]: Hadelieser Ignacio, yamila luana, qaybta jase medina. [facility] [phone].    ATENCIÓN: Si habla español, tiene a calabrese disposición servicios gratuitos de asistencia lingüística.  Kenrick gray [phone].    We comply with applicable federal civil rights laws and [place] laws. We do not discriminate on the basis of race, color, national origin, age, disability, sex, sexual orientation, or gender identity.            Thank you!     Thank you for choosing [facility]  for your care. Our goal is always to provide you with excellent care. Hearing back from our patients is one way we can continue to improve our services. Please take a few minutes to complete the written survey that you may receive in the mail after your visit with us. Thank you!             Your Updated Medication List - Protect others around you: Learn how to safely use, store and throw away your medicines at www.disposemymeds.org.          This list is accurate as of 10/5/18  9:40 AM.  Always use your most recent med list.                   Brand Name Dispense Instructions for use Diagnosis    acetaminophen 325 MG tablet    TYLENOL    100 tablet    Take 2 tablets (650 mg) by mouth every 4 hours as needed for other (mild pain)        ADVAIR DISKUS 250-50 MCG/DOSE diskus inhaler   Generic drug:  fluticasone-salmeterol     1 Inhaler    INHALE 1 PUFF BY MOUTH TWICE A DAY    Panlobular emphysema (H)       amiodarone 200 MG tablet    PACERONE/CODARONE    60 tablet    Take 1 tablet (200 mg) by mouth 2 times daily    Congestive heart failure, unspecified congestive heart failure chronicity, unspecified congestive heart failure type       buPROPion 150 MG 12 hr tablet    WELLBUTRIN SR    180 tablet    TAKE 1 TABLET BY MOUTH TWICE A DAY    Personal history of tobacco use, presenting hazards to health       carvedilol 6.25 MG tablet    COREG    60 tablet    Take 1 tablet (6.25 mg) by mouth 2 times daily (with meals)    Acute on chronic systolic congestive heart failure (H)       fluticasone 50 MCG/ACT spray    FLONASE    16 g    INHALE 1 TO 2 SPRAYS INTO EACH NOSTRIL EVERY DAY    Chronic rhinitis       ipratropium - albuterol  0.5 mg/2.5 mg/3 mL 0.5-2.5 (3) MG/3ML neb solution    DUONEB    540 mL    TAKE 1 VIAL (3 MLS) BY NEBULIZATION EVERY 4 HOURS AS NEEDED FOR SHORTNESS OF BREATH / DYSPNEA OR WHEEZING    COPD exacerbation (H)       JANTOVEN 2.5 MG tablet   Generic drug:  warfarin     210 tablet    TAKE 7.5 MG (3 TABS) ON TUES, THURS, SAT AND 5 MG (2 TABS) ALL OTHERDAYS, OR AS DIRECTED BY THE COUMADIN CLINIC.    Atrial fibrillation with RVR (H)       montelukast 10 MG tablet    SINGULAIR    90 tablet    TAKE 1 TABLET BY MOUTH DAILY AT BEDTIME    Chronic seasonal allergic rhinitis due to other allergen       * nebulizer/tubing/mouthpiece Kit     1 kit    Use every 4-6 hours PRN    Chronic obstructive pulmonary disease, unspecified COPD type (H)       * AIRS DISPOSABLE NEBULIZER Kit     1 kit    USE EVERY 4 TO 6 HOURS AS NEEDED    Panlobular emphysema (H)       order for DME     1 Device    Equipment being ordered: Nebulizer    COPD (chronic obstructive pulmonary disease) (H)       potassium chloride SA 20 MEQ CR tablet    K-DUR/KLOR-CON M    90 tablet    Take 1 tablet (20 mEq) by mouth daily    CHF (congestive heart failure) (H), Peripheral edema       senna-docusate 8.6-50 MG per tablet    SENOKOT-S;PERICOLACE     Take 2 tablets by mouth 2 times daily        spironolactone 25 MG tablet    ALDACTONE    30 tablet    Take 1 tablet (25 mg) by mouth daily    Congestive heart failure, unspecified congestive heart failure chronicity, unspecified congestive heart failure type       torsemide 20 MG tablet    DEMADEX    135 tablet    Take 1 tablet (20 mg) by mouth daily Or as directed by [facility] clinic, up to 3 tabs daily    Chronic diastolic congestive heart failure (H)       VENTOLIN  (90 Base) MCG/ACT inhaler   Generic drug:  albuterol     18 g    INHALE 2 PUFFS BY MOUTH EVERY 4 HOURS AS NEEDED FOR SHORTNESS OF BREATH/DYSPNEA    Chronic obstructive pulmonary disease with acute exacerbation (H)       * Notice:  This list has 2 medication(s)  that are the same as other medications prescribed for you. Read the directions carefully, and ask your doctor or other care provider to review them with you.

## 2018-10-09 ENCOUNTER — OFFICE VISIT (OUTPATIENT)
Dept: VASCULAR SURGERY | Facility: CLINIC | Age: 68
End: 2018-10-09
Payer: MEDICARE

## 2018-10-09 VITALS — RESPIRATION RATE: 16 BRPM | SYSTOLIC BLOOD PRESSURE: 109 MMHG | HEART RATE: 72 BPM | DIASTOLIC BLOOD PRESSURE: 74 MMHG

## 2018-10-09 DIAGNOSIS — I71.40 ABDOMINAL AORTIC ANEURYSM (AAA) WITHOUT RUPTURE (H): Primary | ICD-10-CM

## 2018-10-09 DIAGNOSIS — I10 HYPERTENSION GOAL BP (BLOOD PRESSURE) < 140/90: ICD-10-CM

## 2018-10-09 DIAGNOSIS — E78.5 HYPERLIPIDEMIA LDL GOAL <130: ICD-10-CM

## 2018-10-09 PROCEDURE — 99214 OFFICE O/P EST MOD 30 MIN: CPT | Performed by: SURGERY

## 2018-10-09 NOTE — NURSING NOTE
"Initial /74 (BP Location: Right arm, Patient Position: Chair, Cuff Size: Adult Regular)  Pulse 72  Resp 16 Estimated body mass index is 31.14 kg/(m^2) as calculated from the following:    Height as of 10/4/18: 1.778 m (5' 10\").    Weight as of 10/5/18: 98.4 kg (217 lb). .    Consult for AAA.  yosvany archuleta LPN    "
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
## 2018-10-12 ENCOUNTER — ANTICOAGULATION THERAPY VISIT (OUTPATIENT)
Dept: ANTICOAGULATION | Facility: OTHER | Age: 68
End: 2018-10-12
Payer: MEDICARE

## 2018-10-12 DIAGNOSIS — I48.91 ATRIAL FIBRILLATION WITH RVR (H): ICD-10-CM

## 2018-10-12 LAB — INR POINT OF CARE: 1.8 (ref 0.86–1.14)

## 2018-10-12 PROCEDURE — 36416 COLLJ CAPILLARY BLOOD SPEC: CPT

## 2018-10-12 PROCEDURE — 99207 ZZC NO CHARGE NURSE ONLY: CPT

## 2018-10-12 PROCEDURE — 85610 PROTHROMBIN TIME: CPT | Mod: QW

## 2018-10-12 NOTE — PROGRESS NOTES
ANTICOAGULATION FOLLOW-UP CLINIC VISIT    Patient Name:  Home Valdez  Date:  10/12/2018  Contact Type:  Face to Face    SUBJECTIVE:     Patient Findings     Positives Change in medications (Restarted his amiodarone so we have been slowly adjusting his coumadin because of this. )           OBJECTIVE    INR Protime   Date Value Ref Range Status   10/12/2018 1.8 (A) 0.86 - 1.14 Final       ASSESSMENT / PLAN  INR assessment SUB    Recheck INR In: 1 WEEK    INR Location Clinic      Anticoagulation Summary as of 10/12/2018     INR goal 2.0-3.0   Today's INR 1.8!   Warfarin maintenance plan 2.5 mg (2.5 mg x 1) on Mon, Wed, Fri; 5 mg (2.5 mg x 2) all other days   Full warfarin instructions 2.5 mg on Mon, Wed, Fri; 5 mg all other days   Weekly warfarin total 27.5 mg   Plan last modified Shameka Carey RN (10/12/2018)   Next INR check 10/19/2018   Target end date     Indications   Atrial fibrillation with RVR (H) [I48.91]  Long-term (current) use of anticoagulants [Z79.01] [Z79.01]         Anticoagulation Episode Summary     INR check location     Preferred lab     Send INR reminders to [facility]    Comments 2.5 MG TABLETS, likes print out, PM dose, Amiodarone started 9/26/18      Anticoagulation Care Providers     Provider Role Specialty Phone number    Sandip VegaDO [facility] [phone]            See the Encounter Report to view Anticoagulation Flowsheet and Dosing Calendar (Go to Encounters tab in chart review, and find the Anticoagulation Therapy Visit)    Dosage adjustment made based on physician directed care plan.    Shameka Carey RN

## 2018-10-12 NOTE — MR AVS SNAPSHOT
Home Valdez   10/12/2018 4:15 PM   Anticoagulation Therapy Visit    Description:  68 year old male   Provider:  [facility]   Department:  [facility]           INR as of 10/12/2018     Today's INR 1.8!      Anticoagulation Summary as of 10/12/2018     INR goal 2.0-3.0   Today's INR 1.8!   Full warfarin instructions 2.5 mg on Mon, Wed, Fri; 5 mg all other days   Next INR check 10/19/2018    Indications   Atrial fibrillation with RVR (H) [I48.91]  Long-term (current) use of anticoagulants [Z79.01] [Z79.01]         Your next Anticoagulation Clinic appointment(s)     Oct 12, 2018  4:15 PM CDT   [facility]  [facility]   [facility]    [address] [facility] [address]   [phone]            Oct 19, 2018  8:45 AM CDT   Anticoagulation Visit with  [facility]   [facility]    [address] [facility] [address]   [phone]              Contact Numbers     Clinic Number:         October 2018 Details    Sun Mon Tue Wed Thu Fri Sat      1               2               3               4               5               6                 7               8               9               10               11               12      2.5 mg   See details      13      5 mg           14      5 mg         15      2.5 mg         16      5 mg         17      2.5 mg         18      5 mg         19            20                 21               22               23               24               25               26               27                 28               29               30               31                   Date Details   10/12 This INR check       Date of next INR:  10/19/2018         How to take your warfarin dose     To take:  2.5 mg Take 1 of the 2.5 mg tablets.    To take:  5 mg Take 2 of the 2.5 mg tablets.

## 2018-10-15 ENCOUNTER — DOCUMENTATION ONLY (OUTPATIENT)
Dept: CARDIOLOGY | Facility: CLINIC | Age: 68
End: 2018-10-15

## 2018-10-15 DIAGNOSIS — Z79.899 ON AMIODARONE THERAPY: Primary | ICD-10-CM

## 2018-10-15 NOTE — PROGRESS NOTES
Pt is up to date for amio follow-up testing (had CXR, TSH, EKG done in 9/2018) except for hepatic panel. This was ordered and appt note for labs 10/18/18 now reflects that this should be drawn.     Mayra Krueger CNP ordered for follow-up hepatic panel, TSH for 12/2018, as well.

## 2018-10-18 ENCOUNTER — TELEPHONE (OUTPATIENT)
Dept: CARDIOLOGY | Facility: CLINIC | Age: 68
End: 2018-10-18

## 2018-10-18 ENCOUNTER — HOSPITAL ENCOUNTER (OUTPATIENT)
Dept: CARDIOLOGY | Facility: CLINIC | Age: 68
Discharge: HOME | End: 2018-10-18
Attending: INTERNAL MEDICINE | Admitting: INTERNAL MEDICINE
Payer: MEDICARE

## 2018-10-18 DIAGNOSIS — I50.9 CONGESTIVE HEART FAILURE (H): ICD-10-CM

## 2018-10-18 DIAGNOSIS — Z79.899 ON AMIODARONE THERAPY: ICD-10-CM

## 2018-10-18 DIAGNOSIS — I50.32 CHRONIC DIASTOLIC CONGESTIVE HEART FAILURE (H): ICD-10-CM

## 2018-10-18 LAB
ALBUMIN SERPL-MCNC: 3.8 G/DL (ref 3.4–5)
ALP SERPL-CCNC: 74 U/L (ref 40–150)
ALT SERPL W P-5'-P-CCNC: 23 U/L (ref 0–70)
ANION GAP SERPL CALCULATED.3IONS-SCNC: 4 MMOL/L (ref 3–14)
AST SERPL W P-5'-P-CCNC: 18 U/L (ref 0–45)
BILIRUB DIRECT SERPL-MCNC: 0.3 MG/DL (ref 0–0.2)
BILIRUB SERPL-MCNC: 0.9 MG/DL (ref 0.2–1.3)
BUN SERPL-MCNC: 28 MG/DL (ref 7–30)
CALCIUM SERPL-MCNC: 9.2 MG/DL (ref 8.5–10.1)
CHLORIDE SERPL-SCNC: 100 MMOL/L (ref 94–109)
CO2 SERPL-SCNC: 33 MMOL/L (ref 20–32)
CREAT SERPL-MCNC: 1.58 MG/DL (ref 0.66–1.25)
GFR SERPL CREATININE-BSD FRML MDRD: 44 ML/MIN/1.7M2
GLUCOSE SERPL-MCNC: 102 MG/DL (ref 70–99)
NT-PROBNP SERPL-MCNC: 6653 PG/ML (ref 0–125)
POTASSIUM SERPL-SCNC: 5 MMOL/L (ref 3.4–5.3)
PROT SERPL-MCNC: 7.4 G/DL (ref 6.8–8.8)
SODIUM SERPL-SCNC: 137 MMOL/L (ref 133–144)

## 2018-10-18 PROCEDURE — 25500064 ZZH RX 255 OP 636: Performed by: INTERNAL MEDICINE

## 2018-10-18 PROCEDURE — 36415 COLL VENOUS BLD VENIPUNCTURE: CPT | Performed by: PHYSICIAN ASSISTANT

## 2018-10-18 PROCEDURE — 80076 HEPATIC FUNCTION PANEL: CPT | Performed by: PHYSICIAN ASSISTANT

## 2018-10-18 PROCEDURE — 83880 ASSAY OF NATRIURETIC PEPTIDE: CPT | Performed by: PHYSICIAN ASSISTANT

## 2018-10-18 PROCEDURE — 93321 DOPPLER ECHO F-UP/LMTD STD: CPT | Mod: 26 | Performed by: INTERNAL MEDICINE

## 2018-10-18 PROCEDURE — 93325 DOPPLER ECHO COLOR FLOW MAPG: CPT

## 2018-10-18 PROCEDURE — 93308 TTE F-UP OR LMTD: CPT | Mod: 26 | Performed by: INTERNAL MEDICINE

## 2018-10-18 PROCEDURE — 80048 BASIC METABOLIC PNL TOTAL CA: CPT | Performed by: PHYSICIAN ASSISTANT

## 2018-10-18 PROCEDURE — 93325 DOPPLER ECHO COLOR FLOW MAPG: CPT | Mod: 26 | Performed by: INTERNAL MEDICINE

## 2018-10-18 RX ADMIN — HUMAN ALBUMIN MICROSPHERES AND PERFLUTREN 9 ML: 10; .22 INJECTION, SOLUTION INTRAVENOUS at 09:33

## 2018-10-18 NOTE — TELEPHONE ENCOUNTER
Phone call to patient to inform him that his hepatic panel done today that was ordered by Mayra WEAVER came back normal. He appreciated the call and had no questions for me. [facility]        Notes Recorded by Donal David, RN on 10/18/2018 at 10:04 AM  No notes recorded by provider  ------    Notes Recorded by Donal David, RN on 10/18/2018 at 10:04 AM  Patient notified of hepatic results by phone.           Ref Range & Units 8:27 AM   3wk ago        Bilirubin Direct 0.0 - 0.2 mg/dL 0.3 (H)       Bilirubin Total 0.2 - 1.3 mg/dL 0.9 1.0      Albumin 3.4 - 5.0 g/dL 3.8 3.5      Protein Total 6.8 - 8.8 g/dL 7.4 6.4 (L)      Alkaline Phosphatase 40 - 150 U/L 74 75      ALT 0 - 70 U/L 23 44      AST 0 - 45 U/L 18 38CM     Resulting Agency  [id number]          Specimen Collected: 10/18/18  8:27 AM     Last Resulted: 10/18/18  9:09 AM

## 2018-10-19 ENCOUNTER — TELEPHONE (OUTPATIENT)
Dept: FAMILY MEDICINE | Facility: OTHER | Age: 68
End: 2018-10-19

## 2018-10-19 ENCOUNTER — ANTICOAGULATION THERAPY VISIT (OUTPATIENT)
Dept: ANTICOAGULATION | Facility: OTHER | Age: 68
End: 2018-10-19
Payer: MEDICARE

## 2018-10-19 ENCOUNTER — OFFICE VISIT (OUTPATIENT)
Dept: FAMILY MEDICINE | Facility: OTHER | Age: 68
End: 2018-10-19
Payer: MEDICARE

## 2018-10-19 VITALS
BODY MASS INDEX: 29.76 KG/M2 | SYSTOLIC BLOOD PRESSURE: 108 MMHG | OXYGEN SATURATION: 92 % | TEMPERATURE: 98.3 F | WEIGHT: 207.4 LBS | DIASTOLIC BLOOD PRESSURE: 72 MMHG | HEART RATE: 86 BPM | RESPIRATION RATE: 18 BRPM

## 2018-10-19 DIAGNOSIS — I48.91 ATRIAL FIBRILLATION WITH RVR (H): ICD-10-CM

## 2018-10-19 DIAGNOSIS — Z79.01 LONG TERM CURRENT USE OF ANTICOAGULANT THERAPY: Primary | ICD-10-CM

## 2018-10-19 DIAGNOSIS — J20.9 ACUTE BRONCHITIS, UNSPECIFIED ORGANISM: Primary | ICD-10-CM

## 2018-10-19 DIAGNOSIS — Z79.01 LONG TERM CURRENT USE OF ANTICOAGULANT THERAPY: ICD-10-CM

## 2018-10-19 LAB
INR POINT OF CARE: 5.7 (ref 0.86–1.14)
INR PPP: 4.29 (ref 0.86–1.14)

## 2018-10-19 PROCEDURE — 99207 ZZC NO CHARGE NURSE ONLY: CPT | Performed by: INTERNAL MEDICINE

## 2018-10-19 PROCEDURE — 85610 PROTHROMBIN TIME: CPT | Performed by: INTERNAL MEDICINE

## 2018-10-19 PROCEDURE — 85610 PROTHROMBIN TIME: CPT | Mod: QW

## 2018-10-19 PROCEDURE — 99214 OFFICE O/P EST MOD 30 MIN: CPT | Performed by: FAMILY MEDICINE

## 2018-10-19 PROCEDURE — 36416 COLLJ CAPILLARY BLOOD SPEC: CPT

## 2018-10-19 RX ORDER — DOXYCYCLINE 100 MG/1
100 CAPSULE ORAL 2 TIMES DAILY
Qty: 20 CAPSULE | Refills: 0 | Status: SHIPPED | OUTPATIENT
Start: 2018-10-19 | End: 2018-11-29

## 2018-10-19 ASSESSMENT — PAIN SCALES - GENERAL: PAINLEVEL: NO PAIN (0)

## 2018-10-19 NOTE — MR AVS SNAPSHOT
After Visit Summary   10/19/2018    Home Valdez    MRN: [id number]           Patient Information     Date Of Birth          1950        Visit Information        Provider Department      10/19/2018 4:10 PM Cory Diaz MD [facility]        Today's Diagnoses     Acute bronchitis, unspecified organism    -  1       Follow-ups after your visit        Follow-up notes from your care team     Return in about 3 days (around 10/22/2018) for recheck with Dr. Vega.      Your next 10 appointments already scheduled     Oct 22, 2018 11:00 AM CDT   Office Visit with Sandip Vega,    [facility] ([facility])    [address] [facility] [address] [phone]           Bring a current list of meds and any records pertaining to this visit. For Physicals, please bring immunization records and any forms needing to be filled out. Please arrive 10 minutes early to complete paperwork.            Oct 23, 2018  3:00 PM CDT   Return Visit with Jimenez Murphy MD   [facility] ([facility]    [address] [facility] [address] [phone]            Oct 26, 2018  8:30 AM CDT   Anticoagulation Visit with  ANTI COAG   [facility] ([facility])    [address] [facility] [address] [phone]              Future tests that were ordered for you today     Open Standing Orders        Priority Remaining Interval Expires Ordered    INR Routine 4/5  10/19/2019 10/19/2018          Open Future Orders        Priority Expected Expires Ordered    ECHO LIMITED WITH OPTISON Routine 10/16/2018 9/4/2019 9/4/2018            Who to contact     If you have questions or need follow up information about today's clinic visit or your schedule please contact [facility] directly at [phone].  Normal or non-critical lab and imaging results will be communicated  to you by MyChart, letter or phone within 4 business days after the clinic has received the results. If you do not hear from us within 7 days, please contact the clinic through MyChart or phone. If you have a critical or abnormal lab result, we will notify you by phone as soon as possible.  Submit refill requests through [employer] or call your pharmacy and they will forward the refill request to us. Please allow 3 business days for your refill to be completed.          Additional Information About Your Visit        Care EveryWhere ID     This is your Care EveryWhere ID. This could be used by other organizations to access your [employer] medical records  [id number]        Your Vitals Were     Pulse Temperature Respirations Pulse Oximetry BMI (Body Mass Index)       86 98.3  F (36.8  C) (Temporal) 18 92% 29.76 kg/m2        Blood Pressure from Last 3 Encounters:   10/19/18 108/72   10/09/18 109/74   10/05/18 90/62    Weight from Last 3 Encounters:   10/19/18 207 lb 6.4 oz (94.1 kg)   10/05/18 217 lb (98.4 kg)   10/04/18 218 lb 4.8 oz (99 kg)              Today, you had the following     No orders found for display         Today's Medication Changes          These changes are accurate as of 10/19/18  4:35 PM.  If you have any questions, ask your nurse or doctor.               Start taking these medicines.        Dose/Directions    doxycycline 100 MG capsule   Commonly known as:  VIBRAMYCIN   Used for:  Acute bronchitis, unspecified organism   Started by:  Cory Diaz MD        Dose:  100 mg   Take 1 capsule (100 mg) by mouth 2 times daily   Quantity:  20 capsule   Refills:  0         These medicines have changed or have updated prescriptions.        Dose/Directions    potassium chloride SA 20 MEQ CR tablet   Commonly known as:  K-DUR/KLOR-CON M   This may have changed:  when to take this   Used for:  CHF (congestive heart failure) (H), Peripheral edema        Dose:  20 mEq   Take 1 tablet (20 mEq) by mouth daily    Quantity:  90 tablet   Refills:  2            Where to get your medicines      These medications were sent to [id number] - [place], [place] - [address]   [address] [place] [address]    Hours:  M-F 8:30-6:30; Sat 9-4; closed Sunday Phone:  [phone]     doxycycline 100 MG capsule                Primary Care Provider Office Phone # Fax #    Sandip Vega [phone]       [address] [place] [address]        Goals        General    Medical (pt-stated)     Notes - Note created  9/28/2018 12:01 PM by Zaria Gonzales RN    Goal Statement: I will monitor my weight, blood pressure, pulse, swelling in extremities, fluid and sodium intake, and report any concerning or new symptoms to my care team daily.   Measure of Success: reporting symptoms to care team   Supportive Steps to Achieve: patient has a cardiology care team specifically there to meet patients questions or concerns.   Barriers: complex medical diagnosis.   Strengths: patient and wife are involved in medical care, and current treatment plans  Date to Achieve By: on going  Patient expressed understanding of goal: yes            Equal Access to Services     REBECCA ORTIZ AH: Hadelieser Ignacio, wajelena mccullough, qajase wilcox . So [facility] [phone].    ATENCIÓN: Si habla español, tiene a calabrese disposición servicios gratuitos de asistencia lingüística. Llame al [phone].    We comply with applicable federal civil rights laws and [place] laws. We do not discriminate on the basis of race, color, national origin, age, disability, sex, sexual orientation, or gender identity.            Thank you!     Thank you for choosing [facility]  for your care. Our goal is always to provide you with excellent care. Hearing back from our patients is one way we can continue to improve our services. Please take a few minutes to complete the  written survey that you may receive in the mail after your visit with us. Thank you!             Your Updated Medication List - Protect others around you: Learn how to safely use, store and throw away your medicines at www.[employer].org.          This list is accurate as of 10/19/18  4:35 PM.  Always use your most recent med list.                   Brand Name Dispense Instructions for use Diagnosis    acetaminophen 325 MG tablet    TYLENOL    100 tablet    Take 2 tablets (650 mg) by mouth every 4 hours as needed for other (mild pain)        ADVAIR DISKUS 250-50 MCG/DOSE diskus inhaler   Generic drug:  fluticasone-salmeterol     1 Inhaler    INHALE 1 PUFF BY MOUTH TWICE A DAY    Panlobular emphysema (H)       amiodarone 200 MG tablet    PACERONE/CODARONE    60 tablet    Take 1 tablet (200 mg) by mouth 2 times daily    Congestive heart failure, unspecified congestive heart failure chronicity, unspecified congestive heart failure type       buPROPion 150 MG 12 hr tablet    WELLBUTRIN SR    180 tablet    TAKE 1 TABLET BY MOUTH TWICE A DAY    Personal history of tobacco use, presenting hazards to health       carvedilol 6.25 MG tablet    COREG    60 tablet    Take 1 tablet (6.25 mg) by mouth 2 times daily (with meals)    Acute on chronic systolic congestive heart failure (H)       doxycycline 100 MG capsule    VIBRAMYCIN    20 capsule    Take 1 capsule (100 mg) by mouth 2 times daily    Acute bronchitis, unspecified organism       fluticasone 50 MCG/ACT spray    FLONASE    16 g    INHALE 1 TO 2 SPRAYS INTO EACH NOSTRIL EVERY DAY    Chronic rhinitis       ipratropium - albuterol 0.5 mg/2.5 mg/3 mL 0.5-2.5 (3) MG/3ML neb solution    DUONEB    540 mL    TAKE 1 VIAL (3 MLS) BY NEBULIZATION EVERY 4 HOURS AS NEEDED FOR SHORTNESS OF BREATH / DYSPNEA OR WHEEZING    COPD exacerbation (H)       JANTOVEN 2.5 MG tablet   Generic drug:  warfarin     210 tablet    TAKE 7.5 MG (3 TABS) ON TUES, THURS, SAT AND 5 MG (2 TABS) ALL  OTHERDAYS, OR AS DIRECTED BY THE COUMADIN CLINIC.    Atrial fibrillation with RVR (H)       montelukast 10 MG tablet    SINGULAIR    90 tablet    TAKE 1 TABLET BY MOUTH DAILY AT BEDTIME    Chronic seasonal allergic rhinitis due to other allergen       * nebulizer/tubing/mouthpiece Kit     1 kit    Use every 4-6 hours PRN    Chronic obstructive pulmonary disease, unspecified COPD type (H)       * AIRS DISPOSABLE NEBULIZER Kit     1 kit    USE EVERY 4 TO 6 HOURS AS NEEDED    Panlobular emphysema (H)       order for DME     1 Device    Equipment being ordered: Nebulizer    COPD (chronic obstructive pulmonary disease) (H)       potassium chloride SA 20 MEQ CR tablet    K-DUR/KLOR-CON M    90 tablet    Take 1 tablet (20 mEq) by mouth daily    CHF (congestive heart failure) (H), Peripheral edema       senna-docusate 8.6-50 MG per tablet    SENOKOT-S;PERICOLACE     Take 2 tablets by mouth 2 times daily        spironolactone 25 MG tablet    ALDACTONE    30 tablet    Take 1 tablet (25 mg) by mouth daily    Congestive heart failure, unspecified congestive heart failure chronicity, unspecified congestive heart failure type       torsemide 20 MG tablet    DEMADEX    135 tablet    Take 1 tablet (20 mg) by mouth daily Or as directed by [facility] clinic, up to 3 tabs daily    Chronic diastolic congestive heart failure (H)       VENTOLIN  (90 Base) MCG/ACT inhaler   Generic drug:  albuterol     18 g    INHALE 2 PUFFS BY MOUTH EVERY 4 HOURS AS NEEDED FOR SHORTNESS OF BREATH/DYSPNEA    Chronic obstructive pulmonary disease with acute exacerbation (H)       * Notice:  This list has 2 medication(s) that are the same as other medications prescribed for you. Read the directions carefully, and ask your doctor or other care provider to review them with you.

## 2018-10-19 NOTE — MR AVS SNAPSHOT
Home Valdez   10/19/2018   Anticoagulation Therapy Visit    Description:  68 year old male   Provider:  Sandip Vega DO   Department:   Anticoag           INR as of 10/19/2018     Today's INR 4.29!      Anticoagulation Summary as of 10/19/2018     INR goal 2.0-3.0   Today's INR 4.29!   Full warfarin instructions 10/19: Hold; 10/21: 2.5 mg; Otherwise 2.5 mg on Mon, Wed, Fri; 5 mg all other days   Next INR check 10/26/2018    Indications   Atrial fibrillation with RVR (H) [I48.91]  Long term current use of anticoagulant therapy [Z79.01]         Your next Anticoagulation Clinic appointment(s)     Oct 26, 2018  8:30 AM CDT   Anticoagulation Visit with CAMILO ANTI COAG   [facility]    [address] [facility] [address]   [phone]              Contact Numbers     Clinic Number:         October 2018 Details    Sun Mon Tue Wed Thu Fri Sat      1               2               3               4               5               6                 7               8               9               10               11               12               13                 14               15               16               17               18               19      Hold   See details      20      5 mg           21      2.5 mg         22      2.5 mg         23      5 mg         24      2.5 mg         25      5 mg         26            27                 28               29               30               31                   Date Details   10/19 This INR check       Date of next INR:  10/26/2018         How to take your warfarin dose     To take:  2.5 mg Take 1 of the 2.5 mg tablets.    To take:  5 mg Take 2 of the 2.5 mg tablets.    Hold Do not take your warfarin dose. See the Details table to the right for additional instructions.

## 2018-10-19 NOTE — PROGRESS NOTES
ANTICOAGULATION FOLLOW-UP CLINIC VISIT    Patient Name:  Home Valdez  Date:  10/19/2018  Contact Type:  Telephone/ Chrissy - spouse    SUBJECTIVE:     Patient Findings     Positives Unexplained INR or factor level change           OBJECTIVE    INR   Date Value Ref Range Status   10/19/2018 4.29 (H) 0.86 - 1.14 Final       ASSESSMENT / PLAN  INR assessment SUPRA    Recheck INR In: 1 WEEK    INR Location Clinic      Anticoagulation Summary as of 10/19/2018     INR goal 2.0-3.0   Today's INR 4.29!   Warfarin maintenance plan 2.5 mg (2.5 mg x 1) on Mon, Wed, Fri; 5 mg (2.5 mg x 2) all other days   Full warfarin instructions 10/19: Hold; 10/21: 2.5 mg; Otherwise 2.5 mg on Mon, Wed, Fri; 5 mg all other days   Weekly warfarin total 27.5 mg   Plan last modified Shameka Carey RN (10/12/2018)   Next INR check 10/26/2018   Target end date     Indications   Atrial fibrillation with RVR (H) [I48.91]  Long term current use of anticoagulant therapy [Z79.01]         Anticoagulation Episode Summary     INR check location     Preferred lab     Send INR reminders to [facility]    Comments 2.5 MG TABLETS, likes print out, PM dose, Amiodarone started 9/26/18      Anticoagulation Care Providers     Provider Role Specialty Phone number    Sandip Vega DO [facility] Internal Medicine [phone]            See the Encounter Report to view Anticoagulation Flowsheet and Dosing Calendar (Go to Encounters tab in chart review, and find the Anticoagulation Therapy Visit)    Dosage adjustment made based on physician directed care plan.    Shameka Carey RN
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
## 2018-10-20 DIAGNOSIS — J44.1 COPD EXACERBATION (H): ICD-10-CM

## 2018-10-22 ENCOUNTER — OFFICE VISIT (OUTPATIENT)
Dept: FAMILY MEDICINE | Facility: OTHER | Age: 68
End: 2018-10-22
Payer: MEDICARE

## 2018-10-22 VITALS
HEART RATE: 72 BPM | TEMPERATURE: 97.7 F | WEIGHT: 205 LBS | RESPIRATION RATE: 16 BRPM | DIASTOLIC BLOOD PRESSURE: 70 MMHG | SYSTOLIC BLOOD PRESSURE: 118 MMHG | OXYGEN SATURATION: 92 % | BODY MASS INDEX: 29.41 KG/M2

## 2018-10-22 DIAGNOSIS — Z79.01 LONG TERM CURRENT USE OF ANTICOAGULANT THERAPY: ICD-10-CM

## 2018-10-22 DIAGNOSIS — K59.00 CONSTIPATION, UNSPECIFIED CONSTIPATION TYPE: ICD-10-CM

## 2018-10-22 DIAGNOSIS — J43.1 PANLOBULAR EMPHYSEMA (H): ICD-10-CM

## 2018-10-22 DIAGNOSIS — B37.89 YEAST PHARYNGITIS: Primary | ICD-10-CM

## 2018-10-22 DIAGNOSIS — J02.8 YEAST PHARYNGITIS: Primary | ICD-10-CM

## 2018-10-22 DIAGNOSIS — I50.22 CHRONIC SYSTOLIC CONGESTIVE HEART FAILURE (H): ICD-10-CM

## 2018-10-22 DIAGNOSIS — J40 BRONCHITIS: ICD-10-CM

## 2018-10-22 PROCEDURE — 99214 OFFICE O/P EST MOD 30 MIN: CPT | Performed by: INTERNAL MEDICINE

## 2018-10-22 RX ORDER — FLUCONAZOLE 150 MG/1
150 TABLET ORAL
Qty: 4 TABLET | Refills: 0 | Status: SHIPPED | OUTPATIENT
Start: 2018-10-22 | End: 2018-11-19

## 2018-10-22 RX ORDER — AMOXICILLIN 250 MG
2 CAPSULE ORAL 2 TIMES DAILY
Qty: 360 TABLET | Refills: 3 | Status: SHIPPED | OUTPATIENT
Start: 2018-10-22

## 2018-10-22 ASSESSMENT — PAIN SCALES - GENERAL: PAINLEVEL: NO PAIN (0)

## 2018-10-22 NOTE — MR AVS SNAPSHOT
After Visit Summary   10/22/2018    Home Valdez    MRN: [id number]           Patient Information     Date Of Birth          1950        Visit Information        Provider Department      10/22/2018 11:00 AM Sandip Vega DO [facility]        Today's Diagnoses     Yeast pharyngitis    -  1    Bronchitis        Constipation, unspecified constipation type        Panlobular emphysema (H)        Long term current use of anticoagulant therapy        Chronic systolic congestive heart failure (H)           Follow-ups after your visit        Follow-up notes from your care team     Return in about 1 month (around 11/22/2018) for follow up.      Your next 10 appointments already scheduled     Oct 26, 2018  8:30 AM CDT   Anticoagulation Visit with  ANTI COAG   [facility] ([facility]    [address] [facility] [id number] [phone]            Nov 29, 2018  8:40 AM CST   Return Visit with Yancy Keller PA-C   [facility] ([facility])    [address] [place] [id number] [phone]            Jan 17, 2019  9:00 AM CST   Ech Complete with PHECHADAN   [facility] ([facility])    [address] [place] [id number] [phone]           1.  Please bring or wear a comfortable two-piece outfit. 2.  You may eat, drink and take your normal medicines. 3.  For any questions that cannot be answered, please contact the ordering physician 4.  Please do not wear perfumes or scented lotions on the day of your exam.            Jan 24, 2019  1:45 PM CST   Return Visit with Jimenez Murphy MD   [facility] ([facility])    [address] [place] [id number] [phone]              Future tests that were ordered for you today     Open Future Orders        Priority  Expected Expires Ordered    [facility]-Up with [facility] - ANTWAN visit Routine 11/20/2018 10/23/2019 10/23/2018    Follow-Up with [facility] - Return MD visit Routine 1/21/2019 10/23/2019 10/23/2018    Basic metabolic panel Routine 11/20/2018 10/23/2019 10/23/2018    Hemoglobin Routine 11/20/2018 10/23/2019 10/23/2018    Basic metabolic panel Routine 1/21/2019 10/23/2019 10/23/2018    Echocardiogram Routine 1/21/2019 10/23/2019 10/23/2018    CARDIAC REHAB REFERRAL Routine 10/30/2018 10/23/2019 10/23/2018            Who to contact     If you have questions or need follow up information about today's clinic visit or your schedule please contact [facility] directly at [phone].  Normal or non-critical lab and imaging results will be communicated to you by MyChart, letter or phone within 4 business days after the clinic has received the results. If you do not hear from us within 7 days, please contact the clinic through MyChart or phone. If you have a critical or abnormal lab result, we will notify you by phone as soon as possible.  Submit refill requests through [employer] or call your pharmacy and they will forward the refill request to us. Please allow 3 business days for your refill to be completed.          Additional Information About Your Visit        Care EveryWhere ID     This is your Care EveryWhere ID. This could be used by other organizations to access your [facility] medical records  [id number]        Your Vitals Were     Pulse Temperature Respirations Pulse Oximetry BMI (Body Mass Index)       72 97.7  F (36.5  C) (Temporal) 16 92% 29.41 kg/m2        Blood Pressure from Last 3 Encounters:   10/23/18 108/70   10/22/18 118/70   10/19/18 108/72    Weight from Last 3 Encounters:   10/23/18 206 lb 3.2 oz (93.5 kg)   10/22/18 205 lb (93 kg)   10/19/18 207 lb 6.4 oz (94.1 kg)              Today, you had the following     No orders found for display         Today's Medication Changes          These  changes are accurate as of 10/22/18 11:59 PM.  If you have any questions, ask your nurse or doctor.               Start taking these medicines.        Dose/Directions    fluconazole 150 MG tablet   Commonly known as:  DIFLUCAN   Used for:  Yeast pharyngitis   Started by:  Sandip Vega DO        Dose:  150 mg   Take 1 tablet (150 mg) by mouth every 3 days   Quantity:  4 tablet   Refills:  0         These medicines have changed or have updated prescriptions.        Dose/Directions    potassium chloride SA 20 MEQ CR tablet   Commonly known as:  K-DUR/KLOR-CON M   This may have changed:  when to take this   Used for:  CHF (congestive heart failure) (H), Peripheral edema        Dose:  20 mEq   Take 1 tablet (20 mEq) by mouth daily   Quantity:  90 tablet   Refills:  2            Where to get your medicines      These medications were sent to [id number] - [place], [place] - [address] [place] [address]  [address] [place] [id number]    Hours:  M-F 8:30-6:30; Sat 9-4; closed Sunday Phone:  [phone]     fluconazole 150 MG tablet    senna-docusate 8.6-50 MG per tablet                Primary Care Provider Office Phone # Fax #    Sandip Vega [phone]       [address] [place] [address]        Goals        General    Medical (pt-stated)     Notes - Note created  9/28/2018 12:01 PM by Zaria Gonzales RN    Goal Statement: I will monitor my weight, blood pressure, pulse, swelling in extremities, fluid and sodium intake, and report any concerning or new symptoms to my care team daily.   Measure of Success: reporting symptoms to care team   Supportive Steps to Achieve: patient has a cardiology care team specifically there to meet patients questions or concerns.   Barriers: complex medical diagnosis.   Strengths: patient and wife are involved in medical care, and current treatment plans  Date to Achieve By: on going  Patient expressed understanding of goal: yes             Equal Access to Services     [facility]: Hadii aad ku hadrosefrederick Alexafede, wajanettda luqadaha, qaybta kamarco ajase mackey. So [facility] [phone].    ATENCIÓN: Si habla español, tiene a calabrese disposición servicios gratuitos de asistencia lingüística. Nikame al [phone].    We comply with applicable federal civil rights laws and [place] laws. We do not discriminate on the basis of race, color, national origin, age, disability, sex, sexual orientation, or gender identity.            Thank you!     Thank you for choosing [facility]  for your care. Our goal is always to provide you with excellent care. Hearing back from our patients is one way we can continue to improve our services. Please take a few minutes to complete the written survey that you may receive in the mail after your visit with us. Thank you!             Your Updated Medication List - Protect others around you: Learn how to safely use, store and throw away your medicines at www.disposemymeds.org.          This list is accurate as of 10/22/18 11:59 PM.  Always use your most recent med list.                   Brand Name Dispense Instructions for use Diagnosis    acetaminophen 325 MG tablet    TYLENOL    100 tablet    Take 2 tablets (650 mg) by mouth every 4 hours as needed for other (mild pain)        ADVAIR DISKUS 250-50 MCG/DOSE diskus inhaler   Generic drug:  fluticasone-salmeterol     1 Inhaler    INHALE 1 PUFF BY MOUTH TWICE A DAY    Panlobular emphysema (H)       buPROPion 150 MG 12 hr tablet    WELLBUTRIN SR    180 tablet    TAKE 1 TABLET BY MOUTH TWICE A DAY    Personal history of tobacco use, presenting hazards to health       carvedilol 6.25 MG tablet    COREG    60 tablet    Take 1 tablet (6.25 mg) by mouth 2 times daily (with meals)    Acute on chronic systolic congestive heart failure (H)       doxycycline 100 MG capsule    VIBRAMYCIN    20 capsule    Take 1 capsule (100 mg) by mouth 2  times daily    Acute bronchitis, unspecified organism       fluconazole 150 MG tablet    DIFLUCAN    4 tablet    Take 1 tablet (150 mg) by mouth every 3 days    Yeast pharyngitis       fluticasone 50 MCG/ACT spray    FLONASE    16 g    INHALE 1 TO 2 SPRAYS INTO EACH NOSTRIL EVERY DAY    Chronic rhinitis       ipratropium - albuterol 0.5 mg/2.5 mg/3 mL 0.5-2.5 (3) MG/3ML neb solution    DUONEB    540 mL    NEBULIZE 1 VIAL (3ML) BY MOUTH EVERY 4 HOURS AS NEEDED FOR SHORTNESSOF BREATH / DYSPNEA OR WHEEZING    COPD exacerbation (H)       JANTOVEN 2.5 MG tablet   Generic drug:  warfarin     210 tablet    TAKE 7.5 MG (3 TABS) ON TUES, THURS, SAT AND 5 MG (2 TABS) ALL OTHERDAYS, OR AS DIRECTED BY THE COUMADIN CLINIC.    Atrial fibrillation with RVR (H)       montelukast 10 MG tablet    SINGULAIR    90 tablet    TAKE 1 TABLET BY MOUTH DAILY AT BEDTIME    Chronic seasonal allergic rhinitis due to other allergen       * nebulizer/tubing/mouthpiece Kit     1 kit    Use every 4-6 hours PRN    Chronic obstructive pulmonary disease, unspecified COPD type (H)       * AIRS DISPOSABLE NEBULIZER Kit     1 kit    USE EVERY 4 TO 6 HOURS AS NEEDED    Panlobular emphysema (H)       order for DME     1 Device    Equipment being ordered: Nebulizer    COPD (chronic obstructive pulmonary disease) (H)       potassium chloride SA 20 MEQ CR tablet    K-DUR/KLOR-CON M    90 tablet    Take 1 tablet (20 mEq) by mouth daily    CHF (congestive heart failure) (H), Peripheral edema       senna-docusate 8.6-50 MG per tablet    SENOKOT-S;PERICOLACE    360 tablet    Take 2 tablets by mouth 2 times daily    Constipation, unspecified constipation type       spironolactone 25 MG tablet    ALDACTONE    30 tablet    Take 1 tablet (25 mg) by mouth daily    Congestive heart failure, unspecified congestive heart failure chronicity, unspecified congestive heart failure type       torsemide 20 MG tablet    DEMADEX    135 tablet    Take 1 tablet (20 mg) by mouth  daily Or as directed by [facility], up to 3 tabs daily    Chronic diastolic congestive heart failure (H)       VENTOLIN  (90 Base) MCG/ACT inhaler   Generic drug:  albuterol     18 g    INHALE 2 PUFFS BY MOUTH EVERY 4 HOURS AS NEEDED FOR SHORTNESS OF BREATH/DYSPNEA    Chronic obstructive pulmonary disease with acute exacerbation (H)       * Notice:  This list has 2 medication(s) that are the same as other medications prescribed for you. Read the directions carefully, and ask your doctor or other care provider to review them with you.

## 2018-10-22 NOTE — PROGRESS NOTES
SUBJECTIVE:   Home Valdez is a 68 year old male who presents to clinic today for the following health issues:      Chief Complaint   Patient presents with     RECHECK     cough                     Chief Complaint         The patient is a well-known has not 68-year-old gentleman presents today with a slight cough, excess sputum production.  He was seen only a couple days ago with a diagnosis of acute bronchitis and at that time was started on doxycycline.  He has extensive history of congestive heart failure which is currently well controlled.  He is lost several pounds, and has been converted back to a normal sinus rhythm, and echocardiography confirms that his ejection fraction is slowly improving.  He states that he feels wonderful but was concerned that the bronchitis because him to get pneumonia again.                       PAST, FAMILY,SOCIAL HISTORY:     Medical  History:   has a past medical history of AAA (abdominal aortic aneurysm) (H); Atrial fibrillation (H); Chronic anticoagulation; COPD (chronic obstructive pulmonary disease) (H); Hyperlipidemia; Hypertension; NICM (nonischemic cardiomyopathy) (H); Obesity (BMI 35.0-39.9 without comorbidity); JAMES (obstructive sleep apnea) (9/3/2014); PTSD (post-traumatic stress disorder); Pulmonary HTN (H); and Trigeminal neuralgia.     Surgical History:   has a past surgical history that includes CYSTOURETHROSCOPY (1998); LAMINOTOMY,LUMBAR DISK,1 INTRSP (1993); NONSPECIFIC PROCEDURE; NONSPECIFIC PROCEDURE; Phacoemulsification with standard intraocular lens implant (12/26/2013); Vitrectomy anterior (12/26/2013); L cataract surgery[; Soft tissue surgery; colonoscopy; hernia repair; Head and neck surgery; Vitrectomy parsplana with 25 gauge system (2/6/2014); Balloon compression rhizotomy (Left, 9/7/2016); Balloon compression rhizotomy (Left, 6/5/2017); Balloon compression rhizotomy (Left, 11/7/2017); and Phacoemulsification with standard intraocular lens implant  (Right, 5/3/2018).     Social History:   reports that he quit smoking about 4 years ago. His smoking use included Cigarettes. He has a 40.00 pack-year smoking history. He has never used smokeless tobacco. He reports that he does not drink alcohol or use illicit drugs.     Family History:  family history includes Depression in his father; Diabetes in his paternal grandmother; Hypertension in his mother and paternal grandfather.            MEDICATIONS  Current Outpatient Prescriptions   Medication Sig Dispense Refill     acetaminophen (TYLENOL) 325 MG tablet Take 2 tablets (650 mg) by mouth every 4 hours as needed for other (mild pain) 100 tablet 0     ADVAIR DISKUS 250-50 MCG/DOSE diskus inhaler INHALE 1 PUFF BY MOUTH TWICE A DAY 1 Inhaler 1     buPROPion (WELLBUTRIN SR) 150 MG 12 hr tablet TAKE 1 TABLET BY MOUTH TWICE A  tablet 1     carvedilol (COREG) 6.25 MG tablet Take 1 tablet (6.25 mg) by mouth 2 times daily (with meals) 60 tablet 1     doxycycline (VIBRAMYCIN) 100 MG capsule Take 1 capsule (100 mg) by mouth 2 times daily 20 capsule 0     fluconazole (DIFLUCAN) 150 MG tablet Take 1 tablet (150 mg) by mouth every 3 days 4 tablet 0     fluticasone (FLONASE) 50 MCG/ACT spray INHALE 1 TO 2 SPRAYS INTO EACH NOSTRIL EVERY DAY 16 g 3     JANTOVEN 2.5 MG tablet TAKE 7.5 MG (3 TABS) ON TUES, THURS, SAT AND 5 MG (2 TABS) ALL OTHERDAYS, OR AS DIRECTED BY THE COUMADIN CLINIC. 210 tablet 1     montelukast (SINGULAIR) 10 MG tablet TAKE 1 TABLET BY MOUTH DAILY AT BEDTIME 90 tablet 1     order for DME Equipment being ordered: Nebulizer 1 Device 0     Respiratory Therapy Supplies (AIRS DISPOSABLE NEBULIZER) KIT USE EVERY 4 TO 6 HOURS AS NEEDED 1 kit 0     Respiratory Therapy Supplies (NEBULIZER/TUBING/MOUTHPIECE) KIT Use every 4-6 hours PRN 1 kit 11     senna-docusate (SENOKOT-S;PERICOLACE) 8.6-50 MG per tablet Take 2 tablets by mouth 2 times daily 360 tablet 3     spironolactone (ALDACTONE) 25 MG tablet Take 1 tablet  (25 mg) by mouth daily 30 tablet 0     torsemide (DEMADEX) 20 MG tablet Take 1 tablet (20 mg) by mouth daily Or as directed by [facility] clinic, up to 3 tabs daily 135 tablet 3     VENTOLIN  (90 Base) MCG/ACT inhaler INHALE 2 PUFFS BY MOUTH EVERY 4 HOURS AS NEEDED FOR SHORTNESS OF BREATH/DYSPNEA 18 g 3     amiodarone (PACERONE/CODARONE) 200 MG tablet Take 1 tablet (200 mg) by mouth daily 90 tablet 3     ipratropium - albuterol 0.5 mg/2.5 mg/3 mL (DUONEB) 0.5-2.5 (3) MG/3ML neb solution NEBULIZE 1 VIAL (3ML) BY MOUTH EVERY 4 HOURS AS NEEDED FOR SHORTNESSOF BREATH / DYSPNEA OR WHEEZING 540 mL 1         --------------------------------------------------------------------------------------------------------------------                              Review of Systems     LUNGS: Patient denies hemoptysis.  Has had a slight productive cough which is improved significantly over the last 3 days.   HEART: Pt denies: chest pain, arrythmia, syncope, tachy or bradyarrhythmia.   GI: Pt denies: nausea, vomitting, diarrhea,  melena, or hematochezia.  Patient acknowledges occasional constipation and would like to have his marii-Colace on prescription.   NEURO: Pt denies: seizures, strokes, diplopia, weakness, paraesthesias, or paralysis.                                     Examination      /70 (BP Location: Right arm, Patient Position: Sitting, Cuff Size: Adult Regular)  Pulse 72  Temp 97.7  F (36.5  C) (Temporal)  Resp 16  Wt 205 lb (93 kg)  SpO2 92%  BMI 29.41 kg/m2   Constitutional: The patient appears to be in no acute distress. The patient appears to be adequately hydrated. No acute respiratory or hemodynamic distress is noted at this time.   LUNGS: clear bilaterally, airflow is brisk, no intercostal retraction or stridor is noted. No coughing is noted during visit.   HEART:  regular without rubs, clicks, gallops, or murmurs. PMI is nondisplaced. Upstrokes are brisk. S1,S2 are heard.   GI: Abdomen is soft, without  rebound, guarding or tenderness. Bowel sounds are appropriate. No renal bruits are heard.   ENT: Pharynx is minimally-erythemous, evidence of candidiasis is noted in the pharynx.  Posterior nasal drainage is moderate.                                      Decision Making  1. Bronchitis  Markedly improved, continue include antibiotic    2. Yeast pharyngitis  Start Diflucan in about 5 days and conclude following antibiotic  - fluconazole (DIFLUCAN) 150 MG tablet; Take 1 tablet (150 mg) by mouth every 3 days  Dispense: 4 tablet; Refill: 0    3. Constipation, unspecified constipation type  Continue stool softener/laxative as needed  - senna-docusate (SENOKOT-S;PERICOLACE) 8.6-50 MG per tablet; Take 2 tablets by mouth 2 times daily  Dispense: 360 tablet; Refill: 3    4. Panlobular emphysema (H)  Continue nebulizer therapy    5. Long term current use of anticoagulant therapy  Avoid antibiotics which interfere/done  Regular INR surveillance    6. Chronic systolic congestive heart failure (H)  Improved, follow-up with cardiology.  Continue amiodarone for rhythm management  Continue diuretics/discontinue Spironolactone when possible.                           FOLLOW UP   I have asked the patient to make an appointment for followup with me in 1 month        I have carefully explained the diagnosis and treatment options to the patient.  The patient has displayed an understanding of the above, and all subsequent questions were answered.      DO SANJAY Lock    Portions of this note were produced using [employer]  Although every attempt at real-time proof reading has been made, occasional grammar/syntax errors may have been missed.

## 2018-10-23 ENCOUNTER — OFFICE VISIT (OUTPATIENT)
Dept: CARDIOLOGY | Facility: CLINIC | Age: 68
End: 2018-10-23
Attending: INTERNAL MEDICINE
Payer: MEDICARE

## 2018-10-23 VITALS
SYSTOLIC BLOOD PRESSURE: 108 MMHG | BODY MASS INDEX: 29.52 KG/M2 | DIASTOLIC BLOOD PRESSURE: 70 MMHG | HEIGHT: 70 IN | WEIGHT: 206.2 LBS | HEART RATE: 60 BPM | OXYGEN SATURATION: 93 %

## 2018-10-23 DIAGNOSIS — E78.5 HYPERLIPIDEMIA LDL GOAL <130: ICD-10-CM

## 2018-10-23 DIAGNOSIS — I71.40 ABDOMINAL AORTIC ANEURYSM (AAA) WITHOUT RUPTURE (H): ICD-10-CM

## 2018-10-23 DIAGNOSIS — J44.1 COPD EXACERBATION (H): ICD-10-CM

## 2018-10-23 DIAGNOSIS — I48.0 PAROXYSMAL ATRIAL FIBRILLATION (H): ICD-10-CM

## 2018-10-23 DIAGNOSIS — I50.22 CHRONIC SYSTOLIC CONGESTIVE HEART FAILURE (H): Primary | ICD-10-CM

## 2018-10-23 DIAGNOSIS — I10 HYPERTENSION GOAL BP (BLOOD PRESSURE) < 140/90: ICD-10-CM

## 2018-10-23 PROCEDURE — 99214 OFFICE O/P EST MOD 30 MIN: CPT | Performed by: INTERNAL MEDICINE

## 2018-10-23 RX ORDER — AMIODARONE HYDROCHLORIDE 200 MG/1
200 TABLET ORAL DAILY
Qty: 90 TABLET | Refills: 3 | Status: SHIPPED | OUTPATIENT
Start: 2018-10-23

## 2018-10-23 RX ORDER — IPRATROPIUM BROMIDE AND ALBUTEROL SULFATE 2.5; .5 MG/3ML; MG/3ML
SOLUTION RESPIRATORY (INHALATION)
Qty: 540 ML | Refills: 1 | Status: SHIPPED | OUTPATIENT
Start: 2018-10-23 | End: 2019-01-19

## 2018-10-23 NOTE — TELEPHONE ENCOUNTER
"Requested Prescriptions   Pending Prescriptions Disp Refills     ipratropium - albuterol 0.5 mg/2.5 mg/3 mL (DUONEB) 0.5-2.5 (3) MG/3ML neb solution [Pharmacy Med Name: IPRATROP/ALBUT 0.5-3MG/3ML INH] 540 mL     Last Written Prescription Date:  9/21/18  Last Fill Quantity: 540 mL,  # refills: 1   Last office visit: 10/19/2018 with prescribing provider:  10/19/18   Future Office Visit:   Next 5 appointments (look out 90 days)     Oct 23, 2018  3:00 PM CDT   Return Visit with Jimenez Murphy MD   [facility] ([facility])    [address] [place] [id number] [phone]                Sig: NEBULIZE 1 VIAL (3ML) BY MOUTH EVERY 4 HOURS AS NEEDED FOR SHORTNESSOF BREATH / DYSPNEA OR WHEEZING    Short-Acting Beta Agonist Inhalers Protocol  Passed    10/20/2018  5:01 PM       Passed - Patient is age 12 or older       Passed - Recent (12 mo) or future (30 days) visit within the authorizing provider's specialty    Patient had office visit in the last 12 months or has a visit in the next 30 days with authorizing provider or within the authorizing provider's specialty.  See \"Patient Info\" tab in inbasket, or \"Choose Columns\" in Meds & Orders section of the refill encounter.              "

## 2018-10-23 NOTE — TELEPHONE ENCOUNTER
Prescription approved per [facility] [place].    MAXINE YanceyN, RN  [facility]

## 2018-10-23 NOTE — LETTER
10/23/2018      Sandip Vega, DO  [address] [place] [address]      RE: Home WANG José Miguel       Dear Colleague,    I had the pleasure of seeing Home Valdez in the [facility].    Service Date: 10/23/2018      HISTORY OF PRESENT ILLNESS:  I had the opportunity to see Mr. Home Valdez in the [facility] today at the [facility] in [place] for reevaluation of chronic systolic congestive heart failure and paroxysmal atrial fibrillation.        As you know, Mr. Valdez had chronic diastolic congestive heart failure for several years and deteriorated significantly this summer.  It appeared that he had forgotten his metoprolol for several weeks and went back into atrial fibrillation with rapid ventricular response resulting in severe acute systolic heart failure.  However, continuing attempts at heart rate control and diuresis on an outpatient basis were not very successful.  It was not until he was hospitalized on 09/20/2018 for yet another time that he improved.  He was treated with aggressive IV diuretics and lost quite a bit of fluid.  He went into the hospital at 242 pounds with tremendous abdominal distention and fluid in his chest, but without significant lower extremity edema.  He came back to clinic on 10/05/2018 at a weight of 217 pounds.  His weight loss was 25 pounds at that point and he continued to lose weight fairly steadily since then.  Today, 18 days later, he is down to 206 pounds where he seems to have been stable for the last week.      He is feeling very well.  He is not having shortness of breath and is able to do much more activity than he could not do before.  He is no longer having the abdominal fullness that he had when he went into the hospital.  He denies lightheadedness and continues to be free of edema.  His creatinine has been relatively stable.  Today it is 1.58, compared to his usual baseline of 1.5.   His potassium is 5.0, but he has been using salt substitute at home, which is high in potassium in addition to 20 mEq of potassium supplement and spironolactone.      While he was hospitalized, he underwent transesophageal echocardiography and cardioversion and was started on amiodarone at 200 mg p.o. b.i.d.  He has now been on that for a month.  He was restarted on beta blockers, but is using carvedilol instead of metoprolol and seems to feel better with that medication than he did with metoprolol.  He is currently on some doxycycline for bronchitis and continues to take torsemide 20 mEq daily for diuresis.      He had an echocardiogram prior to my visit with him, but his left ventricular function remains severely depressed.  His ejection fraction is 20%-25%, similar to what it was prior to his cardioversion.      PHYSICAL EXAMINATION:  His blood pressure is 108/70, heart rate 60 and weight 206 pounds.  His lungs are clear.  His heart rhythm is quite regular.  He has no cardiac murmurs and no edema.  I do not see any jugular venous distention.  He does seem to have some balance problems, which he attributes to the amiodarone.      IMPRESSIONS:  Mr. Home Valdez is a 68-year-old gentleman with long-standing COPD due to longstanding smoking history and a history of chronic diastolic heart failure.  Now his left ventricular function has decreased significantly and he has had several months of quite significant systolic heart failure problems, compounded by atrial fibrillation with rapid ventricular response.  He is doing better now after cardioversion in the hospital and IV diuretic treatment.  His weight is down 36 pounds and his breathing and abdominal distention are much improved.      I will decrease his amiodarone to 200 mg daily and discontinue his potassium supplementation.  I will continue his torsemide at his current 20 mg dose as well as his spironolactone.  I have told him he could continue his potassium  chloride salt substitute and we will recheck his labs again when he returns to [facility] in 1 month.  His creatinine appears stable.  Unfortunately, his left ventricular systolic function has not improved yet.  I will recheck that again when I see him back myself in 3 months.      cc:      Sandip Vega DO    [address] [address]         MARCO A YARBROUGH MD, [facility]             D: 10/23/2018   T: 10/23/2018   MT:       Name:     KARTIK HUERTA   MRN:      [id number]        Account:      [id number]   :      1950           Service Date: 10/23/2018      Document: [id number]         Outpatient Encounter Prescriptions as of 10/23/2018   Medication Sig Dispense Refill     acetaminophen (TYLENOL) 325 MG tablet Take 2 tablets (650 mg) by mouth every 4 hours as needed for other (mild pain) 100 tablet 0     ADVAIR DISKUS 250-50 MCG/DOSE diskus inhaler INHALE 1 PUFF BY MOUTH TWICE A DAY 1 Inhaler 1     amiodarone (PACERONE/CODARONE) 200 MG tablet Take 1 tablet (200 mg) by mouth daily 90 tablet 3     buPROPion (WELLBUTRIN SR) 150 MG 12 hr tablet TAKE 1 TABLET BY MOUTH TWICE A  tablet 1     carvedilol (COREG) 6.25 MG tablet Take 1 tablet (6.25 mg) by mouth 2 times daily (with meals) 60 tablet 1     doxycycline (VIBRAMYCIN) 100 MG capsule Take 1 capsule (100 mg) by mouth 2 times daily 20 capsule 0     fluconazole (DIFLUCAN) 150 MG tablet Take 1 tablet (150 mg) by mouth every 3 days 4 tablet 0     fluticasone (FLONASE) 50 MCG/ACT spray INHALE 1 TO 2 SPRAYS INTO EACH NOSTRIL EVERY DAY 16 g 3     ipratropium - albuterol 0.5 mg/2.5 mg/3 mL (DUONEB) 0.5-2.5 (3) MG/3ML neb solution NEBULIZE 1 VIAL (3ML) BY MOUTH EVERY 4 HOURS AS NEEDED FOR SHORTNESSOF BREATH / DYSPNEA OR WHEEZING 540 mL 1     JANTOVEN 2.5 MG tablet TAKE 7.5 MG (3 TABS) ON TUES, THURS, SAT AND 5 MG (2 TABS) ALL OTHERDAYS, OR AS DIRECTED BY THE [facility] CLINIC. 210 tablet 1     montelukast  (SINGULAIR) 10 MG tablet TAKE 1 TABLET BY MOUTH DAILY AT BEDTIME 90 tablet 1     order for DME Equipment being ordered: Nebulizer 1 Device 0     Respiratory Therapy Supplies (AIRS DISPOSABLE NEBULIZER) KIT USE EVERY 4 TO 6 HOURS AS NEEDED 1 kit 0     senna-docusate (SENOKOT-S;PERICOLACE) 8.6-50 MG per tablet Take 2 tablets by mouth 2 times daily 360 tablet 3     spironolactone (ALDACTONE) 25 MG tablet Take 1 tablet (25 mg) by mouth daily 30 tablet 0     torsemide (DEMADEX) 20 MG tablet Take 1 tablet (20 mg) by mouth daily Or as directed by [facility], up to 3 tabs daily 135 tablet 3     VENTOLIN  (90 Base) MCG/ACT inhaler INHALE 2 PUFFS BY MOUTH EVERY 4 HOURS AS NEEDED FOR SHORTNESS OF BREATH/DYSPNEA 18 g 3     Respiratory Therapy Supplies (NEBULIZER/TUBING/MOUTHPIECE) KIT Use every 4-6 hours PRN 1 kit 11     [DISCONTINUED] amiodarone (PACERONE/CODARONE) 200 MG tablet Take 1 tablet (200 mg) by mouth 2 times daily 60 tablet 0     [DISCONTINUED] potassium chloride SA (K-DUR/KLOR-CON M) 20 MEQ CR tablet Take 1 tablet (20 mEq) by mouth daily (Patient taking differently: Take 20 mEq by mouth At Bedtime ) 90 tablet 2     No facility-administered encounter medications on file as of 10/23/2018.        Again, thank you for allowing me to participate in the care of your patient.      Sincerely,    MARCO A YARBROUGH MD     [facility]
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
## 2018-10-23 NOTE — MR AVS SNAPSHOT
After Visit Summary   10/23/2018    Home Valdez    MRN: [id number]           Patient Information     Date Of Birth          1950        Visit Information        Provider Department      10/23/2018 3:00 PM Jimenez Murphy MD [facility]        Today's Diagnoses     Chronic systolic congestive heart failure (H)    -  1    Paroxysmal atrial fibrillation (H)        Hypertension goal BP (blood pressure) < 140/90        COPD exacerbation (H)        Hyperlipidemia LDL goal <130        Abdominal aortic aneurysm (AAA) without rupture (H)           Follow-ups after your visit        Additional Services     CARDIAC REHAB REFERRAL       If you have not heard from the scheduling office within 2 business days, please call [phone] for all locations, with the exception of [place], please call [phone] and [facility] [place], please call [phone].    Please be aware that coverage of these services is subject to the terms and limitations of your health insurance plan.  Call member services at your health plan with any benefit or coverage questions.            Follow-Up with [facility] - ANTWAN visit           Follow-Up with [facility] - Return MD visit                 Your next 10 appointments already scheduled     Oct 26, 2018  8:30 AM CDT   Anticoagulation Visit with  [facility]   [facility] ([facility])    [address] [facility] [address] [phone]              Future tests that were ordered for you today     Open Future Orders        Priority Expected Expires Ordered    Follow-Up with [facility] - ANTWAN visit Routine 11/20/2018 10/23/2019 10/23/2018    Follow-Up with [facility] - Return MD visit Routine 1/21/2019 10/23/2019 10/23/2018    Basic metabolic panel Routine 11/20/2018 10/23/2019 10/23/2018    Hemoglobin Routine 11/20/2018 10/23/2019 10/23/2018    Basic metabolic panel Routine 1/21/2019 10/23/2019 10/23/2018     "Echocardiogram Routine 1/21/2019 10/23/2019 10/23/2018    CARDIAC REHAB REFERRAL Routine 10/30/2018 10/23/2019 10/23/2018            Who to contact     If you have questions or need follow up information about today's clinic visit or your schedule please contact [facility] directly at [phone].  Normal or non-critical lab and imaging results will be communicated to you by MyChart, letter or phone within 4 business days after the clinic has received the results. If you do not hear from us within 7 days, please contact the clinic through MyChart or phone. If you have a critical or abnormal lab result, we will notify you by phone as soon as possible.  Submit refill requests through [employer] or call your pharmacy and they will forward the refill request to us. Please allow 3 business days for your refill to be completed.          Additional Information About Your Visit        Care EveryWhere ID     This is your Care EveryWhere ID. This could be used by other organizations to access your [facility] medical records  [id number]        Your Vitals Were     Pulse Height Pulse Oximetry BMI (Body Mass Index)          60 1.778 m (5' 10\") 93% 29.59 kg/m2         Blood Pressure from Last 3 Encounters:   10/23/18 108/70   10/22/18 118/70   10/19/18 108/72    Weight from Last 3 Encounters:   10/23/18 93.5 kg (206 lb 3.2 oz)   10/22/18 93 kg (205 lb)   10/19/18 94.1 kg (207 lb 6.4 oz)              We Performed the Following     Follow-Up with Cardiologist     Follow-Up with [facility] - Return MD visit          Today's Medication Changes          These changes are accurate as of 10/23/18  3:53 PM.  If you have any questions, ask your nurse or doctor.               These medicines have changed or have updated prescriptions.        Dose/Directions    amiodarone 200 MG tablet   Commonly known as:  PACERONE/CODARONE   This may have changed:  when to take this   Used for:  Paroxysmal atrial " fibrillation (H)        Dose:  200 mg   Take 1 tablet (200 mg) by mouth daily   Quantity:  90 tablet   Refills:  3         Stop taking these medicines if you haven't already. Please contact your care team if you have questions.     potassium chloride SA 20 MEQ CR tablet   Commonly known as:  K-DUR/KLOR-CON M                Where to get your medicines      These medications were sent to [id number] - [place], [place] - [address]   [address] [facility] [address]    Hours:  M-F 8:30-6:30; Sat 9-4; closed Sunday Phone:  [phone]     amiodarone 200 MG tablet                Primary Care Provider Office Phone # Fax #    Sandip Vega [phone]       [address] [place] [address]        Goals        General    Medical (pt-stated)     Notes - Note created  9/28/2018 12:01 PM by Zaria Gonzales RN    Goal Statement: I will monitor my weight, blood pressure, pulse, swelling in extremities, fluid and sodium intake, and report any concerning or new symptoms to my care team daily.   Measure of Success: reporting symptoms to care team   Supportive Steps to Achieve: patient has a cardiology care team specifically there to meet patients questions or concerns.   Barriers: complex medical diagnosis.   Strengths: patient and wife are involved in medical care, and current treatment plans  Date to Achieve By: on going  Patient expressed understanding of goal: yes            Equal Access to Services     REBECCA ORITZ AH: Hadii lissette leon hadroseo Sofede, waaxda luqadaha, qaybta kaalmada jessicayada, jase john . So [facility] [phone].    ATENCIÓN: Si habla español, tiene a calabrese disposición servicios gratuitos de asistencia lingüística. Kenrick al [phone].    We comply with applicable federal civil rights laws and [place] laws. We do not discriminate on the basis of race, color, national origin, age, disability, sex, sexual orientation, or gender  identity.            Thank you!     Thank you for choosing [facility]  for your care. Our goal is always to provide you with excellent care. Hearing back from our patients is one way we can continue to improve our services. Please take a few minutes to complete the written survey that you may receive in the mail after your visit with us. Thank you!             Your Updated Medication List - Protect others around you: Learn how to safely use, store and throw away your medicines at www.disposemymeds.org.          This list is accurate as of 10/23/18  3:53 PM.  Always use your most recent med list.                   Brand Name Dispense Instructions for use Diagnosis    acetaminophen 325 MG tablet    TYLENOL    100 tablet    Take 2 tablets (650 mg) by mouth every 4 hours as needed for other (mild pain)        ADVAIR DISKUS 250-50 MCG/DOSE diskus inhaler   Generic drug:  fluticasone-salmeterol     1 Inhaler    INHALE 1 PUFF BY MOUTH TWICE A DAY    Panlobular emphysema (H)       amiodarone 200 MG tablet    PACERONE/CODARONE    90 tablet    Take 1 tablet (200 mg) by mouth daily    Paroxysmal atrial fibrillation (H)       buPROPion 150 MG 12 hr tablet    WELLBUTRIN SR    180 tablet    TAKE 1 TABLET BY MOUTH TWICE A DAY    Personal history of tobacco use, presenting hazards to health       carvedilol 6.25 MG tablet    COREG    60 tablet    Take 1 tablet (6.25 mg) by mouth 2 times daily (with meals)    Acute on chronic systolic congestive heart failure (H)       doxycycline 100 MG capsule    VIBRAMYCIN    20 capsule    Take 1 capsule (100 mg) by mouth 2 times daily    Acute bronchitis, unspecified organism       fluconazole 150 MG tablet    DIFLUCAN    4 tablet    Take 1 tablet (150 mg) by mouth every 3 days    Yeast pharyngitis       fluticasone 50 MCG/ACT spray    FLONASE    16 g    INHALE 1 TO 2 SPRAYS INTO EACH NOSTRIL EVERY DAY    Chronic rhinitis       ipratropium - albuterol  0.5 mg/2.5 mg/3 mL 0.5-2.5 (3) MG/3ML neb solution    DUONEB    540 mL    NEBULIZE 1 VIAL (3ML) BY MOUTH EVERY 4 HOURS AS NEEDED FOR SHORTNESSOF BREATH / DYSPNEA OR WHEEZING    COPD exacerbation (H)       JANTOVEN 2.5 MG tablet   Generic drug:  warfarin     210 tablet    TAKE 7.5 MG (3 TABS) ON TUES, THURS, SAT AND 5 MG (2 TABS) ALL OTHERDAYS, OR AS DIRECTED BY THE COUMADIN CLINIC.    Atrial fibrillation with RVR (H)       montelukast 10 MG tablet    SINGULAIR    90 tablet    TAKE 1 TABLET BY MOUTH DAILY AT BEDTIME    Chronic seasonal allergic rhinitis due to other allergen       * nebulizer/tubing/mouthpiece Kit     1 kit    Use every 4-6 hours PRN    Chronic obstructive pulmonary disease, unspecified COPD type (H)       * AIRS DISPOSABLE NEBULIZER Kit     1 kit    USE EVERY 4 TO 6 HOURS AS NEEDED    Panlobular emphysema (H)       order for DME     1 Device    Equipment being ordered: Nebulizer    COPD (chronic obstructive pulmonary disease) (H)       senna-docusate 8.6-50 MG per tablet    SENOKOT-S;PERICOLACE    360 tablet    Take 2 tablets by mouth 2 times daily    Constipation, unspecified constipation type       spironolactone 25 MG tablet    ALDACTONE    30 tablet    Take 1 tablet (25 mg) by mouth daily    Congestive heart failure, unspecified congestive heart failure chronicity, unspecified congestive heart failure type       torsemide 20 MG tablet    DEMADEX    135 tablet    Take 1 tablet (20 mg) by mouth daily Or as directed by [facility], up to 3 tabs daily    Chronic diastolic congestive heart failure (H)       VENTOLIN  (90 Base) MCG/ACT inhaler   Generic drug:  albuterol     18 g    INHALE 2 PUFFS BY MOUTH EVERY 4 HOURS AS NEEDED FOR SHORTNESS OF BREATH/DYSPNEA    Chronic obstructive pulmonary disease with acute exacerbation (H)       * Notice:  This list has 2 medication(s) that are the same as other medications prescribed for you. Read the directions carefully, and ask your doctor or other  care provider to review them with you.

## 2018-10-23 NOTE — PROGRESS NOTES
Service Date: 10/23/2018      HISTORY OF PRESENT ILLNESS:  I had the opportunity to see Mr. Home Valdez in the [facility] today at the [facility] in [place] for reevaluation of chronic systolic congestive heart failure and paroxysmal atrial fibrillation.        As you know, Mr. Valdez had chronic diastolic congestive heart failure for several years and deteriorated significantly this summer.  It appeared that he had forgotten his metoprolol for several weeks and went back into atrial fibrillation with rapid ventricular response resulting in severe acute systolic heart failure.  However, continuing attempts at heart rate control and diuresis on an outpatient basis were not very successful.  It was not until he was hospitalized on 09/20/2018 for yet another time that he improved.  He was treated with aggressive IV diuretics and lost quite a bit of fluid.  He went into the hospital at 242 pounds with tremendous abdominal distention and fluid in his chest, but without significant lower extremity edema.  He came back to clinic on 10/05/2018 at a weight of 217 pounds.  His weight loss was 25 pounds at that point and he continued to lose weight fairly steadily since then.  Today, 18 days later, he is down to 206 pounds where he seems to have been stable for the last week.      He is feeling very well.  He is not having shortness of breath and is able to do much more activity than he could not do before.  He is no longer having the abdominal fullness that he had when he went into the hospital.  He denies lightheadedness and continues to be free of edema.  His creatinine has been relatively stable.  Today it is 1.58, compared to his usual baseline of 1.5.  His potassium is 5.0, but he has been using salt substitute at home, which is high in potassium in addition to 20 mEq of potassium supplement and spironolactone.      While he was hospitalized, he underwent transesophageal  echocardiography and cardioversion and was started on amiodarone at 200 mg p.o. b.i.d.  He has now been on that for a month.  He was restarted on beta blockers, but is using carvedilol instead of metoprolol and seems to feel better with that medication than he did with metoprolol.  He is currently on some doxycycline for bronchitis and continues to take torsemide 20 mEq daily for diuresis.      He had an echocardiogram prior to my visit with him, but his left ventricular function remains severely depressed.  His ejection fraction is 20%-25%, similar to what it was prior to his cardioversion.      PHYSICAL EXAMINATION:  His blood pressure is 108/70, heart rate 60 and weight 206 pounds.  His lungs are clear.  His heart rhythm is quite regular.  He has no cardiac murmurs and no edema.  I do not see any jugular venous distention.  He does seem to have some balance problems, which he attributes to the amiodarone.      IMPRESSIONS:  Mr. Home Valdez is a 68-year-old gentleman with long-standing COPD due to longstanding smoking history and a history of chronic diastolic heart failure.  Now his left ventricular function has decreased significantly and he has had several months of quite significant systolic heart failure problems, compounded by atrial fibrillation with rapid ventricular response.  He is doing better now after cardioversion in the hospital and IV diuretic treatment.  His weight is down 36 pounds and his breathing and abdominal distention are much improved.      I will decrease his amiodarone to 200 mg daily and discontinue his potassium supplementation.  I will continue his torsemide at his current 20 mg dose as well as his spironolactone.  I have told him he could continue his potassium chloride salt substitute and we will recheck his labs again when he returns to [facility] in 1 month.  His creatinine appears stable.  Unfortunately, his left ventricular systolic function has not improved yet.  I  will recheck that again when I see him back myself in 3 months.      cc:      Sandip Vega,     [place] [address]         MARCO A YARBROUGH MD, FACC             D: 10/23/2018   T: 10/23/2018   MT: [id number]      Name:     KARTIK HUERTA   MRN:      [id number]        Account:      [id number]   :      1950           Service Date: 10/23/2018      Document: [id number]
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
## 2018-10-26 ENCOUNTER — ANTICOAGULATION THERAPY VISIT (OUTPATIENT)
Dept: ANTICOAGULATION | Facility: OTHER | Age: 68
End: 2018-10-26
Payer: MEDICARE

## 2018-10-26 DIAGNOSIS — Z79.01 LONG TERM CURRENT USE OF ANTICOAGULANT THERAPY: ICD-10-CM

## 2018-10-26 DIAGNOSIS — I48.91 ATRIAL FIBRILLATION WITH RVR (H): ICD-10-CM

## 2018-10-26 LAB — INR POINT OF CARE: 4.2 (ref 0.86–1.14)

## 2018-10-26 PROCEDURE — 85610 PROTHROMBIN TIME: CPT | Mod: QW

## 2018-10-26 PROCEDURE — 36416 COLLJ CAPILLARY BLOOD SPEC: CPT

## 2018-10-26 PROCEDURE — 99207 ZZC NO CHARGE NURSE ONLY: CPT

## 2018-10-26 NOTE — MR AVS SNAPSHOT
Home Valdez   10/26/2018 4:00 PM   Anticoagulation Therapy Visit    Description:  68 year old male   Provider:  CAMILO ANTI DANI   Department:  [facility]           INR as of 10/26/2018     Today's INR 4.2!      Anticoagulation Summary as of 10/26/2018     INR goal 2.0-3.0   Today's INR 4.2!   Full warfarin instructions 10/26: Hold; 10/27: 2.5 mg; Otherwise 2.5 mg on Mon, Wed, Fri; 5 mg all other days   Next INR check 10/29/2018    Indications   Atrial fibrillation with RVR (H) [I48.91]  Long term current use of anticoagulant therapy [Z79.01]         Your next Anticoagulation Clinic appointment(s)     Oct 29, 2018  3:30 PM CDT   Anticoagulation Visit with [facility] ANTI COAG   [facility]    [address] [facility] [address]   [phone]              Contact Numbers     Clinic Number:         October 2018 Details    Sun Mon Tue Wed Thu Fri Sat      1               2               3               4               5               6                 7               8               9               10               11               12               13                 14               15               16               17               18               19               20                 21               22               23               24               25               26      Hold   See details      27      2.5 mg           28      5 mg         29            30               31                   Date Details   10/26 This INR check       Date of next INR:  10/29/2018         How to take your warfarin dose     To take:  2.5 mg Take 1 of the 2.5 mg tablets.    To take:  5 mg Take 2 of the 2.5 mg tablets.    Hold Do not take your warfarin dose. See the Details table to the right for additional instructions.

## 2018-10-26 NOTE — PROGRESS NOTES
ANTICOAGULATION FOLLOW-UP CLINIC VISIT    Patient Name:  Home Valdez  Date:  10/26/2018  Contact Type:  Face to Face    SUBJECTIVE:     Patient Findings     Positives Antibiotic use or infection (Still on Doxy and started diflucan this week - both can raise INR. )           OBJECTIVE    INR Protime   Date Value Ref Range Status   10/26/2018 4.2 (A) 0.86 - 1.14 Final       ASSESSMENT / PLAN  INR assessment SUPRA    Recheck INR In: 3 DAYS    INR Location Clinic      Anticoagulation Summary as of 10/26/2018     INR goal 2.0-3.0   Today's INR 4.2!   Warfarin maintenance plan 2.5 mg (2.5 mg x 1) on Mon, Wed, Fri; 5 mg (2.5 mg x 2) all other days   Full warfarin instructions 10/26: Hold; 10/27: 2.5 mg; 10/28: 2.5 mg; Otherwise 2.5 mg on Mon, Wed, Fri; 5 mg all other days   Weekly warfarin total 27.5 mg   Plan last modified Shameka Carey RN (10/12/2018)   Next INR check 10/29/2018   Target end date     Indications   Atrial fibrillation with RVR (H) [I48.91]  Long term current use of anticoagulant therapy [Z79.01]         Anticoagulation Episode Summary     INR check location     Preferred lab     Send INR reminders to [facility]    Comments 2.5 MG TABLETS, likes print out, PM dose, Amiodarone started 9/26/18      Anticoagulation Care Providers     Provider Role Specialty Phone number    Sandip Vega DO Home [facility] Internal Medicine [phone]            See the Encounter Report to view Anticoagulation Flowsheet and Dosing Calendar (Go to Encounters tab in chart review, and find the Anticoagulation Therapy Visit)    Dosage adjustment made based on physician directed care plan.    Shameka Carey RN

## 2018-10-30 ENCOUNTER — ANTICOAGULATION THERAPY VISIT (OUTPATIENT)
Dept: ANTICOAGULATION | Facility: CLINIC | Age: 68
End: 2018-10-30
Payer: MEDICARE

## 2018-10-30 DIAGNOSIS — I48.91 ATRIAL FIBRILLATION WITH RVR (H): ICD-10-CM

## 2018-10-30 DIAGNOSIS — Z79.01 LONG TERM CURRENT USE OF ANTICOAGULANT THERAPY: ICD-10-CM

## 2018-10-30 LAB — INR POINT OF CARE: 2.2 (ref 0.86–1.14)

## 2018-10-30 PROCEDURE — 99207 ZZC NO CHARGE NURSE ONLY: CPT

## 2018-10-30 PROCEDURE — 85610 PROTHROMBIN TIME: CPT | Mod: QW

## 2018-10-30 PROCEDURE — 36416 COLLJ CAPILLARY BLOOD SPEC: CPT

## 2018-10-30 NOTE — PROGRESS NOTES
ANTICOAGULATION FOLLOW-UP CLINIC VISIT    Patient Name:  Home Valdez  Date:  10/30/2018  Contact Type:  Face to Face    SUBJECTIVE:     Patient Findings     Positives Change in medications (doxycycline done 10/29, has one last dose of diflucan on 11/1)           OBJECTIVE    INR Protime   Date Value Ref Range Status   10/30/2018 2.2 (A) 0.86 - 1.14 Final       ASSESSMENT / PLAN  INR assessment THER    Recheck INR In: 6 DAYS    INR Location Clinic      Anticoagulation Summary as of 10/30/2018     INR goal 2.0-3.0   Today's INR 2.2   Warfarin maintenance plan 5 mg (2.5 mg x 2) on Tue, Thu; 2.5 mg (2.5 mg x 1) all other days   Full warfarin instructions 5 mg on Tue, Thu; 2.5 mg all other days   Weekly warfarin total 22.5 mg   Plan last modified Brittany Dove RN (10/30/2018)   Next INR check 11/5/2018   Target end date     Indications   Atrial fibrillation with RVR (H) [I48.91]  Long term current use of anticoagulant therapy [Z79.01]         Anticoagulation Episode Summary     INR check location     Preferred lab     Send INR reminders to [facility]    Comments 2.5 MG TABLETS, likes print out, PM dose, Amiodarone started 9/26/18      Anticoagulation Care Providers     Provider Role Specialty Phone number    Edgar Vegaifford DO Home [facility] Internal Medicine [phone]            See the Encounter Report to view Anticoagulation Flowsheet and Dosing Calendar (Go to Encounters tab in chart review, and find the Anticoagulation Therapy Visit)    Dosage adjustment made based on physician directed care plan.      Brittany Dove RN

## 2018-10-30 NOTE — MR AVS SNAPSHOT
Home Valdez   10/30/2018 11:15 AM   Anticoagulation Therapy Visit    Description:  68 year old male   Provider:  SHREYAS ANTI COAEBEN   Department:  [facility] Antico[facility]           INR as of 10/30/2018     Today's INR 2.2      Anticoagulation Summary as of 10/30/2018     INR goal 2.0-3.0   Today's INR 2.2   Full warfarin instructions 5 mg on Tue, Thu; 2.5 mg all other days   Next INR check 11/5/2018    Indications   Atrial fibrillation with RVR (H) [I48.91]  Long term current use of anticoagulant therapy [Z79.01]         Your next Anticoagulation Clinic appointment(s)     Nov 05, 2018  3:00 PM CST   [facility]agulation [facility] with [facility] ANTI [facility]AG   [facility]    [address] [facility] [id number] [phone]              Contact Numbers     Clinic Number:         October 2018 Details    Sun Mon Tue Wed Thu Fri Sat      1               2               3               4               5               6                 7               8               9               10               11               12               13                 14               15               16               17               18               19               20                 21               22               23               24               25               26               27                 28               29               30      5 mg   See details      31      2.5 mg             Date Details   10/30 This INR check               How to take your warfarin dose     To take:  2.5 mg Take 1 of the 2.5 mg tablets.    To take:  5 mg Take 2 of the 2.5 mg tablets.           November 2018 Details    Sun Mon Tue Wed Thu Fri Sat         1      5 mg         2      2.5 mg         3      2.5 mg           4      2.5 mg         5            6               7               8               9               10                 11               12               13               14               15               16                17                 18               19               20               21               22               23               24                 25               26               27               28               29               30                 Date Details   No additional details    Date of next INR:  11/5/2018         How to take your warfarin dose     To take:  2.5 mg Take 1 of the 2.5 mg tablets.    To take:  5 mg Take 2 of the 2.5 mg tablets.

## 2018-10-31 ENCOUNTER — PATIENT OUTREACH (OUTPATIENT)
Dept: CARE COORDINATION | Facility: CLINIC | Age: 68
End: 2018-10-31

## 2018-10-31 DIAGNOSIS — I50.22 CHRONIC SYSTOLIC CONGESTIVE HEART FAILURE (H): Primary | ICD-10-CM

## 2018-10-31 RX ORDER — SPIRONOLACTONE 25 MG/1
25 TABLET ORAL DAILY
Qty: 90 TABLET | Refills: 0 | Status: SHIPPED | OUTPATIENT
Start: 2018-10-31 | End: 2018-12-26

## 2018-10-31 NOTE — PROGRESS NOTES
"Clinic Care Coordination Contact  Dec 31st patient is going to retire. He is looking forward to this.   Right now, he can get called and have to travel. 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   Once in a while he feels \"a little wobbly\" on his feet. He will discuss with with PCP.   Weight was 196.1 today. His goal is 195. \"you cannot believe how well I breath with this weight off.\"   Patient uses bipap machine.  Even if he lays down for an hour.   Patient making good choice while going out to eat. Salad and salt free meat with onions.   He is motivated to be healthy. Aware with the holidays coming up.   Discussed upcoming appointments. Happy that INR is less affected by the lower dose of amiodarone.   Per patient, he is doing \"very, very well\"     Goal Statement: I want to weigh 195 lbs  Measure of Success: home scale will read 195.  Supportive Steps to Achieve: patient has made many lifestyle changes to foster safe and healthy weight loss that will in turn cause positive health benefits.   Barriers: none  Strengths: patient is motivated to learn about weight loss, is exercising, and healthy eating. 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   Date to Achieve By: 1 month  Patient expressed understanding of goal: yes    Plan:  1. He did not receive the letter in the mail with My COPD and My heart failure action plan. CC RN will get out to patient.  2. CC RN advised that his is to monitor his symptoms, and reach out to care team as needed.   3. CC RN will follow up with patient in 1 month. Patient will reach out as needed.     ZENA Samuel RN Clinic Care Coordinator   [place], [place], [place]  Phone: [phone]           "

## 2018-10-31 NOTE — TELEPHONE ENCOUNTER
Routing refill request to provider for review/approval because:  Labs out of range:  Creatinine    MAXINE YanceyN, RN  [facility]

## 2018-10-31 NOTE — LETTER
[facility] CARE COORDINATION  [address] [place] [address]    October 31, 2018    Home WANG [place]  [address]  [facility] [address]      Dear Home,    I am a clinic care coordinator who works with Sandip Vega DO at [facility]. I wanted to thank you for spending the time to talk with me.  I wanted to provide you with my contact information so that you can call me with questions or concerns about your health care. Below is a description of clinic care coordination and how I can further assist you.     The clinic care coordinator is a registered nurse and/or  who understand the health care system. The goal of clinic care coordination is to help you manage your health and improve access to the [facility] system in the most efficient manner. The registered nurse can assist you in meeting your health care goals by providing education, coordinating services, and strengthening the communication among your providers. The  can assist you with financial, behavioral, psychosocial, chemical dependency, counseling, and/or psychiatric resources.    Please feel free to contact me at [phone], with any questions or concerns. We at [facility] are focused on providing you with the highest-quality healthcare experience possible and that all starts with you.     Sincerely,   ZENA Samuel RN Clinic Care Coordinator   [place], [place], [place]  Phone: [phone]     Enclosed: I have enclosed a copy of the Complex Care Plan. This has helpful information and goals that we have talked about. Please keep this in an easy to access place to use as needed.

## 2018-10-31 NOTE — LETTER
[facility] Home  Complex Care Plan  About Me  Patient Name:  Home Valdez    YOB: 1950  Age:     68 year old   [facility] MRN:   [id number] Telephone Information:    Home Phone [phone]   Mobile [phone]       Address:    [address] [place], [place] [address] [facility] [address] Email address:  [email or web address]      Emergency Contact(s)  Name Relationship Lgl Grd Work Phone Home Phone Mobile Phone   1. BAKARI VALDEZ Spouse No [phone]   2. MAXWELL BROOKS* Daughter No  [phone]           Primary language:  English     needed? No   [facility] [employer]:  [phone] op. 1  Other communication barriers:    Preferred Method of Communication:  Mail    Health Maintenance  Health Maintenance Reviewed:    Health Maintenance Due   Topic Date Due     HEPATITIS C SCREENING  02/27/1968     MICROALBUMIN Q1 YEAR  12/20/2014     PNEUMOCOCCAL (2 of 2 - PPSV23) 02/10/2017     OP ANNUAL INR REFERRAL  07/11/2017     LIPID MONITORING Q1 YEAR  05/30/2018     INFLUENZA VACCINE (1) 09/01/2018     My Access Plan  Medical Emergency 911   Primary Clinic Line [facility] - [phone]   24 Hour Appointment Line [phone] or  [phone]) (toll-free)   24 Hour Nurse Line [phone] (toll-free)   Preferred Urgent Care     Preferred Hospital     Preferred Pharmacy [facility] [place] [address] [address]     Behavioral Health Crisis Line The National Suicide Prevention Lifeline at 1-120.848.8631 or 911     My Care Team Members    Patient Care Team       Relationship Specialty Notifications Start End    Sandip Vega DO PCP - General Internal Medicine  5/2/17     Phone: [phone] Fax: [phone]         [address] [place] [address]    Zheng Anderson MD   Neurology  7/1/15     Phone: [phone] Fax: [phone]         [facility] OF NEUROLOGY [address] [facility]  [place] [address]    Marilee Gerber, RN Nurse Coordinator Neurosurgery Abnormal results only, Admissions 10/25/17     Phone: [phone]         Zaria Gonzales RN Lead Care Coordinator  Admissions 9/28/18     Phone: [phone] Fax: [phone]                My Care Plans  Goals        General    Medical (pt-stated)     Notes - Note created  9/28/2018 12:01 PM by Zaria Gonzales, RN    Goal Statement: I will monitor my weight, blood pressure, pulse, swelling in extremities, fluid and sodium intake, and report any concerning or new symptoms to my care team daily.   Measure of Success: reporting symptoms to care team   Supportive Steps to Achieve: patient has a cardiology care team specifically there to meet patients questions or concerns.   Barriers: complex medical diagnosis.   Strengths: patient and wife are involved in medical care, and current treatment plans  Date to Achieve By: on going  Patient expressed understanding of goal: yes          Medical (pt-stated)     Notes - Note edited  10/31/2018 11:15 AM by Zaria Gonzales, FRENCH    Goal Statement: I want to weigh 195 lbs  Measure of Success: home scale will read 195.  Supportive Steps to Achieve: patient has made many lifestyle changes to foster safe and healthy weight loss that will in turn cause positive health benefits.   Barriers: none  Strengths: patient is motivated to learn about weight loss, is exercising, and healthy eating. 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   Date to Achieve By: 1 month  Patient expressed understanding of goal: yes                 Action Plans on File:      Advance Care Plans/Directives Type: not on file    My Medical and Care Information  Problem List   Patient Active Problem List   Diagnosis     Posttraumatic stress disorder     PERS HX TOBACCO USE     Pulmonary emphysema (H)     Pulmonary nodule, needs annual ct      Hypertension goal BP (blood pressure) < 140/90     Encounter for long-term current use of  medication     COPD (chronic obstructive pulmonary disease) (H)     Hyperlipidemia LDL goal <130     AAA (abdominal aortic aneurysm) 4.0 cm     SEVERE JAMES (obstructive sleep apnea)     Ejection fraction < 50%     Nonischemic cardiomyopathy EF 45-50% by echo 2016     Other peripheral vascular disease(443.89)     Dermatophytosis of nail     Syncope     Atrial fibrillation with RVR (H)     Acute systolic CHF (congestive heart failure) (H)     Neutrophilic leukocytosis     DVT prophylaxis     Rapid atrial fibrillation (H)     Hypopotassemia     Hypomagnesemia     Acute bronchitis     CHF (congestive heart failure) (H)     Thrombocytopenia (H)     Long term current use of anticoagulant therapy     History of atrial fibrillation     COPD exacerbation (H)     Acute respiratory failure (H)     Trigeminal neuralgia of left side of face     Panlobular emphysema (H)     Trigeminal neuralgia     On amiodarone therapy     Community acquired pneumonia of right upper lobe of lung (H)     CAP (community acquired pneumonia)     Morbid obesity (H)     Atrial fibrillation with rapid ventricular response (H)     Elevated troponin     CKD (chronic kidney disease) stage 3, GFR 30-59 ml/min (H)      Current Medications and Allergies:  See printed Medication Report.    Care Coordination Start Date: 9/27/2018   Frequency of Care Coordination:     Form Last Updated: 10/31/2018

## 2018-10-31 NOTE — TELEPHONE ENCOUNTER
"Requested Prescriptions   Pending Prescriptions Disp Refills     spironolactone (ALDACTONE) 25 MG tablet 30 tablet 0    Last Written Prescription Date:  9/27/18  Last Fill Quantity: 30,  # refills: 0   Last office visit: 10/22/2018 with prescribing provider:  10/22/18   Future Office Visit:   Next 5 appointments (look out 90 days)     Nov 29, 2018  8:40 AM CST   Return Visit with Yancy Keller PA-C   [facility] ([address] [address]   [phone]            Jan 24, 2019  1:45 PM CST   Return Visit with Jimenez Murphy MD   [facility] ([address] [address]   [phone]                Sig: Take 1 tablet (25 mg) by mouth daily    Diuretics (Including Combos) Protocol Failed    10/31/2018 10:27 AM       Failed - Normal serum creatinine on file in past 12 months    Recent Labs   Lab Test  10/18/18   0827   CR  1.58*             Passed - Blood pressure under 140/90 in past 12 months    BP Readings from Last 3 Encounters:   10/23/18 108/70   10/22/18 118/70   10/19/18 108/72                Passed - Recent (12 mo) or future (30 days) visit within the authorizing provider's specialty    Patient had office visit in the last 12 months or has a visit in the next 30 days with authorizing provider or within the authorizing provider's specialty.  See \"Patient Info\" tab in inbasket, or \"Choose Columns\" in Meds & Orders section of the refill encounter.             Passed - Patient is age 18 or older       Passed - Normal serum potassium on file in past 12 months    Recent Labs   Lab Test  10/18/18   0827   POTASSIUM  5.0                   Passed - Normal serum sodium on file in past 12 months    Recent Labs   Lab Test  10/18/18   0827   NA  137              "

## 2018-11-05 ENCOUNTER — ANTICOAGULATION THERAPY VISIT (OUTPATIENT)
Dept: ANTICOAGULATION | Facility: OTHER | Age: 68
End: 2018-11-05
Payer: MEDICARE

## 2018-11-05 DIAGNOSIS — I48.91 ATRIAL FIBRILLATION WITH RVR (H): ICD-10-CM

## 2018-11-05 DIAGNOSIS — Z79.01 LONG TERM CURRENT USE OF ANTICOAGULANT THERAPY: ICD-10-CM

## 2018-11-05 LAB — INR POINT OF CARE: 2.8 (ref 0.86–1.14)

## 2018-11-05 PROCEDURE — 85610 PROTHROMBIN TIME: CPT | Mod: QW

## 2018-11-05 PROCEDURE — 36416 COLLJ CAPILLARY BLOOD SPEC: CPT

## 2018-11-05 PROCEDURE — 99207 ZZC NO CHARGE NURSE ONLY: CPT

## 2018-11-05 NOTE — MR AVS SNAPSHOT
Home Valdez   11/5/2018 3:00 PM   Anticoagulation Therapy Visit    Description:  68 year old male   Provider:  CAMILO ANTI COAG   Department:   Anticoag           INR as of 11/5/2018     Today's INR 2.8      Anticoagulation Summary as of 11/5/2018     INR goal 2.0-3.0   Today's INR 2.8   Full warfarin instructions 5 mg on Tue, Thu; 2.5 mg all other days   Next INR check 11/12/2018    Indications   Atrial fibrillation with RVR (H) [I48.91]  Long term current use of anticoagulant therapy [Z79.01]         Your next Anticoagulation Clinic appointment(s)     Nov 05, 2018  3:00 PM CST   Anticoagulation Visit with  ANTI COAG   [facility]    [address] [facility] [address]   [phone]            Nov 12, 2018  4:00 PM CST   Anticoagulation Visit with  ANTI COAG   [facility]    [address] [facility] [address]   [phone]              Contact Numbers     Clinic Number:         November 2018 Details    Sun Mon Tue Wed Thu Fri Sat         1               2               3                 4               5      2.5 mg   See details      6      5 mg         7      2.5 mg         8      5 mg         9      2.5 mg         10      2.5 mg           11      2.5 mg         12            13               14               15               16               17                 18               19               20               21               22               23               24                 25               26               27               28               29               30                 Date Details   11/05 This INR check       Date of next INR:  11/12/2018         How to take your warfarin dose     To take:  2.5 mg Take 1 of the 2.5 mg tablets.    To take:  5 mg Take 2 of the 2.5 mg tablets.

## 2018-11-05 NOTE — PROGRESS NOTES
ANTICOAGULATION FOLLOW-UP CLINIC VISIT    Patient Name:  Home Valdez  Date:  11/5/2018  Contact Type:  Face to Face    SUBJECTIVE:     Patient Findings     Positives No Problem Findings           OBJECTIVE    INR Protime   Date Value Ref Range Status   11/05/2018 2.8 (A) 0.86 - 1.14 Final       ASSESSMENT / PLAN  INR assessment THER    Recheck INR In: 1 WEEK    INR Location Clinic      Anticoagulation Summary as of 11/5/2018     INR goal 2.0-3.0   Today's INR 2.8   Warfarin maintenance plan 5 mg (2.5 mg x 2) on Tue, Thu; 2.5 mg (2.5 mg x 1) all other days   Full warfarin instructions 5 mg on Tue, Thu; 2.5 mg all other days   Weekly warfarin total 22.5 mg   No change documented Shameka Carey RN   Plan last modified Brittany Dove RN (10/30/2018)   Next INR check 11/12/2018   Target end date     Indications   Atrial fibrillation with RVR (H) [I48.91]  Long term current use of anticoagulant therapy [Z79.01]         Anticoagulation Episode Summary     INR check location     Preferred lab     Send INR reminders to [facility]    Comments 2.5 MG TABLETS, likes print out, PM dose, Amiodarone started 9/26/18      Anticoagulation Care Providers     Provider Role Specialty Phone number    PrimoEdgar quigleyifford DO Home [facility] Internal Medicine [phone]            See the Encounter Report to view Anticoagulation Flowsheet and Dosing Calendar (Go to Encounters tab in chart review, and find the Anticoagulation Therapy Visit)    Dosage adjustment made based on physician directed care plan.    Shameka Carey RN

## 2018-11-06 ENCOUNTER — CARE COORDINATION (OUTPATIENT)
Dept: CARDIOLOGY | Facility: CLINIC | Age: 68
End: 2018-11-06

## 2018-11-06 NOTE — PROGRESS NOTES
Pt's wife called to report that pt continues to have gait issues around 1-2 hours after taking his amiodarone in the morning. He has to lean forward to walk, and feels like he stumbles forward when he walks. Gait issues typically last into the evening. Pt has been keeping track of daily weights and BP/HR. His weight has been 197-198# for the past week, which is down 8# from when  saw pt on 10/23. BPs have been in the 90s/60s-low 100s/60s, HR 60s-80s and regular. Pt denied any lightheadedness or dizziness. He has been trying to eat healthier per pt's wife. She would like to know if  has any suggestions regarding how pt should take his amiodarone to help with the side effects. She questioned whether it would be better for him to take at night or try splitting up dose to 100 mg BID.  mentions in last OV note that pt was having balance issues, possibly related to the amiodarone. Will send him update and call pt's wife back with recommendations. Eric BRASWELL November 6, 2018, 1:54 PM

## 2018-11-07 NOTE — PROGRESS NOTES
Pt's wife called and stated that pt did not take his amiodarone this morning and he did not have any issues with gait through out the day. He is going to take it tonight, and see if he has any issues in the morning. Pt's wife will call with an update tomorrow. If pt feels improved taking it at night, will have him continue to do so. Update already sent to . Eric BRASWELL November 7, 2018, 3:06 PM

## 2018-11-08 NOTE — PROGRESS NOTES
I called pt to get an update on how he tolerated taking the amiodarone at night. Pt said he already thinks he notices a 50% improvement in his gait. I told pt that  is ok with him taking it at night if that helps improve his gait. Pt will try this for a few days and call us next week with an update. Eric BRASWELL November 8, 2018, 10:16 AM

## 2018-11-13 ENCOUNTER — ANTICOAGULATION THERAPY VISIT (OUTPATIENT)
Dept: ANTICOAGULATION | Facility: OTHER | Age: 68
End: 2018-11-13

## 2018-11-13 DIAGNOSIS — I48.91 ATRIAL FIBRILLATION WITH RVR (H): ICD-10-CM

## 2018-11-13 DIAGNOSIS — Z79.01 LONG TERM CURRENT USE OF ANTICOAGULANT THERAPY: ICD-10-CM

## 2018-11-13 LAB — INR BLD: 2.8 (ref 0.86–1.14)

## 2018-11-13 PROCEDURE — 99207 ZZC NO CHARGE NURSE ONLY: CPT | Performed by: INTERNAL MEDICINE

## 2018-11-13 PROCEDURE — 85610 PROTHROMBIN TIME: CPT | Mod: QW | Performed by: INTERNAL MEDICINE

## 2018-11-13 PROCEDURE — 36416 COLLJ CAPILLARY BLOOD SPEC: CPT | Performed by: INTERNAL MEDICINE

## 2018-11-13 NOTE — PROGRESS NOTES
ANTICOAGULATION FOLLOW-UP CLINIC VISIT    Patient Name:  Home Valdez  Date:  11/13/2018  Contact Type:  Telephone    SUBJECTIVE:     Patient Findings     Positives No Problem Findings           OBJECTIVE    INR Point of Care   Date Value Ref Range Status   11/13/2018 2.8 (H) 0.86 - 1.14 Final     Comment:     This test is intended for monitoring Coumadin therapy.  Results are not   accurate in patients with prolonged INR due to factor deficiency.         ASSESSMENT / PLAN  INR assessment THER    Recheck INR In: 2 WEEKS    INR Location Outside lab      Anticoagulation Summary as of 11/13/2018     INR goal 2.0-3.0   Today's INR 2.8   Warfarin maintenance plan 5 mg (2.5 mg x 2) on Tue, Thu; 2.5 mg (2.5 mg x 1) all other days   Full warfarin instructions 5 mg on Tue, Thu; 2.5 mg all other days   Weekly warfarin total 22.5 mg   No change documented Fabiola Kent, RN   Plan last modified Brittany Dove RN (10/30/2018)   Next INR check 11/26/2018   Target end date     Indications   Atrial fibrillation with RVR (H) [I48.91]  Long term current use of anticoagulant therapy [Z79.01]         Anticoagulation Episode Summary     INR check location     Preferred lab     Send INR reminders to [facility]    Comments 2.5 MG TABLETS, likes print out, PM dose, Amiodarone started 9/26/18      Anticoagulation Care Providers     Provider Role Specialty Phone number    Sandip VegaDO [facility] [phone]            See the Encounter Report to view Anticoagulation Flowsheet and Dosing Calendar (Go to Encounters tab in chart review, and find the Anticoagulation Therapy Visit)    Dosage adjustment made based on physician directed care plan.    Fabiola Kent, RN

## 2018-11-13 NOTE — MR AVS SNAPSHOT
Home Valdez   11/13/2018   Anticoagulation Therapy Visit    Description:  68 year old male   Provider:  Sandip Vega DO   Department:   Anticoag           INR as of 11/13/2018     Today's INR 2.8      Anticoagulation Summary as of 11/13/2018     INR goal 2.0-3.0   Today's INR 2.8   Full warfarin instructions 5 mg on Tue, Thu; 2.5 mg all other days   Next INR check 11/26/2018    Indications   Atrial fibrillation with RVR (H) [I48.91]  Long term current use of anticoagulant therapy [Z79.01]         Your next Anticoagulation Clinic appointment(s)     Nov 26, 2018  9:15 AM CST   Anticoagulation Visit with  ANTI COAG   [facility]    [address] [facility] [id number] [phone]              Contact Numbers     Clinic Number:         November 2018 Details    Sun Mon Tue Wed Thu Fri Sat         1               2               3                 4               5               6               7               8               9               10                 11               12               13      5 mg   See details      14      2.5 mg         15      5 mg         16      2.5 mg         17      2.5 mg           18      2.5 mg         19      2.5 mg         20      5 mg         21      2.5 mg         22      5 mg         23      2.5 mg         24      2.5 mg           25      2.5 mg         26            27               28               29               30                 Date Details   11/13 This INR check       Date of next INR:  11/26/2018         How to take your warfarin dose     To take:  2.5 mg Take 1 of the 2.5 mg tablets.    To take:  5 mg Take 2 of the 2.5 mg tablets.

## 2018-11-19 ENCOUNTER — OFFICE VISIT (OUTPATIENT)
Dept: FAMILY MEDICINE | Facility: OTHER | Age: 68
End: 2018-11-19
Payer: MEDICARE

## 2018-11-19 VITALS
WEIGHT: 208 LBS | DIASTOLIC BLOOD PRESSURE: 78 MMHG | OXYGEN SATURATION: 93 % | HEART RATE: 62 BPM | TEMPERATURE: 98 F | RESPIRATION RATE: 16 BRPM | BODY MASS INDEX: 29.84 KG/M2 | SYSTOLIC BLOOD PRESSURE: 102 MMHG

## 2018-11-19 DIAGNOSIS — J43.1 PANLOBULAR EMPHYSEMA (H): ICD-10-CM

## 2018-11-19 DIAGNOSIS — J20.9 ACUTE BRONCHITIS, UNSPECIFIED ORGANISM: Primary | ICD-10-CM

## 2018-11-19 PROCEDURE — 99213 OFFICE O/P EST LOW 20 MIN: CPT | Performed by: INTERNAL MEDICINE

## 2018-11-19 RX ORDER — CEFUROXIME AXETIL 500 MG/1
500 TABLET ORAL 2 TIMES DAILY
Qty: 20 TABLET | Refills: 0 | Status: SHIPPED | OUTPATIENT
Start: 2018-11-19 | End: 2018-11-29

## 2018-11-19 ASSESSMENT — PAIN SCALES - GENERAL: PAINLEVEL: MILD PAIN (2)

## 2018-11-19 NOTE — MR AVS SNAPSHOT
After Visit Summary   11/19/2018    Home Valdez    MRN: [id number]           Patient Information     Date Of Birth          1950        Visit Information        Provider Department      11/19/2018 9:00 AM Sandip Vega DO [facility]        Today's Diagnoses     Acute bronchitis, unspecified organism    -  1    Panlobular emphysema (H)           Follow-ups after your visit        Follow-up notes from your care team     Return in about 2 months (around 1/19/2019) for follow up.      Your next 10 appointments already scheduled     Nov 26, 2018  9:15 AM CST   Anticoagulation Visit with  ANTI COAG   [facility] ([facility])    [address] [facility] [id number] [phone]            Nov 29, 2018  8:40 AM CST   Return Visit with Yancy Keller PA-C   [facility] ([address] [id number] [phone]            Jan 17, 2019  9:00 AM CST   Ech Complete with PHECHADAN   [facility] ([facility])    [address] [place] [id number] [phone]           1.  Please bring or wear a comfortable two-piece outfit. 2.  You may eat, drink and take your normal medicines. 3.  For any questions that cannot be answered, please contact the ordering physician 4.  Please do not wear perfumes or scented lotions on the day of your exam.            Jan 24, 2019  1:45 PM CST   Return Visit with Jimenez Murphy MD   [facility] ([address] [id number] [phone]              Who to contact     If you have questions or need follow up information about today's clinic visit or your schedule please contact [facility] directly at [phone].  Normal or non-critical lab and imaging results will be  communicated to you by MyChart, letter or phone within 4 business days after the clinic has received the results. If you do not hear from us within 7 days, please contact the clinic through MyChart or phone. If you have a critical or abnormal lab result, we will notify you by phone as soon as possible.  Submit refill requests through [employer] or call your pharmacy and they will forward the refill request to us. Please allow 3 business days for your refill to be completed.          Additional Information About Your Visit        Care EveryWhere ID     This is your Care EveryWhere ID. This could be used by other organizations to access your [facility] medical records  [id number]        Your Vitals Were     Pulse Temperature Respirations Pulse Oximetry BMI (Body Mass Index)       62 98  F (36.7  C) (Temporal) 16 93% 29.84 kg/m2        Blood Pressure from Last 3 Encounters:   11/19/18 102/78   10/23/18 108/70   10/22/18 118/70    Weight from Last 3 Encounters:   11/19/18 208 lb (94.3 kg)   10/23/18 206 lb 3.2 oz (93.5 kg)   10/22/18 205 lb (93 kg)              Today, you had the following     No orders found for display         Today's Medication Changes          These changes are accurate as of 11/19/18 10:05 AM.  If you have any questions, ask your nurse or doctor.               Start taking these medicines.        Dose/Directions    cefuroxime 500 MG tablet   Commonly known as:  CEFTIN   Used for:  Acute bronchitis, unspecified organism   Started by:  Sandip Vega,         Dose:  500 mg   Take 1 tablet (500 mg) by mouth 2 times daily   Quantity:  20 tablet   Refills:  0            Where to get your medicines      These medications were sent to [id number] - [place], [place] - [address] [place] [address]  [address] [place] [address]    Hours:  M-F 8:30-6:30; Sat 9-4; closed Sunday Phone:  [phone]     cefuroxime 500 MG tablet                Primary Care Provider Office Phone # Fax #    Sandip Bhat  DO [place] [phone]       [address] [place] [address]        Goals        General    Medical (pt-stated)     Notes - Note created  9/28/2018 12:01 PM by Zaria Gonzales, RN    Goal Statement: I will monitor my weight, blood pressure, pulse, swelling in extremities, fluid and sodium intake, and report any concerning or new symptoms to my care team daily.   Measure of Success: reporting symptoms to care team   Supportive Steps to Achieve: patient has a cardiology care team specifically there to meet patients questions or concerns.   Barriers: complex medical diagnosis.   Strengths: patient and wife are involved in medical care, and current treatment plans  Date to Achieve By: on going  Patient expressed understanding of goal: yes          Medical (pt-stated)     Notes - Note edited  10/31/2018 11:15 AM by Zaria Gonzales RN    Goal Statement: I want to weigh 195 lbs  Measure of Success: home scale will read 195.  Supportive Steps to Achieve: patient has made many lifestyle changes to foster safe and healthy weight loss that will in turn cause positive health benefits.   Barriers: none  Strengths: patient is motivated to learn about weight loss, is exercising, and healthy eating. 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   Date to Achieve By: 1 month  Patient expressed understanding of goal: yes            Equal Access to Services     MILKA ORTIZ : Hadelieser leon hadasho Sobonnyali, waaxda luqadaha, qaybta kaalmada adeegyada, waxay paul bhandari. So [facility] [phone].    ATENCIÓN: Si habla español, tiene a calabrese disposición servicios gratuitos de asistencia lingüística. Llame al [phone].    We comply with applicable federal civil rights laws and [place] laws. We do not discriminate on the basis of race, color, national origin, age, disability, sex, sexual orientation, or gender identity.            Thank you!     Thank you for choosing [facility]   for your care. Our goal is always to provide you with excellent care. Hearing back from our patients is one way we can continue to improve our services. Please take a few minutes to complete the written survey that you may receive in the mail after your visit with us. Thank you!             Your Updated Medication List - Protect others around you: Learn how to safely use, store and throw away your medicines at www.disposemymeds.org.          This list is accurate as of 11/19/18 10:05 AM.  Always use your most recent med list.                   Brand Name Dispense Instructions for use Diagnosis    acetaminophen 325 MG tablet    TYLENOL    100 tablet    Take 2 tablets (650 mg) by mouth every 4 hours as needed for other (mild pain)        ADVAIR DISKUS 250-50 MCG/DOSE diskus inhaler   Generic drug:  fluticasone-salmeterol     1 Inhaler    INHALE 1 PUFF BY MOUTH TWICE A DAY    Panlobular emphysema (H)       amiodarone 200 MG tablet    PACERONE/CODARONE    90 tablet    Take 1 tablet (200 mg) by mouth daily    Paroxysmal atrial fibrillation (H)       buPROPion 150 MG 12 hr tablet    WELLBUTRIN SR    180 tablet    TAKE 1 TABLET BY MOUTH TWICE A DAY    Personal history of tobacco use, presenting hazards to health       carvedilol 6.25 MG tablet    COREG    60 tablet    Take 1 tablet (6.25 mg) by mouth 2 times daily (with meals)    Acute on chronic systolic congestive heart failure (H)       cefuroxime 500 MG tablet    CEFTIN    20 tablet    Take 1 tablet (500 mg) by mouth 2 times daily    Acute bronchitis, unspecified organism       doxycycline 100 MG capsule    VIBRAMYCIN    20 capsule    Take 1 capsule (100 mg) by mouth 2 times daily    Acute bronchitis, unspecified organism       fluticasone 50 MCG/ACT spray    FLONASE    16 g    INHALE 1 TO 2 SPRAYS INTO EACH NOSTRIL EVERY DAY    Chronic rhinitis       ipratropium - albuterol 0.5 mg/2.5 mg/3 mL 0.5-2.5 (3) MG/3ML neb solution    DUONEB    540 mL    NEBULIZE 1 VIAL (3ML)  BY MOUTH EVERY 4 HOURS AS NEEDED FOR SHORTNESSOF BREATH / DYSPNEA OR WHEEZING    COPD exacerbation (H)       JANTOVEN 2.5 MG tablet   Generic drug:  warfarin     210 tablet    TAKE 7.5 MG (3 TABS) ON TUES, THURS, SAT AND 5 MG (2 TABS) ALL OTHERDAYS, OR AS DIRECTED BY THE COUMADIN CLINIC.    Atrial fibrillation with RVR (H)       montelukast 10 MG tablet    SINGULAIR    90 tablet    TAKE 1 TABLET BY MOUTH DAILY AT BEDTIME    Chronic seasonal allergic rhinitis due to other allergen       * nebulizer/tubing/mouthpiece Kit     1 kit    Use every 4-6 hours PRN    Chronic obstructive pulmonary disease, unspecified COPD type (H)       * AIRS DISPOSABLE NEBULIZER Kit     1 kit    USE EVERY 4 TO 6 HOURS AS NEEDED    Panlobular emphysema (H)       order for DME     1 Device    Equipment being ordered: Nebulizer    COPD (chronic obstructive pulmonary disease) (H)       senna-docusate 8.6-50 MG per tablet    SENOKOT-S;PERICOLACE    360 tablet    Take 2 tablets by mouth 2 times daily    Constipation, unspecified constipation type       spironolactone 25 MG tablet    ALDACTONE    90 tablet    Take 1 tablet (25 mg) by mouth daily    Chronic systolic congestive heart failure (H)       torsemide 20 MG tablet    DEMADEX    135 tablet    Take 1 tablet (20 mg) by mouth daily Or as directed by [facility] clinic, up to 3 tabs daily    Chronic diastolic congestive heart failure (H)       VENTOLIN  (90 Base) MCG/ACT inhaler   Generic drug:  albuterol     18 g    INHALE 2 PUFFS BY MOUTH EVERY 4 HOURS AS NEEDED FOR SHORTNESS OF BREATH/DYSPNEA    Chronic obstructive pulmonary disease with acute exacerbation (H)       * Notice:  This list has 2 medication(s) that are the same as other medications prescribed for you. Read the directions carefully, and ask your doctor or other care provider to review them with you.

## 2018-11-19 NOTE — PROGRESS NOTES
SUBJECTIVE:   Home Valdez is a 68 year old male who presents to clinic today for the following health issues:      Acute Illness   Acute illness concerns: sinus   Onset: 4 days    Fever: no    Chills/Sweats: no    Headache (location?): YES    Sinus Pressure:YES    Conjunctivitis:  no    Ear Pain: YES- had some    Rhinorrhea: YES    Congestion: YES    Sore Throat: no     Cough: YES-productive of yellow sputum    Wheeze: YES    Decreased Appetite: no    Nausea: no    Vomiting: no    Diarrhea:  no    Dysuria/Freq.: no    Fatigue/Achiness: no    Sick/Strep Exposure: no     Therapies Tried and outcome: allergy medication not helping                        Chief Complaint         The patient is a pleasant 68-year-old gentleman who has a history of congestive heart failure associated with systolic dysfunction associated with previous atrial fibrillation.  He is now doing much better but recently has developed a posterior nasal drainage and a slight productive cough.  He is quite concerned as he has had pneumonia in the past.  He notes no fever or chills at this time but does note that he is developing a thick and green sputum.  This is worse in the morning when he first gets up.  He is taking food and fluid adequately without any problem.                       PAST, FAMILY,SOCIAL HISTORY:     Medical  History:   has a past medical history of AAA (abdominal aortic aneurysm) (H); Atrial fibrillation (H); Chronic anticoagulation; COPD (chronic obstructive pulmonary disease) (H); Hyperlipidemia; Hypertension; NICM (nonischemic cardiomyopathy) (H); Obesity (BMI 35.0-39.9 without comorbidity); JAMES (obstructive sleep apnea) (9/3/2014); PTSD (post-traumatic stress disorder); Pulmonary HTN (H); and Trigeminal neuralgia.     Surgical History:   has a past surgical history that includes CYSTOURETHROSCOPY (1998); LAMINOTOMY,LUMBAR DISK,1 INTRSP (1993); NONSPECIFIC PROCEDURE; NONSPECIFIC PROCEDURE; Phacoemulsification with  standard intraocular lens implant (12/26/2013); Vitrectomy anterior (12/26/2013); L cataract surgery[; Soft tissue surgery; colonoscopy; hernia repair; Head and neck surgery; Vitrectomy parsplana with 25 gauge system (2/6/2014); Balloon compression rhizotomy (Left, 9/7/2016); Balloon compression rhizotomy (Left, 6/5/2017); Balloon compression rhizotomy (Left, 11/7/2017); and Phacoemulsification with standard intraocular lens implant (Right, 5/3/2018).     Social History:   reports that he quit smoking about 4 years ago. His smoking use included Cigarettes. He has a 40.00 pack-year smoking history. He has never used smokeless tobacco. He reports that he does not drink alcohol or use illicit drugs.     Family History:  family history includes Depression in his father; Diabetes in his paternal grandmother; Hypertension in his mother and paternal grandfather.            MEDICATIONS  Current Outpatient Prescriptions   Medication Sig Dispense Refill     acetaminophen (TYLENOL) 325 MG tablet Take 2 tablets (650 mg) by mouth every 4 hours as needed for other (mild pain) 100 tablet 0     ADVAIR DISKUS 250-50 MCG/DOSE diskus inhaler INHALE 1 PUFF BY MOUTH TWICE A DAY 1 Inhaler 1     amiodarone (PACERONE/CODARONE) 200 MG tablet Take 1 tablet (200 mg) by mouth daily 90 tablet 3     buPROPion (WELLBUTRIN SR) 150 MG 12 hr tablet TAKE 1 TABLET BY MOUTH TWICE A  tablet 1     carvedilol (COREG) 6.25 MG tablet Take 1 tablet (6.25 mg) by mouth 2 times daily (with meals) 60 tablet 1     cefuroxime (CEFTIN) 500 MG tablet Take 1 tablet (500 mg) by mouth 2 times daily 20 tablet 0     doxycycline (VIBRAMYCIN) 100 MG capsule Take 1 capsule (100 mg) by mouth 2 times daily 20 capsule 0     fluticasone (FLONASE) 50 MCG/ACT spray INHALE 1 TO 2 SPRAYS INTO EACH NOSTRIL EVERY DAY 16 g 3     ipratropium - albuterol 0.5 mg/2.5 mg/3 mL (DUONEB) 0.5-2.5 (3) MG/3ML neb solution NEBULIZE 1 VIAL (3ML) BY MOUTH EVERY 4 HOURS AS NEEDED FOR  SHORTNESSOF BREATH / DYSPNEA OR WHEEZING 540 mL 1     JANTOVEN 2.5 MG tablet TAKE 7.5 MG (3 TABS) ON TUES, THURS, SAT AND 5 MG (2 TABS) ALL OTHERDAYS, OR AS DIRECTED BY THE COUMADIN CLINIC. 210 tablet 1     montelukast (SINGULAIR) 10 MG tablet TAKE 1 TABLET BY MOUTH DAILY AT BEDTIME 90 tablet 1     order for DME Equipment being ordered: Nebulizer 1 Device 0     Respiratory Therapy Supplies (AIRS DISPOSABLE NEBULIZER) KIT USE EVERY 4 TO 6 HOURS AS NEEDED 1 kit 0     Respiratory Therapy Supplies (NEBULIZER/TUBING/MOUTHPIECE) KIT Use every 4-6 hours PRN 1 kit 11     senna-docusate (SENOKOT-S;PERICOLACE) 8.6-50 MG per tablet Take 2 tablets by mouth 2 times daily 360 tablet 3     spironolactone (ALDACTONE) 25 MG tablet Take 1 tablet (25 mg) by mouth daily 90 tablet 0     torsemide (DEMADEX) 20 MG tablet Take 1 tablet (20 mg) by mouth daily Or as directed by [facility] clinic, up to 3 tabs daily 135 tablet 3     VENTOLIN  (90 Base) MCG/ACT inhaler INHALE 2 PUFFS BY MOUTH EVERY 4 HOURS AS NEEDED FOR SHORTNESS OF BREATH/DYSPNEA 18 g 3         --------------------------------------------------------------------------------------------------------------------                              Review of Systems     LUNGS: Pt denies:  hemoptysis, or shortness of breath. He does note that his oxygen saturations have a slightly lower.  He has seen values down as low as 90. HEART: Pt denies: chest pain, arrythmia, syncope, tachy or bradyarrhythmia.   GI: Pt denies: nausea, vomitting, diarrhea, constipation, melena, or hematochezia.   NEURO: Pt denies: seizures, strokes, diplopia, weakness, paraesthesias, or paralysis.   SKIN: Pt denies: itching, rashes, discoloration, or specific lesions of concern. Denies recent hair loss.                                     Examination      /78 (BP Location: Left arm, Patient Position: Sitting, Cuff Size: Adult Regular)  Pulse 62  Temp 98  F (36.7  C) (Temporal)  Resp 16  Wt 208 lb (94.3  kg)  SpO2 93%  BMI 29.84 kg/m2   LUNGS: A few scattered rhonchi heard bilaterally, airflow is slightly slowed, no intercostal retraction or stridor is noted. No coughing is noted during visit.   HEART:  regular without rubs, clicks, gallops, or murmurs. PMI is nondisplaced. Upstrokes are brisk. S1,S2 are heard.   GI: Abdomen is soft, without rebound, guarding or tenderness. Bowel sounds are appropriate. No renal bruits are heard.   ENT: Pharynx is mildly-erythemous, moderate green and yellow PND, n there is o significant nasal obstruction, TM's are slightly red and retracted, hearing intact bilaterally. No carotid bruits are heard. No JVD seen. Thyroid is not nodular or enlarged.                                           Decision Making    1. Acute bronchitis, unspecified organism   we will start on antibiotic, Tylenol, fluids, rest.  - cefuroxime (CEFTIN) 500 MG tablet; Take 1 tablet (500 mg) by mouth 2 times daily  Dispense: 20 tablet; Refill: 0    2. Panlobular emphysema (H)  Current nebulizer therapy                             FOLLOW UP   I have asked the patient to make an appointment for followup with me in 1 week if not markedly otherwise, in 2 months.        I have carefully explained the diagnosis and treatment options to the patient.  The patient has displayed an understanding of the above, and all subsequent questions were answered.      DO SANJAY Lock    Portions of this note were produced using [address]  Although every attempt at real-time proof reading has been made, occasional grammar/syntax errors may have been missed.

## 2018-11-26 ENCOUNTER — ANTICOAGULATION THERAPY VISIT (OUTPATIENT)
Dept: ANTICOAGULATION | Facility: OTHER | Age: 68
End: 2018-11-26
Payer: MEDICARE

## 2018-11-26 DIAGNOSIS — Z79.01 LONG TERM CURRENT USE OF ANTICOAGULANT THERAPY: ICD-10-CM

## 2018-11-26 DIAGNOSIS — I48.91 ATRIAL FIBRILLATION WITH RVR (H): ICD-10-CM

## 2018-11-26 LAB — INR POINT OF CARE: 2.2 (ref 0.86–1.14)

## 2018-11-26 PROCEDURE — 36416 COLLJ CAPILLARY BLOOD SPEC: CPT

## 2018-11-26 PROCEDURE — 99207 ZZC NO CHARGE NURSE ONLY: CPT

## 2018-11-26 PROCEDURE — 85610 PROTHROMBIN TIME: CPT | Mod: QW

## 2018-11-26 NOTE — MR AVS SNAPSHOT
Home Valdez   11/26/2018 9:15 AM   Anticoagulation Therapy Visit    Description:  68 year old male   Provider:  CAMILO ANTI COAG   Department:   Anticoag           INR as of 11/26/2018     Today's INR 2.2      Anticoagulation Summary as of 11/26/2018     INR goal 2.0-3.0   Today's INR 2.2   Full warfarin instructions 5 mg on Tue, Thu; 2.5 mg all other days   Next INR check 12/10/2018    Indications   Atrial fibrillation with RVR (H) [I48.91]  Long term current use of anticoagulant therapy [Z79.01]         Your next Anticoagulation Clinic appointment(s)     Nov 26, 2018  9:15 AM CST   Anticoagulation Visit with  ANTI COAG   [facility] ([facility]    [address] [facility] [address]   [phone]            Dec 10, 2018  8:30 AM CST   Anticoagulation Visit with  ANTI COAG   [facility] ([facility])    [address] [facility] [address]   [phone]              Contact Numbers     Clinic Number:         November 2018 Details    Sun Mon Tue Wed Thu Fri Sat         1               2               3                 4               5               6               7               8               9               10                 11               12               13               14               15               16               17                 18               19               20               21               22               23               24                 25               26      2.5 mg   See details      27      5 mg         28      2.5 mg         29      5 mg         30      2.5 mg           Date Details   11/26 This INR check               How to take your warfarin dose     To take:  2.5 mg Take 1 of the 2.5 mg tablets.    To take:  5 mg Take 2 of the 2.5 mg tablets.           December 2018 Details    Sun Mon Tue Wed Thu Fri Sat           1      2.5 mg           2      2.5 mg         3      2.5 mg         4      5 mg         5      2.5  mg         6      5 mg         7      2.5 mg         8      2.5 mg           9      2.5 mg         10            11               12               13               14               15                 16               17               18               19               20               21               22                 23               24               25               26               27               28               29                 30               31                     Date Details   No additional details    Date of next INR:  12/10/2018         How to take your warfarin dose     To take:  2.5 mg Take 1 of the 2.5 mg tablets.    To take:  5 mg Take 2 of the 2.5 mg tablets.

## 2018-11-26 NOTE — PROGRESS NOTES
ANTICOAGULATION FOLLOW-UP CLINIC VISIT    Patient Name:  Home Valdez  Date:  11/26/2018  Contact Type:  Face to Face    SUBJECTIVE:     Patient Findings     Positives No Problem Findings           OBJECTIVE    INR Protime   Date Value Ref Range Status   11/26/2018 2.2 (A) 0.86 - 1.14 Final       ASSESSMENT / PLAN  INR assessment THER    Recheck INR In: 2 WEEKS    INR Location Clinic      Anticoagulation Summary as of 11/26/2018     INR goal 2.0-3.0   Today's INR 2.2   Warfarin maintenance plan 5 mg (2.5 mg x 2) on Tue, Thu; 2.5 mg (2.5 mg x 1) all other days   Full warfarin instructions 5 mg on Tue, Thu; 2.5 mg all other days   Weekly warfarin total 22.5 mg   No change documented Shameka Carey RN   Plan last modified Brittany Dove RN (10/30/2018)   Next INR check 12/10/2018   Target end date     Indications   Atrial fibrillation with RVR (H) [I48.91]  Long term current use of anticoagulant therapy [Z79.01]         Anticoagulation Episode Summary     INR check location     Preferred lab     Send INR reminders to [facility]    Comments 2.5 MG TABLETS, likes print out, PM dose, Amiodarone started 9/26/18      Anticoagulation Care Providers     Provider Role Specialty Phone number    PrimoEdgar quigleyifford DO [facility] Internal Medicine [phone]            See the Encounter Report to view Anticoagulation Flowsheet and Dosing Calendar (Go to Encounters tab in chart review, and find the Anticoagulation Therapy Visit)    Dosage adjustment made based on physician directed care plan.    Shameka Carey RN

## 2018-11-29 ENCOUNTER — TELEPHONE (OUTPATIENT)
Dept: FAMILY MEDICINE | Facility: OTHER | Age: 68
End: 2018-11-29

## 2018-11-29 ENCOUNTER — OFFICE VISIT (OUTPATIENT)
Dept: CARDIOLOGY | Facility: CLINIC | Age: 68
End: 2018-11-29
Payer: MEDICARE

## 2018-11-29 VITALS
DIASTOLIC BLOOD PRESSURE: 62 MMHG | HEIGHT: 70 IN | WEIGHT: 207 LBS | OXYGEN SATURATION: 92 % | HEART RATE: 70 BPM | RESPIRATION RATE: 12 BRPM | SYSTOLIC BLOOD PRESSURE: 124 MMHG | BODY MASS INDEX: 29.63 KG/M2

## 2018-11-29 DIAGNOSIS — I50.22 CHRONIC SYSTOLIC CONGESTIVE HEART FAILURE (H): ICD-10-CM

## 2018-11-29 DIAGNOSIS — I50.23 ACUTE ON CHRONIC SYSTOLIC CONGESTIVE HEART FAILURE (H): ICD-10-CM

## 2018-11-29 LAB
ANION GAP SERPL CALCULATED.3IONS-SCNC: 5 MMOL/L (ref 3–14)
BUN SERPL-MCNC: 20 MG/DL (ref 7–30)
CALCIUM SERPL-MCNC: 9.2 MG/DL (ref 8.5–10.1)
CHLORIDE SERPL-SCNC: 100 MMOL/L (ref 94–109)
CO2 SERPL-SCNC: 33 MMOL/L (ref 20–32)
CREAT SERPL-MCNC: 1.42 MG/DL (ref 0.66–1.25)
GFR SERPL CREATININE-BSD FRML MDRD: 49 ML/MIN/1.7M2
GLUCOSE SERPL-MCNC: 106 MG/DL (ref 70–99)
HGB BLD-MCNC: 16.4 G/DL (ref 13.3–17.7)
POTASSIUM SERPL-SCNC: 4.6 MMOL/L (ref 3.4–5.3)
SODIUM SERPL-SCNC: 138 MMOL/L (ref 133–144)

## 2018-11-29 PROCEDURE — 85018 HEMOGLOBIN: CPT | Performed by: INTERNAL MEDICINE

## 2018-11-29 PROCEDURE — 80048 BASIC METABOLIC PNL TOTAL CA: CPT | Performed by: INTERNAL MEDICINE

## 2018-11-29 PROCEDURE — 36415 COLL VENOUS BLD VENIPUNCTURE: CPT | Performed by: INTERNAL MEDICINE

## 2018-11-29 PROCEDURE — 99214 OFFICE O/P EST MOD 30 MIN: CPT | Performed by: PHYSICIAN ASSISTANT

## 2018-11-29 RX ORDER — CARVEDILOL 6.25 MG/1
6.25 TABLET ORAL 2 TIMES DAILY WITH MEALS
Qty: 180 TABLET | Refills: 3 | Status: SHIPPED | OUTPATIENT
Start: 2018-11-29

## 2018-11-29 RX ORDER — LISINOPRIL 2.5 MG/1
2.5 TABLET ORAL AT BEDTIME
Qty: 30 TABLET | Refills: 11 | Status: SHIPPED | OUTPATIENT
Start: 2018-11-29

## 2018-11-29 ASSESSMENT — PAIN SCALES - GENERAL: PAINLEVEL: NO PAIN (0)

## 2018-11-29 NOTE — PROGRESS NOTES
Service Date: 11/29/2018      PRIMARY CARDIOLOGIST:  Jimenez Murphy MD      REASON FOR VISIT:  Chronic systolic heart failure followup.        HISTORY OF PRESENT ILLNESS:  Mr. Valdez is a delightful, 68-year-old gentleman who has a past medical history significant for heart failure with preserved EF, but was unfortunately found to be reduced in 07/2018 with biventricular heart failure and an EF of 20%, COPD, CKD, hypertension, hyperlipidemia and abdominal aortic aneurysm, whom I met in September of this year.  He had been hospitalized for pneumonia in July and found to have rapid AFib.  He then was found to have biventricular heart failure.  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  His angiogram in 2014 showed no coronary artery disease.  When I saw him, he was in florid heart failure, and I admitted him to [facility].  There, he was diuresed approximately 20 pounds.  He also underwent a ELVIRA-guided cardioversion and was started on amiodarone.  His losartan and metoprolol were both stopped during that hospitalization due to hypotension.  After coming home, he was developing some PVCs and was seen by Mayra Krueger and started on carvedilol.  He then was seen by Dr. Murphy and was doing well.  He had dropped another 10 or so pounds after discharge.      He comes in today for followup.  Compared to when I met him, he is markedly improved.  He can walk several blocks without stopping for rest.  He can go out shopping, where previously he could maybe walk 50 feet and was short of breath getting dressed.  His appetite is good, although he is being very careful with his salt.  His abdomen is no longer bloated.  His edema is resolved in his legs, and he has minimal dyspnea on exertion.  He did develop a cold last week, and he now has a slight winded feeling and congestion on his right side, but this is more when he is outside in the cold.  He sleeps on 3 pillows at baseline and has been doing this for  years.  No chest pain.      SOCIAL HISTORY:  He comes in today with his wife, Chrissy.  He is a former smoker with a 40 pack year history.  No alcohol use.      PHYSICAL EXAMINATION:   GENERAL:  Elderly gentleman in no acute distress.   HEENT:  Normocephalic, atraumatic.     HEART:  Regular, but distant.  I do not appreciate a murmur, rub or gallop.     LUNGS:  Clear, although some slight wheezing in the middle right lobe.  I do not appreciate JVP at 30 degrees.   EXTREMITIES:  With trace peripheral edema.     SKIN:  Warm and dry.      LABORATORY:  Creatinine 1.42, BUN 20, potassium 4.6, sodium 138.  Hemoglobin 16.4.      ASSESSMENT AND PLAN:     1.  Chronic systolic heart failure with biventricular heart failure, likely tachycardia induced due to rapid AFib.  He clearly is well compensated and has class II symptoms at this point.  That is down about 40 pounds from the beginning of the year.  He is like a new man compared to the last time I saw him.  We talked today about him continuing to watch his salt and his fluid intake, keeping his salt below 2000 and his fluid intake less than 64 ounces a day.  In addition, we will continue to optimize his medications.  He is currently on carvedilol 6.25 mg b.i.d., and this is refilled today.  I am also going to start low-dose lisinopril 2.5 mg each day at bedtime.  This is very important for reverse remodeling of his cardiomyopathy.  He will continue on spironolactone 25 mg daily and torsemide 20 mg daily with an extra tablet for weight gain or worsening shortness of breath.   2.  Paroxysmal atrial fibrillation, now status post ELVIRA cardioversion, appropriately on Coumadin for a CHADS-VASc of 3 for age, hypertension and heart failure.  He will remain on Coumadin with close monitoring given he is also on amiodarone.   3.  Abdominal aortic aneurysm.  Now followed by Vascular Surgery in [facility].     4.  Hypertension, now with some hypotension.  At home, typically blood  pressures are in the 90s-110s.  We will cautiously add the lisinopril.   5.  Upper respiratory illness, followed closely by Dr. Vega.  He does have a followup appointment with him tomorrow to reassess his breathing.      Thank you for allowing me to participate in this delightful patient's care.  He already has an echocardiogram and followups with Dr. Murphy scheduled in January; we will keep that.  He will get a BMP in 2 weeks to assess the kidney response to lisinopril.  He will be seen sooner with concern.         KATRINA SHEIKH PA-C             D: 2018   T: 2018   MT: claudio      Name:     KARTIK HUERTA   MRN:      [id number]        Account:      [id number]   :      1950           Service Date: 2018      Document: [id number]

## 2018-11-29 NOTE — LETTER
11/29/2018      Sandip Vega, DO  [address] [place] [address]      RE: Home WANG José Miguel       Dear Colleague,    I had the pleasure of seeing Home Valdez in the [facility].    Service Date: 11/29/2018      PRIMARY CARDIOLOGIST:  Jimenez Murphy MD      REASON FOR VISIT:  Chronic systolic heart failure followup.        HISTORY OF PRESENT ILLNESS:  Mr. Valdez is a delightful, 68-year-old gentleman who has a past medical history significant for heart failure with preserved EF, but was unfortunately found to be reduced in 07/2018 with biventricular heart failure and an EF of 20%, COPD, CKD, hypertension, hyperlipidemia and abdominal aortic aneurysm, whom I met in September of this year.  He had been hospitalized for pneumonia in July and found to have rapid AFib.  He then was found to have biventricular heart failure.  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  His angiogram in 2014 showed no coronary artery disease.  When I saw him, he was in florid heart failure, and I admitted him to [facility].  There, he was diuresed approximately 20 pounds.  He also underwent a ELVIRA-guided cardioversion and was started on amiodarone.  His losartan and metoprolol were both stopped during that hospitalization due to hypotension.  After coming home, he was developing some PVCs and was seen by Mayra Krueger and started on carvedilol.  He then was seen by Dr. Murphy and was doing well.  He had dropped another 10 or so pounds after discharge.      He comes in today for followup.  Compared to when I met him, he is markedly improved.  He can walk several blocks without stopping for rest.  He can go out shopping, where previously he could maybe walk 50 feet and was short of breath getting dressed.  His appetite is good, although he is being very careful with his salt.  His abdomen is no longer bloated.  His edema is resolved in his legs, and he has minimal dyspnea  on exertion.  He did develop a cold last week, and he now has a slight winded feeling and congestion on his right side, but this is more when he is outside in the cold.  He sleeps on 3 pillows at baseline and has been doing this for years.  No chest pain.      SOCIAL HISTORY:  He comes in today with his wife, Chrissy.  He is a former smoker with a 40 pack year history.  No alcohol use.      PHYSICAL EXAMINATION:   GENERAL:  Elderly gentleman in no acute distress.   HEENT:  Normocephalic, atraumatic.     HEART:  Regular, but distant.  I do not appreciate a murmur, rub or gallop.     LUNGS:  Clear, although some slight wheezing in the middle right lobe.  I do not appreciate JVP at 30 degrees.   EXTREMITIES:  With trace peripheral edema.     SKIN:  Warm and dry.      LABORATORY:  Creatinine 1.42, BUN 20, potassium 4.6, sodium 138.  Hemoglobin 16.4.      ASSESSMENT AND PLAN:     1.  Chronic systolic heart failure with biventricular heart failure, likely tachycardia induced due to rapid AFib.  He clearly is well compensated and has class II symptoms at this point.  That is down about 40 pounds from the beginning of the year.  He is like a new man compared to the last time I saw him.  We talked today about him continuing to watch his salt and his fluid intake, keeping his salt below 2000 and his fluid intake less than 64 ounces a day.  In addition, we will continue to optimize his medications.  He is currently on carvedilol 6.25 mg b.i.d., and this is refilled today.  I am also going to start low-dose lisinopril 2.5 mg each day at bedtime.  This is very important for reverse remodeling of his cardiomyopathy.  He will continue on spironolactone 25 mg daily and torsemide 20 mg daily with an extra tablet for weight gain or worsening shortness of breath.   2.  Paroxysmal atrial fibrillation, now status post ELVIRA cardioversion, appropriately on Coumadin for a CHADS-VASc of 3 for age, hypertension and heart failure.  He will  remain on Coumadin with close monitoring given he is also on amiodarone.   3.  Abdominal aortic aneurysm.  Now followed by Vascular Surgery in [facility].     4.  Hypertension, now with some hypotension.  At home, typically blood pressures are in the 90s-110s.  We will cautiously add the lisinopril.   5.  Upper respiratory illness, followed closely by Dr. Vega.  He does have a followup appointment with him tomorrow to reassess his breathing.      Thank you for allowing me to participate in this delightful patient's care.  He already has an echocardiogram and followups with Dr. Murphy scheduled in January; we will keep that.  He will get a BMP in 2 weeks to assess the kidney response to lisinopril.  He will be seen sooner with concern.         KATRINA SHEIKH PA-C             D: 2018   T: 2018   MT: claudio      Name:     KARTIK HUERTA   MRN:      [id number]        Account:      [id number]   :      1950           Service Date: 2018      Document: [id number]         Outpatient Encounter Prescriptions as of 2018   Medication Sig Dispense Refill     acetaminophen (TYLENOL) 325 MG tablet Take 2 tablets (650 mg) by mouth every 4 hours as needed for other (mild pain) 100 tablet 0     ADVAIR DISKUS 250-50 MCG/DOSE diskus inhaler INHALE 1 PUFF BY MOUTH TWICE A DAY 1 Inhaler 1     amiodarone (PACERONE/CODARONE) 200 MG tablet Take 1 tablet (200 mg) by mouth daily 90 tablet 3     buPROPion (WELLBUTRIN SR) 150 MG 12 hr tablet TAKE 1 TABLET BY MOUTH TWICE A  tablet 1     carvedilol (COREG) 6.25 MG tablet Take 1 tablet (6.25 mg) by mouth 2 times daily (with meals) 180 tablet 3     fluticasone (FLONASE) 50 MCG/ACT spray INHALE 1 TO 2 SPRAYS INTO EACH NOSTRIL EVERY DAY 16 g 3     ipratropium - albuterol 0.5 mg/2.5 mg/3 mL (DUONEB) 0.5-2.5 (3) MG/3ML neb solution NEBULIZE 1 VIAL (3ML) BY MOUTH EVERY 4 HOURS AS NEEDED FOR SHORTNESSOF BREATH / DYSPNEA OR WHEEZING 540 mL 1     JANTOVEN 2.5  MG tablet TAKE 7.5 MG (3 TABS) ON TUES, THURS, SAT AND 5 MG (2 TABS) ALL OTHERDAYS, OR AS DIRECTED BY THE COUMADIN CLINIC. 210 tablet 1     lisinopril (PRINIVIL/ZESTRIL) 2.5 MG tablet Take 1 tablet (2.5 mg) by mouth At Bedtime 30 tablet 11     montelukast (SINGULAIR) 10 MG tablet TAKE 1 TABLET BY MOUTH DAILY AT BEDTIME 90 tablet 1     order for DME Equipment being ordered: Nebulizer 1 Device 0     Respiratory Therapy Supplies (AIRS DISPOSABLE NEBULIZER) KIT USE EVERY 4 TO 6 HOURS AS NEEDED 1 kit 0     Respiratory Therapy Supplies (NEBULIZER/TUBING/MOUTHPIECE) KIT Use every 4-6 hours PRN 1 kit 11     senna-docusate (SENOKOT-S;PERICOLACE) 8.6-50 MG per tablet Take 2 tablets by mouth 2 times daily 360 tablet 3     spironolactone (ALDACTONE) 25 MG tablet Take 1 tablet (25 mg) by mouth daily 90 tablet 0     torsemide (DEMADEX) 20 MG tablet Take 1 tablet (20 mg) by mouth daily Or as directed by [facility] clinic, up to 3 tabs daily 135 tablet 3     VENTOLIN  (90 Base) MCG/ACT inhaler INHALE 2 PUFFS BY MOUTH EVERY 4 HOURS AS NEEDED FOR SHORTNESS OF BREATH/DYSPNEA 18 g 3     [DISCONTINUED] carvedilol (COREG) 6.25 MG tablet Take 1 tablet (6.25 mg) by mouth 2 times daily (with meals) 60 tablet 1     [DISCONTINUED] cefuroxime (CEFTIN) 500 MG tablet Take 1 tablet (500 mg) by mouth 2 times daily 20 tablet 0     [DISCONTINUED] doxycycline (VIBRAMYCIN) 100 MG capsule Take 1 capsule (100 mg) by mouth 2 times daily 20 capsule 0     No facility-administered encounter medications on file as of 11/29/2018.        Again, thank you for allowing me to participate in the care of your patient.      Sincerely,    Yancy Keller PA-C     [facility]
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
## 2018-11-29 NOTE — PATIENT INSTRUCTIONS
Thanks for coming into [facility] clinic today.    Please call CORE nurse for any questions or concerns:       [phone]    1. Weigh yourself daily and write it down.     2. Call my nurse if your weight is up more than 2 pounds in one day, or 5 pounds in one week.    3. Call my nurse if you feel more short of breath, have more abdominal bloating or leg swelling.    4. Eat a low sodium diet (less than 2,000mg daily). If you eat less salt, you will retain less fluid.     5. We discussed: I'm glad you are doing so well!      6.  Medication changes:  Start taking lisinopril 2.5 once a day at bedtime.    Continue other medications.      8.  Results: labs look good overall, potassium is normal.    Component      Latest Ref Rng & Units 11/29/2018   Sodium      133 - 144 mmol/L 138   Potassium      3.4 - 5.3 mmol/L 4.6   Chloride      94 - 109 mmol/L 100   Carbon Dioxide      20 - 32 mmol/L 33 (H)   Anion Gap      3 - 14 mmol/L 5   Glucose      70 - 99 mg/dL 106 (H)   Urea Nitrogen      7 - 30 mg/dL 20   Creatinine      0.66 - 1.25 mg/dL 1.42 (H)   GFR Estimate      >60 mL/min/1.7m2 49 (L)   GFR Estimate If Black      >60 mL/min/1.7m2 60 (L)   Calcium      8.5 - 10.1 mg/dL 9.2   Hemoglobin      13.3 - 17.7 g/dL 16.4     9.  Follow up: repeat labs in about 2 weeks.  And then see Dr. Murphy in January.        Please call the [facility] at  [phone] with scheduling concerns.      Reminder: Please bring in all current medications, over the counter supplements and vitamin bottles to your next appointment.

## 2018-11-29 NOTE — TELEPHONE ENCOUNTER
Reason for call:  Other   Patient called regarding (reason for call): appointment  Additional comments: Patient was put on antibiotics 10 days ago. He finished his antibiotics but still has the same symptoms or cough and runny nose. The patient stated for him to call to request an appointment if the symptoms did not cease. He is hoping to schedule an appointment for tomorrow, Friday 11/30/2018 any time of day.     Phone number to reach patient:  Home number on file [phone] (home)    Best Time:  Asap    Can we leave a detailed message on this number?  Yes
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
## 2018-11-29 NOTE — MR AVS SNAPSHOT
After Visit Summary   11/29/2018    Home Valdez    MRN: [id number]           Patient Information     Date Of Birth          1950        Visit Information        Provider Department      11/29/2018 8:40 AM More, Yancy Basurto PA-C [facility]        Today's Diagnoses     Chronic systolic congestive heart failure (H)        Acute on chronic systolic congestive heart failure (H)          Care Instructions    Thanks for coming into [facility] today.    Please call CORE nurse for any questions or concerns:       [phone]    1. Weigh yourself daily and write it down.     2. Call my nurse if your weight is up more than 2 pounds in one day, or 5 pounds in one week.    3. Call my nurse if you feel more short of breath, have more abdominal bloating or leg swelling.    4. Eat a low sodium diet (less than 2,000mg daily). If you eat less salt, you will retain less fluid.     5. We discussed: I'm glad you are doing so well!      6.  Medication changes:  Start taking lisinopril 2.5 once a day at bedtime.    Continue other medications.      8.  Results: labs look good overall, potassium is normal.    Component      Latest Ref Rng & Units 11/29/2018   Sodium      133 - 144 mmol/L 138   Potassium      3.4 - 5.3 mmol/L 4.6   Chloride      94 - 109 mmol/L 100   Carbon Dioxide      20 - 32 mmol/L 33 (H)   Anion Gap      3 - 14 mmol/L 5   Glucose      70 - 99 mg/dL 106 (H)   Urea Nitrogen      7 - 30 mg/dL 20   Creatinine      0.66 - 1.25 mg/dL 1.42 (H)   GFR Estimate      >60 mL/min/1.7m2 49 (L)   GFR Estimate If Black      >60 mL/min/1.7m2 60 (L)   Calcium      8.5 - 10.1 mg/dL 9.2   Hemoglobin      13.3 - 17.7 g/dL 16.4     9.  Follow up: repeat labs in about 2 weeks.  And then see Dr. Murphy in January.        Please call the [facility] at  [phone] with scheduling concerns.      Reminder: Please bring in all current  medications, over the counter supplements and vitamin bottles to your next appointment.            Follow-ups after your visit        Your next 10 appointments already scheduled     Nov 30, 2018 11:00 AM CST   [facility]    [address] [facility] [id number] [phone]           Bring a current list of meds and any records pertaining to this visit. For Physicals, please bring immunization records and any forms needing to be filled out. Please arrive 10 minutes early to complete paperwork.            Dec 10, 2018  8:30 AM CST   Anticoagulation Visit with  ANTI COAG   [facility]    [address] [facility] [phone]            Jan 17, 2019  9:00 AM CST   Ech Complete with RYAN   [facility] ([facility])    [address] [place] [address] [place] [id number] [phone]           1.  Please bring or wear a comfortable two-piece outfit. 2.  You may eat, drink and take your normal medicines. 3.  For any questions that cannot be answered, please contact the ordering physician 4.  Please do not wear perfumes or scented lotions on the day of your exam.            Jan 24, 2019  1:45 PM CST   Return Visit with Jimenez Murphy MD   [facility]    [address] [place] [id number] [phone]              Future tests that were ordered for you today     Open Future Orders        Priority Expected Expires Ordered    Basic metabolic panel Routine 12/13/2018 11/29/2019 11/29/2018    Basic metabolic panel Routine 12/29/2018 11/29/2019 11/29/2018    N terminal pro BNP outpatient Routine 12/29/2018 11/29/2019 11/29/2018            Who to contact     If you have questions or need follow up information about today's clinic visit or your schedule please contact  "[facility] directly at [phone].  Normal or non-critical lab and imaging results will be communicated to you by MyChart, letter or phone within 4 business days after the clinic has received the results. If you do not hear from us within 7 days, please contact the clinic through MyChart or phone. If you have a critical or abnormal lab result, we will notify you by phone as soon as possible.  Submit refill requests through EPIS or call your pharmacy and they will forward the refill request to us. Please allow 3 business days for your refill to be completed.          Additional Information About Your Visit        Care EveryWhere ID     This is your Care EveryWhere ID. This could be used by other organizations to access your [place] medical records  [id number]        Your Vitals Were     Pulse Respirations Height Pulse Oximetry BMI (Body Mass Index)       70 12 1.778 m (5' 10\") 92% 29.7 kg/m2        Blood Pressure from Last 3 Encounters:   11/29/18 124/62   11/19/18 102/78   10/23/18 108/70    Weight from Last 3 Encounters:   11/29/18 93.9 kg (207 lb)   11/19/18 94.3 kg (208 lb)   10/23/18 93.5 kg (206 lb 3.2 oz)              We Performed the Following     Basic metabolic panel     Follow-Up with [facility] - ANTWAN visit     Hemoglobin          Today's Medication Changes          These changes are accurate as of 11/29/18  9:26 AM.  If you have any questions, ask your nurse or doctor.               Start taking these medicines.        Dose/Directions    lisinopril 2.5 MG tablet   Commonly known as:  PRINIVIL/Zestril   Used for:  Chronic systolic congestive heart failure (H)   Started by:  Yancy Keller PA-C        Dose:  2.5 mg   Take 1 tablet (2.5 mg) by mouth At Bedtime   Quantity:  30 tablet   Refills:  11            Where to get your medicines      These medications were sent to [id number] - [place], [place] - [address] [place] [address] " [place] [address]    Hours:  M-F 8:30-6:30; Sat 9-4; closed Sunday Phone:  [phone]     carvedilol 6.25 MG tablet    lisinopril 2.5 MG tablet                Primary Care Provider Office Phone # Fax #    Sandip Vega [phone]       [address] [place] [address]        Goals        General    Medical (pt-stated)     Notes - Note created  9/28/2018 12:01 PM by Zaria Gonzales RN    Goal Statement: I will monitor my weight, blood pressure, pulse, swelling in extremities, fluid and sodium intake, and report any concerning or new symptoms to my care team daily.   Measure of Success: reporting symptoms to care team   Supportive Steps to Achieve: patient has a cardiology care team specifically there to meet patients questions or concerns.   Barriers: complex medical diagnosis.   Strengths: patient and wife are involved in medical care, and current treatment plans  Date to Achieve By: on going  Patient expressed understanding of goal: yes          Medical (pt-stated)     Notes - Note edited  10/31/2018 11:15 AM by Zaria Gonzales RN    Goal Statement: I want to weigh 195 lbs  Measure of Success: home scale will read 195.  Supportive Steps to Achieve: patient has made many lifestyle changes to foster safe and healthy weight loss that will in turn cause positive health benefits.   Barriers: none  Strengths: patient is motivated to learn about weight loss, is exercising, and healthy eating. 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   Date to Achieve By: 1 month  Patient expressed understanding of goal: yes            Equal Access to Services     [facility] AH: Hadii lissette ku hadasho Soomaali, waaxda luqadaha, qaybta kaalmada adeegyada, waxay paul bhandari. So [facility] [phone].    ATENCIÓN: Si habla español, tiene a calabrese disposición servicios gratuitos de asistencia lingüística. Llame al [phone].    We comply with applicable federal civil rights laws and  [place] laws. We do not discriminate on the basis of race, color, national origin, age, disability, sex, sexual orientation, or gender identity.            Thank you!     Thank you for choosing [facility]  for your care. Our goal is always to provide you with excellent care. Hearing back from our patients is one way we can continue to improve our services. Please take a few minutes to complete the written survey that you may receive in the mail after your visit with us. Thank you!             Your Updated Medication List - Protect others around you: Learn how to safely use, store and throw away your medicines at www.disposemymeds.org.          This list is accurate as of 11/29/18  9:26 AM.  Always use your most recent med list.                   Brand Name Dispense Instructions for use Diagnosis    acetaminophen 325 MG tablet    TYLENOL    100 tablet    Take 2 tablets (650 mg) by mouth every 4 hours as needed for other (mild pain)        ADVAIR DISKUS 250-50 MCG/DOSE inhaler   Generic drug:  fluticasone-salmeterol     1 Inhaler    INHALE 1 PUFF BY MOUTH TWICE A DAY    Panlobular emphysema (H)       amiodarone 200 MG tablet    PACERONE/CODARONE    90 tablet    Take 1 tablet (200 mg) by mouth daily    Paroxysmal atrial fibrillation (H)       buPROPion 150 MG 12 hr tablet    WELLBUTRIN SR    180 tablet    TAKE 1 TABLET BY MOUTH TWICE A DAY    Personal history of tobacco use, presenting hazards to health       carvedilol 6.25 MG tablet    COREG    180 tablet    Take 1 tablet (6.25 mg) by mouth 2 times daily (with meals)    Acute on chronic systolic congestive heart failure (H)       fluticasone 50 MCG/ACT nasal spray    FLONASE    16 g    INHALE 1 TO 2 SPRAYS INTO EACH NOSTRIL EVERY DAY    Chronic rhinitis       ipratropium - albuterol 0.5 mg/2.5 mg/3 mL 0.5-2.5 (3) MG/3ML neb solution    DUONEB    540 mL    NEBULIZE 1 VIAL (3ML) BY MOUTH EVERY 4 HOURS AS NEEDED FOR  SHORTNESSOF BREATH / DYSPNEA OR WHEEZING    COPD exacerbation (H)       JANTOVEN 2.5 MG tablet   Generic drug:  warfarin     210 tablet    TAKE 7.5 MG (3 TABS) ON TUES, THURS, SAT AND 5 MG (2 TABS) ALL OTHERDAYS, OR AS DIRECTED BY THE COUMADIN CLINIC.    Atrial fibrillation with RVR (H)       lisinopril 2.5 MG tablet    PRINIVIL/Zestril    30 tablet    Take 1 tablet (2.5 mg) by mouth At Bedtime    Chronic systolic congestive heart failure (H)       montelukast 10 MG tablet    SINGULAIR    90 tablet    TAKE 1 TABLET BY MOUTH DAILY AT BEDTIME    Chronic seasonal allergic rhinitis due to other allergen       * nebulizer/tubing/mouthpiece Kit     1 kit    Use every 4-6 hours PRN    Chronic obstructive pulmonary disease, unspecified COPD type (H)       * AIRS DISPOSABLE NEBULIZER Kit     1 kit    USE EVERY 4 TO 6 HOURS AS NEEDED    Panlobular emphysema (H)       order for DME     1 Device    Equipment being ordered: Nebulizer    COPD (chronic obstructive pulmonary disease) (H)       senna-docusate 8.6-50 MG tablet    SENOKOT-S/PERICOLACE    360 tablet    Take 2 tablets by mouth 2 times daily    Constipation, unspecified constipation type       spironolactone 25 MG tablet    ALDACTONE    90 tablet    Take 1 tablet (25 mg) by mouth daily    Chronic systolic congestive heart failure (H)       torsemide 20 MG tablet    DEMADEX    135 tablet    Take 1 tablet (20 mg) by mouth daily Or as directed by [facility] clinic, up to 3 tabs daily    Chronic diastolic congestive heart failure (H)       VENTOLIN  (90 Base) MCG/ACT inhaler   Generic drug:  albuterol     18 g    INHALE 2 PUFFS BY MOUTH EVERY 4 HOURS AS NEEDED FOR SHORTNESS OF BREATH/DYSPNEA    Chronic obstructive pulmonary disease with acute exacerbation (H)       * Notice:  This list has 2 medication(s) that are the same as other medications prescribed for you. Read the directions carefully, and ask your doctor or other care provider to review them with you.

## 2018-11-30 ENCOUNTER — OFFICE VISIT (OUTPATIENT)
Dept: FAMILY MEDICINE | Facility: OTHER | Age: 68
End: 2018-11-30
Payer: MEDICARE

## 2018-11-30 VITALS
SYSTOLIC BLOOD PRESSURE: 118 MMHG | WEIGHT: 209.5 LBS | RESPIRATION RATE: 28 BRPM | HEART RATE: 64 BPM | TEMPERATURE: 98.4 F | OXYGEN SATURATION: 92 % | DIASTOLIC BLOOD PRESSURE: 60 MMHG | BODY MASS INDEX: 30.06 KG/M2

## 2018-11-30 DIAGNOSIS — Z86.79 HISTORY OF ATRIAL FIBRILLATION: ICD-10-CM

## 2018-11-30 DIAGNOSIS — I50.20 SYSTOLIC CONGESTIVE HEART FAILURE, UNSPECIFIED HF CHRONICITY (H): ICD-10-CM

## 2018-11-30 DIAGNOSIS — J40 BRONCHITIS: Primary | ICD-10-CM

## 2018-11-30 PROCEDURE — 99213 OFFICE O/P EST LOW 20 MIN: CPT | Performed by: INTERNAL MEDICINE

## 2018-11-30 RX ORDER — DOXYCYCLINE 100 MG/1
100 CAPSULE ORAL 2 TIMES DAILY
Qty: 20 CAPSULE | Refills: 0 | Status: SHIPPED | OUTPATIENT
Start: 2018-11-30 | End: 2019-01-11

## 2018-11-30 ASSESSMENT — PAIN SCALES - GENERAL: PAINLEVEL: NO PAIN (0)

## 2018-11-30 NOTE — PROGRESS NOTES
SUBJECTIVE:   Home Valdez is a 68 year old male who presents to clinic today for the following health issues:      Chief Complaint   Patient presents with     RECHECK     cough                         Chief Complaint         The patient is a pleasant and well-known 68-year-old gentleman who presents today with a cough.  He was seen yesterday by his cardiologist who notes that he is doing well from a cardiology standpoint.  He recently was found to have a markedly decreased ejection fraction which is primarily due to atrial fibrillation and congestive heart failure.  Cardiomyopathy.  He is doing much better at this time.  He has no edema.  He denies any significant orthopnea.  He has a slight cough today which is becoming somewhat purulent.  He notes that it is thick and yellow, it is associated with some posterior nasal drainage as well.  He has taken no over-the-counter medication for this.  He is however taking his listed medications compliantly and they are reviewed.                       PAST, FAMILY,SOCIAL HISTORY:     Medical  History:   has a past medical history of AAA (abdominal aortic aneurysm) (H), Atrial fibrillation (H), Chronic anticoagulation, COPD (chronic obstructive pulmonary disease) (H), Hyperlipidemia, Hypertension, NICM (nonischemic cardiomyopathy) (H), Obesity (BMI 35.0-39.9 without comorbidity), JAMES (obstructive sleep apnea) (9/3/2014), PTSD (post-traumatic stress disorder), Pulmonary HTN (H), and Trigeminal neuralgia.     Surgical History:   has a past surgical history that includes CYSTOURETHROSCOPY (1998); LAMINOTOMY,LUMBAR DISK,1 INTRSP (1993); NONSPECIFIC PROCEDURE; NONSPECIFIC PROCEDURE; Phacoemulsification with standard intraocular lens implant (12/26/2013); Vitrectomy anterior (12/26/2013); L cataract surgery[; Soft tissue surgery; colonoscopy; hernia repair; Head and neck surgery; Vitrectomy parsplana with 25 gauge system (2/6/2014); Balloon compression rhizotomy (Left,  9/7/2016); Balloon compression rhizotomy (Left, 6/5/2017); Balloon compression rhizotomy (Left, 11/7/2017); and Phacoemulsification with standard intraocular lens implant (Right, 5/3/2018).     Social History:   reports that he quit smoking about 4 years ago. His smoking use included cigarettes. He has a 40.00 pack-year smoking history. he has never used smokeless tobacco. He reports that he does not drink alcohol or use drugs.     Family History:  family history includes Depression in his father; Diabetes in his paternal grandmother; Hypertension in his mother and paternal grandfather.            MEDICATIONS  Current Outpatient Medications   Medication Sig Dispense Refill     acetaminophen (TYLENOL) 325 MG tablet Take 2 tablets (650 mg) by mouth every 4 hours as needed for other (mild pain) 100 tablet 0     ADVAIR DISKUS 250-50 MCG/DOSE diskus inhaler INHALE 1 PUFF BY MOUTH TWICE A DAY 1 Inhaler 1     amiodarone (PACERONE/CODARONE) 200 MG tablet Take 1 tablet (200 mg) by mouth daily 90 tablet 3     buPROPion (WELLBUTRIN SR) 150 MG 12 hr tablet TAKE 1 TABLET BY MOUTH TWICE A  tablet 1     carvedilol (COREG) 6.25 MG tablet Take 1 tablet (6.25 mg) by mouth 2 times daily (with meals) 180 tablet 3     doxycycline hyclate (VIBRAMYCIN) 100 MG capsule Take 1 capsule (100 mg) by mouth 2 times daily 20 capsule 0     fluticasone (FLONASE) 50 MCG/ACT spray INHALE 1 TO 2 SPRAYS INTO EACH NOSTRIL EVERY DAY 16 g 3     ipratropium - albuterol 0.5 mg/2.5 mg/3 mL (DUONEB) 0.5-2.5 (3) MG/3ML neb solution NEBULIZE 1 VIAL (3ML) BY MOUTH EVERY 4 HOURS AS NEEDED FOR SHORTNESSOF BREATH / DYSPNEA OR WHEEZING 540 mL 1     JANTOVEN 2.5 MG tablet TAKE 7.5 MG (3 TABS) ON TUES, THURS, SAT AND 5 MG (2 TABS) ALL OTHERDAYS, OR AS DIRECTED BY THE COUMADIN CLINIC. 210 tablet 1     lisinopril (PRINIVIL/ZESTRIL) 2.5 MG tablet Take 1 tablet (2.5 mg) by mouth At Bedtime 30 tablet 11     montelukast (SINGULAIR) 10 MG tablet TAKE 1 TABLET BY MOUTH  DAILY AT BEDTIME 90 tablet 1     Respiratory Therapy Supplies (AIRS DISPOSABLE NEBULIZER) KIT USE EVERY 4 TO 6 HOURS AS NEEDED 1 kit 0     Respiratory Therapy Supplies (NEBULIZER/TUBING/MOUTHPIECE) KIT Use every 4-6 hours PRN 1 kit 11     senna-docusate (SENOKOT-S;PERICOLACE) 8.6-50 MG per tablet Take 2 tablets by mouth 2 times daily 360 tablet 3     spironolactone (ALDACTONE) 25 MG tablet Take 1 tablet (25 mg) by mouth daily 90 tablet 0     torsemide (DEMADEX) 20 MG tablet Take 1 tablet (20 mg) by mouth daily Or as directed by [facility], up to 3 tabs daily 135 tablet 3     VENTOLIN  (90 Base) MCG/ACT inhaler INHALE 2 PUFFS BY MOUTH EVERY 4 HOURS AS NEEDED FOR SHORTNESS OF BREATH/DYSPNEA 18 g 3     guaiFENesin-codeine (ROBITUSSIN AC) 100-10 MG/5ML solution Take 5-10 mLs by mouth every 6 hours as needed for cough 8 oz 0     order for DME Equipment being ordered: Nebulizer 1 Device 0     sulfamethoxazole-trimethoprim (BACTRIM DS/SEPTRA DS) 800-160 MG tablet Take 1 tablet by mouth 2 times daily for 14 days 28 tablet 0         --------------------------------------------------------------------------------------------------------------------                              Review of Systems       LUNGS: Pt denies:   hemoptysis, or shortness of breath at rest.  He does have a little bit of dyspnea with exertion..   HEART: Pt denies: chest pain, arrhythmia, syncope, tachy or bradyarrhythmia.   GI: Pt denies: nausea, vomiting, diarrhea, constipation, melena, or hematochezia.   NEURO: Pt denies: seizures, strokes, diplopia, weakness, paraesthesias, or paralysis.   SKIN: Pt denies: itching, rashes, discoloration, or specific lesions of concern. Denies recent hair loss.   PSYCH: The patient denies significant depression, anxiety, mood imbalance. Specifically denies any suicidal ideation.                                     Examination         LUNGS: A few midline rhonchi clear with cough probably.  Lungs are otherwise  clear bilaterally, airflow is brisk, no intercostal retraction or stridor is noted. No coughing is noted during visit.   HEART:  regular without rubs, clicks, gallops, or murmurs. PMI is nondisplaced. Upstrokes are brisk. S1,S2 are heard.  No edema is noted.   GI: Abdomen is soft, without rebound, guarding or tenderness. Bowel sounds are appropriate. No renal bruits are heard.   NEURO: Pt is alert and appropriate. No neurologic lateralization is noted. Cranial nerves 2-12 are intact. Peripheral sensory and motor function are grossly normal.    SKIN:  warm and dry. No erythema, or rashes are noted. No specific lesions of concern are noted.    PSYCH: The patient appears grossly appropriate. Maintains good eye contact, does not have any jittery or atypical motion. Displays appropriate affect.   ENT: Pharynx is non-erythemous, moderate yellow PND, no significant nasal obstruction, TM's not red or retracted, hearing intact bilaterally. No carotid bruits are heard. No JVD seen. Thyroid is not nodular or enlarged.                                           Decision Making    1. Bronchitis  Fluids, Tylenol, rest.  - doxycycline hyclate (VIBRAMYCIN) 100 MG capsule; Take 1 capsule (100 mg) by mouth 2 times daily  Dispense: 20 capsule; Refill: 0    2. Systolic congestive heart failure, unspecified HF chronicity (H)  Currently stable on medication.  No changes recommended                           FOLLOW UP   I have asked the patient to make an appointment for followup with me in 1 week if not markedly improved        I have carefully explained the diagnosis and treatment options to the patient.  The patient has displayed an understanding of the above, and all subsequent questions were answered.      DO SANJAY Lock    Portions of this note were produced using [employer]  Although every attempt at real-time proof reading has been made, occasional grammar/syntax errors may have been missed.

## 2018-12-03 ENCOUNTER — PATIENT OUTREACH (OUTPATIENT)
Dept: CARE COORDINATION | Facility: CLINIC | Age: 68
End: 2018-12-03

## 2018-12-03 ASSESSMENT — ACTIVITIES OF DAILY LIVING (ADL): DEPENDENT_IADLS:: INDEPENDENT

## 2018-12-03 NOTE — PROGRESS NOTES
"Clinic Care Coordination Contact  Clinic Care Coordination Contact  OUTREACH  Referral Information:  Referral Source: IP Report  Primary Diagnosis: CHF  Chief Complaint   Patient presents with     Clinic Care Coordination - Follow-up   Clinical Concerns:  Current Medical Concerns:  Patient has been seen 2x due to bronchitis. He is currently taking doxycycline, tolerating well. Breathing is 50% better now, but not back to 100%. He remembers being told to not wait out cold like symptoms due to chronic conditions. CC RN applauded patient on his choice to listen to his care team. Patient is taking daily weights, pulse, blood pressure and o2.  Pulse is 64-90, O2 is normally 96-97%. Patient is following a low sodium diet. His goal is to be 195 lbs and to maintain that weight. Currently not exercising (but plans to, pt has pending [facility] appt 19). He did note bilateral swelling in the ankles 2 weeks ago. He took one extra spirolactone tabs x2 nights and that resolved the swelling. We discussed his upcoming echo also.   Current Behavioral Concerns: he baked sugar cut out cookies and plans to have his 4 grand children over to frost them. He also plans to have grandchildren over this spring to help him garden.     Education Provided to patient: my CHF action plan health sheet that was mailed to him 10/31/18.    Health Maintenance Reviewed:    Clinical Pathway: Clinic Care Coordination   CHF:  Home scale available:  Yes  Home scale weight this mornin, dry weight is 203  Heart Failure Zones sheet on refrigerator or available: Yes  Any increased SOB since hospital discharge:  Yes  Any increased edema since hospital discharge:  No  What number to call for YELLOW zones: [phone]    Medications:  \"Do you have questions about your medications?\"  No  Is patient on Warfarin?  Yes:   Per INR clinic nurse  Patient independent in medication management and verbalizes adherence and understanding of medication regimen. "     Functional Status:  Dependent ADLs:: Independent  Dependent IADLs:: Independent  Bed or wheelchair confined:: No  Mobility Status: Independent  Fallen 2 or more times in the past year?: No  Any fall with injury in the past year?: No    Living Situation:  Current living arrangement:: I live in a private home with spouse    Diet/Exercise/Sleep:  Diet:: No added salt, Other (reduced sodium diet, portion control)  Inadequate nutrition (GOAL):: No  Food Insecurity: No  Tube Feeding: No  Exercise:: Currently not exercising (but plans to, pt has pending [facility] clinic appt 1/24/19)  Significant changes in sleep pattern (GOAL): No (pt uses BiPAP and is dedicated to using every night)    Transportation:  Transportation concerns (GOAL):: No  Transportation means:: Regular car     Psychosocial:  Baptism or spiritual beliefs that impact treatment:: No  Mental health DX:: Yes  Mental health DX how managed:: None  Mental health management concern (GOAL):: No  Informal Support system:: Spouse     Financial/Insurance:   Financial/Insurance concerns (GOAL):: No     Resources and Interventions:  Current Resources:   Community Resources: None  Supplies used at home:: None  Equipment Currently Used at Home: none    Advance Care Plan/Directive  Advanced Care Plans/Directives on file:: No  Referrals Placed: None     Goals:   Goals        General    Medical (pt-stated)     Notes - Note created  9/28/2018 12:01 PM by Zaria Gonzales RN    Goal Statement: I will monitor my weight, blood pressure, pulse, swelling in extremities, fluid and sodium intake, and report any concerning or new symptoms to my care team daily.   Measure of Success: reporting symptoms to care team   Supportive Steps to Achieve: patient has a cardiology care team specifically there to meet patients questions or concerns.   Barriers: complex medical diagnosis.   Strengths: patient and wife are involved in medical care, and current treatment plans  Date to  Achieve By: on going  Patient expressed understanding of goal: yes          Medical (pt-stated)     Notes - Note edited  10/31/2018 11:15 AM by Zaria Gonzales, RN    Goal Statement: I want to weigh 195 lbs  Measure of Success: home scale will read 195.  Supportive Steps to Achieve: patient has made many lifestyle changes to foster safe and healthy weight loss that will in turn cause positive health benefits.   Barriers: none  Strengths: patient is motivated to learn about weight loss, is exercising, and healthy eating. 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   Date to Achieve By: 1 month  Patient expressed understanding of goal: yes              Patient/Caregiver understanding: yes  Outreach Frequency: monthly  Future Appointments              [facility] 1 [facility]    In 1 month [address]    In 1 month Jimenez Murphy MD [facility]        Plan:   1. Patient will continue current treatment plan.  2. Patient will monitor his acute illness and follow up as needed  3. Patient will reach out to CC RN as needed, CC RN will reach out to patient in 1 month.    ZENA Samuel RN Clinic Care Coordinator   [place], [place], [place]  Phone: [phone]

## 2018-12-04 ENCOUNTER — TELEPHONE (OUTPATIENT)
Dept: INTERNAL MEDICINE | Facility: OTHER | Age: 68
End: 2018-12-04

## 2018-12-04 NOTE — TELEPHONE ENCOUNTER
Reason for call:  Patient reporting a symptom    Symptom or request: pt takes senna-docusate. Pt has been constipated for at least a week    Duration (how long have symptoms been present): 1 wk    Have you been treated for this before? No    Additional comments:     Phone Number patient can be reached at:  Home number on file [phone] (home)    Best Time:      Can we leave a detailed message on this number:  YES    Call taken on 12/4/2018 at 7:14 AM by Keiry Henry

## 2018-12-04 NOTE — TELEPHONE ENCOUNTER
Called and spoke to the patient. He said he has felt constipated for the last week. He has been having BMs but not like normal. Patient said his BMs changed when he started his most recent antibiotic. Patient denies any abdominal pain or any red flag symptoms. He said he went the last 2 days with no BM. He said this morning he finally had a very large BM. He said it was hard to begin with but turned very soft. No diarrhea. No bleeding. No issues. Patient said at this time, he has no further concerns as he had a very large BM this morning. He will call with any questions or concerns.     Kari Mora, MAXINEN, RN  [facility]

## 2018-12-10 ENCOUNTER — ANTICOAGULATION THERAPY VISIT (OUTPATIENT)
Dept: ANTICOAGULATION | Facility: OTHER | Age: 68
End: 2018-12-10
Payer: MEDICARE

## 2018-12-10 ENCOUNTER — OFFICE VISIT (OUTPATIENT)
Dept: FAMILY MEDICINE | Facility: OTHER | Age: 68
End: 2018-12-10
Payer: MEDICARE

## 2018-12-10 ENCOUNTER — ANCILLARY PROCEDURE (OUTPATIENT)
Dept: GENERAL RADIOLOGY | Facility: OTHER | Age: 68
End: 2018-12-10
Attending: INTERNAL MEDICINE
Payer: MEDICARE

## 2018-12-10 VITALS
TEMPERATURE: 98.2 F | DIASTOLIC BLOOD PRESSURE: 62 MMHG | WEIGHT: 205 LBS | OXYGEN SATURATION: 93 % | RESPIRATION RATE: 20 BRPM | HEART RATE: 75 BPM | BODY MASS INDEX: 29.41 KG/M2 | SYSTOLIC BLOOD PRESSURE: 116 MMHG

## 2018-12-10 DIAGNOSIS — E66.01 MORBID OBESITY (H): ICD-10-CM

## 2018-12-10 DIAGNOSIS — I48.91 ATRIAL FIBRILLATION WITH RVR (H): ICD-10-CM

## 2018-12-10 DIAGNOSIS — Z79.01 LONG TERM CURRENT USE OF ANTICOAGULANT THERAPY: ICD-10-CM

## 2018-12-10 DIAGNOSIS — I71.40 ABDOMINAL AORTIC ANEURYSM (AAA) WITHOUT RUPTURE (H): ICD-10-CM

## 2018-12-10 DIAGNOSIS — R05.9 COUGH: ICD-10-CM

## 2018-12-10 DIAGNOSIS — J43.1 PANLOBULAR EMPHYSEMA (H): ICD-10-CM

## 2018-12-10 DIAGNOSIS — R05.9 COUGH: Primary | ICD-10-CM

## 2018-12-10 DIAGNOSIS — I50.20 SYSTOLIC CONGESTIVE HEART FAILURE, UNSPECIFIED HF CHRONICITY (H): ICD-10-CM

## 2018-12-10 LAB — INR POINT OF CARE: 1.6 (ref 0.86–1.14)

## 2018-12-10 PROCEDURE — 85610 PROTHROMBIN TIME: CPT | Mod: QW

## 2018-12-10 PROCEDURE — 99207 ZZC NO CHARGE NURSE ONLY: CPT

## 2018-12-10 PROCEDURE — 71046 X-RAY EXAM CHEST 2 VIEWS: CPT | Mod: FY

## 2018-12-10 PROCEDURE — 36416 COLLJ CAPILLARY BLOOD SPEC: CPT

## 2018-12-10 PROCEDURE — 99214 OFFICE O/P EST MOD 30 MIN: CPT | Performed by: INTERNAL MEDICINE

## 2018-12-10 RX ORDER — PREDNISONE 10 MG/1
TABLET ORAL
Qty: 18 EACH | Refills: 0 | Status: SHIPPED | OUTPATIENT
Start: 2018-12-10 | End: 2018-12-20

## 2018-12-10 RX ORDER — SULFAMETHOXAZOLE/TRIMETHOPRIM 800-160 MG
1 TABLET ORAL 2 TIMES DAILY
Qty: 28 TABLET | Refills: 0 | Status: SHIPPED | OUTPATIENT
Start: 2018-12-10 | End: 2018-12-24

## 2018-12-10 RX ORDER — CODEINE PHOSPHATE AND GUAIFENESIN 10; 100 MG/5ML; MG/5ML
1-2 SOLUTION ORAL EVERY 6 HOURS PRN
Qty: 8 OZ | Refills: 0 | Status: SHIPPED | OUTPATIENT
Start: 2018-12-10 | End: 2019-01-11

## 2018-12-10 ASSESSMENT — PAIN SCALES - GENERAL: PAINLEVEL: EXTREME PAIN (8)

## 2018-12-10 NOTE — PROGRESS NOTES
SUBJECTIVE:   Home Valdez is a 68 year old male who presents to clinic today for the following health issues:    Patient is here today to follow up on his cough seems to be getting worse and now is having pain on the left rib cage and the coughing is keeping him up at night.                       Chief Complaint         The patient is a well-known and pleasant 68-year-old gentleman who presents today with cough that seems to be getting slightly worse.  He was quite concerned as he does have a history of previous congestive heart failure associated with uncontrolled atrial fibrillation.  He has not been converted and is doing much better with respect to that however he does have underlying emphysema and occasionally upper respiratory tract infections.  He is recently had some conservative constipation, it seems to have resolved itself.  He notes that the sputum that he is coughing up is slightly yellow.  He does not have a fever or chills.  He notes that he does more coughing at nighttime when he lays down.  He denies any weight gain or edema in the lower                         PAST, FAMILY,SOCIAL HISTORY:     Medical  History:   has a past medical history of AAA (abdominal aortic aneurysm) (H), Atrial fibrillation (H), Chronic anticoagulation, COPD (chronic obstructive pulmonary disease) (H), Hyperlipidemia, Hypertension, NICM (nonischemic cardiomyopathy) (H), Obesity (BMI 35.0-39.9 without comorbidity), JAMES (obstructive sleep apnea) (9/3/2014), PTSD (post-traumatic stress disorder), Pulmonary HTN (H), and Trigeminal neuralgia.     Surgical History:   has a past surgical history that includes CYSTOURETHROSCOPY (1998); LAMINOTOMY,LUMBAR DISK,1 INTRSP (1993); NONSPECIFIC PROCEDURE; NONSPECIFIC PROCEDURE; Phacoemulsification with standard intraocular lens implant (12/26/2013); Vitrectomy anterior (12/26/2013); L cataract surgery[; Soft tissue surgery; colonoscopy; hernia repair; Head and neck surgery;  Vitrectomy parsplana with 25 gauge system (2/6/2014); Balloon compression rhizotomy (Left, 9/7/2016); Balloon compression rhizotomy (Left, 6/5/2017); Balloon compression rhizotomy (Left, 11/7/2017); and Phacoemulsification with standard intraocular lens implant (Right, 5/3/2018).     Social History:   reports that he quit smoking about 4 years ago. His smoking use included cigarettes. He has a 40.00 pack-year smoking history. he has never used smokeless tobacco. He reports that he does not drink alcohol or use drugs.     Family History:  family history includes Depression in his father; Diabetes in his paternal grandmother; Hypertension in his mother and paternal grandfather.            MEDICATIONS  Current Outpatient Medications   Medication Sig Dispense Refill     acetaminophen (TYLENOL) 325 MG tablet Take 2 tablets (650 mg) by mouth every 4 hours as needed for other (mild pain) 100 tablet 0     ADVAIR DISKUS 250-50 MCG/DOSE diskus inhaler INHALE 1 PUFF BY MOUTH TWICE A DAY 1 Inhaler 1     amiodarone (PACERONE/CODARONE) 200 MG tablet Take 1 tablet (200 mg) by mouth daily 90 tablet 3     buPROPion (WELLBUTRIN SR) 150 MG 12 hr tablet TAKE 1 TABLET BY MOUTH TWICE A  tablet 1     carvedilol (COREG) 6.25 MG tablet Take 1 tablet (6.25 mg) by mouth 2 times daily (with meals) 180 tablet 3     doxycycline hyclate (VIBRAMYCIN) 100 MG capsule Take 1 capsule (100 mg) by mouth 2 times daily 20 capsule 0     fluticasone (FLONASE) 50 MCG/ACT spray INHALE 1 TO 2 SPRAYS INTO EACH NOSTRIL EVERY DAY 16 g 3     guaiFENesin-codeine (ROBITUSSIN AC) 100-10 MG/5ML solution Take 5-10 mLs by mouth every 6 hours as needed for cough 8 oz 0     ipratropium - albuterol 0.5 mg/2.5 mg/3 mL (DUONEB) 0.5-2.5 (3) MG/3ML neb solution NEBULIZE 1 VIAL (3ML) BY MOUTH EVERY 4 HOURS AS NEEDED FOR SHORTNESSOF BREATH / DYSPNEA OR WHEEZING 540 mL 1     JANTOVEN 2.5 MG tablet TAKE 7.5 MG (3 TABS) ON TUES, THURS, SAT AND 5 MG (2 TABS) ALL OTHERDAYS, OR  AS DIRECTED BY THE COUMADIN CLINIC. 210 tablet 1     lisinopril (PRINIVIL/ZESTRIL) 2.5 MG tablet Take 1 tablet (2.5 mg) by mouth At Bedtime 30 tablet 11     order for DME Equipment being ordered: Nebulizer 1 Device 0     Respiratory Therapy Supplies (AIRS DISPOSABLE NEBULIZER) KIT USE EVERY 4 TO 6 HOURS AS NEEDED 1 kit 0     Respiratory Therapy Supplies (NEBULIZER/TUBING/MOUTHPIECE) KIT Use every 4-6 hours PRN 1 kit 11     senna-docusate (SENOKOT-S;PERICOLACE) 8.6-50 MG per tablet Take 2 tablets by mouth 2 times daily 360 tablet 3     torsemide (DEMADEX) 20 MG tablet Take 1 tablet (20 mg) by mouth daily Or as directed by [facility] clinic, up to 3 tabs daily 135 tablet 3     VENTOLIN  (90 Base) MCG/ACT inhaler INHALE 2 PUFFS BY MOUTH EVERY 4 HOURS AS NEEDED FOR SHORTNESS OF BREATH/DYSPNEA 18 g 3     montelukast (SINGULAIR) 10 MG tablet TAKE 1 TABLET BY MOUTH DAILY AT BEDTIME 90 tablet 1     spironolactone (ALDACTONE) 25 MG tablet TAKE 1 TABLET BY MOUTH EVERY DAY 90 tablet 0         --------------------------------------------------------------------------------------------------------------------                              Review of Systems     LUNGS: Pt denies:hemoptysis, or shortness of breath at rest.  He does have mild dyspnea with any exertion.  Cough is productive of yellow sputum.   HEART: Pt denies: chest pain, current symptomatic arrhythmia, syncope, tachy or bradyarrhythmia.   GI: Pt denies: nausea, vomiting, diarrhea, constipation, melena, or hematochezia.  . NEURO: Pt denies: seizures, strokes, diplopia, weakness, paraesthesias, or paralysis.   SKIN: Pt denies: itching, rashes, discoloration, or specific lesions of concern. Denies recent hair loss.   PSYCH: The patient denies significant depression, anxiety, mood imbalance. Specifically denies any suicidal ideation.                                     Examination      /62 (BP Location: Right arm, Patient Position: Sitting, Cuff Size: Adult  Regular)   Pulse 75   Temp 98.2  F (36.8  C) (Oral)   Resp 20   Wt 93 kg (205 lb)   SpO2 93%   BMI 29.41 kg/m     Constitutional: The patient appears to be in no acute distress. The patient appears to be adequately hydrated. No acute respiratory or hemodynamic distress is noted at this time.   LUNGS: Airflow is decreased.  Scattered rhonchi are noted bilaterally, airflow is brisk, no intercostal retraction or stridor is noted. No coughing is noted during visit.   HEART:  regular without rubs, clicks, gallops, or murmurs. PMI is nondisplaced. Upstrokes are brisk. S1,S2 are heard.   GI: Abdomen is soft, without rebound, guarding or tenderness. Bowel sounds are appropriate. No renal bruits are heard.   NEURO: Pt is alert and appropriate. No neurologic lateralization is noted. Cranial nerves 2-12 are intact. Peripheral sensory and motor function are grossly normal.    SKIN:  warm and dry. No erythema, or rashes are noted. No specific lesions of concern are noted.                                            Decision Making  1. Cough  Rule out pneumonia.  Start antibiotic and antitussive therapy.  - XR Chest 2 Views; Future  - guaiFENesin-codeine (ROBITUSSIN AC) 100-10 MG/5ML solution; Take 5-10 mLs by mouth every 6 hours as needed for cough  Dispense: 8 oz; Refill: 0  - sulfamethoxazole-trimethoprim (BACTRIM DS/SEPTRA DS) 800-160 MG tablet; Take 1 tablet by mouth 2 times daily for 14 days  Dispense: 28 tablet; Refill: 0    2. Morbid obesity (H)  Recommend weight loss through continued caloric restriction    3. Panlobular emphysema (H)  Continue multi nebulizer therapy    4. Atrial fibrillation with RVR (H)  Currently controlled and in normal sinus rhythm    5. Abdominal aortic aneurysm (AAA) without rupture (H)  Being followed closely.  Nonsurgical at this point.    6. Systolic congestive heart failure, unspecified HF chronicity (H)  Markedly improved with resolution of the arrhythmia.                                FOLLOW UP   I have asked the patient to make an appointment for followup with me in 1 week if not markedly better        I have carefully explained the diagnosis and treatment options to the patient.  The patient has displayed an understanding of the above, and all subsequent questions were answered.      DO SANJAY Lock    Portions of this note were produced using [employer]  Although every attempt at real-time proof reading has been made, occasional grammar/syntax errors may have been missed.

## 2018-12-10 NOTE — PROGRESS NOTES
ANTICOAGULATION FOLLOW-UP CLINIC VISIT    Patient Name:  Home Valdez  Date:  12/10/2018  Contact Type:  Face to Face    SUBJECTIVE:     Patient Findings     Positives:   Change in medications (Change in BP meds and still playing caught up since his Amiodarone was decreased)           OBJECTIVE    INR Protime   Date Value Ref Range Status   12/10/2018 1.6 (A) 0.86 - 1.14 Final       ASSESSMENT / PLAN  INR assessment SUB    Recheck INR In: 10 DAYS    INR Location Clinic      Anticoagulation Summary  As of 12/10/2018    INR goal:   2.0-3.0   TTR:   61.2 % (2.5 y)   INR used for dosin.6! (12/10/2018)   Warfarin maintenance plan:   2.5 mg (2.5 mg x 1) every Mon, Wed, Fri; 5 mg (2.5 mg x 2) all other days   Full warfarin instructions:   12/10: 5 mg; Otherwise 2.5 mg every Mon, Wed, Fri; 5 mg all other days   Weekly warfarin total:   27.5 mg   Plan last modified:   Shameka Carey RN (12/10/2018)   Next INR check:   2018   Target end date:       Indications    Atrial fibrillation with RVR (H) [I48.91]  Long term current use of anticoagulant therapy [Z79.01]             Anticoagulation Episode Summary     INR check location:       Preferred lab:       Send INR reminders to:   RODOLFO PASCUAL    Comments:   2.5 MG TABLETS, likes print out, PM dose, Amiodarone started 18      Anticoagulation Care Providers     Provider Role Specialty Phone number    Sandip VegaDO [facility] Medicine [phone]            See the Encounter Report to view Anticoagulation Flowsheet and Dosing Calendar (Go to Encounters tab in chart review, and find the Anticoagulation Therapy Visit)    Dosage adjustment made based on physician directed care plan.    Shameka Carey RN

## 2018-12-12 ENCOUNTER — TELEPHONE (OUTPATIENT)
Dept: INTERNAL MEDICINE | Facility: CLINIC | Age: 68
End: 2018-12-12

## 2018-12-12 ENCOUNTER — OFFICE VISIT (OUTPATIENT)
Dept: FAMILY MEDICINE | Facility: OTHER | Age: 68
End: 2018-12-12
Payer: MEDICARE

## 2018-12-12 VITALS
WEIGHT: 206.5 LBS | DIASTOLIC BLOOD PRESSURE: 70 MMHG | TEMPERATURE: 96.7 F | OXYGEN SATURATION: 91 % | SYSTOLIC BLOOD PRESSURE: 132 MMHG | BODY MASS INDEX: 29.63 KG/M2 | RESPIRATION RATE: 20 BRPM | HEART RATE: 76 BPM

## 2018-12-12 DIAGNOSIS — I48.91 ATRIAL FIBRILLATION WITH RVR (H): ICD-10-CM

## 2018-12-12 DIAGNOSIS — I50.20 SYSTOLIC CONGESTIVE HEART FAILURE, UNSPECIFIED HF CHRONICITY (H): ICD-10-CM

## 2018-12-12 DIAGNOSIS — J44.9 CHRONIC OBSTRUCTIVE PULMONARY DISEASE, UNSPECIFIED COPD TYPE (H): Chronic | ICD-10-CM

## 2018-12-12 DIAGNOSIS — R07.82 INTERCOSTAL PAIN: Primary | ICD-10-CM

## 2018-12-12 PROCEDURE — 99213 OFFICE O/P EST LOW 20 MIN: CPT | Performed by: INTERNAL MEDICINE

## 2018-12-12 ASSESSMENT — PAIN SCALES - GENERAL: PAINLEVEL: EXTREME PAIN (8)

## 2018-12-12 NOTE — TELEPHONE ENCOUNTER
Reason for call:  Patient reporting a symptom    Symptom or request: rib pain from coughing     Duration (how long have symptoms been present): ongoing     Have you been treated for this before? Yes    Additional comments: Pt is wanting a call to discuss. He would like advice. He does have an appt today at 3 PM but is wondering if he really needs that? Would like a call first.     Phone Number patient can be reached at:  Home number on file [phone] (home)    Best Time:  Any     Can we leave a detailed message on this number:  YES    Call taken on 12/12/2018 at 7:24 AM by Patito Harris

## 2018-12-12 NOTE — PROGRESS NOTES
SUBJECTIVE:   Home Valdez is a 68 year old male who presents to clinic today for the following health issues:      Joint Pain    Onset: on going for weeks was seen the other day. The pain was worse in last day.     Description:   Location: Left rib area  Character: Sharp and Stabbing    Intensity: severe    Progression of Symptoms: worse    Accompanying Signs & Symptoms:  Other symptoms: feeling sob of breathe when the sharp pains comes hard to take in deep breathe hurts really bad to cough.     History:   Previous similar pain: no       Precipitating factors:   Trauma or overuse: no     Alleviating factors:  Improved by: rest/inactivity, heat, acetaminophen and just took pain killer today to get here that was left over from a year a go. Patient is still not sleeping well do to the pain. Is wondering if maybe he tore something or it feels like a broken rib,     Therapies Tried and outcome:                         Chief Complaint         The patient is a pleasant and well-known 68-year-old gentleman who presents today with some discomfort In his left chest.  It appears to be costochondral pain.  It is easily reproducible deep respiration or bending.  He also notes that it is difficult to sleep on that side. . He does have a history of frequent coughing.  He has had intermittent upper respiratory tract infection as well as his underlying congestive heart failure which is currently well controlled.  His atrial fibrillation is maintained in a sinus rhythm at this time and his rate is controlled.  He is adequately diuresed and is not having significant dyspnea with any exertion.                       PAST, FAMILY,SOCIAL HISTORY:     Medical  History:   has a past medical history of AAA (abdominal aortic aneurysm) (H), Atrial fibrillation (H), Chronic anticoagulation, COPD (chronic obstructive pulmonary disease) (H), Hyperlipidemia, Hypertension, NICM (nonischemic cardiomyopathy) (H), Obesity (BMI 35.0-39.9  without comorbidity), JAMES (obstructive sleep apnea) (9/3/2014), PTSD (post-traumatic stress disorder), Pulmonary HTN (H), and Trigeminal neuralgia.     Surgical History:   has a past surgical history that includes CYSTOURETHROSCOPY (1998); LAMINOTOMY,LUMBAR DISK,1 INTRSP (1993); NONSPECIFIC PROCEDURE; NONSPECIFIC PROCEDURE; Phacoemulsification with standard intraocular lens implant (12/26/2013); Vitrectomy anterior (12/26/2013); L cataract surgery[; Soft tissue surgery; colonoscopy; hernia repair; Head and neck surgery; Vitrectomy parsplana with 25 gauge system (2/6/2014); Balloon compression rhizotomy (Left, 9/7/2016); Balloon compression rhizotomy (Left, 6/5/2017); Balloon compression rhizotomy (Left, 11/7/2017); and Phacoemulsification with standard intraocular lens implant (Right, 5/3/2018).     Social History:   reports that he quit smoking about 4 years ago. His smoking use included cigarettes. He has a 40.00 pack-year smoking history. he has never used smokeless tobacco. He reports that he does not drink alcohol or use drugs.     Family History:  family history includes Depression in his father; Diabetes in his paternal grandmother; Hypertension in his mother and paternal grandfather.            MEDICATIONS  Current Outpatient Medications   Medication Sig Dispense Refill     acetaminophen (TYLENOL) 325 MG tablet Take 2 tablets (650 mg) by mouth every 4 hours as needed for other (mild pain) 100 tablet 0     ADVAIR DISKUS 250-50 MCG/DOSE diskus inhaler INHALE 1 PUFF BY MOUTH TWICE A DAY 1 Inhaler 1     amiodarone (PACERONE/CODARONE) 200 MG tablet Take 1 tablet (200 mg) by mouth daily 90 tablet 3     buPROPion (WELLBUTRIN SR) 150 MG 12 hr tablet TAKE 1 TABLET BY MOUTH TWICE A  tablet 1     carvedilol (COREG) 6.25 MG tablet Take 1 tablet (6.25 mg) by mouth 2 times daily (with meals) 180 tablet 3     fluticasone (FLONASE) 50 MCG/ACT spray INHALE 1 TO 2 SPRAYS INTO EACH NOSTRIL EVERY DAY 16 g 3      ipratropium - albuterol 0.5 mg/2.5 mg/3 mL (DUONEB) 0.5-2.5 (3) MG/3ML neb solution NEBULIZE 1 VIAL (3ML) BY MOUTH EVERY 4 HOURS AS NEEDED FOR SHORTNESSOF BREATH / DYSPNEA OR WHEEZING 540 mL 1     JANTOVEN 2.5 MG tablet TAKE 7.5 MG (3 TABS) ON TUES, THURS, SAT AND 5 MG (2 TABS) ALL OTHERDAYS, OR AS DIRECTED BY THE COUMADIN CLINIC. 210 tablet 1     lisinopril (PRINIVIL/ZESTRIL) 2.5 MG tablet Take 1 tablet (2.5 mg) by mouth At Bedtime 30 tablet 11     order for DME Equipment being ordered: Nebulizer 1 Device 0     Respiratory Therapy Supplies (NEBULIZER/TUBING/MOUTHPIECE) KIT Use every 4-6 hours PRN 1 kit 11     senna-docusate (SENOKOT-S;PERICOLACE) 8.6-50 MG per tablet Take 2 tablets by mouth 2 times daily 360 tablet 3     torsemide (DEMADEX) 20 MG tablet Take 1 tablet (20 mg) by mouth daily Or as directed by [facility], up to 3 tabs daily 135 tablet 3     VENTOLIN  (90 Base) MCG/ACT inhaler INHALE 2 PUFFS BY MOUTH EVERY 4 HOURS AS NEEDED FOR SHORTNESS OF BREATH/DYSPNEA 18 g 3     cefuroxime (CEFTIN) 500 MG tablet Take 1 tablet (500 mg) by mouth 2 times daily for 10 days 20 tablet 0     montelukast (SINGULAIR) 10 MG tablet TAKE 1 TABLET BY MOUTH DAILY AT BEDTIME 90 tablet 1     predniSONE (DELTASONE) 50 MG tablet Take 50 mg by mouth daily for 7 days. 7 tablet 0     Respiratory Therapy Supplies (AIRS DISPOSABLE NEBULIZER) KIT USE EVERY 4 TO 6 HOURS AS NEEDED 1 kit 3     spironolactone (ALDACTONE) 25 MG tablet TAKE 1 TABLET BY MOUTH EVERY DAY 90 tablet 0         --------------------------------------------------------------------------------------------------------------------                              Review of Systems       LUNGS: Pt denies: cough, excess sputum, hemoptysis, or shortness of breath.   HEART: Pt denies: chest pain, arrhythmia, syncope, tachy or bradyarrhythmia.   GI: Pt denies: nausea, vomiting, diarrhea, constipation, melena, or hematochezia.   NEURO: Pt denies: seizures, strokes, diplopia,  weakness, paraesthesias, or paralysis.   SKIN: Pt denies: itching, rashes, discoloration, or specific lesions of concern. Denies recent hair loss.   PSYCH: The patient denies significant depression, anxiety, mood imbalance. Specifically denies any suicidal ideation.                                     Examination      /70 (BP Location: Right arm, Patient Position: Sitting, Cuff Size: Adult Large)   Pulse 76   Temp 96.7  F (35.9  C) (Oral)   Resp 20   Wt 93.7 kg (206 lb 8 oz)   SpO2 91%   BMI 29.63 kg/m       LUNGS: clear bilaterally, airflow is brisk, no intercostal retraction or stridor is noted. No coughing is noted during visit.   HEART:  regular without rubs, clicks, gallops, or murmurs. PMI is nondisplaced. Upstrokes are brisk. S1,S2 are heard.   GI: Abdomen is soft, without rebound, guarding or tenderness. Bowel sounds are appropriate. No renal bruits are heard.   NEURO: Pt is alert and appropriate. No neurologic lateralization is noted. Cranial nerves 2-12 are intact. Peripheral sensory and motor function are grossly normal.    SKIN:  warm and dry. No erythema, or rashes are noted. No specific lesions of concern are noted.    PSYCH: The patient appears grossly appropriate. Maintains good eye contact, does not have any jittery or atypical motion. Displays appropriate affect.   MS: Minimal crepitance is noted in the extremities. No deformity is present. Muscle strength is appropriate and equal bilaterally. No acute joint erythema or swelling is present.  Reproducible tenderness is noted in the left mid thoracic area with deep respiration.  No step-off or crepitance is noted.  Discomfort appears to be intercostal.                                           Decision Making  1. Intercostal pain  Recommend moist heat and Tylenol.  Avoid NSAIDs given current anticoagulated state.    2. Atrial fibrillation with RVR (H)  Currently normal sinus rhythm, follow closely    3. Systolic congestive heart failure,  unspecified HF chronicity (H)  Controlled without evidence of weight gain, edema, or dependent rales or orthopnea    4. Chronic obstructive pulmonary disease, unspecified COPD type (H)  Continue current nebulizer therapy                         FOLLOW UP   I have asked the patient to make an appointment for followup with me in 2-3 weeks        I have carefully explained the diagnosis and treatment options to the patient.  The patient has displayed an understanding of the above, and all subsequent questions were answered.      DO SANJAY Lock    Portions of this note were produced using [employer]  Although every attempt at real-time proof reading has been made, occasional grammar/syntax errors may have been missed.

## 2018-12-12 NOTE — TELEPHONE ENCOUNTER
Home Valdez is a 68 year old male-     NURSING ASSESSMENT:  Description:  Patient was seen on Monday for cough. He is calling today with worsening rib pain from coughing. Pt was started on an antibiotic and given some prednisone. He is still taking both medications. He feels like his cough is somewhat better, but that now his ribs are hurting from all the coughing. Pt has an appt for this afternoon for follow up and he is contemplating whether or not he really needs to be seen in clinic. Reports some shortness of breath with talking, but states that this is not new. He has COPD. DENIES chest pain, difficulty breathing, worsening cough or fevers.   Onset/duration:  4-5 days   Precip. factors:  Previously treated for cough on 12/10/18  Improves/worsens symptoms:  Cough improving, worsening pain with coughing   Pain scale (0-10)   7/10  Last exam/Treatment:  12/10/2018  Allergies:   Allergies   Allergen Reactions     Contrast Dye Anaphylaxis     NURSING PLAN: Nursing advice to patient advised to keep appt in clinic this afternoon     RECOMMENDED DISPOSITION:  See in 24 hours - Pt is in agreement with being seen today. Already has an appt this afternoon.   Will comply with recommendation: Yes  If further questions/concerns or if symptoms do not improve, worsen or new symptoms develop, call your PCP or [facility] as soon as possible.      Guideline used: Cough  Telephone Triage Protocols for Nurses, Fifth Edition, Carito Aguirre RN

## 2018-12-13 NOTE — RESULT ENCOUNTER NOTE
Please contact the patient and notify him of the following:    The chest x-ray is unremarkable.  Thank you.   DO SANJAY Lock

## 2018-12-14 ENCOUNTER — ANTICOAGULATION THERAPY VISIT (OUTPATIENT)
Dept: ANTICOAGULATION | Facility: OTHER | Age: 68
End: 2018-12-14
Payer: MEDICARE

## 2018-12-14 DIAGNOSIS — Z79.01 LONG TERM CURRENT USE OF ANTICOAGULANT THERAPY: ICD-10-CM

## 2018-12-14 DIAGNOSIS — I48.91 ATRIAL FIBRILLATION WITH RVR (H): ICD-10-CM

## 2018-12-14 LAB — INR POINT OF CARE: 2.4 (ref 0.86–1.14)

## 2018-12-14 PROCEDURE — 99207 ZZC NO CHARGE NURSE ONLY: CPT

## 2018-12-14 PROCEDURE — 36416 COLLJ CAPILLARY BLOOD SPEC: CPT

## 2018-12-14 PROCEDURE — 85610 PROTHROMBIN TIME: CPT | Mod: QW

## 2018-12-14 NOTE — PROGRESS NOTES
ANTICOAGULATION FOLLOW-UP CLINIC VISIT    Patient Name:  Home Valdez  Date:  2018  Contact Type:  Face to Face       SUBJECTIVE:     Patient Findings     Positives:   Antibiotic use or infection (Currently on Bactrim and prednisone)           OBJECTIVE    INR Protime   Date Value Ref Range Status   2018 2.4 (A) 0.86 - 1.14 Final       ASSESSMENT / PLAN  INR assessment THER    Recheck INR In: 6 DAYS    INR Location Clinic      Anticoagulation Summary  As of 2018    INR goal:   2.0-3.0   TTR:   61.1 % (2.6 y)   INR used for dosin.4 (2018)   Warfarin maintenance plan:   2.5 mg (2.5 mg x 1) every Mon, Wed, Fri; 5 mg (2.5 mg x 2) all other days   Full warfarin instructions:   12/15: 2.5 mg; : 2.5 mg; Otherwise 2.5 mg every Mon, Wed, Fri; 5 mg all other days   Weekly warfarin total:   27.5 mg   Plan last modified:   Shameka Carey RN (12/10/2018)   Next INR check:   2018   Target end date:       Indications    Atrial fibrillation with RVR (H) [I48.91]  Long term current use of anticoagulant therapy [Z79.01]             Anticoagulation Episode Summary     INR check location:       Preferred lab:       Send INR reminders to:   RODOLFO PASCUAL    Comments:   2.5 MG TABLETS, likes print out, PM dose, Amiodarone started 18      Anticoagulation Care Providers     Provider Role Specialty Phone number    Sandip VegaDO [facility] Medicine [phone]            See the Encounter Report to view Anticoagulation Flowsheet and Dosing Calendar (Go to Encounters tab in chart review, and find the Anticoagulation Therapy Visit)    Dosage adjustment made based on physician directed care plan.    Shameka Carey RN

## 2018-12-20 ENCOUNTER — OFFICE VISIT (OUTPATIENT)
Dept: FAMILY MEDICINE | Facility: OTHER | Age: 68
End: 2018-12-20
Payer: MEDICARE

## 2018-12-20 ENCOUNTER — ANTICOAGULATION THERAPY VISIT (OUTPATIENT)
Dept: ANTICOAGULATION | Facility: OTHER | Age: 68
End: 2018-12-20
Payer: MEDICARE

## 2018-12-20 VITALS
TEMPERATURE: 97.6 F | BODY MASS INDEX: 30.18 KG/M2 | WEIGHT: 210.31 LBS | OXYGEN SATURATION: 94 % | HEART RATE: 66 BPM | SYSTOLIC BLOOD PRESSURE: 115 MMHG | RESPIRATION RATE: 18 BRPM | DIASTOLIC BLOOD PRESSURE: 64 MMHG

## 2018-12-20 DIAGNOSIS — E83.42 HYPOMAGNESEMIA: ICD-10-CM

## 2018-12-20 DIAGNOSIS — I50.20 SYSTOLIC CONGESTIVE HEART FAILURE, UNSPECIFIED HF CHRONICITY (H): ICD-10-CM

## 2018-12-20 DIAGNOSIS — I48.91 ATRIAL FIBRILLATION WITH RVR (H): ICD-10-CM

## 2018-12-20 DIAGNOSIS — Z79.01 LONG TERM CURRENT USE OF ANTICOAGULANT THERAPY: ICD-10-CM

## 2018-12-20 DIAGNOSIS — N18.30 CKD (CHRONIC KIDNEY DISEASE) STAGE 3, GFR 30-59 ML/MIN (H): Primary | ICD-10-CM

## 2018-12-20 DIAGNOSIS — E83.40 DISORDER OF MAGNESIUM METABOLISM: ICD-10-CM

## 2018-12-20 DIAGNOSIS — Z79.899 ENCOUNTER FOR LONG-TERM (CURRENT) USE OF MEDICATIONS: ICD-10-CM

## 2018-12-20 LAB
ANION GAP SERPL CALCULATED.3IONS-SCNC: 9 MMOL/L (ref 3–14)
BUN SERPL-MCNC: 27 MG/DL (ref 7–30)
CALCIUM SERPL-MCNC: 8.3 MG/DL (ref 8.5–10.1)
CHLORIDE SERPL-SCNC: 98 MMOL/L (ref 94–109)
CO2 SERPL-SCNC: 28 MMOL/L (ref 20–32)
CREAT SERPL-MCNC: 1.77 MG/DL (ref 0.66–1.25)
GFR SERPL CREATININE-BSD FRML MDRD: 38 ML/MIN/{1.73_M2}
GLUCOSE SERPL-MCNC: 73 MG/DL (ref 70–99)
INR POINT OF CARE: 3.1 (ref 0.86–1.14)
MAGNESIUM SERPL-MCNC: 2.6 MG/DL (ref 1.6–2.3)
POTASSIUM SERPL-SCNC: 5.4 MMOL/L (ref 3.4–5.3)
SODIUM SERPL-SCNC: 135 MMOL/L (ref 133–144)
T4 FREE SERPL-MCNC: 1.09 NG/DL (ref 0.76–1.46)
TSH SERPL DL<=0.005 MIU/L-ACNC: 4.08 MU/L (ref 0.4–4)

## 2018-12-20 PROCEDURE — 99207 ZZC NO CHARGE NURSE ONLY: CPT

## 2018-12-20 PROCEDURE — 36416 COLLJ CAPILLARY BLOOD SPEC: CPT

## 2018-12-20 PROCEDURE — 99214 OFFICE O/P EST MOD 30 MIN: CPT | Performed by: FAMILY MEDICINE

## 2018-12-20 PROCEDURE — 85610 PROTHROMBIN TIME: CPT | Mod: QW

## 2018-12-20 PROCEDURE — 84439 ASSAY OF FREE THYROXINE: CPT | Performed by: FAMILY MEDICINE

## 2018-12-20 PROCEDURE — 84443 ASSAY THYROID STIM HORMONE: CPT | Performed by: FAMILY MEDICINE

## 2018-12-20 PROCEDURE — 83735 ASSAY OF MAGNESIUM: CPT | Performed by: FAMILY MEDICINE

## 2018-12-20 PROCEDURE — 80048 BASIC METABOLIC PNL TOTAL CA: CPT | Performed by: FAMILY MEDICINE

## 2018-12-20 ASSESSMENT — PAIN SCALES - GENERAL: PAINLEVEL: NO PAIN (0)

## 2018-12-20 NOTE — PROGRESS NOTES
SUBJECTIVE:   Home Valdez is a 68 year old male who presents to clinic today for the following health issues:      Hypertension Follow-up  Patient states he has been feeling a little off and tired lately , wife took bp yesterday and it was below 90     Outpatient blood pressures are being checked at home.  Results are low doesn't remember exact number.    Low Salt Diet: no added salt      Amount of exercise or physical activity: 6-7 days/week for an average of 30-45 minutes    Problems taking medications regularly: No    Medication side effects: feeling a little off from amlodipine and cardio cut it down to 1 and now much better.     Diet: low salt        SUBJECTIVE:  Home Valdez, a 68 year old male scheduled an appointment to discuss the following issues:     CKD (chronic kidney disease) stage 3, GFR 30-59 ml/min (H): feels well today. Feeling of wellness fluctuates. bp yesterday was at one point 88 systolic.    Systolic congestive heart failure, unspecified HF chronicity (H)see above; due for rep echo soon. His exercise tolerance has been much improved p his wt loss. O2 sats have been very good.    Hypomagnesemia,, hx of, recently okay    Encounter for long-term (current) use of medications    Past Medical History:   Diagnosis Date     AAA (abdominal aortic aneurysm) (H)     3.9 cm.     Atrial fibrillation (H)      Chronic anticoagulation      COPD (chronic obstructive pulmonary disease) (H)     moderate     Hyperlipidemia      Hypertension      NICM (nonischemic cardiomyopathy) (H)      Obesity (BMI 35.0-39.9 without comorbidity)      JAMES (obstructive sleep apnea) 9/3/2014         PTSD (post-traumatic stress disorder)      Pulmonary HTN (H)     The right ventricular systolic pressure was 55      Trigeminal neuralgia      Current Outpatient Medications   Medication Sig Dispense Refill     acetaminophen (TYLENOL) 325 MG tablet Take 2 tablets (650 mg) by mouth every 4 hours as needed for other  (mild pain) 100 tablet 0     ADVAIR DISKUS 250-50 MCG/DOSE diskus inhaler INHALE 1 PUFF BY MOUTH TWICE A DAY 1 Inhaler 1     amiodarone (PACERONE/CODARONE) 200 MG tablet Take 1 tablet (200 mg) by mouth daily 90 tablet 3     buPROPion (WELLBUTRIN SR) 150 MG 12 hr tablet TAKE 1 TABLET BY MOUTH TWICE A  tablet 1     carvedilol (COREG) 6.25 MG tablet Take 1 tablet (6.25 mg) by mouth 2 times daily (with meals) 180 tablet 3     doxycycline hyclate (VIBRAMYCIN) 100 MG capsule Take 1 capsule (100 mg) by mouth 2 times daily 20 capsule 0     fluticasone (FLONASE) 50 MCG/ACT spray INHALE 1 TO 2 SPRAYS INTO EACH NOSTRIL EVERY DAY 16 g 3     guaiFENesin-codeine (ROBITUSSIN AC) 100-10 MG/5ML solution Take 5-10 mLs by mouth every 6 hours as needed for cough 8 oz 0     ipratropium - albuterol 0.5 mg/2.5 mg/3 mL (DUONEB) 0.5-2.5 (3) MG/3ML neb solution NEBULIZE 1 VIAL (3ML) BY MOUTH EVERY 4 HOURS AS NEEDED FOR SHORTNESSOF BREATH / DYSPNEA OR WHEEZING 540 mL 1     JANTOVEN 2.5 MG tablet TAKE 7.5 MG (3 TABS) ON TUES, THURS, SAT AND 5 MG (2 TABS) ALL OTHERDAYS, OR AS DIRECTED BY THE COUMADIN CLINIC. 210 tablet 1     lisinopril (PRINIVIL/ZESTRIL) 2.5 MG tablet Take 1 tablet (2.5 mg) by mouth At Bedtime 30 tablet 11     montelukast (SINGULAIR) 10 MG tablet TAKE 1 TABLET BY MOUTH DAILY AT BEDTIME 90 tablet 1     order for DME Equipment being ordered: Nebulizer 1 Device 0     Respiratory Therapy Supplies (AIRS DISPOSABLE NEBULIZER) KIT USE EVERY 4 TO 6 HOURS AS NEEDED 1 kit 0     Respiratory Therapy Supplies (NEBULIZER/TUBING/MOUTHPIECE) KIT Use every 4-6 hours PRN 1 kit 11     senna-docusate (SENOKOT-S;PERICOLACE) 8.6-50 MG per tablet Take 2 tablets by mouth 2 times daily 360 tablet 3     spironolactone (ALDACTONE) 25 MG tablet Take 1 tablet (25 mg) by mouth daily 90 tablet 0     sulfamethoxazole-trimethoprim (BACTRIM DS/SEPTRA DS) 800-160 MG tablet Take 1 tablet by mouth 2 times daily for 14 days 28 tablet 0     torsemide  (DEMADEX) 20 MG tablet Take 1 tablet (20 mg) by mouth daily Or as directed by [facility] clinic, up to 3 tabs daily 135 tablet 3     VENTOLIN  (90 Base) MCG/ACT inhaler INHALE 2 PUFFS BY MOUTH EVERY 4 HOURS AS NEEDED FOR SHORTNESS OF BREATH/DYSPNEA 18 g 3         EXAM:  /64 (BP Location: Right arm, Patient Position: Sitting, Cuff Size: Adult Regular)   Pulse 66   Temp 97.6  F (36.4  C) (Oral)   Resp 18   Wt 95.4 kg (210 lb 5 oz)   SpO2 94%   BMI 30.18 kg/m    NECK:  Supple with no masses, adenopathy or thyromegaly.    LUNGS:  Clear to auscultation.   HEART:  Regular rhythm today, no murmurs.    ABDOMEN:  The abdomen is soft, nontender, no hepatosplenomegaly or masses appreciated.  EXTREMITIES: No cyanosis or edema.  IMPRESSION:  Encounter Diagnoses   Name Primary?     CKD (chronic kidney disease) stage 3, GFR 30-59 ml/min (H) Yes     Systolic congestive heart failure, unspecified HF chronicity (H)      Hypomagnesemia      Encounter for long-term (current) use of medications      PLAN:  Labs drawn  meds all reviewed  He has not had to use his inhalers for some time now  Had his BP been very low today I may have considered backing off on some of his meds, but with his feeling of well being and good bp's, no changes made in meds.  I will call him on his labs tonight    Orders Placed This Encounter     Basic metabolic panel     TSH     T4 free     Magnesium     Called with labs: mg sl high, will increase fluids, avoid miralax, asher next week  Cr and k+ sl high, discussed will increase fluids and cut back on salt substitute, asher next week    [place]

## 2018-12-20 NOTE — PROGRESS NOTES
ANTICOAGULATION FOLLOW-UP CLINIC VISIT    Patient Name:  Home Valdez  Date:  12/20/2018  Contact Type:  Face to Face    SUBJECTIVE:     Patient Findings     Positives:   Antibiotic use or infection (Finished prednisone - still 3 days left of the antibiotic), No Problem Findings           OBJECTIVE    INR Protime   Date Value Ref Range Status   12/20/2018 3.1 (A) 0.86 - 1.14 Final       ASSESSMENT / PLAN  INR assessment THER    Recheck INR In: 1 WEEK    INR Location Clinic      Anticoagulation Summary  As of 12/20/2018    INR goal:   2.0-3.0   TTR:   61.3 % (2.6 y)   INR used for dosing:   3.1! (12/20/2018)   Warfarin maintenance plan:   2.5 mg (2.5 mg x 1) every Mon, Wed, Fri; 5 mg (2.5 mg x 2) all other days   Full warfarin instructions:   12/20: 2.5 mg; Otherwise 2.5 mg every Mon, Wed, Fri; 5 mg all other days   Weekly warfarin total:   27.5 mg   Plan last modified:   Shameka Carey RN (12/10/2018)   Next INR check:   12/27/2018   Target end date:       Indications    Atrial fibrillation with RVR (H) [I48.91]  Long term current use of anticoagulant therapy [Z79.01]             Anticoagulation Episode Summary     INR check location:       Preferred lab:       Send INR reminders to:   RODOLFO PASCUAL    Comments:   2.5 MG TABLETS, likes print out, PM dose, Amiodarone started 9/26/18      Anticoagulation Care Providers     Provider Role Specialty Phone number    Sandip VegaDO [facility] Internal Medicine [phone]            See the Encounter Report to view Anticoagulation Flowsheet and Dosing Calendar (Go to Encounters tab in chart review, and find the Anticoagulation Therapy Visit)    Dosage adjustment made based on physician directed care plan.    Shameka Carey RN

## 2018-12-26 DIAGNOSIS — I50.22 CHRONIC SYSTOLIC CONGESTIVE HEART FAILURE (H): ICD-10-CM

## 2018-12-26 DIAGNOSIS — J30.2 CHRONIC SEASONAL ALLERGIC RHINITIS: Primary | ICD-10-CM

## 2018-12-27 RX ORDER — SPIRONOLACTONE 25 MG/1
TABLET ORAL
Qty: 90 TABLET | Refills: 0 | Status: SHIPPED | OUTPATIENT
Start: 2018-12-27

## 2018-12-27 RX ORDER — MONTELUKAST SODIUM 10 MG/1
TABLET ORAL
Qty: 90 TABLET | Refills: 1 | Status: SHIPPED | OUTPATIENT
Start: 2018-12-27

## 2018-12-27 NOTE — TELEPHONE ENCOUNTER
"Routing refill request to provider for review/approval because:  Labs out of range:  K+, Cr  Prescription approved per RN refill protocol.(singulair)    singulair  Last Written Prescription Date:  9/13/2018  Last Fill Quantity: 90,  # refills: 1   Last office visit: 12/20/2018 with prescribing provider:  12/20/2018   aldactone  Last Written Prescription Date:  10/31/2018  Last Fill Quantity: 90,  # refills: 0   Last office visit: 12/20/2018 with prescribing provider:  12/20/2018   Future Office Visit:   Next 5 appointments (look out 90 days)    Jan 24, 2019  1:45 PM CST  Return Visit with Jimenez Murphy MD  [facility] ([address] [address] [phone]           Requested Prescriptions   Pending Prescriptions Disp Refills     montelukast (SINGULAIR) 10 MG tablet [Pharmacy Med Name: MONTELUKAST 10MG TABLET] 90 tablet 1     Sig: TAKE 1 TABLET BY MOUTH DAILY AT BEDTIME    Leukotriene Inhibitors Protocol Passed - 12/26/2018 12:15 PM       Passed - Patient is age 12 or older    If patient is under 16, ok to refill using age based dosing.          Passed - Recent (12 mo) or future (30 days) visit within the authorizing provider's specialty    Patient had office visit in the last 12 months or has a visit in the next 30 days with authorizing provider or within the authorizing provider's specialty.  See \"Patient Info\" tab in inbasket, or \"Choose Columns\" in Meds & Orders section of the refill encounter.              spironolactone (ALDACTONE) 25 MG tablet [Pharmacy Med Name: SPIRONOLACTONE 25MG TABLET] 90 tablet 0     Sig: TAKE 1 TABLET BY MOUTH EVERY DAY    Diuretics (Including Combos) Protocol Failed - 12/26/2018 12:15 PM       Failed - Normal serum creatinine on file in past 12 months    Recent Labs   Lab Test 12/20/18  1044   CR 1.77*             Failed - Normal serum potassium on file in past 12 months    Recent Labs   Lab " "Test 12/20/18  1044   POTASSIUM 5.4*                   Passed - Blood pressure under 140/90 in past 12 months    BP Readings from Last 3 Encounters:   12/20/18 115/64   12/12/18 132/70   12/10/18 116/62                Passed - Recent (12 mo) or future (30 days) visit within the authorizing provider's specialty    Patient had office visit in the last 12 months or has a visit in the next 30 days with authorizing provider or within the authorizing provider's specialty.  See \"Patient Info\" tab in inbasket, or \"Choose Columns\" in Meds & Orders section of the refill encounter.             Passed - Patient is age 18 or older       Passed - Normal serum sodium on file in past 12 months    Recent Labs   Lab Test 12/20/18  1044                 Nohemy Aguirre RN on 12/27/2018 at 2:25 PM    "

## 2018-12-28 ENCOUNTER — ANTICOAGULATION THERAPY VISIT (OUTPATIENT)
Dept: ANTICOAGULATION | Facility: OTHER | Age: 68
End: 2018-12-28
Payer: MEDICARE

## 2018-12-28 DIAGNOSIS — I48.91 ATRIAL FIBRILLATION WITH RVR (H): ICD-10-CM

## 2018-12-28 DIAGNOSIS — E83.40 DISORDER OF MAGNESIUM METABOLISM: ICD-10-CM

## 2018-12-28 DIAGNOSIS — I50.22 CHRONIC SYSTOLIC CONGESTIVE HEART FAILURE (H): ICD-10-CM

## 2018-12-28 DIAGNOSIS — Z79.01 LONG TERM CURRENT USE OF ANTICOAGULANT THERAPY: ICD-10-CM

## 2018-12-28 LAB
ANION GAP SERPL CALCULATED.3IONS-SCNC: 6 MMOL/L (ref 3–14)
BUN SERPL-MCNC: 30 MG/DL (ref 7–30)
CALCIUM SERPL-MCNC: 8.7 MG/DL (ref 8.5–10.1)
CHLORIDE SERPL-SCNC: 103 MMOL/L (ref 94–109)
CO2 SERPL-SCNC: 30 MMOL/L (ref 20–32)
CREAT SERPL-MCNC: 1.86 MG/DL (ref 0.66–1.25)
GFR SERPL CREATININE-BSD FRML MDRD: 36 ML/MIN/{1.73_M2}
GLUCOSE SERPL-MCNC: 72 MG/DL (ref 70–99)
INR POINT OF CARE: 3.1 (ref 0.86–1.14)
MAGNESIUM SERPL-MCNC: 2.2 MG/DL (ref 1.6–2.3)
NT-PROBNP SERPL-MCNC: 1032 PG/ML (ref 0–125)
POTASSIUM SERPL-SCNC: 4.8 MMOL/L (ref 3.4–5.3)
SODIUM SERPL-SCNC: 139 MMOL/L (ref 133–144)

## 2018-12-28 PROCEDURE — 83735 ASSAY OF MAGNESIUM: CPT | Performed by: FAMILY MEDICINE

## 2018-12-28 PROCEDURE — 99207 ZZC NO CHARGE NURSE ONLY: CPT

## 2018-12-28 PROCEDURE — 83880 ASSAY OF NATRIURETIC PEPTIDE: CPT | Performed by: PHYSICIAN ASSISTANT

## 2018-12-28 PROCEDURE — 85610 PROTHROMBIN TIME: CPT | Mod: QW

## 2018-12-28 PROCEDURE — 36416 COLLJ CAPILLARY BLOOD SPEC: CPT

## 2018-12-28 PROCEDURE — 80048 BASIC METABOLIC PNL TOTAL CA: CPT | Performed by: PHYSICIAN ASSISTANT

## 2018-12-28 NOTE — PROGRESS NOTES
ANTICOAGULATION FOLLOW-UP CLINIC VISIT    Patient Name:  Home Valdez  Date:  12/28/2018  Contact Type:  Face to Face    SUBJECTIVE:     Patient Findings     Positives:   No Problem Findings (Will eat more greens )           OBJECTIVE    INR Protime   Date Value Ref Range Status   12/28/2018 3.1 (A) 0.86 - 1.14 Final       ASSESSMENT / PLAN  INR assessment THER    Recheck INR In: 2 WEEKS    INR Location Clinic      Anticoagulation Summary  As of 12/28/2018    INR goal:   2.0-3.0   TTR:   60.7 % (2.6 y)   INR used for dosing:   3.1! (12/28/2018)   Warfarin maintenance plan:   2.5 mg (2.5 mg x 1) every Mon, Wed, Fri; 5 mg (2.5 mg x 2) all other days   Full warfarin instructions:   2.5 mg every Mon, Wed, Fri; 5 mg all other days   Weekly warfarin total:   27.5 mg   Plan last modified:   Shameka Carey RN (12/10/2018)   Next INR check:   1/11/2019   Target end date:       Indications    Atrial fibrillation with RVR (H) [I48.91]  Long term current use of anticoagulant therapy [Z79.01]             Anticoagulation Episode Summary     INR check location:       Preferred lab:       Send INR reminders to:   [facility]    Comments:   2.5 MG TABLETS, likes print out, PM dose, Amiodarone started 9/26/18      Anticoagulation Care Providers     Provider Role Specialty Phone number    Sandip VegaDO [facility] Internal Medicine [phone]            See the Encounter Report to view Anticoagulation Flowsheet and Dosing Calendar (Go to Encounters tab in chart review, and find the Anticoagulation Therapy Visit)    Dosage adjustment made based on physician directed care plan.    Mayra Lind RN

## 2019-01-03 LAB
CREAT SERPL-MCNC: 1.57 MG/DL (ref 0.66–1.25)
GFR SERPL CREATININE-BSD FRML MDRD: 44 ML/MIN/{1.73_M2}

## 2019-01-03 PROCEDURE — 82565 ASSAY OF CREATININE: CPT | Performed by: FAMILY MEDICINE

## 2019-01-03 PROCEDURE — 36415 COLL VENOUS BLD VENIPUNCTURE: CPT | Performed by: FAMILY MEDICINE

## 2019-01-07 DIAGNOSIS — J43.1 PANLOBULAR EMPHYSEMA (H): ICD-10-CM

## 2019-01-07 RX ORDER — NEBULIZER ACCESSORIES
KIT MISCELLANEOUS
Qty: 1 KIT | Refills: 3 | Status: SHIPPED | OUTPATIENT
Start: 2019-01-07

## 2019-01-07 NOTE — TELEPHONE ENCOUNTER
Respiratory therapy supplies       Last Written Prescription Date:  9-21-18  Last Fill Quantity: 1 kit,   # refills: 0  Last Office Visit: 12/10/18  Future Office visit:       Routing refill request to provider for review/approval because:  Drug not on the [facility], P or [facility] refill protocol or controlled substance

## 2019-01-09 ENCOUNTER — PATIENT OUTREACH (OUTPATIENT)
Dept: CARE COORDINATION | Facility: CLINIC | Age: 69
End: 2019-01-09

## 2019-01-09 ASSESSMENT — ACTIVITIES OF DAILY LIVING (ADL): DEPENDENT_IADLS:: INDEPENDENT

## 2019-01-09 NOTE — PROGRESS NOTES
"Clinic Care Coordination Contact  Clinic Care Coordination Contact  OUTREACH  Referral Information:  Referral Source: IP Report  Primary Diagnosis: (P) CHF  Chief Complaint   Patient presents with     Clinic Care Coordination - Follow-up   Difficulty keeping appointments:: (P) No  Compliance Concerns: (P) No  No-Show Concerns: (P) No  No PCP office visit in Past Year: (P) No  Utilization    Last refreshed: 1/8/2019  5:23 PM:  Hospital Admissions 3           Last refreshed: 1/8/2019  5:23 PM:  ED Visits 2           Last refreshed: 1/8/2019  5:23 PM:  No Show Count (past year) 4              Current as of: 1/8/2019  5:23 PM          Clinical Concerns:  Current Medical Concerns:    Patient is getting over rib/abd muscle strain. Watching his sons chickens. Needed a 80 pound bag of feed. He was dragging the bag and felt an abdominal muscle \"snap.\" He did see PCP. Is following patient instructions.    He was given oxycodone, he had horrible constipation.  He was unhappy with this side effect. He used a laxative, but took 4 days to produce a bowel movement. Weight is stable, will fluctuate 1-2 pounds max, pulse is 60-82, O2 92-96%   He is using the nebulizer and this relieves his breathing issues, and neb also relieves the rib/abdominal muscle strain. Overall, he is getting better. He stated he will follow up with PCP should his rib/abd muscle strain worsen or fail to resolve. Pain is more of a localized discomfort now, can take a deep breath.  Reviewed his pending appointments.   He verbalized motivation to be healthy. ie attend MD appointments, INR clinic, follow MD advice, follow up with referrals, lose and maintain weight, wear CPAP at night. He is hopeful and optimistic about maximizing his health. His goal is to live until his 4 grandchildren graduate, and the youngest is 3 years old.   Current Behavioral Concerns: none    Education Provided to patient: at this time patient feels he is managing his care well. He is " meeting his goals and has no new goals. He has a clear follow up plan with his PCP and cardiologist. His INR is currently bring managed by INR clinic. Pain  Pain (GOAL):: (P) No  Health Maintenance Reviewed:    Clinical Pathway: None    Medication Management:  Patient independent in medication management and verbalizes adherence and understanding of medication regimen.     Functional Status:  Dependent ADLs:: Independent  Dependent IADLs:: Independent  Bed or wheelchair confined:: No  Mobility Status: Independent    Living Situation:  Current living arrangement:: I live in a private home with spouse    Diet/Exercise/Sleep:  Diet:: (P) No added salt, Other(reduced sodium diet, portion control)  Inadequate nutrition (GOAL):: (P) No  Food Insecurity: (P) No  Tube Feeding: (P) No  Exercise:: (P) Currently not exercising(but plans to, pt has pending [facility] appt 1/24/19)  Significant changes in sleep pattern (GOAL): (P) No(pt uses BiPAP and is dedicated to using every night)    Transportation:  Transportation concerns (GOAL):: (P) No  Transportation means:: (P) Regular car     Psychosocial:  Mormonism or spiritual beliefs that impact treatment:: No  Mental health DX:: Yes  Mental health DX how managed:: None  Mental health management concern (GOAL):: No  Informal Support system:: Spouse     Financial/Insurance:   Financial/Insurance concerns (GOAL):: (P) No     Resources and Interventions:  Community Resources: None  Supplies used at home:: None  Equipment Currently Used at Home: none    Advance Care Plan/Directive  Advanced Care Plans/Directives on file:: No    Referrals Placed: None     Goals: graduated 1/9/19  Patient/Caregiver understanding: yes    Outreach Frequency: monthly  Future Appointments              In 2 days  ANTI COAG [facility] MEDIC    In 1 week [address] [facility] [place]    In 2 weeks Jimenez Murphy MD [facility]  [facility]          Plan:   1. Patient will attend future pending appointments. He will ask Dr. Murphy when he can get referred to attend the cardiac rehab.   2. Patient will continue to monitor his chronic diseases and reach out to care team as needed.   3. Patient will continue to work on weight loss through calorie restriction.   4. CC RN and patient have decided that no further routine follow up will be provided. CC RN mailed her business card and a new intro letter, with contact info. CC RN will always be available to patient going forward.        MAXINE SamuelN RN Clinic Care Coordinator   [place], [place], [place]  Phone: [phone]

## 2019-01-09 NOTE — LETTER
[facility] CARE COORDINATION  [address] [place] [address]    January 9, 2019    Home WANG José Miguel  [address]  [place] [facility] [place] [address]      Dear Brad,    I am a clinic care coordinator who works with Sandip Vega DO at [facility]. It has been a pleasure to get to talk with you and get to know you.  I wanted to provide you with my contact information so that you can call me with questions or concerns about your health care. Below is a description of clinic care coordination and how I can further assist you.     The clinic care coordinator is a registered nurse and/or  who understand the health care system. The goal of clinic care coordination is to help you manage your health and improve access to the [facility] system in the most efficient manner. The registered nurse can assist you in meeting your health care goals by providing education, coordinating services, and strengthening the communication among your providers. The  can assist you with financial, behavioral, psychosocial, chemical dependency, counseling, and/or psychiatric resources.    Please feel free to contact me at [phone], with any questions or concerns. We at [facility] are focused on providing you with the highest-quality healthcare experience possible and that all starts with you.     Sincerely,   ZENA Samuel RN Clinic Care Coordinator   [place], [place], [place]  Phone: [phone]

## 2019-01-11 ENCOUNTER — ANTICOAGULATION THERAPY VISIT (OUTPATIENT)
Dept: ANTICOAGULATION | Facility: OTHER | Age: 69
End: 2019-01-11
Payer: MEDICARE

## 2019-01-11 ENCOUNTER — OFFICE VISIT (OUTPATIENT)
Dept: FAMILY MEDICINE | Facility: OTHER | Age: 69
End: 2019-01-11
Payer: MEDICARE

## 2019-01-11 VITALS
HEIGHT: 69 IN | HEART RATE: 72 BPM | SYSTOLIC BLOOD PRESSURE: 108 MMHG | WEIGHT: 207.7 LBS | DIASTOLIC BLOOD PRESSURE: 64 MMHG | OXYGEN SATURATION: 90 % | BODY MASS INDEX: 30.76 KG/M2 | TEMPERATURE: 99 F | RESPIRATION RATE: 18 BRPM

## 2019-01-11 DIAGNOSIS — I48.91 ATRIAL FIBRILLATION WITH RVR (H): ICD-10-CM

## 2019-01-11 DIAGNOSIS — I71.40 ABDOMINAL AORTIC ANEURYSM (AAA) WITHOUT RUPTURE (H): ICD-10-CM

## 2019-01-11 DIAGNOSIS — Z79.01 LONG TERM CURRENT USE OF ANTICOAGULANT THERAPY: ICD-10-CM

## 2019-01-11 DIAGNOSIS — I50.20 SYSTOLIC CONGESTIVE HEART FAILURE, UNSPECIFIED HF CHRONICITY (H): ICD-10-CM

## 2019-01-11 DIAGNOSIS — J44.1 ACUTE EXACERBATION OF CHRONIC OBSTRUCTIVE PULMONARY DISEASE (COPD) (H): Primary | ICD-10-CM

## 2019-01-11 DIAGNOSIS — E66.01 MORBID OBESITY (H): ICD-10-CM

## 2019-01-11 LAB — INR POINT OF CARE: 1.6 (ref 0.86–1.14)

## 2019-01-11 PROCEDURE — 99207 ZZC NO CHARGE NURSE ONLY: CPT

## 2019-01-11 PROCEDURE — 36416 COLLJ CAPILLARY BLOOD SPEC: CPT

## 2019-01-11 PROCEDURE — 99214 OFFICE O/P EST MOD 30 MIN: CPT | Performed by: INTERNAL MEDICINE

## 2019-01-11 PROCEDURE — 85610 PROTHROMBIN TIME: CPT | Mod: QW

## 2019-01-11 RX ORDER — PREDNISONE 50 MG/1
50 TABLET ORAL DAILY
Qty: 7 TABLET | Refills: 0 | Status: SHIPPED | OUTPATIENT
Start: 2019-01-11 | End: 2019-01-18

## 2019-01-11 RX ORDER — CEFUROXIME AXETIL 500 MG/1
500 TABLET ORAL 2 TIMES DAILY
Qty: 20 TABLET | Refills: 0 | Status: SHIPPED | OUTPATIENT
Start: 2019-01-11 | End: 2019-02-12

## 2019-01-11 ASSESSMENT — PAIN SCALES - GENERAL: PAINLEVEL: NO PAIN (1)

## 2019-01-11 ASSESSMENT — MIFFLIN-ST. JEOR: SCORE: 1702.5

## 2019-01-11 NOTE — PROGRESS NOTES
ANTICOAGULATION FOLLOW-UP CLINIC VISIT    Patient Name:  Home Valdez  Date:  2019  Contact Type:  Face to Face    SUBJECTIVE:     Patient Findings     Positives:   Change in diet/appetite (Pt states he has been eating more greens lately)           OBJECTIVE    INR Protime   Date Value Ref Range Status   2019 1.6 (A) 0.86 - 1.14 Final       ASSESSMENT / PLAN  INR assessment SUB    Recheck INR In: 2 WEEKS    INR Location Clinic      Anticoagulation Summary  As of 2019    INR goal:   2.0-3.0   TTR:   60.8 % (2.6 y)   INR used for dosin.6! (2019)   Warfarin maintenance plan:   2.5 mg (2.5 mg x 1) every Mon, Fri; 5 mg (2.5 mg x 2) all other days   Full warfarin instructions:   : 5 mg; Otherwise 2.5 mg every Mon, Fri; 5 mg all other days   Weekly warfarin total:   30 mg   Plan last modified:   Shameka Carey RN (2019)   Next INR check:   2019   Target end date:       Indications    Atrial fibrillation with RVR (H) [I48.91]  Long term current use of anticoagulant therapy [Z79.01]             Anticoagulation Episode Summary     INR check location:       Preferred lab:       Send INR reminders to:   RODOLFO PASCUAL    Comments:   2.5 MG TABLETS, likes print out, PM dose, Amiodarone started 18      Anticoagulation Care Providers     Provider Role Specialty Phone number    Sandip VegaDO [facility] Internal Medicine [phone]            See the Encounter Report to view Anticoagulation Flowsheet and Dosing Calendar (Go to Encounters tab in chart review, and find the Anticoagulation Therapy Visit)    Dosage adjustment made based on physician directed care plan.    Shameka Carey RN

## 2019-01-11 NOTE — PROGRESS NOTES
SUBJECTIVE:   Home Valdez is a 68 year old male who presents to clinic today for the following health issues:      Chief Complaint   Patient presents with     RECHECK     cough and pulled muscle                            Chief Complaint         The patient is a pleasant 68-year-old gentleman who presents today with persistent cough.  He notes that the cough is associated with some mild shortness of breath and wheezing.  Denies any lower extremity edema, he denies recurrence of his previous atrial fibrillation.  His weight is stable.  He does have a little bit of green sputum.  He is currently using the Advair Diskus in combination with the DuoNeb for his underlying COPD.  He continues anticoagulation but has had maintained normal sinus rhythm with amiodarone.                       PAST, FAMILY,SOCIAL HISTORY:     Medical  History:   has a past medical history of AAA (abdominal aortic aneurysm) (H), Atrial fibrillation (H), Chronic anticoagulation, COPD (chronic obstructive pulmonary disease) (H), Hyperlipidemia, Hypertension, NICM (nonischemic cardiomyopathy) (H), Obesity (BMI 35.0-39.9 without comorbidity), JAMES (obstructive sleep apnea) (9/3/2014), PTSD (post-traumatic stress disorder), Pulmonary HTN (H), and Trigeminal neuralgia.     Surgical History:   has a past surgical history that includes CYSTOURETHROSCOPY (1998); LAMINOTOMY,LUMBAR DISK,1 INTRSP (1993); NONSPECIFIC PROCEDURE; NONSPECIFIC PROCEDURE; Phacoemulsification with standard intraocular lens implant (12/26/2013); Vitrectomy anterior (12/26/2013); L cataract surgery[; Soft tissue surgery; colonoscopy; hernia repair; Head and neck surgery; Vitrectomy parsplana with 25 gauge system (2/6/2014); Balloon compression rhizotomy (Left, 9/7/2016); Balloon compression rhizotomy (Left, 6/5/2017); Balloon compression rhizotomy (Left, 11/7/2017); and Phacoemulsification with standard intraocular lens implant (Right, 5/3/2018).     Social History:   reports  that he quit smoking about 4 years ago. His smoking use included cigarettes. He has a 40.00 pack-year smoking history. he has never used smokeless tobacco. He reports that he does not drink alcohol or use drugs.     Family History:  family history includes Depression in his father; Diabetes in his paternal grandmother; Hypertension in his mother and paternal grandfather.            MEDICATIONS  Current Outpatient Medications   Medication Sig Dispense Refill     acetaminophen (TYLENOL) 325 MG tablet Take 2 tablets (650 mg) by mouth every 4 hours as needed for other (mild pain) 100 tablet 0     ADVAIR DISKUS 250-50 MCG/DOSE diskus inhaler INHALE 1 PUFF BY MOUTH TWICE A DAY 1 Inhaler 1     amiodarone (PACERONE/CODARONE) 200 MG tablet Take 1 tablet (200 mg) by mouth daily 90 tablet 3     buPROPion (WELLBUTRIN SR) 150 MG 12 hr tablet TAKE 1 TABLET BY MOUTH TWICE A  tablet 1     carvedilol (COREG) 6.25 MG tablet Take 1 tablet (6.25 mg) by mouth 2 times daily (with meals) 180 tablet 3     cefuroxime (CEFTIN) 500 MG tablet Take 1 tablet (500 mg) by mouth 2 times daily for 10 days 20 tablet 0     fluticasone (FLONASE) 50 MCG/ACT spray INHALE 1 TO 2 SPRAYS INTO EACH NOSTRIL EVERY DAY 16 g 3     ipratropium - albuterol 0.5 mg/2.5 mg/3 mL (DUONEB) 0.5-2.5 (3) MG/3ML neb solution NEBULIZE 1 VIAL (3ML) BY MOUTH EVERY 4 HOURS AS NEEDED FOR SHORTNESSOF BREATH / DYSPNEA OR WHEEZING 540 mL 1     JANTOVEN 2.5 MG tablet TAKE 7.5 MG (3 TABS) ON TUES, THURS, SAT AND 5 MG (2 TABS) ALL OTHERDAYS, OR AS DIRECTED BY THE COUMADIN CLINIC. 210 tablet 1     lisinopril (PRINIVIL/ZESTRIL) 2.5 MG tablet Take 1 tablet (2.5 mg) by mouth At Bedtime 30 tablet 11     montelukast (SINGULAIR) 10 MG tablet TAKE 1 TABLET BY MOUTH DAILY AT BEDTIME 90 tablet 1     order for DME Equipment being ordered: Nebulizer 1 Device 0     predniSONE (DELTASONE) 50 MG tablet Take 50 mg by mouth daily for 7 days. 7 tablet 0     Respiratory Therapy Supplies (AIRS  DISPOSABLE NEBULIZER) KIT USE EVERY 4 TO 6 HOURS AS NEEDED 1 kit 3     Respiratory Therapy Supplies (NEBULIZER/TUBING/MOUTHPIECE) KIT Use every 4-6 hours PRN 1 kit 11     senna-docusate (SENOKOT-S;PERICOLACE) 8.6-50 MG per tablet Take 2 tablets by mouth 2 times daily 360 tablet 3     spironolactone (ALDACTONE) 25 MG tablet TAKE 1 TABLET BY MOUTH EVERY DAY 90 tablet 0     torsemide (DEMADEX) 20 MG tablet Take 1 tablet (20 mg) by mouth daily Or as directed by [facility] clinic, up to 3 tabs daily 135 tablet 3     VENTOLIN  (90 Base) MCG/ACT inhaler INHALE 2 PUFFS BY MOUTH EVERY 4 HOURS AS NEEDED FOR SHORTNESS OF BREATH/DYSPNEA 18 g 3         --------------------------------------------------------------------------------------------------------------------                              Review of Systems       LUNGS: Pt denies:  hemoptysis, or shortness of breath at rest.  He does have a little dyspnea with exertion..   HEART: Pt denies: chest pain, arrhythmia, syncope, tachy or bradyarrhythmia.   GI: Pt denies: nausea, vomiting, diarrhea, constipation, melena, or hematochezia.   NEURO: Pt denies: seizures, strokes, diplopia, weakness, paraesthesias, or paralysis.   SKIN: Pt denies: itching, rashes, discoloration, or specific lesions of concern. Denies recent hair loss.   PSYCH: The patient denies significant depression, anxiety, mood imbalance. Specifically denies any suicidal ideation.                                     Examination         LUNGS: Expiratory wheezes and scattered rhonchi are noted.  No dependent rales are present.  Bilaterally, airflow is brisk, no intercostal retraction or stridor is noted.  Frequent coughing is noted during visit.   HEART:  regular without rubs, clicks, gallops, or murmurs. PMI is nondisplaced. Upstrokes are brisk. S1,S2 are heard.  No lower extremity edema is present.   GI: Abdomen is soft, without rebound, guarding or tenderness. Bowel sounds are appropriate. No renal bruits are  heard.   NEURO: Pt is alert and appropriate. No neurologic lateralization is noted. Cranial nerves 2-12 are intact. Peripheral sensory and motor function are grossly normal.    SKIN:  warm and dry. No erythema, or rashes are noted. No specific lesions of concern are noted.    PSYCH: The patient appears grossly appropriate. Maintains good eye contact, does not have any jittery or atypical motion. Displays appropriate affect.                                           Decision Making  1. Acute exacerbation of chronic obstructive pulmonary disease (COPD) (H)  Prednisone, current nebulizer therapy, antibiotic.  - predniSONE (DELTASONE) 50 MG tablet; Take 50 mg by mouth daily for 7 days.  Dispense: 7 tablet; Refill: 0  - cefuroxime (CEFTIN) 500 MG tablet; Take 1 tablet (500 mg) by mouth 2 times daily for 10 days  Dispense: 20 tablet; Refill: 0    2. Morbid obesity (H)  Patient continues to lose weight and is aggressively watching his dietary intake.    3. Atrial fibrillation with RVR (H)  Continue suppression with amiodarone and simultaneous anticoagulation    4. Abdominal aortic aneurysm (AAA) without rupture (H)  Currently stable and followed regularly    5. Systolic congestive heart failure, unspecified HF chronicity (H)  Currently controlled without evidence of acute exacerbation        I have carefully explained the diagnosis and treatment options to the patient.  The patient has displayed an understanding of the above, and all subsequent questions were answered.                         FOLLOW UP   I have asked the patient to make an appointment for followup with me in 2 weeks      DO SANJAY Lock    Portions of this note were produced using [employer]  Although every attempt at real-time proof reading has been made, occasional grammar/syntax errors may have been missed.

## 2019-01-17 ENCOUNTER — HOSPITAL ENCOUNTER (OUTPATIENT)
Dept: CARDIOLOGY | Facility: CLINIC | Age: 69
Discharge: HOME | End: 2019-01-17
Attending: INTERNAL MEDICINE | Admitting: INTERNAL MEDICINE
Payer: MEDICARE

## 2019-01-17 DIAGNOSIS — I50.22 CHRONIC SYSTOLIC CONGESTIVE HEART FAILURE (H): ICD-10-CM

## 2019-01-17 PROCEDURE — 25500064 ZZH RX 255 OP 636: Performed by: INTERNAL MEDICINE

## 2019-01-17 PROCEDURE — 40000264 ECHOCARDIOGRAM COMPLETE

## 2019-01-17 PROCEDURE — 93306 TTE W/DOPPLER COMPLETE: CPT | Mod: 26 | Performed by: INTERNAL MEDICINE

## 2019-01-17 RX ADMIN — HUMAN ALBUMIN MICROSPHERES AND PERFLUTREN 6 ML: 10; .22 INJECTION, SOLUTION INTRAVENOUS at 09:04

## 2019-01-19 DIAGNOSIS — J44.1 COPD EXACERBATION (H): ICD-10-CM

## 2019-01-21 ENCOUNTER — TELEPHONE (OUTPATIENT)
Dept: PHARMACY | Facility: OTHER | Age: 69
End: 2019-01-21

## 2019-01-21 NOTE — TELEPHONE ENCOUNTER
This patient is due for MTM follow-up. I called the patient to schedule an appointment. He will wait to schedule until after upcoming cardiology appt.    Jesusita Ray, PharmD  Medication Therapy Management Pharmacist, [facility]  Pager: [phone]

## 2019-01-22 RX ORDER — IPRATROPIUM BROMIDE AND ALBUTEROL SULFATE 2.5; .5 MG/3ML; MG/3ML
SOLUTION RESPIRATORY (INHALATION)
Qty: 540 ML | Refills: 3 | Status: SHIPPED | OUTPATIENT
Start: 2019-01-22

## 2019-01-22 NOTE — TELEPHONE ENCOUNTER
"Prescription approved per RN refill protocol.  Nohemy Aguirre RN, BSN    Duoneb  Last Written Prescription Date:  10/23/2018  Last Fill Quantity: 540,  # refills: 1   Last office visit: 1/11/2019 with prescribing provider:  1/11/2019   Future Office Visit:      Requested Prescriptions   Pending Prescriptions Disp Refills     ipratropium - albuterol 0.5 mg/2.5 mg/3 mL (DUONEB) 0.5-2.5 (3) MG/3ML neb solution [Pharmacy Med Name: IPRATROP/ALBUT 0.5-3MG/3ML INH] 540 mL 1     Sig: NEBULIZE 1 VIAL (3ML) BY MOUTH EVERY 4 HOURS AS NEEDED FOR SHORTNESSOF BREATH / DYSPNEA OR WHEEZING    Short-Acting Beta Agonist Inhalers Protocol  Passed - 1/19/2019  8:02 AM       Passed - Patient is age 12 or older       Passed - Recent (12 mo) or future (30 days) visit within the authorizing provider's specialty    Patient had office visit in the last 12 months or has a visit in the next 30 days with authorizing provider or within the authorizing provider's specialty.  See \"Patient Info\" tab in inbasket, or \"Choose Columns\" in Meds & Orders section of the refill encounter.             Passed - Medication is active on med list        Nohemy Aguirre RN on 1/22/2019 at 1:44 PM    "

## 2019-01-24 ENCOUNTER — OFFICE VISIT (OUTPATIENT)
Dept: CARDIOLOGY | Facility: CLINIC | Age: 69
End: 2019-01-24
Attending: INTERNAL MEDICINE
Payer: MEDICARE

## 2019-01-24 VITALS
HEART RATE: 66 BPM | WEIGHT: 207.8 LBS | OXYGEN SATURATION: 95 % | HEIGHT: 69 IN | BODY MASS INDEX: 30.78 KG/M2 | SYSTOLIC BLOOD PRESSURE: 100 MMHG | DIASTOLIC BLOOD PRESSURE: 62 MMHG

## 2019-01-24 DIAGNOSIS — I48.0 PAROXYSMAL ATRIAL FIBRILLATION (H): ICD-10-CM

## 2019-01-24 DIAGNOSIS — I71.40 ABDOMINAL AORTIC ANEURYSM (AAA) WITHOUT RUPTURE (H): ICD-10-CM

## 2019-01-24 DIAGNOSIS — Z79.899 ON AMIODARONE THERAPY: ICD-10-CM

## 2019-01-24 DIAGNOSIS — J44.1 COPD EXACERBATION (H): ICD-10-CM

## 2019-01-24 DIAGNOSIS — I42.8 NONISCHEMIC CARDIOMYOPATHY (H): ICD-10-CM

## 2019-01-24 DIAGNOSIS — E78.5 HYPERLIPIDEMIA LDL GOAL <130: ICD-10-CM

## 2019-01-24 DIAGNOSIS — I50.22 CHRONIC SYSTOLIC CONGESTIVE HEART FAILURE (H): Primary | ICD-10-CM

## 2019-01-24 LAB
ALBUMIN SERPL-MCNC: 3.4 G/DL (ref 3.4–5)
ALP SERPL-CCNC: 72 U/L (ref 40–150)
ALT SERPL W P-5'-P-CCNC: 18 U/L (ref 0–70)
ANION GAP SERPL CALCULATED.3IONS-SCNC: 5 MMOL/L (ref 3–14)
AST SERPL W P-5'-P-CCNC: 14 U/L (ref 0–45)
BILIRUB DIRECT SERPL-MCNC: 0.2 MG/DL (ref 0–0.2)
BILIRUB SERPL-MCNC: 0.8 MG/DL (ref 0.2–1.3)
BUN SERPL-MCNC: 22 MG/DL (ref 7–30)
CALCIUM SERPL-MCNC: 8.5 MG/DL (ref 8.5–10.1)
CHLORIDE SERPL-SCNC: 104 MMOL/L (ref 94–109)
CO2 SERPL-SCNC: 31 MMOL/L (ref 20–32)
CREAT SERPL-MCNC: 1.65 MG/DL (ref 0.66–1.25)
GFR SERPL CREATININE-BSD FRML MDRD: 42 ML/MIN/{1.73_M2}
GLUCOSE SERPL-MCNC: 93 MG/DL (ref 70–99)
POTASSIUM SERPL-SCNC: 4 MMOL/L (ref 3.4–5.3)
PROT SERPL-MCNC: 6.6 G/DL (ref 6.8–8.8)
SODIUM SERPL-SCNC: 140 MMOL/L (ref 133–144)

## 2019-01-24 PROCEDURE — 99214 OFFICE O/P EST MOD 30 MIN: CPT | Performed by: INTERNAL MEDICINE

## 2019-01-24 PROCEDURE — 80076 HEPATIC FUNCTION PANEL: CPT | Performed by: INTERNAL MEDICINE

## 2019-01-24 PROCEDURE — 36415 COLL VENOUS BLD VENIPUNCTURE: CPT | Performed by: INTERNAL MEDICINE

## 2019-01-24 PROCEDURE — 80048 BASIC METABOLIC PNL TOTAL CA: CPT | Performed by: INTERNAL MEDICINE

## 2019-01-24 ASSESSMENT — MIFFLIN-ST. JEOR: SCORE: 1702.95

## 2019-01-24 NOTE — LETTER
1/24/2019      Sandip Vega, DO  [address] [place] [address]      RE: Home WANG José Miguel       Dear Colleague,    I had the pleasure of seeing Home Valdez in the [facility].    Service Date: 01/24/2019      HISTORY OF PRESENT ILLNESS:  I had the opportunity to see Mr. Home Valdez in the [facility] at the [facility] in [place] for re-evaluation of chronic systolic heart failure and paroxysmal atrial fibrillation.  Mr. Valdez had significant deterioration in his left ventricular function this summer, probably due to a recurrence of atrial fibrillation with rapid ventricular response that was not immediately recognized and treated.  He rapidly developed worsening systolic heart failure and was eventually hospitalized on 09/20/2018.  IV diuretics and cardioversion seem to be the key treatments.  He had lost 25 pounds of fluid down to a weight of 206 pounds and was feeling much better.  He was started on amiodarone to help him maintain sinus rhythm and did have some balance difficulties and blurred vision initially until we decreased the dose to 200 mg daily.  Those issues have decreased significantly since then.      He seems to be doing much better now.  His breathing has improved significantly and he no longer is having difficulty with abdominal distention or edema.  His weight is stable at 207 pounds.      His blood pressure today is 100/62, heart rate 66 and weight 207 pounds.  His lungs are clear.  His heart rhythm is regular.  I do not hear any significant cardiac murmurs.  He has no edema.      He had an echocardiogram done prior to my visit with him on 01/17/2019.  That showed significant improvement in left ventricular function.  His ejection fraction had been down to 20% in September and now is up to 40%-45%.  He has mild aortic regurgitation, but no other significant valvular disease.  He was in sinus rhythm during  that echo.      I went over his labs with him today as well and his creatinine is stable at 1.65, BUN 22 and potassium is 4.0.  His liver function tests are normal.      IMPRESSIONS:  Mr. Home Valdez is a 68-year-old gentleman with history of recurrent heart failure with systolic dysfunction, probably due to issues with atrial fibrillation with rapid ventricular response.  He does much better while maintaining sinus rhythm.  He is using amiodarone currently and tolerating it reasonably well and so far it seems to be working to keep him in sinus rhythm.  He is being very diligent with his salt intake and medications and maintaining his weight extremely well.  His symptoms are minimal and he is improving significantly on the echo, now with an ejection fraction up to 40%-45%.      I will not make any medication changes at this point.  We will plan to have him continue to follow up with us in the [facility] again in 3 months for re-evaluation.      cc:   Sandip Vega, [place] [address]         MARCO A YARBROUGH MD, [facility]             D: 2019   T: 2019   MT: ALONSO      Name:     HOME VALDEZ   MRN:      [id number]        Account:      [id number]   :      1950           Service Date: 2019      Document: [id number]         Outpatient Encounter Medications as of 2019   Medication Sig Dispense Refill     acetaminophen (TYLENOL) 325 MG tablet Take 2 tablets (650 mg) by mouth every 4 hours as needed for other (mild pain) 100 tablet 0     ADVAIR DISKUS 250-50 MCG/DOSE diskus inhaler INHALE 1 PUFF BY MOUTH TWICE A DAY 1 Inhaler 1     amiodarone (PACERONE/CODARONE) 200 MG tablet Take 1 tablet (200 mg) by mouth daily 90 tablet 3     buPROPion (WELLBUTRIN SR) 150 MG 12 hr tablet TAKE 1 TABLET BY MOUTH TWICE A  tablet 1     carvedilol (COREG) 6.25 MG tablet Take 1 tablet (6.25 mg) by mouth 2 times daily (with meals)  180 tablet 3     fluticasone (FLONASE) 50 MCG/ACT spray INHALE 1 TO 2 SPRAYS INTO EACH NOSTRIL EVERY DAY 16 g 3     ipratropium - albuterol 0.5 mg/2.5 mg/3 mL (DUONEB) 0.5-2.5 (3) MG/3ML neb solution NEBULIZE 1 VIAL (3ML) BY MOUTH EVERY 4 HOURS AS NEEDED FOR SHORTNESSOF BREATH / DYSPNEA OR WHEEZING 540 mL 3     JANTOVEN 2.5 MG tablet TAKE 7.5 MG (3 TABS) ON TUES, THURS, SAT AND 5 MG (2 TABS) ALL OTHERDAYS, OR AS DIRECTED BY THE COUMADIN CLINIC. 210 tablet 1     lisinopril (PRINIVIL/ZESTRIL) 2.5 MG tablet Take 1 tablet (2.5 mg) by mouth At Bedtime 30 tablet 11     montelukast (SINGULAIR) 10 MG tablet TAKE 1 TABLET BY MOUTH DAILY AT BEDTIME 90 tablet 1     order for DME Equipment being ordered: Nebulizer 1 Device 0     Respiratory Therapy Supplies (AIRS DISPOSABLE NEBULIZER) KIT USE EVERY 4 TO 6 HOURS AS NEEDED 1 kit 3     Respiratory Therapy Supplies (NEBULIZER/TUBING/MOUTHPIECE) KIT Use every 4-6 hours PRN 1 kit 11     senna-docusate (SENOKOT-S;PERICOLACE) 8.6-50 MG per tablet Take 2 tablets by mouth 2 times daily 360 tablet 3     spironolactone (ALDACTONE) 25 MG tablet TAKE 1 TABLET BY MOUTH EVERY DAY 90 tablet 0     torsemide (DEMADEX) 20 MG tablet Take 1 tablet (20 mg) by mouth daily Or as directed by [facility] clinic, up to 3 tabs daily 135 tablet 3     VENTOLIN  (90 Base) MCG/ACT inhaler INHALE 2 PUFFS BY MOUTH EVERY 4 HOURS AS NEEDED FOR SHORTNESS OF BREATH/DYSPNEA 18 g 3     No facility-administered encounter medications on file as of 1/24/2019.    Again, thank you for allowing me to participate in the care of your patient.      Sincerely,    MARCO A YARBROUGH MD     [facility]

## 2019-01-24 NOTE — PROGRESS NOTES
Service Date: 01/24/2019      HISTORY OF PRESENT ILLNESS:  I had the opportunity to see Mr. Home Valdez in the [facility] Clinic at the [facility] in [place] for re-evaluation of chronic systolic heart failure and paroxysmal atrial fibrillation.  Mr. Valdez had significant deterioration in his left ventricular function this summer, probably due to a recurrence of atrial fibrillation with rapid ventricular response that was not immediately recognized and treated.  He rapidly developed worsening systolic heart failure and was eventually hospitalized on 09/20/2018.  IV diuretics and cardioversion seem to be the key treatments.  He had lost 25 pounds of fluid down to a weight of 206 pounds and was feeling much better.  He was started on amiodarone to help him maintain sinus rhythm and did have some balance difficulties and blurred vision initially until we decreased the dose to 200 mg daily.  Those issues have decreased significantly since then.      He seems to be doing much better now.  His breathing has improved significantly and he no longer is having difficulty with abdominal distention or edema.  His weight is stable at 207 pounds.      His blood pressure today is 100/62, heart rate 66 and weight 207 pounds.  His lungs are clear.  His heart rhythm is regular.  I do not hear any significant cardiac murmurs.  He has no edema.      He had an echocardiogram done prior to my visit with him on 01/17/2019.  That showed significant improvement in left ventricular function.  His ejection fraction had been down to 20% in September and now is up to 40%-45%.  He has mild aortic regurgitation, but no other significant valvular disease.  He was in sinus rhythm during that echo.      I went over his labs with him today as well and his creatinine is stable at 1.65, BUN 22 and potassium is 4.0.  His liver function tests are normal.      IMPRESSIONS:  Mr. Home Valdez is a 68-year-old gentleman with  history of recurrent heart failure with systolic dysfunction, probably due to issues with atrial fibrillation with rapid ventricular response.  He does much better while maintaining sinus rhythm.  He is using amiodarone currently and tolerating it reasonably well and so far it seems to be working to keep him in sinus rhythm.  He is being very diligent with his salt intake and medications and maintaining his weight extremely well.  His symptoms are minimal and he is improving significantly on the echo, now with an ejection fraction up to 40%-45%.      I will not make any medication changes at this point.  We will plan to have him continue to follow up with us in the [facility] again in 3 months for re-evaluation.      cc:   Sandip Vega, [place] [address]         MARCO A YARBROUGH MD, [facility]             D: 2019   T: 2019   MT: ALONSO      Name:     KARTIK HUERTA   MRN:      [id number]        Account:      [id number]   :      1950           Service Date: 2019      Document: [id number]
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
## 2019-01-25 ENCOUNTER — ANTICOAGULATION THERAPY VISIT (OUTPATIENT)
Dept: ANTICOAGULATION | Facility: OTHER | Age: 69
End: 2019-01-25
Payer: MEDICARE

## 2019-01-25 DIAGNOSIS — Z79.01 LONG TERM CURRENT USE OF ANTICOAGULANT THERAPY: ICD-10-CM

## 2019-01-25 DIAGNOSIS — I48.91 ATRIAL FIBRILLATION WITH RVR (H): ICD-10-CM

## 2019-01-25 LAB — INR POINT OF CARE: 2.2 (ref 0.86–1.14)

## 2019-01-25 PROCEDURE — 85610 PROTHROMBIN TIME: CPT | Mod: QW

## 2019-01-25 PROCEDURE — 36416 COLLJ CAPILLARY BLOOD SPEC: CPT

## 2019-01-25 PROCEDURE — 99207 ZZC NO CHARGE NURSE ONLY: CPT

## 2019-01-25 NOTE — PROGRESS NOTES
ANTICOAGULATION FOLLOW-UP CLINIC VISIT    Patient Name:  Home Valdez  Date:  2019  Contact Type:  Face to Face    SUBJECTIVE:     Patient Findings     Positives:   No Problem Findings           OBJECTIVE    INR Protime   Date Value Ref Range Status   2019 2.2 (A) 0.86 - 1.14 Final       ASSESSMENT / PLAN  INR assessment THER    Recheck INR In: 3 WEEKS    INR Location Clinic      Anticoagulation Summary  As of 2019    INR goal:   2.0-3.0   TTR:   60.4 % (2.7 y)   INR used for dosin.2 (2019)   Warfarin maintenance plan:   2.5 mg (2.5 mg x 1) every Mon, Fri; 5 mg (2.5 mg x 2) all other days   Full warfarin instructions:   2.5 mg every Mon, Fri; 5 mg all other days   Weekly warfarin total:   30 mg   No change documented:   Shameka Carey RN   Plan last modified:   Shameka Carey RN (2019)   Next INR check:   2/15/2019   Target end date:       Indications    Atrial fibrillation with RVR (H) [I48.91]  Long term current use of anticoagulant therapy [Z79.01]             Anticoagulation Episode Summary     INR check location:       Preferred lab:       Send INR reminders to:   [facility]    Comments:   2.5 MG TABLETS, likes print out, PM dose, Amiodarone started 18      Anticoagulation Care Providers     Provider Role Specialty Phone number    Sandip VegaDO [facility] [phone]            See the Encounter Report to view Anticoagulation Flowsheet and Dosing Calendar (Go to Encounters tab in chart review, and find the Anticoagulation Therapy Visit)    Dosage adjustment made based on physician directed care plan.    Shameka Carey RN

## 2019-02-11 DIAGNOSIS — G47.33 OBSTRUCTIVE SLEEP APNEA (ADULT) (PEDIATRIC): Primary | ICD-10-CM

## 2019-02-12 ENCOUNTER — OFFICE VISIT (OUTPATIENT)
Dept: FAMILY MEDICINE | Facility: OTHER | Age: 69
End: 2019-02-12
Payer: MEDICARE

## 2019-02-12 VITALS
TEMPERATURE: 99 F | DIASTOLIC BLOOD PRESSURE: 78 MMHG | OXYGEN SATURATION: 97 % | WEIGHT: 206.6 LBS | BODY MASS INDEX: 30.51 KG/M2 | RESPIRATION RATE: 16 BRPM | HEART RATE: 78 BPM | SYSTOLIC BLOOD PRESSURE: 122 MMHG

## 2019-02-12 DIAGNOSIS — J01.00 ACUTE NON-RECURRENT MAXILLARY SINUSITIS: Primary | ICD-10-CM

## 2019-02-12 DIAGNOSIS — J44.9 CHRONIC OBSTRUCTIVE PULMONARY DISEASE, UNSPECIFIED COPD TYPE (H): Chronic | ICD-10-CM

## 2019-02-12 DIAGNOSIS — G47.33 OSA (OBSTRUCTIVE SLEEP APNEA): ICD-10-CM

## 2019-02-12 DIAGNOSIS — I48.91 ATRIAL FIBRILLATION WITH RVR (H): ICD-10-CM

## 2019-02-12 DIAGNOSIS — I42.8 NONISCHEMIC CARDIOMYOPATHY (H): ICD-10-CM

## 2019-02-12 PROCEDURE — 99214 OFFICE O/P EST MOD 30 MIN: CPT | Performed by: INTERNAL MEDICINE

## 2019-02-12 RX ORDER — AMOXICILLIN 500 MG/1
500 CAPSULE ORAL 2 TIMES DAILY
Qty: 20 CAPSULE | Refills: 0 | Status: SHIPPED | OUTPATIENT
Start: 2019-02-12 | End: 2019-02-22

## 2019-02-12 ASSESSMENT — PAIN SCALES - GENERAL: PAINLEVEL: NO PAIN (0)

## 2019-02-12 NOTE — PROGRESS NOTES
SUBJECTIVE:   Home Valdez is a 68 year old male who presents to clinic today for the following health issues:    Acute Illness   Acute illness concerns: cough, short of breath  Onset: x 3 days    Fever: no     Chills/Sweats: no    Headache (location?): no    Sinus Pressure:no    Conjunctivitis:  no    Ear Pain: no    Rhinorrhea: YES    Congestion: YES    Sore Throat: no     Cough: YES-productive of yellow sputum    Wheeze: YES    Decreased Appetite: no    Nausea: no    Vomiting: no    Diarrhea:  no    Dysuria/Freq.: no    Fatigue/Achiness: no    Sick/Strep Exposure: YES- possibly     Therapies Tried and outcome: cough drops at night                  Chief Complaint         The patient is a pleasant 68-year-old gentleman who presents today with symptoms of an upper respiratory tract infection.  He has had a slight cough, a little bit of shortness of breath as a result of some modest yellow phlegm.  He has some posterior nasal drainage and little bit of nasal congestion.  He denies fevers or chills.  His appetite has been decreased slightly but otherwise he is taking food and fluids without difficulty.  He recently saw his cardiologist for follow-up of his congestive heart failure and was noted to be doing remarkably well.  We have discussed his recent echocardiogram which demonstrated left ventricular ejection fraction of 40%.  Is markedly improved over previous values.  We have also discussed his activity.  He is doing woodworking on a regular basis and has become much more active.  He does have significant sleep apnea and continues to use his CPAP.  His atrial fibrillation has been controlled and he maintains his chronic anticoagulation without any bruising or bleeding.                       PAST, FAMILY,SOCIAL HISTORY:     Medical  History:   has a past medical history of AAA (abdominal aortic aneurysm) (H), Atrial fibrillation (H), Chronic anticoagulation, COPD (chronic obstructive pulmonary disease) (H),  Hyperlipidemia, Hypertension, NICM (nonischemic cardiomyopathy) (H), Obesity (BMI 35.0-39.9 without comorbidity), JAMES (obstructive sleep apnea) (9/3/2014), PTSD (post-traumatic stress disorder), Pulmonary HTN (H), and Trigeminal neuralgia.     Surgical History:   has a past surgical history that includes CYSTOURETHROSCOPY (1998); LAMINOTOMY,LUMBAR DISK,1 INTRSP (1993); NONSPECIFIC PROCEDURE; NONSPECIFIC PROCEDURE; Phacoemulsification with standard intraocular lens implant (12/26/2013); Vitrectomy anterior (12/26/2013); L cataract surgery[; Soft tissue surgery; colonoscopy; hernia repair; Head and neck surgery; Vitrectomy parsplana with 25 gauge system (2/6/2014); Balloon compression rhizotomy (Left, 9/7/2016); Balloon compression rhizotomy (Left, 6/5/2017); Balloon compression rhizotomy (Left, 11/7/2017); and Phacoemulsification with standard intraocular lens implant (Right, 5/3/2018).     Social History:   reports that he quit smoking about 4 years ago. His smoking use included cigarettes. He has a 40.00 pack-year smoking history. he has never used smokeless tobacco. He reports that he does not drink alcohol or use drugs.     Family History:  family history includes Depression in his father; Diabetes in his paternal grandmother; Hypertension in his mother and paternal grandfather.            MEDICATIONS  Current Outpatient Medications   Medication Sig Dispense Refill     acetaminophen (TYLENOL) 325 MG tablet Take 2 tablets (650 mg) by mouth every 4 hours as needed for other (mild pain) 100 tablet 0     ADVAIR DISKUS 250-50 MCG/DOSE diskus inhaler INHALE 1 PUFF BY MOUTH TWICE A DAY 1 Inhaler 1     amiodarone (PACERONE/CODARONE) 200 MG tablet Take 1 tablet (200 mg) by mouth daily 90 tablet 3     buPROPion (WELLBUTRIN SR) 150 MG 12 hr tablet TAKE 1 TABLET BY MOUTH TWICE A  tablet 1     carvedilol (COREG) 6.25 MG tablet Take 1 tablet (6.25 mg) by mouth 2 times daily (with meals) 180 tablet 3     fluticasone  (FLONASE) 50 MCG/ACT spray INHALE 1 TO 2 SPRAYS INTO EACH NOSTRIL EVERY DAY 16 g 3     ipratropium - albuterol 0.5 mg/2.5 mg/3 mL (DUONEB) 0.5-2.5 (3) MG/3ML neb solution NEBULIZE 1 VIAL (3ML) BY MOUTH EVERY 4 HOURS AS NEEDED FOR SHORTNESSOF BREATH / DYSPNEA OR WHEEZING 540 mL 3     lisinopril (PRINIVIL/ZESTRIL) 2.5 MG tablet Take 1 tablet (2.5 mg) by mouth At Bedtime 30 tablet 11     montelukast (SINGULAIR) 10 MG tablet TAKE 1 TABLET BY MOUTH DAILY AT BEDTIME 90 tablet 1     senna-docusate (SENOKOT-S;PERICOLACE) 8.6-50 MG per tablet Take 2 tablets by mouth 2 times daily 360 tablet 3     spironolactone (ALDACTONE) 25 MG tablet TAKE 1 TABLET BY MOUTH EVERY DAY 90 tablet 0     torsemide (DEMADEX) 20 MG tablet Take 1 tablet (20 mg) by mouth daily Or as directed by [facility], up to 3 tabs daily 135 tablet 3     VENTOLIN  (90 Base) MCG/ACT inhaler INHALE 2 PUFFS BY MOUTH EVERY 4 HOURS AS NEEDED FOR SHORTNESS OF BREATH/DYSPNEA 18 g 3     JANTOVEN 2.5 MG tablet TAKE 7.5 MG (3 TABS) ON TUES, THURS, SAT AND 5 MG (2 TABS) ALL OTHERDAYS, OR AS DIRECTED BY THE COUMADIN CLINIC. 210 tablet 1     order for DME Equipment being ordered: Nebulizer 1 Device 0     Respiratory Therapy Supplies (AIRS DISPOSABLE NEBULIZER) KIT USE EVERY 4 TO 6 HOURS AS NEEDED 1 kit 3     Respiratory Therapy Supplies (NEBULIZER/TUBING/MOUTHPIECE) KIT Use every 4-6 hours PRN 1 kit 11         --------------------------------------------------------------------------------------------------------------------                              Review of Systems     LUNGS: Pt denies: cough, excess sputum, hemoptysis, or shortness of breath.   HEART: Pt denies: chest pain, arrhythmia, syncope, tachy or bradyarrhythmia.   GI: Pt denies: nausea, vomiting, diarrhea, constipation, melena, or hematochezia.   NEURO: Pt denies: seizures, strokes, diplopia, weakness, paraesthesias, or paralysis.   ENT: Pt denies: sore throat, acknowledges moderate nasal congestion  without epistaxis, difficulty swallowing, or changes in base-line hearing.                                     Examination      /78   Pulse 78   Temp 99  F (37.2  C) (Temporal)   Resp 16   Wt 93.7 kg (206 lb 9.6 oz)   SpO2 97%   BMI 30.51 kg/m     Constitutional: The patient appears to be in no acute distress. The patient appears to be adequately hydrated. No acute respiratory or hemodynamic distress is noted at this time.   LUNGS: A few midline bronchial rhonchi are heard but breath sounds are clear bilaterally, airflow is brisk, no intercostal retraction or stridor is noted. No coughing is noted during visit.   HEART:  regular without rubs, clicks, gallops, or murmurs. PMI is nondisplaced. Upstrokes are brisk. S1,S2 are heard.   GI: Abdomen is soft, without rebound, guarding or tenderness. Bowel sounds are appropriate. No renal bruits are heard.   NEURO: Pt is alert and appropriate. No neurologic lateralization is noted. Cranial nerves 2-12 are intact. Peripheral sensory and motor function are grossly normal.    ENT: Pharynx is mildly-erythemous, moderate/yellow PND, there is significant nasal obstruction, TM's are mildly red and minimally retracted, hearing intact bilaterally. No carotid bruits are heard. No JVD seen. Thyroid is not nodular or enlarged.  Positive maxillary tap is noted bilaterally.                                           Decision Making    1. Acute non-recurrent maxillary sinusitis  Placed on amoxicillin, Tylenol, fluids, rest as needed  - amoxicillin (AMOXIL) 500 MG capsule; Take 1 capsule (500 mg) by mouth 2 times daily for 10 days  Dispense: 20 capsule; Refill: 0    2. Chronic obstructive pulmonary disease, unspecified COPD type (H)  Continue current nebulizer therapy    3. SEVERE JAMES (obstructive sleep apnea)  Continue CPAP at night    4. Nonischemic cardiomyopathy   Markedly improved, follow-up with cardiology    5. Atrial fibrillation with RVR (H)  Stable, continue  anticoagulation and rate control                         FOLLOW UP   I have asked the patient to make an appointment for followup with me in 3 months or as needed        I have carefully explained the diagnosis and treatment options to the patient.  The patient has displayed an understanding of the above, and all subsequent questions were answered.      DO SANJAY Lock    Portions of this note were produced using Vyykn  Although every attempt at real-time proof reading has been made, occasional grammar/syntax errors may have been missed.

## 2019-02-13 RX ORDER — WARFARIN SODIUM 2.5 MG/1
TABLET ORAL
Qty: 210 TABLET | Refills: 1 | Status: SHIPPED | OUTPATIENT
Start: 2019-02-13

## 2019-02-13 NOTE — TELEPHONE ENCOUNTER
"Requested Prescriptions   Pending Prescriptions Disp Refills     JANTOVEN 2.5 MG tablet [Pharmacy Med Name: JANTOVEN 2.5MG TABLET] 210 tablet 1    Last Written Prescription Date:  9/19/18  Last Fill Quantity: 210,  # refills: 1   Last office visit: 1/11/19  Future Office Visit:     Sig: TAKE 7.5 MG (3 TABS) ON TUES, THURS, SAT AND 5 MG (2 TABS) ALL OTHERDAYS, OR AS DIRECTED BY THE COUMADIN CLINIC.    Vitamin K Antagonists Failed - 2/12/2019  7:21 PM       Failed - INR is within goal in the past 6 weeks    Confirm INR is within goal in the past 6 weeks.     Recent Labs   Lab Test 01/25/19   INR 2.2*          Passed - Recent (12 mo) or future (30 days) visit within the authorizing provider's specialty    Patient had office visit in the last 12 months or has a visit in the next 30 days with authorizing provider or within the authorizing provider's specialty.  See \"Patient Info\" tab in inbasket, or \"Choose Columns\" in Meds & Orders section of the refill encounter.             Passed - Medication is active on med list       Passed - Patient is 18 years of age or older      Routing refill request to provider for review/approval because:  Labs out of range:  INR    Dari Childs RN              "

## 2019-02-15 ENCOUNTER — TELEPHONE (OUTPATIENT)
Dept: INTERNAL MEDICINE | Facility: CLINIC | Age: 69
End: 2019-02-15

## 2019-04-10 NOTE — PROGRESS NOTES
ANTICOAGULATION FOLLOW-UP CLINIC VISIT    Patient Name:  Home Valdez  Date:  8/20/2018  Contact Type:  Face to Face    SUBJECTIVE:     Patient Findings     Positives No Problem Findings           OBJECTIVE    INR Protime   Date Value Ref Range Status   08/20/2018 3.3 (A) 0.86 - 1.14 Final       ASSESSMENT / PLAN  INR assessment THER    Recheck INR In: 10 DAYS    INR Location Clinic      Anticoagulation Summary as of 8/20/2018     INR goal 2.0-3.0   Today's INR 3.3!   Warfarin maintenance plan 7.5 mg (2.5 mg x 3) on Thu; 5 mg (2.5 mg x 2) all other days   Full warfarin instructions 7.5 mg on Thu; 5 mg all other days   Weekly warfarin total 37.5 mg   Plan last modified Shameka Carey RN (8/20/2018)   Next INR check 8/30/2018   Target end date     Indications   Atrial fibrillation with RVR (H) [I48.91]  Long-term (current) use of anticoagulants [Z79.01] [Z79.01]         Anticoagulation Episode Summary     INR check location     Preferred lab     Send INR reminders to [facility]    Comments 2.5 MG TABLETS, likes print out, PM dose, Amiodarone doubled 7/31/18 but then discontinued completely 8/8/18      Anticoagulation Care Providers     Provider Role Specialty Phone number    Sandip Vega DO [facility] [phone]            See the Encounter Report to view Anticoagulation Flowsheet and Dosing Calendar (Go to Encounters tab in chart review, and find the Anticoagulation Therapy Visit)    Dosage adjustment made based on physician directed care plan.    Shameka Carey, RN               
[id number] Detailed

## 2019-07-08 NOTE — PROGRESS NOTES
PT'S DAUGHTER, MERCED  [phone] WANTED DR. KAPLAN TO KNOW HER MOTHER, SHANNON IS BEING ADDMITTED TODAY INTO [facility] DUE TO LOW HEMOGLOBIN  (5.8) AND ABDOMINAL PAIN!     PLEASE CALL BACK IF YOU CAN.    Service Date: 08/08/2018      REASON FOR CARDIOLOGY VISIT:  Followup recent hospitalization for congestive heart failure, atrial fibrillation.      HISTORY OF PRESENT ILLNESS:  Mr. Valdez is a very pleasant 60-year-old gentleman with history of nonischemic cardiomyopathy, history of paroxysmal atrial fibrillation, was on rhythm control strategy on low dose amiodarone 100 mg daily, on Coumadin for stroke prophylaxis.        To be noted in terms of cardiac history, back in 2014, the patient had cardiomyopathy and underwent coronary angiogram that showed normal coronary arteries.  He had atrial fibrillation with rapid ventricular rate at that time and this was felt to be the likely etiology of his cardiomyopathy.  Subsequently, his LV function improved to around 45%-50%.  He follows my colleague, Dr. Murphy, in Cardiology Clinic.        Last month the patient was noted to have some breathing issues and he went to the ER, was diagnosed with pneumonia.  Remarkably that time he was also diagnosed with atrial fibrillation with rapid ventricular rate.  No significant changes were made to his cardiac medication.  He was subsequently discharged and then came back a week later with worsening shortness of breath, weight gain, pedal edema and was diagnosed with acute decompensated congestive heart failure, atrial fibrillation, rapid ventricular rate.        Echocardiogram done about a week ago showed LVEF less than 20%, moderately dilated LV, severely dilated left atrium with the LA volume index of 51 mL per meter square.  The RV was mild to moderate reduced in systolic function.  At that time his amiodarone dose was increased to 200 mg daily.  He was also started on spironolactone and metoprolol was changed to metoprolol succinate 50 mg daily to 50 mg b.i.d.        Today, patient is coming to clinic accompanied by his wife.  I am seeing patient in the absence of Dr. Murphy.  The patient tells me overall he is feeling much better  compared to what he felt at the time of admission.  He still struggles with dyspnea on exertion and some fatigue.  No dizziness, no palpitation, no chest discomfort, no orthopnea.  His weight has slowly come down.  It was 246 pounds last month; came down to 238 pounds now.  He is on 20 mg b.i.d. of torsemide.  Additionally, is on 12.5 mg b.i.d. of spironolactone, 25 mg daily of losartan and Coumadin.        The patient does have severe COPD and PFT last year showed severe obstructive disease but normal diffusion capacity.  TSH, AST and ALT have been normal.        In terms of his INR, it has recently fluctuated.  Today it was 3.5.  Going back, his INR was 1.6 on 07/23/2018.        The patient also has sleep apnea and is quite compliant with CPAP.  He is also very compliant with salt-restricted diet.      PHYSICAL EXAMINATION:   VITAL SIGNS:  Blood pressure 106/86, heart rate 89 and irregular, weight 238 pounds, BMI 34.35.   GENERAL:  The patient appears pleasant, comfortable.   NECK:  Normal JVP, negative hepatojugular reflux.   CARDIOVASCULAR SYSTEM:  Irregular, normal rate to borderline tachycardia, no murmur, rub or gallop.   RESPIRATORY SYSTEM:  Decreased air entry bilaterally, no rhonchi or crackles.   ABDOMEN:  Soft, nontender.   EXTREMITIES:  There is 1+ pitting pedal edema bilaterally.   PSYCH:  Normal affect.   SKIN:  No obvious rash.   HEENT:  No pallor or icterus.        EKG done today was personally reviewed by me and shows atrial fibrillation.  Ventricular rates of 89 beats per minute, left bundle branch block.      IMPRESSION AND PLAN:  A very pleasant 68-year-old gentleman with history of tachycardia-mediated cardiomyopathy in the past with normal coronary arteries on coronary angiogram 2014.  The patient spontaneously converted in the past to sinus rhythm and was maintained on low dose amiodarone since then and on Coumadin for stroke prophylaxis.  About a month ago, patient had pneumonia and was  diagnosed with atrial fibrillation with rapid ventricular rate.  Also, on more detailed discussion with the wife, it looks like patient may have run out of metoprolol in the middle of July, last month.     He was recently admitted with acute decompensated congestive heart failure in the setting of atrial fibrillation with rapid ventricular rate.  I think this is all likely tachycardia-mediated cardiomyopathy.  I had a long discussion, the patient regarding the etiology of his congestive heart failure which is likely tachycardia-mediated cardiomyopathy.     Regarding atrial fibrillation, we had a long discussion about the pathophysiology of atrial fibrillation, rate versus rhythm control strategy and stroke prophylaxis.  Given elevated [id number] score of 4, Coumadin continuation is recommended.     Regarding rhythm versus rate control strategy, there are certain features which are make rhythm control strategy somewhat difficult, namely severely dilated left atrium, underlying severe COPD and the fact that despite being on amiodarone, although it was slightly lower dose, he still went into atrial fibrillation.  I am somewhat wary of continuing amiodarone at this time given that he has underlying lung disease and still continues to be in atrial fibrillation.  I recommend at this time discontinuing amiodarone.     Regarding rate versus rhythm control strategy, the patient is feeling better with ventricular rate controlled.  They may be slightly on the higher side of normal.  I recommend increasing metoprolol to 75 mg b.i.d.  Ideally, I would use metoprolol succinate, but for now to avoid making too many changes at the same time, I recommend continuing metoprolol tartrate and increase the dose to 75 mg b.i.d.  To allow room for the blood pressure, I also recommend withholding spironolactone at this time and discontinuing it for now.     The patient already has an appointment with his established primary cardiologist,  Dr. Murphy, next week.  I also recommend doing a 24-hour Holter, which hopefully can be done by this weekend to see the adequacy of rate control of increased dose of metoprolol tartrate.  We also briefly discussed about oral anticoagulation guided cardioversion and also permanent pacemaker implantation and AV node ablation.  Regarding latter, I think this is probably not needed at this time as ventricular rates are already much better controlled and hopefully even more controlled with increasing beta blocker therapy.  Regarding oral anticoagulation guided cardioversion, I am not sure whether he will be able to maintain sinus rhythm given the above-mentioned factors.  Regardless, his INR was less than 2 on 07/23.  This can be further discussed when patient sees Dr. Murphy.  For now, I favor continuing rate control strategy.   1.  Recent admission with acute decompensated congestive heart failure.  LVEF less than 20%.  Likely etiology tachycardia-mediated cardiomyopathy.  Somewhat similar presentation back in 2014.  Appears compensated, some evidence of pedal edema and extravascular volume overload on examination today.  Weight has steadily decreased over the last several days.   2.  History of proximal atrial fibrillation.  Was on low dose amiodarone for maintenance of sinus rhythm.  Now persistent atrial fibrillation.  Ventricular rates better controlled.  [id number] score of 4 on Coumadin for stroke prophylaxis.   3.  History of nonischemic cardiomyopathy, likely due to tachycardia-mediated cardiomyopathy in the past.  Baseline LVEF did improve to around 45%-50%.   4.  Chronic kidney disease, stage III.      RECOMMENDATIONS:   1.  Increase metoprolol tartrate to 75 mg b.i.d.   2.  Discontinue amiodarone.   3.  Discontinue spironolactone.   4.  Continue torsemide, Coumadin and losartan.   5.  A 24-hour Holter evaluation.   6.  Followup next week, which is already scheduled, with a BMP.         DANIEL CHAVIRA MD              D: 2018   T: 2018   MT: SHUBHAM      Name:     KARTIK HUERTA   MRN:      [id number]        Account:      [id number]   :      1950           Service Date: 2018      Document: [id number]

## 2020-08-27 NOTE — MR AVS SNAPSHOT
Home Valdez   4/13/2018 8:15 AM   Anticoagulation Therapy Visit    Description:  68 year old male   Provider:  CAMILO ANTI COAG   Department:  [facility]           INR as of 4/13/2018     Today's INR 2.5      Anticoagulation Summary as of 4/13/2018     INR goal 2.0-3.0   Today's INR 2.5   Full instructions 7.5 mg on Tue, Thu, Sat; 5 mg all other days   Next INR check 5/11/2018    Indications   Atrial fibrillation with RVR (H) [I48.91]  Long-term (current) use of anticoagulants [Z79.01] [Z79.01]         Your next Anticoagulation Clinic appointment(s)     May 11, 2018  8:15 AM CDT   Anticoagulation Visit with [facility]O ANTI D[facility]   [facility]    [address] [facility] [address]   [phone]              Contact Numbers     Clinic Number:         April 2018 Details    Sun Mon Tue Wed Thu Fri Sat     1               2               3               4               5               6               7                 8               9               10               11               12               13      5 mg   See details      14      7.5 mg           15      5 mg         16      5 mg         17      7.5 mg         18      5 mg         19      7.5 mg         20      5 mg         21      7.5 mg           22      5 mg         23      5 mg         24      7.5 mg         25      5 mg         26      7.5 mg         27      5 mg         28      7.5 mg           29      5 mg         30      5 mg               Date Details   04/13 This INR check               How to take your warfarin dose     To take:  5 mg Take 2 of the 2.5 mg tablets.    To take:  7.5 mg Take 3 of the 2.5 mg tablets.           May 2018 Details    Sun Mon Tue Wed Thu Fri Sat       1      7.5 mg         2      5 mg         3      7.5 mg         4      5 mg         5      7.5 mg           6      5 mg         7      5 mg         8      7.5 mg         9      5 mg         10      7.5 mg         11            12                  13               14               15               16               17               18               19                 20               21               22               23               24               25               26                 27               28               29               30               31                  Date Details   No additional details    Date of next INR:  5/11/2018         How to take your warfarin dose     To take:  5 mg Take 2 of the 2.5 mg tablets.    To take:  7.5 mg Take 3 of the 2.5 mg tablets.            Detail Level: Simple Initiate Treatment: Tim Yu \\nWash affected areas with Benzoyl Peroxide 5% wash daily \\nBactrim DS twice daily for 7 days. \\n\\nMupirocin 2% ointment twice daily for 7 days. Initiate Treatment: Ellouise Finger \\nWash area with soap and water. Then, apply Mupirocin 2% ointment  twice daily for 7 days. Keep it covering with a bandage.

## 2021-03-31 NOTE — CONSULTS
Consult Date:  09/21/2018      CARDIOLOGY CONSULTATION      REASON FOR CONSULTATION:  Mr. Home Valdez is 68 years old and has been admitted to [facility] with edema, abdominal distention and shortness of breath.        REQUESTING PHYSICIAN:  Cardiology consultation has been requested by Dr. Mina of the Hospitalist Service.      HISTORY OF PRESENT ILLNESS:  Mr. Valdez has atrial fibrillation and was diagnosed during his hospitalization 2 months ago with pneumonia.  He has had a rapid ventricular response to his atrial fibrillation and unfortunately, his previously normal left ventricular systolic performance had diminished quite significantly.  He had a left ventricular ejection fraction of 20% with biventricular failure.  He had no anginal symptoms and an angiogram done 4 years ago in 2014 had not demonstrated coronary artery disease.      From a cardiology standpoint, he has continued on Lopressor 100 mg p.o. b.i.d. and losartan 25 mg p.o. daily and torsemide.  He had not felt well and after being treated with torsemide he lost 2 pounds, but has otherwise really not felt well with both persistent edema of his lower extremities, abdominal distention and shortness of breath.  He was seen by ALEJANDRO Méndez, yesterday and it was recommended that he be admitted to the hospital for intravenous diuresis.      He continues to deny any chest discomfort.  He does have 3-pillow orthopnea and he has noted that he has exertional dyspnea just walking out to the garage.  His appetite has diminished despite his maintaining a very low sodium intake here.      He has previously seen Dr. Murphy from cardiology who suggested that if his rate is not controlled, AV peter ablation and pacemaker implantation could be considered.  His QRS was 110 ms consistent with a left bundle branch block.      PAST MEDICAL HISTORY:  Abdominal aortic aneurysm with enlargement of greater than 1 cm in the last year, atrial  fibrillation with rapid ventricular response rate, chronic obstructive pulmonary disease, dyslipidemia, hypertension, dilated cardiomyopathy, obstructive sleep apnea, posttraumatic stress syndrome, pulmonary hypertension, trigeminal neuralgia.      PAST SURGICAL HISTORY:   1.  Prior compression rhizotomy.   2.  Laminotomy at lumbar spine.   3.  Prior head and neck surgery.   4.  Prior herniorrhaphy.   5.  Prior cataract surgery, bilateral.   6.  Prior vitrectomy.      SOCIAL HISTORY:  The patient is .  His wife was present and she has been an LPN working with one of the long-time family practitioners at the [facility].  He smoked cigarettes until 4 years ago.  He does not drink alcohol.      FAMILY HISTORY:  Significant for diabetes in a grandmother and hypertension in his mother.      ALLERGIES:  CONTRAST DYE.      MEDICATIONS:   1.  Advair 250/50 one puff b.i.d.   2.  Wellbutrin 150 mg p.o. b.i.d.   3.  Jantoven 7.5 mg on Tuesday, Thursday, Saturday and 5 mg all other days of the week.   4.  Losartan 25 mg p.o. nightly.   5.  Metoprolol tartrate 100 mg p.o. b.i.d.   6.  Singulair 10 mg p.o. daily.   7.  Potassium chloride 20 mEq p.o. daily.   8.  Senokot-S, Latoya-Colace 2 tablets p.o. b.i.d.   9.  Torsemide 20 mg p.o. daily.   10.  Ventolin inhaler 2 puffs every 4 hours p.r.n.   11.  Tylenol 325 mg with 2 tablets every 4 hours p.r.n.   12.  Flonase 1-2 sprays to each nostril daily.      REVIEW OF SYSTEMS:  The 10-point review of systems is negative except noted in the HPI.      PHYSICAL EXAMINATION:   GENERAL:  This is a man sitting in a chair and appearing mildly dyspneic with conversation.   VITAL SIGNS:  The blood pressure was 96/76 mmHg, heart rate 96 beats per minute and irregular and respiratory rate 16-22 per minute.   HEENT:  The head was normocephalic.  The eyes were nonicteric.     NECK:  Supple, without thyromegaly.  The carotid upstrokes were diminished bilaterally, but without bruits.    CHEST:  Free of crackles or wheezes, but there was a diffuse and moderate decrease in breath sounds.  Breathing was only minimally labored.   CARDIOVASCULAR:  On cardiac auscultation, there was a soft and rapid set of heart sounds including an S1 and S2 without a definite gallop rhythm.  There were no murmurs.   ABDOMEN:  Distended without definite tenderness.  Organomegaly was impossible to assess.     EXTREMITIES:  On assessment of lower extremities, there were venous stasis changes bilaterally with approximately 2-3+ pitting and woody edema of the lower extremities to the calves.   NEUROLOGIC:  On neurologic evaluation, facies were symmetric and he moved all extremities without focal limitation.      LABORATORY DATA:  The EKG was as described above, but also demonstrated anteroseptal Q-waves.  The sodium was 142, potassium 4, BUN 38, and creatinine 1.94.  The white blood cell count was 7.1, hemoglobin 15.1 and platelet count 183,000.  The INR was 3.  TSH was 5.7.      ASSESSMENT AND RECOMMENDATIONS:  This is an individual with biventricular failure and   predominant right-sided failure on examination.  He is receiving intravenous Lasix.  With intravenous Lasix b.i.d., he has diuresed approximately 800 mL and his creatinine has remained in the 1.7 to 1.9 range.  His symptoms are  consistent with both left and right-sided failure.      He would benefit from a change from metoprolol tartrate to metoprolol succinate to improve left ventricular systolic performance.  He is likely to benefit from vasodilator therapy, which will eventually be determined in terms of dose and agent based on his renal function.      He has obstructive sleep apnea by history and that should be aggressively treated.  Lymphedema clinic will be consulted.      His heart rate remains less than ideal and an echocardiogram will be ordered.  If he has persistent severe left ventricular dysfunction, he will require more aggressive rate control.   That could include an aggressive attempt to reinstitute sinus rhythm or AV peter ablation and permanent pacemaker placement.  His QRS is not quite wide enough to qualify for a biventricular pacemaker, but that would be preferable.  His echocardiogram will be reviewed for possible dyssynchronous wall motion which would be another reason for biventricular pacing.  If the atrial fibrillation has not been present for greater than several months, an attempt at cardioversion and even antiarrhythmic therapy to maintain sinus rhythm could be pursued. He remains on anticoagulation with a therapeutic INR.    He should be formally re-evaluated for coronary disease.  At the very least, a lexiscan stress nuclear study should be performed this admission and eventually, a coronary angiogram if his GFR can be somewhat improved.     He has an abdominal aortic aneurysm measured at about 4 cm and he has been evaluated by vascular surgery who suggested outpatient followup when the aneurysm size increases to 5.5-6 cm.  He may be a candidate for an EVAR at that time.      Further recommendations will depend upon his response to therapy.                     D: 2018   T: 2018   MT: AUGUST      Name:     KARTIK HUERTA   MRN:      [id number]        Account:       [id number]   :      1950           Consult Date:  2018      Document: [id number]       Soft, non-tender, no hepatosplenomegaly, normal bowel sounds

## 2021-04-30 NOTE — NURSING NOTE
"Chief Complaint   Patient presents with     URI       Initial /78 (BP Location: Left arm, Patient Position: Chair, Cuff Size: Adult Regular)  Pulse 72  Temp 98  F (36.7  C) (Temporal)  Wt 250 lb (113.4 kg)  SpO2 95%  BMI 35.87 kg/m2 Estimated body mass index is 35.87 kg/(m^2) as calculated from the following:    Height as of 6/23/17: 5' 10\" (1.778 m).    Weight as of this encounter: 250 lb (113.4 kg).  Medication Reconciliation: complete  Tricia LAU    "       Patient is a 37y old  Male who presents with a chief complaint of catatonia r/o seizure (29 Apr 2021 12:23)      SUBJECTIVE / OVERNIGHT EVENTS: VEEG unremarkable. Will be admitted to IP psych once covid swab results return. When I spoke to the pt he agreed to go into IP psych, but said no to the psychiatrist. Pt does not have insight into his medical problems so 2 physician consent was obtained to admit the pt to psych.       ADDITIONAL REVIEW OF SYSTEMS:  CONSTITUTIONAL: No fever, weight loss  RESPIRATORY: No SOB. No cough, wheezing, chills or hemoptysis  CARDIOVASCULAR: No chest pain, palpitations, dizziness, or leg swelling  GASTROINTESTINAL: No abdominal or epigastric pain. No nausea, vomiting, or hematemesis; No diarrhea or constipation. No melena or hematochezia.  GENITOURINARY: No dysuria, frequency, hematuria, or incontinence  NEUROLOGICAL: No headaches, memory loss, loss of strength, numbness, or tremors   ENDOCRINE: No heat or cold intolerance; No hair loss  MUSCULOSKELETAL: No joint pain or swelling; No muscle, back pain  PSYCHIATRIC: Pauses and blank episodes        MEDICATIONS  (STANDING):  BACItracin   Ointment 1 Application(s) Topical two times a day  chlorhexidine 4% Liquid 1 Application(s) Topical <User Schedule>  OLANZapine Disintegrating Tablet 20 milliGRAM(s) Oral daily    MEDICATIONS  (PRN):      CAPILLARY BLOOD GLUCOSE        I&O's Summary      PHYSICAL EXAM:  Vital Signs Last 24 Hrs  T(C): 35.7 (29 Apr 2021 04:45), Max: 36.7 (28 Apr 2021 19:11)  T(F): 96.2 (29 Apr 2021 04:45), Max: 98.1 (28 Apr 2021 19:11)  HR: 79 (29 Apr 2021 04:45) (79 - 98)  BP: 108/67 (29 Apr 2021 04:45) (108/67 - 162/95)  BP(mean): --  RR: 16 (29 Apr 2021 04:45) (16 - 16)  SpO2: 99% (28 Apr 2021 21:50) (99% - 100%)  CONSTITUTIONAL: NAD, well-developed, well-groomed  EYES: PERRLA; conjunctiva and sclera clear  ENMT: Moist oral mucosa, no pharyngeal injection or exudates; normal dentition  NECK: Supple, no palpable masses; no thyromegaly  RESPIRATORY: Normal respiratory effort; lungs are clear to auscultation bilaterally  CARDIOVASCULAR: Regular rate and rhythm, normal S1 and S2, no murmur/rub/gallop; No lower extremity edema; Peripheral pulses are 2+ bilaterally  ABDOMEN: Nontender to palpation, normoactive bowel sounds, no rebound/guarding; No hepatosplenomegaly  MUSCULOSKELETAL:  Normal gait; no clubbing or cyanosis of digits; no joint swelling or tenderness to palpation  PSYCH: A+O to person, place, and time; affect appropriate  NEUROLOGY: CN 2-12 are intact and symmetric; no gross sensory deficits; equal motor strength 5/5 in b/l UE and LE      LABS:                        12.5   5.20  )-----------( 268      ( 29 Apr 2021 06:30 )             37.1     04-29    141  |  101  |  17  ----------------------------<  106<H>  4.3   |  27  |  0.7    Ca    9.5      29 Apr 2021 06:30    TPro  7.6  /  Alb  4.9  /  TBili  0.6  /  DBili  x   /  AST  16  /  ALT  15  /  AlkPhos  70  04-27      CARDIAC MARKERS ( 29 Apr 2021 06:30 )  x     / x     / 92 U/L / x     / x                RADIOLOGY & ADDITIONAL TESTS:  Results Reviewed:   Imaging Personally Reviewed:  Electrocardiogram Personally Reviewed:    COORDINATION OF CARE:  Care Discussed with Consultants/Other Providers [Y/N]:  Prior or Outpatient Records Reviewed [Y/N]:

## 2021-05-06 NOTE — PROGRESS NOTES
"S-(situation): Patient discharged to home via wc with wife.    B-(background): Patient here for observation of afib with rapid ventricular response.     A-(assessment): Patient alert and oriented x 4. BP 95/72 (BP Location: Right arm)  Pulse 77  Temp 98.4  F (36.9  C) (Axillary)  Resp 20  Ht 1.791 m (5' 10.5\")  Wt 111.2 kg (245 lb 3.2 oz)  SpO2 92%  BMI 34.69 kg/m2. Denies pain, n/v, dizziness, sob and cp. CMS and neuros intact. Stef LE and pitting edema noted to LE, patient currently on lasix. PIV removed. Dc instructions given to wife and patient. Belongings sent with patient. Advair  And inhaler given to patient.       R-(recommendations): Discharge instructions reviewed with patient and wife. Listed belongings gathered and returned to patient.          Discharge Nursing Criteria:     Care Plan and Patient education resolved: Yes    New Medications- pt has been educated about purpose and side effects: Yes    Vaccines  Influenza status verified at discharge:  NA    MISC  Prescriptions if needed, hard copies sent with patient  No, electronically sent to [facility] in [place]  Home and hospital aquired medications returned to patient: Yes  Medication Bin checked and emptied on discharge Yes  Patient reports post-discharge pain management plan is effective: Yes    TKA/STEPHEN ONLY:   Discharged with MEÑO Stockings NA  MEÑO stocking Education Completed NA  " May 6, 2021     Patient: Diandra Eagle   YOB: 2015   Date of Visit: 5/6/2021       To Whom it May Concern:    Diandra Eagle was seen in my clinic on 5/6/2021 at 1:45 pm.     Please excuse Diandra for her absence from school on the date listed above to be able to make her appointment.    Sincerely,         Radha Rivera, DO    Medical information is confidential and cannot be disclosed without the written consent of the patient or her representative.       2-3x/week

## 2022-01-10 NOTE — IP AVS SNAPSHOT
MRN:[id number]                      After Visit Summary   5/3/2018    [facility]    MRN: [id number]           Thank you!     Thank you for choosing [facility] for your care. Our goal is always to provide you with excellent care. Hearing back from our patients is one way we can continue to improve our services. Please take a few minutes to complete the written survey that you may receive in the mail after you visit with us. Thank you!        Patient Information     Date Of Birth          1950        About your hospital stay     You were admitted on:  May 3, 2018 You last received care in the:  [facility] Phase II    You were discharged on:  May 3, 2018       Who to Call     For medical emergencies, please call 911.  For non-urgent questions about your medical care, please call your primary care provider or clinic, [phone]  For questions related to your surgery, please call your surgery clinic        Attending Provider     Provider Meir Menezes MD Ophthalmology       Primary Care Provider Office Phone # Fax #    [facility] [phone]      Your next 10 appointments already scheduled     May 11, 2018  8:15 AM CDT   An[facility]agulation Visit with  [facility]   [facility]    [address] [facility] [address] [phone]              Further instructions from your care team        POST CATARACT SURGERY EYE INSTRUCTIONS  DR. HERRMANN           Eye Medications      *If you opted for the one bottle containing all 3 eye medications, follow these instructions.    *Instill one drop into the operative eye 3 times a day until the bottle is empty.       - If your are currently being treated for glaucoma please continue your    medication as normally prescribed.     - Wear eye shield while sleeping for 3 days     - Refrain from heavy lifting, bending, straining, or strenuous activity for 1       [FreeTextEntry1] : EKG sinus bradley, stable\par Physically active week.     - Do not rub or push on the operative eye for 1 week (you may wipe the    eye lid(s) gently with a wet wash cloth to remove matter from                         eyelashes).     - You may bathe or shower, but do not get the eye wet for 1 month      (swimming, hot tubs or other water activities).     - Minor itching, scratching sensation, burning sensation, etc. is normal.     Report any severe eye pain or loss of vision.     - Please call our office at [phone] if you have questions or     concerns.  [facility]   Adult Discharge Orders & Instructions     For 24 hours after surgery    1. Get plenty of rest.  A responsible adult must stay with you for at least 24 hours after you leave the hospital.   2. Do not drive or use heavy equipment.  If you have weakness or tingling, don't drive or use heavy equipment until this feeling goes away.  3. Do not drink alcohol.  4. Avoid strenuous or risky activities.  Ask for help when climbing stairs.   5. You may feel lightheaded.  IF so, sit for a few minutes before standing.  Have someone help you get up.   6. If you have nausea (feel sick to your stomach): Drink only clear liquids such as apple juice, ginger ale, broth or 7-Up.  Rest may also help.  Be sure to drink enough fluids.  Move to a regular diet as you feel able.  7. You may have a slight fever. Call the doctor if your fever is over 100 F (37.7 C) (taken under the tongue) or lasts longer than 24 hours.  8. You may have a dry mouth, a sore throat, muscle aches or trouble sleeping.  These should go away after 24 hours.  9. Do not make important or legal decisions.   Call your doctor for any of the followin.  Signs of infection (fever, growing tenderness at the surgery site, a large amount of drainage or bleeding, severe pain, foul-smelling drainage, redness, swelling).    [phone] Nurse Advice LIne    Pending Results     No orders found from 2018 to 2018.           "  Admission Information     Date & Time Provider Department Dept. Phone    5/3/2018 Meir Angelo MD [facility] Phase [phone]      Your Vitals Were     Blood Pressure Temperature Respirations Height Weight Pulse Oximetry    142/83 98  F (36.7  C) (Oral) 16 1.778 m (5' 10\") 112 kg (247 lb) 95%    BMI (Body Mass Index)                   35.44 kg/m2           [facility] Information     [facility] lets you send messages to your doctor, view your test results, renew your prescriptions, schedule appointments and more. To sign up, go to [email or web address] . Click on \"Log in\" on the left side of the screen, which will take you to the Welcome page. Then click on \"Sign up Now\" on the right side of the page.     You will be asked to enter the access code listed below, as well as some personal information. Please follow the directions to create your username and password.     Your access code is: [email or web address]  Expires: 2018 11:17 AM     Your access code will  in 90 days. If you need help or a new code, please call your [facility] clinic or [phone].        Care EveryWhere ID     This is your Care EveryWhere ID. This could be used by other organizations to access your [facility] medical records  [id number]        Equal Access to Services     MILKA ORTIZ : Hadii lissette monroe Sofede, waaxda luqadaha, qaybta kaalmada compa, jase john . So [facility] [phone].    ATENCIÓN: Si habla español, tiene a aclabrese disposición servicios gratuitos de asistencia lingüística. Kenrick al [phone].    We comply with applicable federal civil rights laws and [place] laws. We do not discriminate on the basis of race, color, national origin, age, disability, sex, sexual orientation, or gender identity.               Review of your medicines      CONTINUE these medicines which may have CHANGED, or have new prescriptions. If we are uncertain of the size of tablets/capsules you " have at home, strength may be listed as something that might have changed.        Dose / Directions    torsemide 20 MG tablet   Commonly known as:  DEMADEX   This may have changed:    - how much to take  - how to take this  - when to take this  - reasons to take this  - additional instructions   Used for:  Chronic diastolic congestive heart failure (H)        Take 10 mg po daily. Take an extra 10mg po daily as needed for swelling.   Quantity:  180 tablet   Refills:  3         CONTINUE these medicines which have NOT CHANGED        Dose / Directions    acetaminophen 325 MG tablet   Commonly known as:  TYLENOL        Dose:  650 mg   Take 2 tablets (650 mg) by mouth every 4 hours as needed for other (mild pain)   Quantity:  100 tablet   Refills:  0       albuterol 108 (90 Base) MCG/ACT Inhaler   Commonly known as:  PROAIR HFA/PROVENTIL HFA/VENTOLIN HFA   Used for:  Chronic obstructive pulmonary disease with acute exacerbation (H)        Dose:  2 puff   Inhale 2 puffs into the lungs every 4 hours as needed for shortness of breath / dyspnea   Quantity:  6 Inhaler   Refills:  3       amiodarone 200 MG tablet   Commonly known as:  PACERONE/CODARONE   Used for:  Atrial fibrillation (H)        Dose:  100 mg   Take 0.5 tablets (100 mg) by mouth daily   Quantity:  45 tablet   Refills:  1       buPROPion 150 MG 12 hr tablet   Commonly known as:  WELLBUTRIN SR   Used for:  Personal history of tobacco use, presenting hazards to health        Dose:  150 mg   Take 1 tablet (150 mg) by mouth 2 times daily   Quantity:  180 tablet   Refills:  3       cefuroxime 250 MG tablet   Commonly known as:  CEFTIN   Used for:  Obstructive chronic bronchitis with exacerbation (H)        Dose:  250 mg   Take 1 tablet (250 mg) by mouth 2 times daily   Quantity:  20 tablet   Refills:  3       cetirizine 10 MG tablet   Commonly known as:  zyrTEC   Used for:  Chronic seasonal allergic rhinitis due to other allergen        TAKE 1 TABLET BY MOUTH EVERY  EVENING   Quantity:  90 tablet   Refills:  1       fluticasone 50 MCG/ACT spray   Commonly known as:  FLONASE   Used for:  Chronic rhinitis        USE ONE TO TWO SPRAYS IN EACH NOSTRIL EVERY DAY   Quantity:  16 g   Refills:  6       fluticasone-salmeterol 250-50 MCG/DOSE diskus inhaler   Commonly known as:  ADVAIR   Used for:  Panlobular emphysema (H)        Dose:  1 puff   Inhale 1 puff into the lungs 2 times daily   Quantity:  3 Inhaler   Refills:  3       ipratropium - albuterol 0.5 mg/2.5 mg/3 mL 0.5-2.5 (3) MG/3ML neb solution   Commonly known as:  DUONEB   Used for:  COPD exacerbation (H)        TAKE 1 VIAL (3 MLS) BY NEBULIZATION EVERY 4 HOURS AS NEEDED FOR SHORTNESS OF BREATH / DYSPNEA OR WHEEZING   Quantity:  540 mL   Refills:  0       losartan 50 MG tablet   Commonly known as:  COZAAR   Used for:  Atrial fibrillation with RVR (H), Hypertension goal BP (blood pressure) < 140/90        TAKE 2 TABLETS (100MG) BY MOUTH EVERY DAY   Quantity:  180 tablet   Refills:  0       methylPREDNISolone 4 MG tablet   Commonly known as:  MEDROL DOSEPAK   Used for:  Obstructive chronic bronchitis with exacerbation (H)        Follow package instructions   Quantity:  21 tablet   Refills:  3       metoprolol succinate 50 MG 24 hr tablet   Commonly known as:  TOPROL-XL   Used for:  Atrial fibrillation with RVR (H), CHF (congestive heart failure) (H)        Dose:  50 mg   Take 1 tablet (50 mg) by mouth daily   Quantity:  90 tablet   Refills:  1       montelukast 10 MG tablet   Commonly known as:  SINGULAIR   Used for:  Chronic seasonal allergic rhinitis due to other allergen        TAKE 1 TABLET BY MOUTH DAILY AT BEDTIME   Quantity:  30 tablet   Refills:  10       multivitamin, therapeutic Tabs tablet        Dose:  1 tablet   Take 1 tablet by mouth daily   Refills:  0       nebulizer/tubing/mouthpiece Kit   Used for:  Chronic obstructive pulmonary disease, unspecified COPD type (H)        Use every 4-6 hours PRN   Quantity:  1  kit   Refills:  11       order for DME   Used for:  COPD (chronic obstructive pulmonary disease) (H)        Equipment being ordered: Nebulizer   Quantity:  1 Device   Refills:  0       potassium chloride SA 20 MEQ CR tablet   Commonly known as:  K-DUR/KLOR-CON M   Used for:  CHF (congestive heart failure) (H), Peripheral edema        Dose:  20 mEq   Take 1 tablet (20 mEq) by mouth daily   Quantity:  90 tablet   Refills:  2       senna-docusate 8.6-50 MG per tablet   Commonly known as:  SENOKOT-S;PERICOLACE        Dose:  1-2 tablet   Take 1-2 tablets by mouth 2 times daily Take while on oral narcotics to prevent or treat constipation.   Quantity:  30 tablet   Refills:  0       * warfarin 2.5 MG tablet   Commonly known as:  JANTOVEN   Used for:  Atrial fibrillation with RVR (H)        Take 7.5 mg (3 tablets) on Tuesday, Thursday, Saturday and 5 mg (2 tablets) all other days or as directed by coumadin clinic.   Quantity:  235 tablet   Refills:  0       * warfarin 2.5 MG tablet   Commonly known as:  JANTOVEN   Used for:  Atrial fibrillation with RVR (H)        Take 7.5 mg (3 tabs) on Tues, Thurs, Sat and 5 mg (2 tabs)all other days, or as directed by the coumadin clinic.   Quantity:  210 tablet   Refills:  0       * Notice:  This list has 2 medication(s) that are the same as other medications prescribed for you. Read the directions carefully, and ask your doctor or other care provider to review them with you.             Protect others around you: Learn how to safely use, store and throw away your medicines at www.disposemymeds.org.             Medication List: This is a list of all your medications and when to take them. Check marks below indicate your daily home schedule. Keep this list as a reference.      Medications           Morning Afternoon Evening Bedtime As Needed    acetaminophen 325 MG tablet   Commonly known as:  TYLENOL   Take 2 tablets (650 mg) by mouth every 4 hours as needed for other (mild pain)                                 albuterol 108 (90 Base) MCG/ACT Inhaler   Commonly known as:  PROAIR HFA/PROVENTIL HFA/VENTOLIN HFA   Inhale 2 puffs into the lungs every 4 hours as needed for shortness of breath / dyspnea                                amiodarone 200 MG tablet   Commonly known as:  PACERONE/CODARONE   Take 0.5 tablets (100 mg) by mouth daily                                buPROPion 150 MG 12 hr tablet   Commonly known as:  WELLBUTRIN SR   Take 1 tablet (150 mg) by mouth 2 times daily                                cefuroxime 250 MG tablet   Commonly known as:  CEFTIN   Take 1 tablet (250 mg) by mouth 2 times daily                                cetirizine 10 MG tablet   Commonly known as:  zyrTEC   TAKE 1 TABLET BY MOUTH EVERY EVENING                                fluticasone 50 MCG/ACT spray   Commonly known as:  FLONASE   USE ONE TO TWO SPRAYS IN EACH NOSTRIL EVERY DAY                                fluticasone-salmeterol 250-50 MCG/DOSE diskus inhaler   Commonly known as:  ADVAIR   Inhale 1 puff into the lungs 2 times daily                                ipratropium - albuterol 0.5 mg/2.5 mg/3 mL 0.5-2.5 (3) MG/3ML neb solution   Commonly known as:  DUONEB   TAKE 1 VIAL (3 MLS) BY NEBULIZATION EVERY 4 HOURS AS NEEDED FOR SHORTNESS OF BREATH / DYSPNEA OR WHEEZING                                losartan 50 MG tablet   Commonly known as:  COZAAR   TAKE 2 TABLETS (100MG) BY MOUTH EVERY DAY                                methylPREDNISolone 4 MG tablet   Commonly known as:  MEDROL DOSEPAK   Follow package instructions                                metoprolol succinate 50 MG 24 hr tablet   Commonly known as:  TOPROL-XL   Take 1 tablet (50 mg) by mouth daily                                montelukast 10 MG tablet   Commonly known as:  SINGULAIR   TAKE 1 TABLET BY MOUTH DAILY AT BEDTIME                                multivitamin, therapeutic Tabs tablet   Take 1 tablet by mouth daily                                 nebulizer/tubing/mouthpiece Kit   Use every 4-6 hours PRN                                order for DME   Equipment being ordered: Nebulizer                                potassium chloride SA 20 MEQ CR tablet   Commonly known as:  K-DUR/KLOR-CON M   Take 1 tablet (20 mEq) by mouth daily                                senna-docusate 8.6-50 MG per tablet   Commonly known as:  SENOKOT-S;PERICOLACE   Take 1-2 tablets by mouth 2 times daily Take while on oral narcotics to prevent or treat constipation.                                torsemide 20 MG tablet   Commonly known as:  DEMADEX   Take 10 mg po daily. Take an extra 10mg po daily as needed for swelling.                                * warfarin 2.5 MG tablet   Commonly known as:  JANTOVEN   Take 7.5 mg (3 tablets) on Tuesday, Thursday, Saturday and 5 mg (2 tablets) all other days or as directed by coumadin clinic.                                * warfarin 2.5 MG tablet   Commonly known as:  JANTOVEN   Take 7.5 mg (3 tabs) on Tues, Thurs, Sat and 5 mg (2 tabs)all other days, or as directed by the coumadin clinic.                                * Notice:  This list has 2 medication(s) that are the same as other medications prescribed for you. Read the directions carefully, and ask your doctor or other care provider to review them with you.

## 2022-06-22 ENCOUNTER — DOCUMENTATION ONLY (OUTPATIENT)
Dept: ANTICOAGULATION | Facility: CLINIC | Age: 72
End: 2022-06-22

## 2022-06-22 DIAGNOSIS — Z79.01 LONG TERM CURRENT USE OF ANTICOAGULANT THERAPY: ICD-10-CM

## 2022-06-22 DIAGNOSIS — I48.91 ATRIAL FIBRILLATION WITH RVR (H): Primary | ICD-10-CM

## 2022-06-22 NOTE — PROGRESS NOTES
ANTICOAGULATION  MANAGEMENT    Home Valdez is being discharged from the [facility] Anticoagulation Management Program ([facility]).    Reason for discharge:     Anticoagulation episode resolved, ACC referral closed and Standing order discontinued      Anni Benavidez RN

## 2022-10-21 NOTE — MR AVS SNAPSHOT
[facility]   1/5/2018 4:15 PM   Anticoagulation Therapy Visit    Description:  67 year old male   Provider:  [facility]   Department:  [facility]           INR as of 1/5/2018     Today's INR 3.7!      Anticoagulation Summary as of 1/5/2018     INR goal 2.0-3.0   Today's INR 3.7!   Full instructions 1/6: 5 mg; Otherwise 5 mg on Mon, Wed, Fri; 7.5 mg all other days   Next INR check 1/19/2018    Indications   Atrial fibrillation with RVR (H) [I48.91]  Long-term (current) use of anticoagulants [Z79.01] [Z79.01]         Your next Anticoagulation Clinic appointment(s)     Jan 05, 2018  4:15 PM CST   Anticoagulation [facility] with  ANTI COAG   [facility]    [address] [facility] [address]   [phone]            Jan 19, 2018  8:15 AM CST   Anticoagulation Visit with  ANTI COAG   [facility])    [address] [facility] [address]   [phone]              Contact Numbers     Clinic Number:         January 2018 Details    Sun Mon Tue Wed Thu Fri Sat      1               2               3               4               5      5 mg   See details      6      5 mg           7      7.5 mg         8      5 mg         9      7.5 mg         10      5 mg         11      7.5 mg         12      5 mg         13      7.5 mg           14      7.5 mg         15      5 mg         16      7.5 mg         17      5 mg         18      7.5 mg         19            20                 21               22               23               24               25               26               27                 28               29               30               31                   Date Details   01/05 This INR check       Date of next INR:  1/19/2018         How to take your warfarin dose     To take:  5 mg Take 2 of the 2.5 mg tablets.    To take:  7.5 mg Take 3 of the 2.5 mg tablets.            Group Therapy Note    Date: 10/21/2022    Group Start Time: 1000  Group End Time: 1100  Group Topic: Psychoeducation    [facility], [facility]        Group Therapy Note    Attendees: 10       Patient's Goal:  \"to go to Rehab for treatment\"    Notes:  Pt. attended the 1000 skill group. Worked on drawing and was very talkative with other male pt. Happy to be going to treatment. Status After Intervention:  Improved    Participation Level:  Active Listener and Interactive    Participation Quality: Appropriate, Attentive, and Sharing      Speech:  normal      Thought Process/Content: Logical      Affective Functioning: Congruent      Mood:  calm      Level of consciousness:  Alert, Oriented x4, and Attentive      Response to Learning: Progressing to goal      Endings: None Reported    Modes of Intervention: Education, Support, Socialization, and Activity      Discipline Responsible: Psychoeducational Specialist      Signature:  Chu Villalobos

## 2023-11-06 NOTE — MR AVS SNAPSHOT
[facility]   7/7/2017 9:45 AM   Anticoagulation Therapy Visit    Description:  67 year old male   Provider:  CAMILO ANTI [facility]AG   Department:   Anticoag           INR as of 7/7/2017     Today's INR 2.0      Anticoagulation Summary as of 7/7/2017     INR goal 2.0-3.0   Today's INR 2.0   Full instructions 5 mg on Mon; 7.5 mg all other days   Next INR check 7/21/2017    Indications   Atrial fibrillation with RVR (H) [I48.91]  Long-term (current) use of anticoagulants [Z79.01] [Z79.01]         Your next Anticoagulation Clinic appointment(s)     Jul 07, 2017  9:45 AM CDT   [facility]  [facility]   [facility]    [address] [facility] [address]   [phone]            Jul 21, 2017  4:15 PM CDT   [facility] with  [facility]   [facility]    [address] [facility] [address]   [phone]              Contact Numbers     Clinic Number:         July 2017 Details    Sun Mon Tue Wed Thu Fri Sat           1                 2               3               4               5               6               7      7.5 mg   See details      8      7.5 mg           9      7.5 mg         10      5 mg         11      7.5 mg         12      7.5 mg         13      7.5 mg         14      7.5 mg         15      7.5 mg           16      7.5 mg         17      5 mg         18      7.5 mg         19      7.5 mg         20      7.5 mg         21            22                 23               24               25               26               27               28               29                 30               31                     Date Details   07/07 This INR check       Date of next INR:  7/21/2017         How to take your warfarin dose     To take:  5 mg Take 2 of the 2.5 mg tablets.    To take:  7.5 mg Take 3 of the 2.5 mg tablets.            Occupational Therapy    Visit Type: treatment  SUBJECTIVE  Patient agreed to participate in therapy this date.    Pt states \"I would be scared to drive\", pt educated on  readiness and need to be cleared by MD prior to attempting driving  Patient participated in all scheduled therapy time this session.    Patient / Family Goal: maximize function and return home    Pain   Patient does not demonstrate pain behaviors.    OBJECTIVE      Patient Activity Tolerance: 2 to 1 activity to rest             Vision  Current vision: wears readers at baselines.  Glasses: reading only  Patient Reported: bumping into objects, difficulty reading, difficulty judging depths and balance difficulty  Last eye care appointment: can not remember, has not been recently  Head Position   - normal  Vision Attention   - impaired (deficits with losing place with reading, left inattention)  Pupil Symmetry: symmetrical   - Corneal Reflection: intact   - Pupil Appearance/Size: Left: constricted; Right: normal   - Eyelid Function: Left: abnormal (baseline drooping); Right: normal  Acuity Measures  Scale used: Snellen   - Far Acuity: Left: 20/50; Right: 20/40; Binocular: 20/30   - Near Acuity: Left: 20/100; Right: 20/50; Binocular: 20/80     - Eye Dominance: left  Reading Assessments   - Readin.25M  Visual Field Testing   - Left: left visual field loss; Right: intact  Oculomotor function   - Eye ROM: Left: within normal limits; Right: within normal limits   - Saccades: intact   - Tracking/Visual Pursuits: intact   - Convergence:      • < 6 cm     • Trial 2: < 6 cm     • Trial 2: < 6 cm  Ocular Alignment:  - Hypertropia (Left eye drifts superiorly)  Double Vision: denies double vision   - Cover/Uncover test: negative   - Alternate Cover/Uncover test: negative  Visual Attention/Neglect: impaired left  Visual Perceptual    - left inattention and/or neglect - present  Baseline Left eye lid droop.           Interventions  Vision / Visual  Perceptual  Pt educated on [place] strategy, and then worked on using the strategy to match socks. Pt needed minimal cues for location of 2 socks on left side.     Pt given handout for  readiness program.            ASSESSMENT  Impairments: balance, coordination/proprioception, range of motion, vision, safety awareness and strength  Functional Limitations: functional mobility, grooming, toileting, community reintegration, dressing, IADLs, bathing, functional transfers, participating in meaningful/purposeful activities, job performance and showering    Treatment today focused on vision assessment. Pt demonstrates severe deficits with Left field (both upper and lower quadrants) and visual inattention.  Patient is demonstrating fair progress supported by good understanding of visual deficits.    Patient limited at this time by balance, and visual deficits.  Patient will benefit from further skilled OT  for continued training with visual strategies, ADLs and functional mobility to help the patient meet goal of safe return to home alone.           Discharge Recommendations:  Recommendation for Discharge Location: OT [place]: Home with outpatient therapy  Recommendation for Discharge Support: OT [place]: Assistance with IADLs  PT/OT Mobility Equipment for Discharge: To be determined -does not own any AE.  PT/OT ADL Equipment for Discharge: To be determined -does not own any AE.  OT Identified Barriers to Discharge: Vision deficits, lives alone      Progress: improving as expected    Therapy Participation: This patient participated in all scheduled occupational therapy time this session.    Education:   - Present and ready to learn: patient  Education provided during session:  - See body of note.  - Results of above outlined education: Needs reinforcement    Patient at End of Session:   Location: in chair  Safety measures: alarm system in place/re-engaged, lines intact and call light within reach  Handoff to:  family/caregiver    PLAN  Suggestions for next session as indicated: Treatment plan for next session: Jse:, Gonsalo neglect assessment during ADLs  Additional treatment options: assess LUE stereognosis and proprioception, LUE coordination   Plan considerations:  Outcome measures: [place] hand and arm stage, 9 hole peg, box and block and  strength assessed 11/4, Visual assessment 11/6  Family/Care partner training details:  Hospital equipment in room:      Interventions: activity tolerance training, caregiver training, community reintegration, continued evaluation, functional transfer training, IADL, neuromuscular reeducation, patient/family training, safety training, therapeutic activity, upper extremity strengthening/ROM, ADL retraining, coordination, equipment eval/education, therapeutic exercise, balance, fine motor coordination activities and transfer training Frequency: 5-7 days per week  Frequency Comments: 90 min per day at least 5 days per week   Duration: 11/13/2023      Agreement to plan and goals: patient agrees with goals and treatment plan      GOALS  Review Date: 11/10/2023  Long Term Goals (LTGs): to be met by discharge from rehab program.  - Patient will complete grooming at modified Independent level with adaptive equipment as deemed appropriate.  - Patient will complete bathing at modified Independent level with adaptive equipment as deemed appropriate.  - Patient will complete upper body dressing at modified Independent level with adaptive equipment as deemed appropriate.  - Patient will complete lower body dressing at modified Independent level with adaptive equipment as deemed appropriate.  - Patient will complete toileting at modified Independent level with adaptive equipment as deemed appropriate.  - Patient will complete toilet transfer at modified Independent level with adaptive equipment as deemed appropriate.  - Patient will complete tub transfer at modified Independent level with  adaptive equipment as deemed appropriate.  - Patient will complete light meal prep at modified Independent level.  - Patient will complete medication management at modified Independent level.  - Patient will complete home cleaning tasks at modified Independent level.    Documented in the chart in the following areas: Assessment/Plan. Plan.      Therapy procedure time and total treatment time can be found documented on the Time Entry flowsheet

## 2024-08-19 NOTE — PATIENT INSTRUCTIONS
1. Please return to clinic in 3 months for follow-up with Dr. Orozco.     2. Please call the neurosurgery clinic at [phone] with further questions or concerns.    Report endorsed to 2 [facility] RN

## (undated) DEVICE — SYR 01ML 27GA 0.5" NDL TBC 309623

## (undated) DEVICE — ADPT 5 IN 1 360

## (undated) DEVICE — DRSG GAUZE 4X4" TRAY 6939

## (undated) DEVICE — DRSG STERI STRIP 1/4X3" R1541

## (undated) DEVICE — NDL BX BONE MARROW 11GA 4" JAMSHIDI  DJ4011X

## (undated) DEVICE — NDL 15GA 1.5" 8881200029

## (undated) DEVICE — ADAPTER CATH CHECK-FLO 9FR FLL 050885 G15476

## (undated) DEVICE — NDL ECLIPSE 18GA 1.5"

## (undated) DEVICE — DRAPE MAYO STAND 23X54 8337

## (undated) DEVICE — LINEN TOWEL PACK X5 5464

## (undated) DEVICE — CATH FOGARTY EMBOLECTOMY 4FR 40CM LATEX 120404F

## (undated) DEVICE — PADS CPR ELEC ZOLL ONE-STEP ADULT 8900-0224-01

## (undated) DEVICE — LABEL MEDICATION SYSTEM 3303-P

## (undated) DEVICE — GLOVE PROTEXIS W/NEU-THERA 7.5  2D73TE75

## (undated) DEVICE — NDL COUNTER 20CT 31142493

## (undated) DEVICE — SYR INFLATOR KYPHX  A08A

## (undated) DEVICE — TAPE DURAPORE 3" SILK 1538-3

## (undated) DEVICE — PREP CHLORAPREP CLEAR 3ML 260400

## (undated) DEVICE — GLOVE PROTEXIS MICRO 7.5  2D73PM75

## (undated) DEVICE — DRAPE SHEET MED 44X70" 9355

## (undated) DEVICE — NDL 19GA 1.5" FILTER 305200

## (undated) DEVICE — DRSG BANDAID 1X3" FABRIC CURITY LATEX FREE KC44101

## (undated) RX ORDER — FENTANYL CITRATE 50 UG/ML
INJECTION, SOLUTION INTRAMUSCULAR; INTRAVENOUS
Status: DISPENSED
Start: 2017-11-07

## (undated) RX ORDER — LIDOCAINE HYDROCHLORIDE 40 MG/ML
SOLUTION TOPICAL
Status: DISPENSED
Start: 2018-09-25

## (undated) RX ORDER — GLYCOPYRROLATE 0.2 MG/ML
INJECTION, SOLUTION INTRAMUSCULAR; INTRAVENOUS
Status: DISPENSED
Start: 2017-11-07

## (undated) RX ORDER — ACETAMINOPHEN 325 MG/1
TABLET ORAL
Status: DISPENSED
Start: 2017-11-07

## (undated) RX ORDER — IOPAMIDOL 612 MG/ML
INJECTION, SOLUTION INTRATHECAL
Status: DISPENSED
Start: 2017-06-03

## (undated) RX ORDER — IPRATROPIUM BROMIDE AND ALBUTEROL SULFATE 2.5; .5 MG/3ML; MG/3ML
SOLUTION RESPIRATORY (INHALATION)
Status: DISPENSED
Start: 2017-11-07

## (undated) RX ORDER — IPRATROPIUM BROMIDE AND ALBUTEROL SULFATE 2.5; .5 MG/3ML; MG/3ML
SOLUTION RESPIRATORY (INHALATION)
Status: DISPENSED
Start: 2017-06-05

## (undated) RX ORDER — GLYCOPYRROLATE 0.2 MG/ML
INJECTION, SOLUTION INTRAMUSCULAR; INTRAVENOUS
Status: DISPENSED
Start: 2018-09-25

## (undated) RX ORDER — PROPOFOL 10 MG/ML
INJECTION, EMULSION INTRAVENOUS
Status: DISPENSED
Start: 2017-11-07

## (undated) RX ORDER — LEVOFLOXACIN 5 MG/ML
INJECTION, SOLUTION INTRAVENOUS
Status: DISPENSED
Start: 2017-11-07

## (undated) RX ORDER — IOPAMIDOL 612 MG/ML
INJECTION, SOLUTION INTRATHECAL
Status: DISPENSED
Start: 2017-11-07

## (undated) RX ORDER — EPHEDRINE SULFATE 50 MG/ML
INJECTION, SOLUTION INTRAMUSCULAR; INTRAVENOUS; SUBCUTANEOUS
Status: DISPENSED
Start: 2017-06-05

## (undated) RX ORDER — FENTANYL CITRATE 50 UG/ML
INJECTION, SOLUTION INTRAMUSCULAR; INTRAVENOUS
Status: DISPENSED
Start: 2017-06-05

## (undated) RX ORDER — PHENYLEPHRINE HCL IN 0.9% NACL 1 MG/10 ML
SYRINGE (ML) INTRAVENOUS
Status: DISPENSED
Start: 2017-06-05

## (undated) RX ORDER — FENTANYL CITRATE 50 UG/ML
INJECTION, SOLUTION INTRAMUSCULAR; INTRAVENOUS
Status: DISPENSED
Start: 2018-09-25

## (undated) RX ORDER — PROPOFOL 10 MG/ML
INJECTION, EMULSION INTRAVENOUS
Status: DISPENSED
Start: 2017-06-05

## (undated) RX ORDER — GLYCOPYRROLATE 0.2 MG/ML
INJECTION, SOLUTION INTRAMUSCULAR; INTRAVENOUS
Status: DISPENSED
Start: 2017-06-05

## (undated) RX ORDER — CEFAZOLIN SODIUM 2 G/100ML
INJECTION, SOLUTION INTRAVENOUS
Status: DISPENSED
Start: 2017-11-07

## (undated) RX ORDER — FLUMAZENIL 0.1 MG/ML
INJECTION, SOLUTION INTRAVENOUS
Status: DISPENSED
Start: 2018-09-25

## (undated) RX ORDER — REGADENOSON 0.08 MG/ML
INJECTION, SOLUTION INTRAVENOUS
Status: DISPENSED
Start: 2018-09-24

## (undated) RX ORDER — HYDRALAZINE HYDROCHLORIDE 20 MG/ML
INJECTION INTRAMUSCULAR; INTRAVENOUS
Status: DISPENSED
Start: 2017-11-07

## (undated) RX ORDER — LIDOCAINE HYDROCHLORIDE 20 MG/ML
INJECTION, SOLUTION EPIDURAL; INFILTRATION; INTRACAUDAL; PERINEURAL
Status: DISPENSED
Start: 2017-06-05

## (undated) RX ORDER — GABAPENTIN 300 MG/1
CAPSULE ORAL
Status: DISPENSED
Start: 2017-11-07

## (undated) RX ORDER — ONDANSETRON 2 MG/ML
INJECTION INTRAMUSCULAR; INTRAVENOUS
Status: DISPENSED
Start: 2017-06-05

## (undated) RX ORDER — KETAMINE HCL IN 0.9 % NACL 50 MG/5 ML
SYRINGE (ML) INTRAVENOUS
Status: DISPENSED
Start: 2018-05-03

## (undated) RX ORDER — LIDOCAINE HYDROCHLORIDE 20 MG/ML
INJECTION, SOLUTION EPIDURAL; INFILTRATION; INTRACAUDAL; PERINEURAL
Status: DISPENSED
Start: 2017-11-07

## (undated) RX ORDER — NALOXONE HYDROCHLORIDE 0.4 MG/ML
INJECTION, SOLUTION INTRAMUSCULAR; INTRAVENOUS; SUBCUTANEOUS
Status: DISPENSED
Start: 2018-09-25

## (undated) RX ORDER — ONDANSETRON 2 MG/ML
INJECTION INTRAMUSCULAR; INTRAVENOUS
Status: DISPENSED
Start: 2017-11-07

## (undated) RX ORDER — KETAMINE HYDROCHLORIDE 10 MG/ML
INJECTION, SOLUTION INTRAMUSCULAR; INTRAVENOUS
Status: DISPENSED
Start: 2017-06-05

## (undated) RX ORDER — ROCURONIUM BROMIDE 50 MG/5 ML
SYRINGE (ML) INTRAVENOUS
Status: DISPENSED
Start: 2017-11-07